# Patient Record
Sex: MALE | Race: WHITE | Employment: OTHER | URBAN - METROPOLITAN AREA
[De-identification: names, ages, dates, MRNs, and addresses within clinical notes are randomized per-mention and may not be internally consistent; named-entity substitution may affect disease eponyms.]

---

## 2018-04-26 ENCOUNTER — OFFICE VISIT (OUTPATIENT)
Dept: FAMILY MEDICINE CLINIC | Age: 83
End: 2018-04-26
Payer: MEDICARE

## 2018-04-26 VITALS
HEIGHT: 69 IN | DIASTOLIC BLOOD PRESSURE: 80 MMHG | OXYGEN SATURATION: 98 % | TEMPERATURE: 98.3 F | BODY MASS INDEX: 24.9 KG/M2 | WEIGHT: 168.12 LBS | SYSTOLIC BLOOD PRESSURE: 128 MMHG | RESPIRATION RATE: 16 BRPM | HEART RATE: 90 BPM

## 2018-04-26 DIAGNOSIS — E78.00 PURE HYPERCHOLESTEROLEMIA: Primary | ICD-10-CM

## 2018-04-26 DIAGNOSIS — M15.9 PRIMARY OSTEOARTHRITIS INVOLVING MULTIPLE JOINTS: ICD-10-CM

## 2018-04-26 DIAGNOSIS — I10 ESSENTIAL HYPERTENSION: ICD-10-CM

## 2018-04-26 PROCEDURE — G8427 DOCREV CUR MEDS BY ELIG CLIN: HCPCS | Performed by: FAMILY MEDICINE

## 2018-04-26 PROCEDURE — 1123F ACP DISCUSS/DSCN MKR DOCD: CPT | Performed by: FAMILY MEDICINE

## 2018-04-26 PROCEDURE — 1036F TOBACCO NON-USER: CPT | Performed by: FAMILY MEDICINE

## 2018-04-26 PROCEDURE — 99213 OFFICE O/P EST LOW 20 MIN: CPT | Performed by: FAMILY MEDICINE

## 2018-04-26 PROCEDURE — G8420 CALC BMI NORM PARAMETERS: HCPCS | Performed by: FAMILY MEDICINE

## 2018-04-26 PROCEDURE — 4040F PNEUMOC VAC/ADMIN/RCVD: CPT | Performed by: FAMILY MEDICINE

## 2018-04-26 RX ORDER — LISINOPRIL AND HYDROCHLOROTHIAZIDE 25; 20 MG/1; MG/1
1 TABLET ORAL DAILY
Qty: 90 TABLET | Refills: 1 | Status: SHIPPED | OUTPATIENT
Start: 2018-04-26 | End: 2018-06-14 | Stop reason: SDUPTHER

## 2018-04-26 RX ORDER — AMLODIPINE BESYLATE 2.5 MG/1
2.5 TABLET ORAL DAILY
Qty: 90 TABLET | Refills: 1 | Status: SHIPPED | OUTPATIENT
Start: 2018-04-26 | End: 2018-06-14 | Stop reason: SDUPTHER

## 2018-04-26 RX ORDER — FINASTERIDE 5 MG/1
5 TABLET, FILM COATED ORAL DAILY
Qty: 90 TABLET | Refills: 1 | Status: SHIPPED | OUTPATIENT
Start: 2018-04-26 | End: 2018-06-14 | Stop reason: SDUPTHER

## 2018-06-14 RX ORDER — LISINOPRIL AND HYDROCHLOROTHIAZIDE 25; 20 MG/1; MG/1
1 TABLET ORAL DAILY
Qty: 90 TABLET | Refills: 1 | Status: SHIPPED | OUTPATIENT
Start: 2018-06-14 | End: 2018-10-09 | Stop reason: SDUPTHER

## 2018-06-14 RX ORDER — FINASTERIDE 5 MG/1
5 TABLET, FILM COATED ORAL DAILY
Qty: 90 TABLET | Refills: 1 | Status: SHIPPED | OUTPATIENT
Start: 2018-06-14 | End: 2019-01-16 | Stop reason: SDUPTHER

## 2018-06-14 RX ORDER — AMLODIPINE BESYLATE 2.5 MG/1
2.5 TABLET ORAL DAILY
Qty: 90 TABLET | Refills: 1 | Status: SHIPPED | OUTPATIENT
Start: 2018-06-14 | End: 2018-10-09 | Stop reason: SDUPTHER

## 2018-07-24 ENCOUNTER — NURSE ONLY (OUTPATIENT)
Dept: FAMILY MEDICINE CLINIC | Age: 83
End: 2018-07-24
Payer: MEDICARE

## 2018-07-24 ENCOUNTER — HOSPITAL ENCOUNTER (OUTPATIENT)
Age: 83
Discharge: HOME | End: 2018-07-26
Payer: MEDICARE

## 2018-07-24 DIAGNOSIS — E78.00 PURE HYPERCHOLESTEROLEMIA: ICD-10-CM

## 2018-07-24 DIAGNOSIS — I10 ESSENTIAL HYPERTENSION: ICD-10-CM

## 2018-07-24 LAB
ALBUMIN SERPL-MCNC: 3.9 G/DL (ref 3.5–5.2)
ALP BLD-CCNC: 61 U/L (ref 40–129)
ALT SERPL-CCNC: 9 U/L (ref 0–40)
ANION GAP SERPL CALCULATED.3IONS-SCNC: 19 MMOL/L (ref 7–16)
AST SERPL-CCNC: 15 U/L (ref 0–39)
BASOPHILS ABSOLUTE: 0.08 E9/L (ref 0–0.2)
BASOPHILS RELATIVE PERCENT: 0.9 % (ref 0–2)
BILIRUB SERPL-MCNC: 0.4 MG/DL (ref 0–1.2)
BUN BLDV-MCNC: 26 MG/DL (ref 8–23)
CALCIUM SERPL-MCNC: 9.4 MG/DL (ref 8.6–10.2)
CHLORIDE BLD-SCNC: 100 MMOL/L (ref 98–107)
CHOLESTEROL, TOTAL: 152 MG/DL (ref 0–199)
CO2: 25 MMOL/L (ref 22–29)
CREAT SERPL-MCNC: 1.5 MG/DL (ref 0.7–1.2)
EOSINOPHILS ABSOLUTE: 0.07 E9/L (ref 0.05–0.5)
EOSINOPHILS RELATIVE PERCENT: 0.8 % (ref 0–6)
GFR AFRICAN AMERICAN: 54
GFR NON-AFRICAN AMERICAN: 44 ML/MIN/1.73
GLUCOSE BLD-MCNC: 93 MG/DL (ref 74–109)
HCT VFR BLD CALC: 35.3 % (ref 37–54)
HDLC SERPL-MCNC: 42 MG/DL
HEMOGLOBIN: 11.6 G/DL (ref 12.5–16.5)
IMMATURE GRANULOCYTES #: 0.02 E9/L
IMMATURE GRANULOCYTES %: 0.2 % (ref 0–5)
LDL CHOLESTEROL CALCULATED: 83 MG/DL (ref 0–99)
LYMPHOCYTES ABSOLUTE: 1.58 E9/L (ref 1.5–4)
LYMPHOCYTES RELATIVE PERCENT: 18.2 % (ref 20–42)
MCH RBC QN AUTO: 31.9 PG (ref 26–35)
MCHC RBC AUTO-ENTMCNC: 32.9 % (ref 32–34.5)
MCV RBC AUTO: 97 FL (ref 80–99.9)
MONOCYTES ABSOLUTE: 0.6 E9/L (ref 0.1–0.95)
MONOCYTES RELATIVE PERCENT: 6.9 % (ref 2–12)
NEUTROPHILS ABSOLUTE: 6.35 E9/L (ref 1.8–7.3)
NEUTROPHILS RELATIVE PERCENT: 73 % (ref 43–80)
PDW BLD-RTO: 12.8 FL (ref 11.5–15)
PLATELET # BLD: 212 E9/L (ref 130–450)
PMV BLD AUTO: 11.4 FL (ref 7–12)
POTASSIUM SERPL-SCNC: 3.7 MMOL/L (ref 3.5–5)
RBC # BLD: 3.64 E12/L (ref 3.8–5.8)
SODIUM BLD-SCNC: 144 MMOL/L (ref 132–146)
TOTAL PROTEIN: 7.3 G/DL (ref 6.4–8.3)
TRIGL SERPL-MCNC: 137 MG/DL (ref 0–149)
VLDLC SERPL CALC-MCNC: 27 MG/DL
WBC # BLD: 8.7 E9/L (ref 4.5–11.5)

## 2018-07-24 PROCEDURE — 36415 COLL VENOUS BLD VENIPUNCTURE: CPT | Performed by: FAMILY MEDICINE

## 2018-07-24 PROCEDURE — 85025 COMPLETE CBC W/AUTO DIFF WBC: CPT

## 2018-07-24 PROCEDURE — 80053 COMPREHEN METABOLIC PANEL: CPT

## 2018-07-24 PROCEDURE — 80061 LIPID PANEL: CPT

## 2018-07-26 ENCOUNTER — OFFICE VISIT (OUTPATIENT)
Dept: FAMILY MEDICINE CLINIC | Age: 83
End: 2018-07-26
Payer: MEDICARE

## 2018-07-26 VITALS
SYSTOLIC BLOOD PRESSURE: 126 MMHG | HEIGHT: 69 IN | BODY MASS INDEX: 25.59 KG/M2 | HEART RATE: 61 BPM | WEIGHT: 172.8 LBS | RESPIRATION RATE: 16 BRPM | DIASTOLIC BLOOD PRESSURE: 72 MMHG | TEMPERATURE: 97.4 F | OXYGEN SATURATION: 94 %

## 2018-07-26 DIAGNOSIS — N18.30 CKD (CHRONIC KIDNEY DISEASE) STAGE 3, GFR 30-59 ML/MIN (HCC): ICD-10-CM

## 2018-07-26 DIAGNOSIS — D50.0 IRON DEFICIENCY ANEMIA DUE TO CHRONIC BLOOD LOSS: ICD-10-CM

## 2018-07-26 DIAGNOSIS — I10 ESSENTIAL HYPERTENSION: ICD-10-CM

## 2018-07-26 DIAGNOSIS — E78.00 PURE HYPERCHOLESTEROLEMIA: Primary | ICD-10-CM

## 2018-07-26 DIAGNOSIS — M15.9 PRIMARY OSTEOARTHRITIS INVOLVING MULTIPLE JOINTS: ICD-10-CM

## 2018-07-26 PROCEDURE — 1036F TOBACCO NON-USER: CPT | Performed by: FAMILY MEDICINE

## 2018-07-26 PROCEDURE — G8417 CALC BMI ABV UP PARAM F/U: HCPCS | Performed by: FAMILY MEDICINE

## 2018-07-26 PROCEDURE — 1101F PT FALLS ASSESS-DOCD LE1/YR: CPT | Performed by: FAMILY MEDICINE

## 2018-07-26 PROCEDURE — G8427 DOCREV CUR MEDS BY ELIG CLIN: HCPCS | Performed by: FAMILY MEDICINE

## 2018-07-26 PROCEDURE — 4040F PNEUMOC VAC/ADMIN/RCVD: CPT | Performed by: FAMILY MEDICINE

## 2018-07-26 PROCEDURE — 99214 OFFICE O/P EST MOD 30 MIN: CPT | Performed by: FAMILY MEDICINE

## 2018-07-26 PROCEDURE — 1123F ACP DISCUSS/DSCN MKR DOCD: CPT | Performed by: FAMILY MEDICINE

## 2018-07-26 NOTE — PROGRESS NOTES
venous distension, lymphadenopathy or thyromegaly Trachea midline  Lungs: Lungs clear to auscultation bilaterally. No ronchi, crackles or wheezes  Heart: S1 S2  Regular rate and rhythm. No rub, murmur or gallop  Abdomen: Abdomen soft, non-tender. BS normal. No masses, organomegaly  Extremities: Arthritic changes are noted. Movements are limited. Pedal pulses are normal.  Musculoskeletal: Muscular strength appears intact. No joint effusion, tenderness, swelling or warmth  Neuro:  No focal motor or sensory deficits        ASSESSMENT     Patient Active Problem List    Diagnosis Date Noted    Primary osteoarthritis involving multiple joints 09/24/2012     Priority: High     Class: Chronic    Pure hypercholesterolemia      Priority: High     Class: Chronic    Essential hypertension      Priority: High     Class: Chronic    Iron deficiency anemia due to chronic blood loss 07/26/2018    CKD (chronic kidney disease) stage 3, GFR 30-59 ml/min 07/26/2018        Diagnosis:     ICD-10-CM ICD-9-CM    1. Pure hypercholesterolemia E78.00 272.0     CONTROLLED   2. Essential hypertension I10 401.9     CONTROLLED   3. Primary osteoarthritis involving multiple joints M15.0 715.09     STABLE   4. Iron deficiency anemia due to chronic blood loss D50.0 280.0 [facility] [address] [address]    PERSIOSTANT   5. CKD (chronic kidney disease) stage 3, GFR 30-59 ml/min N18.3 585.3        PLAN:      LABORATORY RESULTS 7/25/2018/REVIEWED AND DISCUSSED. Patient Instructions   LOW SALT  AND LOW FAT DIET. CONTINUE CURRENT MEDICATIONS TAKING REGULARLY. REGULAR WALKING ADVISED. SEE DR. Lin Snyder AS SCHEDULED. NEXT APPOINTMENT IN 2 MONTHS. Return in about 2 months (around 9/26/2018). I have reviewed my findings and recommendations with Kali Head.     Electronically signed by Fernando Vargas MD on 7/26/18 at 10:58 AM

## 2018-08-27 ENCOUNTER — HOSPITAL ENCOUNTER (OUTPATIENT)
Age: 83
Discharge: HOME | End: 2018-08-27
Payer: MEDICARE

## 2018-08-27 LAB
BASOPHILS ABSOLUTE: 0.06 E9/L (ref 0–0.2)
BASOPHILS RELATIVE PERCENT: 0.9 % (ref 0–2)
EOSINOPHILS ABSOLUTE: 0.06 E9/L (ref 0.05–0.5)
EOSINOPHILS RELATIVE PERCENT: 0.9 % (ref 0–6)
FERRITIN: 123 NG/ML
FOLATE: >20 NG/ML (ref 4.8–24.2)
HCT VFR BLD CALC: 37 % (ref 37–54)
HEMOGLOBIN: 12.3 G/DL (ref 12.5–16.5)
IMMATURE GRANULOCYTES #: 0.02 E9/L
IMMATURE GRANULOCYTES %: 0.3 % (ref 0–5)
IRON SATURATION: 35 % (ref 20–55)
IRON: 108 MCG/DL (ref 59–158)
LYMPHOCYTES ABSOLUTE: 1.93 E9/L (ref 1.5–4)
LYMPHOCYTES RELATIVE PERCENT: 29.4 % (ref 20–42)
MCH RBC QN AUTO: 31.5 PG (ref 26–35)
MCHC RBC AUTO-ENTMCNC: 33.2 % (ref 32–34.5)
MCV RBC AUTO: 94.6 FL (ref 80–99.9)
MONOCYTES ABSOLUTE: 0.48 E9/L (ref 0.1–0.95)
MONOCYTES RELATIVE PERCENT: 7.3 % (ref 2–12)
NEUTROPHILS ABSOLUTE: 4.02 E9/L (ref 1.8–7.3)
NEUTROPHILS RELATIVE PERCENT: 61.2 % (ref 43–80)
PDW BLD-RTO: 12.2 FL (ref 11.5–15)
PLATELET # BLD: 207 E9/L (ref 130–450)
PMV BLD AUTO: 11 FL (ref 7–12)
RBC # BLD: 3.91 E12/L (ref 3.8–5.8)
TOTAL IRON BINDING CAPACITY: 309 MCG/DL (ref 250–450)
VITAMIN B-12: 1045 PG/ML (ref 211–946)
WBC # BLD: 6.6 E9/L (ref 4.5–11.5)

## 2018-08-27 PROCEDURE — 85025 COMPLETE CBC W/AUTO DIFF WBC: CPT

## 2018-08-27 PROCEDURE — 36415 COLL VENOUS BLD VENIPUNCTURE: CPT

## 2018-08-27 PROCEDURE — 83540 ASSAY OF IRON: CPT

## 2018-08-27 PROCEDURE — 82746 ASSAY OF FOLIC ACID SERUM: CPT

## 2018-08-27 PROCEDURE — 82607 VITAMIN B-12: CPT

## 2018-08-27 PROCEDURE — 82728 ASSAY OF FERRITIN: CPT

## 2018-08-27 PROCEDURE — 83550 IRON BINDING TEST: CPT

## 2018-09-24 ENCOUNTER — HOSPITAL ENCOUNTER (OUTPATIENT)
Age: 83
Discharge: HOME | End: 2018-09-24
Payer: MEDICARE

## 2018-09-24 LAB
HCT VFR BLD CALC: 38.4 % (ref 37–54)
HEMOGLOBIN: 12.7 G/DL (ref 12.5–16.5)
MCH RBC QN AUTO: 31 PG (ref 26–35)
MCHC RBC AUTO-ENTMCNC: 33.1 % (ref 32–34.5)
MCV RBC AUTO: 93.7 FL (ref 80–99.9)
PDW BLD-RTO: 12.3 FL (ref 11.5–15)
PLATELET # BLD: 204 E9/L (ref 130–450)
PMV BLD AUTO: 10.6 FL (ref 7–12)
RBC # BLD: 4.1 E12/L (ref 3.8–5.8)
WBC # BLD: 6.1 E9/L (ref 4.5–11.5)

## 2018-09-24 PROCEDURE — 36415 COLL VENOUS BLD VENIPUNCTURE: CPT

## 2018-09-24 PROCEDURE — 85027 COMPLETE CBC AUTOMATED: CPT

## 2018-10-09 ENCOUNTER — OFFICE VISIT (OUTPATIENT)
Dept: FAMILY MEDICINE CLINIC | Age: 83
End: 2018-10-09
Payer: MEDICARE

## 2018-10-09 VITALS
RESPIRATION RATE: 16 BRPM | SYSTOLIC BLOOD PRESSURE: 120 MMHG | HEIGHT: 69 IN | HEART RATE: 68 BPM | DIASTOLIC BLOOD PRESSURE: 70 MMHG | BODY MASS INDEX: 25.45 KG/M2 | OXYGEN SATURATION: 99 % | TEMPERATURE: 97.5 F | WEIGHT: 171.8 LBS

## 2018-10-09 DIAGNOSIS — I10 ESSENTIAL HYPERTENSION: ICD-10-CM

## 2018-10-09 DIAGNOSIS — N18.30 CKD (CHRONIC KIDNEY DISEASE) STAGE 3, GFR 30-59 ML/MIN (HCC): ICD-10-CM

## 2018-10-09 DIAGNOSIS — D50.0 IRON DEFICIENCY ANEMIA DUE TO CHRONIC BLOOD LOSS: ICD-10-CM

## 2018-10-09 DIAGNOSIS — M15.9 PRIMARY OSTEOARTHRITIS INVOLVING MULTIPLE JOINTS: ICD-10-CM

## 2018-10-09 DIAGNOSIS — E78.00 PURE HYPERCHOLESTEROLEMIA: Primary | ICD-10-CM

## 2018-10-09 PROCEDURE — 1036F TOBACCO NON-USER: CPT | Performed by: FAMILY MEDICINE

## 2018-10-09 PROCEDURE — G0008 ADMIN INFLUENZA VIRUS VAC: HCPCS | Performed by: FAMILY MEDICINE

## 2018-10-09 PROCEDURE — G8427 DOCREV CUR MEDS BY ELIG CLIN: HCPCS | Performed by: FAMILY MEDICINE

## 2018-10-09 PROCEDURE — G8510 SCR DEP NEG, NO PLAN REQD: HCPCS | Performed by: FAMILY MEDICINE

## 2018-10-09 PROCEDURE — G8417 CALC BMI ABV UP PARAM F/U: HCPCS | Performed by: FAMILY MEDICINE

## 2018-10-09 PROCEDURE — 1101F PT FALLS ASSESS-DOCD LE1/YR: CPT | Performed by: FAMILY MEDICINE

## 2018-10-09 PROCEDURE — 4040F PNEUMOC VAC/ADMIN/RCVD: CPT | Performed by: FAMILY MEDICINE

## 2018-10-09 PROCEDURE — G8482 FLU IMMUNIZE ORDER/ADMIN: HCPCS | Performed by: FAMILY MEDICINE

## 2018-10-09 PROCEDURE — 90662 IIV NO PRSV INCREASED AG IM: CPT | Performed by: FAMILY MEDICINE

## 2018-10-09 PROCEDURE — 1123F ACP DISCUSS/DSCN MKR DOCD: CPT | Performed by: FAMILY MEDICINE

## 2018-10-09 PROCEDURE — 99213 OFFICE O/P EST LOW 20 MIN: CPT | Performed by: FAMILY MEDICINE

## 2018-10-09 RX ORDER — AMLODIPINE BESYLATE 2.5 MG/1
2.5 TABLET ORAL DAILY
Qty: 90 TABLET | Refills: 1 | Status: SHIPPED | OUTPATIENT
Start: 2018-10-09 | End: 2019-01-16 | Stop reason: SDUPTHER

## 2018-10-09 RX ORDER — SIMVASTATIN 40 MG
40 TABLET ORAL NIGHTLY
Qty: 90 TABLET | Refills: 0 | Status: SHIPPED | OUTPATIENT
Start: 2018-10-09 | End: 2019-01-16 | Stop reason: SDUPTHER

## 2018-10-09 RX ORDER — LISINOPRIL AND HYDROCHLOROTHIAZIDE 25; 20 MG/1; MG/1
1 TABLET ORAL DAILY
Qty: 90 TABLET | Refills: 1 | Status: SHIPPED | OUTPATIENT
Start: 2018-10-09 | End: 2019-01-16 | Stop reason: SDUPTHER

## 2018-10-09 RX ORDER — PANTOPRAZOLE SODIUM 20 MG/1
20 TABLET, DELAYED RELEASE ORAL DAILY
COMMUNITY
End: 2020-01-14

## 2018-10-09 ASSESSMENT — PATIENT HEALTH QUESTIONNAIRE - PHQ9
2. FEELING DOWN, DEPRESSED OR HOPELESS: 0
1. LITTLE INTEREST OR PLEASURE IN DOING THINGS: 0
SUM OF ALL RESPONSES TO PHQ QUESTIONS 1-9: 0
SUM OF ALL RESPONSES TO PHQ QUESTIONS 1-9: 0
SUM OF ALL RESPONSES TO PHQ9 QUESTIONS 1 & 2: 0

## 2018-11-26 ENCOUNTER — HOSPITAL ENCOUNTER (OUTPATIENT)
Age: 83
Discharge: HOME | End: 2018-11-26
Payer: MEDICARE

## 2018-11-26 LAB
ALBUMIN SERPL-MCNC: 4 G/DL (ref 3.5–5.2)
ALP BLD-CCNC: 62 U/L (ref 40–129)
ALT SERPL-CCNC: 8 U/L (ref 0–40)
ANION GAP SERPL CALCULATED.3IONS-SCNC: 15 MMOL/L (ref 7–16)
AST SERPL-CCNC: 11 U/L (ref 0–39)
BASOPHILS ABSOLUTE: 0.05 E9/L (ref 0–0.2)
BASOPHILS RELATIVE PERCENT: 0.9 % (ref 0–2)
BILIRUB SERPL-MCNC: 0.5 MG/DL (ref 0–1.2)
BUN BLDV-MCNC: 27 MG/DL (ref 8–23)
CALCIUM SERPL-MCNC: 9.3 MG/DL (ref 8.6–10.2)
CHLORIDE BLD-SCNC: 104 MMOL/L (ref 98–107)
CO2: 28 MMOL/L (ref 22–29)
CREAT SERPL-MCNC: 1.6 MG/DL (ref 0.7–1.2)
EOSINOPHILS ABSOLUTE: 0.08 E9/L (ref 0.05–0.5)
EOSINOPHILS RELATIVE PERCENT: 1.4 % (ref 0–6)
GFR AFRICAN AMERICAN: 50
GFR NON-AFRICAN AMERICAN: 41 ML/MIN/1.73
GLUCOSE BLD-MCNC: 112 MG/DL (ref 74–99)
HCT VFR BLD CALC: 35.9 % (ref 37–54)
HEMOGLOBIN: 11.7 G/DL (ref 12.5–16.5)
IMMATURE GRANULOCYTES #: 0.01 E9/L
IMMATURE GRANULOCYTES %: 0.2 % (ref 0–5)
LYMPHOCYTES ABSOLUTE: 1.76 E9/L (ref 1.5–4)
LYMPHOCYTES RELATIVE PERCENT: 31.9 % (ref 20–42)
MCH RBC QN AUTO: 31 PG (ref 26–35)
MCHC RBC AUTO-ENTMCNC: 32.6 % (ref 32–34.5)
MCV RBC AUTO: 95.2 FL (ref 80–99.9)
MONOCYTES ABSOLUTE: 0.37 E9/L (ref 0.1–0.95)
MONOCYTES RELATIVE PERCENT: 6.7 % (ref 2–12)
NEUTROPHILS ABSOLUTE: 3.25 E9/L (ref 1.8–7.3)
NEUTROPHILS RELATIVE PERCENT: 58.9 % (ref 43–80)
PDW BLD-RTO: 12 FL (ref 11.5–15)
PLATELET # BLD: 195 E9/L (ref 130–450)
PMV BLD AUTO: 9.8 FL (ref 7–12)
POTASSIUM SERPL-SCNC: 3.5 MMOL/L (ref 3.5–5)
RBC # BLD: 3.77 E12/L (ref 3.8–5.8)
SODIUM BLD-SCNC: 147 MMOL/L (ref 132–146)
TOTAL PROTEIN: 7.1 G/DL (ref 6.4–8.3)
WBC # BLD: 5.5 E9/L (ref 4.5–11.5)

## 2018-11-26 PROCEDURE — 80053 COMPREHEN METABOLIC PANEL: CPT

## 2018-11-26 PROCEDURE — 85025 COMPLETE CBC W/AUTO DIFF WBC: CPT

## 2018-11-26 PROCEDURE — 36415 COLL VENOUS BLD VENIPUNCTURE: CPT

## 2019-01-16 ENCOUNTER — OFFICE VISIT (OUTPATIENT)
Dept: FAMILY MEDICINE CLINIC | Age: 84
End: 2019-01-16
Payer: MEDICARE

## 2019-01-16 VITALS
HEIGHT: 69 IN | RESPIRATION RATE: 16 BRPM | BODY MASS INDEX: 25.77 KG/M2 | WEIGHT: 174 LBS | SYSTOLIC BLOOD PRESSURE: 126 MMHG | DIASTOLIC BLOOD PRESSURE: 62 MMHG | HEART RATE: 81 BPM | TEMPERATURE: 97.4 F | OXYGEN SATURATION: 98 %

## 2019-01-16 DIAGNOSIS — N18.30 CKD (CHRONIC KIDNEY DISEASE) STAGE 3, GFR 30-59 ML/MIN (HCC): ICD-10-CM

## 2019-01-16 DIAGNOSIS — E78.00 PURE HYPERCHOLESTEROLEMIA: Primary | ICD-10-CM

## 2019-01-16 DIAGNOSIS — I10 ESSENTIAL HYPERTENSION: ICD-10-CM

## 2019-01-16 DIAGNOSIS — D50.0 IRON DEFICIENCY ANEMIA DUE TO CHRONIC BLOOD LOSS: ICD-10-CM

## 2019-01-16 PROCEDURE — G8482 FLU IMMUNIZE ORDER/ADMIN: HCPCS | Performed by: FAMILY MEDICINE

## 2019-01-16 PROCEDURE — G8427 DOCREV CUR MEDS BY ELIG CLIN: HCPCS | Performed by: FAMILY MEDICINE

## 2019-01-16 PROCEDURE — 1036F TOBACCO NON-USER: CPT | Performed by: FAMILY MEDICINE

## 2019-01-16 PROCEDURE — 1101F PT FALLS ASSESS-DOCD LE1/YR: CPT | Performed by: FAMILY MEDICINE

## 2019-01-16 PROCEDURE — G8417 CALC BMI ABV UP PARAM F/U: HCPCS | Performed by: FAMILY MEDICINE

## 2019-01-16 PROCEDURE — 99213 OFFICE O/P EST LOW 20 MIN: CPT | Performed by: FAMILY MEDICINE

## 2019-01-16 PROCEDURE — 1123F ACP DISCUSS/DSCN MKR DOCD: CPT | Performed by: FAMILY MEDICINE

## 2019-01-16 PROCEDURE — 4040F PNEUMOC VAC/ADMIN/RCVD: CPT | Performed by: FAMILY MEDICINE

## 2019-01-16 RX ORDER — LISINOPRIL AND HYDROCHLOROTHIAZIDE 25; 20 MG/1; MG/1
1 TABLET ORAL DAILY
Qty: 90 TABLET | Refills: 1 | Status: SHIPPED | OUTPATIENT
Start: 2019-01-16 | End: 2019-04-17 | Stop reason: SDUPTHER

## 2019-01-16 RX ORDER — SIMVASTATIN 40 MG
40 TABLET ORAL NIGHTLY
Qty: 90 TABLET | Refills: 0 | Status: SHIPPED | OUTPATIENT
Start: 2019-01-16 | End: 2019-04-17 | Stop reason: SDUPTHER

## 2019-01-16 RX ORDER — METOPROLOL SUCCINATE 25 MG/1
TABLET, EXTENDED RELEASE ORAL
COMMUNITY
Start: 2019-01-11 | End: 2020-06-17 | Stop reason: SDUPTHER

## 2019-01-16 RX ORDER — AMLODIPINE BESYLATE 2.5 MG/1
2.5 TABLET ORAL DAILY
Qty: 90 TABLET | Refills: 1 | Status: SHIPPED | OUTPATIENT
Start: 2019-01-16 | End: 2019-04-17 | Stop reason: SDUPTHER

## 2019-01-16 RX ORDER — FINASTERIDE 5 MG/1
5 TABLET, FILM COATED ORAL DAILY
Qty: 90 TABLET | Refills: 1 | Status: SHIPPED | OUTPATIENT
Start: 2019-01-16 | End: 2019-04-17 | Stop reason: SDUPTHER

## 2019-01-25 ENCOUNTER — HOSPITAL ENCOUNTER (OUTPATIENT)
Age: 84
Discharge: HOME | End: 2019-01-25
Payer: MEDICARE

## 2019-01-25 LAB
BASOPHILS ABSOLUTE: 0.06 E9/L (ref 0–0.2)
BASOPHILS RELATIVE PERCENT: 1 % (ref 0–2)
EOSINOPHILS ABSOLUTE: 0.08 E9/L (ref 0.05–0.5)
EOSINOPHILS RELATIVE PERCENT: 1.3 % (ref 0–6)
HCT VFR BLD CALC: 35.3 % (ref 37–54)
HEMOGLOBIN: 11.6 G/DL (ref 12.5–16.5)
IMMATURE GRANULOCYTES #: 0.02 E9/L
IMMATURE GRANULOCYTES %: 0.3 % (ref 0–5)
LYMPHOCYTES ABSOLUTE: 1.95 E9/L (ref 1.5–4)
LYMPHOCYTES RELATIVE PERCENT: 32.8 % (ref 20–42)
MCH RBC QN AUTO: 31.5 PG (ref 26–35)
MCHC RBC AUTO-ENTMCNC: 32.9 % (ref 32–34.5)
MCV RBC AUTO: 95.9 FL (ref 80–99.9)
MONOCYTES ABSOLUTE: 0.4 E9/L (ref 0.1–0.95)
MONOCYTES RELATIVE PERCENT: 6.7 % (ref 2–12)
NEUTROPHILS ABSOLUTE: 3.44 E9/L (ref 1.8–7.3)
NEUTROPHILS RELATIVE PERCENT: 57.9 % (ref 43–80)
PDW BLD-RTO: 12.2 FL (ref 11.5–15)
PLATELET # BLD: 213 E9/L (ref 130–450)
PMV BLD AUTO: 11 FL (ref 7–12)
RBC # BLD: 3.68 E12/L (ref 3.8–5.8)
WBC # BLD: 6 E9/L (ref 4.5–11.5)

## 2019-01-25 PROCEDURE — 36415 COLL VENOUS BLD VENIPUNCTURE: CPT

## 2019-01-25 PROCEDURE — 85025 COMPLETE CBC W/AUTO DIFF WBC: CPT

## 2019-04-17 ENCOUNTER — OFFICE VISIT (OUTPATIENT)
Dept: FAMILY MEDICINE CLINIC | Age: 84
End: 2019-04-17
Payer: MEDICARE

## 2019-04-17 DIAGNOSIS — N18.30 CKD (CHRONIC KIDNEY DISEASE) STAGE 3, GFR 30-59 ML/MIN (HCC): ICD-10-CM

## 2019-04-17 DIAGNOSIS — Z13.31 SCREENING FOR DEPRESSION: ICD-10-CM

## 2019-04-17 DIAGNOSIS — R73.09 ELEVATED HEMOGLOBIN A1C: ICD-10-CM

## 2019-04-17 DIAGNOSIS — D50.0 IRON DEFICIENCY ANEMIA DUE TO CHRONIC BLOOD LOSS: ICD-10-CM

## 2019-04-17 DIAGNOSIS — E78.00 PURE HYPERCHOLESTEROLEMIA: Primary | ICD-10-CM

## 2019-04-17 DIAGNOSIS — I10 ESSENTIAL HYPERTENSION: ICD-10-CM

## 2019-04-17 LAB — HBA1C MFR BLD: 5.8 %

## 2019-04-17 PROCEDURE — G8428 CUR MEDS NOT DOCUMENT: HCPCS | Performed by: FAMILY MEDICINE

## 2019-04-17 PROCEDURE — G0444 DEPRESSION SCREEN ANNUAL: HCPCS | Performed by: FAMILY MEDICINE

## 2019-04-17 PROCEDURE — G8417 CALC BMI ABV UP PARAM F/U: HCPCS | Performed by: FAMILY MEDICINE

## 2019-04-17 PROCEDURE — 99213 OFFICE O/P EST LOW 20 MIN: CPT | Performed by: FAMILY MEDICINE

## 2019-04-17 PROCEDURE — 83036 HEMOGLOBIN GLYCOSYLATED A1C: CPT | Performed by: FAMILY MEDICINE

## 2019-04-17 PROCEDURE — 1123F ACP DISCUSS/DSCN MKR DOCD: CPT | Performed by: FAMILY MEDICINE

## 2019-04-17 PROCEDURE — 4040F PNEUMOC VAC/ADMIN/RCVD: CPT | Performed by: FAMILY MEDICINE

## 2019-04-17 PROCEDURE — 1036F TOBACCO NON-USER: CPT | Performed by: FAMILY MEDICINE

## 2019-04-17 RX ORDER — AMLODIPINE BESYLATE 2.5 MG/1
2.5 TABLET ORAL DAILY
Qty: 90 TABLET | Refills: 1 | Status: SHIPPED | OUTPATIENT
Start: 2019-04-17 | End: 2019-08-14 | Stop reason: SDUPTHER

## 2019-04-17 RX ORDER — SIMVASTATIN 40 MG
40 TABLET ORAL NIGHTLY
Qty: 90 TABLET | Refills: 0 | Status: SHIPPED | OUTPATIENT
Start: 2019-04-17 | End: 2019-08-14 | Stop reason: SDUPTHER

## 2019-04-17 RX ORDER — FINASTERIDE 5 MG/1
5 TABLET, FILM COATED ORAL DAILY
Qty: 90 TABLET | Refills: 1 | Status: SHIPPED | OUTPATIENT
Start: 2019-04-17 | End: 2019-08-14 | Stop reason: SDUPTHER

## 2019-04-17 RX ORDER — LISINOPRIL AND HYDROCHLOROTHIAZIDE 25; 20 MG/1; MG/1
1 TABLET ORAL DAILY
Qty: 90 TABLET | Refills: 1 | Status: SHIPPED | OUTPATIENT
Start: 2019-04-17 | End: 2019-08-14 | Stop reason: SDUPTHER

## 2019-04-17 ASSESSMENT — PATIENT HEALTH QUESTIONNAIRE - PHQ9
SUM OF ALL RESPONSES TO PHQ QUESTIONS 1-9: 0
1. LITTLE INTEREST OR PLEASURE IN DOING THINGS: 0
2. FEELING DOWN, DEPRESSED OR HOPELESS: 0
SUM OF ALL RESPONSES TO PHQ9 QUESTIONS 1 & 2: 0
SUM OF ALL RESPONSES TO PHQ QUESTIONS 1-9: 0

## 2019-04-17 NOTE — PROGRESS NOTES
OFFICE PROGRESS NOTE      SUBJECTIVE:        Patient ID:   Clint Forman is a 80 y.o. male who presents for   Chief Complaint   Patient presents with    3 Month Follow-Up           HPI:     FEELS GOOD. WATCHING DIET GOOD. WALKING FOR EXERCISE. TAKING MEDICATIONS REGULARLY. SEEN DR. Paul Pepper. HAD ENDOSCOPY DONE. NO SOURCE OF ACTIVE BLEEDING FOUND. SEEING DR. Paul Pepper AGAIN SOON FOR  FOLLOW UP. Prior to Admission medications    Medication Sig Start Date End Date Taking? Authorizing Provider   amLODIPine (NORVASC) 2.5 MG tablet Take 1 tablet by mouth daily 4/17/19  Yes Danilo Young MD   finasteride (PROSCAR) 5 MG tablet Take 1 tablet by mouth daily 4/17/19  Yes Danilo Young MD   lisinopril-hydrochlorothiazide (PRINZIDE;ZESTORETIC) 20-25 MG per tablet Take 1 tablet by mouth daily 4/17/19  Yes Danilo Young MD   simvastatin (ZOCOR) 40 MG tablet Take 1 tablet by mouth nightly 4/17/19  Yes Danilo Young MD   metoprolol succinate (TOPROL XL) 25 MG extended release tablet  1/11/19   Historical Provider, MD   pantoprazole (PROTONIX) 20 MG tablet Take 20 mg by mouth daily    Historical Provider, MD   timolol (TIMOPTIC) 0.5 % ophthalmic solution  5/4/16   Historical Provider, MD   Multiple Vitamin (ONE-A-DAY ESSENTIAL) TABS Take 1 tablet by mouth daily. Historical Provider, MD Jann Rodríguez Serenoa repens, 450 MG CAPS Take 2 tablets by mouth daily. Historical Provider, MD   Misc Natural Products (OSTEO BI-FLEX JOINT SHIELD PO) Take 2 tablets by mouth daily. Historical Provider, MD   aspirin 81 MG EC tablet Take 81 mg by mouth daily.       Historical Provider, MD     Social History     Socioeconomic History    Marital status:      Spouse name: Not on file    Number of children: Not on file    Years of education: Not on file    Highest education level: Not on file   Occupational History    Not on file   Social Needs    Financial resource frequency, dysuria, nocturia, hesitancy, or incontinence  Musculoskeletal: joint pains off and on. Morning stiffness. Ambulating well  Neurologic: no paralysis, paresis, paresthesia, seizures, tremors, or headaches  Hematologic/Lymphatic/Immunologic: no anemia, abnormal bleeding/bruising, fever, chills, night sweats, swollen glands, or unexplained weight loss  Endocrine: no heat or cold intolerance and no polyphagia, polydipsia, or polyuria        OBJECTIVE:     VS:  Wt Readings from Last 3 Encounters:   01/16/19 174 lb (78.9 kg)   10/09/18 171 lb 12.8 oz (77.9 kg)   07/26/18 172 lb 12.8 oz (78.4 kg)     Temp Readings from Last 3 Encounters:   01/16/19 97.4 °F (36.3 °C) (Temporal)   10/09/18 97.5 °F (36.4 °C) (Temporal)   07/26/18 97.4 °F (36.3 °C) (Temporal)     BP Readings from Last 3 Encounters:   01/16/19 126/62   10/09/18 120/70   07/26/18 126/72        General appearance: Alert, Awake, Oriented times 3, no distress  Skin: Warm and dry  Head: Normocephalic. No masses, lesions or tenderness noted  Eyes: Conjunctivae appear normal. PERLE  Ears: External ears normal  Nose/Sinuses: Nares normal. Septum midline. Mucosa normal. No drainage  Oropharynx: Oropharynx clear with no exudate seen  Neck: Neck supple. No jugular venous distension, lymphadenopathy or thyromegaly Trachea midline  Lungs: Lungs clear to auscultation bilaterally. No ronchi, crackles or wheezes  Heart: S1 S2  Regular rate and rhythm. No rub, murmur or gallop  Abdomen: Abdomen soft, non-tender. BS normal. No masses, organomegaly  Extremities: Arthritic changes are noted. Movements are limited. Pedal pulses are normal.  Musculoskeletal: Muscular strength appears intact.  No joint effusion, tenderness, swelling or warmth  Neuro:  No focal motor or sensory deficits        ASSESSMENT     Patient Active Problem List    Diagnosis Date Noted    Primary osteoarthritis involving multiple joints 09/24/2012     Priority: High     Class: Chronic    Pure hypercholesterolemia      Priority: High     Class: Chronic    Essential hypertension      Priority: High     Class: Chronic    Iron deficiency anemia due to chronic blood loss 07/26/2018    CKD (chronic kidney disease) stage 3, GFR 30-59 ml/min ([facility]) 07/26/2018        Diagnosis:     ICD-10-CM    1. Pure hypercholesterolemia E78.00    2. Iron deficiency anemia due to chronic blood loss D50.0    3. Essential hypertension I10    4. CKD (chronic kidney disease) stage 3, GFR 30-59 ml/min ([facility]) N18.3    5. Elevated hemoglobin A1c R73.09 POCT glycosylated hemoglobin (Hb A1C)   6. Screening for depression Z13.31 [place] DEPRESSION SCREEN ANNUAL       PLAN:           Patient Instructions   LOW SALT,LOW CARB. AND LOW FAT DIET. CONTINUE CURRENT MEDICATIONS TAKING REGULARLY. REGULAR WALKING ADVISED. NEXT APPOINTMENT IN 3 MONTHS. Return in about 3 months (around 7/17/2019). I have reviewed my findings and recommendations with Marita Adams.     Electronically signed by Naman Tang MD on 4/17/19 at 10:32 AM

## 2019-04-30 ENCOUNTER — HOSPITAL ENCOUNTER (OUTPATIENT)
Age: 84
Discharge: HOME | End: 2019-04-30
Payer: MEDICARE

## 2019-04-30 LAB
HCT VFR BLD CALC: 36.4 % (ref 37–54)
HEMOGLOBIN: 11.9 G/DL (ref 12.5–16.5)
MCH RBC QN AUTO: 31 PG (ref 26–35)
MCHC RBC AUTO-ENTMCNC: 32.7 % (ref 32–34.5)
MCV RBC AUTO: 94.8 FL (ref 80–99.9)
PDW BLD-RTO: 12.1 FL (ref 11.5–15)
PLATELET # BLD: 218 E9/L (ref 130–450)
PMV BLD AUTO: 10 FL (ref 7–12)
RBC # BLD: 3.84 E12/L (ref 3.8–5.8)
WBC # BLD: 7.3 E9/L (ref 4.5–11.5)

## 2019-04-30 PROCEDURE — 36415 COLL VENOUS BLD VENIPUNCTURE: CPT

## 2019-04-30 PROCEDURE — 85027 COMPLETE CBC AUTOMATED: CPT

## 2019-08-14 ENCOUNTER — OFFICE VISIT (OUTPATIENT)
Dept: FAMILY MEDICINE CLINIC | Age: 84
End: 2019-08-14
Payer: MEDICARE

## 2019-08-14 VITALS
TEMPERATURE: 97.6 F | HEART RATE: 78 BPM | BODY MASS INDEX: 24.99 KG/M2 | HEIGHT: 69 IN | RESPIRATION RATE: 16 BRPM | SYSTOLIC BLOOD PRESSURE: 132 MMHG | OXYGEN SATURATION: 97 % | DIASTOLIC BLOOD PRESSURE: 68 MMHG | WEIGHT: 168.7 LBS

## 2019-08-14 DIAGNOSIS — E78.00 PURE HYPERCHOLESTEROLEMIA: ICD-10-CM

## 2019-08-14 DIAGNOSIS — I10 ESSENTIAL HYPERTENSION: ICD-10-CM

## 2019-08-14 DIAGNOSIS — M15.9 PRIMARY OSTEOARTHRITIS INVOLVING MULTIPLE JOINTS: ICD-10-CM

## 2019-08-14 DIAGNOSIS — Z13.31 DEPRESSION SCREEN: Primary | ICD-10-CM

## 2019-08-14 DIAGNOSIS — Z00.00 ROUTINE GENERAL MEDICAL EXAMINATION AT A HEALTH CARE FACILITY: ICD-10-CM

## 2019-08-14 PROCEDURE — G0439 PPPS, SUBSEQ VISIT: HCPCS | Performed by: FAMILY MEDICINE

## 2019-08-14 PROCEDURE — 1123F ACP DISCUSS/DSCN MKR DOCD: CPT | Performed by: FAMILY MEDICINE

## 2019-08-14 PROCEDURE — 4040F PNEUMOC VAC/ADMIN/RCVD: CPT | Performed by: FAMILY MEDICINE

## 2019-08-14 RX ORDER — LISINOPRIL AND HYDROCHLOROTHIAZIDE 25; 20 MG/1; MG/1
1 TABLET ORAL DAILY
Qty: 90 TABLET | Refills: 1 | Status: SHIPPED | OUTPATIENT
Start: 2019-08-14 | End: 2019-11-13 | Stop reason: SDUPTHER

## 2019-08-14 RX ORDER — FINASTERIDE 5 MG/1
5 TABLET, FILM COATED ORAL DAILY
Qty: 90 TABLET | Refills: 1 | Status: SHIPPED | OUTPATIENT
Start: 2019-08-14 | End: 2019-11-13 | Stop reason: SDUPTHER

## 2019-08-14 RX ORDER — AMLODIPINE BESYLATE 2.5 MG/1
2.5 TABLET ORAL DAILY
Qty: 90 TABLET | Refills: 1 | Status: SHIPPED | OUTPATIENT
Start: 2019-08-14 | End: 2019-11-13 | Stop reason: SDUPTHER

## 2019-08-14 RX ORDER — SIMVASTATIN 40 MG
40 TABLET ORAL NIGHTLY
Qty: 90 TABLET | Refills: 0 | Status: SHIPPED | OUTPATIENT
Start: 2019-08-14 | End: 2020-01-14 | Stop reason: SDUPTHER

## 2019-08-14 ASSESSMENT — PATIENT HEALTH QUESTIONNAIRE - PHQ9
SUM OF ALL RESPONSES TO PHQ QUESTIONS 1-9: 0
SUM OF ALL RESPONSES TO PHQ QUESTIONS 1-9: 0

## 2019-08-14 NOTE — PROGRESS NOTES
Medicare Annual Wellness Visit  Name: Joaquín Waters Date: 2019   MRN: <[id number]> Sex: Male   Age: 80 y.o. Ethnicity: Non-/Non    : 1932 Race: Gerson Lai is here for Annual Exam    Screenings for behavioral, psychosocial and functional/safety risks, and cognitive dysfunction are all negative except as indicated below. These results, as well as other patient data from the [address] form, are documented in Flowsheets linked to this Encounter. No Known Allergies    Prior to Visit Medications    Medication Sig Taking? Authorizing Provider   amLODIPine (NORVASC) 2.5 MG tablet Take 1 tablet by mouth daily Yes Miryam Red MD   finasteride (PROSCAR) 5 MG tablet Take 1 tablet by mouth daily Yes Miryam Red MD   lisinopril-hydrochlorothiazide (PRINZIDE;ZESTORETIC) 20-25 MG per tablet Take 1 tablet by mouth daily Yes Miryam Red MD   simvastatin (ZOCOR) 40 MG tablet Take 1 tablet by mouth nightly Yes Miryam Red MD   metoprolol succinate (TOPROL XL) 25 MG extended release tablet  Yes Historical Provider, MD   pantoprazole (PROTONIX) 20 MG tablet Take 20 mg by mouth daily Yes Historical Provider, MD   timolol (TIMOPTIC) 0.5 % ophthalmic solution  Yes Historical Provider, MD   Multiple Vitamin (ONE-A-DAY ESSENTIAL) TABS Take 1 tablet by mouth daily. Yes Historical Provider, MD   Saw [place], Serenoa repens, 450 MG CAPS Take 2 tablets by mouth daily. Yes Historical Provider, MD   Misc Natural Products (OSTEO BI-FLEX JOINT SHIELD PO) Take 2 tablets by mouth daily. Yes Historical Provider, MD   aspirin 81 MG EC tablet Take 81 mg by mouth daily.    Yes Historical Provider, MD       Past Medical History:   Diagnosis Date    Hyperlipidemia     Hypertension     Lumbosacral strain 2013     Past Surgical History:   Procedure Laterality Date    A-V CARDIAC PACEMAKER INSERTION  07    COLONOSCOPY  8/3/11    EYE SURGERY  2002    cataracts evelyn    Administered    DTaP 09/18/2003    Influenza 10/28/2013    Influenza Virus Vaccine 09/24/2012, 09/23/2014    Influenza, High Dose (Fluzone 65 yrs and older) 11/21/2016, 10/25/2017, 10/09/2018    Pneumococcal Conjugate 13-valent ([id number]) 02/09/2016    Pneumococcal Polysaccharide ([id number]) 07/21/2011        Health Maintenance   Topic Date Due    Shingles Vaccine (1 of 2) 06/02/1982    DTaP/Tdap/Td vaccine (2 - Tdap) 09/18/2013    Annual Wellness Visit (AWV)  08/24/2018    Flu vaccine (1) 09/01/2019    Potassium monitoring  11/26/2019    Creatinine monitoring  11/26/2019    Pneumococcal 65+ years Vaccine  Completed     Recommendations for Preventive Services Due: see orders and patient instructions/AVS.  . Recommended screening schedule for the next 5-10 years is provided to the patient in written form: see Patient Miguel A Olea was seen today for annual exam.    Diagnoses and all orders for this visit:    Depression screen  -     CA DEPRESSION SCREEN ANNUAL    Pure hypercholesterolemia  -     Comprehensive Metabolic Panel; Future  -     Lipid Panel; Future    Essential hypertension  -     CBC Auto Differential; Future    Primary osteoarthritis involving multiple joints    Routine general medical examination at a health care facility    Other orders  -     amLODIPine (NORVASC) 2.5 MG tablet; Take 1 tablet by mouth daily  -     finasteride (PROSCAR) 5 MG tablet; Take 1 tablet by mouth daily  -     lisinopril-hydrochlorothiazide (PRINZIDE;ZESTORETIC) 20-25 MG per tablet; Take 1 tablet by mouth daily  -     simvastatin (ZOCOR) 40 MG tablet;  Take 1 tablet by mouth nightly

## 2019-10-03 ENCOUNTER — HOSPITAL ENCOUNTER (EMERGENCY)
Age: 84
Discharge: HOME | End: 2019-10-03
Payer: MEDICARE

## 2019-10-03 ENCOUNTER — APPOINTMENT (OUTPATIENT)
Dept: GENERAL RADIOLOGY | Age: 84
End: 2019-10-03
Payer: MEDICARE

## 2019-10-03 VITALS
HEART RATE: 64 BPM | SYSTOLIC BLOOD PRESSURE: 155 MMHG | DIASTOLIC BLOOD PRESSURE: 68 MMHG | TEMPERATURE: 98 F | WEIGHT: 165 LBS | RESPIRATION RATE: 18 BRPM | OXYGEN SATURATION: 96 % | BODY MASS INDEX: 24.37 KG/M2

## 2019-10-03 DIAGNOSIS — J20.9 ACUTE BRONCHITIS, UNSPECIFIED ORGANISM: Primary | ICD-10-CM

## 2019-10-03 PROCEDURE — 71046 X-RAY EXAM CHEST 2 VIEWS: CPT

## 2019-10-03 PROCEDURE — 99212 OFFICE O/P EST SF 10 MIN: CPT

## 2019-10-03 RX ORDER — DOXYCYCLINE 100 MG/1
100 CAPSULE ORAL 2 TIMES DAILY
Qty: 14 CAPSULE | Refills: 0 | Status: SHIPPED | OUTPATIENT
Start: 2019-10-03 | End: 2019-10-10

## 2019-11-05 ENCOUNTER — HOSPITAL ENCOUNTER (OUTPATIENT)
Age: 84
Discharge: HOME | End: 2019-11-05
Payer: MEDICARE

## 2019-11-05 DIAGNOSIS — E78.00 PURE HYPERCHOLESTEROLEMIA: ICD-10-CM

## 2019-11-05 DIAGNOSIS — I10 ESSENTIAL HYPERTENSION: ICD-10-CM

## 2019-11-05 LAB
ALBUMIN SERPL-MCNC: 4 G/DL (ref 3.5–5.2)
ALP BLD-CCNC: 67 U/L (ref 40–129)
ALT SERPL-CCNC: 6 U/L (ref 0–40)
ANION GAP SERPL CALCULATED.3IONS-SCNC: 12 MMOL/L (ref 7–16)
AST SERPL-CCNC: 12 U/L (ref 0–39)
BASOPHILS ABSOLUTE: 0.06 E9/L (ref 0–0.2)
BASOPHILS RELATIVE PERCENT: 1 % (ref 0–2)
BILIRUB SERPL-MCNC: 0.3 MG/DL (ref 0–1.2)
BUN BLDV-MCNC: 22 MG/DL (ref 8–23)
CALCIUM SERPL-MCNC: 9.2 MG/DL (ref 8.6–10.2)
CHLORIDE BLD-SCNC: 101 MMOL/L (ref 98–107)
CHOLESTEROL, TOTAL: 148 MG/DL (ref 0–199)
CO2: 29 MMOL/L (ref 22–29)
CREAT SERPL-MCNC: 1.5 MG/DL (ref 0.7–1.2)
EOSINOPHILS ABSOLUTE: 0.1 E9/L (ref 0.05–0.5)
EOSINOPHILS RELATIVE PERCENT: 1.7 % (ref 0–6)
GFR AFRICAN AMERICAN: 54
GFR NON-AFRICAN AMERICAN: 44 ML/MIN/1.73
GLUCOSE BLD-MCNC: 104 MG/DL (ref 74–99)
HCT VFR BLD CALC: 31.2 % (ref 37–54)
HDLC SERPL-MCNC: 45 MG/DL
HEMOGLOBIN: 10.2 G/DL (ref 12.5–16.5)
IMMATURE GRANULOCYTES #: 0.01 E9/L
IMMATURE GRANULOCYTES %: 0.2 % (ref 0–5)
LDL CHOLESTEROL CALCULATED: 80 MG/DL (ref 0–99)
LYMPHOCYTES ABSOLUTE: 1.68 E9/L (ref 1.5–4)
LYMPHOCYTES RELATIVE PERCENT: 28.5 % (ref 20–42)
MCH RBC QN AUTO: 30.6 PG (ref 26–35)
MCHC RBC AUTO-ENTMCNC: 32.7 % (ref 32–34.5)
MCV RBC AUTO: 93.7 FL (ref 80–99.9)
MONOCYTES ABSOLUTE: 0.47 E9/L (ref 0.1–0.95)
MONOCYTES RELATIVE PERCENT: 8 % (ref 2–12)
NEUTROPHILS ABSOLUTE: 3.58 E9/L (ref 1.8–7.3)
NEUTROPHILS RELATIVE PERCENT: 60.6 % (ref 43–80)
PDW BLD-RTO: 13.4 FL (ref 11.5–15)
PLATELET # BLD: 213 E9/L (ref 130–450)
PMV BLD AUTO: 9.9 FL (ref 7–12)
POTASSIUM SERPL-SCNC: 3.7 MMOL/L (ref 3.5–5)
RBC # BLD: 3.33 E12/L (ref 3.8–5.8)
SODIUM BLD-SCNC: 142 MMOL/L (ref 132–146)
TOTAL PROTEIN: 7.2 G/DL (ref 6.4–8.3)
TRIGL SERPL-MCNC: 116 MG/DL (ref 0–149)
VLDLC SERPL CALC-MCNC: 23 MG/DL
WBC # BLD: 5.9 E9/L (ref 4.5–11.5)

## 2019-11-05 PROCEDURE — 80053 COMPREHEN METABOLIC PANEL: CPT

## 2019-11-05 PROCEDURE — 36415 COLL VENOUS BLD VENIPUNCTURE: CPT

## 2019-11-05 PROCEDURE — 85025 COMPLETE CBC W/AUTO DIFF WBC: CPT

## 2019-11-05 PROCEDURE — 80061 LIPID PANEL: CPT

## 2019-11-13 ENCOUNTER — OFFICE VISIT (OUTPATIENT)
Dept: FAMILY MEDICINE CLINIC | Age: 84
End: 2019-11-13
Payer: MEDICARE

## 2019-11-13 VITALS
DIASTOLIC BLOOD PRESSURE: 62 MMHG | WEIGHT: 169 LBS | SYSTOLIC BLOOD PRESSURE: 130 MMHG | HEART RATE: 52 BPM | BODY MASS INDEX: 25.03 KG/M2 | HEIGHT: 69 IN | OXYGEN SATURATION: 98 %

## 2019-11-13 DIAGNOSIS — Z23 NEED FOR INFLUENZA VACCINATION: ICD-10-CM

## 2019-11-13 DIAGNOSIS — N18.30 CKD (CHRONIC KIDNEY DISEASE) STAGE 3, GFR 30-59 ML/MIN (HCC): ICD-10-CM

## 2019-11-13 DIAGNOSIS — I10 ESSENTIAL HYPERTENSION: ICD-10-CM

## 2019-11-13 DIAGNOSIS — D50.0 IRON DEFICIENCY ANEMIA DUE TO CHRONIC BLOOD LOSS: ICD-10-CM

## 2019-11-13 DIAGNOSIS — E78.00 PURE HYPERCHOLESTEROLEMIA: Primary | ICD-10-CM

## 2019-11-13 PROCEDURE — G8482 FLU IMMUNIZE ORDER/ADMIN: HCPCS | Performed by: FAMILY MEDICINE

## 2019-11-13 PROCEDURE — G0008 ADMIN INFLUENZA VIRUS VAC: HCPCS | Performed by: FAMILY MEDICINE

## 2019-11-13 PROCEDURE — 4040F PNEUMOC VAC/ADMIN/RCVD: CPT | Performed by: FAMILY MEDICINE

## 2019-11-13 PROCEDURE — 1123F ACP DISCUSS/DSCN MKR DOCD: CPT | Performed by: FAMILY MEDICINE

## 2019-11-13 PROCEDURE — G8427 DOCREV CUR MEDS BY ELIG CLIN: HCPCS | Performed by: FAMILY MEDICINE

## 2019-11-13 PROCEDURE — G8420 CALC BMI NORM PARAMETERS: HCPCS | Performed by: FAMILY MEDICINE

## 2019-11-13 PROCEDURE — 90653 IIV ADJUVANT VACCINE IM: CPT | Performed by: FAMILY MEDICINE

## 2019-11-13 PROCEDURE — 99213 OFFICE O/P EST LOW 20 MIN: CPT | Performed by: FAMILY MEDICINE

## 2019-11-13 PROCEDURE — 1036F TOBACCO NON-USER: CPT | Performed by: FAMILY MEDICINE

## 2019-11-13 RX ORDER — LISINOPRIL AND HYDROCHLOROTHIAZIDE 25; 20 MG/1; MG/1
1 TABLET ORAL DAILY
Qty: 90 TABLET | Refills: 1 | Status: SHIPPED | OUTPATIENT
Start: 2019-11-13 | End: 2020-01-14 | Stop reason: SDUPTHER

## 2019-11-13 RX ORDER — FERROUS SULFATE 7.5 MG/0.5
15 SYRINGE (EA) ORAL DAILY
Status: ON HOLD | COMMUNITY
End: 2021-08-09 | Stop reason: DRUGHIGH

## 2019-11-13 RX ORDER — AMLODIPINE BESYLATE 2.5 MG/1
2.5 TABLET ORAL DAILY
Qty: 90 TABLET | Refills: 1 | Status: SHIPPED | OUTPATIENT
Start: 2019-11-13 | End: 2020-01-14 | Stop reason: SDUPTHER

## 2019-11-13 RX ORDER — FINASTERIDE 5 MG/1
5 TABLET, FILM COATED ORAL DAILY
Qty: 90 TABLET | Refills: 1 | Status: SHIPPED | OUTPATIENT
Start: 2019-11-13 | End: 2020-01-14 | Stop reason: SDUPTHER

## 2019-12-10 ENCOUNTER — HOSPITAL ENCOUNTER (OUTPATIENT)
Age: 84
Discharge: HOME | End: 2019-12-12
Payer: MEDICARE

## 2019-12-10 DIAGNOSIS — E78.00 PURE HYPERCHOLESTEROLEMIA: ICD-10-CM

## 2019-12-10 DIAGNOSIS — I10 ESSENTIAL HYPERTENSION: ICD-10-CM

## 2019-12-10 DIAGNOSIS — N18.30 CKD (CHRONIC KIDNEY DISEASE) STAGE 3, GFR 30-59 ML/MIN (HCC): ICD-10-CM

## 2019-12-10 LAB
ALBUMIN SERPL-MCNC: 4.2 G/DL (ref 3.5–5.2)
ALP BLD-CCNC: 69 U/L (ref 40–129)
ALT SERPL-CCNC: 6 U/L (ref 0–40)
ANION GAP SERPL CALCULATED.3IONS-SCNC: 16 MMOL/L (ref 7–16)
AST SERPL-CCNC: 9 U/L (ref 0–39)
BASOPHILS ABSOLUTE: 0.08 E9/L (ref 0–0.2)
BASOPHILS RELATIVE PERCENT: 1.2 % (ref 0–2)
BILIRUB SERPL-MCNC: 0.3 MG/DL (ref 0–1.2)
BUN BLDV-MCNC: 36 MG/DL (ref 8–23)
CALCIUM SERPL-MCNC: 9.4 MG/DL (ref 8.6–10.2)
CHLORIDE BLD-SCNC: 104 MMOL/L (ref 98–107)
CHOLESTEROL, TOTAL: 187 MG/DL (ref 0–199)
CO2: 24 MMOL/L (ref 22–29)
CREAT SERPL-MCNC: 1.9 MG/DL (ref 0.7–1.2)
EOSINOPHILS ABSOLUTE: 0.12 E9/L (ref 0.05–0.5)
EOSINOPHILS RELATIVE PERCENT: 1.7 % (ref 0–6)
GFR AFRICAN AMERICAN: 41
GFR NON-AFRICAN AMERICAN: 34 ML/MIN/1.73
GLUCOSE BLD-MCNC: 107 MG/DL (ref 74–99)
HCT VFR BLD CALC: 36.8 % (ref 37–54)
HDLC SERPL-MCNC: 43 MG/DL
HEMOGLOBIN: 11.3 G/DL (ref 12.5–16.5)
IMMATURE GRANULOCYTES #: 0.01 E9/L
IMMATURE GRANULOCYTES %: 0.1 % (ref 0–5)
LDL CHOLESTEROL CALCULATED: 122 MG/DL (ref 0–99)
LYMPHOCYTES ABSOLUTE: 1.95 E9/L (ref 1.5–4)
LYMPHOCYTES RELATIVE PERCENT: 28.3 % (ref 20–42)
MCH RBC QN AUTO: 29.7 PG (ref 26–35)
MCHC RBC AUTO-ENTMCNC: 30.7 % (ref 32–34.5)
MCV RBC AUTO: 96.6 FL (ref 80–99.9)
MONOCYTES ABSOLUTE: 0.54 E9/L (ref 0.1–0.95)
MONOCYTES RELATIVE PERCENT: 7.8 % (ref 2–12)
NEUTROPHILS ABSOLUTE: 4.19 E9/L (ref 1.8–7.3)
NEUTROPHILS RELATIVE PERCENT: 60.9 % (ref 43–80)
PDW BLD-RTO: 13.3 FL (ref 11.5–15)
PLATELET # BLD: 226 E9/L (ref 130–450)
PMV BLD AUTO: 11.4 FL (ref 7–12)
POTASSIUM SERPL-SCNC: 3.7 MMOL/L (ref 3.5–5)
RBC # BLD: 3.81 E12/L (ref 3.8–5.8)
SODIUM BLD-SCNC: 144 MMOL/L (ref 132–146)
TOTAL PROTEIN: 7.4 G/DL (ref 6.4–8.3)
TRIGL SERPL-MCNC: 111 MG/DL (ref 0–149)
VLDLC SERPL CALC-MCNC: 22 MG/DL
WBC # BLD: 6.9 E9/L (ref 4.5–11.5)

## 2019-12-10 PROCEDURE — 80053 COMPREHEN METABOLIC PANEL: CPT

## 2019-12-10 PROCEDURE — 36415 COLL VENOUS BLD VENIPUNCTURE: CPT

## 2019-12-10 PROCEDURE — 80061 LIPID PANEL: CPT

## 2019-12-10 PROCEDURE — 85025 COMPLETE CBC W/AUTO DIFF WBC: CPT

## 2020-01-14 ENCOUNTER — OFFICE VISIT (OUTPATIENT)
Dept: FAMILY MEDICINE CLINIC | Age: 85
End: 2020-01-14
Payer: MEDICARE

## 2020-01-14 VITALS
HEIGHT: 69 IN | DIASTOLIC BLOOD PRESSURE: 58 MMHG | OXYGEN SATURATION: 97 % | SYSTOLIC BLOOD PRESSURE: 138 MMHG | BODY MASS INDEX: 24.68 KG/M2 | WEIGHT: 166.6 LBS | HEART RATE: 58 BPM

## 2020-01-14 PROCEDURE — G8420 CALC BMI NORM PARAMETERS: HCPCS | Performed by: FAMILY MEDICINE

## 2020-01-14 PROCEDURE — 1036F TOBACCO NON-USER: CPT | Performed by: FAMILY MEDICINE

## 2020-01-14 PROCEDURE — G8482 FLU IMMUNIZE ORDER/ADMIN: HCPCS | Performed by: FAMILY MEDICINE

## 2020-01-14 PROCEDURE — 99213 OFFICE O/P EST LOW 20 MIN: CPT | Performed by: FAMILY MEDICINE

## 2020-01-14 PROCEDURE — 4040F PNEUMOC VAC/ADMIN/RCVD: CPT | Performed by: FAMILY MEDICINE

## 2020-01-14 PROCEDURE — 1123F ACP DISCUSS/DSCN MKR DOCD: CPT | Performed by: FAMILY MEDICINE

## 2020-01-14 PROCEDURE — G8427 DOCREV CUR MEDS BY ELIG CLIN: HCPCS | Performed by: FAMILY MEDICINE

## 2020-01-14 RX ORDER — FINASTERIDE 5 MG/1
5 TABLET, FILM COATED ORAL DAILY
Qty: 90 TABLET | Refills: 1 | Status: SHIPPED
Start: 2020-01-14 | End: 2020-06-17 | Stop reason: SDUPTHER

## 2020-01-14 RX ORDER — PANTOPRAZOLE SODIUM 40 MG/1
40 TABLET, DELAYED RELEASE ORAL DAILY
COMMUNITY
Start: 2019-12-06 | End: 2022-04-29 | Stop reason: SDUPTHER

## 2020-01-14 RX ORDER — SIMVASTATIN 40 MG
40 TABLET ORAL NIGHTLY
Qty: 90 TABLET | Refills: 1 | Status: SHIPPED
Start: 2020-01-14 | End: 2020-06-17 | Stop reason: SDUPTHER

## 2020-01-14 RX ORDER — AMLODIPINE BESYLATE 2.5 MG/1
2.5 TABLET ORAL DAILY
Qty: 90 TABLET | Refills: 1 | Status: SHIPPED
Start: 2020-01-14 | End: 2020-06-08 | Stop reason: SDUPTHER

## 2020-01-14 RX ORDER — LISINOPRIL AND HYDROCHLOROTHIAZIDE 25; 20 MG/1; MG/1
1 TABLET ORAL DAILY
Qty: 90 TABLET | Refills: 1 | Status: SHIPPED
Start: 2020-01-14 | End: 2020-06-08 | Stop reason: SDUPTHER

## 2020-01-14 RX ORDER — MICONAZOLE NITRATE 20 MG/G
CREAM TOPICAL
COMMUNITY
Start: 2019-10-14 | End: 2021-08-09 | Stop reason: ALTCHOICE

## 2020-01-14 NOTE — PROGRESS NOTES
OFFICE PROGRESS NOTE      SUBJECTIVE:        Patient ID:   Lord Butt is a 80 y.o. male who presents for   Chief Complaint   Patient presents with    Hyperlipidemia     2 month follow up    Hypertension     check up   Mika King 55 were done 12/10/2019           HPI:     FEELS GOOD. WATCHING DIET GOOD. WALKING SOME IN HOUSE  FOR EXERCISE. TAKING MEDICATIONS REGULARLY. FORGET TO TAKE SIMVASTATIN AT NIGHT. Prior to Admission medications    Medication Sig Start Date End Date Taking? Authorizing Provider   simvastatin (ZOCOR) 40 MG tablet Take 1 tablet by mouth nightly 1/14/20  Yes Katelin Downing MD   lisinopril-hydrochlorothiazide (PRINZIDE;ZESTORETIC) 20-25 MG per tablet Take 1 tablet by mouth daily 1/14/20  Yes Katelin Downing MD   finasteride (PROSCAR) 5 MG tablet Take 1 tablet by mouth daily 1/14/20  Yes Katelin Downing MD   amLODIPine (NORVASC) 2.5 MG tablet Take 1 tablet by mouth daily 1/14/20  Yes Katelin Downing MD   ciclopirox (PENLAC) 8 % solution APPLY 1 APPLICATION TOPICALLY TO AFFECTED AREA AT BEDTIME 10/11/19  Yes Historical Provider, MD   ferrous sulfate (LI-IN-SOL) 75 (15 Fe) MG/ML solution Take 15 mg by mouth daily   Yes Historical Provider, MD   timolol (TIMOPTIC) 0.5 % ophthalmic solution  5/4/16  Yes Historical Provider, MD   Multiple Vitamin (ONE-A-DAY ESSENTIAL) TABS Take 1 tablet by mouth daily. Yes Historical Provider, MD   Saw [place], Serenoa repens, 450 MG CAPS Take 2 tablets by mouth daily. Yes Historical Provider, MD   Misc Natural Products (OSTEO BI-FLEX JOINT SHIELD PO) Take 2 tablets by mouth daily. Yes Historical Provider, MD   aspirin 81 MG EC tablet Take 81 mg by mouth daily.      Yes Historical Provider, MD   ANTIFUNGAL 2 % cream APPLY CREAM TO AFFECTED AREA TWICE DAILY 10/14/19   Historical Provider, MD   pantoprazole (PROTONIX) 40 MG tablet TAKE 1 TABLET BY MOUTH ONCE DAILY 19   Historical Provider, MD   metoprolol succinate (TOPROL XL) 25 MG extended release tablet  19   Historical Provider, MD     Social History     Socioeconomic History    Marital status:      Spouse name: None    Number of children: None    Years of education: None    Highest education level: None   Occupational History    None   Social Needs    Financial resource strain: None    Food insecurity:     Worry: None     Inability: None    Transportation needs:     Medical: None     Non-medical: None   Tobacco Use    Smoking status: Former Smoker     Packs/day: 1.00     Years: 40.00     Pack years: 40.00     Last attempt to quit: 2000     Years since quittin.3    Smokeless tobacco: Never Used   Substance and Sexual Activity    Alcohol use: No    Drug use: No    Sexual activity: None   Lifestyle    Physical activity:     Days per week: None     Minutes per session: None    Stress: None   Relationships    Social connections:     Talks on phone: None     Gets together: None     Attends Synagogue service: None     Active member of club or organization: None     Attends meetings of clubs or organizations: None     Relationship status: None    Intimate partner violence:     Fear of current or ex partner: None     Emotionally abused: None     Physically abused: None     Forced sexual activity: None   Other Topics Concern    None   Social History Narrative    None       I have reviewed Kyle's allergies, medications, problem list, medical, social and family history and have updated as needed in the electronic medical record  Review Of Systems:    Skin: no abnormal pigmentation, rash, scaling, itching, masses, hair or nail changes  Eyes: no blurring, diplopia, or eye pain  Ears/Nose/Throat: no hearing loss, tinnitus, vertigo, nosebleed, nasal congestion, rhinorrhea, sore throat  Respiratory: no cough, pleuritic chest pain, dyspnea, or changes are noted. Movements are limited. Pedal pulses are normal.  Musculoskeletal: Muscular strength appears intact. No joint effusion, tenderness, swelling or warmth  Neuro:  No focal motor or sensory deficits        ASSESSMENT     Patient Active Problem List    Diagnosis Date Noted    Primary osteoarthritis involving multiple joints 09/24/2012     Priority: High     Class: Chronic    Pure hypercholesterolemia      Priority: High     Class: Chronic    Essential hypertension      Priority: High     Class: Chronic    Iron deficiency anemia due to chronic blood loss 07/26/2018    CKD (chronic kidney disease) stage 3, GFR 30-59 ml/min ([facility]) 07/26/2018        Diagnosis:     ICD-10-CM    1. Pure hypercholesterolemia E78.00 Comprehensive Metabolic Panel     Lipid Panel    FAIR CONTROL   2. Essential hypertension I10 CBC Auto Differential    CONTROLLED   3. CKD (chronic kidney disease) stage 3, GFR 30-59 ml/min ([facility]) N18.3     STABLE   4. Iron deficiency anemia due to chronic blood loss D50.0 CBC Auto Differential    IMPROVING. PLAN:           Patient Instructions   LOW SALT  AND LOW FAT DIET. CONTINUE CURRENT MEDICATIONS TAKING REGULARLY. REGULAR WALKING ADVISED. FASTING FOR LAB WORK PRIOR TO NEXT VISIT. NEXT APPOINTMENT IN 3 MONTHS. Return in about 3 months (around 4/14/2020) for FOLLOW UP VISIT. I have reviewed my findings and recommendations with Ochoa Breaux.     Electronically signed by Rubi Leal MD on 1/14/20 at 9:45 AM

## 2020-06-08 RX ORDER — AMLODIPINE BESYLATE 2.5 MG/1
2.5 TABLET ORAL DAILY
Qty: 90 TABLET | Refills: 1 | Status: SHIPPED
Start: 2020-06-08 | End: 2020-06-17 | Stop reason: SDUPTHER

## 2020-06-08 RX ORDER — LISINOPRIL AND HYDROCHLOROTHIAZIDE 25; 20 MG/1; MG/1
1 TABLET ORAL DAILY
Qty: 90 TABLET | Refills: 1 | Status: SHIPPED
Start: 2020-06-08 | End: 2020-06-17 | Stop reason: SDUPTHER

## 2020-06-10 ENCOUNTER — HOSPITAL ENCOUNTER (OUTPATIENT)
Age: 85
Discharge: HOME | End: 2020-06-12
Payer: MEDICARE

## 2020-06-10 LAB
ALBUMIN SERPL-MCNC: 4.3 G/DL (ref 3.5–5.2)
ALP BLD-CCNC: 66 U/L (ref 40–129)
ALT SERPL-CCNC: 8 U/L (ref 0–40)
ANION GAP SERPL CALCULATED.3IONS-SCNC: 16 MMOL/L (ref 7–16)
AST SERPL-CCNC: 10 U/L (ref 0–39)
BASOPHILS ABSOLUTE: 0.07 E9/L (ref 0–0.2)
BASOPHILS RELATIVE PERCENT: 1.1 % (ref 0–2)
BILIRUB SERPL-MCNC: 0.2 MG/DL (ref 0–1.2)
BUN BLDV-MCNC: 34 MG/DL (ref 8–23)
CALCIUM SERPL-MCNC: 9.6 MG/DL (ref 8.6–10.2)
CHLORIDE BLD-SCNC: 105 MMOL/L (ref 98–107)
CHOLESTEROL, TOTAL: 136 MG/DL (ref 0–199)
CO2: 22 MMOL/L (ref 22–29)
CREAT SERPL-MCNC: 1.8 MG/DL (ref 0.7–1.2)
EOSINOPHILS ABSOLUTE: 0.14 E9/L (ref 0.05–0.5)
EOSINOPHILS RELATIVE PERCENT: 2.1 % (ref 0–6)
GFR AFRICAN AMERICAN: 43
GFR NON-AFRICAN AMERICAN: 36 ML/MIN/1.73
GLUCOSE BLD-MCNC: 103 MG/DL (ref 74–99)
HCT VFR BLD CALC: 36.6 % (ref 37–54)
HDLC SERPL-MCNC: 40 MG/DL
HEMOGLOBIN: 11.6 G/DL (ref 12.5–16.5)
IMMATURE GRANULOCYTES #: 0.01 E9/L
IMMATURE GRANULOCYTES %: 0.2 % (ref 0–5)
LDL CHOLESTEROL CALCULATED: 71 MG/DL (ref 0–99)
LYMPHOCYTES ABSOLUTE: 1.79 E9/L (ref 1.5–4)
LYMPHOCYTES RELATIVE PERCENT: 27 % (ref 20–42)
MCH RBC QN AUTO: 30.9 PG (ref 26–35)
MCHC RBC AUTO-ENTMCNC: 31.7 % (ref 32–34.5)
MCV RBC AUTO: 97.6 FL (ref 80–99.9)
MONOCYTES ABSOLUTE: 0.53 E9/L (ref 0.1–0.95)
MONOCYTES RELATIVE PERCENT: 8 % (ref 2–12)
NEUTROPHILS ABSOLUTE: 4.09 E9/L (ref 1.8–7.3)
NEUTROPHILS RELATIVE PERCENT: 61.6 % (ref 43–80)
PDW BLD-RTO: 13.3 FL (ref 11.5–15)
PLATELET # BLD: 216 E9/L (ref 130–450)
PMV BLD AUTO: 11.2 FL (ref 7–12)
POTASSIUM SERPL-SCNC: 3.6 MMOL/L (ref 3.5–5)
RBC # BLD: 3.75 E12/L (ref 3.8–5.8)
SODIUM BLD-SCNC: 143 MMOL/L (ref 132–146)
TOTAL PROTEIN: 7.4 G/DL (ref 6.4–8.3)
TRIGL SERPL-MCNC: 126 MG/DL (ref 0–149)
VLDLC SERPL CALC-MCNC: 25 MG/DL
WBC # BLD: 6.6 E9/L (ref 4.5–11.5)

## 2020-06-10 PROCEDURE — 36415 COLL VENOUS BLD VENIPUNCTURE: CPT

## 2020-06-10 PROCEDURE — 80061 LIPID PANEL: CPT

## 2020-06-10 PROCEDURE — 85025 COMPLETE CBC W/AUTO DIFF WBC: CPT

## 2020-06-10 PROCEDURE — 80053 COMPREHEN METABOLIC PANEL: CPT

## 2020-06-11 NOTE — RESULT ENCOUNTER NOTE
KIDNEY FUNCTION BORDERLINE LOW. WILL MONITOR. BLOOD SUGAR BORDERLINE HIGH. LOW SALT,LOW CARB. AND LOW FAT DIET. CONTINUE CURRENT MEDICATIONS TAKING REGULARLY. REGULAR WALKING ADVISED.

## 2020-06-17 ENCOUNTER — OFFICE VISIT (OUTPATIENT)
Dept: FAMILY MEDICINE CLINIC | Age: 85
End: 2020-06-17
Payer: MEDICARE

## 2020-06-17 VITALS
WEIGHT: 165 LBS | BODY MASS INDEX: 24.44 KG/M2 | SYSTOLIC BLOOD PRESSURE: 154 MMHG | HEIGHT: 69 IN | RESPIRATION RATE: 18 BRPM | DIASTOLIC BLOOD PRESSURE: 68 MMHG | HEART RATE: 60 BPM

## 2020-06-17 PROCEDURE — 1036F TOBACCO NON-USER: CPT | Performed by: FAMILY MEDICINE

## 2020-06-17 PROCEDURE — 99213 OFFICE O/P EST LOW 20 MIN: CPT | Performed by: FAMILY MEDICINE

## 2020-06-17 PROCEDURE — G8420 CALC BMI NORM PARAMETERS: HCPCS | Performed by: FAMILY MEDICINE

## 2020-06-17 PROCEDURE — 1123F ACP DISCUSS/DSCN MKR DOCD: CPT | Performed by: FAMILY MEDICINE

## 2020-06-17 PROCEDURE — 4040F PNEUMOC VAC/ADMIN/RCVD: CPT | Performed by: FAMILY MEDICINE

## 2020-06-17 PROCEDURE — G8427 DOCREV CUR MEDS BY ELIG CLIN: HCPCS | Performed by: FAMILY MEDICINE

## 2020-06-17 RX ORDER — LISINOPRIL AND HYDROCHLOROTHIAZIDE 25; 20 MG/1; MG/1
1 TABLET ORAL DAILY
Qty: 90 TABLET | Refills: 1 | Status: SHIPPED
Start: 2020-06-17 | End: 2020-09-21 | Stop reason: SDUPTHER

## 2020-06-17 RX ORDER — AMLODIPINE BESYLATE 2.5 MG/1
2.5 TABLET ORAL DAILY
Qty: 90 TABLET | Refills: 1 | Status: SHIPPED
Start: 2020-06-17 | End: 2020-09-21 | Stop reason: SDUPTHER

## 2020-06-17 RX ORDER — SIMVASTATIN 40 MG
40 TABLET ORAL NIGHTLY
Qty: 90 TABLET | Refills: 1 | Status: SHIPPED
Start: 2020-06-17 | End: 2020-09-21 | Stop reason: SDUPTHER

## 2020-06-17 RX ORDER — METOPROLOL SUCCINATE 25 MG/1
25 TABLET, EXTENDED RELEASE ORAL DAILY
Qty: 30 TABLET | Refills: 3 | Status: SHIPPED
Start: 2020-06-17 | End: 2020-09-21 | Stop reason: SDUPTHER

## 2020-06-17 RX ORDER — FINASTERIDE 5 MG/1
5 TABLET, FILM COATED ORAL DAILY
Qty: 90 TABLET | Refills: 1 | Status: SHIPPED
Start: 2020-06-17 | End: 2020-09-21 | Stop reason: SDUPTHER

## 2020-06-17 ASSESSMENT — PATIENT HEALTH QUESTIONNAIRE - PHQ9
2. FEELING DOWN, DEPRESSED OR HOPELESS: 0
SUM OF ALL RESPONSES TO PHQ9 QUESTIONS 1 & 2: 0
1. LITTLE INTEREST OR PLEASURE IN DOING THINGS: 0
SUM OF ALL RESPONSES TO PHQ QUESTIONS 1-9: 0
SUM OF ALL RESPONSES TO PHQ QUESTIONS 1-9: 0

## 2020-06-17 NOTE — PROGRESS NOTES
OFFICE PROGRESS NOTE      SUBJECTIVE:        Patient ID:   Ada Major is a 80 y.o. male who presents for   Chief Complaint   Patient presents with    Hyperlipidemia           HPI:     FEELS GOOD. WATCHING DIET GOOD. WALKING SOME IN HOUSE FOR EXERCISE. TAKING MEDICATIONS REGULARLY. HAD GI WORK UP 2 YEARS AGO BY DR. Ganesh Velazquez. FOUND TO HAVE A POLYP AND HEMORRHOIDS. NO OTHER SIGNIFICANT FINDINGS,         Prior to Admission medications    Medication Sig Start Date End Date Taking? Authorizing Provider   amLODIPine (NORVASC) 2.5 MG tablet Take 1 tablet by mouth daily 6/17/20  Yes Noel Bucio MD   finasteride (PROSCAR) 5 MG tablet Take 1 tablet by mouth daily 6/17/20  Yes Noel Bucio MD   lisinopril-hydroCHLOROthiazide (PRINZIDE;ZESTORETIC) 20-25 MG per tablet Take 1 tablet by mouth daily 6/17/20  Yes Noel Bucio MD   simvastatin (ZOCOR) 40 MG tablet Take 1 tablet by mouth nightly 6/17/20  Yes Noel Bucio MD   metoprolol succinate (TOPROL XL) 25 MG extended release tablet Take 1 tablet by mouth daily 6/17/20  Yes Noel Bucio MD   ANTIFUNGAL 2 % cream APPLY CREAM TO AFFECTED AREA TWICE DAILY 10/14/19  Yes Historical Provider, MD   pantoprazole (PROTONIX) 40 MG tablet TAKE 1 TABLET BY MOUTH ONCE DAILY 12/6/19  Yes Historical Provider, MD   ciclopirox (PENLAC) 8 % solution APPLY 1 APPLICATION TOPICALLY TO AFFECTED AREA AT BEDTIME 10/11/19  Yes Historical Provider, MD   ferrous sulfate (LI-IN-SOL) 75 (15 Fe) MG/ML solution Take 15 mg by mouth daily   Yes Historical Provider, MD   timolol (TIMOPTIC) 0.5 % ophthalmic solution  5/4/16  Yes Historical Provider, MD   Multiple Vitamin (ONE-A-DAY ESSENTIAL) TABS Take 1 tablet by mouth daily. Yes Historical Provider, MD Jann Rodríguez Serenoa repens, 450 MG CAPS Take 2 tablets by mouth daily. Yes Historical Provider, MD   Misc Natural Products (OSTEO BI-FLEX JOINT SHIELD PO) Take 2 tablets by mouth daily. Yes Historical Provider, MD   aspirin 81 MG EC tablet Take 81 mg by mouth daily.      Yes Historical Provider, MD     Social History     Socioeconomic History    Marital status:      Spouse name: None    Number of children: None    Years of education: None    Highest education level: None   Occupational History    None   Social Needs    Financial resource strain: None    Food insecurity     Worry: None     Inability: None    Transportation needs     Medical: None     Non-medical: None   Tobacco Use    Smoking status: Former Smoker     Packs/day: 1.00     Years: 40.00     Pack years: 40.00     Last attempt to quit: 2000     Years since quittin.7    Smokeless tobacco: Never Used   Substance and Sexual Activity    Alcohol use: No    Drug use: No    Sexual activity: None   Lifestyle    Physical activity     Days per week: None     Minutes per session: None    Stress: None   Relationships    Social connections     Talks on phone: None     Gets together: None     Attends Bahai service: None     Active member of club or organization: None     Attends meetings of clubs or organizations: None     Relationship status: None    Intimate partner violence     Fear of current or ex partner: None     Emotionally abused: None     Physically abused: None     Forced sexual activity: None   Other Topics Concern    None   Social History Narrative    None       I have reviewed Kyle's allergies, medications, problem list, medical, social and family history and have updated as needed in the electronic medical record  Review Of Systems:    Skin: no abnormal pigmentation, rash, scaling, itching, masses, hair or nail changes  Eyes: no blurring, diplopia, or eye pain  Ears/Nose/Throat: no hearing loss, tinnitus, vertigo, nosebleed, nasal congestion, rhinorrhea, sore throat  Respiratory: no cough, pleuritic chest pain, dyspnea, or wheezing  Cardiovascular: no chest pain, angina, dyspnea on exertion, orthopnea, PND, palpitations, or claudication  Gastrointestinal: no nausea, vomiting, heartburn, diarrhea, constipation, bloating,  abdominal pain, or blood per rectum. Appetite is good  Genitourinary: no urinary urgency, frequency, dysuria, nocturia, hesitancy, or incontinence  Musculoskeletal: joint pains off and on. Morning stiffness. Ambulating well  Neurologic: no paralysis, paresis, paresthesia, seizures, tremors, or headaches  Hematologic/Lymphatic/Immunologic: no anemia, abnormal bleeding/bruising, fever, chills, night sweats, swollen glands, or unexplained weight loss  Endocrine: no heat or cold intolerance and no polyphagia, polydipsia, or polyuria        OBJECTIVE:     VS:  Wt Readings from Last 3 Encounters:   06/17/20 165 lb (74.8 kg)   01/14/20 166 lb 9.6 oz (75.6 kg)   11/13/19 169 lb (76.7 kg)     Temp Readings from Last 3 Encounters:   10/03/19 98 °F (36.7 °C) (Oral)   08/14/19 97.6 °F (36.4 °C) (Temporal)   01/16/19 97.4 °F (36.3 °C) (Temporal)     BP Readings from Last 3 Encounters:   06/17/20 (!) 154/68   01/14/20 (!) 138/58   11/13/19 130/62        General appearance: Alert, Awake, Oriented times 3, no distress  Skin: Warm and dry  Head: Normocephalic. No masses, lesions or tenderness noted  Eyes: Conjunctivae appear normal. PERLE  Ears: External ears normal  Nose/Sinuses: Nares normal. Septum midline. Mucosa normal. No drainage  Oropharynx: Oropharynx clear with no exudate seen  Neck: Neck supple. No jugular venous distension, lymphadenopathy or thyromegaly Trachea midline  Chest:  Normal. Movements are Normal and Equal.  Lungs: Lungs clear to auscultation bilaterally. No ronchi, crackles or wheezes  Heart: S1 S2  Regular rate and rhythm. No rub, murmur or gallop  Abdomen: Abdomen soft, non-tender. BS normal. No masses, organomegaly. Back: Grossly Normal and Equal. DTR are Normal. SLR is Normal.  Extremities: Arthritic changes are noted. Movements are limited.  Pedal pulses are

## 2020-09-21 ENCOUNTER — OFFICE VISIT (OUTPATIENT)
Dept: FAMILY MEDICINE CLINIC | Age: 85
End: 2020-09-21
Payer: MEDICARE

## 2020-09-21 VITALS
HEART RATE: 60 BPM | OXYGEN SATURATION: 98 % | RESPIRATION RATE: 18 BRPM | SYSTOLIC BLOOD PRESSURE: 150 MMHG | HEIGHT: 69 IN | TEMPERATURE: 97 F | DIASTOLIC BLOOD PRESSURE: 68 MMHG | BODY MASS INDEX: 23.99 KG/M2 | WEIGHT: 162 LBS

## 2020-09-21 PROCEDURE — G8420 CALC BMI NORM PARAMETERS: HCPCS | Performed by: FAMILY MEDICINE

## 2020-09-21 PROCEDURE — 1123F ACP DISCUSS/DSCN MKR DOCD: CPT | Performed by: FAMILY MEDICINE

## 2020-09-21 PROCEDURE — 4040F PNEUMOC VAC/ADMIN/RCVD: CPT | Performed by: FAMILY MEDICINE

## 2020-09-21 PROCEDURE — G8427 DOCREV CUR MEDS BY ELIG CLIN: HCPCS | Performed by: FAMILY MEDICINE

## 2020-09-21 PROCEDURE — 99213 OFFICE O/P EST LOW 20 MIN: CPT | Performed by: FAMILY MEDICINE

## 2020-09-21 PROCEDURE — 1036F TOBACCO NON-USER: CPT | Performed by: FAMILY MEDICINE

## 2020-09-21 RX ORDER — LISINOPRIL AND HYDROCHLOROTHIAZIDE 25; 20 MG/1; MG/1
1 TABLET ORAL DAILY
Qty: 90 TABLET | Refills: 1 | Status: SHIPPED
Start: 2020-09-21 | End: 2020-12-07 | Stop reason: SDUPTHER

## 2020-09-21 RX ORDER — AMLODIPINE BESYLATE 2.5 MG/1
2.5 TABLET ORAL DAILY
Qty: 90 TABLET | Refills: 1 | Status: SHIPPED
Start: 2020-09-21 | End: 2020-12-07 | Stop reason: SDUPTHER

## 2020-09-21 RX ORDER — SIMVASTATIN 40 MG
40 TABLET ORAL NIGHTLY
Qty: 90 TABLET | Refills: 1 | Status: SHIPPED
Start: 2020-09-21 | End: 2020-12-07 | Stop reason: SDUPTHER

## 2020-09-21 RX ORDER — METOPROLOL SUCCINATE 25 MG/1
25 TABLET, EXTENDED RELEASE ORAL DAILY
Qty: 30 TABLET | Refills: 3 | Status: SHIPPED
Start: 2020-09-21 | End: 2020-12-07 | Stop reason: SDUPTHER

## 2020-09-21 RX ORDER — FINASTERIDE 5 MG/1
5 TABLET, FILM COATED ORAL DAILY
Qty: 90 TABLET | Refills: 1 | Status: SHIPPED
Start: 2020-09-21 | End: 2020-12-07 | Stop reason: SDUPTHER

## 2020-09-21 NOTE — PATIENT INSTRUCTIONS
LOW SALT  AND LOW FAT DIET. CONTINUE CURRENT MEDICATIONS TAKING REGULARLY. REGULAR WALKING ADVISED. FASTING FOR LAB WORK ONE MORNING. NEXT APPOINTMENT IN 1 MONTH FOR ANNUAL PHYSICAL EXAMINATION.

## 2020-09-21 NOTE — PROGRESS NOTES
OFFICE PROGRESS NOTE      SUBJECTIVE:        Patient ID:   Antoinette Dunbar is a 80 y.o. male who presents for   Chief Complaint   Patient presents with    Hypertension     3 month check up   [facility] Maintenance     Due for AWV and due flu and dtap            HPI:     FEELS GOOD. WATCHING DIET GOOD. WALKING SOME IN HOUSE FOR EXERCISE. TAKING MEDICATIONS REGULARLY. Prior to Admission medications    Medication Sig Start Date End Date Taking? Authorizing Provider   metoprolol succinate (TOPROL XL) 25 MG extended release tablet Take 1 tablet by mouth daily 9/21/20  Yes Ciara Rojas MD   simvastatin (ZOCOR) 40 MG tablet Take 1 tablet by mouth nightly 9/21/20  Yes Ciara Rojas MD   lisinopril-hydroCHLOROthiazide (PRINZIDE;ZESTORETIC) 20-25 MG per tablet Take 1 tablet by mouth daily 9/21/20  Yes Ciara Rojas MD   finasteride (PROSCAR) 5 MG tablet Take 1 tablet by mouth daily 9/21/20  Yes Ciara Rojas MD   amLODIPine (NORVASC) 2.5 MG tablet Take 1 tablet by mouth daily 9/21/20  Yes Ciara Rojas MD   ANTIFUNGAL 2 % cream APPLY CREAM TO AFFECTED AREA TWICE DAILY 10/14/19  Yes Historical Provider, MD   pantoprazole (PROTONIX) 40 MG tablet TAKE 1 TABLET BY MOUTH ONCE DAILY 12/6/19  Yes Historical Provider, MD   ciclopirox (PENLAC) 8 % solution APPLY 1 APPLICATION TOPICALLY TO AFFECTED AREA AT BEDTIME 10/11/19  Yes Historical Provider, MD   ferrous sulfate (LI-IN-SOL) 75 (15 Fe) MG/ML solution Take 15 mg by mouth daily   Yes Historical Provider, MD   timolol (TIMOPTIC) 0.5 % ophthalmic solution  5/4/16  Yes Historical Provider, MD   Multiple Vitamin (ONE-A-DAY ESSENTIAL) TABS Take 1 tablet by mouth daily. Yes Historical Provider, MD Jann Rodríguez Serenoa repens, 450 MG CAPS Take 2 tablets by mouth daily. Yes Historical Provider, MD   Misc Natural Products (OSTEO BI-FLEX JOINT SHIELD PO) Take 2 tablets by mouth daily.      Yes Historical Provider, MD aspirin 81 MG EC tablet Take 81 mg by mouth daily.      Yes Historical Provider, MD     Social History     Socioeconomic History    Marital status:      Spouse name: None    Number of children: None    Years of education: None    Highest education level: None   Occupational History    None   Social Needs    Financial resource strain: None    Food insecurity     Worry: None     Inability: None    Transportation needs     Medical: None     Non-medical: None   Tobacco Use    Smoking status: Former Smoker     Packs/day: 1.00     Years: 40.00     Pack years: 40.00     Last attempt to quit: 2000     Years since quittin.0    Smokeless tobacco: Never Used   Substance and Sexual Activity    Alcohol use: No    Drug use: No    Sexual activity: None   Lifestyle    Physical activity     Days per week: None     Minutes per session: None    Stress: None   Relationships    Social connections     Talks on phone: None     Gets together: None     Attends Jain service: None     Active member of club or organization: None     Attends meetings of clubs or organizations: None     Relationship status: None    Intimate partner violence     Fear of current or ex partner: None     Emotionally abused: None     Physically abused: None     Forced sexual activity: None   Other Topics Concern    None   Social History Narrative    None       I have reviewed Kyle's allergies, medications, problem list, medical, social and family history and have updated as needed in the electronic medical record  Review Of Systems:    Skin: no abnormal pigmentation, rash, scaling, itching, masses, hair or nail changes  Eyes: no blurring, diplopia, or eye pain  Ears/Nose/Throat: no hearing loss, tinnitus, vertigo, nosebleed, nasal congestion, rhinorrhea, sore throat  Respiratory: no cough, pleuritic chest pain, dyspnea, or wheezing  Cardiovascular: no chest pain, angina, dyspnea on exertion, orthopnea, PND, palpitations, or claudication  Gastrointestinal: no nausea, vomiting, heartburn, diarrhea, constipation, bloating,  abdominal pain, or blood per rectum. Appetite is good  Genitourinary: no urinary urgency, frequency, dysuria, nocturia, hesitancy, or incontinence  Musculoskeletal: joint pains off and on. Morning stiffness. Ambulating well  Neurologic: no paralysis, paresis, paresthesia, seizures, tremors, or headaches  Hematologic/Lymphatic/Immunologic: no anemia, abnormal bleeding/bruising, fever, chills, night sweats, swollen glands, or unexplained weight loss  Endocrine: no heat or cold intolerance and no polyphagia, polydipsia, or polyuria        OBJECTIVE:     VS:  Wt Readings from Last 3 Encounters:   09/21/20 162 lb (73.5 kg)   06/17/20 165 lb (74.8 kg)   01/14/20 166 lb 9.6 oz (75.6 kg)     Temp Readings from Last 3 Encounters:   09/21/20 97 °F (36.1 °C)   10/03/19 98 °F (36.7 °C) (Oral)   08/14/19 97.6 °F (36.4 °C) (Temporal)     BP Readings from Last 3 Encounters:   09/21/20 (!) 150/68   06/17/20 (!) 154/68   01/14/20 (!) 138/58        General appearance: Alert, Awake, Oriented times 3, no distress  Skin: Warm and dry  Head: Normocephalic. No masses, lesions or tenderness noted  Eyes: Conjunctivae appear normal. PERLE  Ears: External ears normal  Nose/Sinuses: Nares normal. Septum midline. Mucosa normal. No drainage  Oropharynx: Oropharynx clear with no exudate seen  Neck: Neck supple. No jugular venous distension, lymphadenopathy or thyromegaly Trachea midline  Chest:  Normal. Movements are Normal and Equal.  Lungs: Lungs clear to auscultation bilaterally. No ronchi, crackles or wheezes  Heart: S1 S2  Regular rate and rhythm. No rub, murmur or gallop  Abdomen: Abdomen soft, non-tender. BS normal. No masses, organomegaly. Back: Grossly Normal and Equal. DTR are Normal. SLR is Normal.  Extremities: Arthritic changes are noted. Movements are limited.  Pedal pulses are normal.  Musculoskeletal: Muscular strength appears intact. No joint effusion, tenderness, swelling or warmth  Neuro:  No focal motor or sensory deficits        ASSESSMENT     Patient Active Problem List    Diagnosis Date Noted    Primary osteoarthritis involving multiple joints 09/24/2012     Priority: High     Class: Chronic    Pure hypercholesterolemia      Priority: High     Class: Chronic    Essential hypertension      Priority: High     Class: Chronic    Iron deficiency anemia due to chronic blood loss 07/26/2018    CKD (chronic kidney disease) stage 3, GFR 30-59 ml/min ([facility]) 07/26/2018        Diagnosis:     ICD-10-CM    1. Pure hypercholesterolemia  E78.00 Comprehensive Metabolic Panel     Lipid Panel   2. Essential hypertension  I10    3. CKD (chronic kidney disease) stage 3, GFR 30-59 ml/min ([facility])  N18.3    4. Iron deficiency anemia due to chronic blood loss  D50.0 CBC Auto Differential       PLAN:           Patient Instructions   LOW SALT  AND LOW FAT DIET. CONTINUE CURRENT MEDICATIONS TAKING REGULARLY. REGULAR WALKING ADVISED. FASTING FOR LAB WORK ONE MORNING. NEXT APPOINTMENT IN 1 MONTH FOR ANNUAL PHYSICAL EXAMINATION. Return in about 1 month (around 10/21/2020) for [place]. .         I have reviewed my findings and recommendations with Kam Martin.     Electronically signed by Christina Alba MD on 9/21/20 at 9:43 AM EDT

## 2020-10-09 ENCOUNTER — HOSPITAL ENCOUNTER (OUTPATIENT)
Age: 85
Discharge: HOME | End: 2020-10-11
Payer: MEDICARE

## 2020-10-09 LAB
ALBUMIN SERPL-MCNC: 4.1 G/DL (ref 3.5–5.2)
ALP BLD-CCNC: 71 U/L (ref 40–129)
ALT SERPL-CCNC: 7 U/L (ref 0–40)
ANION GAP SERPL CALCULATED.3IONS-SCNC: 15 MMOL/L (ref 7–16)
AST SERPL-CCNC: 8 U/L (ref 0–39)
BASOPHILS ABSOLUTE: 0.05 E9/L (ref 0–0.2)
BASOPHILS RELATIVE PERCENT: 0.8 % (ref 0–2)
BILIRUB SERPL-MCNC: 0.3 MG/DL (ref 0–1.2)
BUN BLDV-MCNC: 32 MG/DL (ref 8–23)
CALCIUM SERPL-MCNC: 9.4 MG/DL (ref 8.6–10.2)
CHLORIDE BLD-SCNC: 106 MMOL/L (ref 98–107)
CHOLESTEROL, TOTAL: 152 MG/DL (ref 0–199)
CO2: 24 MMOL/L (ref 22–29)
CREAT SERPL-MCNC: 1.8 MG/DL (ref 0.7–1.2)
EOSINOPHILS ABSOLUTE: 0.09 E9/L (ref 0.05–0.5)
EOSINOPHILS RELATIVE PERCENT: 1.4 % (ref 0–6)
GFR AFRICAN AMERICAN: 43
GFR NON-AFRICAN AMERICAN: 36 ML/MIN/1.73
GLUCOSE BLD-MCNC: 115 MG/DL (ref 74–99)
HCT VFR BLD CALC: 37.5 % (ref 37–54)
HDLC SERPL-MCNC: 43 MG/DL
HEMOGLOBIN: 11.9 G/DL (ref 12.5–16.5)
IMMATURE GRANULOCYTES #: 0.02 E9/L
IMMATURE GRANULOCYTES %: 0.3 % (ref 0–5)
LDL CHOLESTEROL CALCULATED: 91 MG/DL (ref 0–99)
LYMPHOCYTES ABSOLUTE: 1.78 E9/L (ref 1.5–4)
LYMPHOCYTES RELATIVE PERCENT: 26.9 % (ref 20–42)
MCH RBC QN AUTO: 31.2 PG (ref 26–35)
MCHC RBC AUTO-ENTMCNC: 31.7 % (ref 32–34.5)
MCV RBC AUTO: 98.2 FL (ref 80–99.9)
MONOCYTES ABSOLUTE: 0.51 E9/L (ref 0.1–0.95)
MONOCYTES RELATIVE PERCENT: 7.7 % (ref 2–12)
NEUTROPHILS ABSOLUTE: 4.16 E9/L (ref 1.8–7.3)
NEUTROPHILS RELATIVE PERCENT: 62.9 % (ref 43–80)
PDW BLD-RTO: 12.6 FL (ref 11.5–15)
PLATELET # BLD: 241 E9/L (ref 130–450)
PMV BLD AUTO: 10.8 FL (ref 7–12)
POTASSIUM SERPL-SCNC: 4.4 MMOL/L (ref 3.5–5)
RBC # BLD: 3.82 E12/L (ref 3.8–5.8)
SODIUM BLD-SCNC: 145 MMOL/L (ref 132–146)
TOTAL PROTEIN: 7.2 G/DL (ref 6.4–8.3)
TRIGL SERPL-MCNC: 89 MG/DL (ref 0–149)
VLDLC SERPL CALC-MCNC: 18 MG/DL
WBC # BLD: 6.6 E9/L (ref 4.5–11.5)

## 2020-10-09 PROCEDURE — 80061 LIPID PANEL: CPT

## 2020-10-09 PROCEDURE — 80053 COMPREHEN METABOLIC PANEL: CPT

## 2020-10-09 PROCEDURE — 36415 COLL VENOUS BLD VENIPUNCTURE: CPT

## 2020-10-09 PROCEDURE — 85025 COMPLETE CBC W/AUTO DIFF WBC: CPT

## 2020-10-12 ENCOUNTER — TELEPHONE (OUTPATIENT)
Dept: FAMILY MEDICINE CLINIC | Age: 85
End: 2020-10-12

## 2020-10-12 NOTE — TELEPHONE ENCOUNTER
----- Message from Lanette Jones MD sent at 10/12/2020 10:21 AM EDT -----  KIDNEY FUNCTION BORDERLINE LOW. WILL MONITOR. BLOOD GLUCOSE BORDERLINE HIGH. LOW SALT,LOW CARB. AND LOW FAT DIET. CONTINUE CURRENT MEDICATIONS TAKING REGULARLY. REGULAR WALKING ADVISED.

## 2020-10-12 NOTE — RESULT ENCOUNTER NOTE
KIDNEY FUNCTION BORDERLINE LOW. WILL MONITOR. BLOOD GLUCOSE BORDERLINE HIGH. LOW SALT,LOW CARB. AND LOW FAT DIET. CONTINUE CURRENT MEDICATIONS TAKING REGULARLY. REGULAR WALKING ADVISED.

## 2020-11-23 ENCOUNTER — OFFICE VISIT (OUTPATIENT)
Dept: FAMILY MEDICINE CLINIC | Age: 85
End: 2020-11-23
Payer: MEDICARE

## 2020-11-23 VITALS
OXYGEN SATURATION: 97 % | WEIGHT: 162.8 LBS | HEART RATE: 43 BPM | HEIGHT: 69 IN | SYSTOLIC BLOOD PRESSURE: 118 MMHG | DIASTOLIC BLOOD PRESSURE: 70 MMHG | TEMPERATURE: 97.4 F | BODY MASS INDEX: 24.11 KG/M2 | RESPIRATION RATE: 16 BRPM

## 2020-11-23 PROCEDURE — 1123F ACP DISCUSS/DSCN MKR DOCD: CPT | Performed by: FAMILY MEDICINE

## 2020-11-23 PROCEDURE — G0439 PPPS, SUBSEQ VISIT: HCPCS | Performed by: FAMILY MEDICINE

## 2020-11-23 PROCEDURE — 4040F PNEUMOC VAC/ADMIN/RCVD: CPT | Performed by: FAMILY MEDICINE

## 2020-11-23 PROCEDURE — G8484 FLU IMMUNIZE NO ADMIN: HCPCS | Performed by: FAMILY MEDICINE

## 2020-11-23 ASSESSMENT — LIFESTYLE VARIABLES
HOW OFTEN DO YOU HAVE A DRINK CONTAINING ALCOHOL: 0
AUDIT TOTAL SCORE: INCOMPLETE
HOW OFTEN DO YOU HAVE A DRINK CONTAINING ALCOHOL: NEVER
AUDIT-C TOTAL SCORE: INCOMPLETE

## 2020-11-23 ASSESSMENT — PATIENT HEALTH QUESTIONNAIRE - PHQ9
SUM OF ALL RESPONSES TO PHQ QUESTIONS 1-9: 0
SUM OF ALL RESPONSES TO PHQ QUESTIONS 1-9: 0
2. FEELING DOWN, DEPRESSED OR HOPELESS: 0
1. LITTLE INTEREST OR PLEASURE IN DOING THINGS: 0
SUM OF ALL RESPONSES TO PHQ QUESTIONS 1-9: 0
SUM OF ALL RESPONSES TO PHQ9 QUESTIONS 1 & 2: 0

## 2020-11-23 NOTE — PATIENT INSTRUCTIONS
LOW SALT,LOW CARB. AND LOW FAT DIET. CONTINUE CURRENT MEDICATIONS TAKING REGULARLY. REGULAR WALKING ADVISED. FASTING FOR LAB WORK PRIOR TO NEXT VISIT. NEXT APPOINTMENT IN 3 MONTHS. Personalized Preventive Plan for Victor M Jackson - 11/23/2020  Medicare offers a range of preventive health benefits. Some of the tests and screenings are paid in full while other may be subject to a deductible, co-insurance, and/or copay. Some of these benefits include a comprehensive review of your medical history including lifestyle, illnesses that may run in your family, and various assessments and screenings as appropriate. After reviewing your medical record and screening and assessments performed today your provider may have ordered immunizations, labs, imaging, and/or referrals for you. A list of these orders (if applicable) as well as your Preventive Care list are included within your After Visit Summary for your review. Other Preventive Recommendations:    · A preventive eye exam performed by an eye specialist is recommended every 1-2 years to screen for glaucoma; cataracts, macular degeneration, and other eye disorders. · A preventive dental visit is recommended every 6 months. · Try to get at least 150 minutes of exercise per week or 10,000 steps per day on a pedometer . · Order or download the FREE \"Exercise & Physical Activity: Your Everyday Guide\" from [employer]. Call 0-333.352.3217 or search [employer] online. · You need 1292-8389 mg of calcium and 6889-1022 IU of vitamin D per day. It is possible to meet your calcium requirement with diet alone, but a vitamin D supplement is usually necessary to meet this goal.  · When exposed to the sun, use a sunscreen that protects against both UVA and UVB radiation with an SPF of 30 or greater. Reapply every 2 to 3 hours or after sweating, drying off with a towel, or swimming.   · Always wear a seat belt when traveling in a car. Always wear a helmet when riding a bicycle or motorcycle.

## 2020-11-23 NOTE — PROGRESS NOTES
Medicare Annual Wellness Visit  Name: Amaya Shadow Date: 2020   MRN: <[id number]> Sex: Male   Age: 80 y.o. Ethnicity: Non-/Non    : 1932 Race: Maxwell Ramey is here for Medicare AWV and Discuss Labs    Screenings for behavioral, psychosocial and functional/safety risks, and cognitive dysfunction are all negative except as indicated below. These results, as well as other patient data from the [address] form, are documented in Flowsheets linked to this Encounter. No Known Allergies      Prior to Visit Medications    Medication Sig Taking? Authorizing Provider   metoprolol succinate (TOPROL XL) 25 MG extended release tablet Take 1 tablet by mouth daily Yes Maykel Bowen MD   simvastatin (ZOCOR) 40 MG tablet Take 1 tablet by mouth nightly Yes Maykel Bowen MD   lisinopril-hydroCHLOROthiazide (PRINZIDE;ZESTORETIC) 20-25 MG per tablet Take 1 tablet by mouth daily Yes Maykel Bowen MD   finasteride (PROSCAR) 5 MG tablet Take 1 tablet by mouth daily Yes Maykel Bowen MD   amLODIPine (NORVASC) 2.5 MG tablet Take 1 tablet by mouth daily Yes Maykel Bowen MD   ANTIFUNGAL 2 % cream APPLY CREAM TO AFFECTED AREA TWICE DAILY Yes Historical Provider, MD   pantoprazole (PROTONIX) 40 MG tablet TAKE 1 TABLET BY MOUTH ONCE DAILY Yes Historical Provider, MD   ciclopirox (PENLAC) 8 % solution APPLY 1 APPLICATION TOPICALLY TO AFFECTED AREA AT BEDTIME Yes Historical Provider, MD   ferrous sulfate (LI-IN-SOL) 75 (15 Fe) MG/ML solution Take 15 mg by mouth daily Yes Historical Provider, MD   timolol (TIMOPTIC) 0.5 % ophthalmic solution  Yes Historical Provider, MD   Multiple Vitamin (ONE-A-DAY ESSENTIAL) TABS Take 1 tablet by mouth daily. Yes Historical Provider, MD Jann Rodríguez Serenoa repens, 450 MG CAPS Take 2 tablets by mouth daily. Yes Historical Provider, MD   Misc Natural Products (OSTEO BI-FLEX JOINT SHIELD PO) Take 2 tablets by mouth daily.    Yes Historical Provider, MD   aspirin 81 MG EC tablet Take 81 mg by mouth daily. Yes Historical Provider, MD         Past Medical History:   Diagnosis Date    Hyperlipidemia     Hypertension     Lumbosacral strain 7/8/2013       Past Surgical History:   Procedure Laterality Date    A-V CARDIAC PACEMAKER INSERTION  5/30/07    COLONOSCOPY  8/3/11    EYE SURGERY  2002    cataracts evelyn    TONSILLECTOMY      child         Family History   Problem Relation Age of Onset    High Blood Pressure Mother     Cancer Mother     Diabetes Mother     Heart Disease Father     High Blood Pressure Father        CareTeam (Including outside providers/suppliers regularly involved in providing care):   Patient Care Team:  Nancy Tolentino MD as PCP - Nadya Akbar MD as PCP - [facility] Empaneled Provider    Wt Readings from Last 3 Encounters:   11/23/20 162 lb 12.8 oz (73.8 kg)   09/21/20 162 lb (73.5 kg)   06/17/20 165 lb (74.8 kg)     Vitals:    11/23/20 1412   BP: 118/70   Pulse: (!) 43   Resp: 16   Temp: 97.4 °F (36.3 °C)   TempSrc: Temporal   SpO2: 97%   Weight: 162 lb 12.8 oz (73.8 kg)   Height: 5' 9\" (1.753 m)     Body mass index is 24.04 kg/m². Based upon direct observation of the patient, evaluation of cognition reveals recent and remote memory intact.     General Appearance: alert and oriented to person, place and time, well developed and well- nourished, in no acute distress  Skin: warm and dry, no rash or erythema  Head: normocephalic and atraumatic  Eyes: pupils equal, round, and reactive to light, extraocular eye movements intact, conjunctivae normal  ENT: tympanic membrane, external ear and ear canal normal bilaterally, nose without deformity, nasal mucosa and turbinates normal without polyps  Neck: supple and non-tender without mass, no thyromegaly or thyroid nodules, no cervical lymphadenopathy  Pulmonary/Chest: clear to auscultation bilaterally- no wheezes, rales or rhonchi, normal air movement, no respiratory distress  Cardiovascular: normal rate, regular rhythm, normal S1 and S2, no murmurs, rubs, clicks, or gallops, distal pulses intact, no carotid bruits  Abdomen: soft, non-tender, non-distended, normal bowel sounds, no masses or organomegaly  Extremities: no cyanosis, clubbing or edema  Musculoskeletal: normal range of motion, no joint swelling, deformity or tenderness  Neurologic: reflexes normal and symmetric, no cranial nerve deficit, gait, coordination and speech normal    Patient's complete Health Risk Assessment and screening values have been reviewed and are found in Flowsheets. The following problems were reviewed today and where indicated follow up appointments were made and/or referrals ordered. Positive Risk Factor Screenings with Interventions:       General Health and ACP:  General  In general, how would you say your health is?: Very Good  In the past 7 days, have you experienced any of the following? New or Increased Pain, New or Increased Fatigue, Loneliness, Social Isolation, Stress or Anger?: None of These  Do you get the social and emotional support that you need?: Yes  Do you have a Living Will?: (!) No  Advance Directives     Power of [facility] Will ACP-Advance Directive ACP-Power of     Not on File Not on File Filed [address] Risk Interventions:  · No Living Will: Advance Care Planning addressed with patient today    Health Habits/Nutrition:  Health Habits/Nutrition  Do you exercise for at least 20 minutes 2-3 times per week?: (!) No  Have you lost any weight without trying in the past 3 months?: No  Do you eat fewer than 2 meals per day?: No  Have you seen a dentist within the past year?: (!) No  Body mass index: 24.04  Health Habits/Nutrition Interventions:  · Inadequate physical activity:  educational materials provided to promote increased physical activity   · ADVISED TO SEE DENTIST SOON.     Hearing/Vision:  No exam data present  Hearing/Vision  Do you or your family notice any trouble with your hearing?: (!) Yes  Do you have difficulty driving, watching TV, or doing any of your daily activities because of your eyesight?: No  Have you had an eye exam within the past year?: Yes  Hearing/Vision Interventions:  · WEARS HEARING AID. ABLE TO HEAR COMFORTABLY. Personalized Preventive Plan   Current Health Maintenance Status  Immunization History   Administered Date(s) Administered    DTaP 09/18/2003    Influenza 10/28/2013    Influenza Virus Vaccine 09/24/2012, 09/23/2014    Influenza, High Dose (Fluzone 65 yrs and older) 11/21/2016, 10/25/2017, 10/09/2018    Influenza, Triv, inactivated, subunit, adjuvanted, IM (Fluad 65 yrs and older) 11/13/2019    Pneumococcal Conjugate 13-valent ([id number]) 02/09/2016    Pneumococcal Polysaccharide ([id number]) 07/21/2011        Health Maintenance   Topic Date Due    Shingles Vaccine (1 of 2) 06/02/1982    DTaP/Tdap/Td vaccine (2 - Tdap) 09/18/2013    Annual Wellness Visit (AWV)  05/29/2019    Flu vaccine (1) 09/01/2020    Lipid screen  10/09/2021    Potassium monitoring  10/09/2021    Creatinine monitoring  10/09/2021    Pneumococcal 65+ years Vaccine  Completed    Hepatitis A vaccine  Aged Out    Hepatitis B vaccine  Aged Out    Hib vaccine  Aged Out    Meningococcal (ACWY) vaccine  Aged Out     Recommendations for elmenus Due: see orders and patient instructions/AVS.  . Recommended screening schedule for the next 5-10 years is provided to the patient in written form: see Patient Instructions/AVS.    Janett Jake was seen today for medicare awv and discuss labs.     Diagnoses and all orders for this visit:    Routine general medical examination at a health care facility    Pure hypercholesterolemia    Essential hypertension    Stage 3 chronic kidney disease, unspecified whether stage 3a or 3b CKD    Iron deficiency anemia due to chronic blood loss

## 2020-12-03 ENCOUNTER — APPOINTMENT (OUTPATIENT)
Dept: GENERAL RADIOLOGY | Age: 85
End: 2020-12-03
Payer: MEDICARE

## 2020-12-03 ENCOUNTER — HOSPITAL ENCOUNTER (EMERGENCY)
Age: 85
Discharge: HOME | End: 2020-12-03
Attending: EMERGENCY MEDICINE
Payer: MEDICARE

## 2020-12-03 VITALS
HEART RATE: 68 BPM | TEMPERATURE: 98.1 F | BODY MASS INDEX: 23.7 KG/M2 | SYSTOLIC BLOOD PRESSURE: 143 MMHG | HEIGHT: 69 IN | OXYGEN SATURATION: 100 % | DIASTOLIC BLOOD PRESSURE: 70 MMHG | WEIGHT: 160 LBS | RESPIRATION RATE: 16 BRPM

## 2020-12-03 LAB
ANION GAP SERPL CALCULATED.3IONS-SCNC: 11 MMOL/L (ref 7–16)
BASOPHILS ABSOLUTE: 0.07 E9/L (ref 0–0.2)
BASOPHILS RELATIVE PERCENT: 0.7 % (ref 0–2)
BUN BLDV-MCNC: 62 MG/DL (ref 8–23)
CALCIUM SERPL-MCNC: 8.6 MG/DL (ref 8.6–10.2)
CHLORIDE BLD-SCNC: 106 MMOL/L (ref 98–107)
CO2: 24 MMOL/L (ref 22–29)
CREAT SERPL-MCNC: 1.8 MG/DL (ref 0.7–1.2)
EOSINOPHILS ABSOLUTE: 0.04 E9/L (ref 0.05–0.5)
EOSINOPHILS RELATIVE PERCENT: 0.4 % (ref 0–6)
GFR AFRICAN AMERICAN: 43
GFR NON-AFRICAN AMERICAN: 36 ML/MIN/1.73
GLUCOSE BLD-MCNC: 144 MG/DL (ref 74–99)
HCT VFR BLD CALC: 30.6 % (ref 37–54)
HEMOGLOBIN: 9.7 G/DL (ref 12.5–16.5)
IMMATURE GRANULOCYTES #: 0.04 E9/L
IMMATURE GRANULOCYTES %: 0.4 % (ref 0–5)
LYMPHOCYTES ABSOLUTE: 2.08 E9/L (ref 1.5–4)
LYMPHOCYTES RELATIVE PERCENT: 19.4 % (ref 20–42)
MCH RBC QN AUTO: 31 PG (ref 26–35)
MCHC RBC AUTO-ENTMCNC: 31.7 % (ref 32–34.5)
MCV RBC AUTO: 97.8 FL (ref 80–99.9)
MONOCYTES ABSOLUTE: 0.61 E9/L (ref 0.1–0.95)
MONOCYTES RELATIVE PERCENT: 5.7 % (ref 2–12)
NEUTROPHILS ABSOLUTE: 7.88 E9/L (ref 1.8–7.3)
NEUTROPHILS RELATIVE PERCENT: 73.4 % (ref 43–80)
PDW BLD-RTO: 12.4 FL (ref 11.5–15)
PLATELET # BLD: 215 E9/L (ref 130–450)
PMV BLD AUTO: 11 FL (ref 7–12)
POTASSIUM REFLEX MAGNESIUM: 4.2 MMOL/L (ref 3.5–5)
RBC # BLD: 3.13 E12/L (ref 3.8–5.8)
SODIUM BLD-SCNC: 141 MMOL/L (ref 132–146)
TROPONIN: <0.01 NG/ML (ref 0–0.03)
WBC # BLD: 10.7 E9/L (ref 4.5–11.5)

## 2020-12-03 PROCEDURE — 36415 COLL VENOUS BLD VENIPUNCTURE: CPT

## 2020-12-03 PROCEDURE — 93005 ELECTROCARDIOGRAM TRACING: CPT | Performed by: EMERGENCY MEDICINE

## 2020-12-03 PROCEDURE — 96360 HYDRATION IV INFUSION INIT: CPT

## 2020-12-03 PROCEDURE — 85025 COMPLETE CBC W/AUTO DIFF WBC: CPT

## 2020-12-03 PROCEDURE — 2580000003 HC RX 258: Performed by: EMERGENCY MEDICINE

## 2020-12-03 PROCEDURE — 84484 ASSAY OF TROPONIN QUANT: CPT

## 2020-12-03 PROCEDURE — 80048 BASIC METABOLIC PNL TOTAL CA: CPT

## 2020-12-03 PROCEDURE — 71045 X-RAY EXAM CHEST 1 VIEW: CPT

## 2020-12-03 PROCEDURE — 99285 EMERGENCY DEPT VISIT HI MDM: CPT

## 2020-12-03 RX ORDER — 0.9 % SODIUM CHLORIDE 0.9 %
1000 INTRAVENOUS SOLUTION INTRAVENOUS ONCE
Status: COMPLETED | OUTPATIENT
Start: 2020-12-03 | End: 2020-12-03

## 2020-12-03 RX ADMIN — SODIUM CHLORIDE 1000 ML: 9 INJECTION, SOLUTION INTRAVENOUS at 14:37

## 2020-12-03 NOTE — ED NOTES
Bed: 12  Expected date:   Expected time:   Means of arrival:   Comments:  arslan Bryant RN  12/03/20 0187

## 2020-12-03 NOTE — ED NOTES
Spoke with [employer] technician, Micky Qureshi, who states all 3 transmissions have gone through and report will be faxed in 15-20 minutes.      Emmanuel Eaton RN  12/03/20 [id number]

## 2020-12-04 LAB
EKG ATRIAL RATE: 59 BPM
EKG P AXIS: -18 DEGREES
EKG P-R INTERVAL: 200 MS
EKG Q-T INTERVAL: 458 MS
EKG QRS DURATION: 136 MS
EKG QTC CALCULATION (BAZETT): 472 MS
EKG R AXIS: -54 DEGREES
EKG T AXIS: 109 DEGREES
EKG VENTRICULAR RATE: 64 BPM

## 2020-12-04 PROCEDURE — 93010 ELECTROCARDIOGRAM REPORT: CPT | Performed by: INTERNAL MEDICINE

## 2020-12-05 ASSESSMENT — ENCOUNTER SYMPTOMS
VOMITING: 0
EYE REDNESS: 0
ABDOMINAL PAIN: 0
RECTAL PAIN: 0
SHORTNESS OF BREATH: 0

## 2020-12-05 NOTE — ED PROVIDER NOTES
Chief complaint: Dizziness      HPI:  12/5/20, Time: 10:58 AM EST      Dizziness   Quality:  Lightheadedness  Severity:  Moderate  Onset quality:  Sudden  Duration: 5 minutes. Timing:  Constant  Progression:  Resolved  Chronicity:  New  Context comment:  When carrying laundry basket  Relieved by:  Nothing  Worsened by:  Nothing  Ineffective treatments:  None tried  Associated symptoms: no chest pain, no shortness of breath and no vomiting    Risk factors: heart disease                   Gerardo Bryant is a 80 y.o. male presenting to the ED for dizziness. The history is obtained from patient as well as the patient's medical record. The patient states that he was at the [address] [place] [address] was carrying laundry to his car. The patient states that he had dizziness. This was described as lightheaded. The patient states he did not have a syncopal episode. This lasted 5 minutes. This was moderate in severity. Nothing made it better. Nothing made it worse. The patient denies any treatments prior to arrival.  The patient denies any fevers, chills, chest pain, shortness of breath, abdominal pain, dysuria, diarrhea or constipation. The patient reports he does have a pacemaker. ROS:   Review of Systems   Constitutional: Negative for chills and fever. HENT: Negative for congestion. Eyes: Negative for redness. Respiratory: Negative for shortness of breath. Cardiovascular: Negative for chest pain. Gastrointestinal: Negative for abdominal pain, rectal pain and vomiting. Genitourinary: Negative for dysuria. Musculoskeletal: Negative for arthralgias. Skin: Negative for rash. Neurological: Positive for dizziness and light-headedness. Psychiatric/Behavioral: Negative for confusion.    All other systems reviewed and are negative.      --------------------------------------------- PAST HISTORY ---------------------------------------------  Past Medical History:  has a past medical history of Hyperlipidemia, Hypertension, and Lumbosacral strain. Past Surgical History:  has a past surgical history that includes A-V cardiac pacemaker insertion (5/30/07); eye surgery (2002); Tonsillectomy; and Colonoscopy (8/3/11). Social History:  reports that he quit smoking about 20 years ago. He has a 40.00 pack-year smoking history. He has never used smokeless tobacco. He reports that he does not drink alcohol or use drugs. Family History: family history includes Cancer in his mother; Diabetes in his mother; Heart Disease in his father; High Blood Pressure in his father and mother. The patients home medications have been reviewed. Allergies: Patient has no known allergies. ---------------------------------------------------PHYSICAL EXAM--------------------------------------  Constitutional/General: Alert and oriented x3, well appearing, non toxic in NAD  Head: Normocephalic and atraumatic  Ears: Tympanic membranes unremarkable bilaterally  Mouth: Oropharynx clear, handling secretions, no trismus  Neck: Supple, full ROM,  Pulmonary: Lungs clear to auscultation bilaterally, no wheezes, rales, or rhonchi. Not in respiratory distress  Cardiovascular:  Regular rate. Regular rhythm. No murmurs  Chest: no chest wall tenderness  Abdomen: Soft. Non tender. Non distended. No rebound, guarding, or rigidity. No pulsatile masses appreciated. Musculoskeletal: Moves all extremities x 4. Warm and well perfused, no clubbing, cyanosis, or edema. Capillary refill <3 seconds  Skin: warm and dry. No rashes. Neurologic: GCS 15, CN 2-12 grossly intact, no focal deficits, symmetric strength 5/5 in the upper and lower extremities bilaterally. Speech normal Normal finger to nose. No drift. Psych: Normal Affect    -------------------------------------------------- RESULTS -------------------------------------------------  I have personally reviewed all laboratory and imaging results for this patient. Results are listed below. LABS:  Results for orders placed or performed during the hospital encounter of 12/03/20   CBC Auto Differential   Result Value Ref Range    WBC 10.7 4.5 - 11.5 E9/L    RBC 3.13 (L) 3.80 - 5.80 E12/L    Hemoglobin 9.7 (L) 12.5 - 16.5 g/dL    Hematocrit 30.6 (L) 37.0 - 54.0 %    MCV 97.8 80.0 - 99.9 fL    MCH 31.0 26.0 - 35.0 pg    MCHC 31.7 (L) 32.0 - 34.5 %    RDW 12.4 11.5 - 15.0 fL    Platelets 598 978 - 754 E9/L    MPV 11.0 7.0 - 12.0 fL    Neutrophils % 73.4 43.0 - 80.0 %    Immature Granulocytes % 0.4 0.0 - 5.0 %    Lymphocytes % 19.4 (L) 20.0 - 42.0 %    Monocytes % 5.7 2.0 - 12.0 %    Eosinophils % 0.4 0.0 - 6.0 %    Basophils % 0.7 0.0 - 2.0 %    Neutrophils Absolute 7.88 (H) 1.80 - 7.30 E9/L    Immature Granulocytes # 0.04 E9/L    Lymphocytes Absolute 2.08 1.50 - 4.00 E9/L    Monocytes Absolute 0.61 0.10 - 0.95 E9/L    Eosinophils Absolute 0.04 (L) 0.05 - 0.50 E9/L    Basophils Absolute 0.07 0.00 - 0.20 E9/L   Troponin   Result Value Ref Range    Troponin <0.01 0.00 - 0.03 ng/mL   Basic Metabolic Panel w/ Reflex to MG   Result Value Ref Range    Sodium 141 132 - 146 mmol/L    Potassium reflex Magnesium 4.2 3.5 - 5.0 mmol/L    Chloride 106 98 - 107 mmol/L    CO2 24 22 - 29 mmol/L    Anion Gap 11 7 - 16 mmol/L    Glucose 144 (H) 74 - 99 mg/dL    BUN 62 (H) 8 - 23 mg/dL    CREATININE 1.8 (H) 0.7 - 1.2 mg/dL    GFR Non-African American 36 >=60 mL/min/1.73    GFR African American 43     Calcium 8.6 8.6 - 10.2 mg/dL   EKG 12 Lead   Result Value Ref Range    Ventricular Rate 64 BPM    Atrial Rate 59 BPM    P-R Interval 200 ms    QRS Duration 136 ms    Q-T Interval 458 ms    QTc Calculation (Bazett) 472 ms    P Axis -18 degrees    R Axis -54 degrees    T Axis 109 degrees       RADIOLOGY:  Interpreted by Radiologist.  XR CHEST PORTABLE   Final Result   No acute process. EKG:  This EKG is signed and interpreted by me.     Atrial paced rate of 64, no ST segment elevation or depression, NE interval 200 MS,  MS, no STEMI  Interpreted by me      ------------------------- NURSING NOTES AND VITALS REVIEWED ---------------------------   The nursing notes within the ED encounter and vital signs as below have been reviewed by myself. BP (!) 143/70   Pulse 68   Temp 98.1 °F (36.7 °C) (Oral)   Resp 16   Ht 5' 9\" (1.753 m)   Wt 160 lb (72.6 kg)   SpO2 100%   BMI 23.63 kg/m²   Oxygen Saturation Interpretation: Normal    The patients available past medical records and past encounters were reviewed. ------------------------------ ED COURSE/MEDICAL DECISION MAKING----------------------  Medications   0.9 % sodium chloride bolus (0 mLs Intravenous Stopped 12/3/20 1550)             Medical Decision Making:   I, Dr. Kevin Mc am the primary physician of record. Tayo Hicks is a 80 y.o. male who presents to the ED for dizziness. This had resolved. differential diagnosis includes but is not limited to orthostatic hypotension electrolyte derangement, acute kidney injury the patient does have a past medical history of   has a past medical history of Hyperlipidemia, Hypertension, and Lumbosacral strain. . The patient was given IV fluid. Labs and imaging obtained, reviewed. Patient did have CBC which was fairly unremarkable, CMP grossly at the patient's baseline aside from mildly elevated BUN with prerenal etiology likely, troponin negative, patient was given IV fluid. Patient treated symptomatically. Results discussed with patient. The patient had no dizziness in the emergency department. Patient's pacemaker was interrogated and patient had no events. Re-Evaluations/Consultations:           Patient in no acute distress. Results discussed. Patient be discharged.                This patient's ED course included: History, physical examination, reevaluation prior to disposition, labs, imaging, telemetry monitoring, EKG, IV fluid      This patient has remained hemodynamically stable during their ED course. Counseling: The emergency provider has spoken with the patient and discussed todays results, in addition to providing specific details for the plan of care and counseling regarding the diagnosis and prognosis. Questions are answered at this time and they are agreeable with the plan.       --------------------------------- IMPRESSION AND DISPOSITION ---------------------------------    IMPRESSION  1. Dizziness    2. Dehydration        DISPOSITION  Disposition: Discharge to home  Patient condition is stable        NOTE: This report was transcribed using voice recognition software.  Every effort was made to ensure accuracy; however, inadvertent computerized transcription errors may be present         Madai Jaime DO  12/05/20 1113

## 2020-12-07 ENCOUNTER — OFFICE VISIT (OUTPATIENT)
Dept: FAMILY MEDICINE CLINIC | Age: 85
End: 2020-12-07
Payer: MEDICARE

## 2020-12-07 VITALS
HEART RATE: 49 BPM | WEIGHT: 161 LBS | BODY MASS INDEX: 23.85 KG/M2 | OXYGEN SATURATION: 98 % | TEMPERATURE: 98 F | SYSTOLIC BLOOD PRESSURE: 120 MMHG | RESPIRATION RATE: 16 BRPM | HEIGHT: 69 IN | DIASTOLIC BLOOD PRESSURE: 72 MMHG

## 2020-12-07 PROCEDURE — G8427 DOCREV CUR MEDS BY ELIG CLIN: HCPCS | Performed by: FAMILY MEDICINE

## 2020-12-07 PROCEDURE — 1123F ACP DISCUSS/DSCN MKR DOCD: CPT | Performed by: FAMILY MEDICINE

## 2020-12-07 PROCEDURE — G8484 FLU IMMUNIZE NO ADMIN: HCPCS | Performed by: FAMILY MEDICINE

## 2020-12-07 PROCEDURE — G8420 CALC BMI NORM PARAMETERS: HCPCS | Performed by: FAMILY MEDICINE

## 2020-12-07 PROCEDURE — 99214 OFFICE O/P EST MOD 30 MIN: CPT | Performed by: FAMILY MEDICINE

## 2020-12-07 PROCEDURE — 4040F PNEUMOC VAC/ADMIN/RCVD: CPT | Performed by: FAMILY MEDICINE

## 2020-12-07 PROCEDURE — 1036F TOBACCO NON-USER: CPT | Performed by: FAMILY MEDICINE

## 2020-12-07 RX ORDER — FINASTERIDE 5 MG/1
5 TABLET, FILM COATED ORAL DAILY
Qty: 90 TABLET | Refills: 1 | Status: SHIPPED
Start: 2020-12-07 | End: 2021-06-09 | Stop reason: SDUPTHER

## 2020-12-07 RX ORDER — LISINOPRIL AND HYDROCHLOROTHIAZIDE 25; 20 MG/1; MG/1
1 TABLET ORAL DAILY
Qty: 90 TABLET | Refills: 1 | Status: SHIPPED
Start: 2020-12-07 | End: 2021-06-09 | Stop reason: SDUPTHER

## 2020-12-07 RX ORDER — METOPROLOL SUCCINATE 25 MG/1
25 TABLET, EXTENDED RELEASE ORAL DAILY
Qty: 30 TABLET | Refills: 3 | Status: SHIPPED
Start: 2020-12-07 | End: 2021-02-25 | Stop reason: SDUPTHER

## 2020-12-07 RX ORDER — SIMVASTATIN 40 MG
40 TABLET ORAL NIGHTLY
Qty: 90 TABLET | Refills: 1 | Status: SHIPPED
Start: 2020-12-07 | End: 2021-04-04

## 2020-12-07 RX ORDER — AMLODIPINE BESYLATE 2.5 MG/1
2.5 TABLET ORAL DAILY
Qty: 90 TABLET | Refills: 1 | Status: SHIPPED
Start: 2020-12-07 | End: 2021-06-09 | Stop reason: SDUPTHER

## 2020-12-07 NOTE — PATIENT INSTRUCTIONS
LOW SALT. AND LOW FAT DIET. CONTINUE CURRENT MEDICATIONS TAKING REGULARLY. REGULAR WALKING ADVISED. SEE DR. Karson Hidalgo AS SCHEDULED. FASTING FOR LAB WORK PRIOR TO NEXT VISIT. NEXT APPOINTMENT IN 1 MONTH.

## 2020-12-07 NOTE — PROGRESS NOTES
OFFICE PROGRESS NOTE      SUBJECTIVE:        Patient ID:   Ellen Vásquez is a 80 y.o. male who presents for   Chief Complaint   Patient presents with    Follow-up     Patient was seen in ER 12/3/2020 patient had passed out at laundry mat with daughter.  Medication Refill           HPI:     HAD EPISODE OF DIZZINESS. EVALUATED IN ER ON 12/3/2020 AND RELEASED. HERE FOR FOLLOW UP. NO FURTHER SUCH EPISODES. HAS NOTICED TARRY STOOLS LATELY. NOT SEEN DR. Karson Hidalgo FOR LONG TIME. EATING  GOOD. WALKING SOME IN HOUSE FOR EXERCISE. TAKING MEDICATIONS REGULARLY. Prior to Admission medications    Medication Sig Start Date End Date Taking? Authorizing Provider   metoprolol succinate (TOPROL XL) 25 MG extended release tablet Take 1 tablet by mouth daily 12/7/20  Yes Montserrat Yi MD   simvastatin (ZOCOR) 40 MG tablet Take 1 tablet by mouth nightly 9/21/20   Montserrat Yi MD   lisinopril-hydroCHLOROthiazide (PRINZIDE;ZESTORETIC) 20-25 MG per tablet Take 1 tablet by mouth daily 9/21/20   Montserrat Yi MD   finasteride (PROSCAR) 5 MG tablet Take 1 tablet by mouth daily 9/21/20   Montserrat Yi MD   amLODIPine (NORVASC) 2.5 MG tablet Take 1 tablet by mouth daily 9/21/20   Montserrat Yi MD   ANTIFUNGAL 2 % cream APPLY CREAM TO AFFECTED AREA TWICE DAILY 10/14/19   Historical Provider, MD   pantoprazole (PROTONIX) 40 MG tablet TAKE 1 TABLET BY MOUTH ONCE DAILY 12/6/19   Historical Provider, MD   ciclopirox (PENLAC) 8 % solution APPLY 1 APPLICATION TOPICALLY TO AFFECTED AREA AT BEDTIME 10/11/19   Historical Provider, MD   ferrous sulfate (LI-IN-SOL) 75 (15 Fe) MG/ML solution Take 15 mg by mouth daily    Historical Provider, MD   timolol (TIMOPTIC) 0.5 % ophthalmic solution  5/4/16   Historical Provider, MD   Multiple Vitamin (ONE-A-DAY ESSENTIAL) TABS Take 1 tablet by mouth daily.       Historical Provider, Yohannes Rocha, 450 MG CAPS Take 2 tablets by mouth daily.      Historical Provider, MD   [facility] Natural Products (OSTEO BI-FLEX JOINT SHIELD PO) Take 2 tablets by mouth daily. Historical Provider, MD   aspirin 81 MG EC tablet Take 81 mg by mouth daily.       Historical Provider, MD     Social History     Socioeconomic History    Marital status:      Spouse name: None    Number of children: None    Years of education: None    Highest education level: None   Occupational History    None   Social Needs    Financial resource strain: None    Food insecurity     Worry: None     Inability: None    Transportation needs     Medical: None     Non-medical: None   Tobacco Use    Smoking status: Former Smoker     Packs/day: 1.00     Years: 40.00     Pack years: 40.00     Last attempt to quit: 2000     Years since quittin.2    Smokeless tobacco: Never Used   Substance and Sexual Activity    Alcohol use: No    Drug use: No    Sexual activity: None   Lifestyle    Physical activity     Days per week: None     Minutes per session: None    Stress: None   Relationships    Social connections     Talks on phone: None     Gets together: None     Attends Oriental orthodox service: None     Active member of club or organization: None     Attends meetings of clubs or organizations: None     Relationship status: None    Intimate partner violence     Fear of current or ex partner: None     Emotionally abused: None     Physically abused: None     Forced sexual activity: None   Other Topics Concern    None   Social History Narrative    None       I have reviewed Kyle's allergies, medications, problem list, medical, social and family history and have updated as needed in the electronic medical record  Review Of Systems:    Skin: no abnormal pigmentation, rash, scaling, itching, masses, hair or nail changes  Eyes: no blurring, diplopia, or eye pain  Ears/Nose/Throat: no hearing loss, tinnitus, vertigo, nosebleed, nasal congestion, rhinorrhea, sore throat  Respiratory: no cough, pleuritic chest pain, dyspnea, or wheezing  Cardiovascular: no chest pain, angina, dyspnea on exertion, orthopnea, PND, palpitations, or claudication  Gastrointestinal: no nausea, vomiting, heartburn, diarrhea, constipation, bloating,  abdominal pain, or blood per rectum. Appetite is good  Genitourinary: no urinary urgency, frequency, dysuria, nocturia, hesitancy, or incontinence  Musculoskeletal: joint pains off and on. Morning stiffness. Ambulating well  Neurologic: no paralysis, paresis, paresthesia, seizures, tremors, or headaches  Hematologic/Lymphatic/Immunologic: no anemia, abnormal bleeding/bruising, fever, chills, night sweats, swollen glands, or unexplained weight loss  Endocrine: no heat or cold intolerance and no polyphagia, polydipsia, or polyuria        OBJECTIVE:     VS:  Wt Readings from Last 3 Encounters:   12/07/20 161 lb (73 kg)   12/03/20 160 lb (72.6 kg)   11/23/20 162 lb 12.8 oz (73.8 kg)     Temp Readings from Last 3 Encounters:   12/07/20 98 °F (36.7 °C)   12/03/20 98.1 °F (36.7 °C) (Oral)   11/23/20 97.4 °F (36.3 °C) (Temporal)     BP Readings from Last 3 Encounters:   12/07/20 120/72   12/03/20 (!) 143/70   11/23/20 118/70        General appearance: Alert, Awake, Oriented times 3, no distress  Skin: Warm and dry  Head: Normocephalic. No masses, lesions or tenderness noted  Eyes: Conjunctivae appear normal. PERLE  Ears: External ears normal  Nose/Sinuses: Nares normal. Septum midline. Mucosa normal. No drainage  Oropharynx: Oropharynx clear with no exudate seen  Neck: Neck supple. No jugular venous distension, lymphadenopathy or thyromegaly Trachea midline  Chest:  Normal. Movements are Normal and Equal.  Lungs: Lungs clear to auscultation bilaterally. No ronchi, crackles or wheezes  Heart: S1 S2  Regular rate and rhythm. No rub, murmur or gallop  Abdomen: Abdomen soft, non-tender. BS normal. No masses, organomegaly.   Back: Grossly Normal and Equal. DTR are Normal. SLR is Normal.  Extremities: Arthritic changes are noted. Movements are limited. Pedal pulses are normal.  Musculoskeletal: Muscular strength appears intact. No joint effusion, tenderness, swelling or warmth  Neuro:  No focal motor or sensory deficits        ASSESSMENT     Patient Active Problem List    Diagnosis Date Noted    Primary osteoarthritis involving multiple joints 09/24/2012     Priority: High     Class: Chronic    Pure hypercholesterolemia      Priority: High     Class: Chronic    Essential hypertension      Priority: High     Class: Chronic    Iron deficiency anemia due to chronic blood loss 07/26/2018    CKD (chronic kidney disease) stage 3, GFR 30-59 ml/min 07/26/2018        Diagnosis:     ICD-10-CM    1. Dizziness  R42 CBC Auto Differential     Sunday MD [facility] ([facility])    NEW ONSET   2. Hypochromic microcytic anemia  D50.9 CBC Auto Differential     Sunday MD [facility] ([facility])    NEW ONSET   3. Stage 3 chronic kidney disease, unspecified whether stage 3a or 3b CKD  N18.30 Comprehensive Metabolic Panel    STABLE   4. Essential hypertension  I10     STABLE   5. Tarry stools  K92.1 Sunday MD [facility] ([facility])    RECENT       PLAN:     ED AND LAB. REPORT 12/3/2020 REVIEWED AND DISCUSSED. Patient Instructions   LOW SALT. AND LOW FAT DIET. CONTINUE CURRENT MEDICATIONS TAKING REGULARLY. REGULAR WALKING ADVISED. SEE DR. Zach Ellington AS SCHEDULED. FASTING FOR LAB WORK PRIOR TO NEXT VISIT. NEXT APPOINTMENT IN 1 MONTH. Return in about 1 month (around 1/7/2021) for FOLLOW UP VISIT. I have reviewed my findings and recommendations with Thien Walker.     Electronically signed by Marco Hurtado MD on 12/7/20 at 8:55 AM EST

## 2020-12-21 ENCOUNTER — TELEPHONE (OUTPATIENT)
Dept: FAMILY MEDICINE CLINIC | Age: 85
End: 2020-12-21

## 2020-12-21 ENCOUNTER — OFFICE VISIT (OUTPATIENT)
Dept: FAMILY MEDICINE CLINIC | Age: 85
End: 2020-12-21
Payer: MEDICARE

## 2020-12-21 VITALS
DIASTOLIC BLOOD PRESSURE: 78 MMHG | WEIGHT: 159 LBS | TEMPERATURE: 98 F | RESPIRATION RATE: 16 BRPM | HEIGHT: 69 IN | HEART RATE: 61 BPM | SYSTOLIC BLOOD PRESSURE: 118 MMHG | BODY MASS INDEX: 23.55 KG/M2 | OXYGEN SATURATION: 96 %

## 2020-12-21 PROCEDURE — 1123F ACP DISCUSS/DSCN MKR DOCD: CPT | Performed by: FAMILY MEDICINE

## 2020-12-21 PROCEDURE — G8484 FLU IMMUNIZE NO ADMIN: HCPCS | Performed by: FAMILY MEDICINE

## 2020-12-21 PROCEDURE — G8427 DOCREV CUR MEDS BY ELIG CLIN: HCPCS | Performed by: FAMILY MEDICINE

## 2020-12-21 PROCEDURE — 1036F TOBACCO NON-USER: CPT | Performed by: FAMILY MEDICINE

## 2020-12-21 PROCEDURE — 99213 OFFICE O/P EST LOW 20 MIN: CPT | Performed by: FAMILY MEDICINE

## 2020-12-21 PROCEDURE — G8420 CALC BMI NORM PARAMETERS: HCPCS | Performed by: FAMILY MEDICINE

## 2020-12-21 PROCEDURE — 4040F PNEUMOC VAC/ADMIN/RCVD: CPT | Performed by: FAMILY MEDICINE

## 2020-12-21 NOTE — PATIENT INSTRUCTIONS
LOW SALT,LOW CARB. AND LOW FAT DIET. CONTINUE CURRENT MEDICATIONS TAKING REGULARLY. TAKE LAXATIVE LIKE MILK OF MAGNESIA 30 ML TODAY. USE FLEET ENEMA IF NO RESULT WITH LAXATIVE. REGULAR WALKING ADVISED. ADVISED WEIGHT REDUCTION. KEEP NEXT APPOINTMENT IN 3 WEEKS.

## 2020-12-21 NOTE — PROGRESS NOTES
OFFICE PROGRESS NOTE      SUBJECTIVE:        Patient ID:   Daksha George is a 80 y.o. male who presents for   Chief Complaint   Patient presents with    Constipation     x3 day's with bloating w loss of appetite           HPI:     NO BOWEL MOVEMENTS FOR 3 DAYS. FEELS UNCOMFORTABLE. PASSING GAS OK. NO FEVER OR OTHER ASSOCIATED SYMPTOMS. WATCHING DIET GOOD. NO  EXERCISE. TAKING MEDICATIONS REGULARLY. HAD APPOINTMENT WITH DR. Star Gutierrez LAST Wednesday BUT GOT CANCELLED BY HIS OFFICE. WILL CALL HIM AGAIN TO RESCHEDULE. Prior to Admission medications    Medication Sig Start Date End Date Taking? Authorizing Provider   metoprolol succinate (TOPROL XL) 25 MG extended release tablet Take 1 tablet by mouth daily 12/7/20  Yes Gregorio Bradley MD   simvastatin (ZOCOR) 40 MG tablet Take 1 tablet by mouth nightly 12/7/20  Yes Gregorio Bradley MD   lisinopril-hydroCHLOROthiazide (PRINZIDE;ZESTORETIC) 20-25 MG per tablet Take 1 tablet by mouth daily 12/7/20  Yes Gregorio Bradley MD   finasteride (PROSCAR) 5 MG tablet Take 1 tablet by mouth daily 12/7/20  Yes Gregorio Bradley MD   amLODIPine (NORVASC) 2.5 MG tablet Take 1 tablet by mouth daily 12/7/20  Yes Gregorio Bradley MD   ANTIFUNGAL 2 % cream APPLY CREAM TO AFFECTED AREA TWICE DAILY 10/14/19  Yes Historical Provider, MD   pantoprazole (PROTONIX) 40 MG tablet TAKE 1 TABLET BY MOUTH ONCE DAILY 12/6/19  Yes Historical Provider, MD   ciclopirox (PENLAC) 8 % solution APPLY 1 APPLICATION TOPICALLY TO AFFECTED AREA AT BEDTIME 10/11/19  Yes Historical Provider, MD   ferrous sulfate (LI-IN-SOL) 75 (15 Fe) MG/ML solution Take 15 mg by mouth daily   Yes Historical Provider, MD   timolol (TIMOPTIC) 0.5 % ophthalmic solution  5/4/16  Yes Historical Provider, MD   Multiple Vitamin (ONE-A-DAY ESSENTIAL) TABS Take 1 tablet by mouth daily. Yes Historical Provider, Yohannes Rocha, 450 MG CAPS Take 2 tablets by mouth daily. Yes Historical Provider, MD   Misc Natural Products (OSTEO BI-FLEX JOINT SHIELD PO) Take 2 tablets by mouth daily. Yes Historical Provider, MD   aspirin 81 MG EC tablet Take 81 mg by mouth daily.      Yes Historical Provider, MD     Social History     Socioeconomic History    Marital status:      Spouse name: Not on file    Number of children: Not on file    Years of education: Not on file    Highest education level: Not on file   Occupational History    Not on file   Social Needs    Financial resource strain: Not on file    Food insecurity     Worry: Not on file     Inability: Not on file    Transportation needs     Medical: Not on file     Non-medical: Not on file   Tobacco Use    Smoking status: Former Smoker     Packs/day: 1.00     Years: 40.00     Pack years: 40.00     Quit date: 2000     Years since quittin.2    Smokeless tobacco: Never Used   Substance and Sexual Activity    Alcohol use: No    Drug use: No    Sexual activity: Not on file   Lifestyle    Physical activity     Days per week: Not on file     Minutes per session: Not on file    Stress: Not on file   Relationships    Social connections     Talks on phone: Not on file     Gets together: Not on file     Attends Orthodoxy service: Not on file     Active member of club or organization: Not on file     Attends meetings of clubs or organizations: Not on file     Relationship status: Not on file    Intimate partner violence     Fear of current or ex partner: Not on file     Emotionally abused: Not on file     Physically abused: Not on file     Forced sexual activity: Not on file   Other Topics Concern    Not on file   Social History Narrative    Not on file       I have reviewed Kyle's allergies, medications, problem list, medical, social and family history and have updated as needed in the electronic medical record  Review Of Systems:    Skin: no abnormal pigmentation, rash, scaling, itching, masses, hair or nail changes  Eyes: no blurring, diplopia, or eye pain  Ears/Nose/Throat: no hearing loss, tinnitus, vertigo, nosebleed, nasal congestion, rhinorrhea, sore throat  Respiratory: no cough, pleuritic chest pain, dyspnea, or wheezing  Cardiovascular: no chest pain, angina, dyspnea on exertion, orthopnea, PND, palpitations, or claudication  Gastrointestinal: no nausea, vomiting, heartburn, diarrhea, constipation, bloating,  abdominal pain, or blood per rectum. Appetite is good  Genitourinary: no urinary urgency, frequency, dysuria, nocturia, hesitancy, or incontinence  Musculoskeletal: joint pains off and on. Morning stiffness. Ambulating well  Neurologic: no paralysis, paresis, paresthesia, seizures, tremors, or headaches  Hematologic/Lymphatic/Immunologic: no anemia, abnormal bleeding/bruising, fever, chills, night sweats, swollen glands, or unexplained weight loss  Endocrine: no heat or cold intolerance and no polyphagia, polydipsia, or polyuria        OBJECTIVE:     VS:  Wt Readings from Last 3 Encounters:   12/21/20 159 lb (72.1 kg)   12/07/20 161 lb (73 kg)   12/03/20 160 lb (72.6 kg)     Temp Readings from Last 3 Encounters:   12/21/20 98 °F (36.7 °C)   12/07/20 98 °F (36.7 °C)   12/03/20 98.1 °F (36.7 °C) (Oral)     BP Readings from Last 3 Encounters:   12/21/20 118/78   12/07/20 120/72   12/03/20 (!) 143/70        General appearance: Alert, Awake, Oriented times 3, no distress  Skin: Warm and dry  Head: Normocephalic. No masses, lesions or tenderness noted  Eyes: Conjunctivae appear normal. PERLE  Ears: External ears normal  Nose/Sinuses: Nares normal. Septum midline. Mucosa normal. No drainage  Oropharynx: Oropharynx clear with no exudate seen  Neck: Neck supple. No jugular venous distension, lymphadenopathy or thyromegaly Trachea midline  Chest:  Normal. Movements are Normal and Equal.  Lungs: Lungs clear to auscultation bilaterally. No ronchi, crackles or wheezes  Heart: S1 S2  Regular rate and rhythm.  No rub, murmur or gallop  Abdomen: Abdomen soft, non-tender. BS normal. No masses, organomegaly. Back: Grossly Normal and Equal. DTR are Normal. SLR is Normal.  Extremities: Arthritic changes are noted. Movements are limited. Pedal pulses are normal.  Musculoskeletal: Muscular strength appears intact. No joint effusion, tenderness, swelling or warmth  Neuro:  No focal motor or sensory deficits        ASSESSMENT     Patient Active Problem List    Diagnosis Date Noted    Primary osteoarthritis involving multiple joints 09/24/2012     Priority: High     Class: Chronic    Pure hypercholesterolemia      Priority: High     Class: Chronic    Essential hypertension      Priority: High     Class: Chronic    Iron deficiency anemia due to chronic blood loss 07/26/2018    CKD (chronic kidney disease) stage 3, GFR 30-59 ml/min 07/26/2018        Diagnosis:     ICD-10-CM    1. Pure hypercholesterolemia  E78.00    2. Essential hypertension  I10    3. Constipation, unspecified constipation type  K59.00        PLAN:           Patient Instructions   LOW SALT,LOW CARB. AND LOW FAT DIET. CONTINUE CURRENT MEDICATIONS TAKING REGULARLY. TAKE LAXATIVE LIKE MILK OF MAGNESIA 30 ML TODAY. USE FLEET ENEMA IF NO RESULT WITH LAXATIVE. REGULAR WALKING ADVISED. ADVISED WEIGHT REDUCTION. KEEP NEXT APPOINTMENT IN 3 WEEKS. Return in about 3 weeks (around 1/11/2021) for FOLLOW UP VISIT. I have reviewed my findings and recommendations with Noah Lala. Zofia Fink     Electronically signed by Diandra Mc MD on 12/21/20 at 3:45 PM EST

## 2020-12-21 NOTE — TELEPHONE ENCOUNTER
Pt daughter Slim Leon called pt had appt with Dr. Ken España but it was cancelled due to no power. Pt has been constipated times 5 days he took laxative and didn't work on 12/20/2020 and again this a.m. still didn't go.  What  To do     Last seen 12/7/2020  Next appt 1/11/2021

## 2021-01-06 DIAGNOSIS — N18.30 STAGE 3 CHRONIC KIDNEY DISEASE, UNSPECIFIED WHETHER STAGE 3A OR 3B CKD (HCC): ICD-10-CM

## 2021-01-11 ENCOUNTER — OFFICE VISIT (OUTPATIENT)
Dept: FAMILY MEDICINE CLINIC | Age: 86
End: 2021-01-11
Payer: MEDICARE

## 2021-01-11 VITALS
HEART RATE: 57 BPM | DIASTOLIC BLOOD PRESSURE: 90 MMHG | HEIGHT: 69 IN | SYSTOLIC BLOOD PRESSURE: 138 MMHG | BODY MASS INDEX: 22.96 KG/M2 | RESPIRATION RATE: 16 BRPM | OXYGEN SATURATION: 97 % | WEIGHT: 155 LBS | TEMPERATURE: 98 F

## 2021-01-11 DIAGNOSIS — I10 ESSENTIAL HYPERTENSION: ICD-10-CM

## 2021-01-11 DIAGNOSIS — N18.30 STAGE 3 CHRONIC KIDNEY DISEASE, UNSPECIFIED WHETHER STAGE 3A OR 3B CKD (HCC): ICD-10-CM

## 2021-01-11 DIAGNOSIS — E78.00 PURE HYPERCHOLESTEROLEMIA: Primary | ICD-10-CM

## 2021-01-11 DIAGNOSIS — K59.00 CONSTIPATION, UNSPECIFIED CONSTIPATION TYPE: ICD-10-CM

## 2021-01-11 PROCEDURE — G8420 CALC BMI NORM PARAMETERS: HCPCS | Performed by: FAMILY MEDICINE

## 2021-01-11 PROCEDURE — 4040F PNEUMOC VAC/ADMIN/RCVD: CPT | Performed by: FAMILY MEDICINE

## 2021-01-11 PROCEDURE — 99213 OFFICE O/P EST LOW 20 MIN: CPT | Performed by: FAMILY MEDICINE

## 2021-01-11 PROCEDURE — G8484 FLU IMMUNIZE NO ADMIN: HCPCS | Performed by: FAMILY MEDICINE

## 2021-01-11 PROCEDURE — 1123F ACP DISCUSS/DSCN MKR DOCD: CPT | Performed by: FAMILY MEDICINE

## 2021-01-11 PROCEDURE — 1036F TOBACCO NON-USER: CPT | Performed by: FAMILY MEDICINE

## 2021-01-11 PROCEDURE — G8427 DOCREV CUR MEDS BY ELIG CLIN: HCPCS | Performed by: FAMILY MEDICINE

## 2021-01-11 SDOH — ECONOMIC STABILITY: TRANSPORTATION INSECURITY
IN THE PAST 12 MONTHS, HAS THE LACK OF TRANSPORTATION KEPT YOU FROM MEDICAL APPOINTMENTS OR FROM GETTING MEDICATIONS?: NO

## 2021-01-11 SDOH — ECONOMIC STABILITY: FOOD INSECURITY: WITHIN THE PAST 12 MONTHS, YOU WORRIED THAT YOUR FOOD WOULD RUN OUT BEFORE YOU GOT MONEY TO BUY MORE.: NEVER TRUE

## 2021-01-11 SDOH — ECONOMIC STABILITY: INCOME INSECURITY: HOW HARD IS IT FOR YOU TO PAY FOR THE VERY BASICS LIKE FOOD, HOUSING, MEDICAL CARE, AND HEATING?: NOT HARD AT ALL

## 2021-01-11 ASSESSMENT — PATIENT HEALTH QUESTIONNAIRE - PHQ9
SUM OF ALL RESPONSES TO PHQ9 QUESTIONS 1 & 2: 2
1. LITTLE INTEREST OR PLEASURE IN DOING THINGS: 1

## 2021-01-11 NOTE — PROGRESS NOTES
OFFICE PROGRESS NOTE      SUBJECTIVE:        Patient ID:   Paloma Duff is a 80 y.o. male who presents for   Chief Complaint   Patient presents with    Constipation     Patient had upper GI last week. Patient was last seen 12/21/2020 for the issue.  Health Maintenance     due for dtap and shingles            HPI:     FEELS GOOD. SEEN DR. Coleen Marcial  AND HAD UPPER ENDOSCOPY DONE.4 POLYPS REMOVED. ALSO TAKING MIRALAX  FOR CONSTIPATION CONTROL AS RECOMMENDED. WATCHING DIET GOOD. WALKING SOME IN HOUSE  FOR EXERCISE. TAKING MEDICATIONS REGULARLY. Prior to Admission medications    Medication Sig Start Date End Date Taking? Authorizing Provider   metoprolol succinate (TOPROL XL) 25 MG extended release tablet Take 1 tablet by mouth daily 12/7/20  Yes Vicenta Spears MD   simvastatin (ZOCOR) 40 MG tablet Take 1 tablet by mouth nightly 12/7/20  Yes Vicenta Spears MD   lisinopril-hydroCHLOROthiazide (PRINZIDE;ZESTORETIC) 20-25 MG per tablet Take 1 tablet by mouth daily 12/7/20  Yes Vicenta Spears MD   finasteride (PROSCAR) 5 MG tablet Take 1 tablet by mouth daily 12/7/20  Yes Vicenta Spears MD   amLODIPine (NORVASC) 2.5 MG tablet Take 1 tablet by mouth daily 12/7/20  Yes Vicenta Spears MD   ANTIFUNGAL 2 % cream APPLY CREAM TO AFFECTED AREA TWICE DAILY 10/14/19  Yes Historical Provider, MD   pantoprazole (PROTONIX) 40 MG tablet TAKE 1 TABLET BY MOUTH ONCE DAILY 12/6/19  Yes Historical Provider, MD   ciclopirox (PENLAC) 8 % solution APPLY 1 APPLICATION TOPICALLY TO AFFECTED AREA AT BEDTIME 10/11/19  Yes Historical Provider, MD   ferrous sulfate (LI-IN-SOL) 75 (15 Fe) MG/ML solution Take 15 mg by mouth daily   Yes Historical Provider, MD   timolol (TIMOPTIC) 0.5 % ophthalmic solution  5/4/16  Yes Historical Provider, MD   Multiple Vitamin (ONE-A-DAY ESSENTIAL) TABS Take 1 tablet by mouth daily.      Yes Historical Provider, Yohannes Rocha, 450 MG CAPS Take 2 tablets by mouth daily. Yes Historical Provider, MD   [facility] (OSTEO BI-FLEX JOINT SHIELD PO) Take 2 tablets by mouth daily. Yes Historical Provider, MD   aspirin 81 MG EC tablet Take 81 mg by mouth daily.      Yes Historical Provider, MD     Social History     Socioeconomic History    Marital status:      Spouse name: None    Number of children: None    Years of education: None    Highest education level: None   Occupational History    None   Social Needs    Financial resource strain: Not hard at all   [place] insecurity     Worry: Never true     Inability: Never true    Transportation needs     Medical: No     Non-medical: No   Tobacco Use    Smoking status: Former Smoker     Packs/day: 1.00     Years: 40.00     Pack years: 40.00     Quit date: 2000     Years since quittin.3    Smokeless tobacco: Never Used   Substance and Sexual Activity    Alcohol use: No    Drug use: No    Sexual activity: None   Lifestyle    Physical activity     Days per week: None     Minutes per session: None    Stress: None   Relationships    Social connections     Talks on phone: None     Gets together: None     Attends Worship service: None     Active member of club or organization: None     Attends meetings of clubs or organizations: None     Relationship status: None    Intimate partner violence     Fear of current or ex partner: None     Emotionally abused: None     Physically abused: None     Forced sexual activity: None   Other Topics Concern    None   Social History Narrative    None       I have reviewed Kyle's allergies, medications, problem list, medical, social and family history and have updated as needed in the electronic medical record  Review Of Systems:    Skin: no abnormal pigmentation, rash, scaling, itching, masses, hair or nail changes  Eyes: no blurring, diplopia, or eye pain  Ears/Nose/Throat: no hearing loss, tinnitus, vertigo, nosebleed, nasal congestion, rhinorrhea, sore throat  Respiratory: no cough, pleuritic chest pain, dyspnea, or wheezing  Cardiovascular: no chest pain, angina, dyspnea on exertion, orthopnea, PND, palpitations, or claudication  Gastrointestinal: no nausea, vomiting, heartburn, diarrhea, constipation, bloating,  abdominal pain, or blood per rectum. Appetite is good  Genitourinary: no urinary urgency, frequency, dysuria, nocturia, hesitancy, or incontinence  Musculoskeletal: joint pains off and on. Morning stiffness. Ambulating well  Neurologic: no paralysis, paresis, paresthesia, seizures, tremors, or headaches  Hematologic/Lymphatic/Immunologic: no anemia, abnormal bleeding/bruising, fever, chills, night sweats, swollen glands, or unexplained weight loss  Endocrine: no heat or cold intolerance and no polyphagia, polydipsia, or polyuria        OBJECTIVE:     VS:  Wt Readings from Last 3 Encounters:   01/11/21 155 lb (70.3 kg)   12/21/20 159 lb (72.1 kg)   12/07/20 161 lb (73 kg)     Temp Readings from Last 3 Encounters:   01/11/21 98 °F (36.7 °C)   12/21/20 98 °F (36.7 °C)   12/07/20 98 °F (36.7 °C)     BP Readings from Last 3 Encounters:   01/11/21 (!) 138/90   12/21/20 118/78   12/07/20 120/72        General appearance: Alert, Awake, Oriented times 3, no distress  Skin: Warm and dry  Head: Normocephalic. No masses, lesions or tenderness noted  Eyes: Conjunctivae appear normal. PERLE  Ears: External ears normal  Nose/Sinuses: Nares normal. Septum midline. Mucosa normal. No drainage  Oropharynx: Oropharynx clear with no exudate seen  Neck: Neck supple. No jugular venous distension, lymphadenopathy or thyromegaly Trachea midline  Chest:  Normal. Movements are Normal and Equal.  Lungs: Lungs clear to auscultation bilaterally. No ronchi, crackles or wheezes  Heart: S1 S2  Regular rate and rhythm. No rub, murmur or gallop  Abdomen: Abdomen soft, non-tender. BS normal. No masses, organomegaly.   Back: Grossly Normal and Equal. DTR are Normal. SLR is Normal.  Extremities: Arthritic changes are noted. Movements are limited. Pedal pulses are normal.  Musculoskeletal: Muscular strength appears intact. No joint effusion, tenderness, swelling or warmth  Neuro:  No focal motor or sensory deficits        ASSESSMENT     Patient Active Problem List    Diagnosis Date Noted    Primary osteoarthritis involving multiple joints 09/24/2012     Priority: High     Class: Chronic    Pure hypercholesterolemia      Priority: High     Class: Chronic    Essential hypertension      Priority: High     Class: Chronic    Iron deficiency anemia due to chronic blood loss 07/26/2018    CKD (chronic kidney disease) stage 3, GFR 30-59 ml/min 07/26/2018        Diagnosis:     ICD-10-CM    1. Pure hypercholesterolemia  E78.00    2. Essential hypertension  I10    3. Constipation, unspecified constipation type  K59.00    4. Stage 3 chronic kidney disease, unspecified whether stage 3a or 3b CKD  N18.30        PLAN:      LABORATORY RESULTS 1/4/2021 REVIEWED AND DISCUSSED. Patient Instructions   LOW SALT,LOW CARB. AND LOW FAT DIET. CONTINUE CURRENT MEDICATIONS TAKING REGULARL  REGULAR WALKING ADVISED. NEXT APPOINTMENT IN 3 MONTHS. Return in about 3 months (around 4/11/2021) for FOLLOW UP VISIT. I have reviewed my findings and recommendations with David Angel.     Electronically signed by Cheli Seymour MD on 1/11/21 at 9:42 AM EST

## 2021-01-11 NOTE — PATIENT INSTRUCTIONS
LOW SALT,LOW CARB. AND LOW FAT DIET. CONTINUE CURRENT MEDICATIONS TAKING REGULARL  REGULAR WALKING ADVISED. NEXT APPOINTMENT IN 3 MONTHS.

## 2021-01-20 ENCOUNTER — IMMUNIZATION (OUTPATIENT)
Dept: PRIMARY CARE CLINIC | Age: 86
End: 2021-01-20
Payer: MEDICARE

## 2021-01-20 PROCEDURE — 91301 COVID-19, MODERNA VACCINE 100MCG/0.5ML DOSE: CPT | Performed by: NURSE PRACTITIONER

## 2021-01-20 PROCEDURE — 0011A COVID-19, MODERNA VACCINE 100MCG/0.5ML DOSE: CPT | Performed by: NURSE PRACTITIONER

## 2021-02-05 ENCOUNTER — TELEPHONE (OUTPATIENT)
Dept: FAMILY MEDICINE CLINIC | Age: 86
End: 2021-02-05

## 2021-02-05 ENCOUNTER — OFFICE VISIT (OUTPATIENT)
Dept: FAMILY MEDICINE CLINIC | Age: 86
End: 2021-02-05
Payer: MEDICARE

## 2021-02-05 VITALS
RESPIRATION RATE: 18 BRPM | OXYGEN SATURATION: 95 % | SYSTOLIC BLOOD PRESSURE: 126 MMHG | TEMPERATURE: 98.8 F | HEART RATE: 66 BPM | WEIGHT: 155 LBS | HEIGHT: 69 IN | DIASTOLIC BLOOD PRESSURE: 78 MMHG | BODY MASS INDEX: 22.96 KG/M2

## 2021-02-05 DIAGNOSIS — B96.89 ACUTE BACTERIAL SINUSITIS: ICD-10-CM

## 2021-02-05 DIAGNOSIS — J01.90 ACUTE BACTERIAL SINUSITIS: ICD-10-CM

## 2021-02-05 DIAGNOSIS — U07.1 COVID-19: ICD-10-CM

## 2021-02-05 DIAGNOSIS — R50.9 FEVER, UNSPECIFIED FEVER CAUSE: Primary | ICD-10-CM

## 2021-02-05 LAB
Lab: ABNORMAL
QC PASS/FAIL: ABNORMAL
SARS-COV-2, POC: DETECTED

## 2021-02-05 PROCEDURE — 87426 SARSCOV CORONAVIRUS AG IA: CPT | Performed by: FAMILY MEDICINE

## 2021-02-05 PROCEDURE — G8427 DOCREV CUR MEDS BY ELIG CLIN: HCPCS | Performed by: FAMILY MEDICINE

## 2021-02-05 PROCEDURE — 1036F TOBACCO NON-USER: CPT | Performed by: FAMILY MEDICINE

## 2021-02-05 PROCEDURE — G8420 CALC BMI NORM PARAMETERS: HCPCS | Performed by: FAMILY MEDICINE

## 2021-02-05 PROCEDURE — 4040F PNEUMOC VAC/ADMIN/RCVD: CPT | Performed by: FAMILY MEDICINE

## 2021-02-05 PROCEDURE — 1123F ACP DISCUSS/DSCN MKR DOCD: CPT | Performed by: FAMILY MEDICINE

## 2021-02-05 PROCEDURE — G8484 FLU IMMUNIZE NO ADMIN: HCPCS | Performed by: FAMILY MEDICINE

## 2021-02-05 PROCEDURE — 99213 OFFICE O/P EST LOW 20 MIN: CPT | Performed by: FAMILY MEDICINE

## 2021-02-05 RX ORDER — AZITHROMYCIN 250 MG/1
250 TABLET, FILM COATED ORAL SEE ADMIN INSTRUCTIONS
Qty: 6 TABLET | Refills: 0 | Status: SHIPPED | OUTPATIENT
Start: 2021-02-05 | End: 2021-02-10

## 2021-02-05 NOTE — PROGRESS NOTES
2021    Eduin Motta (:  1932) is a 80 y.o. male, here for evaluation of the following medical concerns:  Chief Complaint   Patient presents with    Fever     100.3 since this am / Had first covid vac 21       Medical history was reviewed. HPI   Covid +  sinuses    No Known Allergies    Prior to Visit Medications    Medication Sig Taking? Authorizing Provider   metoprolol succinate (TOPROL XL) 25 MG extended release tablet Take 1 tablet by mouth daily Yes Viola Mooney MD   simvastatin (ZOCOR) 40 MG tablet Take 1 tablet by mouth nightly Yes Viola Mooney MD   lisinopril-hydroCHLOROthiazide (PRINZIDE;ZESTORETIC) 20-25 MG per tablet Take 1 tablet by mouth daily Yes Viola Mooney MD   finasteride (PROSCAR) 5 MG tablet Take 1 tablet by mouth daily Yes Viola Mooney MD   amLODIPine (NORVASC) 2.5 MG tablet Take 1 tablet by mouth daily Yes Viola Mooney MD   ANTIFUNGAL 2 % cream APPLY CREAM TO AFFECTED AREA TWICE DAILY Yes Historical Provider, MD   pantoprazole (PROTONIX) 40 MG tablet TAKE 1 TABLET BY MOUTH ONCE DAILY Yes Historical Provider, MD   ciclopirox (PENLAC) 8 % solution APPLY 1 APPLICATION TOPICALLY TO AFFECTED AREA AT BEDTIME Yes Historical Provider, MD   ferrous sulfate (LI-IN-SOL) 75 (15 Fe) MG/ML solution Take 15 mg by mouth daily Yes Historical Provider, MD   timolol (TIMOPTIC) 0.5 % ophthalmic solution  Yes Historical Provider, MD   Multiple Vitamin (ONE-A-DAY ESSENTIAL) TABS Take 1 tablet by mouth daily. Yes Historical Provider, MD Jann Rodríguez Serenoa repens, 450 MG CAPS Take 2 tablets by mouth daily. Yes Historical Provider, MD   Misc Natural Products (OSTEO BI-FLEX JOINT SHIELD PO) Take 2 tablets by mouth daily. Yes Historical Provider, MD   aspirin 81 MG EC tablet Take 81 mg by mouth daily.    Yes Historical Provider, MD        Review of Systems    Vitals:    21 1438   BP: 126/78   Pulse: 66   Resp: 18   Temp: 98.8 °F (37.1 °C)   TempSrc: Temporal   SpO2: 95%   Weight: 155 lb (70.3 kg)   Height: 5' 9\" (1.753 m)       Estimated body mass index is 22.89 kg/m² as calculated from the following:    Height as of this encounter: 5' 9\" (1.753 m). Weight as of this encounter: 155 lb (70.3 kg). Physical Exam  Constitutional:       Appearance: Normal appearance. He is normal weight. He is not ill-appearing, toxic-appearing or diaphoretic. HENT:      Head: Normocephalic and atraumatic. Right Ear: Tympanic membrane, ear canal and external ear normal.      Left Ear: Tympanic membrane, ear canal and external ear normal.      Nose: Nose normal.      Mouth/Throat:      Mouth: Mucous membranes are moist.      Pharynx: Oropharynx is clear. Cardiovascular:      Rate and Rhythm: Normal rate and regular rhythm. Pulses: Normal pulses. Heart sounds: Normal heart sounds. Pulmonary:      Effort: Pulmonary effort is normal.      Breath sounds: Normal breath sounds. Skin:     General: Skin is warm and dry. Neurological:      Mental Status: He is alert. Psychiatric:         Mood and Affect: Mood normal.         Behavior: Behavior normal.         ASSESSMENT/PLAN:  Jose Knight was seen today for fever. Diagnoses and all orders for this visit:    Fever, unspecified fever cause         Return in about 1 week (around 2/12/2021). An  electronic signature was used to authenticate this note.     --Dinah Jett,  on 2/5/2021 at 2:51 PM

## 2021-02-05 NOTE — TELEPHONE ENCOUNTER
Patient daughter called and stated that this morning patient had a fever of 99.3 around 8:00 am . Now at 11:30 am patient has a fever of 100.3 temp and a stuffy nose. Patient had Covid-19 injection on january 17 2021. Patient is being advised to flu clinic for further evaluation.

## 2021-02-09 ENCOUNTER — HOSPITAL ENCOUNTER (EMERGENCY)
Age: 86
Discharge: HOME | End: 2021-02-09
Attending: EMERGENCY MEDICINE
Payer: MEDICARE

## 2021-02-09 ENCOUNTER — APPOINTMENT (OUTPATIENT)
Dept: GENERAL RADIOLOGY | Age: 86
End: 2021-02-09
Payer: MEDICARE

## 2021-02-09 VITALS
DIASTOLIC BLOOD PRESSURE: 60 MMHG | OXYGEN SATURATION: 97 % | SYSTOLIC BLOOD PRESSURE: 136 MMHG | HEART RATE: 60 BPM | WEIGHT: 155 LBS | RESPIRATION RATE: 18 BRPM | BODY MASS INDEX: 22.96 KG/M2 | HEIGHT: 69 IN | TEMPERATURE: 98.4 F

## 2021-02-09 DIAGNOSIS — E87.6 HYPOKALEMIA: Primary | ICD-10-CM

## 2021-02-09 DIAGNOSIS — B34.9 VIRAL ILLNESS: ICD-10-CM

## 2021-02-09 DIAGNOSIS — N17.9 AKI (ACUTE KIDNEY INJURY) (HCC): ICD-10-CM

## 2021-02-09 LAB
ALBUMIN SERPL-MCNC: 3.4 G/DL (ref 3.5–5.2)
ALP BLD-CCNC: 52 U/L (ref 40–129)
ALT SERPL-CCNC: 7 U/L (ref 0–40)
ANION GAP SERPL CALCULATED.3IONS-SCNC: 14 MMOL/L (ref 7–16)
AST SERPL-CCNC: 15 U/L (ref 0–39)
BASOPHILS ABSOLUTE: 0.02 E9/L (ref 0–0.2)
BASOPHILS RELATIVE PERCENT: 0.3 % (ref 0–2)
BILIRUB SERPL-MCNC: 0.4 MG/DL (ref 0–1.2)
BUN BLDV-MCNC: 48 MG/DL (ref 8–23)
CALCIUM SERPL-MCNC: 8.8 MG/DL (ref 8.6–10.2)
CHLORIDE BLD-SCNC: 103 MMOL/L (ref 98–107)
CO2: 22 MMOL/L (ref 22–29)
CREAT SERPL-MCNC: 2.4 MG/DL (ref 0.7–1.2)
EKG ATRIAL RATE: 58 BPM
EKG P AXIS: -53 DEGREES
EKG P-R INTERVAL: 218 MS
EKG Q-T INTERVAL: 466 MS
EKG QRS DURATION: 140 MS
EKG QTC CALCULATION (BAZETT): 480 MS
EKG R AXIS: -54 DEGREES
EKG T AXIS: 97 DEGREES
EKG VENTRICULAR RATE: 64 BPM
EOSINOPHILS ABSOLUTE: 0 E9/L (ref 0.05–0.5)
EOSINOPHILS RELATIVE PERCENT: 0 % (ref 0–6)
GFR AFRICAN AMERICAN: 31
GFR NON-AFRICAN AMERICAN: 26 ML/MIN/1.73
GLUCOSE BLD-MCNC: 113 MG/DL (ref 74–99)
HCT VFR BLD CALC: 32 % (ref 37–54)
HEMOGLOBIN: 10.7 G/DL (ref 12.5–16.5)
IMMATURE GRANULOCYTES #: 0.05 E9/L
IMMATURE GRANULOCYTES %: 0.7 % (ref 0–5)
LYMPHOCYTES ABSOLUTE: 0.96 E9/L (ref 1.5–4)
LYMPHOCYTES RELATIVE PERCENT: 13 % (ref 20–42)
MAGNESIUM: 2 MG/DL (ref 1.6–2.6)
MCH RBC QN AUTO: 31.4 PG (ref 26–35)
MCHC RBC AUTO-ENTMCNC: 33.4 % (ref 32–34.5)
MCV RBC AUTO: 93.8 FL (ref 80–99.9)
MONOCYTES ABSOLUTE: 0.3 E9/L (ref 0.1–0.95)
MONOCYTES RELATIVE PERCENT: 4.1 % (ref 2–12)
NEUTROPHILS ABSOLUTE: 6.07 E9/L (ref 1.8–7.3)
NEUTROPHILS RELATIVE PERCENT: 81.9 % (ref 43–80)
PDW BLD-RTO: 13 FL (ref 11.5–15)
PLATELET # BLD: 172 E9/L (ref 130–450)
PMV BLD AUTO: 11.2 FL (ref 7–12)
POTASSIUM REFLEX MAGNESIUM: 3 MMOL/L (ref 3.5–5)
RBC # BLD: 3.41 E12/L (ref 3.8–5.8)
SODIUM BLD-SCNC: 139 MMOL/L (ref 132–146)
TOTAL PROTEIN: 6.8 G/DL (ref 6.4–8.3)
TROPONIN: 0.02 NG/ML (ref 0–0.03)
WBC # BLD: 7.4 E9/L (ref 4.5–11.5)

## 2021-02-09 PROCEDURE — 84484 ASSAY OF TROPONIN QUANT: CPT

## 2021-02-09 PROCEDURE — 6370000000 HC RX 637 (ALT 250 FOR IP): Performed by: STUDENT IN AN ORGANIZED HEALTH CARE EDUCATION/TRAINING PROGRAM

## 2021-02-09 PROCEDURE — 93010 ELECTROCARDIOGRAM REPORT: CPT | Performed by: INTERNAL MEDICINE

## 2021-02-09 PROCEDURE — 2580000003 HC RX 258: Performed by: STUDENT IN AN ORGANIZED HEALTH CARE EDUCATION/TRAINING PROGRAM

## 2021-02-09 PROCEDURE — 80053 COMPREHEN METABOLIC PANEL: CPT

## 2021-02-09 PROCEDURE — 96360 HYDRATION IV INFUSION INIT: CPT

## 2021-02-09 PROCEDURE — 71045 X-RAY EXAM CHEST 1 VIEW: CPT

## 2021-02-09 PROCEDURE — 93005 ELECTROCARDIOGRAM TRACING: CPT | Performed by: STUDENT IN AN ORGANIZED HEALTH CARE EDUCATION/TRAINING PROGRAM

## 2021-02-09 PROCEDURE — 85025 COMPLETE CBC W/AUTO DIFF WBC: CPT

## 2021-02-09 PROCEDURE — 99283 EMERGENCY DEPT VISIT LOW MDM: CPT

## 2021-02-09 PROCEDURE — 83735 ASSAY OF MAGNESIUM: CPT

## 2021-02-09 RX ORDER — 0.9 % SODIUM CHLORIDE 0.9 %
1000 INTRAVENOUS SOLUTION INTRAVENOUS ONCE
Status: COMPLETED | OUTPATIENT
Start: 2021-02-09 | End: 2021-02-09

## 2021-02-09 RX ORDER — POTASSIUM CHLORIDE 20 MEQ/1
20 TABLET, EXTENDED RELEASE ORAL ONCE
Status: COMPLETED | OUTPATIENT
Start: 2021-02-09 | End: 2021-02-09

## 2021-02-09 RX ORDER — IBUPROFEN 800 MG/1
800 TABLET ORAL EVERY 6 HOURS PRN
Status: ON HOLD | COMMUNITY
End: 2021-08-12 | Stop reason: HOSPADM

## 2021-02-09 RX ADMIN — SODIUM CHLORIDE 1000 ML: 9 INJECTION, SOLUTION INTRAVENOUS at 09:53

## 2021-02-09 RX ADMIN — POTASSIUM CHLORIDE 20 MEQ: 1500 TABLET, EXTENDED RELEASE ORAL at 11:11

## 2021-02-09 ASSESSMENT — ENCOUNTER SYMPTOMS
BACK PAIN: 0
SORE THROAT: 0
EYE PAIN: 0
WHEEZING: 0
SINUS PRESSURE: 0
DIARRHEA: 0
EYE DISCHARGE: 0
VOMITING: 0
NAUSEA: 0
ABDOMINAL PAIN: 0
COUGH: 0
CONSTIPATION: 1
SHORTNESS OF BREATH: 0
EYE REDNESS: 0

## 2021-02-09 NOTE — ED PROVIDER NOTES
Patient presents with concern for fever. Patient states that his fever got as high as 103. He did take some Motrin and it did improve he is currently at 98.4. Patient states that he was tested positive for Covid 4 days ago. He is denying any shortness of breath, cough, chest pain, nausea, vomiting, loss of taste or smell. He is not on oxygen at home. He is concerned because of the fever and that he does not feel like eating or drinking as much as he usually does. He then mentions that he is concerned about drinking too much water due to it upsetting his stomach. Patient mentions that he may be constipated as he has not had a bowel movement in 3 days. He states that he normally has a bowel movement every other day. The history is provided by the patient. No  was used. Fever  Max temp prior to arrival:  103  Severity:  Moderate  Onset quality:  Gradual  Duration:  4 days  Timing:  Constant  Progression:  Worsening  Chronicity:  New  Relieved by:  Ibuprofen  Worsened by:  Nothing  Associated symptoms: no chest pain, no chills, no cough, no diarrhea, no dysuria, no ear pain, no headaches, no myalgias, no nausea, no rash, no sore throat and no vomiting         Review of Systems   Constitutional: Positive for appetite change and fever. Negative for chills. HENT: Negative for ear pain, sinus pressure and sore throat. Eyes: Negative for pain, discharge and redness. Respiratory: Negative for cough, shortness of breath and wheezing. Cardiovascular: Negative for chest pain. Gastrointestinal: Positive for constipation. Negative for abdominal pain, diarrhea, nausea and vomiting. Genitourinary: Negative for dysuria and frequency. Musculoskeletal: Negative for arthralgias, back pain and myalgias. Skin: Negative for rash and wound. Neurological: Negative for weakness and headaches. Hematological: Negative for adenopathy. All other systems reviewed and are negative. Physical Exam  Vitals signs and nursing note reviewed. Constitutional:       General: He is not in acute distress. Appearance: He is well-developed. He is not ill-appearing. HENT:      Head: Normocephalic and atraumatic. Eyes:      Conjunctiva/sclera: Conjunctivae normal.   Neck:      Musculoskeletal: Normal range of motion and neck supple. No neck rigidity or muscular tenderness. Cardiovascular:      Rate and Rhythm: Normal rate and regular rhythm. Heart sounds: Normal heart sounds. No murmur. Pulmonary:      Effort: Pulmonary effort is normal. No respiratory distress. Breath sounds: Normal breath sounds. No wheezing or rales. Abdominal:      General: Bowel sounds are normal.      Palpations: Abdomen is soft. Tenderness: There is no abdominal tenderness. There is no guarding or rebound. Musculoskeletal:         General: No tenderness or deformity. Skin:     General: Skin is warm and dry. Neurological:      Mental Status: He is alert and oriented to person, place, and time. Cranial Nerves: No cranial nerve deficit. Coordination: Coordination normal.          Procedures     MDM  Number of Diagnoses or Management Options  MORENA (acute kidney injury) ([facility] [address]  Hypokalemia  Viral illness  Diagnosis management comments: Patient's only complaint is fever which is now resolved. CBC did not show any elevated white counts or anemias. His BMP showed a potassium at 3.0 and a mildly elevated creatinine from his baseline. Fluids started and patient was given oral potassium chloride. Patient was ambulated and was able to maintain his sats and heart rate. His vitals have been stable throughout his stay. Patient educated on isolating himself. Patient informed results and plan and is agreeable. Patient discharged.        Amount and/or Complexity of Data Reviewed  Clinical lab tests: reviewed  Tests in the radiology section of CPT®: reviewed Tests in the medicine section of CPT®: reviewed  Decide to obtain previous medical records or to obtain history from someone other than the patient: yes         ED Course as of Feb 09 1319 Tue Feb 09, 2021   1009 EKG Interpretation    Interpreted by emergency department physician    Rhythm: paced  Rate: 64  Axis: left  Ectopy: premature ventricular contractions (unifocal)  Conduction: 1st degree AV block and left bundle branch block likely secondary to pacer  ST Segments: no acute change  T Waves: inversion in  aVl  Q Waves: nonspecific    Clinical Impression: non-specific EKG and paced rhythm    Janessa Avelar      [BB]      ED Course User Index  [BB] Janessa Avelar DO        ED Course as of Feb 09 1319 Tue Feb 09, 2021   1009 EKG Interpretation    Interpreted by emergency department physician    Rhythm: paced  Rate: 64  Axis: left  Ectopy: premature ventricular contractions (unifocal)  Conduction: 1st degree AV block and left bundle branch block likely secondary to pacer  ST Segments: no acute change  T Waves: inversion in  aVl  Q Waves: nonspecific    Clinical Impression: non-specific EKG and paced rhythm    Janessa Avelar      [BB]      ED Course User Index  [BB] Janessa Avelar DO       --------------------------------------------- PAST HISTORY ---------------------------------------------  Past Medical History:  has a past medical history of Hyperlipidemia, Hypertension, and Lumbosacral strain. Past Surgical History:  has a past surgical history that includes A-V cardiac pacemaker insertion (5/30/07); eye surgery (2002); Tonsillectomy; and Colonoscopy (8/3/11). Social History:  reports that he quit smoking about 20 years ago. He has a 40.00 pack-year smoking history. He has never used smokeless tobacco. He reports that he does not drink alcohol or use drugs. Family History: family history includes Cancer in his mother; Diabetes in his mother; Heart Disease in his father; High Blood Pressure in his father and mother. The patients home medications have been reviewed. Allergies: Patient has no known allergies.     -------------------------------------------------- RESULTS -------------------------------------------------  Labs:  Results for orders placed or performed during the hospital encounter of 02/09/21   CBC Auto Differential   Result Value Ref Range    WBC 7.4 4.5 - 11.5 E9/L    RBC 3.41 (L) 3.80 - 5.80 E12/L    Hemoglobin 10.7 (L) 12.5 - 16.5 g/dL    Hematocrit 32.0 (L) 37.0 - 54.0 %    MCV 93.8 80.0 - 99.9 fL    MCH 31.4 26.0 - 35.0 pg    MCHC 33.4 32.0 - 34.5 %    RDW 13.0 11.5 - 15.0 fL    Platelets 372 067 - 093 E9/L    MPV 11.2 7.0 - 12.0 fL    Neutrophils % 81.9 (H) 43.0 - 80.0 %    Immature Granulocytes % 0.7 0.0 - 5.0 %    Lymphocytes % 13.0 (L) 20.0 - 42.0 %    Monocytes % 4.1 2.0 - 12.0 %    Eosinophils % 0.0 0.0 - 6.0 %    Basophils % 0.3 0.0 - 2.0 %    Neutrophils Absolute 6.07 1.80 - 7.30 E9/L    Immature Granulocytes # 0.05 E9/L    Lymphocytes Absolute 0.96 (L) 1.50 - 4.00 E9/L    Monocytes Absolute 0.30 0.10 - 0.95 E9/L    Eosinophils Absolute 0.00 (L) 0.05 - 0.50 E9/L    Basophils Absolute 0.02 0.00 - 0.20 E9/L   Comprehensive Metabolic Panel w/ Reflex to MG   Result Value Ref Range    Sodium 139 132 - 146 mmol/L    Potassium reflex Magnesium 3.0 (L) 3.5 - 5.0 mmol/L    Chloride 103 98 - 107 mmol/L    CO2 22 22 - 29 mmol/L    Anion Gap 14 7 - 16 mmol/L    Glucose 113 (H) 74 - 99 mg/dL    BUN 48 (H) 8 - 23 mg/dL    CREATININE 2.4 (H) 0.7 - 1.2 mg/dL    GFR Non-African American 26 >=60 mL/min/1.73    GFR African American 31     Calcium 8.8 8.6 - 10.2 mg/dL    Total Protein 6.8 6.4 - 8.3 g/dL    Albumin 3.4 (L) 3.5 - 5.2 g/dL    Total Bilirubin 0.4 0.0 - 1.2 mg/dL    Alkaline Phosphatase 52 40 - 129 U/L    ALT 7 0 - 40 U/L    AST 15 0 - 39 U/L Troponin   Result Value Ref Range    Troponin 0.02 0.00 - 0.03 ng/mL   Magnesium   Result Value Ref Range    Magnesium 2.0 1.6 - 2.6 mg/dL   EKG 12 Lead   Result Value Ref Range    Ventricular Rate 64 BPM    Atrial Rate 58 BPM    P-R Interval 218 ms    QRS Duration 140 ms    Q-T Interval 466 ms    QTc Calculation (Bazett) 480 ms    P Axis -53 degrees    R Axis -54 degrees    T Axis 97 degrees       Radiology:  XR CHEST PORTABLE   Final Result   No acute process          ------------------------- NURSING NOTES AND VITALS REVIEWED ---------------------------  Date / Time Roomed:  2/9/2021  7:35 AM  ED Bed Assignment:  [email or web address]    The nursing notes within the ED encounter and vital signs as below have been reviewed. /60   Pulse 60   Temp 98.4 °F (36.9 °C) (Oral)   Resp 18   Ht 5' 9\" (1.753 m)   Wt 155 lb (70.3 kg)   SpO2 97%   BMI 22.89 kg/m²   Oxygen Saturation Interpretation: Normal      ------------------------------------------ PROGRESS NOTES ------------------------------------------  1:18 PM EST  I have spoken with the patient and discussed todays results, in addition to providing specific details for the plan of care and counseling regarding the diagnosis and prognosis. Their questions are answered at this time and they are agreeable with the plan. I discussed at length with them reasons for immediate return here for re evaluation. They will followup with their primary care physician by calling their office tomorrow. --------------------------------- ADDITIONAL PROVIDER NOTES ---------------------------------  At this time the patient is without objective evidence of an acute process requiring hospitalization or inpatient management. They have remained hemodynamically stable throughout their entire ED visit and are stable for discharge with outpatient follow-up. The plan has been discussed in detail and they are aware of the specific conditions for emergent return, as well as the importance of follow-up. New Prescriptions    No medications on file       Diagnosis:  1. Hypokalemia    2. Viral illness    3. MORENA (acute kidney injury) ([facility] [address]        Disposition:  Patient's disposition: Discharge to home  Patient's condition is stable.     Patient was seen and evaluated by both myself and Karyle Amor, [address], DO  Resident  02/09/21 4220

## 2021-02-10 ENCOUNTER — CARE COORDINATION (OUTPATIENT)
Dept: CARE COORDINATION | Age: 86
End: 2021-02-10

## 2021-02-12 NOTE — CARE COORDINATION
Patient contacted regarding HXR-07 diagnosis\". Discussed COVID-19 related testing which was available at this time. Test results were positive. Patient informed of results, if available? Yes    Care Transition Nurse/ Ambulatory Care Manager contacted the patient by telephone to perform post discharge assessment. Call within 2 business days of discharge: Yes. Verified name and  with patient as identifiers. Provided introduction to self, and explanation of the CTN/ACM role, and reason for call due to risk factors for infection and/or exposure to COVID-19. Symptoms reviewed with patient who verbalized the following symptoms: fever and appetite change. Due to no new or worsening symptoms encounter was not routed to provider for escalation. Discussed follow-up appointments. If no appointment was previously scheduled, appointment scheduling offered: [facility][1] follow up appointment(s):   Future Appointments   Date Time Provider Radha Gillette   2021 11:40 AM MHYX [place] COVID IMM, MODERNA 28 DAY SECOND DOSE [place] FL[facility]   2021  8:15 AM Kyle Ding MD [place] [facility]     Non-[facility] follow up appointment(s): NA    Patient states he is feeling better. He has follow up with PCP scheduled at end of month. Advised hydration with gatorade or pedialyte. Tylenol for fever, aches. Patient also advised when to return to ER, significant worsening of symptoms, especially shortness of breath or elevated temperatures. Patient voiced understanding. Non-face-to-face services provided:  Obtained and reviewed discharge summary and/or continuity of care documents  Education of patient/family/caregiver/guardian to support self-management-symptom management. Advance Care Planning:   Does patient have an Advance Directive:  not on file. Patient has following risk factors of: no known risk factors.  CTN/ACM reviewed discharge instructions, medical action plan and red flags such as increased shortness of breath, increasing fever and signs of decompensation with patient who verbalized understanding. Discussed exposure protocols and quarantine with CDC Guidelines What to do if you are sick with coronavirus disease 2019.  Patient was given an opportunity for questions and concerns. The patient agrees to contact the [facility] exposure line [phone], local health department [place] Department of Health: ([phone]) and PCP office for questions related to their healthcare. CTN/ACM provided contact information for future needs. Reviewed and educated patient on any new and changed medications related to discharge diagnosis     Patient/family/caregiver given information for GetWell Loop and agrees to enroll no  Patient's preferred e-mail: na   Patient's preferred phone number: na  Based on Loop alert triggers, patient will be contacted by nurse care manager for worsening symptoms. Plan for follow-up call in 5-7 days based on severity of symptoms and risk factors.

## 2021-02-17 ENCOUNTER — TELEPHONE (OUTPATIENT)
Dept: PRIMARY CARE CLINIC | Age: 86
End: 2021-02-17

## 2021-02-17 NOTE — TELEPHONE ENCOUNTER
Patient in clinic today for 2nd covid vaccine. He reports to me that his wife was told he should not receive second vaccine dose for 90 days since he was diagnosed with covid on 2/5/2020. Per CDC guidelines updated 2/11/2020 a person can receive their second  Dose as ling as they are no longer symptomatic and they are no longer in quarantine. Can you please let me know if you have documentation that says otherwise?     Thank you,  Lou Rai

## 2021-02-19 ENCOUNTER — CARE COORDINATION (OUTPATIENT)
Dept: CARE COORDINATION | Age: 86
End: 2021-02-19

## 2021-02-19 NOTE — CARE COORDINATION
You Patient resolved from the Care Transitions episode on 2/19/21. Discussed COVID-19 related testing which was available at this time. Test results were positive. Patient informed of results, if available? Yes    Patient/family has been provided the following resources and education related to COVID-19:                         Signs, symptoms and red flags related to COVID-19            [facility] exposure and quarantine guidelines            [facility] exposure contact - [phone]            Contact for their local Department of Health                 Patient currently reports that the following symptoms have improved:  appetite change and fever. Patient states that he is feeling much better. Has a follow up appt with PCP on 2/25/21. No further outreach scheduled with this CTN/ACM. Episode of Care resolved. Patient has this CTN/ACM contact information if future needs arise.

## 2021-02-25 ENCOUNTER — OFFICE VISIT (OUTPATIENT)
Dept: FAMILY MEDICINE CLINIC | Age: 86
End: 2021-02-25
Payer: MEDICARE

## 2021-02-25 VITALS
RESPIRATION RATE: 16 BRPM | HEIGHT: 69 IN | WEIGHT: 152.8 LBS | SYSTOLIC BLOOD PRESSURE: 122 MMHG | TEMPERATURE: 97 F | BODY MASS INDEX: 22.63 KG/M2 | DIASTOLIC BLOOD PRESSURE: 78 MMHG | OXYGEN SATURATION: 94 % | HEART RATE: 63 BPM

## 2021-02-25 DIAGNOSIS — D50.0 IRON DEFICIENCY ANEMIA DUE TO CHRONIC BLOOD LOSS: ICD-10-CM

## 2021-02-25 DIAGNOSIS — I10 ESSENTIAL HYPERTENSION: ICD-10-CM

## 2021-02-25 DIAGNOSIS — N18.30 STAGE 3 CHRONIC KIDNEY DISEASE, UNSPECIFIED WHETHER STAGE 3A OR 3B CKD (HCC): ICD-10-CM

## 2021-02-25 DIAGNOSIS — E78.00 PURE HYPERCHOLESTEROLEMIA: Primary | ICD-10-CM

## 2021-02-25 DIAGNOSIS — U07.1 COVID-19 VIRUS INFECTION: ICD-10-CM

## 2021-02-25 PROCEDURE — 1036F TOBACCO NON-USER: CPT | Performed by: FAMILY MEDICINE

## 2021-02-25 PROCEDURE — 99213 OFFICE O/P EST LOW 20 MIN: CPT | Performed by: FAMILY MEDICINE

## 2021-02-25 PROCEDURE — G8427 DOCREV CUR MEDS BY ELIG CLIN: HCPCS | Performed by: FAMILY MEDICINE

## 2021-02-25 PROCEDURE — 4040F PNEUMOC VAC/ADMIN/RCVD: CPT | Performed by: FAMILY MEDICINE

## 2021-02-25 PROCEDURE — 1123F ACP DISCUSS/DSCN MKR DOCD: CPT | Performed by: FAMILY MEDICINE

## 2021-02-25 PROCEDURE — G8420 CALC BMI NORM PARAMETERS: HCPCS | Performed by: FAMILY MEDICINE

## 2021-02-25 PROCEDURE — G8484 FLU IMMUNIZE NO ADMIN: HCPCS | Performed by: FAMILY MEDICINE

## 2021-02-25 RX ORDER — LANOLIN ALCOHOL/MO/W.PET/CERES
1000 CREAM (GRAM) TOPICAL DAILY
COMMUNITY

## 2021-02-25 RX ORDER — ACETAMINOPHEN 160 MG
2000 TABLET,DISINTEGRATING ORAL DAILY
COMMUNITY

## 2021-02-25 RX ORDER — METOPROLOL SUCCINATE 25 MG/1
25 TABLET, EXTENDED RELEASE ORAL DAILY
Qty: 30 TABLET | Refills: 3 | Status: SHIPPED
Start: 2021-02-25 | End: 2021-06-09 | Stop reason: SDUPTHER

## 2021-02-25 NOTE — PROGRESS NOTES
OFFICE PROGRESS NOTE      SUBJECTIVE:        Patient ID:   Sherlyn Cao is a 80 y.o. male who presents for   Chief Complaint   Patient presents with   Becky De Guzman     one month check up,Patient is recently recovering from covid-19 and would like heart and lungs checked.  Health Maintenance     due for shingles and dtap    Fatigue     due to recently having covid           HPI:     FEELS TIRED EASILY. WAS  DIAGNOSED TO HAVE COVID INFECTION 1 MONTH AGO. RECEIVED 1ST DOSE OF COVID-19 VACCINATION. DUE FOR 2ND SHOT ON 2 WEEKS. EATING  GOOD. NO  EXERCISE. TAKING MEDICATIONS REGULARLY. Prior to Admission medications    Medication Sig Start Date End Date Taking?  Authorizing Provider   Cholecalciferol (VITAMIN D3) 50 MCG (2000 UT) CAPS Take by mouth   Yes Historical Provider, MD   Ascorbic Acid (C-250) 250 MG CHEW Take by mouth   Yes Historical Provider, MD   vitamin B-12 (CYANOCOBALAMIN) 1000 MCG tablet Take 1,000 mcg by mouth daily   Yes Historical Provider, MD   metoprolol succinate (TOPROL XL) 25 MG extended release tablet Take 1 tablet by mouth daily 2/25/21  Yes Georgina Che MD   ibuprofen (ADVIL;MOTRIN) 800 MG tablet Take 800 mg by mouth every 6 hours as needed for Pain   Yes Historical Provider, MD   simvastatin (ZOCOR) 40 MG tablet Take 1 tablet by mouth nightly 12/7/20  Yes Georgina Che MD   lisinopril-hydroCHLOROthiazide (PRINZIDE;ZESTORETIC) 20-25 MG per tablet Take 1 tablet by mouth daily 12/7/20  Yes Georgina Che MD   finasteride (PROSCAR) 5 MG tablet Take 1 tablet by mouth daily 12/7/20  Yes Georgina Che MD   amLODIPine (NORVASC) 2.5 MG tablet Take 1 tablet by mouth daily 12/7/20  Yes Georgina Che MD   ANTIFUNGAL 2 % cream APPLY CREAM TO AFFECTED AREA TWICE DAILY 10/14/19  Yes Historical Provider, MD   pantoprazole (PROTONIX) 40 MG tablet TAKE 1 TABLET BY MOUTH ONCE DAILY 12/6/19  Yes Historical Provider, MD   ciclopirox (PENLAC) 8 % solution APPLY 1 APPLICATION TOPICALLY TO AFFECTED AREA AT BEDTIME 10/11/19  Yes Historical Provider, MD   ferrous sulfate (LI-IN-SOL) 75 (15 Fe) MG/ML solution Take 15 mg by mouth daily   Yes Historical Provider, MD   timolol (TIMOPTIC) 0.5 % ophthalmic solution  16  Yes Historical Provider, MD   Multiple Vitamin (ONE-A-DAY ESSENTIAL) TABS Take 1 tablet by mouth daily. Yes Historical Provider, Yohannes Rocha, 450 MG CAPS Take 2 tablets by mouth daily. Yes Historical Provider, MD   Misc Natural Products (OSTEO BI-FLEX JOINT SHIELD PO) Take 2 tablets by mouth daily. Yes Historical Provider, MD   aspirin 81 MG EC tablet Take 81 mg by mouth daily.      Yes Historical Provider, MD     Social History     Socioeconomic History    Marital status:      Spouse name: None    Number of children: None    Years of education: None    Highest education level: None   Occupational History    None   Social Needs    Financial resource strain: Not hard at all   [employer] insecurity     Worry: Never true     Inability: Never true    Transportation needs     Medical: No     Non-medical: No   Tobacco Use    Smoking status: Former Smoker     Packs/day: 1.00     Years: 40.00     Pack years: 40.00     Quit date: 2000     Years since quittin.4    Smokeless tobacco: Never Used   Substance and Sexual Activity    Alcohol use: No    Drug use: No    Sexual activity: None   Lifestyle    Physical activity     Days per week: None     Minutes per session: None    Stress: None   Relationships    Social connections     Talks on phone: None     Gets together: None     Attends Quaker service: None     Active member of club or organization: None     Attends meetings of clubs or organizations: None     Relationship status: None    Intimate partner violence     Fear of current or ex partner: None     Emotionally abused: None     Physically abused: None     Forced sexual activity: None Other Topics Concern    None   Social History Narrative    None       I have reviewed Kyle's allergies, medications, problem list, medical, social and family history and have updated as needed in the electronic medical record  Review Of Systems:    Skin: no abnormal pigmentation, rash, scaling, itching, masses, hair or nail changes  Eyes: no blurring, diplopia, or eye pain  Ears/Nose/Throat: no hearing loss, tinnitus, vertigo, nosebleed, nasal congestion, rhinorrhea, sore throat  Respiratory: no cough, pleuritic chest pain, dyspnea, or wheezing  Cardiovascular: no chest pain, angina, dyspnea on exertion, orthopnea, PND, palpitations, or claudication  Gastrointestinal: no nausea, vomiting, heartburn, diarrhea, constipation, bloating,  abdominal pain, or blood per rectum. Appetite is good  Genitourinary: no urinary urgency, frequency, dysuria, nocturia, hesitancy, or incontinence  Musculoskeletal: joint pains off and on. Morning stiffness. Ambulating well  Neurologic: no paralysis, paresis, paresthesia, seizures, tremors, or headaches  Hematologic/Lymphatic/Immunologic: no anemia, abnormal bleeding/bruising, fever, chills, night sweats, swollen glands, or unexplained weight loss  Endocrine: no heat or cold intolerance and no polyphagia, polydipsia, or polyuria        OBJECTIVE:     VS:  Wt Readings from Last 3 Encounters:   02/25/21 152 lb 12.8 oz (69.3 kg)   02/09/21 155 lb (70.3 kg)   02/05/21 155 lb (70.3 kg)     Temp Readings from Last 3 Encounters:   02/25/21 97 °F (36.1 °C)   02/09/21 98.4 °F (36.9 °C) (Oral)   02/05/21 98.8 °F (37.1 °C) (Temporal)     BP Readings from Last 3 Encounters:   02/25/21 122/78   02/09/21 136/60   02/05/21 126/78        General appearance: Alert, Awake, Oriented times 3, no distress  Skin: Warm and dry  Head: Normocephalic. No masses, lesions or tenderness noted  Eyes: Conjunctivae appear normal. PERLE  Ears: External ears normal  Nose/Sinuses: Nares normal. Septum midline.  Mucosa normal. No drainage  Oropharynx: Oropharynx clear with no exudate seen  Neck: Neck supple. No jugular venous distension, lymphadenopathy or thyromegaly Trachea midline  Chest:  Normal. Movements are Normal and Equal.  Lungs: Lungs clear to auscultation bilaterally. No ronchi, crackles or wheezes  Heart: S1 S2  Regular rate and rhythm. No rub, murmur or gallop  Abdomen: Abdomen soft, non-tender. BS normal. No masses, organomegaly. Back: Grossly Normal and Equal. DTR are Normal. SLR is Normal.  Extremities: Arthritic changes are noted. Movements are limited. Pedal pulses are normal.  Musculoskeletal: Muscular strength appears intact. No joint effusion, tenderness, swelling or warmth  Neuro:  No focal motor or sensory deficits        ASSESSMENT     Patient Active Problem List    Diagnosis Date Noted    Primary osteoarthritis involving multiple joints 09/24/2012     Priority: High     Class: Chronic    Pure hypercholesterolemia      Priority: High     Class: Chronic    Essential hypertension      Priority: High     Class: Chronic    Iron deficiency anemia due to chronic blood loss 07/26/2018    CKD (chronic kidney disease) stage 3, GFR 30-59 ml/min 07/26/2018        Diagnosis:     ICD-10-CM    1. Pure hypercholesterolemia  E78.00     CONTROLLED   2. Essential hypertension  I10     CONTROLLED   3. Iron deficiency anemia due to chronic blood loss  D50.0     STABLE   4. COVID-19 virus infection  U07.1     IMPROVING   5. Stage 3 chronic kidney disease, unspecified whether stage 3a or 3b CKD  N18.30     STABLE       PLAN:      LABORATORY RESULTS 1/4/2021 AND 2/9/2021 REVIEWED AND DISCUSSED. Patient Instructions   LOW SALT  AND LOW FAT DIET. CONTINUE CURRENT MEDICATIONS TAKING REGULARLY. REGULAR WALKING IN HOUSE  ADVISED. OK TO TAKE 2ND COVID-19 VACCINE. NEXT APPOINTMENT IN 3 MONTHS. Return in about 3 months (around 5/25/2021) for FOLLOW UP VISIT.          I have reviewed my findings and recommendations with Denae Yung.     Electronically signed by Jen Leyva MD on 2/25/21 at 9:06 AM EST

## 2021-02-25 NOTE — PATIENT INSTRUCTIONS
LOW SALT  AND LOW FAT DIET. CONTINUE CURRENT MEDICATIONS TAKING REGULARLY. REGULAR WALKING IN HOUSE  ADVISED. OK TO TAKE 2ND COVID-19 VACCINE. NEXT APPOINTMENT IN 3 MONTHS.

## 2021-03-01 ENCOUNTER — IMMUNIZATION (OUTPATIENT)
Dept: PRIMARY CARE CLINIC | Age: 86
End: 2021-03-01
Payer: MEDICARE

## 2021-03-01 PROCEDURE — 0012A COVID-19, MODERNA VACCINE 100MCG/0.5ML DOSE: CPT | Performed by: NURSE PRACTITIONER

## 2021-03-01 PROCEDURE — 91301 COVID-19, MODERNA VACCINE 100MCG/0.5ML DOSE: CPT | Performed by: NURSE PRACTITIONER

## 2021-04-04 RX ORDER — SIMVASTATIN 40 MG
40 TABLET ORAL NIGHTLY
Qty: 90 TABLET | Refills: 0 | Status: SHIPPED
Start: 2021-04-04 | End: 2021-06-09 | Stop reason: SDUPTHER

## 2021-06-09 ENCOUNTER — OFFICE VISIT (OUTPATIENT)
Dept: FAMILY MEDICINE CLINIC | Age: 86
End: 2021-06-09
Payer: MEDICARE

## 2021-06-09 VITALS
OXYGEN SATURATION: 98 % | DIASTOLIC BLOOD PRESSURE: 82 MMHG | TEMPERATURE: 97 F | HEART RATE: 58 BPM | WEIGHT: 156 LBS | RESPIRATION RATE: 16 BRPM | HEIGHT: 69 IN | BODY MASS INDEX: 23.11 KG/M2 | SYSTOLIC BLOOD PRESSURE: 118 MMHG

## 2021-06-09 DIAGNOSIS — E78.00 PURE HYPERCHOLESTEROLEMIA: Primary | ICD-10-CM

## 2021-06-09 DIAGNOSIS — I10 ESSENTIAL HYPERTENSION: ICD-10-CM

## 2021-06-09 DIAGNOSIS — N18.30 STAGE 3 CHRONIC KIDNEY DISEASE, UNSPECIFIED WHETHER STAGE 3A OR 3B CKD (HCC): ICD-10-CM

## 2021-06-09 DIAGNOSIS — D50.0 IRON DEFICIENCY ANEMIA DUE TO CHRONIC BLOOD LOSS: ICD-10-CM

## 2021-06-09 PROCEDURE — 1123F ACP DISCUSS/DSCN MKR DOCD: CPT | Performed by: FAMILY MEDICINE

## 2021-06-09 PROCEDURE — 99213 OFFICE O/P EST LOW 20 MIN: CPT | Performed by: FAMILY MEDICINE

## 2021-06-09 PROCEDURE — G8427 DOCREV CUR MEDS BY ELIG CLIN: HCPCS | Performed by: FAMILY MEDICINE

## 2021-06-09 PROCEDURE — 4040F PNEUMOC VAC/ADMIN/RCVD: CPT | Performed by: FAMILY MEDICINE

## 2021-06-09 PROCEDURE — G8420 CALC BMI NORM PARAMETERS: HCPCS | Performed by: FAMILY MEDICINE

## 2021-06-09 PROCEDURE — 1036F TOBACCO NON-USER: CPT | Performed by: FAMILY MEDICINE

## 2021-06-09 RX ORDER — SIMVASTATIN 40 MG
40 TABLET ORAL NIGHTLY
Qty: 90 TABLET | Refills: 0 | Status: SHIPPED
Start: 2021-06-09 | End: 2021-10-12 | Stop reason: SDUPTHER

## 2021-06-09 RX ORDER — LISINOPRIL AND HYDROCHLOROTHIAZIDE 25; 20 MG/1; MG/1
1 TABLET ORAL DAILY
Qty: 90 TABLET | Refills: 1 | Status: SHIPPED
Start: 2021-06-09 | End: 2021-10-12 | Stop reason: SDUPTHER

## 2021-06-09 RX ORDER — AMLODIPINE BESYLATE 2.5 MG/1
2.5 TABLET ORAL DAILY
Qty: 90 TABLET | Refills: 1 | Status: SHIPPED
Start: 2021-06-09 | End: 2021-10-12 | Stop reason: SDUPTHER

## 2021-06-09 RX ORDER — METOPROLOL SUCCINATE 25 MG/1
25 TABLET, EXTENDED RELEASE ORAL DAILY
Qty: 30 TABLET | Refills: 3 | Status: SHIPPED
Start: 2021-06-09 | End: 2021-10-12 | Stop reason: SDUPTHER

## 2021-06-09 RX ORDER — FINASTERIDE 5 MG/1
5 TABLET, FILM COATED ORAL DAILY
Qty: 90 TABLET | Refills: 1 | Status: SHIPPED
Start: 2021-06-09 | End: 2021-10-12 | Stop reason: SDUPTHER

## 2021-06-09 SDOH — ECONOMIC STABILITY: INCOME INSECURITY: IN THE LAST 12 MONTHS, WAS THERE A TIME WHEN YOU WERE NOT ABLE TO PAY THE MORTGAGE OR RENT ON TIME?: NO

## 2021-06-09 SDOH — ECONOMIC STABILITY: HOUSING INSECURITY
IN THE LAST 12 MONTHS, WAS THERE A TIME WHEN YOU DID NOT HAVE A STEADY PLACE TO SLEEP OR SLEPT IN A SHELTER (INCLUDING NOW)?: NO

## 2021-06-09 SDOH — ECONOMIC STABILITY: HOUSING INSECURITY: IN THE LAST 12 MONTHS, HOW MANY PLACES HAVE YOU LIVED?: 1

## 2021-06-09 ASSESSMENT — LIFESTYLE VARIABLES
HOW MANY STANDARD DRINKS CONTAINING ALCOHOL DO YOU HAVE ON A TYPICAL DAY: 1 OR 2
HOW OFTEN DO YOU HAVE A DRINK CONTAINING ALCOHOL: NEVER

## 2021-06-09 NOTE — PROGRESS NOTES
OFFICE PROGRESS NOTE      SUBJECTIVE:        Patient ID:   Frank Vargas is a 80 y.o. male who presents for   Chief Complaint   Patient presents with    Hyperlipidemia     3 month check up   [facility] Maintenance     due for dtap and shingles           HPI:     FEELS GOOD. WATCHING DIET GOOD. WALKING FOR EXERCISE. TAKING MEDICATIONS REGULARLY. OK FOR VACCINES. Prior to Admission medications    Medication Sig Start Date End Date Taking?  Authorizing Provider   metoprolol succinate (TOPROL XL) 25 MG extended release tablet Take 1 tablet by mouth daily 6/9/21  Yes Grace Guthrie MD   lisinopril-hydroCHLOROthiazide [facility]) 20-25 MG per tablet Take 1 tablet by mouth daily 6/9/21  Yes Grace Guthrie MD   finasteride (PROSCAR) 5 MG tablet Take 1 tablet by mouth daily 6/9/21  Yes Grace Guthrie MD   amLODIPine (NORVASC) 2.5 MG tablet Take 1 tablet by mouth daily 6/9/21  Yes Grace Guthrie MD   simvastatin (ZOCOR) 40 MG tablet Take 1 tablet by mouth nightly 6/9/21  Yes Grace Guthrie MD   Tetanus-Diphth-Acell Pertussis ([address]) 5-2.5-18.5 LF-MCG/0.5 injection Inject 0.5 mLs into the muscle once for 1 dose 6/9/21 6/9/21 Yes Grace Guthrie MD   Cholecalciferol (VITAMIN D3) 50 MCG (2000 UT) CAPS Take by mouth   Yes Historical Provider, MD   Ascorbic Acid (C-250) 250 MG CHEW Take by mouth   Yes Historical Provider, MD   vitamin B-12 (CYANOCOBALAMIN) 1000 MCG tablet Take 1,000 mcg by mouth daily   Yes Historical Provider, MD   ibuprofen (ADVIL;MOTRIN) 800 MG tablet Take 800 mg by mouth every 6 hours as needed for Pain   Yes Historical Provider, MD   ANTIFUNGAL 2 % cream APPLY CREAM TO AFFECTED AREA TWICE DAILY 10/14/19  Yes Historical Provider, MD   pantoprazole (PROTONIX) 40 MG tablet TAKE 1 TABLET BY MOUTH ONCE DAILY 12/6/19  Yes Historical Provider, MD   ciclopirox (PENLAC) 8 % solution APPLY 1 APPLICATION TOPICALLY TO AFFECTED AREA AT BEDTIME 10/11/19  Yes  Attends Scientologist Services:     Active Member of Clubs or Organizations:     Attends Club or Organization Meetings:     Marital Status:    Intimate Partner Violence:     Fear of Current or Ex-Partner:     Emotionally Abused:     Physically Abused:     Sexually Abused:        I have reviewed Kyle's allergies, medications, problem list, medical, social and family history and have updated as needed in the electronic medical record  Review Of Systems:    Skin: no abnormal pigmentation, rash, scaling, itching, masses, hair or nail changes  Eyes: no blurring, diplopia, or eye pain  Ears/Nose/Throat: no hearing loss, tinnitus, vertigo, nosebleed, nasal congestion, rhinorrhea, sore throat  Respiratory: no cough, pleuritic chest pain, dyspnea, or wheezing  Cardiovascular: no chest pain, angina, dyspnea on exertion, orthopnea, PND, palpitations, or claudication  Gastrointestinal: no nausea, vomiting, heartburn, diarrhea, constipation, bloating,  abdominal pain, or blood per rectum. Appetite is good  Genitourinary: no urinary urgency, frequency, dysuria, nocturia, hesitancy, or incontinence  Musculoskeletal: joint pains off and on. Morning stiffness.  Ambulating well  Neurologic: no paralysis, paresis, paresthesia, seizures, tremors, or headaches  Hematologic/Lymphatic/Immunologic: no anemia, abnormal bleeding/bruising, fever, chills, night sweats, swollen glands, or unexplained weight loss  Endocrine: no heat or cold intolerance and no polyphagia, polydipsia, or polyuria        OBJECTIVE:     VS:  Wt Readings from Last 3 Encounters:   06/09/21 156 lb (70.8 kg)   02/25/21 152 lb 12.8 oz (69.3 kg)   02/09/21 155 lb (70.3 kg)     Temp Readings from Last 3 Encounters:   06/09/21 97 °F (36.1 °C)   02/25/21 97 °F (36.1 °C)   02/09/21 98.4 °F (36.9 °C) (Oral)     BP Readings from Last 3 Encounters:   06/09/21 118/82   02/25/21 122/78   02/09/21 136/60        General appearance: Alert, Awake, Oriented times 3, no distress  Skin: Warm and dry  Head: Normocephalic. No masses, lesions or tenderness noted  Eyes: Conjunctivae appear normal. PERLE  Ears: External ears normal  Nose/Sinuses: Nares normal. Septum midline. Mucosa normal. No drainage  Oropharynx: Oropharynx clear with no exudate seen  Neck: Neck supple. No jugular venous distension, lymphadenopathy or thyromegaly Trachea midline  Chest:  Normal. Movements are Normal and Equal.  Lungs: Lungs clear to auscultation bilaterally. No ronchi, crackles or wheezes  Heart: S1 S2  Regular rate and rhythm. No rub, murmur or gallop  Abdomen: Abdomen soft, non-tender. BS normal. No masses, organomegaly. Back: Grossly Normal and Equal. DTR are Normal. SLR is Normal.  Extremities: Arthritic changes are noted. Movements are limited. Pedal pulses are normal.  Musculoskeletal: Muscular strength appears intact. No joint effusion, tenderness, swelling or warmth  Neuro:  No focal motor or sensory deficits        ASSESSMENT     Patient Active Problem List    Diagnosis Date Noted    Primary osteoarthritis involving multiple joints 09/24/2012    Pure hypercholesterolemia     Essential hypertension     Iron deficiency anemia due to chronic blood loss 07/26/2018    CKD (chronic kidney disease) stage 3, GFR 30-59 ml/min ([facility]) 07/26/2018        Diagnosis:     ICD-10-CM    1. Pure hypercholesterolemia  E78.00    2. Essential hypertension  I10    3. Stage 3 chronic kidney disease, unspecified whether stage 3a or 3b CKD ([facility] [address]  N18.30 Hever Ricardo MD, Nephrology, [place]   4. Iron deficiency anemia due to chronic blood loss  D50.0 [place] - Nghia Harry MD, Nephrology, [place]       PLAN:      LABORATORY RESULTS 2/17/2021 REVIEWED AND DISCUSSED. Patient Instructions   LOW SALT  AND LOW FAT DIET. CONTINUE CURRENT MEDICATIONS TAKING REGULARLY. REGULAR WALKING ADVISED. SEE KIDNEY DOCTOR AS SCHEDULED. NEXT APPOINTMENT IN 3 MONTHS.        Return in about 3 months (around

## 2021-08-09 ENCOUNTER — HOSPITAL ENCOUNTER (INPATIENT)
Age: 86
LOS: 1 days | Discharge: HOME | DRG: 726 | End: 2021-08-12
Attending: EMERGENCY MEDICINE | Admitting: FAMILY MEDICINE
Payer: MEDICARE

## 2021-08-09 ENCOUNTER — APPOINTMENT (OUTPATIENT)
Dept: CT IMAGING | Age: 86
DRG: 726 | End: 2021-08-09
Payer: MEDICARE

## 2021-08-09 ENCOUNTER — APPOINTMENT (OUTPATIENT)
Dept: GENERAL RADIOLOGY | Age: 86
DRG: 726 | End: 2021-08-09
Payer: MEDICARE

## 2021-08-09 DIAGNOSIS — E87.6 HYPOKALEMIA: ICD-10-CM

## 2021-08-09 DIAGNOSIS — N40.0 ENLARGED PROSTATE: ICD-10-CM

## 2021-08-09 DIAGNOSIS — N18.9 CHRONIC KIDNEY DISEASE, UNSPECIFIED CKD STAGE: ICD-10-CM

## 2021-08-09 DIAGNOSIS — R33.9 URINARY RETENTION: Primary | ICD-10-CM

## 2021-08-09 PROBLEM — N18.31 STAGE 3A CHRONIC KIDNEY DISEASE (HCC): Status: ACTIVE | Noted: 2018-07-26

## 2021-08-09 LAB
ALBUMIN SERPL-MCNC: 3.6 G/DL (ref 3.5–5.2)
ALP BLD-CCNC: 120 U/L (ref 40–129)
ALT SERPL-CCNC: 15 U/L (ref 0–40)
ANION GAP SERPL CALCULATED.3IONS-SCNC: 14 MMOL/L (ref 7–16)
AST SERPL-CCNC: 12 U/L (ref 0–39)
BACTERIA: NORMAL /HPF
BASOPHILS ABSOLUTE: 0.04 E9/L (ref 0–0.2)
BASOPHILS RELATIVE PERCENT: 0.4 % (ref 0–2)
BILIRUB SERPL-MCNC: 0.5 MG/DL (ref 0–1.2)
BILIRUBIN DIRECT: 0.2 MG/DL (ref 0–0.3)
BILIRUBIN URINE: NEGATIVE
BILIRUBIN, INDIRECT: 0.3 MG/DL (ref 0–1)
BLOOD, URINE: ABNORMAL
BUN BLDV-MCNC: 39 MG/DL (ref 6–23)
CALCIUM SERPL-MCNC: 9.4 MG/DL (ref 8.6–10.2)
CHLORIDE BLD-SCNC: 105 MMOL/L (ref 98–107)
CLARITY: CLEAR
CO2: 22 MMOL/L (ref 22–29)
COLOR: YELLOW
CREAT SERPL-MCNC: 1.8 MG/DL (ref 0.7–1.2)
EKG ATRIAL RATE: 69 BPM
EKG Q-T INTERVAL: 290 MS
EKG QRS DURATION: 176 MS
EKG QTC CALCULATION (BAZETT): 395 MS
EKG R AXIS: -55 DEGREES
EKG T AXIS: 121 DEGREES
EKG VENTRICULAR RATE: 112 BPM
EOSINOPHILS ABSOLUTE: 0.01 E9/L (ref 0.05–0.5)
EOSINOPHILS RELATIVE PERCENT: 0.1 % (ref 0–6)
EPITHELIAL CELLS, UA: NORMAL /HPF
GFR AFRICAN AMERICAN: 43
GFR NON-AFRICAN AMERICAN: 36 ML/MIN/1.73
GLUCOSE BLD-MCNC: 114 MG/DL (ref 74–99)
GLUCOSE URINE: NEGATIVE MG/DL
HCT VFR BLD CALC: 32.1 % (ref 37–54)
HEMOGLOBIN: 10.5 G/DL (ref 12.5–16.5)
IMMATURE GRANULOCYTES #: 0.03 E9/L
IMMATURE GRANULOCYTES %: 0.3 % (ref 0–5)
KETONES, URINE: NEGATIVE MG/DL
LACTIC ACID: 0.8 MMOL/L (ref 0.5–2.2)
LEUKOCYTE ESTERASE, URINE: NEGATIVE
LIPASE: 27 U/L (ref 13–60)
LYMPHOCYTES ABSOLUTE: 1.24 E9/L (ref 1.5–4)
LYMPHOCYTES RELATIVE PERCENT: 13.5 % (ref 20–42)
MAGNESIUM: 1.8 MG/DL (ref 1.6–2.6)
MCH RBC QN AUTO: 30.9 PG (ref 26–35)
MCHC RBC AUTO-ENTMCNC: 32.7 % (ref 32–34.5)
MCV RBC AUTO: 94.4 FL (ref 80–99.9)
MONOCYTES ABSOLUTE: 0.48 E9/L (ref 0.1–0.95)
MONOCYTES RELATIVE PERCENT: 5.2 % (ref 2–12)
NEUTROPHILS ABSOLUTE: 7.36 E9/L (ref 1.8–7.3)
NEUTROPHILS RELATIVE PERCENT: 80.5 % (ref 43–80)
NITRITE, URINE: NEGATIVE
PDW BLD-RTO: 13 FL (ref 11.5–15)
PH UA: 5.5 (ref 5–9)
PLATELET # BLD: 340 E9/L (ref 130–450)
PMV BLD AUTO: 10.4 FL (ref 7–12)
POTASSIUM REFLEX MAGNESIUM: 3.2 MMOL/L (ref 3.5–5)
PROTEIN UA: NEGATIVE MG/DL
RBC # BLD: 3.4 E12/L (ref 3.8–5.8)
RBC UA: NORMAL /HPF (ref 0–2)
SODIUM BLD-SCNC: 141 MMOL/L (ref 132–146)
SPECIFIC GRAVITY UA: 1.02 (ref 1–1.03)
TOTAL PROTEIN: 6.7 G/DL (ref 6.4–8.3)
TROPONIN, HIGH SENSITIVITY: 26 NG/L (ref 0–11)
TROPONIN, HIGH SENSITIVITY: 26 NG/L (ref 0–11)
UROBILINOGEN, URINE: 0.2 E.U./DL
WBC # BLD: 9.2 E9/L (ref 4.5–11.5)
WBC UA: NORMAL /HPF (ref 0–5)

## 2021-08-09 PROCEDURE — 80076 HEPATIC FUNCTION PANEL: CPT

## 2021-08-09 PROCEDURE — 51702 INSERT TEMP BLADDER CATH: CPT

## 2021-08-09 PROCEDURE — 6370000000 HC RX 637 (ALT 250 FOR IP): Performed by: NURSE PRACTITIONER

## 2021-08-09 PROCEDURE — 81001 URINALYSIS AUTO W/SCOPE: CPT

## 2021-08-09 PROCEDURE — 84484 ASSAY OF TROPONIN QUANT: CPT

## 2021-08-09 PROCEDURE — 93010 ELECTROCARDIOGRAM REPORT: CPT | Performed by: INTERNAL MEDICINE

## 2021-08-09 PROCEDURE — G0378 HOSPITAL OBSERVATION PER HR: HCPCS

## 2021-08-09 PROCEDURE — 85025 COMPLETE CBC W/AUTO DIFF WBC: CPT

## 2021-08-09 PROCEDURE — 96361 HYDRATE IV INFUSION ADD-ON: CPT

## 2021-08-09 PROCEDURE — 80048 BASIC METABOLIC PNL TOTAL CA: CPT

## 2021-08-09 PROCEDURE — 96360 HYDRATION IV INFUSION INIT: CPT

## 2021-08-09 PROCEDURE — 99284 EMERGENCY DEPT VISIT MOD MDM: CPT

## 2021-08-09 PROCEDURE — 96374 THER/PROPH/DIAG INJ IV PUSH: CPT

## 2021-08-09 PROCEDURE — 83605 ASSAY OF LACTIC ACID: CPT

## 2021-08-09 PROCEDURE — 83690 ASSAY OF LIPASE: CPT

## 2021-08-09 PROCEDURE — 2580000003 HC RX 258: Performed by: EMERGENCY MEDICINE

## 2021-08-09 PROCEDURE — 71045 X-RAY EXAM CHEST 1 VIEW: CPT

## 2021-08-09 PROCEDURE — 74176 CT ABD & PELVIS W/O CONTRAST: CPT

## 2021-08-09 PROCEDURE — 99231 SBSQ HOSP IP/OBS SF/LOW 25: CPT | Performed by: FAMILY MEDICINE

## 2021-08-09 PROCEDURE — 93005 ELECTROCARDIOGRAM TRACING: CPT | Performed by: EMERGENCY MEDICINE

## 2021-08-09 PROCEDURE — 83735 ASSAY OF MAGNESIUM: CPT

## 2021-08-09 PROCEDURE — 6370000000 HC RX 637 (ALT 250 FOR IP): Performed by: FAMILY MEDICINE

## 2021-08-09 PROCEDURE — 36415 COLL VENOUS BLD VENIPUNCTURE: CPT

## 2021-08-09 RX ORDER — FERROUS SULFATE 325(65) MG
325 TABLET ORAL DAILY
COMMUNITY
End: 2021-09-10 | Stop reason: DRUGHIGH

## 2021-08-09 RX ORDER — M-VIT,TX,IRON,MINS/CALC/FOLIC 27MG-0.4MG
1 TABLET ORAL DAILY
Status: DISCONTINUED | OUTPATIENT
Start: 2021-08-10 | End: 2021-08-12 | Stop reason: HOSPADM

## 2021-08-09 RX ORDER — TAMSULOSIN HYDROCHLORIDE 0.4 MG/1
0.4 CAPSULE ORAL DAILY
Status: DISCONTINUED | OUTPATIENT
Start: 2021-08-09 | End: 2021-08-12 | Stop reason: HOSPADM

## 2021-08-09 RX ORDER — ATORVASTATIN CALCIUM 20 MG/1
20 TABLET, FILM COATED ORAL DAILY
Refills: 0 | Status: DISCONTINUED | OUTPATIENT
Start: 2021-08-09 | End: 2021-08-12 | Stop reason: HOSPADM

## 2021-08-09 RX ORDER — METOPROLOL SUCCINATE 25 MG/1
25 TABLET, EXTENDED RELEASE ORAL DAILY
Status: DISCONTINUED | OUTPATIENT
Start: 2021-08-09 | End: 2021-08-12 | Stop reason: HOSPADM

## 2021-08-09 RX ORDER — LANOLIN ALCOHOL/MO/W.PET/CERES
1000 CREAM (GRAM) TOPICAL DAILY
Status: DISCONTINUED | OUTPATIENT
Start: 2021-08-09 | End: 2021-08-12 | Stop reason: HOSPADM

## 2021-08-09 RX ORDER — PANTOPRAZOLE SODIUM 40 MG/1
40 TABLET, DELAYED RELEASE ORAL DAILY
Status: DISCONTINUED | OUTPATIENT
Start: 2021-08-09 | End: 2021-08-12 | Stop reason: HOSPADM

## 2021-08-09 RX ORDER — FINASTERIDE 5 MG/1
5 TABLET, FILM COATED ORAL DAILY
Status: DISCONTINUED | OUTPATIENT
Start: 2021-08-09 | End: 2021-08-12 | Stop reason: HOSPADM

## 2021-08-09 RX ORDER — FERROUS SULFATE 325(65) MG
325 TABLET ORAL DAILY
Status: DISCONTINUED | OUTPATIENT
Start: 2021-08-09 | End: 2021-08-12 | Stop reason: HOSPADM

## 2021-08-09 RX ORDER — IBUPROFEN 400 MG/1
200 TABLET ORAL EVERY 6 HOURS PRN
Status: DISCONTINUED | OUTPATIENT
Start: 2021-08-09 | End: 2021-08-12 | Stop reason: HOSPADM

## 2021-08-09 RX ORDER — TIMOLOL MALEATE 5 MG/ML
1 SOLUTION/ DROPS OPHTHALMIC 2 TIMES DAILY
Status: DISCONTINUED | OUTPATIENT
Start: 2021-08-09 | End: 2021-08-12 | Stop reason: HOSPADM

## 2021-08-09 RX ORDER — ASCORBIC ACID 500 MG
250 TABLET ORAL DAILY
Status: DISCONTINUED | OUTPATIENT
Start: 2021-08-09 | End: 2021-08-12 | Stop reason: HOSPADM

## 2021-08-09 RX ORDER — AMLODIPINE BESYLATE 5 MG/1
2.5 TABLET ORAL DAILY
Status: DISCONTINUED | OUTPATIENT
Start: 2021-08-09 | End: 2021-08-12 | Stop reason: HOSPADM

## 2021-08-09 RX ORDER — ASPIRIN 81 MG/1
81 TABLET ORAL DAILY
Status: DISCONTINUED | OUTPATIENT
Start: 2021-08-09 | End: 2021-08-12 | Stop reason: HOSPADM

## 2021-08-09 RX ORDER — M-VIT,TX,IRON,MINS/CALC/FOLIC 27MG-0.4MG
1 TABLET ORAL DAILY
COMMUNITY
End: 2021-09-10 | Stop reason: SDUPTHER

## 2021-08-09 RX ORDER — VITAMIN B COMPLEX
2000 TABLET ORAL DAILY
Status: DISCONTINUED | OUTPATIENT
Start: 2021-08-09 | End: 2021-08-12 | Stop reason: HOSPADM

## 2021-08-09 RX ORDER — 0.9 % SODIUM CHLORIDE 0.9 %
1000 INTRAVENOUS SOLUTION INTRAVENOUS ONCE
Status: COMPLETED | OUTPATIENT
Start: 2021-08-09 | End: 2021-08-09

## 2021-08-09 RX ADMIN — Medication 2000 UNITS: at 19:00

## 2021-08-09 RX ADMIN — ATORVASTATIN CALCIUM 20 MG: 20 TABLET, FILM COATED ORAL at 19:01

## 2021-08-09 RX ADMIN — METOPROLOL SUCCINATE 25 MG: 25 TABLET, EXTENDED RELEASE ORAL at 19:01

## 2021-08-09 RX ADMIN — FINASTERIDE 5 MG: 5 TABLET, FILM COATED ORAL at 19:01

## 2021-08-09 RX ADMIN — CYANOCOBALAMIN TAB 1000 MCG 1000 MCG: 1000 TAB at 19:01

## 2021-08-09 RX ADMIN — AMLODIPINE BESYLATE 2.5 MG: 5 TABLET ORAL at 19:01

## 2021-08-09 RX ADMIN — TIMOLOL MALEATE 1 DROP: 5 SOLUTION/ DROPS OPHTHALMIC at 21:53

## 2021-08-09 RX ADMIN — FERROUS SULFATE TAB 325 MG (65 MG ELEMENTAL FE) 325 MG: 325 (65 FE) TAB at 19:01

## 2021-08-09 RX ADMIN — PANTOPRAZOLE SODIUM 40 MG: 40 TABLET, DELAYED RELEASE ORAL at 19:01

## 2021-08-09 RX ADMIN — TAMSULOSIN HYDROCHLORIDE 0.4 MG: 0.4 CAPSULE ORAL at 19:01

## 2021-08-09 RX ADMIN — ASPIRIN 81 MG: 81 TABLET, FILM COATED ORAL at 19:00

## 2021-08-09 RX ADMIN — OXYCODONE HYDROCHLORIDE AND ACETAMINOPHEN 250 MG: 500 TABLET ORAL at 19:00

## 2021-08-09 RX ADMIN — SODIUM CHLORIDE 1000 ML: 9 INJECTION, SOLUTION INTRAVENOUS at 10:00

## 2021-08-09 ASSESSMENT — ENCOUNTER SYMPTOMS
SHORTNESS OF BREATH: 0
COUGH: 0
ABDOMINAL PAIN: 1
DIARRHEA: 0
SORE THROAT: 0
VOMITING: 0
ABDOMINAL DISTENTION: 1
NAUSEA: 0
EYE PAIN: 0
BACK PAIN: 0

## 2021-08-09 ASSESSMENT — PAIN DESCRIPTION - ORIENTATION: ORIENTATION: MID;UPPER

## 2021-08-09 ASSESSMENT — PAIN DESCRIPTION - FREQUENCY: FREQUENCY: CONTINUOUS

## 2021-08-09 ASSESSMENT — PAIN DESCRIPTION - LOCATION: LOCATION: ABDOMEN

## 2021-08-09 ASSESSMENT — PAIN DESCRIPTION - PAIN TYPE: TYPE: ACUTE PAIN

## 2021-08-09 NOTE — H&P
History and Physical    Patient:  Jose Peters  MRN: [id number]    CHIEF COMPLAINT: abdominal bloating with distension for 3 days. History Obtained From:  patient, spouse  PCP: Kevin Venegas MD    HISTORY OF PRESENT ILLNESS:   The patient is a 80 y.o. male admitted via ED with c/o of abdominal distension for 2 days. Has not been passing urine well for pasy fw days. He was catherised  In ER and 5 litre of urine was drained. He is admitted for  Further work up and treatment for the problem. Urology was consulted by ER physician. Past Medical History:        Diagnosis Date    Hyperlipidemia     Hypertension     Lumbosacral strain 7/8/2013       Past Surgical History:        Procedure Laterality Date    A-V CARDIAC PACEMAKER INSERTION  5/30/07    COLONOSCOPY  8/3/11    EYE SURGERY  2002    cataracts evelyn    TONSILLECTOMY      child       Medications Prior to Admission:    Prior to Admission medications    Medication Sig Start Date End Date Taking?  Authorizing Provider   ferrous sulfate (IRON 325) 325 (65 Fe) MG tablet Take 325 mg by mouth daily   Yes Historical Provider, MD   Multiple Vitamins-Minerals (THERAPEUTIC MULTIVITAMIN-MINERALS) tablet Take 1 tablet by mouth daily   Yes Historical Provider, MD   metoprolol succinate (TOPROL XL) 25 MG extended release tablet Take 1 tablet by mouth daily 6/9/21  Yes Kevin Venegas MD   lisinopril-hydroCHLOROthiazide (PRINZIDE;ZESTORETIC) 20-25 MG per tablet Take 1 tablet by mouth daily 6/9/21  Yes Kevin Venegas MD   finasteride (PROSCAR) 5 MG tablet Take 1 tablet by mouth daily 6/9/21  Yes Kevin Venegas MD   amLODIPine (NORVASC) 2.5 MG tablet Take 1 tablet by mouth daily 6/9/21  Yes Kevin Venegas MD   simvastatin (ZOCOR) 40 MG tablet Take 1 tablet by mouth nightly 6/9/21  Yes Kevin Venegas MD   Cholecalciferol (VITAMIN D3) 50 MCG (2000 UT) CAPS Take 2,000 Units by mouth daily    Yes Historical Provider, MD   Ascorbic Acid (C-250) 250 MG CHEW Take 250 mg by mouth daily    Yes Historical Provider, MD   vitamin B-12 (CYANOCOBALAMIN) 1000 MCG tablet Take 1,000 mcg by mouth daily   Yes Historical Provider, MD   ibuprofen (ADVIL;MOTRIN) 800 MG tablet Take 800 mg by mouth every 6 hours as needed for Pain   Yes Historical Provider, MD   pantoprazole (PROTONIX) 40 MG tablet Take 40 mg by mouth daily  12/6/19  Yes Historical Provider, MD   timolol (TIMOPTIC) 0.5 % ophthalmic solution Place 1 drop into both eyes 2 times daily  5/4/16  Yes Historical Provider, MD Jann Rodríguez Serenoa repens, 450 MG CAPS Take 2 tablets by mouth daily. Yes Historical Provider, MD   Misc Natural Products (OSTEO BI-FLEX JOINT SHIELD PO) Take 2 tablets by mouth daily. Yes Historical Provider, MD   aspirin 81 MG EC tablet Take 81 mg by mouth daily. Yes Historical Provider, MD       Allergies:  Patient has no known allergies. Social History:   TOBACCO:   reports that he quit smoking about 20 years ago. He has a 40.00 pack-year smoking history. He has never used smokeless tobacco.  ETOH:   reports no history of alcohol use. OCCUPATION:      Family History:       Problem Relation Age of Onset    High Blood Pressure Mother     Cancer Mother     Diabetes Mother     Heart Disease Father     High Blood Pressure Father        REVIEW OF SYSTEMS:  Negative except for   Pertinent items are noted in HPI. Physical Exam:    Vitals: /66   Pulse 65   Temp 98.2 °F (36.8 °C) (Oral)   Resp 16   Ht 5' 10\" (1.778 m)   Wt 156 lb (70.8 kg)   SpO2 100%   BMI 22.38 kg/m²   General appearance: alert, appears stated age and cooperative  Skin: Skin color, texture, turgor normal. No rashes or lesions  HEENT: Head: Normocephalic, no lesions, without obvious abnormality.   Neck: no adenopathy, no carotid bruit, no JVD, supple, symmetrical, trachea midline and thyroid not enlarged, symmetric, no tenderness/mass/nodules  Lungs: clear to auscultation bilaterally  Heart: regular rate and rhythm, S1, S2 normal, no murmur, click, rub or gallop  Abdomen: soft, non-tender; bowel sounds normal; no masses,  no organomegaly  Extremities: extremities show arthritic changes in the joints. Movements are limited. Neurologic: Mental status: Alert, oriented, thought content appropriate    CBC:   Recent Labs     08/09/21  0758   WBC 9.2   HGB 10.5*        BMP:    Recent Labs     08/09/21  0758      K 3.2*      CO2 22   BUN 39*   CREATININE 1.8*   GLUCOSE 114*     Hepatic:   Recent Labs     08/09/21  0758   AST 12   ALT 15   BILITOT 0.5   ALKPHOS 120       ASSESSMENT:)  Active Hospital Problems    Diagnosis Date Noted    Urinary retention [R33.9] 08/09/2021     Priority: High    Hypokalemia [E87.6] 08/09/2021     Priority: High     Class: Acute    Iron deficiency anemia due to chronic blood loss [D50.0] 07/26/2018     Priority: High    Primary osteoarthritis involving multiple joints [M89.49] 09/24/2012     Priority: High     Class: Chronic    Pure hypercholesterolemia [E78.00]      Priority: High     Class: Chronic    Essential hypertension [I10]      Priority: High     Class: Chronic    Stage 3a chronic kidney disease ([facility] [address] [N18.31] 07/26/2018       PLAN: CONTINUE CURRENT MEDICATIONS. AWAIT FURTHER UROLOGICAL  EVALUATION AND  MANAGEMENT.     Electronically signed by Yousif Schneider MD on 8/9/21 at 4:45 PM EDT

## 2021-08-09 NOTE — CONSULTS
8/9/2021 11:12 AM  Service: Urology  Group: [facility] ([facility])    Apollo Acuna  [id number]     Chief Complaint:    Urinary retention, severe bilateral hydroureteronephrosis    History of Present Illness: The patient is a 80 y.o. male patient who presented to the hospital today with complaints of abdominal pain for 3 to 4 days. He states that over the last 3 to 4 days he has noticed difficulty urinating, weak stream, dribbling, straining, and flank pain. He had a CT abdomen pelvis performed in the emergency department that showed severe bilateral hydroureteronephrosis and bladder distention. A Fay catheter was inserted for greater than 4000 mL of immediate urine output. He does not report extreme discomfort prior to Fay catheter insertion. Does notice some degree of improvement in his abdominal pain since the Fay was inserted. He has not seen a urologist for many years. He saw Dr. Dagoberto Castro in the past for BPH. He has never undergone any previous urological procedures to his knowledge. He was taking Proscar prior to arrival.  He does have a history of chronic kidney disease, but states he has not seen a nephrologist in the past to his knowledge. Past Medical History:   Diagnosis Date    Hyperlipidemia     Hypertension     Lumbosacral strain 7/8/2013         Past Surgical History:   Procedure Laterality Date    A-V CARDIAC PACEMAKER INSERTION  5/30/07    COLONOSCOPY  8/3/11    EYE SURGERY  2002    cataracts evelyn    TONSILLECTOMY      child       Medications Prior to Admission:    Not in a hospital admission. Allergies:    Patient has no known allergies. Social History:    reports that he quit smoking about 20 years ago. He has a 40.00 pack-year smoking history. He has never used smokeless tobacco. He reports that he does not drink alcohol and does not use drugs.     Family History:   Non-contributory to this Urological problem  family history includes Cancer in his mother; Diabetes in his mother; Heart Disease in his father; High Blood Pressure in his father and mother. Review of Systems:  Constitutional: No fever or chills   Respiratory: negative for cough and hemoptysis  Cardiovascular: negative for chest pain and dyspnea  Gastrointestinal: negative for abdominal pain, diarrhea, nausea and vomiting   Derm: negative for rash and skin lesion(s)  Neurological: negative for seizures and tremors  Musculoskeletal: Negative    Psychiatric: Negative   : As above in the HPI, otherwise negative  All other reviews are negative    Physical Exam:     Vitals:  BP (!) 157/88   Pulse 69   Temp 98.3 °F (36.8 °C) (Oral)   Resp 20   Ht 5' 10\" (1.778 m)   Wt 156 lb (70.8 kg)   SpO2 99%   BMI 22.38 kg/m²     General:  Awake, alert, oriented X 3. No apparent distress. HEENT:  Normocephalic, atraumatic. Lungs:  Respirations symmetric and non-labored. Abdomen:  soft, nontender, no masses  Extremities:  No clubbing, cyanosis, or edema  Skin:  Warm and dry, no open lesions or rashes  Neuro: There are no motor or sensory deficits in the 4 quadrant extremities   Rectal: deferred  Genitourinary: Fay catheter intact draining yellow urine    Labs:     Recent Labs     08/09/21  0758   WBC 9.2   RBC 3.40*   HGB 10.5*   HCT 32.1*   MCV 94.4   MCH 30.9   MCHC 32.7   RDW 13.0      MPV 10.4         Recent Labs     08/09/21  0758   CREATININE 1.8*       Lab Results   Component Value Date    PSA 2.56 11/11/2015       Imaging:               Assessment/plan:  Urinary retention (>4000ml)  Severe bilateral hydroureteronephrosis   CKD     Creatinine currently appears baseline  Needs to be monitored closely for postobstructive diuresis  Would benefit from nephrology consult  CT abdomen pelvis reviewed shows severe bilateral hydroureteronephrosis and bladder distention. This appears to be a chronic process as evidence by bilateral renal cortical thinning.   Continue the Proscar  Start Flomax  Continue the Fay catheter  Would benefit from chronic Fay versus possible TURP with SPT placement in the future as an outpatient  We will continue to follow      Electronically signed by RAJAN Esclaera CNP on 8/9/2021 at 11:12 AM   Agree with above assessment and plan
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
## 2021-08-10 LAB
ALBUMIN SERPL-MCNC: 3 G/DL (ref 3.5–5.2)
ALP BLD-CCNC: 96 U/L (ref 40–129)
ALT SERPL-CCNC: 13 U/L (ref 0–40)
ANION GAP SERPL CALCULATED.3IONS-SCNC: 11 MMOL/L (ref 7–16)
ANION GAP SERPL CALCULATED.3IONS-SCNC: 12 MMOL/L (ref 7–16)
AST SERPL-CCNC: 15 U/L (ref 0–39)
BILIRUB SERPL-MCNC: 0.6 MG/DL (ref 0–1.2)
BUN BLDV-MCNC: 36 MG/DL (ref 6–23)
BUN BLDV-MCNC: 37 MG/DL (ref 6–23)
CALCIUM SERPL-MCNC: 8.4 MG/DL (ref 8.6–10.2)
CALCIUM SERPL-MCNC: 8.7 MG/DL (ref 8.6–10.2)
CHLORIDE BLD-SCNC: 104 MMOL/L (ref 98–107)
CHLORIDE BLD-SCNC: 107 MMOL/L (ref 98–107)
CO2: 23 MMOL/L (ref 22–29)
CO2: 23 MMOL/L (ref 22–29)
CREAT SERPL-MCNC: 1.8 MG/DL (ref 0.7–1.2)
CREAT SERPL-MCNC: 1.8 MG/DL (ref 0.7–1.2)
GFR AFRICAN AMERICAN: 43
GFR AFRICAN AMERICAN: 43
GFR NON-AFRICAN AMERICAN: 36 ML/MIN/1.73
GFR NON-AFRICAN AMERICAN: 36 ML/MIN/1.73
GLUCOSE BLD-MCNC: 122 MG/DL (ref 74–99)
GLUCOSE BLD-MCNC: 150 MG/DL (ref 74–99)
HCT VFR BLD CALC: 27.5 % (ref 37–54)
HEMOGLOBIN: 9.3 G/DL (ref 12.5–16.5)
MAGNESIUM: 1.7 MG/DL (ref 1.6–2.6)
MCH RBC QN AUTO: 31.2 PG (ref 26–35)
MCHC RBC AUTO-ENTMCNC: 33.8 % (ref 32–34.5)
MCV RBC AUTO: 92.3 FL (ref 80–99.9)
PDW BLD-RTO: 13.2 FL (ref 11.5–15)
PHOSPHORUS: 2.3 MG/DL (ref 2.5–4.5)
PLATELET # BLD: 296 E9/L (ref 130–450)
PMV BLD AUTO: 10.6 FL (ref 7–12)
POTASSIUM SERPL-SCNC: 2.8 MMOL/L (ref 3.5–5)
POTASSIUM SERPL-SCNC: 3.7 MMOL/L (ref 3.5–5)
RBC # BLD: 2.98 E12/L (ref 3.8–5.8)
SODIUM BLD-SCNC: 138 MMOL/L (ref 132–146)
SODIUM BLD-SCNC: 142 MMOL/L (ref 132–146)
TOTAL PROTEIN: 6.2 G/DL (ref 6.4–8.3)
WBC # BLD: 10 E9/L (ref 4.5–11.5)

## 2021-08-10 PROCEDURE — 6370000000 HC RX 637 (ALT 250 FOR IP): Performed by: NURSE PRACTITIONER

## 2021-08-10 PROCEDURE — 84100 ASSAY OF PHOSPHORUS: CPT

## 2021-08-10 PROCEDURE — 85027 COMPLETE CBC AUTOMATED: CPT

## 2021-08-10 PROCEDURE — 80053 COMPREHEN METABOLIC PANEL: CPT

## 2021-08-10 PROCEDURE — 6360000002 HC RX W HCPCS: Performed by: INTERNAL MEDICINE

## 2021-08-10 PROCEDURE — 6370000000 HC RX 637 (ALT 250 FOR IP): Performed by: FAMILY MEDICINE

## 2021-08-10 PROCEDURE — G0378 HOSPITAL OBSERVATION PER HR: HCPCS

## 2021-08-10 PROCEDURE — 96374 THER/PROPH/DIAG INJ IV PUSH: CPT

## 2021-08-10 PROCEDURE — 83735 ASSAY OF MAGNESIUM: CPT

## 2021-08-10 PROCEDURE — 36415 COLL VENOUS BLD VENIPUNCTURE: CPT

## 2021-08-10 PROCEDURE — 80048 BASIC METABOLIC PNL TOTAL CA: CPT

## 2021-08-10 RX ORDER — POTASSIUM CHLORIDE 20 MEQ/1
20 TABLET, EXTENDED RELEASE ORAL ONCE
Status: COMPLETED | OUTPATIENT
Start: 2021-08-10 | End: 2021-08-10

## 2021-08-10 RX ORDER — POTASSIUM CHLORIDE 20 MEQ/1
20 TABLET, EXTENDED RELEASE ORAL 2 TIMES DAILY WITH MEALS
Status: DISCONTINUED | OUTPATIENT
Start: 2021-08-10 | End: 2021-08-12 | Stop reason: HOSPADM

## 2021-08-10 RX ORDER — POTASSIUM CHLORIDE AND SODIUM CHLORIDE 900; 300 MG/100ML; MG/100ML
INJECTION, SOLUTION INTRAVENOUS CONTINUOUS
Status: DISCONTINUED | OUTPATIENT
Start: 2021-08-10 | End: 2021-08-12

## 2021-08-10 RX ADMIN — Medication 1 TABLET: at 08:47

## 2021-08-10 RX ADMIN — TIMOLOL MALEATE 1 DROP: 5 SOLUTION/ DROPS OPHTHALMIC at 20:40

## 2021-08-10 RX ADMIN — TAMSULOSIN HYDROCHLORIDE 0.4 MG: 0.4 CAPSULE ORAL at 08:48

## 2021-08-10 RX ADMIN — TIMOLOL MALEATE 1 DROP: 5 SOLUTION/ DROPS OPHTHALMIC at 08:50

## 2021-08-10 RX ADMIN — ASPIRIN 81 MG: 81 TABLET, FILM COATED ORAL at 08:48

## 2021-08-10 RX ADMIN — CYANOCOBALAMIN TAB 1000 MCG 1000 MCG: 1000 TAB at 08:49

## 2021-08-10 RX ADMIN — FINASTERIDE 5 MG: 5 TABLET, FILM COATED ORAL at 08:49

## 2021-08-10 RX ADMIN — AMLODIPINE BESYLATE 2.5 MG: 5 TABLET ORAL at 08:49

## 2021-08-10 RX ADMIN — POTASSIUM CHLORIDE 20 MEQ: 1500 TABLET, EXTENDED RELEASE ORAL at 12:37

## 2021-08-10 RX ADMIN — METOPROLOL SUCCINATE 25 MG: 25 TABLET, EXTENDED RELEASE ORAL at 08:49

## 2021-08-10 RX ADMIN — OXYCODONE HYDROCHLORIDE AND ACETAMINOPHEN 250 MG: 500 TABLET ORAL at 08:49

## 2021-08-10 RX ADMIN — POTASSIUM CHLORIDE 20 MEQ: 1500 TABLET, EXTENDED RELEASE ORAL at 17:45

## 2021-08-10 RX ADMIN — PANTOPRAZOLE SODIUM 40 MG: 40 TABLET, DELAYED RELEASE ORAL at 08:48

## 2021-08-10 RX ADMIN — Medication 2000 UNITS: at 08:48

## 2021-08-10 RX ADMIN — POTASSIUM CHLORIDE 20 MEQ: 1500 TABLET, EXTENDED RELEASE ORAL at 14:28

## 2021-08-10 RX ADMIN — ATORVASTATIN CALCIUM 20 MG: 20 TABLET, FILM COATED ORAL at 08:48

## 2021-08-10 RX ADMIN — FERROUS SULFATE TAB 325 MG (65 MG ELEMENTAL FE) 325 MG: 325 (65 FE) TAB at 08:50

## 2021-08-10 RX ADMIN — POTASSIUM CHLORIDE AND SODIUM CHLORIDE: 900; 300 INJECTION, SOLUTION INTRAVENOUS at 16:28

## 2021-08-10 ASSESSMENT — PAIN SCALES - GENERAL
PAINLEVEL_OUTOF10: 0
PAINLEVEL_OUTOF10: 0

## 2021-08-10 NOTE — PROGRESS NOTES
HOSPITAL   PROGRESS NOTE. SUBJECTIVE:  Apollo Acuna, 80 y.o., male C/O  FEELS OK. NO SYMPTOMS. CONDITION DISCUSSED WITH  PATIENT  AND NURSING   STAFF. UROLOGY CONSULT NOTE REVIEWED. ROS:GENERAL- APPETITE- GOOD. BOWEL MOVEMENTS- NORMAL. NO URINE    PROBLEM. EENT: NORMAL            CVS: NORMAL. NO CHEST PAIN OR PALPITATION. RESPIRATORY:NO SOB. GI/: NO ABDOMINAL PAINS            MUSCULOSKELETAL: NO COMPLAIN            CNS: NO  COMPLAIN            OBJECTIVE:    GENERAL:Temperature:  Current - Temp: 99.6 °F (37.6 °C); Max - Temp  Av.9 °F (37.2 °C)  Min: 98.2 °F (36.8 °C)  Max: 99.6 °F (37.6 °C)  Respiratory Rate : Resp  Av.7  Min: 16  Max: 18  Pulse Range: Pulse  Av.3  Min: 64  Max: 72  Blood Presuure Range:  Systolic (16EKR), FYU:836 , Min:103 , WDJ:110   ; Diastolic (71XAS), VPX:70, Min:54, Max:77    Pulse ox Range: SpO2  Av.7 %  Min: 96 %  Max: 100 %  24hr I & O:    Intake/Output Summary (Last 24 hours) at 8/10/2021 1132  Last data filed at 8/10/2021 0953  Gross per 24 hour   Intake 180 ml   Output 3950 ml   Net -3770 ml       CONSTITUTIONALl:FAIRLY  WELL DEVELOPED,ORIENTED,CO-OPERATIVE  HEENT:NORMAL. NECK:  supple, symmetrical, trachea midline,Carotids- normal,JVP- flat  HEMATOLOGIC/LYMPHATICS:  no cervical lymphadenopathy  LUNGS:clear  CARDIOVASCULAR:  Normal apical impulse, regular rate and rhythm, normal S1 and S2, no S3 or S4, and no murmur noted  ABDOMEN:  normal bowel sounds, non-distended, non-tender, no masses palpated  EXTREMITIES: ARTHRITIC CHANGES. MOVEMENTS-LIMITED. PEDAL PULSES-FAIR.   SKIN:  normal skin color, texture, turgor    Data    CBC:   Lab Results   Component Value Date    WBC 10.0 08/10/2021    RBC 2.98 08/10/2021    HGB 9.3 08/10/2021    HCT 27.5 08/10/2021    MCV 92.3 08/10/2021    MCH 31.2 08/10/2021    MCHC 33.8 08/10/2021    RDW 13.2 08/10/2021     08/10/2021 MPV 10.6 08/10/2021     CMP:    Lab Results   Component Value Date     08/10/2021    K 2.8 08/10/2021    K 3.2 08/09/2021     08/10/2021    CO2 23 08/10/2021    BUN 37 08/10/2021    CREATININE 1.8 08/10/2021    GFRAA 43 08/10/2021    LABGLOM 36 08/10/2021    GLUCOSE 122 08/10/2021    GLUCOSE 106 01/09/2012    PROT 6.2 08/10/2021    LABALBU 3.0 08/10/2021    LABALBU 4.4 01/09/2012    CALCIUM 8.7 08/10/2021    BILITOT 0.6 08/10/2021    ALKPHOS 96 08/10/2021    AST 15 08/10/2021    ALT 13 08/10/2021         ASSESSMENT:    Active Hospital Problems    Diagnosis Date Noted    Urinary retention [R33.9] 08/09/2021     Priority: High    Hypokalemia [E87.6] 08/09/2021     Priority: High     Class: Acute    Iron deficiency anemia due to chronic blood loss [D50.0] 07/26/2018     Priority: High    Primary osteoarthritis involving multiple joints [M89.49] 09/24/2012     Priority: High     Class: Chronic    Pure hypercholesterolemia [E78.00]      Priority: High     Class: Chronic    Essential hypertension [I10]      Priority: High     Class: Chronic    Stage 3a chronic kidney disease ([facility] [address] [N18.31] 07/26/2018       PLAN: CONTINUE CURRENT MEDICATIONS AND Rx.K+ SUPPLEMENT ORALLY, NEPHROLOGY EVALUATION.       Electronically signed by Cherylene Hausen, MD on 8/10/21 at 11:32 AM EDT

## 2021-08-10 NOTE — CARE COORDINATION
Ss note:8/10/2021 1:22 PM No covid testing. Met with pt & wife Sherry Jordan, they reside in 2 story home, 3 steps to enter w/rail, B/B on first floor. PTA pt independent in ambulation, has access to str cane, standard walker, BSC & shower chair. Pt does not drive, pt has dtr Savi and son Chad Cook whom reside close by and are retired, they check in on pt & provide transportation. PCP is Dr. Naila Bridges. No hx of HHC or SNF. [facility] on [address]. Plan is home with wife, no transition of care needs relayed, children will transport home.  MINERVA Cerna

## 2021-08-10 NOTE — PROGRESS NOTES
[facility]                                                            PROGRESS NOTE                                                                       8/10/2021          SUBJECTIVE:  abd pain abated  Urine clear  Pt comfortable    OBJECTIVE:    BP (!) 103/54   Pulse 64   Temp 99.6 °F (37.6 °C) (Oral)   Resp 16   Ht 5' 10\" (1.778 m)   Wt 156 lb (70.8 kg)   SpO2 96%   BMI 22.38 kg/m²     PHYSICAL EXAMINATION:  Skin: dry, without rashes  Respirations: non-labored, intact  Abdomen: soft, non-tender, non-distended      Lab Results   Component Value Date    WBC 10.0 08/10/2021    HGB 9.3 (L) 08/10/2021    HCT 27.5 (L) 08/10/2021    MCV 92.3 08/10/2021     08/10/2021       Lab Results   Component Value Date    CREATININE 1.8 (H) 08/10/2021       Lab Results   Component Value Date    PSA 2.56 2015       REVIEW OF SYSTEMS:    CONSTITUTIONAL: negative  HEENT: negative  HEMATOLOGIC: negative  ENDOCRINE: negative  RESPIRATORY: negative  CV: negative  GI: negative  NEURO: negative  ORTHOPEDICS: negative  PSYCHIATRIC: negative  : as above    PAST FAMILY HISTORY:    Family History   Problem Relation Age of Onset    High Blood Pressure Mother     Cancer Mother     Diabetes Mother     Heart Disease Father     High Blood Pressure Father      PAST SOCIAL HISTORY:    Social History     Socioeconomic History    Marital status:      Spouse name: None    Number of children: None    Years of education: None    Highest education level: None   Occupational History    None   Tobacco Use    Smoking status: Former Smoker     Packs/day: 1.00     Years: 40.00     Pack years: 40.00     Quit date: 2000     Years since quittin.9    Smokeless tobacco: Never Used   Vaping Use    Vaping Use: Never used   Substance and Sexual Activity    Alcohol use: No    Drug use: No    Sexual activity: None   Other Topics Concern    None   Social History Narrative    None     Social Determinants of Health     Financial Resource Strain: Low Risk     Difficulty of Paying Living Expenses: Not hard at all   Food Insecurity: No Food Insecurity    Worried About Running Out of Food in the Last Year: Never true    Fidel of Food in the Last Year: Never true   Transportation Needs: No Transportation Needs    Lack of Transportation (Medical): No    Lack of Transportation (Non-Medical):  No   Physical Activity:     Days of Exercise per Week:     Minutes of Exercise per Session:    Stress:     Feeling of Stress :    Social Connections:     Frequency of Communication with Friends and Family:     Frequency of Social Gatherings with Friends and Family:     Attends Quaker Services:     Active Member of Clubs or Organizations:     Attends Club or Organization Meetings:     Marital Status:    Intimate Partner Violence:     Fear of Current or Ex-Partner:     Emotionally Abused:     Physically Abused:     Sexually Abused:        Scheduled Meds:   potassium chloride  20 mEq Oral BID WC    amLODIPine  2.5 mg Oral Daily    ascorbic acid  250 mg Oral Daily    aspirin  81 mg Oral Daily    Vitamin D  2,000 Units Oral Daily    ferrous sulfate  325 mg Oral Daily    finasteride  5 mg Oral Daily    metoprolol succinate  25 mg Oral Daily    pantoprazole  40 mg Oral Daily    therapeutic multivitamin-minerals  1 tablet Oral Daily    atorvastatin  20 mg Oral Daily    timolol  1 drop Both Eyes BID    vitamin B-12  1,000 mcg Oral Daily    tamsulosin  0.4 mg Oral Daily     Continuous Infusions:  PRN Meds:.ibuprofen    ASSESSMENT:    Patient Active Problem List   Diagnosis    Pure hypercholesterolemia    Essential hypertension    Primary osteoarthritis involving multiple joints    Iron deficiency anemia due to chronic blood loss    Stage 3a chronic kidney disease ([facility] [address]    Urinary retention    Hypokalemia       PLAN:  Pt will continue with guillory post discharge  porsha aparicio will need s/p cath and possible turp        Cortez Loera MD, MTAMY.  8/10/2021  3:09 PM

## 2021-08-10 NOTE — CONSULTS
Consult Note  NEPHROLOGY    Reason for Consult: Management of CKD stage III    Requesting Physician: Dr. Florence Query    Chief Complaint: Admitted with urinary retention    History Obtained From:  patient, electronic medical record    History of Present Ilness:  Patient is a 80 y.o. male with a past medical history of hyperlipidemia, hypertension, CKD, presenting to the Emergency Department for abdominal pain and swelling. Vitals upon arrival included BP (!) 115/56   Pulse 72   Temp 99.5 °F (37.5 °C) (Oral)   Resp 18   Ht 5' 10\" (1.778 m)   Wt 156 lb (70.8 kg)   SpO2 97%   BMI 22.38 kg/m². Pertinent labs included WBC 9.2, hemoglobin 10.5, hematocrit 32.1, and platelet count 485. Initial BMP showed sodium 141, potassium 3.2, chloride 105, CO2 22, BUN 39 and creatinine 1.8.  UA showed clear yellow urine, 1.020, large blood, negative protein. CT scan showed severely distended urinary bladder with severe bilateral hydronephrosis. Patient was subsequently admitted w/ urinary retention and hydronephrosis. Currently upon exam patient is in no acute distress. He denies any chest pain or shortness of breath. No nausea or vomiting. Good appetite since admission. He does not report extreme discomfort prior to Fay catheter insertion. Does notice some degree of improvement in his abdominal pain since the Fay was inserted. He has not seen a urologist for many years. He saw Dr. Parmjit Farrar in the past for BPH.  He was taking Proscar prior to arrival.        Past Medical History:        Diagnosis Date    Hyperlipidemia     Hypertension     Lumbosacral strain 7/8/2013       Past Surgical History:        Procedure Laterality Date    A-V CARDIAC PACEMAKER INSERTION  5/30/07    COLONOSCOPY  8/3/11    EYE SURGERY  2002    cataracts evelyn    TONSILLECTOMY      child       Current Medications:    Current Facility-Administered Medications: potassium chloride (KLOR-CON M) extended release tablet 20 mEq, 20 mEq, Oral, BID WC  amLODIPine (NORVASC) tablet 2.5 mg, 2.5 mg, Oral, Daily  ascorbic acid (VITAMIN C) tablet 250 mg, 250 mg, Oral, Daily  aspirin EC tablet 81 mg, 81 mg, Oral, Daily  Vitamin D (CHOLECALCIFEROL) tablet 2,000 Units, 2,000 Units, Oral, Daily  ferrous sulfate (IRON 325) tablet 325 mg, 325 mg, Oral, Daily  finasteride (PROSCAR) tablet 5 mg, 5 mg, Oral, Daily  metoprolol succinate (TOPROL XL) extended release tablet 25 mg, 25 mg, Oral, Daily  pantoprazole (PROTONIX) tablet 40 mg, 40 mg, Oral, Daily  therapeutic multivitamin-minerals 1 tablet, 1 tablet, Oral, Daily  atorvastatin (LIPITOR) tablet 20 mg, 20 mg, Oral, Daily  timolol (TIMOPTIC) 0.5 % ophthalmic solution 1 drop, 1 drop, Both Eyes, BID  vitamin B-12 (CYANOCOBALAMIN) tablet 1,000 mcg, 1,000 mcg, Oral, Daily  ibuprofen (ADVIL;MOTRIN) tablet 200 mg, 200 mg, Oral, Q6H PRN  tamsulosin (FLOMAX) capsule 0.4 mg, 0.4 mg, Oral, Daily    Allergies:  Patient has no known allergies. Social History:      reports that he quit smoking about 20 years ago. He has a 40.00 pack-year smoking history. He has never used smokeless tobacco. He reports that he does not drink alcohol and does not use drugs. Family History:     Family History   Problem Relation Age of Onset    High Blood Pressure Mother     Cancer Mother     Diabetes Mother     Heart Disease Father     High Blood Pressure Father          Review of Systems:       Pertinent positives stated above in HPI. All other systems were reviewed and were negative.     Physical exam:   Constitutional:  VITALS:  BP (!) 103/54   Pulse 64   Temp 99.6 °F (37.6 °C) (Oral)   Resp 16   Ht 5' 10\" (1.778 m)   Wt 156 lb (70.8 kg)   SpO2 96%   BMI 22.38 kg/m²   CURRENT TEMPERATURE:  Temp: 99.6 °F (37.6 °C)  CURRENT RESPIRATORY RATE:  Resp: 16  CURRENT PULSE:  Pulse: 64  CURRENT BLOOD PRESSURE:  BP: (!) 103/54  24HR BLOOD PRESSURE RANGE:  Systolic (15CBA), NNY:474 , Min:103 , EAL:239   ; Diastolic (04GKF), EHL:85, Min:54, Max:77    24HR INTAKE/OUTPUT:      Intake/Output Summary (Last 24 hours) at 8/10/2021 1307  Last data filed at 8/10/2021 9888  Gross per 24 hour   Intake 180 ml   Output 1950 ml   Net -1770 ml     Gen: alert, awake, nad  Skin: no rash, turgor wnl  Heent:  eomi, mmm  Neck: no bruits or jvd noted  Cardiovascular:  S1, S2 without m/r/g  Respiratory: Clear   Abdomen:  +bs, soft, nt, nd  Ext: no edema   Psychiatric: mood and affect appropriate  Musculoskeletal:  Rom, muscular strength intact    DATA:      CBC:   Lab Results   Component Value Date    WBC 10.0 08/10/2021    RBC 2.98 08/10/2021    HGB 9.3 08/10/2021    HCT 27.5 08/10/2021    MCV 92.3 08/10/2021    MCH 31.2 08/10/2021    MCHC 33.8 08/10/2021    RDW 13.2 08/10/2021     08/10/2021    MPV 10.6 08/10/2021     BMP:    Lab Results   Component Value Date     08/10/2021    K 2.8 08/10/2021    K 3.2 08/09/2021     08/10/2021    CO2 23 08/10/2021    BUN 37 08/10/2021    LABALBU 3.0 08/10/2021    LABALBU 4.4 01/09/2012    CREATININE 1.8 08/10/2021    CALCIUM 8.7 08/10/2021    GFRAA 43 08/10/2021    LABGLOM 36 08/10/2021    GLUCOSE 122 08/10/2021    GLUCOSE 106 01/09/2012       RAD:  CT ABDOMEN PELVIS WO CONTRAST Additional Contrast? None    Result Date: 8/9/2021  EXAMINATION: CT OF THE ABDOMEN AND PELVIS WITHOUT CONTRAST 8/9/2021 9:35 am TECHNIQUE: CT of the abdomen and pelvis was performed without the administration of intravenous contrast. Multiplanar reformatted images are provided for review. Dose modulation, iterative reconstruction, and/or weight based adjustment of the mA/kV was utilized to reduce the radiation dose to as low as reasonably achievable. COMPARISON: None.  HISTORY: ORDERING SYSTEM PROVIDED HISTORY: abdomialpain, periumbilical, distention TECHNOLOGIST PROVIDED HISTORY: Reason for exam:->abdomialpain, periumbilical, distention Additional Contrast?->None FINDINGS: Exam is limited without intravenous contrast.  Heterogeneous enlargement of the prostate gland measuring 7.8 x 7.5 x 8.1 cm with significant mass effect on the base of the urinary bladder. The urinary bladder is also severely distended measuring approximately 15.4 x 15.1 x 16.2 cm. Extensive severe bilateral hydroureteronephrosis. There is also some cortical thinning of the right greater than left kidney suggesting that this may be a somewhat chronic process. No obstructing ureteral stone identified and the findings are probably related to the severely distended urinary bladder. Gallbladder is moderately distended and contains at least 1 small calcified stone. No definitive evidence of acute gallbladder inflammatory change. Some images are slightly degraded by patient motion. Liver is homogeneous. Spleen is normal in size. Pancreas is unremarkable. Near the anterior margin of the uncinate process is an approximate 2.3 x 2.1 cm low-attenuation focus measuring 3 Hounsfield units. No adrenal mass. Assessment of bowel is limited without oral contrast.  No bowel obstruction. Appendix is normal.  The bowel is significantly peripherally displaced by the distended urinary bladder and the marked hydroureteronephrosis. Moderate diverticulosis without evidence of acute diverticulitis. Scattered vascular calcifications. No free fluid, free air or localized fluid collection. Calcifications in the pelvis are most consistent with phleboliths. Degenerative changes are present in the spine and pelvis. Partial visualization of cardiac pacemaker leads. Scattered calcifications, nonspecific but suggestive of old granulomatous disease. Severe heterogeneous enlargement of prostate gland with mass effect on bladder base. Severely distended urinary bladder with severe bilateral hydroureteronephrosis. These findings may be related to bladder outlet obstruction from the enlarged prostate. Urologic consultation is recommended. Cholelithiasis.  A 2.3 cm low-attenuation focus near the anterior margin of the uncinate process of the pancreas is nonspecific. This could represent a cyst or pseudocyst.  A cystic neoplasm is not entirely excluded. Correlate with any previous examination or follow-up with a nonemergent pancreatic protocol CT. XR CHEST PORTABLE    Result Date: 8/9/2021  EXAMINATION: ONE XRAY VIEW OF THE CHEST 8/9/2021 7:53 am COMPARISON: 02/09/2021 HISTORY: ORDERING SYSTEM PROVIDED HISTORY: abdominal, sometimes epigastrci pain TECHNOLOGIST PROVIDED HISTORY: Reason for exam:->abdominal, sometimes epigastrci pain FINDINGS: The cardiac silhouette is within normal limits. There is a dual lead cardiac pacer on the left. The lungs are clear. There is no focal infiltrate or effusion. 1. There is no acute cardiopulmonary disease. Assessment/Plan    1) CKD Stage 3A  Chronic kidney disease likely due to longstanding history of hypertension as well as BPH  Baseline serum creatinine range 1.8 ->1.9 over the past several years  Now current admitting serum creatinine of 1.8 at baseline  Initial UA shows clear yellow urine, 1.020, large blood, negative leuko-, negative protein  CT imaging shows severe bilateral hydronephrosis and bladder distention  Initial diuresis 4.5 L upon placement of Fay in the ED  Now without evidence of postobstructive diuresis  Follow closely on oral fluid intake; may require short course of IV fluids  Urology has been consulted  Daily labs and strict intake and output  Avoid NSAIDs    2. Hypokalemia  In the setting of postobstructive diuresis  Supplement potassium today for goal 4.0  Maintain magnesium at greater than 2.0    3. Hypertension of CKD stages 1-4  Follow with current mild hypotension    4. Anemia  Likely of CKD  Concern for current hematuria as well  Follow closely        Thank you for allowing us to participate in care of Mr Rizwan Stevens, RAJAN - CNP  8/10/2021  1:07 PM       Patient seen and examined. Chart reviewed.   Discussed with the patient's daughter was present at the bedside. I had a face to face encounter with the patient. Agree with exam.    Agree with  formulation, assessment and plan as outlined above and directed by me. Addition and corrections were done as deemed appropriate. My exam and plan include:   we'll start the patient on IV fluids  To avoid postobstructive diuresis inducedvolume depletion. Supplement potassium and check magnesium.         [address]  MD  Nephrology        Electronically signed by Silas Martinez MD on 8/10/2021 at 4:01 PM

## 2021-08-11 LAB
ALBUMIN SERPL-MCNC: 3.1 G/DL (ref 3.5–5.2)
ALP BLD-CCNC: 105 U/L (ref 40–129)
ALT SERPL-CCNC: 17 U/L (ref 0–40)
ANION GAP SERPL CALCULATED.3IONS-SCNC: 10 MMOL/L (ref 7–16)
AST SERPL-CCNC: 20 U/L (ref 0–39)
BILIRUB SERPL-MCNC: 0.5 MG/DL (ref 0–1.2)
BUN BLDV-MCNC: 34 MG/DL (ref 6–23)
CALCIUM SERPL-MCNC: 8.6 MG/DL (ref 8.6–10.2)
CHLORIDE BLD-SCNC: 107 MMOL/L (ref 98–107)
CO2: 23 MMOL/L (ref 22–29)
CREAT SERPL-MCNC: 1.7 MG/DL (ref 0.7–1.2)
GFR AFRICAN AMERICAN: 46
GFR NON-AFRICAN AMERICAN: 38 ML/MIN/1.73
GLUCOSE BLD-MCNC: 140 MG/DL (ref 74–99)
HCT VFR BLD CALC: 29.8 % (ref 37–54)
HEMOGLOBIN: 9.7 G/DL (ref 12.5–16.5)
IRON SATURATION: 20 % (ref 20–55)
IRON: 40 MCG/DL (ref 59–158)
MAGNESIUM: 1.7 MG/DL (ref 1.6–2.6)
MCH RBC QN AUTO: 30.8 PG (ref 26–35)
MCHC RBC AUTO-ENTMCNC: 32.6 % (ref 32–34.5)
MCV RBC AUTO: 94.6 FL (ref 80–99.9)
PDW BLD-RTO: 13.5 FL (ref 11.5–15)
PHOSPHORUS: 2.1 MG/DL (ref 2.5–4.5)
PLATELET # BLD: 256 E9/L (ref 130–450)
PMV BLD AUTO: 11.1 FL (ref 7–12)
POTASSIUM SERPL-SCNC: 4 MMOL/L (ref 3.5–5)
RBC # BLD: 3.15 E12/L (ref 3.8–5.8)
SODIUM BLD-SCNC: 140 MMOL/L (ref 132–146)
TOTAL IRON BINDING CAPACITY: 200 MCG/DL (ref 250–450)
TOTAL PROTEIN: 6.3 G/DL (ref 6.4–8.3)
WBC # BLD: 8.3 E9/L (ref 4.5–11.5)

## 2021-08-11 PROCEDURE — 6370000000 HC RX 637 (ALT 250 FOR IP): Performed by: NURSE PRACTITIONER

## 2021-08-11 PROCEDURE — 6370000000 HC RX 637 (ALT 250 FOR IP): Performed by: FAMILY MEDICINE

## 2021-08-11 PROCEDURE — 83550 IRON BINDING TEST: CPT

## 2021-08-11 PROCEDURE — 1200000000 HC SEMI PRIVATE

## 2021-08-11 PROCEDURE — 6360000002 HC RX W HCPCS: Performed by: INTERNAL MEDICINE

## 2021-08-11 PROCEDURE — 36415 COLL VENOUS BLD VENIPUNCTURE: CPT

## 2021-08-11 PROCEDURE — 83735 ASSAY OF MAGNESIUM: CPT

## 2021-08-11 PROCEDURE — 80053 COMPREHEN METABOLIC PANEL: CPT

## 2021-08-11 PROCEDURE — 85027 COMPLETE CBC AUTOMATED: CPT

## 2021-08-11 PROCEDURE — 83540 ASSAY OF IRON: CPT

## 2021-08-11 PROCEDURE — 84100 ASSAY OF PHOSPHORUS: CPT

## 2021-08-11 RX ADMIN — POTASSIUM CHLORIDE 20 MEQ: 1500 TABLET, EXTENDED RELEASE ORAL at 16:54

## 2021-08-11 RX ADMIN — FINASTERIDE 5 MG: 5 TABLET, FILM COATED ORAL at 08:11

## 2021-08-11 RX ADMIN — POTASSIUM CHLORIDE 20 MEQ: 1500 TABLET, EXTENDED RELEASE ORAL at 08:10

## 2021-08-11 RX ADMIN — POTASSIUM CHLORIDE AND SODIUM CHLORIDE: 900; 300 INJECTION, SOLUTION INTRAVENOUS at 04:00

## 2021-08-11 RX ADMIN — AMLODIPINE BESYLATE 2.5 MG: 5 TABLET ORAL at 08:10

## 2021-08-11 RX ADMIN — CYANOCOBALAMIN TAB 1000 MCG 1000 MCG: 1000 TAB at 08:10

## 2021-08-11 RX ADMIN — OXYCODONE HYDROCHLORIDE AND ACETAMINOPHEN 250 MG: 500 TABLET ORAL at 08:10

## 2021-08-11 RX ADMIN — ATORVASTATIN CALCIUM 20 MG: 20 TABLET, FILM COATED ORAL at 08:10

## 2021-08-11 RX ADMIN — Medication 1 TABLET: at 08:11

## 2021-08-11 RX ADMIN — IBUPROFEN 200 MG: 400 TABLET, FILM COATED ORAL at 19:36

## 2021-08-11 RX ADMIN — POTASSIUM CHLORIDE AND SODIUM CHLORIDE: 900; 300 INJECTION, SOLUTION INTRAVENOUS at 19:36

## 2021-08-11 RX ADMIN — TIMOLOL MALEATE 1 DROP: 5 SOLUTION/ DROPS OPHTHALMIC at 08:13

## 2021-08-11 RX ADMIN — PANTOPRAZOLE SODIUM 40 MG: 40 TABLET, DELAYED RELEASE ORAL at 08:11

## 2021-08-11 RX ADMIN — METOPROLOL SUCCINATE 25 MG: 25 TABLET, EXTENDED RELEASE ORAL at 08:11

## 2021-08-11 RX ADMIN — FERROUS SULFATE TAB 325 MG (65 MG ELEMENTAL FE) 325 MG: 325 (65 FE) TAB at 08:09

## 2021-08-11 RX ADMIN — ASPIRIN 81 MG: 81 TABLET, FILM COATED ORAL at 08:09

## 2021-08-11 RX ADMIN — TIMOLOL MALEATE 1 DROP: 5 SOLUTION/ DROPS OPHTHALMIC at 19:36

## 2021-08-11 RX ADMIN — TAMSULOSIN HYDROCHLORIDE 0.4 MG: 0.4 CAPSULE ORAL at 08:15

## 2021-08-11 RX ADMIN — Medication 2000 UNITS: at 08:11

## 2021-08-11 ASSESSMENT — PAIN DESCRIPTION - ONSET: ONSET: ON-GOING

## 2021-08-11 ASSESSMENT — PAIN DESCRIPTION - LOCATION: LOCATION: HEAD

## 2021-08-11 ASSESSMENT — PAIN DESCRIPTION - PAIN TYPE: TYPE: ACUTE PAIN

## 2021-08-11 ASSESSMENT — PAIN - FUNCTIONAL ASSESSMENT: PAIN_FUNCTIONAL_ASSESSMENT: ACTIVITIES ARE NOT PREVENTED

## 2021-08-11 ASSESSMENT — PAIN DESCRIPTION - FREQUENCY: FREQUENCY: CONTINUOUS

## 2021-08-11 ASSESSMENT — PAIN SCALES - GENERAL
PAINLEVEL_OUTOF10: 3
PAINLEVEL_OUTOF10: 0

## 2021-08-11 ASSESSMENT — PAIN DESCRIPTION - ORIENTATION: ORIENTATION: POSTERIOR;LEFT

## 2021-08-11 ASSESSMENT — PAIN DESCRIPTION - PROGRESSION: CLINICAL_PROGRESSION: GRADUALLY WORSENING

## 2021-08-11 ASSESSMENT — PAIN DESCRIPTION - DESCRIPTORS: DESCRIPTORS: HEADACHE

## 2021-08-11 NOTE — PROGRESS NOTES
5940    ferrous sulfate (IRON 325) tablet 325 mg  325 mg Oral Daily Freddy Nova MD   325 mg at 08/11/21 0809    finasteride (PROSCAR) tablet 5 mg  5 mg Oral Daily Diandra Mc MD   5 mg at 08/11/21 4899    metoprolol succinate (TOPROL XL) extended release tablet 25 mg  25 mg Oral Daily Freddy Nova MD   25 mg at 08/11/21 0811    pantoprazole (PROTONIX) tablet 40 mg  40 mg Oral Daily Freddy Nova MD   40 mg at 08/11/21 3099    therapeutic multivitamin-minerals 1 tablet  1 tablet Oral Daily Diandra Mc MD   1 tablet at 08/11/21 0811    atorvastatin (LIPITOR) tablet 20 mg  20 mg Oral Daily Freddy Nova MD   20 mg at 08/11/21 0810    timolol (TIMOPTIC) 0.5 % ophthalmic solution 1 drop  1 drop Both Eyes BID Diandra Mc MD   1 drop at 08/11/21 0813    vitamin B-12 (CYANOCOBALAMIN) tablet 1,000 mcg  1,000 mcg Oral Daily Freddy Nova MD   1,000 mcg at 08/11/21 0810    ibuprofen (ADVIL;MOTRIN) tablet 200 mg  200 mg Oral Q6H PRN Freddy Nova MD        tamsulosin [facility]) capsule 0.4 mg  0.4 mg Oral Daily RAJAN Neves - CNP   0.4 mg at 08/11/21 0815       CT ABDOMEN PELVIS WO CONTRAST Additional Contrast? None   Final Result   Severe heterogeneous enlargement of prostate gland with mass effect on   bladder base. Severely distended urinary bladder with severe bilateral   hydroureteronephrosis. These findings may be related to bladder outlet   obstruction from the enlarged prostate. Urologic consultation is recommended. Cholelithiasis. A 2.3 cm low-attenuation focus near the anterior margin of the uncinate   process of the pancreas is nonspecific. This could represent a cyst or   pseudocyst.  A cystic neoplasm is not entirely excluded. Correlate with any   previous examination or follow-up with a nonemergent pancreatic protocol CT. XR CHEST PORTABLE   Final Result   1. There is no acute cardiopulmonary disease.                Labs:    CBC:   Recent Labs 08/09/21  0758 08/10/21  0720 08/11/21  0919   WBC 9.2 10.0 8.3   HGB 10.5* 9.3* 9.7*    296 256        Lab Results   Component Value Date    IRON 40 (L) 08/11/2021    TIBC 200 (L) 08/11/2021    FERRITIN 85 06/22/2021       Lab Results   Component Value Date    CALCIUM 8.6 08/11/2021    CALCIUM 8.4 (L) 08/10/2021    CALCIUM 8.7 08/10/2021    PHOS 2.1 (L) 08/11/2021    PHOS 2.3 (L) 08/10/2021    MG 1.7 08/11/2021    MG 1.7 08/10/2021    MG 1.8 08/09/2021       BMP:   Recent Labs     08/10/21  0720 08/10/21  2038 08/11/21  0919    142 140   K 2.8* 3.7 4.0    107 107   CO2 23 23 23   BUN 37* 36* 34*   CREATININE 1.8* 1.8* 1.7*   GLUCOSE 122* 150* 140*             Assessment: / Plan:    1. Chronic kidney disease stage 3a with urinary obstruction. Renal function is at baseline despite urinary obstruction. Gricelda Diaz PLAN:Will follow. 2.  Hypertension with chronic kidney disease stage G1 to G4. Blood pressure at target of less than 130/80 mmHg. PLAN:continue current regimen. 3.   Anemia of chronic kidney disease and mild iron deficiency. .  Follow hemoglobin hematocrit. PLAN:  Supplement iron once acute illnesses over. Target hemoglobin 10 g/dL . No indication for JAG at the present time. 4.   Hypophosphatemia. This reflects poor oral intake. Patient encouraged to increase intake of high phosphorous content foods. Very such foods discussed. Gricelda Diaz PLAN: Supplement phosphorus as needed. Check vitamin D and PTH levels. 5.  Hypokalemia. .  Improved. PLAN: We'll follow    Discussed with patient's daughter. Becca Franklin MD  Nephrology  Electronically signed by Becca Franklin MD on 8/11/2021 at 2:44 PM      Note: This report was completed utilizing computer voice recognition software. Every effort has been made to ensure accuracy, however; inadvertent computerized transcription errors may be present.

## 2021-08-11 NOTE — PROGRESS NOTES
HOSPITAL   PROGRESS NOTE. SUBJECTIVE:  Prasanna Montana, 80 y.o., male C/O  FEELS GOOD. STARTED GETTING UP AND AROUND. CONDITION DISCUSSED WITH  PATIENT  AND NURSING   STAFF. CONSULT NOTES REVIEWED. ROS:GENERAL- APPETITE- GOOD. BOWEL MOVEMENTS- NORMAL. NO URINE    PROBLEM. EENT: NORMAL            CVS: NORMAL. NO CHEST PAIN OR PALPITATION. RESPIRATORY:NO SOB. GI/: NO ABDOMINAL PAINS            MUSCULOSKELETAL: NO COMPLAIN            CNS: NO  COMPLAIN            OBJECTIVE:    GENERAL:Temperature:  Current - Temp: 98.8 °F (37.1 °C); Max - Temp  Av.7 °F (37.1 °C)  Min: 98.6 °F (37 °C)  Max: 98.8 °F (37.1 °C)  Respiratory Rate : Resp  Av  Min: 18  Max: 18  Pulse Range: Pulse  Av.3  Min: 57  Max: 65  Blood Presuure Range:  Systolic (79JXG), MLI:800 , Min:95 , JQJ:535   ; Diastolic (21PNM), VXP:23, Min:53, Max:53    Pulse ox Range: SpO2  Av %  Min: 96 %  Max: 96 %  24hr I & O:    Intake/Output Summary (Last 24 hours) at 2021 1242  Last data filed at 2021 1241  Gross per 24 hour   Intake 1660 ml   Output 2100 ml   Net -440 ml       [facility]. NECK:  supple, symmetrical, trachea midline,Carotids- normal,JVP- flat  HEMATOLOGIC/LYMPHATICS:  no cervical lymphadenopathy  LUNGS:clear  CARDIOVASCULAR:  Normal apical impulse, regular rate and rhythm, normal S1 and S2, no S3 or S4, and no murmur noted  ABDOMEN:  normal bowel sounds, non-distended, non-tender, no masses palpated  EXTREMITIES: ARTHRITIC CHANGES. MOVEMENTS-LIMITED. PEDAL PULSES-FAIR.   SKIN:  normal skin color, texture, turgor    Data    CBC:   Lab Results   Component Value Date    WBC 8.3 2021    RBC 3.15 2021    HGB 9.7 2021    HCT 29.8 2021    MCV 94.6 2021    MCH 30.8 2021    MCHC 32.6 2021    RDW 13.5 2021     08/11/2021    MPV 11.1 08/11/2021     CMP:    Lab Results   Component Value Date     08/11/2021    K 4.0 08/11/2021    K 3.2 08/09/2021     08/11/2021    CO2 23 08/11/2021    BUN 34 08/11/2021    CREATININE 1.7 08/11/2021    GFRAA 46 08/11/2021    LABGLOM 38 08/11/2021    GLUCOSE 140 08/11/2021    GLUCOSE 106 01/09/2012    PROT 6.3 08/11/2021    LABALBU 3.1 08/11/2021    LABALBU 4.4 01/09/2012    CALCIUM 8.6 08/11/2021    BILITOT 0.5 08/11/2021    ALKPHOS 105 08/11/2021    AST 20 08/11/2021    ALT 17 08/11/2021         ASSESSMENT:    Active Hospital Problems    Diagnosis Date Noted    Urinary retention [R33.9] 08/09/2021     Priority: High    Hypokalemia [E87.6] 08/09/2021     Priority: High     Class: Acute    Iron deficiency anemia due to chronic blood loss [D50.0] 07/26/2018     Priority: High    Primary osteoarthritis involving multiple joints [M89.49] 09/24/2012     Priority: High     Class: Chronic    Pure hypercholesterolemia [E78.00]      Priority: High     Class: Chronic    Essential hypertension [I10]      Priority: High     Class: Chronic    Stage 3a chronic kidney disease ([facility] [address] [N18.31] 07/26/2018       PLAN: CONTINUE CURRENT MEDICATIONS AND Rx.ENCOURAGED AMBULATION. STOP K+ SUPPLEMENT. DISCUSS PLANNING WITH NEPHROLOGY.       Electronically signed by Ellie Alicea MD on 8/11/21 at 12:42 PM EDT

## 2021-08-12 VITALS
BODY MASS INDEX: 22.33 KG/M2 | OXYGEN SATURATION: 96 % | HEIGHT: 70 IN | HEART RATE: 68 BPM | DIASTOLIC BLOOD PRESSURE: 61 MMHG | WEIGHT: 156 LBS | SYSTOLIC BLOOD PRESSURE: 115 MMHG | RESPIRATION RATE: 16 BRPM | TEMPERATURE: 98.2 F

## 2021-08-12 LAB
ALBUMIN SERPL-MCNC: 3.2 G/DL (ref 3.5–5.2)
ALP BLD-CCNC: 126 U/L (ref 40–129)
ALT SERPL-CCNC: 17 U/L (ref 0–40)
ANION GAP SERPL CALCULATED.3IONS-SCNC: 10 MMOL/L (ref 7–16)
AST SERPL-CCNC: 19 U/L (ref 0–39)
BILIRUB SERPL-MCNC: 0.5 MG/DL (ref 0–1.2)
BUN BLDV-MCNC: 29 MG/DL (ref 6–23)
CALCIUM SERPL-MCNC: 8.9 MG/DL (ref 8.6–10.2)
CHLORIDE BLD-SCNC: 108 MMOL/L (ref 98–107)
CO2: 21 MMOL/L (ref 22–29)
CREAT SERPL-MCNC: 1.5 MG/DL (ref 0.7–1.2)
GFR AFRICAN AMERICAN: 53
GFR NON-AFRICAN AMERICAN: 44 ML/MIN/1.73
GLUCOSE BLD-MCNC: 117 MG/DL (ref 74–99)
HCT VFR BLD CALC: 31 % (ref 37–54)
HEMOGLOBIN: 9.9 G/DL (ref 12.5–16.5)
MAGNESIUM: 1.6 MG/DL (ref 1.6–2.6)
MCH RBC QN AUTO: 31.1 PG (ref 26–35)
MCHC RBC AUTO-ENTMCNC: 31.9 % (ref 32–34.5)
MCV RBC AUTO: 97.5 FL (ref 80–99.9)
PDW BLD-RTO: 13.6 FL (ref 11.5–15)
PHOSPHORUS: 2.2 MG/DL (ref 2.5–4.5)
PLATELET # BLD: 268 E9/L (ref 130–450)
PMV BLD AUTO: 11.4 FL (ref 7–12)
POTASSIUM SERPL-SCNC: 4.9 MMOL/L (ref 3.5–5)
RBC # BLD: 3.18 E12/L (ref 3.8–5.8)
SODIUM BLD-SCNC: 139 MMOL/L (ref 132–146)
TOTAL PROTEIN: 6.3 G/DL (ref 6.4–8.3)
WBC # BLD: 9.6 E9/L (ref 4.5–11.5)

## 2021-08-12 PROCEDURE — 80053 COMPREHEN METABOLIC PANEL: CPT

## 2021-08-12 PROCEDURE — 6360000002 HC RX W HCPCS: Performed by: INTERNAL MEDICINE

## 2021-08-12 PROCEDURE — 84100 ASSAY OF PHOSPHORUS: CPT

## 2021-08-12 PROCEDURE — 6370000000 HC RX 637 (ALT 250 FOR IP): Performed by: FAMILY MEDICINE

## 2021-08-12 PROCEDURE — 83735 ASSAY OF MAGNESIUM: CPT

## 2021-08-12 PROCEDURE — 6370000000 HC RX 637 (ALT 250 FOR IP): Performed by: NURSE PRACTITIONER

## 2021-08-12 PROCEDURE — 99239 HOSP IP/OBS DSCHRG MGMT >30: CPT | Performed by: FAMILY MEDICINE

## 2021-08-12 PROCEDURE — 36415 COLL VENOUS BLD VENIPUNCTURE: CPT

## 2021-08-12 PROCEDURE — 85027 COMPLETE CBC AUTOMATED: CPT

## 2021-08-12 RX ORDER — IBUPROFEN 200 MG
200 TABLET ORAL EVERY 6 HOURS PRN
Qty: 30 TABLET | Refills: 1 | Status: ON HOLD | OUTPATIENT
Start: 2021-08-12 | End: 2021-08-25 | Stop reason: HOSPADM

## 2021-08-12 RX ADMIN — ASPIRIN 81 MG: 81 TABLET, FILM COATED ORAL at 08:18

## 2021-08-12 RX ADMIN — FERROUS SULFATE TAB 325 MG (65 MG ELEMENTAL FE) 325 MG: 325 (65 FE) TAB at 08:19

## 2021-08-12 RX ADMIN — ATORVASTATIN CALCIUM 20 MG: 20 TABLET, FILM COATED ORAL at 08:20

## 2021-08-12 RX ADMIN — AMLODIPINE BESYLATE 2.5 MG: 5 TABLET ORAL at 08:19

## 2021-08-12 RX ADMIN — Medication 2000 UNITS: at 08:18

## 2021-08-12 RX ADMIN — POTASSIUM CHLORIDE AND SODIUM CHLORIDE: 900; 300 INJECTION, SOLUTION INTRAVENOUS at 08:18

## 2021-08-12 RX ADMIN — CYANOCOBALAMIN TAB 1000 MCG 1000 MCG: 1000 TAB at 08:20

## 2021-08-12 RX ADMIN — TIMOLOL MALEATE 1 DROP: 5 SOLUTION/ DROPS OPHTHALMIC at 08:18

## 2021-08-12 RX ADMIN — FINASTERIDE 5 MG: 5 TABLET, FILM COATED ORAL at 08:20

## 2021-08-12 RX ADMIN — TAMSULOSIN HYDROCHLORIDE 0.4 MG: 0.4 CAPSULE ORAL at 08:24

## 2021-08-12 RX ADMIN — METOPROLOL SUCCINATE 25 MG: 25 TABLET, EXTENDED RELEASE ORAL at 08:19

## 2021-08-12 RX ADMIN — POTASSIUM CHLORIDE 20 MEQ: 1500 TABLET, EXTENDED RELEASE ORAL at 08:24

## 2021-08-12 RX ADMIN — OXYCODONE HYDROCHLORIDE AND ACETAMINOPHEN 250 MG: 500 TABLET ORAL at 08:18

## 2021-08-12 RX ADMIN — Medication 1 TABLET: at 08:19

## 2021-08-12 RX ADMIN — PANTOPRAZOLE SODIUM 40 MG: 40 TABLET, DELAYED RELEASE ORAL at 08:19

## 2021-08-12 NOTE — PLAN OF CARE
Problem: Falls - Risk of:  Goal: Will remain free from falls  Description: Will remain free from falls  Outcome: Completed     Problem: Falls - Risk of:  Goal: Absence of physical injury  Description: Absence of physical injury  Outcome: Completed     Problem: Pain:  Goal: Pain level will decrease  Description: Pain level will decrease  Outcome: Completed

## 2021-08-12 NOTE — DISCHARGE SUMMARY
Discharge Summary    Ezekiel Mauricio  :  1932  MRN:  [id number]    Admit date:  2021  Discharge date:  2021    Admitting Physician:  Carlos A Frye MD    Admission Diagnoses: Urinary retention [R33.9]    Discharge Diagnoses: Active Hospital Problems    Diagnosis Date Noted    Urinary retention [R33.9] 2021     Priority: High    Hypokalemia [E87.6] 2021     Priority: High     Class: Acute    Iron deficiency anemia due to chronic blood loss [D50.0] 2018     Priority: High    Primary osteoarthritis involving multiple joints [M89.49] 2012     Priority: High     Class: Chronic    Pure hypercholesterolemia [E78.00]      Priority: High     Class: Chronic    Essential hypertension [I10]      Priority: High     Class: Chronic    Stage 3a chronic kidney disease ([facility] [address] [N18.31] 2018       Consults:  nephrology and urology    HPI/Hospital Course:   IV FLUID, K+ SUPPLEMENT, ROBERTS CATHETER, ENCOURAGED AMBULATION. UROLOGY AND NEPHROLOGY EVALUATION.     Discharge Medications:        Medication List      CHANGE how you take these medications    ibuprofen 200 MG tablet  Commonly known as: ADVIL;MOTRIN  Take 1 tablet by mouth every 6 hours as needed for Pain  What changed:   · medication strength  · how much to take        CONTINUE taking these medications    amLODIPine 2.5 MG tablet  Commonly known as: NORVASC  Take 1 tablet by mouth daily     aspirin 81 MG EC tablet     C-250 250 MG Chew  Generic drug: Ascorbic Acid     ferrous sulfate 325 (65 Fe) MG tablet  Commonly known as: IRON 325     finasteride 5 MG tablet  Commonly known as: PROSCAR  Take 1 tablet by mouth daily     lisinopril-hydroCHLOROthiazide 20-25 MG per tablet  Commonly known as: PRINZIDE;ZESTORETIC  Take 1 tablet by mouth daily     metoprolol succinate 25 MG extended release tablet  Commonly known as: TOPROL XL  Take 1 tablet by mouth daily     OSTEO BI-FLEX JOINT SHIELD PO     pantoprazole 40 MG tablet  Commonly known as:  PROTONIX     Saw Palmetto (Serenoa repens) 450 MG Caps     simvastatin 40 MG tablet  Commonly known as: ZOCOR  Take 1 tablet by mouth nightly     therapeutic multivitamin-minerals tablet     timolol 0.5 % ophthalmic solution  Commonly known as: TIMOPTIC     vitamin B-12 1000 MCG tablet  Commonly known as: CYANOCOBALAMIN     Vitamin D3 50 MCG (2000 UT) Caps           Where to Get Your Medications      These medications were sent to [facility] [address]  [address] [place] [address], [place] - [address] [address] [facility] [address] [facility] [phone] Maki Bearden [phone]  [address] [facility] [address] [facility]) [address]    Phone: [phone]   · ibuprofen 200 MG tablet         Disposition:   home    CONDITION AT DISCHARGE: STABLE    Patient Instructions:   Current Discharge Medication List      CONTINUE these medications which have CHANGED    Details   ibuprofen (ADVIL;MOTRIN) 200 MG tablet Take 1 tablet by mouth every 6 hours as needed for Pain  Qty: 30 tablet, Refills: 1         CONTINUE these medications which have NOT CHANGED    Details   ferrous sulfate (IRON 325) 325 (65 Fe) MG tablet Take 325 mg by mouth daily      Multiple Vitamins-Minerals (THERAPEUTIC MULTIVITAMIN-MINERALS) tablet Take 1 tablet by mouth daily      metoprolol succinate (TOPROL XL) 25 MG extended release tablet Take 1 tablet by mouth daily  Qty: 30 tablet, Refills: 3      lisinopril-hydroCHLOROthiazide (PRINZIDE;ZESTORETIC) 20-25 MG per tablet Take 1 tablet by mouth daily  Qty: 90 tablet, Refills: 1      finasteride (PROSCAR) 5 MG tablet Take 1 tablet by mouth daily  Qty: 90 tablet, Refills: 1      amLODIPine (NORVASC) 2.5 MG tablet Take 1 tablet by mouth daily  Qty: 90 tablet, Refills: 1      simvastatin (ZOCOR) 40 MG tablet Take 1 tablet by mouth nightly  Qty: 90 tablet, Refills: 0      Cholecalciferol (VITAMIN D3) 50 MCG (2000 UT) CAPS Take 2,000 Units by mouth daily       Ascorbic Acid (C-250) 250 MG CHEW Take 250 mg by mouth daily       vitamin B-12 (CYANOCOBALAMIN) 1000 MCG tablet Take 1,000 mcg by mouth daily      pantoprazole (PROTONIX) 40 MG tablet Take 40 mg by mouth daily       timolol (TIMOPTIC) 0.5 % ophthalmic solution Place 1 drop into both eyes 2 times daily   Refills: 0      Saw Palmetto, Serenoa repens, 450 MG CAPS Take 2 tablets by mouth daily. Misc Natural Products (OSTEO BI-FLEX JOINT SHIELD PO) Take 2 tablets by mouth daily. aspirin 81 MG EC tablet Take 81 mg by mouth daily.              Activity: activity as tolerated  Diet: cardiac diet    Follow-up with Diandra Mc MD in 1 WEEK      Note that over 30 minutes was spent in preparing discharge papers, discussing discharge with patient, medication review, etc.      Signed:  Diandra Mc MD  8/12/2021, 11:38 AM

## 2021-08-12 NOTE — PROGRESS NOTES
Nephrology Note  Yashira Tan MD          Patient seen and examined. Patient's son is present at the bedside. Chart reviewed. Patient is feeling better. Fay catheter draining clear urine. Denies shortness of breath. Appetite good. No nausea or dysguesia. Awake and alert . In no acute distress. Anticipating discharge     Vital SignsBP 115/61   Pulse 68   Temp 98.2 °F (36.8 °C) (Oral)   Resp 16   Ht 5' 10\" (1.778 m)   Wt 156 lb (70.8 kg)   SpO2 96%   BMI 22.38 kg/m²   24HR INTAKE/OUTPUT:      Intake/Output Summary (Last 24 hours) at 8/12/2021 1108  Last data filed at 8/12/2021 0547  Gross per 24 hour   Intake 240 ml   Output 2125 ml   Net -1885 ml         Physical Exam    Neck: No JVD  Lungs: Breath sounds decreased at the bases. No rales or ronchi. Heart: Regular rate and rhythm. No S3 gallop. No  murmrur. Abdomen: Soft non distended, non tender and normal bowel sounds. Extremeties: No edema. ROS:  RESPIRATORY:  negative  CARDIOVASCULAR:  negative  GASTROINTESTINAL:  negative  GENITOURINARY:  Fay catheter in  place.       Current Facility-Administered Medications   Medication Dose Route Frequency Provider Last Rate Last Admin    perflutren lipid microspheres (DEFINITY) injection 1.65 mg  1.5 mL Intravenous ONCE PRN John Wagner MD        potassium chloride (KLOR-CON M) extended release tablet 20 mEq  20 mEq Oral BID WC Freddy Manjarrez MD   20 mEq at 08/12/21 0824    0.9% NaCl with KCl 40 mEq infusion   Intravenous Continuous Almas Joan Tiwari MD 75 mL/hr at 08/12/21 0818 New Bag at 08/12/21 0818    amLODIPine (NORVASC) tablet 2.5 mg  2.5 mg Oral Daily John Wagner MD   2.5 mg at 08/12/21 7607    ascorbic acid (VITAMIN C) tablet 250 mg  250 mg Oral Daily John Wagner MD   250 mg at 08/12/21 0818    aspirin EC tablet 81 mg  81 mg Oral Daily Freddy Manjarrez MD   81 mg at 08/12/21 0818    Vitamin D (CHOLECALCIFEROL) tablet 2,000 Units  2,000 Units Oral Daily John Wagner MD   2,000 Units at 08/12/21 0818    ferrous sulfate (IRON 325) tablet 325 mg  325 mg Oral Daily Freddy Manjarrez MD   325 mg at 08/12/21 0819    finasteride (PROSCAR) tablet 5 mg  5 mg Oral Daily Freddy Manjarrez MD   5 mg at 08/12/21 0820    metoprolol succinate (TOPROL XL) extended release tablet 25 mg  25 mg Oral Daily John Wagner MD   25 mg at 08/12/21 0819    pantoprazole (PROTONIX) tablet 40 mg  40 mg Oral Daily Freddy Manjarrez MD   40 mg at 08/12/21 1045    therapeutic multivitamin-minerals 1 tablet  1 tablet Oral Daily John Wagner MD   1 tablet at 08/12/21 0819    atorvastatin (LIPITOR) tablet 20 mg  20 mg Oral Daily Freddy Manjarrez MD   20 mg at 08/12/21 0820    timolol (TIMOPTIC) 0.5 % ophthalmic solution 1 drop  1 drop Both Eyes BID John Wagner MD   1 drop at 08/12/21 0818    vitamin B-12 (CYANOCOBALAMIN) tablet 1,000 mcg  1,000 mcg Oral Daily Freddy Manjarrez MD   1,000 mcg at 08/12/21 0820    ibuprofen (ADVIL;MOTRIN) tablet 200 mg  200 mg Oral Q6H PRN Freddy Manjarrez MD   200 mg at 08/11/21 1936    tamsulosin (FLOMAX) capsule 0.4 mg  0.4 mg Oral Daily RAJAN Neves - CNP   0.4 mg at 08/12/21 0824       CT ABDOMEN PELVIS WO CONTRAST Additional Contrast? None   Final Result   Severe heterogeneous enlargement of prostate gland with mass effect on   bladder base. Severely distended urinary bladder with severe bilateral   hydroureteronephrosis. These findings may be related to bladder outlet   obstruction from the enlarged prostate. Urologic consultation is recommended. Cholelithiasis. A 2.3 cm low-attenuation focus near the anterior margin of the uncinate   process of the pancreas is nonspecific. This could represent a cyst or   pseudocyst.  A cystic neoplasm is not entirely excluded. Correlate with any   previous examination or follow-up with a nonemergent pancreatic protocol CT. XR CHEST PORTABLE   Final Result   1. There is no acute cardiopulmonary disease. Labs:    CBC:   Recent Labs     08/10/21  0720 08/11/21  0919 08/12/21  0900   WBC 10.0 8.3 9.6   HGB 9.3* 9.7* 9.9*    256 268        Lab Results   Component Value Date    IRON 40 (L) 08/11/2021    TIBC 200 (L) 08/11/2021    FERRITIN 85 06/22/2021       Lab Results   Component Value Date    CALCIUM 8.9 08/12/2021    CALCIUM 8.6 08/11/2021    CALCIUM 8.4 (L) 08/10/2021    PHOS 2.2 (L) 08/12/2021    PHOS 2.1 (L) 08/11/2021    PHOS 2.3 (L) 08/10/2021    MG 1.6 08/12/2021    MG 1.7 08/11/2021    MG 1.7 08/10/2021       BMP:   Recent Labs     08/10/21  2038 08/11/21  0919 08/12/21  0900    140 139   K 3.7 4.0 4.9    107 108*   CO2 23 23 21*   BUN 36* 34* 29*   CREATININE 1.8* 1.7* 1.5*   GLUCOSE 150* 140* 117*             Assessment: / Plan:    1. Chronic kidney disease stage 3a with urinary obstruction. Renal function is at baseline despite urinary obstruction. Clare Arellano PLAN:Will follow. 2.  Hypertension with chronic kidney disease stage G1 to G4. Blood pressure at target of less than 130/80 mmHg. PLAN:continue current regimen. 3.   Anemia of chronic kidney disease and mild iron deficiency. .  Follow hemoglobin hematocrit. PLAN:  Supplement iron once acute illnesses over. Target hemoglobin 10 g/dL . No indication for JAG at the present time. 4.   Hypophosphatemia. This reflects poor oral intake. Patient encouraged to increase intake of high phosphorous content foods. PLAN: Supplement phosphorus as needed. 5.  Hypokalemia. .  Improved. PLAN: We'll follow      Discharge Planning: stable for discharge from Renal - recommend BMP/CBC/MG/PHOS in 1 week - our office will arrange f/u          [facility]  Nephrology  Electronically signed by RAJAN Conklin CNP on 8/12/2021 at 11:08 AM    Patient seen and examined. Chart reviewed. I had a face to face encounter with the patient.    Agree with exam.    Agree with  formulation, assessment and plan as outlined above and directed by me. Addition and corrections were done as deemed appropriate. My exam and plan include:    Serum creatinine slightly lower than recent baseline. Hep-Lock IV fluids. Continue current treatment.       Dominick Benitez MD  Nephrology        Electronically signed by Dominick Benitez MD on 8/12/2021 at 12:55 PM

## 2021-08-13 ENCOUNTER — CARE COORDINATION (OUTPATIENT)
Dept: CASE MANAGEMENT | Age: 86
End: 2021-08-13

## 2021-08-13 NOTE — CARE COORDINATION
Care Transitions Outreach Attempt    Call within 2 business days of discharge: Yes   Attempted to reach patient for transitions of care follow up. Unable to reach patient. Left HIPAA compliant message and provided call back number  Patient: Lacey Wolfe Patient : 1932 MRN: [id number]    Last Discharge [facility]       Complaint Diagnosis Description Type Department Provider    21 Abdominal Pain; Swelling Urinary retention . .. ED to Hosp-Admission (Discharged) (ADMITTED) Via Arthur Moore MD; Eva García .. Was this an external facility discharge?  No Discharge Facility:     Noted following upcoming appointments from discharge chart review:   [facility] follow up appointment(s):   Future Appointments   Date Time Provider Radha Gillette   2021 10:15 AM MD Yon Londono [facility]   9/10/2021  9:00 AM MD Yon Londono [facility]     Non-[facility] follow up appointment(s):

## 2021-08-14 ENCOUNTER — HOSPITAL ENCOUNTER (EMERGENCY)
Age: 86
Discharge: HOME | End: 2021-08-14
Attending: EMERGENCY MEDICINE
Payer: MEDICARE

## 2021-08-14 VITALS
DIASTOLIC BLOOD PRESSURE: 58 MMHG | SYSTOLIC BLOOD PRESSURE: 121 MMHG | OXYGEN SATURATION: 98 % | RESPIRATION RATE: 18 BRPM | TEMPERATURE: 98.2 F | HEART RATE: 59 BPM

## 2021-08-14 DIAGNOSIS — T83.091A OBSTRUCTION OF FOLEY CATHETER, INITIAL ENCOUNTER (HCC): Primary | ICD-10-CM

## 2021-08-14 PROCEDURE — 51702 INSERT TEMP BLADDER CATH: CPT

## 2021-08-14 PROCEDURE — 99283 EMERGENCY DEPT VISIT LOW MDM: CPT

## 2021-08-14 ASSESSMENT — ENCOUNTER SYMPTOMS
DIARRHEA: 0
COUGH: 0
BACK PAIN: 0
VOMITING: 0
NAUSEA: 0
SHORTNESS OF BREATH: 0
ABDOMINAL DISTENTION: 0
ABDOMINAL PAIN: 0
BLOOD IN STOOL: 0
CONSTIPATION: 0

## 2021-08-14 ASSESSMENT — PAIN - FUNCTIONAL ASSESSMENT: PAIN_FUNCTIONAL_ASSESSMENT: ACTIVITIES ARE NOT PREVENTED

## 2021-08-14 ASSESSMENT — PAIN DESCRIPTION - ORIENTATION: ORIENTATION: RIGHT

## 2021-08-14 ASSESSMENT — PAIN SCALES - GENERAL: PAINLEVEL_OUTOF10: 5

## 2021-08-14 ASSESSMENT — PAIN DESCRIPTION - PAIN TYPE: TYPE: ACUTE PAIN

## 2021-08-14 ASSESSMENT — PAIN DESCRIPTION - LOCATION: LOCATION: SHOULDER

## 2021-08-14 ASSESSMENT — PAIN DESCRIPTION - ONSET: ONSET: ON-GOING

## 2021-08-14 ASSESSMENT — PAIN DESCRIPTION - PROGRESSION: CLINICAL_PROGRESSION: NOT CHANGED

## 2021-08-14 ASSESSMENT — PAIN DESCRIPTION - FREQUENCY: FREQUENCY: CONTINUOUS

## 2021-08-14 ASSESSMENT — PAIN DESCRIPTION - DESCRIPTORS: DESCRIPTORS: ACHING

## 2021-08-14 NOTE — ED TRIAGE NOTES
FIRST PROVIDER CONTACT ASSESSMENT NOTE      Department of Emergency Medicine   Admit Date: No admission date for patient encounter. Chief Complaint: Urinary Retention (Fay catheter not draining )      History of Present Illness:   Dre Thomas is a 80 y.o. male who presents to the ED for decreased urine output from catheter bag. Bag was changed and no drainage since that time.          -----------------END OF FIRST PROVIDER CONTACT ASSESSMENT NOTE--------------  Electronically signed by COLTEN Brewer   DD: 8/14/21

## 2021-08-14 NOTE — ED PROVIDER NOTES
Patient was recently admitted to the hospital for acute urinary retention. He had a Fay catheter placed and was evaluated also for electrolyte abnormalities. Patient was discharged recently and was sent home with a catheter in place. He states that last night he did empty his Fay bag and when he woke up this morning there was only 400 cc of urine in it. He states since then he has not had any output from the catheter. States he has not noticed any blood in the urine. States he has some mild lower abdominal discomfort but no other real complaints. No associated nausea or vomiting. No associated fever or chills. No associated back pain. Symptoms are mild in severity and have been persistent. He did not attempt to irrigate the catheter at home. Review of Systems   Constitutional: Negative for chills, diaphoresis, fatigue and fever. Eyes: Negative for visual disturbance. Respiratory: Negative for cough and shortness of breath. Cardiovascular: Negative for chest pain. Gastrointestinal: Negative for abdominal distention, abdominal pain, blood in stool, constipation, diarrhea, nausea and vomiting. Genitourinary: Negative for hematuria. Currently has a Fay catheter in place   Musculoskeletal: Negative for back pain. Skin: Negative for pallor. Neurological: Negative for dizziness and light-headedness. Physical Exam  Vitals and nursing note reviewed. Constitutional:       General: He is not in acute distress. Appearance: He is well-developed. He is not diaphoretic. Comments: Patient lying in bed in no acute distress   HENT:      Head: Normocephalic and atraumatic. Eyes:      Conjunctiva/sclera: Conjunctivae normal.   Cardiovascular:      Rate and Rhythm: Normal rate and regular rhythm. Heart sounds: Normal heart sounds. No murmur heard. Pulmonary:      Effort: Pulmonary effort is normal. No respiratory distress.       Breath sounds: Normal breath sounds. No wheezing or rales. Abdominal:      General: Bowel sounds are normal. There is no distension. Palpations: Abdomen is soft. Tenderness: There is no abdominal tenderness. There is no right CVA tenderness, left CVA tenderness, guarding or rebound. Musculoskeletal:      Cervical back: Normal range of motion and neck supple. Skin:     General: Skin is warm and dry. Coloration: Skin is not pale. Neurological:      Mental Status: He is alert and oriented to person, place, and time. Procedures     MDM   Initially presented to the ED because he was having no output from his urinary catheter as of this morning. He did have output as a last night and there was only 400 cc in the bag this morning. Since then there have been no output. Patient had a 12 [place] and. This was replaced when he arrived in a larger catheter with three-way was placed. There is low but irrigation no large clots. Since then he has had appropriate urine output and is advised to follow-up with his PCP and urologist.    ED Course as of Aug 14 2046   Sat Aug 14, 2021   1926 The catheter in place. Approximately 100 cc of urine in the bag. Slightly bloody. No clots. Patient has no complaints. [MS]   2039 Patient had a total of another 40 cc of urine output. Patient has no complaints at this time and is comfortably discharged home. He will continue with his appointment with his PCP as well as urology. He understands if he has worsening symptoms or new concerns that he can return to the ED for further evaluation. [MS]      ED Course User Index  [MS] Sami Orlando DO       --------------------------------------------- PAST HISTORY ---------------------------------------------  Past Medical History:  has a past medical history of Enlarged prostate, Hyperlipidemia, Hypertension, Lumbosacral strain, and Pacemaker.     Past Surgical History:  has a past surgical history that includes A-V cardiac pacemaker insertion (5/30/07); eye surgery (2002); Tonsillectomy; and Colonoscopy (8/3/11). Social History:  reports that he quit smoking about 20 years ago. He has a 40.00 pack-year smoking history. He has never used smokeless tobacco. He reports that he does not drink alcohol and does not use drugs. Family History: family history includes Cancer in his mother; Diabetes in his mother; Heart Disease in his father; High Blood Pressure in his father and mother. The patients home medications have been reviewed. Allergies: Patient has no known allergies. -------------------------------------------------- RESULTS -------------------------------------------------  Labs:  No results found for this visit on 08/14/21. Radiology:  No orders to display       ------------------------- NURSING NOTES AND VITALS REVIEWED ---------------------------  Date / Time Roomed:  8/14/2021  6:43 PM  ED Bed Assignment:  13/13    The nursing notes within the ED encounter and vital signs as below have been reviewed. BP (!) 121/58   Pulse 59   Temp 98.2 °F (36.8 °C)   Resp 18   SpO2 98%   Oxygen Saturation Interpretation: Normal      ------------------------------------------ PROGRESS NOTES ------------------------------------------  I have spoken with the patient and discussed todays results, in addition to providing specific details for the plan of care and counseling regarding the diagnosis and prognosis. Their questions are answered at this time and they are agreeable with the plan. I discussed at length with them reasons for immediate return here for re evaluation. They will followup with primary care by calling their office tomorrow. --------------------------------- ADDITIONAL PROVIDER NOTES ---------------------------------  At this time the patient is without objective evidence of an acute process requiring hospitalization or inpatient management.   They have remained hemodynamically stable throughout their entire ED visit and are stable for discharge with outpatient follow-up. The plan has been discussed in detail and they are aware of the specific conditions for emergent return, as well as the importance of follow-up. New Prescriptions    No medications on file       Diagnosis:  1. Obstruction of Fay catheter, initial encounter [facility])        Disposition:  Patient's disposition: Discharge to home  Patient's condition is stable.          Bobo Feng DO  08/14/21 2049

## 2021-08-16 ENCOUNTER — CARE COORDINATION (OUTPATIENT)
Dept: CASE MANAGEMENT | Age: 86
End: 2021-08-16

## 2021-08-16 DIAGNOSIS — R33.9 URINARY RETENTION: Primary | ICD-10-CM

## 2021-08-16 PROCEDURE — 1111F DSCHRG MED/CURRENT MED MERGE: CPT | Performed by: FAMILY MEDICINE

## 2021-08-16 NOTE — CARE COORDINATION
CTN role. CTN reviewed discharge instructions, medical action plan and red flags with patient who verbalized understanding. Patient given an opportunity to ask questions and does not have any further questions or concerns at this time. Were discharge instructions available to patient? Yes. Reviewed appropriate site of care based on symptoms and resources available to patient including: PCP, Specialist and Urgent care clinics. The patient agrees to contact the PCP office for questions related to their healthcare. Medication reconciliation was performed with patient, who verbalizes understanding of administration of home medications. Advised obtaining a 90-day supply of all daily and as-needed medications. Covid Risk Education     Educated patient about risk for severe COVID-19 due to risk factors according to CDC guidelines. CTN reviewed discharge instructions, medical action plan and red flag symptoms with the patient who verbalized understanding. Discussed COVID vaccination status: Yes. Education provided on COVID-19 vaccination as appropriate. Discussed exposure protocols and quarantine with CDC Guidelines. Patient was given an opportunity to verbalize any questions and concerns and agrees to contact CTN or health care provider for questions related to their healthcare. Reviewed and educated patient on any new and changed medications related to discharge diagnosis. Was patient discharged with a pulse oximeter? No Discussed and confirmed pulse oximeter discharge instructions and when to notify provider or seek emergency care. CTN provided contact information. Plan for follow-up call in 5-7 days based on severity of symptoms and risk factors.   Plan for next call: self management-guillory and follow up appointment-PCP, urology        Non-face-to-face services provided:  Obtained and reviewed discharge summary and/or continuity of care documents    Care Transitions 24 Hour Call    Do you have any ongoing symptoms?: No  Do you have a copy of your discharge instructions?: Yes  Do you have all of your prescriptions and are they filled?: Yes  Have you been contacted by a [employer] [address]?: No  Have you scheduled your follow up appointment?: Yes  How are you going to get to your appointment?: Car - family or friend to transport  Were you discharged with any Home Care or Post Acute Services: No  Patient DME: Sean Khan, Shower chair  Patient Home Equipment: Other (Comment: kahlil)  Do you have support at home?: Partner/Spouse/SO  Do you feel like you have everything you need to keep you well at home?: Yes  Are you an active caregiver in your home?: No  Care Transitions Interventions         Follow Up  Future Appointments   Date Time Provider Radha Gillette   8/18/2021 10:15 AM Flakita Okeefe MD [facility]   9/10/2021  9:00 AM Flakita Okeefe MD [facility] [place], RN

## 2021-08-17 LAB
LEFT VENTRICULAR EJECTION FRACTION HIGH VALUE: 55 %
LV EF: 50 %

## 2021-08-18 ENCOUNTER — OFFICE VISIT (OUTPATIENT)
Dept: FAMILY MEDICINE CLINIC | Age: 86
End: 2021-08-18
Payer: MEDICARE

## 2021-08-18 ENCOUNTER — TELEPHONE (OUTPATIENT)
Dept: FAMILY MEDICINE CLINIC | Age: 86
End: 2021-08-18

## 2021-08-18 VITALS
HEIGHT: 70 IN | OXYGEN SATURATION: 98 % | WEIGHT: 141 LBS | BODY MASS INDEX: 20.19 KG/M2 | DIASTOLIC BLOOD PRESSURE: 68 MMHG | HEART RATE: 55 BPM | SYSTOLIC BLOOD PRESSURE: 122 MMHG | TEMPERATURE: 97.5 F | RESPIRATION RATE: 16 BRPM

## 2021-08-18 DIAGNOSIS — N18.30 STAGE 3 CHRONIC KIDNEY DISEASE, UNSPECIFIED WHETHER STAGE 3A OR 3B CKD (HCC): ICD-10-CM

## 2021-08-18 DIAGNOSIS — R33.9 URINARY RETENTION: Primary | ICD-10-CM

## 2021-08-18 DIAGNOSIS — E87.6 HYPOKALEMIA: ICD-10-CM

## 2021-08-18 DIAGNOSIS — E78.00 PURE HYPERCHOLESTEROLEMIA: ICD-10-CM

## 2021-08-18 DIAGNOSIS — I10 ESSENTIAL HYPERTENSION: ICD-10-CM

## 2021-08-18 DIAGNOSIS — D50.0 IRON DEFICIENCY ANEMIA DUE TO CHRONIC BLOOD LOSS: ICD-10-CM

## 2021-08-18 PROCEDURE — 1111F DSCHRG MED/CURRENT MED MERGE: CPT | Performed by: FAMILY MEDICINE

## 2021-08-18 PROCEDURE — 99495 TRANSJ CARE MGMT MOD F2F 14D: CPT | Performed by: FAMILY MEDICINE

## 2021-08-18 RX ORDER — MULTIVITAMIN
1 TABLET ORAL DAILY
COMMUNITY

## 2021-08-18 NOTE — PROGRESS NOTES
Post-Discharge Transitional Care Management Services or Hospital Follow Up      Juventino Quijano   YOB: 1932    Date of Office Visit:  8/18/2021  Date of Hospital Admission: 8/9/21  Date of Hospital Discharge: 8/12/21  Risk of hospital readmission (high >=14%. Medium >=10%) :Readmission Risk Score: 12      Care management risk score Rising risk (score 2-5) and Complex Care (Scores >=6): 6     Non face to face  following discharge, date last encounter closed (first attempt may have been earlier): 8/16/2021 10:26 AM    Call initiated 2 business days of discharge: Yes    Patient Active Problem List   Diagnosis    Pure hypercholesterolemia    Essential hypertension    Primary osteoarthritis involving multiple joints    Iron deficiency anemia due to chronic blood loss    Stage 3a chronic kidney disease ([facility] [address]    Urinary retention    Hypokalemia       No Known Allergies    Medications listed as ordered at the time of discharge from hospital   [place], [facility] Medication Instructions DINA:    Printed on:08/18/21 1123   Medication Information                      amLODIPine (NORVASC) 2.5 MG tablet  Take 1 tablet by mouth daily             Ascorbic Acid (C-250) 250 MG CHEW  Take 250 mg by mouth daily              aspirin 81 MG EC tablet  Take 81 mg by mouth daily.                Cholecalciferol (VITAMIN D3) 50 MCG (2000 UT) CAPS  Take 2,000 Units by mouth daily              ferrous sulfate (IRON 325) 325 (65 Fe) MG tablet  Take 325 mg by mouth daily             finasteride (PROSCAR) 5 MG tablet  Take 1 tablet by mouth daily             ibuprofen (ADVIL;MOTRIN) 200 MG tablet  Take 1 tablet by mouth every 6 hours as needed for Pain             lisinopril-hydroCHLOROthiazide (PRINZIDE;ZESTORETIC) 20-25 MG per tablet  Take 1 tablet by mouth daily             metoprolol succinate (TOPROL XL) 25 MG extended release tablet  Take 1 tablet by mouth daily             Misc Natural Products (OSTEO BI-FLEX JOINT SHIELD PO)  Take 2 tablets by mouth daily. Multiple Vitamin (ONE-A-DAY ESSENTIAL) TABS  Take 1 tablet by mouth daily             Multiple Vitamins-Minerals (THERAPEUTIC MULTIVITAMIN-MINERALS) tablet  Take 1 tablet by mouth daily             pantoprazole (PROTONIX) 40 MG tablet  Take 40 mg by mouth daily              Saw [place], [place] repens, 450 MG CAPS  Take 2 tablets by mouth daily.                simvastatin (ZOCOR) 40 MG tablet  Take 1 tablet by mouth nightly             timolol (TIMOPTIC) 0.5 % ophthalmic solution  Place 1 drop into both eyes 2 times daily              vitamin B-12 (CYANOCOBALAMIN) 1000 MCG tablet  Take 1,000 mcg by mouth daily                   Medications marked \"taking\" at this time  Outpatient Medications Marked as Taking for the 8/18/21 encounter (Office Visit) with Cherylene Hausen, MD   Medication Sig Dispense Refill    ibuprofen (ADVIL;MOTRIN) 200 MG tablet Take 1 tablet by mouth every 6 hours as needed for Pain 30 tablet 1    ferrous sulfate (IRON 325) 325 (65 Fe) MG tablet Take 325 mg by mouth daily      Multiple Vitamins-Minerals (THERAPEUTIC MULTIVITAMIN-MINERALS) tablet Take 1 tablet by mouth daily      metoprolol succinate (TOPROL XL) 25 MG extended release tablet Take 1 tablet by mouth daily 30 tablet 3    lisinopril-hydroCHLOROthiazide (PRINZIDE;ZESTORETIC) 20-25 MG per tablet Take 1 tablet by mouth daily 90 tablet 1    finasteride (PROSCAR) 5 MG tablet Take 1 tablet by mouth daily 90 tablet 1    amLODIPine (NORVASC) 2.5 MG tablet Take 1 tablet by mouth daily 90 tablet 1    simvastatin (ZOCOR) 40 MG tablet Take 1 tablet by mouth nightly 90 tablet 0    Cholecalciferol (VITAMIN D3) 50 MCG (2000 UT) CAPS Take 2,000 Units by mouth daily       Ascorbic Acid (C-250) 250 MG CHEW Take 250 mg by mouth daily       vitamin B-12 (CYANOCOBALAMIN) 1000 MCG tablet Take 1,000 mcg by mouth daily      pantoprazole (PROTONIX) 40 MG tablet Take 40 mg by mouth daily       timolol (TIMOPTIC) 0.5 % ophthalmic solution Place 1 drop into both eyes 2 times daily   0    Saw [place], [place]ren[place] repens, 450 MG CAPS Take 2 tablets by mouth daily.  Misc Natural Products (OSTEO BI-FLEX JOINT SHIELD PO) Take 2 tablets by mouth daily.  aspirin 81 MG EC tablet Take 81 mg by mouth daily. Medications patient taking as of now reconciled against medications ordered at time of hospital discharge: Yes    Chief Complaint   Patient presents with   [address] from [facility]     8/9/2021 - 8/12/2021 for enlarged prostate issues     Discuss Labs       History of Present illness - Follow up of Hospital diagnosis(es): WAS ADMITTED AT [place] [id number]. WAS FOUND TO HAVE SEVERE URINARY RETENTION. HE WAS SENT HOME WITH CATHETER. SEEN NEPHROLOGIST BUT NO APPOINTMENT SET UP WITH UROLOGIST. Inpatient course: Discharge summary reviewed- see chart. Interval history/Current status: FEELS TIRED. HAD CATHETER  CHANGED BECAUSE GOT BLOCKED UP WITH BLOOD CLOT. A comprehensive review of systems was negative except for what was noted in the HPI. Vitals:    08/18/21 1034   BP: 122/68   Pulse: 55   Resp: 16   Temp: 97.5 °F (36.4 °C)   SpO2: 98%   Weight: 141 lb (64 kg)   Height: 5' 10\" (1.778 m)     Body mass index is 20.23 kg/m².    Wt Readings from Last 3 Encounters:   08/18/21 141 lb (64 kg)   08/09/21 156 lb (70.8 kg)   06/09/21 156 lb (70.8 kg)     BP Readings from Last 3 Encounters:   08/18/21 122/68   08/14/21 (!) 121/58   08/12/21 115/61        Physical Exam:  General Appearance: alert and oriented to person, place and time, well developed and well- nourished, in no acute distress  Skin: warm and dry, no rash or erythema  Head: normocephalic and atraumatic  Eyes: pupils equal, round, and reactive to light, extraocular eye movements intact, conjunctivae normal  ENT: tympanic membrane, external ear and ear canal normal bilaterally, nose without deformity, nasal mucosa and turbinates normal without polyps  Neck: supple and non-tender without mass, no thyromegaly or thyroid nodules, no cervical lymphadenopathy  Pulmonary/Chest: clear to auscultation bilaterally- no wheezes, rales or rhonchi, normal air movement, no respiratory distress  Cardiovascular: normal rate, regular rhythm, normal S1 and S2, no murmurs, rubs, clicks, or gallops, distal pulses intact, no carotid bruits  Abdomen: soft, non-tender, non-distended, normal bowel sounds, no masses or organomegaly  Extremities: no cyanosis, clubbing or edema  Musculoskeletal: normal range of motion, no joint swelling, deformity or tenderness  Neurologic: reflexes normal and symmetric, no cranial nerve deficit, gait, coordination and speech normal    Assessment/Plan:  1. Urinary retention  CONTORLLED    2. Stage 3 chronic kidney disease, unspecified whether stage 3a or 3b CKD ([facility] [address]  STABLE  - Comprehensive Metabolic Panel; Future    3. Hypokalemia  IMPROVED. 4. Iron deficiency anemia due to chronic blood loss  IMPROVING  - CBC Auto Differential; Future    5. Pure hypercholesterolemia  STABLE  - Lipid Panel; Future    6.  Essential hypertension  CONTROLLED        Medical Decision Making: moderate complexity

## 2021-08-18 NOTE — TELEPHONE ENCOUNTER
Patient need appt sooner then later for N. E.O due to recently being seen in ED for cath and urinary retention.

## 2021-08-22 ENCOUNTER — APPOINTMENT (OUTPATIENT)
Dept: GENERAL RADIOLOGY | Age: 86
DRG: 698 | End: 2021-08-22
Payer: MEDICARE

## 2021-08-22 ENCOUNTER — HOSPITAL ENCOUNTER (INPATIENT)
Age: 86
LOS: 1 days | Discharge: HOME HEALTH | DRG: 698 | End: 2021-08-25
Attending: EMERGENCY MEDICINE | Admitting: INTERNAL MEDICINE
Payer: MEDICARE

## 2021-08-22 DIAGNOSIS — N28.9 ACUTE ON CHRONIC RENAL INSUFFICIENCY: Primary | ICD-10-CM

## 2021-08-22 DIAGNOSIS — T83.89XA CALCIFICATION OF INDWELLING FOLEY CATHETER, INITIAL ENCOUNTER (HCC): ICD-10-CM

## 2021-08-22 DIAGNOSIS — R05.9 COUGH: ICD-10-CM

## 2021-08-22 DIAGNOSIS — N18.9 ACUTE ON CHRONIC RENAL INSUFFICIENCY: Primary | ICD-10-CM

## 2021-08-22 DIAGNOSIS — N40.0 BENIGN PROSTATIC HYPERPLASIA, UNSPECIFIED WHETHER LOWER URINARY TRACT SYMPTOMS PRESENT: ICD-10-CM

## 2021-08-22 PROBLEM — N17.9 AKI (ACUTE KIDNEY INJURY) (HCC): Status: ACTIVE | Noted: 2021-08-22

## 2021-08-22 LAB
ADENOVIRUS BY PCR: NOT DETECTED
ALBUMIN SERPL-MCNC: 3.1 G/DL (ref 3.5–5.2)
ALP BLD-CCNC: 96 U/L (ref 40–129)
ALT SERPL-CCNC: 8 U/L (ref 0–40)
ANION GAP SERPL CALCULATED.3IONS-SCNC: 13 MMOL/L (ref 7–16)
ANION GAP SERPL CALCULATED.3IONS-SCNC: 15 MMOL/L (ref 7–16)
AST SERPL-CCNC: 9 U/L (ref 0–39)
BACTERIA: ABNORMAL /HPF
BASOPHILS ABSOLUTE: 0.02 E9/L (ref 0–0.2)
BASOPHILS ABSOLUTE: 0.02 E9/L (ref 0–0.2)
BASOPHILS RELATIVE PERCENT: 0.2 % (ref 0–2)
BASOPHILS RELATIVE PERCENT: 0.2 % (ref 0–2)
BILIRUB SERPL-MCNC: 0.5 MG/DL (ref 0–1.2)
BILIRUBIN URINE: NEGATIVE
BLOOD, URINE: ABNORMAL
BORDETELLA PARAPERTUSSIS BY PCR: NOT DETECTED
BORDETELLA PERTUSSIS BY PCR: NOT DETECTED
BUN BLDV-MCNC: 50 MG/DL (ref 6–23)
BUN BLDV-MCNC: 54 MG/DL (ref 6–23)
CALCIUM SERPL-MCNC: 8.2 MG/DL (ref 8.6–10.2)
CALCIUM SERPL-MCNC: 9 MG/DL (ref 8.6–10.2)
CHLAMYDOPHILIA PNEUMONIAE BY PCR: NOT DETECTED
CHLORIDE BLD-SCNC: 93 MMOL/L (ref 98–107)
CHLORIDE BLD-SCNC: 99 MMOL/L (ref 98–107)
CLARITY: ABNORMAL
CO2: 20 MMOL/L (ref 22–29)
CO2: 22 MMOL/L (ref 22–29)
COLOR: YELLOW
CORONAVIRUS 229E BY PCR: NOT DETECTED
CORONAVIRUS HKU1 BY PCR: NOT DETECTED
CORONAVIRUS NL63 BY PCR: NOT DETECTED
CORONAVIRUS OC43 BY PCR: NOT DETECTED
CREAT SERPL-MCNC: 2.2 MG/DL (ref 0.7–1.2)
CREAT SERPL-MCNC: 2.4 MG/DL (ref 0.7–1.2)
EOSINOPHILS ABSOLUTE: 0 E9/L (ref 0.05–0.5)
EOSINOPHILS ABSOLUTE: 0.01 E9/L (ref 0.05–0.5)
EOSINOPHILS RELATIVE PERCENT: 0 % (ref 0–6)
EOSINOPHILS RELATIVE PERCENT: 0.1 % (ref 0–6)
GFR AFRICAN AMERICAN: 31
GFR AFRICAN AMERICAN: 34
GFR NON-AFRICAN AMERICAN: 26 ML/MIN/1.73
GFR NON-AFRICAN AMERICAN: 28 ML/MIN/1.73
GLUCOSE BLD-MCNC: 109 MG/DL (ref 74–99)
GLUCOSE BLD-MCNC: 127 MG/DL (ref 74–99)
GLUCOSE URINE: NEGATIVE MG/DL
HCT VFR BLD CALC: 30.6 % (ref 37–54)
HCT VFR BLD CALC: 32 % (ref 37–54)
HEMOGLOBIN: 10.3 G/DL (ref 12.5–16.5)
HEMOGLOBIN: 10.6 G/DL (ref 12.5–16.5)
HUMAN METAPNEUMOVIRUS BY PCR: NOT DETECTED
HUMAN RHINOVIRUS/ENTEROVIRUS BY PCR: NOT DETECTED
IMMATURE GRANULOCYTES #: 0.05 E9/L
IMMATURE GRANULOCYTES #: 0.06 E9/L
IMMATURE GRANULOCYTES %: 0.4 % (ref 0–5)
IMMATURE GRANULOCYTES %: 0.5 % (ref 0–5)
INFLUENZA A BY PCR: NOT DETECTED
INFLUENZA B BY PCR: NOT DETECTED
KETONES, URINE: NEGATIVE MG/DL
LACTIC ACID: 1 MMOL/L (ref 0.5–2.2)
LEUKOCYTE ESTERASE, URINE: ABNORMAL
LYMPHOCYTES ABSOLUTE: 1.04 E9/L (ref 1.5–4)
LYMPHOCYTES ABSOLUTE: 1.06 E9/L (ref 1.5–4)
LYMPHOCYTES RELATIVE PERCENT: 8.1 % (ref 20–42)
LYMPHOCYTES RELATIVE PERCENT: 9 % (ref 20–42)
MCH RBC QN AUTO: 30.9 PG (ref 26–35)
MCH RBC QN AUTO: 30.9 PG (ref 26–35)
MCHC RBC AUTO-ENTMCNC: 33.1 % (ref 32–34.5)
MCHC RBC AUTO-ENTMCNC: 33.7 % (ref 32–34.5)
MCV RBC AUTO: 91.9 FL (ref 80–99.9)
MCV RBC AUTO: 93.3 FL (ref 80–99.9)
MONOCYTES ABSOLUTE: 0.73 E9/L (ref 0.1–0.95)
MONOCYTES ABSOLUTE: 0.76 E9/L (ref 0.1–0.95)
MONOCYTES RELATIVE PERCENT: 5.6 % (ref 2–12)
MONOCYTES RELATIVE PERCENT: 6.6 % (ref 2–12)
MYCOPLASMA PNEUMONIAE BY PCR: NOT DETECTED
NEUTROPHILS ABSOLUTE: 11.2 E9/L (ref 1.8–7.3)
NEUTROPHILS ABSOLUTE: 9.62 E9/L (ref 1.8–7.3)
NEUTROPHILS RELATIVE PERCENT: 83.7 % (ref 43–80)
NEUTROPHILS RELATIVE PERCENT: 85.6 % (ref 43–80)
NITRITE, URINE: NEGATIVE
PARAINFLUENZA VIRUS 1 BY PCR: NOT DETECTED
PARAINFLUENZA VIRUS 2 BY PCR: NOT DETECTED
PARAINFLUENZA VIRUS 3 BY PCR: NOT DETECTED
PARAINFLUENZA VIRUS 4 BY PCR: NOT DETECTED
PDW BLD-RTO: 13.3 FL (ref 11.5–15)
PDW BLD-RTO: 13.3 FL (ref 11.5–15)
PH UA: 6.5 (ref 5–9)
PLATELET # BLD: 390 E9/L (ref 130–450)
PLATELET # BLD: 417 E9/L (ref 130–450)
PMV BLD AUTO: 10.1 FL (ref 7–12)
PMV BLD AUTO: 10.2 FL (ref 7–12)
POTASSIUM SERPL-SCNC: 4 MMOL/L (ref 3.5–5)
POTASSIUM SERPL-SCNC: 4.1 MMOL/L (ref 3.5–5)
PRO-BNP: 1099 PG/ML (ref 0–450)
PROTEIN UA: >=300 MG/DL
RBC # BLD: 3.33 E12/L (ref 3.8–5.8)
RBC # BLD: 3.43 E12/L (ref 3.8–5.8)
RBC UA: >20 /HPF (ref 0–2)
RESPIRATORY SYNCYTIAL VIRUS BY PCR: NOT DETECTED
SARS-COV-2, NAAT: NOT DETECTED
SARS-COV-2, PCR: NOT DETECTED
SODIUM BLD-SCNC: 130 MMOL/L (ref 132–146)
SODIUM BLD-SCNC: 132 MMOL/L (ref 132–146)
SPECIFIC GRAVITY UA: 1.02 (ref 1–1.03)
TOTAL PROTEIN: 6.9 G/DL (ref 6.4–8.3)
UROBILINOGEN, URINE: 0.2 E.U./DL
WBC # BLD: 11.5 E9/L (ref 4.5–11.5)
WBC # BLD: 13.1 E9/L (ref 4.5–11.5)
WBC UA: ABNORMAL /HPF (ref 0–5)

## 2021-08-22 PROCEDURE — APPSS45 APP SPLIT SHARED TIME 31-45 MINUTES: Performed by: NURSE PRACTITIONER

## 2021-08-22 PROCEDURE — 83880 ASSAY OF NATRIURETIC PEPTIDE: CPT

## 2021-08-22 PROCEDURE — 36415 COLL VENOUS BLD VENIPUNCTURE: CPT

## 2021-08-22 PROCEDURE — 96372 THER/PROPH/DIAG INJ SC/IM: CPT

## 2021-08-22 PROCEDURE — 96365 THER/PROPH/DIAG IV INF INIT: CPT

## 2021-08-22 PROCEDURE — 81001 URINALYSIS AUTO W/SCOPE: CPT

## 2021-08-22 PROCEDURE — 80053 COMPREHEN METABOLIC PANEL: CPT

## 2021-08-22 PROCEDURE — 87040 BLOOD CULTURE FOR BACTERIA: CPT

## 2021-08-22 PROCEDURE — 2580000003 HC RX 258: Performed by: NURSE PRACTITIONER

## 2021-08-22 PROCEDURE — 6370000000 HC RX 637 (ALT 250 FOR IP): Performed by: NURSE PRACTITIONER

## 2021-08-22 PROCEDURE — 0202U NFCT DS 22 TRGT SARS-COV-2: CPT

## 2021-08-22 PROCEDURE — G0378 HOSPITAL OBSERVATION PER HR: HCPCS

## 2021-08-22 PROCEDURE — 2580000003 HC RX 258: Performed by: EMERGENCY MEDICINE

## 2021-08-22 PROCEDURE — 6360000002 HC RX W HCPCS: Performed by: NURSE PRACTITIONER

## 2021-08-22 PROCEDURE — 85025 COMPLETE CBC W/AUTO DIFF WBC: CPT

## 2021-08-22 PROCEDURE — 71045 X-RAY EXAM CHEST 1 VIEW: CPT

## 2021-08-22 PROCEDURE — 87635 SARS-COV-2 COVID-19 AMP PRB: CPT

## 2021-08-22 PROCEDURE — 80048 BASIC METABOLIC PNL TOTAL CA: CPT

## 2021-08-22 PROCEDURE — 83605 ASSAY OF LACTIC ACID: CPT

## 2021-08-22 PROCEDURE — 51798 US URINE CAPACITY MEASURE: CPT

## 2021-08-22 PROCEDURE — 96375 TX/PRO/DX INJ NEW DRUG ADDON: CPT

## 2021-08-22 PROCEDURE — 99223 1ST HOSP IP/OBS HIGH 75: CPT | Performed by: INTERNAL MEDICINE

## 2021-08-22 PROCEDURE — 99285 EMERGENCY DEPT VISIT HI MDM: CPT

## 2021-08-22 RX ORDER — IPRATROPIUM BROMIDE AND ALBUTEROL SULFATE 2.5; .5 MG/3ML; MG/3ML
1 SOLUTION RESPIRATORY (INHALATION) EVERY 4 HOURS PRN
Status: DISCONTINUED | OUTPATIENT
Start: 2021-08-22 | End: 2021-08-25 | Stop reason: HOSPADM

## 2021-08-22 RX ORDER — 0.9 % SODIUM CHLORIDE 0.9 %
1000 INTRAVENOUS SOLUTION INTRAVENOUS ONCE
Status: COMPLETED | OUTPATIENT
Start: 2021-08-22 | End: 2021-08-22

## 2021-08-22 RX ORDER — ACETAMINOPHEN 650 MG/1
650 SUPPOSITORY RECTAL EVERY 6 HOURS PRN
Status: DISCONTINUED | OUTPATIENT
Start: 2021-08-22 | End: 2021-08-25 | Stop reason: HOSPADM

## 2021-08-22 RX ORDER — SODIUM CHLORIDE 0.9 % (FLUSH) 0.9 %
5-40 SYRINGE (ML) INJECTION PRN
Status: DISCONTINUED | OUTPATIENT
Start: 2021-08-22 | End: 2021-08-25 | Stop reason: HOSPADM

## 2021-08-22 RX ORDER — SODIUM CHLORIDE 9 MG/ML
INJECTION, SOLUTION INTRAVENOUS CONTINUOUS
Status: ACTIVE | OUTPATIENT
Start: 2021-08-22 | End: 2021-08-23

## 2021-08-22 RX ORDER — ONDANSETRON 2 MG/ML
4 INJECTION INTRAMUSCULAR; INTRAVENOUS EVERY 6 HOURS PRN
Status: DISCONTINUED | OUTPATIENT
Start: 2021-08-22 | End: 2021-08-25 | Stop reason: HOSPADM

## 2021-08-22 RX ORDER — LANOLIN ALCOHOL/MO/W.PET/CERES
1000 CREAM (GRAM) TOPICAL DAILY
Status: DISCONTINUED | OUTPATIENT
Start: 2021-08-22 | End: 2021-08-25 | Stop reason: HOSPADM

## 2021-08-22 RX ORDER — M-VIT,TX,IRON,MINS/CALC/FOLIC 27MG-0.4MG
1 TABLET ORAL DAILY
Status: DISCONTINUED | OUTPATIENT
Start: 2021-08-22 | End: 2021-08-25 | Stop reason: HOSPADM

## 2021-08-22 RX ORDER — ASCORBIC ACID 500 MG
250 TABLET ORAL DAILY
Status: DISCONTINUED | OUTPATIENT
Start: 2021-08-22 | End: 2021-08-25 | Stop reason: HOSPADM

## 2021-08-22 RX ORDER — SODIUM CHLORIDE 0.9 % (FLUSH) 0.9 %
5-40 SYRINGE (ML) INJECTION EVERY 12 HOURS SCHEDULED
Status: DISCONTINUED | OUTPATIENT
Start: 2021-08-22 | End: 2021-08-25 | Stop reason: HOSPADM

## 2021-08-22 RX ORDER — ONDANSETRON 4 MG/1
4 TABLET, ORALLY DISINTEGRATING ORAL EVERY 8 HOURS PRN
Status: DISCONTINUED | OUTPATIENT
Start: 2021-08-22 | End: 2021-08-25 | Stop reason: HOSPADM

## 2021-08-22 RX ORDER — FINASTERIDE 5 MG/1
5 TABLET, FILM COATED ORAL DAILY
Status: DISCONTINUED | OUTPATIENT
Start: 2021-08-22 | End: 2021-08-25 | Stop reason: HOSPADM

## 2021-08-22 RX ORDER — TIMOLOL MALEATE 5 MG/ML
1 SOLUTION/ DROPS OPHTHALMIC 2 TIMES DAILY
Status: DISCONTINUED | OUTPATIENT
Start: 2021-08-22 | End: 2021-08-25 | Stop reason: HOSPADM

## 2021-08-22 RX ORDER — POLYETHYLENE GLYCOL 3350 17 G/17G
17 POWDER, FOR SOLUTION ORAL DAILY PRN
Status: DISCONTINUED | OUTPATIENT
Start: 2021-08-22 | End: 2021-08-25 | Stop reason: HOSPADM

## 2021-08-22 RX ORDER — VITAMIN B COMPLEX
2000 TABLET ORAL DAILY
Status: DISCONTINUED | OUTPATIENT
Start: 2021-08-22 | End: 2021-08-25 | Stop reason: HOSPADM

## 2021-08-22 RX ORDER — FERROUS SULFATE 325(65) MG
325 TABLET ORAL DAILY
Status: DISCONTINUED | OUTPATIENT
Start: 2021-08-22 | End: 2021-08-25 | Stop reason: HOSPADM

## 2021-08-22 RX ORDER — ASPIRIN 81 MG/1
81 TABLET ORAL DAILY
Status: DISCONTINUED | OUTPATIENT
Start: 2021-08-22 | End: 2021-08-25 | Stop reason: HOSPADM

## 2021-08-22 RX ORDER — ACETAMINOPHEN 325 MG/1
650 TABLET ORAL EVERY 6 HOURS PRN
Status: DISCONTINUED | OUTPATIENT
Start: 2021-08-22 | End: 2021-08-25 | Stop reason: HOSPADM

## 2021-08-22 RX ORDER — PANTOPRAZOLE SODIUM 40 MG/1
40 TABLET, DELAYED RELEASE ORAL DAILY
Status: DISCONTINUED | OUTPATIENT
Start: 2021-08-22 | End: 2021-08-25 | Stop reason: HOSPADM

## 2021-08-22 RX ORDER — METOPROLOL SUCCINATE 25 MG/1
25 TABLET, EXTENDED RELEASE ORAL DAILY
Status: DISCONTINUED | OUTPATIENT
Start: 2021-08-22 | End: 2021-08-25 | Stop reason: HOSPADM

## 2021-08-22 RX ORDER — ATORVASTATIN CALCIUM 20 MG/1
20 TABLET, FILM COATED ORAL DAILY
Status: DISCONTINUED | OUTPATIENT
Start: 2021-08-22 | End: 2021-08-25 | Stop reason: HOSPADM

## 2021-08-22 RX ORDER — ATROPA BELLADONNA AND OPIUM 16.2; 6 MG/1; MG/1
60 SUPPOSITORY RECTAL EVERY 8 HOURS PRN
Status: DISCONTINUED | OUTPATIENT
Start: 2021-08-22 | End: 2021-08-25 | Stop reason: HOSPADM

## 2021-08-22 RX ORDER — SODIUM CHLORIDE 9 MG/ML
25 INJECTION, SOLUTION INTRAVENOUS PRN
Status: DISCONTINUED | OUTPATIENT
Start: 2021-08-22 | End: 2021-08-25 | Stop reason: HOSPADM

## 2021-08-22 RX ORDER — AMLODIPINE BESYLATE 2.5 MG/1
2.5 TABLET ORAL DAILY
Status: DISCONTINUED | OUTPATIENT
Start: 2021-08-22 | End: 2021-08-25 | Stop reason: HOSPADM

## 2021-08-22 RX ADMIN — Medication 2000 UNITS: at 11:23

## 2021-08-22 RX ADMIN — WATER 1000 MG: 1 INJECTION INTRAMUSCULAR; INTRAVENOUS; SUBCUTANEOUS at 11:15

## 2021-08-22 RX ADMIN — FINASTERIDE 5 MG: 5 TABLET, FILM COATED ORAL at 11:23

## 2021-08-22 RX ADMIN — FERROUS SULFATE TAB 325 MG (65 MG ELEMENTAL FE) 325 MG: 325 (65 FE) TAB at 11:23

## 2021-08-22 RX ADMIN — Medication 1 TABLET: at 11:23

## 2021-08-22 RX ADMIN — ENOXAPARIN SODIUM 30 MG: 30 INJECTION SUBCUTANEOUS at 11:20

## 2021-08-22 RX ADMIN — ASPIRIN 81 MG: 81 TABLET, FILM COATED ORAL at 11:23

## 2021-08-22 RX ADMIN — OXYCODONE HYDROCHLORIDE AND ACETAMINOPHEN 250 MG: 500 TABLET ORAL at 11:23

## 2021-08-22 RX ADMIN — CYANOCOBALAMIN TAB 1000 MCG 1000 MCG: 1000 TAB at 11:23

## 2021-08-22 RX ADMIN — TIMOLOL MALEATE 1 DROP: 5 SOLUTION OPHTHALMIC at 20:20

## 2021-08-22 RX ADMIN — AZITHROMYCIN DIHYDRATE 500 MG: 500 INJECTION, POWDER, LYOPHILIZED, FOR SOLUTION INTRAVENOUS at 14:10

## 2021-08-22 RX ADMIN — AMLODIPINE BESYLATE 2.5 MG: 2.5 TABLET ORAL at 11:23

## 2021-08-22 RX ADMIN — PANTOPRAZOLE SODIUM 40 MG: 40 TABLET, DELAYED RELEASE ORAL at 11:23

## 2021-08-22 RX ADMIN — SODIUM CHLORIDE 1000 ML: 9 INJECTION, SOLUTION INTRAVENOUS at 04:03

## 2021-08-22 RX ADMIN — METOPROLOL SUCCINATE 25 MG: 25 TABLET, EXTENDED RELEASE ORAL at 11:23

## 2021-08-22 RX ADMIN — ATORVASTATIN CALCIUM 20 MG: 20 TABLET, FILM COATED ORAL at 11:23

## 2021-08-22 RX ADMIN — SODIUM CHLORIDE: 9 INJECTION, SOLUTION INTRAVENOUS at 11:15

## 2021-08-22 ASSESSMENT — PAIN SCALES - GENERAL
PAINLEVEL_OUTOF10: 0

## 2021-08-22 NOTE — ED NOTES
Blood Cultures obtained from [facility], per policy. 1st set drawn at this time and sent to lab. Blood Cultures obtained from [facility]. 2nd set drawn and sent to lab, 5 min. Apart.       Tatiana Zhu RN  08/22/21 9402

## 2021-08-22 NOTE — Clinical Note
Patient Class: Observation [104]   REQUIRED: Diagnosis: MORENA ([facility] kidney [facility]) ([facility] [[id number]]   Estimated Length of Stay: Estimated stay of less than 2 midnights   Telemetry/Cardiac Monitoring Required?: No

## 2021-08-22 NOTE — H&P
[address] [place], [place] [address]ist Group   History and Physical      CHIEF COMPLAINT:  Urinary Retention     History of Present Illness: Charanjit Lawson is a  80 y.o. male with a history of hypertension, hyperlipidemia, pacemaker, and enlarged prostate, who presents with urinary retention with Guillory catheter. Patient states for the last 2 days he has been experiencing some difficulty with urine not draining into the Guillory. Patient states that on August 14, 2021 he had a Guillory catheter placed due to urinary retention. Patient states that he went home with his Guillory catheter, up until about a day or so he started having issues with urine retention. Patient stated that his abdomen became distended and and felt the urge to urinate but was not able to. Patient presented to the ED d/t increasing abdominal pain from not being able to void. Patient's guillory catheter was irrigated and unclogged. Patient continues to have the same 26 fr catheter in place. Patient denies any tobacco, alcohol or illicit drug use. Informant(s) for H&P:Provided by patient     REVIEW OF SYSTEMS:  no fevers, chills, cp, sob, n/v, ha, vision/hearing changes, wt changes, hot/cold flashes, other open skin lesions, diarrhea, constipation, dysuria/hematuria unless noted in HPI. Complete ROS performed with the patient and is otherwise negative. PMH:  Past Medical History:   Diagnosis Date    Enlarged prostate     Hyperlipidemia     Hypertension     Lumbosacral strain 7/8/2013    Pacemaker        Surgical History:  Past Surgical History:   Procedure Laterality Date    A-V CARDIAC PACEMAKER INSERTION  5/30/07    COLONOSCOPY  8/3/11    EYE SURGERY  2002    cataracts evelyn    TONSILLECTOMY      child       Medications Prior to Admission:    Prior to Admission medications    Medication Sig Start Date End Date Taking?  Authorizing Provider   Multiple Vitamin (ONE-A-DAY ESSENTIAL) TABS Take 1 tablet by mouth daily    Historical Provider, MD   ibuprofen (ADVIL;MOTRIN) 200 MG tablet Take 1 tablet by mouth every 6 hours as needed for Pain 8/12/21   Austin Echols MD   ferrous sulfate (IRON 325) 325 (65 Fe) MG tablet Take 325 mg by mouth daily    Historical Provider, MD   Multiple Vitamins-Minerals (THERAPEUTIC MULTIVITAMIN-MINERALS) tablet Take 1 tablet by mouth daily    Historical Provider, MD   metoprolol succinate (TOPROL XL) 25 MG extended release tablet Take 1 tablet by mouth daily 6/9/21   Austin Echols MD   lisinopril-hydroCHLOROthiazide (PRINZIDE;ZESTORETIC) 20-25 MG per tablet Take 1 tablet by mouth daily 6/9/21   Austin Echols MD   finasteride (PROSCAR) 5 MG tablet Take 1 tablet by mouth daily 6/9/21   Austin Echols MD   amLODIPine (NORVASC) 2.5 MG tablet Take 1 tablet by mouth daily 6/9/21   Austin Echols MD   simvastatin (ZOCOR) 40 MG tablet Take 1 tablet by mouth nightly 6/9/21   Austin Echols MD   Cholecalciferol (VITAMIN D3) 50 MCG (2000 UT) CAPS Take 2,000 Units by mouth daily     Historical Provider, MD   Ascorbic Acid (C-250) 250 MG CHEW Take 250 mg by mouth daily     Historical Provider, MD   vitamin B-12 (CYANOCOBALAMIN) 1000 MCG tablet Take 1,000 mcg by mouth daily    Historical Provider, MD   pantoprazole (PROTONIX) 40 MG tablet Take 40 mg by mouth daily  12/6/19   Historical Provider, MD   timolol (TIMOPTIC) 0.5 % ophthalmic solution Place 1 drop into both eyes 2 times daily  5/4/16   Historical Provider, MD   Saw [place], Serenoa repens, 450 MG CAPS Take 2 tablets by mouth daily. Historical Provider, MD   [facility] Natural Products (OSTEO BI-FLEX JOINT SHIELD PO) Take 2 tablets by mouth daily. Historical Provider, MD   aspirin 81 MG EC tablet Take 81 mg by mouth daily. Historical Provider, MD       Allergies:    Patient has no known allergies. Social History:    reports that he quit smoking about 20 years ago. He has a 40.00 pack-year smoking history.  He has never used smokeless tobacco. He reports that he does not drink alcohol and does not use drugs. Family History:   family history includes Cancer in his mother; Diabetes in his mother; Heart Disease in his father; High Blood Pressure in his father and mother. PHYSICAL EXAM:  Vitals:  BP (!) 139/54   Pulse 70   Temp 98 °F (36.7 °C) (Infrared)   Resp 20   Ht 5' 9\" (1.753 m)   Wt 141 lb (64 kg)   SpO2 97%   BMI 20.82 kg/m²      General Appearance: alert and oriented to person, place and time and in no acute distress  Skin: warm and dry  Head: normocephalic and atraumatic  Eyes: pupils equal, round, and reactive to light, extraocular eye movements intact, conjunctivae normal, wears glasses  Neck: neck supple and non tender without mass   Pulmonary/Chest: diminished with rales/rhonchi and moist cough for 1 weeks, normal air movement, no respiratory distress  Cardiovascular: normal rate, Pacemaker intact and no carotid bruits  Abdomen: soft, non-tender, non-distended, normal bowel sounds   Extremities: no cyanosis, no clubbing and no edema  Neurologic: no cranial nerve deficit and speech normal    LABS:  Recent Labs     08/22/21 0221 08/22/21  0536   * 132   K 4.1 4.0   CL 93* 99   CO2 22 20*   BUN 54* 50*   CREATININE 2.4* 2.2*   GLUCOSE 127* 109*   CALCIUM 9.0 8.2*       Recent Labs     08/22/21 0221 08/22/21  0738   WBC 11.5 13.1*   RBC 3.43* 3.33*   HGB 10.6* 10.3*   HCT 32.0* 30.6*   MCV 93.3 91.9   MCH 30.9 30.9   MCHC 33.1 33.7   RDW 13.3 13.3    417   MPV 10.2 10.1       No results for input(s): POCGLU in the last 72 hours.     U/A:    Lab Results   Component Value Date    COLORU Yellow 08/22/2021    PROTEINU >=300 08/22/2021    PHUR 6.5 08/22/2021    WBCUA PACKED 08/22/2021    RBCUA >20 08/22/2021    BACTERIA MANY 08/22/2021    CLARITYU TURBID 08/22/2021    SPECGRAV 1.020 08/22/2021    LEUKOCYTESUR LARGE 08/22/2021    UROBILINOGEN 0.2 08/22/2021    BILIRUBINUR Negative 08/22/2021    BLOODU LARGE 08/22/2021    GLUCOSEU Negative 08/22/2021       Radiology: XR CHEST PORTABLE    Result Date: 8/22/2021  EXAMINATION: ONE XRAY VIEW OF THE CHEST 8/22/2021 6:35 am COMPARISON: 08/09/2021 HISTORY: ORDERING SYSTEM PROVIDED HISTORY: ?volume overload TECHNOLOGIST PROVIDED HISTORY: Reason for exam:->?volume overload FINDINGS: There is some patchy infiltrate in the left lower lobe which is concerning for pneumonia. Left pacemaker is unchanged. There is no cardiomegaly or vascular congestion. Calcified granuloma in the right lower lobe is unchanged. There is no pleural effusion or pneumothorax. Left lower lobe infiltrate is concerning for pneumonia. ASSESSMENT:      Principal Problem:    MORENA (acute kidney injury) ([address]  Active Problems:    Pure hypercholesterolemia    Essential hypertension    Iron deficiency anemia due to chronic blood loss    Stage 3a chronic kidney disease ([address]    Urinary retention  Resolved Problems:    * No resolved hospital problems. *      PLAN:    1. Acute kidney injury superimposed on stage IIIa chronic kidney disease - admit telemetry - most likely creatinine is worse d/t urinary retention - nephrology consulted - IVF's for gentle hydration - will monitor renal function    2. Urinary retention - originally experience retention on 8/14/21 and guillory catheter inserted - folely irrigated d/t being clogged     3. Urinary tract infection - awaiting urine culture - IV Rocephin     4. Left lower lobe Pneumonia - r/o COVID pneumonia - moist cough - sating 94% on room air - rapid COVID test negative - PCR ordered - IV Azithromycin and Ceftriaxone - will place respiratory and contact isolation according to CDC protocol for COVID-19  - previously had COVID in 2/5/21     5. Essential hypertension - BP slightly elevated - holding Zestoretic - continue Norvasc and Toprol XL    6. Pure hypercholesterolemia - usually on Zocor but replaced with Lipitor     7.  Coronary artery disease - on Aspirin - AV Pacemaker - elevated proBNP - will give fluids with caution - Toprol XL - no c/o shortness of breath or chest pain    8. Benign prostatic hyperplasia  - resume home dose Proscar     9. Iron deficiency anemia - oral Ferrous sulfate - H&H stable     10. Elevated blood glucose - without the diagnosis of diabetes mellitus -A1c pending    Code Status: Full Code  DVT prophylaxis: Lovenox     NOTE: This report was transcribed using voice recognition software.  Every effort was made to ensure accuracy; however, inadvertent computerized transcription errors may be present.     Electronically signed by RAJAN Kay CNP on 8/22/2021 at 8:17 AM

## 2021-08-22 NOTE — ED NOTES
Bladder scan-330cc. Dr. Macias. Requested irrigate of guillory.      Julián Marmolejo RN  08/22/21 1357

## 2021-08-22 NOTE — ED PROVIDER NOTES
HPI:  8/22/21, Time: 2:51 AM EDT         Tano Valentino is a 80 y.o. male presenting to the ED for urinary retnetion with guillory catheter in place that was placed last Saturday for retention due to enlarged prostate. He does have a 32 [address] three-way Guillory catheter placed in. He stopped putting urine out of the Guillory at 2 PM yesterday. He does not appear to be in any discomfort. He was advised by the urology office to irrigate the Guillory catheter but they were uncomfortable doing this at home. He does not report any dysuria, hematuria, abdominal pain, flank pain, fever or chills, nausea or vomiting. The complaint has been persistent, moderate in severity, and worsened by nothing. Patient denies fever/chills, sore throat, cough, congestion, chest pain, shortness of breath, edema, headache, visual disturbances, focal paresthesias, focal weakness, abdominal pain, nausea, vomiting, diarrhea, constipation, dysuria, hematuria, trauma, neck or back pain, rash or other complaints. ROS:   A complete review of systems was performed and all pertinent positives and negatives are stated within HPI, all other systems reviewed and are negative.      --------------------------------------------- PAST HISTORY ---------------------------------------------  Past Medical History:  has a past medical history of Enlarged prostate, Hyperlipidemia, Hypertension, Lumbosacral strain, and Pacemaker. Past Surgical History:  has a past surgical history that includes A-V cardiac pacemaker insertion (5/30/07); eye surgery (2002); Tonsillectomy; and Colonoscopy (8/3/11). Social History:  reports that he quit smoking about 20 years ago. He has a 40.00 pack-year smoking history. He has never used smokeless tobacco. He reports that he does not drink alcohol and does not use drugs.     Family History: family history includes Cancer in his mother; Diabetes in his mother; Heart Disease in his father; High Blood Pressure in his father and mother. The patients home medications have been reviewed. Allergies: Patient has no known allergies. ----------------------------------------PHYSICAL EXAM--------------------------------------  Constitutional:  Well developed, well nourished, no acute distress, non-toxic appearance   Eyes:  PERRL, conjunctiva normal, EOMI  HENT:  Atraumatic, external ears normal, nose normal, oropharynx moist. Neck- normal range of motion, no nuchal rigidity   Respiratory:  No respiratory distress, normal breath sounds, no rales, no wheezing   Cardiovascular:  Normal rate, normal rhythm, no murmurs, no gallops, no rubs. Radial pulses 2+ bilaterally. GI:  Soft, nondistended, normal bowel sounds, nontender, no organomegaly, no mass, no rebound, no guarding   :  No costovertebral angle tenderness. 32 [address], three-way Fay catheter in place with no urine draining. Musculoskeletal:  No edema, no tenderness, no deformities. Back- no tenderness  Integument:  Well hydrated, no visible rash. Adequate perfusion. Lymphatic:  No inguinal lymphadenopathy noted   Neurologic:  Alert & oriented x 3, CN 2-12 normal, no focal deficits noted. Normal gait. Psychiatric:  Speech and behavior appropriate       -------------------------------------------------- RESULTS -------------------------------------------------  I have personally reviewed all laboratory and imaging results for this patient. Results are listed below.      LABS:  Results for orders placed or performed during the hospital encounter of 08/22/21   CBC auto differential   Result Value Ref Range    WBC 11.5 4.5 - 11.5 E9/L    RBC 3.43 (L) 3.80 - 5.80 E12/L    Hemoglobin 10.6 (L) 12.5 - 16.5 g/dL    Hematocrit 32.0 (L) 37.0 - 54.0 %    MCV 93.3 80.0 - 99.9 fL    MCH 30.9 26.0 - 35.0 pg    MCHC 33.1 32.0 - 34.5 %    RDW 13.3 11.5 - 15.0 fL    Platelets 656 861 - 179 E9/L    MPV 10.2 7.0 - 12.0 fL    Neutrophils % 83.7 (H) 43.0 - 80.0 %    Immature Granulocytes % 0.4 0.0 - 5.0 %    Lymphocytes % 9.0 (L) 20.0 - 42.0 %    Monocytes % 6.6 2.0 - 12.0 %    Eosinophils % 0.1 0.0 - 6.0 %    Basophils % 0.2 0.0 - 2.0 %    Neutrophils Absolute 9.62 (H) 1.80 - 7.30 E9/L    Immature Granulocytes # 0.05 E9/L    Lymphocytes Absolute 1.04 (L) 1.50 - 4.00 E9/L    Monocytes Absolute 0.76 0.10 - 0.95 E9/L    Eosinophils Absolute 0.01 (L) 0.05 - 0.50 E9/L    Basophils Absolute 0.02 0.00 - 0.20 E9/L   Comprehensive Metabolic Panel   Result Value Ref Range    Sodium 130 (L) 132 - 146 mmol/L    Potassium 4.1 3.5 - 5.0 mmol/L    Chloride 93 (L) 98 - 107 mmol/L    CO2 22 22 - 29 mmol/L    Anion Gap 15 7 - 16 mmol/L    Glucose 127 (H) 74 - 99 mg/dL    BUN 54 (H) 6 - 23 mg/dL    CREATININE 2.4 (H) 0.7 - 1.2 mg/dL    GFR Non-African American 26 >=60 mL/min/1.73    GFR African American 31     Calcium 9.0 8.6 - 10.2 mg/dL    Total Protein 6.9 6.4 - 8.3 g/dL    Albumin 3.1 (L) 3.5 - 5.2 g/dL    Total Bilirubin 0.5 0.0 - 1.2 mg/dL    Alkaline Phosphatase 96 40 - 129 U/L    ALT 8 0 - 40 U/L    AST 9 0 - 39 U/L   Lactic Acid, Plasma   Result Value Ref Range    Lactic Acid 1.0 0.5 - 2.2 mmol/L   Basic Metabolic Panel   Result Value Ref Range    Sodium 132 132 - 146 mmol/L    Potassium 4.0 3.5 - 5.0 mmol/L    Chloride 99 98 - 107 mmol/L    CO2 20 (L) 22 - 29 mmol/L    Anion Gap 13 7 - 16 mmol/L    Glucose 109 (H) 74 - 99 mg/dL    BUN 50 (H) 6 - 23 mg/dL    CREATININE 2.2 (H) 0.7 - 1.2 mg/dL    GFR Non-African American 28 >=60 mL/min/1.73    GFR African American 34     Calcium 8.2 (L) 8.6 - 10.2 mg/dL       RADIOLOGY:  Interpreted by Radiologist.  XR CHEST PORTABLE    (Results Pending)         ------------------------- NURSING NOTES AND VITALS REVIEWED ---------------------------  The nursing notes within the ED encounter and vital signs as below have been reviewed by myself.     BP (!) 139/54   Pulse 70   Temp 98 °F (36.7 °C) (Infrared)   Resp 20   Ht 5' 9\" (1.753 m)   Wt 141 lb (64 kg)   SpO2 97% BMI 20.82 kg/m²   Oxygen Saturation Interpretation: Normal      The patients available past medical records and past encounters were reviewed. ------------------------------ ED COURSE/MEDICAL DECISION MAKING----------------------  Medications   0.9 % sodium chloride bolus (0 mLs Intravenous Stopped 8/22/21 0530)           Procedures:   none      Medical Decision Making:    Bladder scan done by RN and 350cc urine in bladder   Irrigation done by RN   6:41 AM EDT  Daughter at bedside now states that patient has a junky cough. When asked patient states that it has been going on for a week now. Daughter also states that his roommate at the nursing home has pneumonia and patient has had intermittent fever. She would like him checked. Will order labs, blood cultures, chest x-ray, BNP and Covid test.    This patient's ED course included: multiple bedside re-evaluations, cardiac monitoring, continuous pulse oximetry and a personal history and physicial eaxmination    This patient has remained hemodynamically stable, improved and been closely monitored during their ED course. Re-Evaluations:  Time: 6:42 AM EDT   Re-evaluation. Patients symptoms show no change  Repeat physical examination is not changed      Consultations:   6:37 AM EDT  Spoke with Dr Radha Sweet, discussed case, recommends patient be admitted for fluids and renal monitoring  6:39 AM EDT  Spoke with Dr Chance Ramos, discussed case, accepts admission    Critical Care: none    Narayan ZAVALA, DO, am the Primary Provider of Record    Counseling: The emergency provider has spoken with the patient and daughter and discussed todays results, in addition to providing specific details for the plan of care and counseling regarding the diagnosis and prognosis. Questions are answered at this time and they are agreeable with the plan.    --------------------------- IMPRESSION AND DISPOSITION ---------------------------------    IMPRESSION  1.  Acute on chronic renal insufficiency    2. Calcification of indwelling Fay catheter, initial encounter ([facility] [address]    3. Benign prostatic hyperplasia, unspecified whether lower urinary tract symptoms present    4.  Cough        DISPOSITION  Disposition: Admit to med/surg floor  Patient condition is stable             Yung Gutierrez DO  08/22/21 [id number]

## 2021-08-22 NOTE — ED NOTES
Fay irrigant done. Pt. Tolerated well. Fay catheter drained /emptied 3000 cc. .  IV infusing as ordered.      Remy Pandya RN  08/22/21 [id number]

## 2021-08-22 NOTE — CARE COORDINATION
CM Note: 8/22/2021 at 1pm: [address] PENDING (ORDRED TODAY) / RAPID COVID NEGATIVE ON 8/22. SW consult received for \"discharge planning needs\". CM went to room went into room to discuss transition of care and do readmission assessment, however, patient is sleeping. Per chart review, patient was recently discharged from [facility] on 8/12/2021 and per notes, appears patient went home with guillory cath. PT / [place] may be helpful when the patient is able to assist with discharge planning. Unit CM / SW to follow tomorrow.  Guanakito Cronin RN

## 2021-08-22 NOTE — ED NOTES
Irrigant connected to 3 way guillory-good flow noted. No return flow noted initiallly. Then return flow of dark brown urine noted. Saline removed from catheter bulb and guillory catheter manipulated into penis. Return flow of large amount yellow/milky fluid. Saline instilled into catheter bulb. And irrigant flowing via gravity without incident. Dr. Musa Lafleur informed.      Sweetie Lopez RN  08/22/21 [id number]

## 2021-08-22 NOTE — ED NOTES
Daughter feels pt. Becoming congested (lungs). Rhonchi noted L. Upper lobe. Dr. Macias.      Victorino Campuzano, RN  08/22/21 [id number]

## 2021-08-22 NOTE — PROGRESS NOTES
3 way guillory irrigated with 100 ml normal saline, returned yellow sedimented urine, no blood or clots. Pt tolerated well.

## 2021-08-22 NOTE — CONSULTS
INPATIENT CONSULTATION RECORD FOR  8/22/2021      [facility]  [address] [facility]. [facility], [address] [facility]  [phone]        REASON FOR CONSULTATION: Urinary retention/BPH/Bilateral hydronephrosis/Malfunctioning Fay catheter      HISTORY OF PRESENT ILLNESS:      The patient is a 80 y.o. male patient who is known to my father. He was admitted earlier this month with urinary retention. He had seen Dr. Elaina Welch in the past for BPH issues and has taken Proscar chronically. A Fay was placed earlier this month for a residual of approximately 4 L. A CT scan on 8/9/2021 showed severely dilated bladder with severe bilateral hydronephrosis and severe heterogeneous enlargement of the prostate gland with mass-effect on the bladder base. I spoke with his daughter who is a nurse last night. He was at home and they noticed that there was no drainage of urine through the catheter. She was unable to flush it. She presented to the ER where the catheter was subsequently irrigated and approximately 1 L of urine drained immediately after this was done. He is currently comfortable. He denies flank or abdominal pain. He denies hematuria or UTIs. He is to be seen by nephrology. His creatinine is currently 2.2. He has been started empirically on antibiotics. Cultures are pending.     Past Medical History:   Diagnosis Date    Enlarged prostate     Hyperlipidemia     Hypertension     Lumbosacral strain 7/8/2013    Pacemaker          Past Surgical History:   Procedure Laterality Date    A-V CARDIAC PACEMAKER INSERTION  5/30/07    COLONOSCOPY  8/3/11    EYE SURGERY  2002    cataracts evelyn    TONSILLECTOMY      child       Medications Prior to Admission:    Medications Prior to Admission: Multiple Vitamin (ONE-A-DAY ESSENTIAL) TABS, Take 1 tablet by mouth daily  ibuprofen (ADVIL;MOTRIN) 200 MG tablet, Take 1 tablet by mouth every 6 hours as needed for Pain  ferrous sulfate (IRON 325) 325 (65 Fe) MG tablet, Take 325 mg by mouth daily  Multiple Vitamins-Minerals (THERAPEUTIC MULTIVITAMIN-MINERALS) tablet, Take 1 tablet by mouth daily  metoprolol succinate (TOPROL XL) 25 MG extended release tablet, Take 1 tablet by mouth daily  lisinopril-hydroCHLOROthiazide (PRINZIDE;ZESTORETIC) 20-25 MG per tablet, Take 1 tablet by mouth daily  finasteride (PROSCAR) 5 MG tablet, Take 1 tablet by mouth daily  amLODIPine (NORVASC) 2.5 MG tablet, Take 1 tablet by mouth daily  simvastatin (ZOCOR) 40 MG tablet, Take 1 tablet by mouth nightly  Cholecalciferol (VITAMIN D3) 50 MCG (2000 UT) CAPS, Take 2,000 Units by mouth daily   Ascorbic Acid (C-250) 250 MG CHEW, Take 250 mg by mouth daily   vitamin B-12 (CYANOCOBALAMIN) 1000 MCG tablet, Take 1,000 mcg by mouth daily  pantoprazole (PROTONIX) 40 MG tablet, Take 40 mg by mouth daily   timolol (TIMOPTIC) 0.5 % ophthalmic solution, Place 1 drop into both eyes 2 times daily   Saw Palmetto, Serenoa repens, 450 MG CAPS, Take 2 tablets by mouth daily. Misc Natural Products (OSTEO BI-FLEX JOINT SHIELD PO), Take 2 tablets by mouth daily. aspirin 81 MG EC tablet, Take 81 mg by mouth daily. Allergies:    Patient has no known allergies. Social History:   He  reports that he quit smoking about 20 years ago. He has a 40.00 pack-year smoking history. He has never used smokeless tobacco. He reports that he does not drink alcohol and does not use drugs. He is  and has 6 children. He is retired. Family History:   Non-contributory to this Urological problem  family history includes Cancer in his mother; Diabetes in his mother; Heart Disease in his father; High Blood Pressure in his father and mother.     REVIEW OF SYSTEMS:  Respiratory: denies any symptoms of a productive cough, shortness of breath, or hemoptysis  Cardiovascular: denies chest pain, dyspnea, or cardiac murmur  Gastrointestinal: negative for abdominal pain, diarrhea, or constipation  Dermatologic: denies any rashes, skin lesion(s), or pruritis  Neurological: negative for headaches, seizures, and tremors  Endocrine: negative for diabetic symptoms including polydipsia and polyuria or thyroid dysfunction  Ocular: denies retinopathy or glaucoma  ENT: denies sinusitis or epistaxis  Constitutional: denies nausea, vomiting, fever, or chills  Psychiatric: denies anxiety or depression  : denies hematuria or UTIs    PHYSICAL EXAM:    Vitals:  BP (!) 136/54   Pulse 77   Temp 99.6 °F (37.6 °C) (Oral)   Resp 16   Ht 5' 9\" (1.753 m)   Wt 141 lb (64 kg)   SpO2 93%   BMI 20.82 kg/m²     General:  Awake, alert, oriented X 3. Well developed, well nourished, well groomed. No apparent distress. HEENT:  Normocephalic, atraumatic. Pupils equal, round. No scleral icterus. No conjunctival injection. Normal lips, teeth, and gums. No nasal discharge. Neck:  Supple, no masses. He has hearing loss and wears bilateral hearing aids  Heart:  RRR. Lungs:  No audible wheezing. Respirations symmetric and non-labored. Clear bilaterally. Abdomen:  Soft, nontender, no masses, no organomegaly, no peritoneal signs. Active bowel sounds. No rebound or guarding. No flank or CVA tenderness. Extremities:  No clubbing, cyanosis, or edema. Brisk peripheral pulses. Skin:  Warm and dry, no open lesions or rashes. Neuro:  Cranial nerves 2-12 intact, no focal motor or sensory deficits. Alert and oriented. Rectal: Deferred  Genitalia: Circumcised male without lesions or deformity. Testes are normal bilaterally. There is no hydrocele, hernia, varicocele bilaterally. Spermatic cord and vas deferens are normal bilaterally.   He has a 26 [address] three-way Fay catheter well-positioned and draining cloudy, yellow-colored urine into a gravity bag    LABS:    Lab Results   Component Value Date    WBC 13.1 (H) 08/22/2021    HGB 10.3 (L) 08/22/2021    HCT 30.6 (L) 08/22/2021    MCV 91.9 08/22/2021     08/22/2021       Lab Results Component Value Date    BUN 50 (H) 08/22/2021    CREATININE 2.2 (H) 08/22/2021       Lab Results   Component Value Date    PSA 2.56 11/11/2015         ASSESSMENT / PLAN:    Urinary retention/BPH/Bilateral hydronephrosis/Malfunctioning Fay catheter  He will continue broad-spectrum antibiotics. Cultures are pending. He will continue taking Proscar as he has been for years. His catheter is now draining after an apparent mucous plug had caused an obstruction. His catheter be irrigated every shift and as needed to maintain patency. We again discussed the fact that he will likely need an eventual suprapubic tube and/or TURP may be later during this admission or as an outpatient in the coming weeks. He would certainly require preoperative medical clearance for this to occur given his advanced age. We will continue to follow him during his hospital stay. Thank you for allow me to assist in his care once again.   Sincerely,      Luis Deleon MD  11:18 AM  8/22/2021

## 2021-08-23 ENCOUNTER — APPOINTMENT (OUTPATIENT)
Dept: GENERAL RADIOLOGY | Age: 86
DRG: 698 | End: 2021-08-23
Payer: MEDICARE

## 2021-08-23 LAB
ALBUMIN SERPL-MCNC: 2.7 G/DL (ref 3.5–5.2)
ALP BLD-CCNC: 121 U/L (ref 40–129)
ALT SERPL-CCNC: 10 U/L (ref 0–40)
ANION GAP SERPL CALCULATED.3IONS-SCNC: 12 MMOL/L (ref 7–16)
AST SERPL-CCNC: 13 U/L (ref 0–39)
BACTERIA: ABNORMAL /HPF
BASOPHILS ABSOLUTE: 0 E9/L (ref 0–0.2)
BASOPHILS RELATIVE PERCENT: 0.4 % (ref 0–2)
BILIRUB SERPL-MCNC: 0.5 MG/DL (ref 0–1.2)
BILIRUBIN URINE: NEGATIVE
BLOOD, URINE: ABNORMAL
BUN BLDV-MCNC: 47 MG/DL (ref 6–23)
BURR CELLS: ABNORMAL
CALCIUM SERPL-MCNC: 8.6 MG/DL (ref 8.6–10.2)
CHLORIDE BLD-SCNC: 100 MMOL/L (ref 98–107)
CLARITY: ABNORMAL
CO2: 23 MMOL/L (ref 22–29)
COLOR: YELLOW
CREAT SERPL-MCNC: 2 MG/DL (ref 0.7–1.2)
EOSINOPHILS ABSOLUTE: 0 E9/L (ref 0.05–0.5)
EOSINOPHILS RELATIVE PERCENT: 0.1 % (ref 0–6)
EPITHELIAL CELLS, UA: ABNORMAL /HPF
GFR AFRICAN AMERICAN: 38
GFR NON-AFRICAN AMERICAN: 32 ML/MIN/1.73
GLUCOSE BLD-MCNC: 125 MG/DL (ref 74–99)
GLUCOSE URINE: NEGATIVE MG/DL
HBA1C MFR BLD: 6 % (ref 4–5.6)
HCT VFR BLD CALC: 29.5 % (ref 37–54)
HEMOGLOBIN: 9.7 G/DL (ref 12.5–16.5)
KETONES, URINE: NEGATIVE MG/DL
LACTIC ACID: 0.9 MMOL/L (ref 0.5–2.2)
LEUKOCYTE ESTERASE, URINE: ABNORMAL
LYMPHOCYTES ABSOLUTE: 1.46 E9/L (ref 1.5–4)
LYMPHOCYTES RELATIVE PERCENT: 11.4 % (ref 20–42)
MAGNESIUM: 2.1 MG/DL (ref 1.6–2.6)
MCH RBC QN AUTO: 31.3 PG (ref 26–35)
MCHC RBC AUTO-ENTMCNC: 32.9 % (ref 32–34.5)
MCV RBC AUTO: 95.2 FL (ref 80–99.9)
MONOCYTES ABSOLUTE: 0.4 E9/L (ref 0.1–0.95)
MONOCYTES RELATIVE PERCENT: 2.6 % (ref 2–12)
NEUTROPHILS ABSOLUTE: 11.44 E9/L (ref 1.8–7.3)
NEUTROPHILS RELATIVE PERCENT: 86 % (ref 43–80)
NITRITE, URINE: POSITIVE
PDW BLD-RTO: 13.6 FL (ref 11.5–15)
PH UA: 6 (ref 5–9)
PHOSPHORUS: 4.1 MG/DL (ref 2.5–4.5)
PLATELET # BLD: 384 E9/L (ref 130–450)
PMV BLD AUTO: 10.2 FL (ref 7–12)
POIKILOCYTES: ABNORMAL
POTASSIUM SERPL-SCNC: 3.6 MMOL/L (ref 3.5–5)
PRO-BNP: 3795 PG/ML (ref 0–450)
PROTEIN UA: 100 MG/DL
RBC # BLD: 3.1 E12/L (ref 3.8–5.8)
RBC UA: ABNORMAL /HPF (ref 0–2)
SODIUM BLD-SCNC: 135 MMOL/L (ref 132–146)
SPECIFIC GRAVITY UA: 1.02 (ref 1–1.03)
TEAR DROP CELLS: ABNORMAL
TOTAL PROTEIN: 6.2 G/DL (ref 6.4–8.3)
UROBILINOGEN, URINE: 0.2 E.U./DL
WBC # BLD: 13.3 E9/L (ref 4.5–11.5)
WBC UA: ABNORMAL /HPF (ref 0–5)

## 2021-08-23 PROCEDURE — 85025 COMPLETE CBC W/AUTO DIFF WBC: CPT

## 2021-08-23 PROCEDURE — 2700000000 HC OXYGEN THERAPY PER DAY

## 2021-08-23 PROCEDURE — 97535 SELF CARE MNGMENT TRAINING: CPT

## 2021-08-23 PROCEDURE — 94669 MECHANICAL CHEST WALL OSCILL: CPT

## 2021-08-23 PROCEDURE — 71046 X-RAY EXAM CHEST 2 VIEWS: CPT

## 2021-08-23 PROCEDURE — 94640 AIRWAY INHALATION TREATMENT: CPT

## 2021-08-23 PROCEDURE — 83735 ASSAY OF MAGNESIUM: CPT

## 2021-08-23 PROCEDURE — 80053 COMPREHEN METABOLIC PANEL: CPT

## 2021-08-23 PROCEDURE — 84100 ASSAY OF PHOSPHORUS: CPT

## 2021-08-23 PROCEDURE — 96372 THER/PROPH/DIAG INJ SC/IM: CPT

## 2021-08-23 PROCEDURE — 83880 ASSAY OF NATRIURETIC PEPTIDE: CPT

## 2021-08-23 PROCEDURE — 36415 COLL VENOUS BLD VENIPUNCTURE: CPT

## 2021-08-23 PROCEDURE — 96366 THER/PROPH/DIAG IV INF ADDON: CPT

## 2021-08-23 PROCEDURE — G0378 HOSPITAL OBSERVATION PER HR: HCPCS

## 2021-08-23 PROCEDURE — 96376 TX/PRO/DX INJ SAME DRUG ADON: CPT

## 2021-08-23 PROCEDURE — APPSS30 APP SPLIT SHARED TIME 16-30 MINUTES: Performed by: NURSE PRACTITIONER

## 2021-08-23 PROCEDURE — 2580000003 HC RX 258: Performed by: NURSE PRACTITIONER

## 2021-08-23 PROCEDURE — 6370000000 HC RX 637 (ALT 250 FOR IP): Performed by: NURSE PRACTITIONER

## 2021-08-23 PROCEDURE — 6370000000 HC RX 637 (ALT 250 FOR IP): Performed by: INTERNAL MEDICINE

## 2021-08-23 PROCEDURE — 6360000002 HC RX W HCPCS: Performed by: NURSE PRACTITIONER

## 2021-08-23 PROCEDURE — 83036 HEMOGLOBIN GLYCOSYLATED A1C: CPT

## 2021-08-23 PROCEDURE — 97530 THERAPEUTIC ACTIVITIES: CPT | Performed by: PHYSICAL THERAPIST

## 2021-08-23 PROCEDURE — 87077 CULTURE AEROBIC IDENTIFY: CPT

## 2021-08-23 PROCEDURE — 97161 PT EVAL LOW COMPLEX 20 MIN: CPT | Performed by: PHYSICAL THERAPIST

## 2021-08-23 PROCEDURE — 93005 ELECTROCARDIOGRAM TRACING: CPT | Performed by: NURSE PRACTITIONER

## 2021-08-23 PROCEDURE — 87088 URINE BACTERIA CULTURE: CPT

## 2021-08-23 PROCEDURE — 99232 SBSQ HOSP IP/OBS MODERATE 35: CPT | Performed by: INTERNAL MEDICINE

## 2021-08-23 PROCEDURE — 81001 URINALYSIS AUTO W/SCOPE: CPT

## 2021-08-23 PROCEDURE — 97165 OT EVAL LOW COMPLEX 30 MIN: CPT

## 2021-08-23 PROCEDURE — 83605 ASSAY OF LACTIC ACID: CPT

## 2021-08-23 PROCEDURE — 87186 SC STD MICRODIL/AGAR DIL: CPT

## 2021-08-23 RX ADMIN — PANTOPRAZOLE SODIUM 40 MG: 40 TABLET, DELAYED RELEASE ORAL at 08:26

## 2021-08-23 RX ADMIN — AMLODIPINE BESYLATE 2.5 MG: 2.5 TABLET ORAL at 08:26

## 2021-08-23 RX ADMIN — ACETAMINOPHEN 650 MG: 325 TABLET ORAL at 01:25

## 2021-08-23 RX ADMIN — ENOXAPARIN SODIUM 30 MG: 30 INJECTION SUBCUTANEOUS at 11:43

## 2021-08-23 RX ADMIN — Medication 10 ML: at 20:01

## 2021-08-23 RX ADMIN — FINASTERIDE 5 MG: 5 TABLET, FILM COATED ORAL at 08:26

## 2021-08-23 RX ADMIN — ASPIRIN 81 MG: 81 TABLET, FILM COATED ORAL at 08:27

## 2021-08-23 RX ADMIN — WATER 1000 MG: 1 INJECTION INTRAMUSCULAR; INTRAVENOUS; SUBCUTANEOUS at 11:43

## 2021-08-23 RX ADMIN — IPRATROPIUM BROMIDE AND ALBUTEROL SULFATE 1 AMPULE: .5; 3 SOLUTION RESPIRATORY (INHALATION) at 01:04

## 2021-08-23 RX ADMIN — METOPROLOL SUCCINATE 25 MG: 25 TABLET, EXTENDED RELEASE ORAL at 08:26

## 2021-08-23 RX ADMIN — TIMOLOL MALEATE 1 DROP: 5 SOLUTION OPHTHALMIC at 08:27

## 2021-08-23 RX ADMIN — Medication 1 TABLET: at 08:27

## 2021-08-23 RX ADMIN — ATORVASTATIN CALCIUM 20 MG: 20 TABLET, FILM COATED ORAL at 08:26

## 2021-08-23 RX ADMIN — Medication 2000 UNITS: at 14:41

## 2021-08-23 RX ADMIN — AZITHROMYCIN DIHYDRATE 500 MG: 500 INJECTION, POWDER, LYOPHILIZED, FOR SOLUTION INTRAVENOUS at 11:43

## 2021-08-23 RX ADMIN — OXYCODONE HYDROCHLORIDE AND ACETAMINOPHEN 250 MG: 500 TABLET ORAL at 08:26

## 2021-08-23 RX ADMIN — CYANOCOBALAMIN TAB 1000 MCG 1000 MCG: 1000 TAB at 14:41

## 2021-08-23 RX ADMIN — TIMOLOL MALEATE 1 DROP: 5 SOLUTION OPHTHALMIC at 20:00

## 2021-08-23 RX ADMIN — FERROUS SULFATE TAB 325 MG (65 MG ELEMENTAL FE) 325 MG: 325 (65 FE) TAB at 08:26

## 2021-08-23 ASSESSMENT — ENCOUNTER SYMPTOMS: TACHYPNEA: 1

## 2021-08-23 ASSESSMENT — PAIN SCALES - GENERAL
PAINLEVEL_OUTOF10: 0
PAINLEVEL_OUTOF10: 3

## 2021-08-23 NOTE — PROGRESS NOTES
[address] [facility]  [facility] [place]. [place]        Date:2021                                                  Patient Name: Mitzi Bell    MRN: [id number]    : 1932    Room: [address] [place] [address]      Evaluating OT: Eleanor Benson OTR/L; [id number]     Referring Provider and Specific Provider Orders/Date:      21  OT eval and treat Start: 21, End: 21, ONE TIME, Standing Count: 1 Occurrences, R      Guillermo Ching, APRN - CNP      Placement Recommendation: [address] with assistance from family (family prefers [address]) vs. Subacute if goals are not met        Diagnosis:   1. Acute on chronic renal insufficiency    2. Calcification of indwelling Fay catheter, initial encounter ([facility] [address]    3. Benign prostatic hyperplasia, unspecified whether lower urinary tract symptoms present    4.  Cough         Surgery: none      Pertinent Medical History:       Past Medical History:   Diagnosis Date    Enlarged prostate     Hyperlipidemia     Hypertension     Lumbosacral strain 2013    Pacemaker          Past Surgical History:   Procedure Laterality Date    A-V CARDIAC PACEMAKER INSERTION  07    COLONOSCOPY  8/3/11    EYE SURGERY  2002    cataracts evelyn    TONSILLECTOMY      child      Precautions:  Fall Risk, O2, catheter      Assessment of current deficits    [x] Functional mobility  [x]ADLs  [x] Strength               []Cognition    [x] Functional transfers   [x] IADLs         [] Safety Awareness   [x]Endurance    [] Fine Coordination              [x] Balance      [] Vision/perception   []Sensation     []Gross Motor Coordination  [] ROM  [] Delirium                   [] Motor Control     OT PLAN OF CARE   OT POC based on physician orders, patient diagnosis and results of clinical assessment    Frequency/Duration 1-3 days/wk for 2 weeks PRN     Specific OT Treatment Interventions to include:   * Instruction/training on adapted ADL techniques and AE recommendations to increase functional independence within precautions       * Training on energy conservation strategies, correct breathing pattern and techniques to improve independence/tolerance for self-care routine  * Functional transfer/mobility training/DME recommendations for increased independence, safety, and fall prevention  * Patient/Family education to increase follow through with safety techniques and functional independence  * Recommendation of environmental modifications for increased safety with functional transfers/mobility and ADLs  * Therapeutic exercise to improve motor endurance, ROM, and functional strength for ADLs/functional transfers  * Therapeutic activities to facilitate/challenge dynamic balance, stand tolerance for increased safety and independence with ADLs  * Positioning to improve skin integrity, interaction with environment and functional independence    Recommended Adaptive Equipment: spouse states daughter will get wheels to convert their standard walker to a wheeled walker     Home Living: spouse; single family home, 2 story, resides on 1st floor, 3 steps to enter with rail, tub shower. Equipment owned: standard walker, cane, bedside commode, tub transfer bench, grab bars    Prior Level of Function: Independent with ADLs , Independent with IADLs; ambulated cane    Driving: no  Occupation: retired from [employer]     Pain Level: 4/10 chronic back pain; Nursing notified.       Cognition: A&O: 4/4; Follows 1 step directions   Memory: good    Sequencing: good    Problem solving: good    Judgement/safety: good     [facility]   AM-PAC Daily Activity Inpatient   How much help for putting on and taking off regular lower body clothing?: A Lot  How much help for Bathing?: A Lot  How much help for Toileting?: A Lot  How much help for putting on and taking off regular upper body clothing?: A Little  How much help for taking care of personal grooming?: A Little  How much help for eating meals?: None  AM-PAC Inpatient Daily Activity Raw Score: 16  AM-PAC Inpatient ADL T-Scale Score : 35.96  ADL Inpatient CMS 0-100% Score: 53.32  ADL Inpatient CMS G-Code Modifier : CK     Functional Assessment:    Initial Eval Status  Date: 8/23/21 Treatment Status  Date: STGs = LTGs  Time frame: 10-14 days   Feeding Independent   Independent    Grooming Supervision with set up for oral care at sink level: to brush teeth   Independent    UB Dressing Minimal Assist   Independent    LB Dressing Moderate Assist to don pants while seated EOB and stood with assistance to bring up around hips and waist, assistance to place catheter through pant leg. Independent    Bathing Moderate Assist to wash face while standing at sink level  Minimal Assist    Toileting Moderate Assist   Supervision    Bed Mobility  Supine to sit: Supervision   Sit to supine: Supervision   Supine to sit: Independent   Sit to supine: Independent    Functional Transfers Minimal Assist from EOB to wheeled walker. Minimal assist for transfer to and from low commode with minimal verbal cues to use grab bar for safe commode transfer. Minimal assist for transfer from standing with wheeled walker  Transfer training with verbal cues for hand placement throughout session to improve safety. Independent    Functional Mobility Minimal assist with wheeled walker to improve balance, verbal cues for walker sequence and safety. Assistance for long O2 line management.    Modified Helper    Balance Sitting:     Static: good     Dynamic: fair   Standing: fair  with wheeled walker     Activity Tolerance fair   good    Visual/  Perceptual Glasses: no                 Hand Dominance: left      AROM (PROM) Strength Additional Info:    RUE  WFL 4/5 good  and wfl FMC/dexterity noted during ADL tasks     LUE WFL 4/5 good  and wfl FMC/dexterity noted during ADL tasks       Hearing: [facility] [place]   Sensation:  No c/o numbness

## 2021-08-23 NOTE — PROGRESS NOTES
Physical Therapy Initial Evaluation/Plan of Care    Room #:  7257/3601-14  Patient Name: Arvilla Goltz  YOB: 1932  MRN: [id number]    Date of Service: 8/23/2021     Tentative placement recommendation: Subacute vs Home Health Physical Therapy if patient meets goals  Equipment recommendation: None      Evaluating Physical Therapist: Stephany Shukla, PT, DPT #[id number]      Specific Provider Orders/Date/Referring Provider :   08/23/21 0815   PT eval and treat Start: 08/23/21 0815, End: 08/23/21 0815, ONE TIME, Standing Count: 1 Occurrences, R    Honey Archuleta, APRN - CNP Acknowledge New    Admitting Diagnosis:   Cough [R05]  Acute on chronic renal insufficiency [N28.9, N18.9]  MORENA (acute kidney injury) ([facility] [address] [N17.9]  Calcification of indwelling Fay catheter, initial encounter ([facility] [T83.89XA]  Benign prostatic hyperplasia, unspecified whether lower urinary tract symptoms present [N40.0]      Visit Diagnoses       Codes    Calcification of indwelling Fay catheter, initial encounter ([facility]     T83.89XA    Benign prostatic hyperplasia, unspecified whether lower urinary tract symptoms present     N40.0    Cough     R05        Surgery:     Patient Active Problem List   Diagnosis    Pure hypercholesterolemia    Essential hypertension    Primary osteoarthritis involving multiple joints    Iron deficiency anemia due to chronic blood loss    Stage 3a chronic kidney disease ([address]    Urinary retention    Hypokalemia    MORENA (acute kidney injury) ([address]    Acute on chronic renal insufficiency        ASSESSMENT of Current Deficits Patient exhibits decreased strength, balance and endurance impairing functional mobility, transfers, gait , gait distance and tolerance to activity . Pt able to ambulate in room with [employer] and Best for short distances with 2L of O2 donned with VC needed for [employer] approximation and sequencing.  Pt used commode during session and required Best for commode and bed transfers from sitting to standing.       PHYSICAL THERAPY  PLAN OF CARE       Physical therapy plan of care is established based on physician order,  patient diagnosis and clinical assessment    Current Treatment Recommendations:    -Bed Mobility: Lower extremity exercises  and Trunk control activities   -Sitting Balance: Incorporate reaching activities to activate trunk muscles , Hands on support to maintain midline , Facilitate active trunk muscle engagement , Facilitate postural control in all planes  and Engage in core activities to allow for movement within base of support   -Standing Balance: Perform strengthening exercises in standing to promote motor control with or without upper extremity support , Instruct patient on adequate base of support to maintain balance and Challenge balance utilizing reaching  activities beyond center of gravity    -Transfers: Provide instruction on proper hand and foot position for adequate transfer of weight onto lower extremities and use of gait device, Cues for hand placement, technique and safety, Facilitate weight shift forward on to lower extremities and provide necessary stabilization of bilateral lower extremities , Support transfer of weight on to lower extremities, Assist with extension of knees trunk and hip to accept weight transfer  and Provide stabilization to prevent fall   -Gait: Gait training, Standing activities to improve: base of support, weight shift, weight bearing , Exercises to improve trunk control, Exercises to improve hip and knee control, Performance of protected weight bearing activities and Activities to increase weight bearing   -Endurance: Utilize Supervised activities to increase level of endurance to allow for safe functional mobility including transfers and gait  and Use graduated activities to promote good breathing techniques and provide support and education to maximize respiratory function    PT long term treatment goals are located in below grid    Patient and or family understand(s) diagnosis, prognosis, and plan of care. Frequency of treatments: Patient will be seen  daily. Prior Level of Function: Patient ambulated independently and with cane   Rehab Potential: good  for baseline    Past medical history:   Past Medical History:   Diagnosis Date    Enlarged prostate     Hyperlipidemia     Hypertension     Lumbosacral strain 7/8/2013    Pacemaker      Past Surgical History:   Procedure Laterality Date    A-V CARDIAC PACEMAKER INSERTION  5/30/07    COLONOSCOPY  8/3/11    EYE SURGERY  2002    cataracts evelyn    TONSILLECTOMY      child       SUBJECTIVE:    Precautions: Up with assistance, falls and O2 ,   Social history: Patient lives with spouse  in a two story home resides first  with 3 steps  to enter with Rail  Tub shower grab bars    Equipment owned: [employer],      [address] [employer] [address]   17/24    AM-[facility] Mobility Inpatient   How much difficulty turning over in bed?: A Little  How much difficulty sitting down on / standing up from a chair with arms?: A Little  How much difficulty moving from lying on back to sitting on side of bed?: A Little  How much help from another person moving to and from a bed to a chair?: A Little  How much help from another person needed to walk in hospital room?: A Little  How much help from another person for climbing 3-5 steps with a railing?: A Lot  AM-PAC Inpatient Mobility Raw Score : 17  AM-PAC Inpatient T-Scale Score : 42.13  Mobility Inpatient CMS 0-100% Score: 50.57  Mobility Inpatient CMS G-Code Modifier : CK    Nursing cleared patient for PT evaluation. The admitting diagnosis and active problem list as listed above have been reviewed prior to the initiation of this evaluation. OBJECTIVE;   Initial Evaluation  Date: 8/23/2021 Treatment Date:     Short Term/ Long Term   Goals   Was pt agreeable to Eval/treatment?  Yes  To be met in 3 days   Pain level   0/10       Bed Mobility Rolling: Supervision     Supine to sit: Supervision     Sit to supine: Supervision     Scooting: Supervision     Rolling: Independent    Supine to sit: Independent    Sit to supine: Independent    Scooting: Independent     Transfers Sit to stand: Minimal assist of 1   Sit to stand: Independent    Ambulation    40 feet using  wheeled walker with Minimal assist of 1   for walker control, walker approximation, balance and safety   > 100 feet using  wheeled walker with Independent    Stair negotiation: ascended and descended   Not assessed     3 steps with 1 HR with IND   ROM Within functional limits    Increase range of motion 10% of affected joints    Strength BUE:  refer to OT eval  RLE:  4-/5  LLE:  4-/5  Increase strength in affected mm groups by 1/3 grade   Balance Sitting EOB:  fair +  Dynamic Standing:  fair   Sitting EOB:  good   Dynamic Standing: fair+     Patient is Alert & Oriented x person, place, time and situation and follows directions    Sensation:  Patient  denies numbness/tingling   Edema:  none noted   Endurance: fair     Vitals: 2 liters nasal cannula   Blood Pressure at rest  Blood Pressure during session    Heart Rate at rest  Heart Rate during session    SPO2 at rest 95%  SPO2 during session 92-95%     Patient education  Patient educated on role of Physical Therapy, risks of immobility, safety and plan of care, energy conservation,  importance of mobility while in hospital , purse lip breathing, safety  and O2 line management and safety      Patient response to education:   Pt verbalized understanding Pt demonstrated skill Pt requires further education in this area   Yes Partial Yes      Treatment:  Patient practiced and was instructed/facilitated in the following treatment: Patient  Sat edge of bed 10 minutes with Supervision  to increase dynamic sitting balance and activity tolerance. Pt ambulated 36' and 13' with WW with Best and VC for ww approximation.  Pt demonstrated fair+ safety awareness and was generally stable throughout therapy session. Pt had to use commode during session and required Best for commode transfer. Therapeutic Exercises:  not performed       At end of session, patient in chair with  call light and phone within reach,  all lines and tubes intact, nursing notified. Patient would benefit from continued skilled Physical Therapy to improve functional independence and quality of life. Patient's/ family goals   get stronger    Time in  940  Time out  1019    Total Treatment Time  19 minutes    Evaluation time includes thorough review of current medical information, gathering information on past medical history/social history and prior level of function, completion of standardized testing/informal observation of tasks, assessment of data, and development of Plan of care and goals.      CPT codes:  Low Complexity PT evaluation (16721)  Therapeutic activities (22330)   19 minutes  1 unit(s)    Chuck Mccollum PT

## 2021-08-23 NOTE — CARE COORDINATION
8-23-Antonio note: ( covid neg 8-22) met with pt for transition of care needs. Pt lives with his wife, he uses a cane , BSC, grab bars, and has access to a standard wheelchair, pt will talk to his wife about the recommendations from PT/oT for [facility] vs [facility]. Cm will follow up.  Electronically signed by Lashon Monreal RN on 8/23/2021 at 2:43 PM

## 2021-08-23 NOTE — PROGRESS NOTES
8/24/2021 9:49 AM  Service: Urology  Group: [facility] (Tan[facility]Patti/[facility])    Franco Kemp  [id number]    Subjective:  Sitting up in a chair  Urine is yellow  He does have a cough and some shortness of breath   No pain or discomfort  No family present     Review of Systems  Constitutional: No fever or chills   Respiratory: + cough   Cardiovascular: negative for chest pain and dyspnea  Gastrointestinal: negative for abdominal pain, diarrhea, nausea and vomiting   : See above  Derm: negative for rash and skin lesion(s)  Neurological: negative for seizures and tremors  Musculoskeletal: Negative    Psychiatric: Negative   All other reviews are negative      Scheduled Meds:   amLODIPine  2.5 mg Oral Daily    ascorbic acid  250 mg Oral Daily    aspirin  81 mg Oral Daily    Vitamin D  2,000 Units Oral Daily    ferrous sulfate  325 mg Oral Daily    finasteride  5 mg Oral Daily    metoprolol succinate  25 mg Oral Daily    therapeutic multivitamin-minerals  1 tablet Oral Daily    pantoprazole  40 mg Oral Daily    atorvastatin  20 mg Oral Daily    timolol  1 drop Both Eyes BID    vitamin B-12  1,000 mcg Oral Daily    sodium chloride flush  5-40 mL Intravenous 2 times per day    enoxaparin  30 mg Subcutaneous Daily    cefTRIAXone (ROCEPHIN) IV  1,000 mg Intravenous Q24H    azithromycin  500 mg Intravenous Q24H       Objective:  Vitals:    08/24/21 0843   BP:    Pulse: 68   Resp:    Temp:    SpO2:          Allergies: Patient has no known allergies.     General Appearance: alert and oriented to person, place and time and in no acute distress  Skin: no rash or erythema  Head: normocephalic and atraumatic  Pulmonary/Chest: normal air movement, no respiratory distress  Abdomen: soft, non-tender, non-distended  Genitourinary: guillory draining yellow urine   Extremities: no cyanosis, clubbing or edema         Labs:     Recent Labs     08/24/21  0513      K 3.5      CO2 23   BUN 42*   CREATININE 1.7* GLUCOSE 102*   CALCIUM 9.1       Lab Results   Component Value Date    HGB 9.9 08/24/2021    HCT 30.6 08/24/2021       Lab Results   Component Value Date    PSA 2.56 11/11/2015         Assessment/Plan:  Urinary Retention   BPH   Bilateral Hydronephrosis   Malfunctioning guillory catheter   MORENA on CKD       Cont to watch the creatinine, trending down   Nephrology following   Urine culture pending  Antibiotics per primary   Cont the Proscar   Cont the guillory  Change every 4 weeks   Will need outpatient follow up to discuss possible TURP once he is medically stable       RAJAN Chavez - CNP   [facility]  Urology

## 2021-08-23 NOTE — PROGRESS NOTES
Nephrology Progress Note  [facility]    Reason for Consult:   MORENA   Date of Service: 8/23/2021     Subjective    Patient seen and examined  States his dyspnea and cough improving  No chest pain or palpitations  No abdominal pain, nausea vomiting, diarrhea or constipation        Past Medical History:        Diagnosis Date    Enlarged prostate     Hyperlipidemia     Hypertension     Lumbosacral strain 7/8/2013    Pacemaker        Past Surgical History:        Procedure Laterality Date    A-V CARDIAC PACEMAKER INSERTION  5/30/07    COLONOSCOPY  8/3/11    EYE SURGERY  2002    cataracts evelyn    TONSILLECTOMY      child       Current Medications:    Current Facility-Administered Medications: amLODIPine (NORVASC) tablet 2.5 mg, 2.5 mg, Oral, Daily  ascorbic acid (VITAMIN C) tablet 250 mg, 250 mg, Oral, Daily  aspirin EC tablet 81 mg, 81 mg, Oral, Daily  Vitamin D (CHOLECALCIFEROL) tablet 2,000 Units, 2,000 Units, Oral, Daily  ferrous sulfate (IRON 325) tablet 325 mg, 325 mg, Oral, Daily  finasteride (PROSCAR) tablet 5 mg, 5 mg, Oral, Daily  metoprolol succinate (TOPROL XL) extended release tablet 25 mg, 25 mg, Oral, Daily  therapeutic multivitamin-minerals 1 tablet, 1 tablet, Oral, Daily  pantoprazole (PROTONIX) tablet 40 mg, 40 mg, Oral, Daily  atorvastatin (LIPITOR) tablet 20 mg, 20 mg, Oral, Daily  timolol (TIMOPTIC) 0.5 % ophthalmic solution 1 drop, 1 drop, Both Eyes, BID  vitamin B-12 (CYANOCOBALAMIN) tablet 1,000 mcg, 1,000 mcg, Oral, Daily  sodium chloride flush 0.9 % injection 5-40 mL, 5-40 mL, Intravenous, 2 times per day  sodium chloride flush 0.9 % injection 5-40 mL, 5-40 mL, Intravenous, PRN  0.9 % sodium chloride infusion, 25 mL, Intravenous, PRN  enoxaparin (LOVENOX) injection 30 mg, 30 mg, Subcutaneous, Daily  ondansetron (ZOFRAN-ODT) disintegrating tablet 4 mg, 4 mg, Oral, Q8H PRN **OR** ondansetron (ZOFRAN) injection 4 mg, 4 mg, Intravenous, Q6H PRN  polyethylene glycol (GLYCOLAX) packet 17 g, 17 g, Oral, Daily PRN  acetaminophen (TYLENOL) tablet 650 mg, 650 mg, Oral, Q6H PRN **OR** acetaminophen (TYLENOL) suppository 650 mg, 650 mg, Rectal, Q6H PRN  cefTRIAXone (ROCEPHIN) 1,000 mg in sterile water 10 mL IV syringe, 1,000 mg, Intravenous, Q24H  azithromycin (ZITHROMAX) 500 mg in D5W 250ml Vial Mate, 500 mg, Intravenous, Q24H  opium-belladonna (B&O SUPPRETTES) 16.2-60 MG suppository 60 mg, 60 mg, Rectal, Q8H PRN  ipratropium-albuterol (DUONEB) nebulizer solution 1 ampule, 1 ampule, Inhalation, Q4H PRN    Allergies:  Patient has no known allergies.       Physical exam:   Constitutional:  VITALS:  BP (!) 131/54   Pulse 67   Temp 99 °F (37.2 °C) (Oral)   Resp 22   Ht 5' 9\" (1.753 m)   Wt 141 lb (64 kg)   SpO2 92%   BMI 20.82 kg/m²     Gen: alert, awake, no acute distress  Eyes: eomi  HEENT: atraumatic/normocephalic  Lungs: clear to ascultation bilaterally, equal lung expansion  CV: RRR, no murmurs  Abdomen: soft, nontender, nondistended, normoactive bowel sounds  Extremitiy: no edema  : +guillory   Skin: no rash, tugor wnl  Neuro: no focal deficits  Psych: cooperative and appropriate      Data:         Last 3 BMP  Recent Labs     08/22/21 0221 08/22/21  0536 08/23/21  0453   * 132 135   K 4.1 4.0 3.6   CL 93* 99 100   CO2 22 20* 23   BUN 54* 50* 47*   CREATININE 2.4* 2.2* 2.0*   GLUCOSE 127* 109* 125*   CALCIUM 9.0 8.2* 8.6         Last 3 CMP:    Recent Labs     08/22/21 0221 08/22/21  0536 08/23/21  0453   * 132 135   K 4.1 4.0 3.6   CL 93* 99 100   CO2 22 20* 23   BUN 54* 50* 47*   CREATININE 2.4* 2.2* 2.0*   GLUCOSE 127* 109* 125*   CALCIUM 9.0 8.2* 8.6   PROT 6.9  --  6.2*   LABALBU 3.1*  --  2.7*   BILITOT 0.5  --  0.5   ALKPHOS 96  --  121   AST 9  --  13   ALT 8  --  10         Last 3 Glucose:     Recent Labs     08/22/21  0221 08/22/21  0536 08/23/21  0453   GLUCOSE 127* 109* 125*         Last 3 POC Glucose:     No results for input(s): POCGLU in the last 72 hours. Last 3 CK, CKMB, Troponin  No results for input(s): CKTOTAL, CKMB, TROPONINI in the last 72 hours. Last 3 CBC:  Recent Labs     08/22/21 0221 08/22/21  0738 08/23/21 0453   WBC 11.5 13.1* 13.3*   RBC 3.43* 3.33* 3.10*   HGB 10.6* 10.3* 9.7*   HCT 32.0* 30.6* 29.5*   MCV 93.3 91.9 95.2   MCH 30.9 30.9 31.3   MCHC 33.1 33.7 32.9   RDW 13.3 13.3 13.6    417 384   MPV 10.2 10.1 10.2       Last 3 Hepatic Function Panel:    Recent Labs     08/22/21 0221 08/23/21 0453   ALKPHOS 96 121   ALT 8 10   AST 9 13   PROT 6.9 6.2*   BILITOT 0.5 0.5   LABALBU 3.1* 2.7*       Albumin:  Recent Labs     08/23/21 0453   LABALBU 2.7*       Calcium:  Recent Labs     08/23/21 0453   CALCIUM 8.6       Ionized Calcium:  No results for input(s): IONCA in the last 72 hours. Magnesium:    Recent Labs     08/23/21 0453   MG 2.1         ABGs:  No results for input(s): PHART, PO2ART, LYD9GPM, JED3ORQ, BEART, W4TABSHT in the last 72 hours. Lactic Acid:  Recent Labs     08/23/21 0453   LACTA 0.9       Last 3 Amylase:  No results for input(s): AMYLASE in the last 72 hours. Last 3 Lipase:  No results for input(s): LIPASE in the last 72 hours. Last 3 BNP:  No results for input(s): BNP in the last 72 hours. Assessment/Plan      1. Acute kidney injury with underlying chronic kidney disease stage G3b. Acute kidney injury probably secondary to urinary retention with malfunctioning Fay   Urology following  Renal function improving  IV fluids are now off, though may need fluids if no further improvement in renal function    2. Metabolic acidosis. The patient has normal anion gap metabolic acidosis. This may be a reflection of tubular dysfunction with obstruction. Follow for now and give bicarbonate as needed     3. Hypertension with chronic kidney disease stage G1 to G4. Blood pressure is close to target of less than 130/80 mmHg. 4.  Anemia with history of mild iron deficiency. Supplement iron once acute illness is over. 5.  Hyponatremia. Hyponatremia was mild and improved. 6.  History of hypophosphatemia. Acceptable on last check.           Thank you for the opportunity to participate in the care of Mr Shannan Cardenas      ______________________________      Reagan Jarquin MD  8/23/2021  12:35 PM

## 2021-08-23 NOTE — CONSULTS
[address]                                  CONSULTATION    PATIENT NAME: Parul Guzman                       :        1932  MED REC NO:   [id number]                            ROOM:       [id number]  ACCOUNT NO:   [de-identified]                           ADMIT DATE: 2021  PROVIDER:     Isha Tabares MD    CONSULT DATE:  2021    AGE:  77-year-old. ADMITTING PHYSICIAN:  Fernando Ayers MD    REASON FOR CONSULTATION:  Acute-on-chronic kidney disease. HISTORY OF PRESENT ILLNESS:  The patient is being seen in consultation  at the request of Dr. Ventura Samson. The patient is an 77-year-old gentleman  with underlying history of chronic kidney disease stage G3b with a  baseline serum creatinine of 1.8 mg/dL. The patient was seen during his  recent hospitalization at this very facility from 2021 to  2021 when he had acute kidney injury secondary to urinary  retention. He was seen by Urology. His renal function had improved and  his serum creatinine had gone down to 1.5 mg/dL around the time of his  discharge. The patient is now admitted after he presents with chief  complaint of poorly functioning Fay catheter. The patient stated that  for two days prior to presentation, he was having difficulty in draining  urine from his Fay catheter. On 2021, the patient did have a  Fay catheter placed because of urinary retention and this was after  his discharge. The patient states that his abdomen became progressively  distended and although he had continued to experience urge to urinate,  he did not have much of urinary output. The patient presented with  increasing abdominal pain and difficulty in voiding. The patient's  Fay catheter was irrigated and unclogged in the emergency room.   The  patient's serum creatinine upon presentation was 2.4 mg/dL and early  this morning, it is down to 2.2 mg/dL. When seen up on the floor, the  patient is feeling better. He has no acute distress. PAST MEDICAL HISTORY:  Past medical history is significant for chronic  kidney disease stage G3b, deemed to be secondary to hypertensive kidney  disease and history of chronic urinary obstruction. History of  hyperlipidemia, history of hypertension, history of benign prostatic  hypertrophy. In addition, he has history of anemia of chronic kidney  disease, history of hypophosphatemia, history of hypokalemia. PAST SURGICAL HISTORY:  Past surgical history is significant for history  of tonsillectomy, history of bilateral cataract surgery with lens  implantation, history of cardiac pacemaker insertion, history of  colonoscopy. ALLERGIES:  The patient has no known drug allergies. MEDICATIONS:  Prior to admission included multivitamins once a day,  Advil 200 mg q.6 hours p.r.n. for pain which he states he has been using  less and less, ferrous sulfate 325 mg daily, metoprolol succinate 25 mg  daily, lisinopril/HCT 20/25 mg one daily, finasteride 5 mg daily,  amlodipine 2.5 mg daily, ascorbic acid 250 mg daily, vitamin B12 1000  mcg daily, pantoprazole 40 mg daily, timolol one drop to both eyes  daily, saw palmetto two tablets daily, and aspirin 81 mg daily. The  patient's current medications in the hospital include Norvasc 2.5 mg  daily, pantoprazole 40 mg daily, atorvastatin 20 mg daily, Lovenox 30 mg  subcutaneously daily, vitamin B12 1000 mcg daily, ascorbic acid 250 mg  daily, aspirin 81 mg daily, multivitamins one tablet daily, metoprolol  succinate 25 mg daily, finasteride 5 mg daily, ferrous sulfate 325 mg  daily, vitamin D 2000 units daily, GlycoLax 17 gm daily, azithromycin  500 mg once a day. He is on normal saline intravenously at the rate of  50 mL an hour. SOCIAL HISTORY:  The patient is . He is a former smoker and quit  smoking 20 years ago. He has 40-pack-year history of smoking. Denies  any alcohol use. FAMILY HISTORY:  Significant for diabetes mellitus and some form of  malignancy in his mother. His father had hypertension and  atherosclerotic heart disease. His mother also had hypertension. REVIEW OF SYSTEMS:  Negative for any fever or chills. No cough or  wheezing. He has had abdominal distention with difficulty in urinating. No chest pain. No palpitations. No gross hematuria. No dysuria. He  does use ibuprofen intermittently. PHYSICAL EXAMINATION:  GENERAL:  The patient is awake and alert, in no acute distress. VITAL SIGNS:  Blood pressure is 119/49, pulse is 80, respiratory rate is  18, temperature 98.7 degrees Fahrenheit. HEENT:  Head is normocephalic and atraumatic. Eyes:  Sclerae are  nonicteric. Pupils are equal and reactive to light and accommodation. Fundus examination deferred. Mouth and throat:  Dry oral mucosa without  any mucosal ulceration or exudate. NECK:  Supple. No jugular venous distention. Carotid pulses are  palpable and equal bilaterally. No carotid bruits. No palpable masses. CHEST:  Symmetrical.  LUNGS:  Vesicular breath sounds. Breath sounds are decreased at the  bases. No rales or rhonchi. HEART:  Regular rate and rhythm without any pericardial rub or gallop. ABDOMEN:  Soft, distended, nontender. Normal bowel sounds. No rebound  tenderness. EXTREMITIES:  The patient has +1 bipedal edema. LABORATORY DATA:  Lab data from today; sodium 132 mmol/L, potassium 4.0  mmol/L, chloride 99 mmol/L, CO2 20 mmol/L, BUN 50 mg/dL, creatinine 2.2  mg/dL, calcium 8.2 mg/dL. Hemoglobin 10.3 gm/dL. Urinalysis shows many  rbc's and wbc's with large amount of leukocyte esterase. IMPRESSION:  1. Acute kidney injury with underlying chronic kidney disease stage  G3b. Acute kidney injury probably secondary to mild Fay catheter  malfunction. The patient may need a suprapubic catheter and transurethral  resection of the prostate.   2. Metabolic acidosis. The patient has normal anion gap metabolic  acidosis. This may be a reflection of tubular dysfunction with  obstruction. We will follow. 3.  Hypertension with chronic kidney disease stage G1 to G4. Blood  pressure is at target of less than 130/80 mmHg. 4.  Anemia with history of mild iron deficiency. Supplement iron once  acute illness is over. 5.  Hyponatremia. Hyponatremia is mild. We will follow. 6.  History of hypophosphatemia. We will recheck phosphorus levels. PLAN:  Plan is to continue gentle hydration. Leave the Fay catheter  in place. Urology note is reviewed. Follow serial electrolytes, BUN  and creatinine, and phosphorus. Rest of plans as per orders. Thank you for allowing me to participate in the care of your patient. We will follow the patient with you.         Tianna Beltrán MD    D: 08/22/2021 21:09:48       T: 08/22/2021 23:38:25     [id number]  Job#: [id number]     Doc#: [id number]    CC:

## 2021-08-23 NOTE — PROGRESS NOTES
[address] [place], [place] [address]   Progress Note    Admitting Date and Time: 8/22/2021 12:28 AM  Admit Dx: Cough [R05]  Acute on chronic renal insufficiency [N28.9, N18.9]  MORENA (acute kidney injury) ([facility] [N17.9]  Calcification of indwelling Fay catheter, initial encounter ([facility] [T83.89XA]  Benign prostatic hyperplasia, unspecified whether lower urinary tract symptoms present [N40.0]    Subjective:    Patient seen and examined. He was requiring 4 liters of oxygen and was previously on room air. He reports a cough with mucus. ROS: denies fever, chills, cp, sob, n/v, HA unless stated above.      amLODIPine  2.5 mg Oral Daily    ascorbic acid  250 mg Oral Daily    aspirin  81 mg Oral Daily    Vitamin D  2,000 Units Oral Daily    ferrous sulfate  325 mg Oral Daily    finasteride  5 mg Oral Daily    metoprolol succinate  25 mg Oral Daily    therapeutic multivitamin-minerals  1 tablet Oral Daily    pantoprazole  40 mg Oral Daily    atorvastatin  20 mg Oral Daily    timolol  1 drop Both Eyes BID    vitamin B-12  1,000 mcg Oral Daily    sodium chloride flush  5-40 mL Intravenous 2 times per day    enoxaparin  30 mg Subcutaneous Daily    cefTRIAXone (ROCEPHIN) IV  1,000 mg Intravenous Q24H    azithromycin  500 mg Intravenous Q24H     sodium chloride flush, 5-40 mL, PRN  sodium chloride, 25 mL, PRN  ondansetron, 4 mg, Q8H PRN   Or  ondansetron, 4 mg, Q6H PRN  polyethylene glycol, 17 g, Daily PRN  acetaminophen, 650 mg, Q6H PRN   Or  acetaminophen, 650 mg, Q6H PRN  opium-belladonna, 60 mg, Q8H PRN  ipratropium-albuterol, 1 ampule, Q4H PRN         Objective:    BP (!) 131/54   Pulse 67   Temp 99 °F (37.2 °C) (Oral)   Resp 22   Ht 5' 9\" (1.753 m)   Wt 141 lb (64 kg)   SpO2 92%   BMI 20.82 kg/m²   General Appearance: alert and oriented to person, place and time and in no acute distress  Skin: warm and dry  Head: normocephalic and atraumatic  Eyes: pupils equal, round, and reactive to light, conjunctivae normal  Neck: neck supple and non tender without mass   Pulmonary/Chest: rhonchi, no respiratory distress  Cardiovascular: irregular, pacemaker  Abdomen: soft, non-tender, non-distended, normal bowel sounds, no masses or organomegaly  Extremities: no cyanosis, no clubbing and no edema  Neurologic: no cranial nerve deficit and speech normal      Recent Labs     08/22/21 0221 08/22/21  0536 08/23/21  0453   * 132 135   K 4.1 4.0 3.6   CL 93* 99 100   CO2 22 20* 23   BUN 54* 50* 47*   CREATININE 2.4* 2.2* 2.0*   GLUCOSE 127* 109* 125*   CALCIUM 9.0 8.2* 8.6       Recent Labs     08/22/21 0221 08/23/21  0453   ALKPHOS 96 121   PROT 6.9 6.2*   LABALBU 3.1* 2.7*   BILITOT 0.5 0.5   AST 9 13   ALT 8 10       Recent Labs     08/22/21 0221 08/22/21  0738 08/23/21  0453   WBC 11.5 13.1* 13.3*   RBC 3.43* 3.33* 3.10*   HGB 10.6* 10.3* 9.7*   HCT 32.0* 30.6* 29.5*   MCV 93.3 91.9 95.2   MCH 30.9 30.9 31.3   MCHC 33.1 33.7 32.9   RDW 13.3 13.3 13.6    417 384   MPV 10.2 10.1 10.2           Radiology:   XR CHEST (2 VW)   Final Result   Bilateral multifocal pneumonia. Consider COVID-19. XR CHEST PORTABLE   Final Result   Left lower lobe infiltrate is concerning for pneumonia. Assessment:  Principal Problem:    MORENA (acute kidney injury) ([address]  Active Problems:    Pure hypercholesterolemia    Essential hypertension    Iron deficiency anemia due to chronic blood loss    Stage 3a chronic kidney disease ([address]    Urinary retention  Resolved Problems:    * No resolved hospital problems. *      Plan:  1. Acute kidney injury superimposed on stage IIIa chronic kidney disease:  Nephrology following. MORENA likely secondary to urinary retention. Improving:  Cr was 2.4 on admission and is 2.0 today. 2. Urinary retention:  Urology following. He had a malfunctioning guillory catheter prior to admission likely due to a mucus plug. Catheter is to be irrigated every shift.  Possible TURP and or

## 2021-08-23 NOTE — CONSULTS
Patient seen and examined. Consult dictated.  , Thank You for allowing me to participate in the care of this patient. Will follow the patient with you.     Leah Abdi MD  Nephrology    Electronically signed by Warner Guardado MD on 8/22/2021 at 9:01 PM

## 2021-08-24 PROBLEM — N17.9 ACUTE KIDNEY INJURY (HCC): Status: ACTIVE | Noted: 2021-08-24

## 2021-08-24 LAB
ANION GAP SERPL CALCULATED.3IONS-SCNC: 12 MMOL/L (ref 7–16)
BUN BLDV-MCNC: 42 MG/DL (ref 6–23)
CALCIUM SERPL-MCNC: 9.1 MG/DL (ref 8.6–10.2)
CHLORIDE BLD-SCNC: 101 MMOL/L (ref 98–107)
CO2: 23 MMOL/L (ref 22–29)
CREAT SERPL-MCNC: 1.7 MG/DL (ref 0.7–1.2)
GFR AFRICAN AMERICAN: 46
GFR NON-AFRICAN AMERICAN: 38 ML/MIN/1.73
GLUCOSE BLD-MCNC: 102 MG/DL (ref 74–99)
HCT VFR BLD CALC: 30.6 % (ref 37–54)
HEMOGLOBIN: 9.9 G/DL (ref 12.5–16.5)
MAGNESIUM: 2.1 MG/DL (ref 1.6–2.6)
MCH RBC QN AUTO: 30.9 PG (ref 26–35)
MCHC RBC AUTO-ENTMCNC: 32.4 % (ref 32–34.5)
MCV RBC AUTO: 95.6 FL (ref 80–99.9)
PDW BLD-RTO: 13.5 FL (ref 11.5–15)
PHOSPHORUS: 3.9 MG/DL (ref 2.5–4.5)
PLATELET # BLD: 409 E9/L (ref 130–450)
PMV BLD AUTO: 10.3 FL (ref 7–12)
POTASSIUM SERPL-SCNC: 3.5 MMOL/L (ref 3.5–5)
RBC # BLD: 3.2 E12/L (ref 3.8–5.8)
SODIUM BLD-SCNC: 136 MMOL/L (ref 132–146)
WBC # BLD: 16 E9/L (ref 4.5–11.5)

## 2021-08-24 PROCEDURE — 36415 COLL VENOUS BLD VENIPUNCTURE: CPT

## 2021-08-24 PROCEDURE — 83735 ASSAY OF MAGNESIUM: CPT

## 2021-08-24 PROCEDURE — 6370000000 HC RX 637 (ALT 250 FOR IP): Performed by: NURSE PRACTITIONER

## 2021-08-24 PROCEDURE — 6370000000 HC RX 637 (ALT 250 FOR IP): Performed by: FAMILY MEDICINE

## 2021-08-24 PROCEDURE — 99232 SBSQ HOSP IP/OBS MODERATE 35: CPT | Performed by: FAMILY MEDICINE

## 2021-08-24 PROCEDURE — 2580000003 HC RX 258: Performed by: NURSE PRACTITIONER

## 2021-08-24 PROCEDURE — 6360000002 HC RX W HCPCS: Performed by: NURSE PRACTITIONER

## 2021-08-24 PROCEDURE — 85027 COMPLETE CBC AUTOMATED: CPT

## 2021-08-24 PROCEDURE — 1200000000 HC SEMI PRIVATE

## 2021-08-24 PROCEDURE — 84100 ASSAY OF PHOSPHORUS: CPT

## 2021-08-24 PROCEDURE — 80048 BASIC METABOLIC PNL TOTAL CA: CPT

## 2021-08-24 RX ORDER — GUAIFENESIN 600 MG/1
600 TABLET, EXTENDED RELEASE ORAL 2 TIMES DAILY
Status: DISCONTINUED | OUTPATIENT
Start: 2021-08-24 | End: 2021-08-25 | Stop reason: HOSPADM

## 2021-08-24 RX ADMIN — ENOXAPARIN SODIUM 30 MG: 30 INJECTION SUBCUTANEOUS at 08:42

## 2021-08-24 RX ADMIN — WATER 1000 MG: 1 INJECTION INTRAMUSCULAR; INTRAVENOUS; SUBCUTANEOUS at 12:52

## 2021-08-24 RX ADMIN — TIMOLOL MALEATE 1 DROP: 5 SOLUTION OPHTHALMIC at 20:17

## 2021-08-24 RX ADMIN — METOPROLOL SUCCINATE 25 MG: 25 TABLET, EXTENDED RELEASE ORAL at 08:43

## 2021-08-24 RX ADMIN — FINASTERIDE 5 MG: 5 TABLET, FILM COATED ORAL at 08:42

## 2021-08-24 RX ADMIN — Medication 2000 UNITS: at 08:42

## 2021-08-24 RX ADMIN — FERROUS SULFATE TAB 325 MG (65 MG ELEMENTAL FE) 325 MG: 325 (65 FE) TAB at 08:42

## 2021-08-24 RX ADMIN — ASPIRIN 81 MG: 81 TABLET, FILM COATED ORAL at 08:41

## 2021-08-24 RX ADMIN — TIMOLOL MALEATE 1 DROP: 5 SOLUTION OPHTHALMIC at 08:42

## 2021-08-24 RX ADMIN — GUAIFENESIN 600 MG: 600 TABLET, EXTENDED RELEASE ORAL at 12:52

## 2021-08-24 RX ADMIN — PANTOPRAZOLE SODIUM 40 MG: 40 TABLET, DELAYED RELEASE ORAL at 08:41

## 2021-08-24 RX ADMIN — OXYCODONE HYDROCHLORIDE AND ACETAMINOPHEN 250 MG: 500 TABLET ORAL at 08:41

## 2021-08-24 RX ADMIN — GUAIFENESIN 600 MG: 600 TABLET, EXTENDED RELEASE ORAL at 20:17

## 2021-08-24 RX ADMIN — Medication 10 ML: at 20:18

## 2021-08-24 RX ADMIN — Medication 10 ML: at 08:42

## 2021-08-24 RX ADMIN — ATORVASTATIN CALCIUM 20 MG: 20 TABLET, FILM COATED ORAL at 08:42

## 2021-08-24 RX ADMIN — AMLODIPINE BESYLATE 2.5 MG: 2.5 TABLET ORAL at 08:42

## 2021-08-24 RX ADMIN — CYANOCOBALAMIN TAB 1000 MCG 1000 MCG: 1000 TAB at 08:42

## 2021-08-24 RX ADMIN — Medication 1 TABLET: at 08:41

## 2021-08-24 RX ADMIN — AZITHROMYCIN DIHYDRATE 500 MG: 500 INJECTION, POWDER, LYOPHILIZED, FOR SOLUTION INTRAVENOUS at 12:52

## 2021-08-24 ASSESSMENT — PAIN SCALES - GENERAL
PAINLEVEL_OUTOF10: 0
PAINLEVEL_OUTOF10: 0

## 2021-08-24 NOTE — ACP (ADVANCE CARE PLANNING)
Advance Care Planning   Healthcare Decision Maker:    Primary Decision Maker: Gerson Pacheco - Spouse - [phone]    Secondary Decision Maker: Dillan Sahaer - [phone]    Supplemental (Other) Decision Maker: Pavel Glasgow - Child - [phone]

## 2021-08-24 NOTE — PROGRESS NOTES
HOSPITAL   PROGRESS NOTE. SUBJECTIVE:  Theoplis Risk, 80 y.o., male C/O  KIDNEY PROBLEM. GETTING BETTER. ALSO COUGHING UP PHLEGM AND FEELS BETTER WITH IT.  CONDITION DISCUSSED WITH  PATIENT  AND NURSING   STAFF. CONSULT NOTES REVIEWED. ROS:GENERAL- APPETITE- GOOD. BOWEL MOVEMENTS- NORMAL. NO URINE    PROBLEM. EENT: NORMAL            CVS: NORMAL. NO CHEST PAIN OR PALPITATION. RESPIRATORY:NO SOB. GI/: NO ABDOMINAL PAINS            MUSCULOSKELETAL: NO COMPLAIN            CNS: NO  COMPLAIN            OBJECTIVE:    GENERAL:Temperature:  Current - Temp: 98.9 °F (37.2 °C); Max - Temp  Av.8 °F (37.1 °C)  Min: 98.6 °F (37 °C)  Max: 98.9 °F (37.2 °C)  Respiratory Rate : Resp  Av  Min: 16  Max: 20  Pulse Range: Pulse  Av  Min: 62  Max: 68  Blood Presuure Range:  Systolic (09MCS), DPE:978 , Min:123 , RHK:548   ; Diastolic (25NLT), ZSK:91, Min:48, Max:50    Pulse ox Range: SpO2  Av %  Min: 92 %  Max: 96 %  24hr I & O:    Intake/Output Summary (Last 24 hours) at 2021 1123  Last data filed at 2021 0946  Gross per 24 hour   Intake 720 ml   Output 1400 ml   Net -680 ml       CONSTITUTIONALl:FAIRLY   DEVELOPED,ORIENTED,CO-OPERATIVE  HEENT:NORMAL. NECK:  supple, symmetrical, trachea midline,Carotids- normal,JVP- flat  HEMATOLOGIC/LYMPHATICS:  no cervical lymphadenopathy  LUNGS:clear  CARDIOVASCULAR:  Normal apical impulse, regular rate and rhythm, normal S1 and S2, no S3 or S4, and no murmur noted  ABDOMEN:  normal bowel sounds, non-distended, non-tender, no masses palpated  EXTREMITIES: ARTHRITIC CHANGES. MOVEMENTS-LIMITED. PEDAL PULSES-FAIR.   SKIN:  normal skin color, texture, turgor    Data    CBC:   Lab Results   Component Value Date    WBC 16.0 2021    RBC 3.20 2021    HGB 9.9 2021    HCT 30.6 2021    MCV 95.6 2021    MCH 30.9 2021    MCHC 32.4 2021    RDW 13.5 08/24/2021     08/24/2021    MPV 10.3 08/24/2021     CMP:    Lab Results   Component Value Date     08/24/2021    K 3.5 08/24/2021    K 3.2 08/09/2021     08/24/2021    CO2 23 08/24/2021    BUN 42 08/24/2021    CREATININE 1.7 08/24/2021    GFRAA 46 08/24/2021    LABGLOM 38 08/24/2021    GLUCOSE 102 08/24/2021    GLUCOSE 106 01/09/2012    PROT 6.2 08/23/2021    LABALBU 2.7 08/23/2021    LABALBU 4.4 01/09/2012    CALCIUM 9.1 08/24/2021    BILITOT 0.5 08/23/2021    ALKPHOS 121 08/23/2021    AST 13 08/23/2021    ALT 10 08/23/2021         ASSESSMENT:    Active Hospital Problems    Diagnosis Date Noted    MORENA (acute kidney injury) ([facility] [N17.9] 08/22/2021     Priority: High    Urinary retention [R33.9] 08/09/2021     Priority: High    Iron deficiency anemia due to chronic blood loss [D50.0] 07/26/2018     Priority: High    Stage 3a chronic kidney disease ([facility] [N18.31] 07/26/2018     Priority: High    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       Electronically signed by Santos Turner MD on 8/24/21 at 11:23 AM EDT

## 2021-08-24 NOTE — PROGRESS NOTES
Nephrology Progress Note  [facility]    Reason for Consult:   MORENA   Date of Service: 8/24/2021     Subjective    Patient seen and examined  States his dyspnea and cough improving  No chest pain or palpitations  No abdominal pain, nausea vomiting, diarrhea or constipation  Eating lunch at time of my evaluation          Past Medical History:        Diagnosis Date    Enlarged prostate     Hyperlipidemia     Hypertension     Lumbosacral strain 7/8/2013    Pacemaker        Past Surgical History:        Procedure Laterality Date    A-V CARDIAC PACEMAKER INSERTION  5/30/07    COLONOSCOPY  8/3/11    EYE SURGERY  2002    cataracts evelyn    TONSILLECTOMY      child       Current Medications:    Current Facility-Administered Medications: guaiFENesin (MUCINEX) extended release tablet 600 mg, 600 mg, Oral, BID  amLODIPine (NORVASC) tablet 2.5 mg, 2.5 mg, Oral, Daily  ascorbic acid (VITAMIN C) tablet 250 mg, 250 mg, Oral, Daily  aspirin EC tablet 81 mg, 81 mg, Oral, Daily  Vitamin D (CHOLECALCIFEROL) tablet 2,000 Units, 2,000 Units, Oral, Daily  ferrous sulfate (IRON 325) tablet 325 mg, 325 mg, Oral, Daily  finasteride (PROSCAR) tablet 5 mg, 5 mg, Oral, Daily  metoprolol succinate (TOPROL XL) extended release tablet 25 mg, 25 mg, Oral, Daily  therapeutic multivitamin-minerals 1 tablet, 1 tablet, Oral, Daily  pantoprazole (PROTONIX) tablet 40 mg, 40 mg, Oral, Daily  atorvastatin (LIPITOR) tablet 20 mg, 20 mg, Oral, Daily  timolol (TIMOPTIC) 0.5 % ophthalmic solution 1 drop, 1 drop, Both Eyes, BID  vitamin B-12 (CYANOCOBALAMIN) tablet 1,000 mcg, 1,000 mcg, Oral, Daily  sodium chloride flush 0.9 % injection 5-40 mL, 5-40 mL, Intravenous, 2 times per day  sodium chloride flush 0.9 % injection 5-40 mL, 5-40 mL, Intravenous, PRN  0.9 % sodium chloride infusion, 25 mL, Intravenous, PRN  enoxaparin (LOVENOX) injection 30 mg, 30 mg, Subcutaneous, Daily  ondansetron (ZOFRAN-ODT) disintegrating tablet 4 mg, 4 mg, Oral, Q8H PRN **OR** ondansetron (ZOFRAN) injection 4 mg, 4 mg, Intravenous, Q6H PRN  polyethylene glycol (GLYCOLAX) packet 17 g, 17 g, Oral, Daily PRN  acetaminophen (TYLENOL) tablet 650 mg, 650 mg, Oral, Q6H PRN **OR** acetaminophen (TYLENOL) suppository 650 mg, 650 mg, Rectal, Q6H PRN  cefTRIAXone (ROCEPHIN) 1,000 mg in sterile water 10 mL IV syringe, 1,000 mg, Intravenous, Q24H  azithromycin (ZITHROMAX) 500 mg in D5W 250ml Vial Mate, 500 mg, Intravenous, Q24H  opium-belladonna (B&O SUPPRETTES) 16.2-60 MG suppository 60 mg, 60 mg, Rectal, Q8H PRN  ipratropium-albuterol (DUONEB) nebulizer solution 1 ampule, 1 ampule, Inhalation, Q4H PRN    Allergies:  Patient has no known allergies.       Physical exam:   Constitutional:  VITALS:  BP (!) 123/48   Pulse 68   Temp 98.9 °F (37.2 °C) (Oral)   Resp 20   Ht 5' 9\" (1.753 m)   Wt 141 lb (64 kg)   SpO2 94%   BMI 20.82 kg/m²     Gen: alert, awake, no acute distress  Eyes: eomi  HEENT: atraumatic/normocephalic  Lungs: clear to ascultation bilaterally, equal lung expansion  CV: RRR, no murmurs  Abdomen: soft, nontender, nondistended, normoactive bowel sounds  Extremitiy: no edema  : +guillory   Skin: no rash, tugor wnl  Neuro: no focal deficits  Psych: cooperative and appropriate      Data:         Last 3 BMP  Recent Labs     08/22/21  0536 08/23/21 0453 08/24/21  0513    135 136   K 4.0 3.6 3.5   CL 99 100 101   CO2 20* 23 23   BUN 50* 47* 42*   CREATININE 2.2* 2.0* 1.7*   GLUCOSE 109* 125* 102*   CALCIUM 8.2* 8.6 9.1         Last 3 CMP:    Recent Labs     08/22/21  0221 08/22/21  0221 08/22/21  0536 08/23/21  0453 08/24/21  0513   *   < > 132 135 136   K 4.1   < > 4.0 3.6 3.5   CL 93*   < > 99 100 101   CO2 22   < > 20* 23 23   BUN 54*   < > 50* 47* 42*   CREATININE 2.4*   < > 2.2* 2.0* 1.7*   GLUCOSE 127*   < > 109* 125* 102*   CALCIUM 9.0   < > 8.2* 8.6 9.1   PROT 6.9  --   --  6.2*  --    LABALBU 3.1*  --   --  2.7*  --    BILITOT 0.5  --   --  0.5  -- ALKPHOS 96  --   --  121  --    AST 9  --   --  13  --    ALT 8  --   --  10  --     < > = values in this interval not displayed. Last 3 Glucose:     Recent Labs     08/22/21  0536 08/23/21 0453 08/24/21  0513   GLUCOSE 109* 125* 102*         Last 3 POC Glucose:     No results for input(s): POCGLU in the last 72 hours. Last 3 CK, CKMB, Troponin  No results for input(s): CKTOTAL, CKMB, TROPONINI in the last 72 hours. Last 3 CBC:  Recent Labs     08/22/21  0738 08/23/21 0453 08/24/21  0513   WBC 13.1* 13.3* 16.0*   RBC 3.33* 3.10* 3.20*   HGB 10.3* 9.7* 9.9*   HCT 30.6* 29.5* 30.6*   MCV 91.9 95.2 95.6   MCH 30.9 31.3 30.9   MCHC 33.7 32.9 32.4   RDW 13.3 13.6 13.5    384 409   MPV 10.1 10.2 10.3       Last 3 Hepatic Function Panel:    Recent Labs     08/22/21  0221 08/23/21  0453   ALKPHOS 96 121   ALT 8 10   AST 9 13   PROT 6.9 6.2*   BILITOT 0.5 0.5   LABALBU 3.1* 2.7*       Albumin:  Recent Labs     08/23/21  0453   LABALBU 2.7*       Calcium:  Recent Labs     08/24/21  0513   CALCIUM 9.1       Ionized Calcium:  No results for input(s): IONCA in the last 72 hours. Magnesium:    Recent Labs     08/24/21  0513   MG 2.1         ABGs:  No results for input(s): PHART, PO2ART, GKV4VKB, TQE4MUQ, BEART, R8KRONRN in the last 72 hours. Lactic Acid:  Recent Labs     08/23/21 0453   LACTA 0.9       Last 3 Amylase:  No results for input(s): AMYLASE in the last 72 hours. Last 3 Lipase:  No results for input(s): LIPASE in the last 72 hours. Last 3 BNP:  No results for input(s): BNP in the last 72 hours. Assessment/Plan      1. Acute kidney injury with underlying chronic kidney disease stage G3b. Acute kidney injury probably secondary to urinary retention with malfunctioning Fay   Urology following  Renal function improving  IV fluids are now off, though may need fluids if no further improvement in renal function    2. Metabolic acidosis.   The patient has normal anion gap metabolic acidosis. This may be a reflection of tubular dysfunction with obstruction. Follow for now and give bicarbonate as needed     3. Hypertension with chronic kidney disease stage G1 to G4. Blood pressure is close to target of less than 130/80 mmHg. 4.  Anemia with history of mild iron deficiency. On oral iron     5. Hyponatremia. Hyponatremia was mild and improved. 6.  History of hypophosphatemia. Acceptable on last check.           Thank you for the opportunity to participate in the care of Mr Ann Salazar      ______________________________      Trista Calle MD  8/24/2021  11:44 AM

## 2021-08-24 NOTE — CARE COORDINATION
8-24-Cm notes: ( cvid neg 8-22) pt plans on going home with home health care at [place] , he [facility] [place], called referral to [facility] , they have accepted the pt with a start of care thursday 8-26. Pt has a cane , BSC, grab bars, and has access to a standard wheelchair. No other needs expressed at this time.  Electronically signed by Christopher Perdomo RN on 8/24/2021 at 11:39 AM

## 2021-08-25 ENCOUNTER — TELEPHONE (OUTPATIENT)
Dept: FAMILY MEDICINE CLINIC | Age: 86
End: 2021-08-25

## 2021-08-25 ENCOUNTER — HOSPITAL ENCOUNTER (EMERGENCY)
Age: 86
Discharge: HOME | DRG: 698 | End: 2021-08-26
Attending: EMERGENCY MEDICINE
Payer: MEDICARE

## 2021-08-25 VITALS
OXYGEN SATURATION: 93 % | HEART RATE: 72 BPM | DIASTOLIC BLOOD PRESSURE: 63 MMHG | RESPIRATION RATE: 18 BRPM | TEMPERATURE: 98.5 F | SYSTOLIC BLOOD PRESSURE: 140 MMHG

## 2021-08-25 VITALS
TEMPERATURE: 97.9 F | HEART RATE: 59 BPM | SYSTOLIC BLOOD PRESSURE: 147 MMHG | OXYGEN SATURATION: 95 % | DIASTOLIC BLOOD PRESSURE: 68 MMHG | HEIGHT: 69 IN | BODY MASS INDEX: 20.88 KG/M2 | RESPIRATION RATE: 20 BRPM | WEIGHT: 141 LBS

## 2021-08-25 DIAGNOSIS — T83.9XXA FOLEY CATHETER PROBLEM, INITIAL ENCOUNTER (HCC): Primary | ICD-10-CM

## 2021-08-25 DIAGNOSIS — N18.30 STAGE 3 CHRONIC KIDNEY DISEASE, UNSPECIFIED WHETHER STAGE 3A OR 3B CKD (HCC): ICD-10-CM

## 2021-08-25 DIAGNOSIS — R33.9 URINARY RETENTION: Primary | ICD-10-CM

## 2021-08-25 LAB
ANION GAP SERPL CALCULATED.3IONS-SCNC: 13 MMOL/L (ref 7–16)
BUN BLDV-MCNC: 36 MG/DL (ref 6–23)
CALCIUM SERPL-MCNC: 8.5 MG/DL (ref 8.6–10.2)
CHLORIDE BLD-SCNC: 99 MMOL/L (ref 98–107)
CO2: 23 MMOL/L (ref 22–29)
CREAT SERPL-MCNC: 1.5 MG/DL (ref 0.7–1.2)
EKG ATRIAL RATE: 115 BPM
EKG Q-T INTERVAL: 452 MS
EKG QRS DURATION: 146 MS
EKG QTC CALCULATION (BAZETT): 480 MS
EKG R AXIS: -60 DEGREES
EKG T AXIS: 101 DEGREES
EKG VENTRICULAR RATE: 68 BPM
GFR AFRICAN AMERICAN: 53
GFR NON-AFRICAN AMERICAN: 44 ML/MIN/1.73
GLUCOSE BLD-MCNC: 103 MG/DL (ref 74–99)
HCT VFR BLD CALC: 27.9 % (ref 37–54)
HEMOGLOBIN: 9.3 G/DL (ref 12.5–16.5)
MAGNESIUM: 1.9 MG/DL (ref 1.6–2.6)
MCH RBC QN AUTO: 30.7 PG (ref 26–35)
MCHC RBC AUTO-ENTMCNC: 33.3 % (ref 32–34.5)
MCV RBC AUTO: 92.1 FL (ref 80–99.9)
ORGANISM: ABNORMAL
PDW BLD-RTO: 13.3 FL (ref 11.5–15)
PHOSPHORUS: 3.6 MG/DL (ref 2.5–4.5)
PLATELET # BLD: 378 E9/L (ref 130–450)
PMV BLD AUTO: 10.2 FL (ref 7–12)
POTASSIUM SERPL-SCNC: 3.5 MMOL/L (ref 3.5–5)
RBC # BLD: 3.03 E12/L (ref 3.8–5.8)
SODIUM BLD-SCNC: 135 MMOL/L (ref 132–146)
URINE CULTURE, ROUTINE: ABNORMAL
WBC # BLD: 12.9 E9/L (ref 4.5–11.5)

## 2021-08-25 PROCEDURE — 6370000000 HC RX 637 (ALT 250 FOR IP): Performed by: NURSE PRACTITIONER

## 2021-08-25 PROCEDURE — 36415 COLL VENOUS BLD VENIPUNCTURE: CPT

## 2021-08-25 PROCEDURE — 97110 THERAPEUTIC EXERCISES: CPT

## 2021-08-25 PROCEDURE — 97530 THERAPEUTIC ACTIVITIES: CPT

## 2021-08-25 PROCEDURE — 80048 BASIC METABOLIC PNL TOTAL CA: CPT

## 2021-08-25 PROCEDURE — 99282 EMERGENCY DEPT VISIT SF MDM: CPT

## 2021-08-25 PROCEDURE — 83735 ASSAY OF MAGNESIUM: CPT

## 2021-08-25 PROCEDURE — 97535 SELF CARE MNGMENT TRAINING: CPT

## 2021-08-25 PROCEDURE — 99239 HOSP IP/OBS DSCHRG MGMT >30: CPT | Performed by: FAMILY MEDICINE

## 2021-08-25 PROCEDURE — 6370000000 HC RX 637 (ALT 250 FOR IP): Performed by: FAMILY MEDICINE

## 2021-08-25 PROCEDURE — 6360000002 HC RX W HCPCS: Performed by: NURSE PRACTITIONER

## 2021-08-25 PROCEDURE — 85027 COMPLETE CBC AUTOMATED: CPT

## 2021-08-25 PROCEDURE — 84100 ASSAY OF PHOSPHORUS: CPT

## 2021-08-25 RX ORDER — LEVOFLOXACIN 500 MG/1
500 TABLET, FILM COATED ORAL DAILY
Qty: 7 TABLET | Refills: 0 | Status: SHIPPED | OUTPATIENT
Start: 2021-08-25 | End: 2021-09-01

## 2021-08-25 RX ADMIN — FINASTERIDE 5 MG: 5 TABLET, FILM COATED ORAL at 08:06

## 2021-08-25 RX ADMIN — Medication 2000 UNITS: at 08:06

## 2021-08-25 RX ADMIN — OXYCODONE HYDROCHLORIDE AND ACETAMINOPHEN 250 MG: 500 TABLET ORAL at 08:05

## 2021-08-25 RX ADMIN — CYANOCOBALAMIN TAB 1000 MCG 1000 MCG: 1000 TAB at 08:06

## 2021-08-25 RX ADMIN — GUAIFENESIN 600 MG: 600 TABLET, EXTENDED RELEASE ORAL at 08:06

## 2021-08-25 RX ADMIN — FERROUS SULFATE TAB 325 MG (65 MG ELEMENTAL FE) 325 MG: 325 (65 FE) TAB at 08:06

## 2021-08-25 RX ADMIN — Medication 1 TABLET: at 08:06

## 2021-08-25 RX ADMIN — METOPROLOL SUCCINATE 25 MG: 25 TABLET, EXTENDED RELEASE ORAL at 08:06

## 2021-08-25 RX ADMIN — ATORVASTATIN CALCIUM 20 MG: 20 TABLET, FILM COATED ORAL at 08:06

## 2021-08-25 RX ADMIN — ASPIRIN 81 MG: 81 TABLET, FILM COATED ORAL at 08:06

## 2021-08-25 RX ADMIN — AMLODIPINE BESYLATE 2.5 MG: 2.5 TABLET ORAL at 08:05

## 2021-08-25 RX ADMIN — TIMOLOL MALEATE 1 DROP: 5 SOLUTION OPHTHALMIC at 08:09

## 2021-08-25 RX ADMIN — ENOXAPARIN SODIUM 30 MG: 30 INJECTION SUBCUTANEOUS at 08:07

## 2021-08-25 RX ADMIN — PANTOPRAZOLE SODIUM 40 MG: 40 TABLET, DELAYED RELEASE ORAL at 08:06

## 2021-08-25 ASSESSMENT — PAIN SCALES - GENERAL
PAINLEVEL_OUTOF10: 0
PAINLEVEL_OUTOF10: 0
PAINLEVEL_OUTOF10: 10

## 2021-08-25 NOTE — TELEPHONE ENCOUNTER
----- Message from Kavita Lele sent at 8/25/2021 12:28 PM EDT -----  Subject: Message to Provider    QUESTIONS  Information for Provider? Amaya from [facility] was seeing if   patient is eligible for home care.  ---------------------------------------------------------------------------  --------------  [address]  What is the best way for the office to contact you? OK to leave message on   voicemail  Preferred Call Back Phone Number? [phone]  ---------------------------------------------------------------------------  --------------  SCRIPT ANSWERS  Relationship to Patient? Third Party  Representative Name?  Amaya from [facility]

## 2021-08-25 NOTE — PROGRESS NOTES
Physician Progress Note      PATIENT:               Sil Castillo  CSN #:                  [id number]  :                       1932  ADMIT DATE:       2021 12:28 AM  DISCH DATE:  RESPONDING  PROVIDER #:        Gilberto Delacruz MD          QUERY TEXT:    Pt admitted with diagnosis of MORENA, pneumonia and  UTI. Pt noted to have   documentation of indwelling urinary catheter. If possible, please document in   the progress notes and discharge summary if you are evaluating and/or treating   any of the following: The medical record reflects the following:  Risk Factors: per documentation guillory catheter  Clinical Indicators: per ED documentation guillory catheter in place that was   placed last Saturday for retention due to enlarged prostate, per H and P   Urinary tract infection - awaiting urine culture - IV Rocephin, Urinary   retention - originally experience retention on 21 and guillory catheter   inserted  Treatment: urinalysis, urine culture, IVF boluses, IVF, IV Rocephin, lab work   monitoring    Thank you  Anirudh Trammell RN [facility]  [phone]  Options provided:  -- UTI due to chronic indwelling urinary catheter  -- UTI not due to indwelling urinary catheter  -- Other - I will add my own diagnosis  -- Disagree - Not applicable / Not valid  -- Disagree - Clinically unable to determine / Unknown  -- Refer to Clinical Documentation Reviewer    PROVIDER RESPONSE TEXT:    UTI is due to the chronic indwelling urinary catheter.     Query created by: Loura Apley on 2021 12:10 PM      Electronically signed by:  Gilberto Delacruz MD 2021 8:24 AM

## 2021-08-25 NOTE — PROGRESS NOTES
HOSPITALIST PROGRESS NOTE:      Patient: Lior Robert Date: 6/26/2017   male, 46 year old  Admit Date: 6/19/2017   Attending: Julio Trent MD      Subjective:  Lior Robert is a 46 year old year old male who is being seen in follow up for Staphylococcus aureus bacteremia with history of pulmonary hypertension s/p port removal. which being weaned off.doing well no  shortness of breath diarrhea or nausea or vomiting.     ALLERGIES:   Allergies as of 06/19/2017 - Reviewed 06/19/2017   Allergen Reaction Noted   • Bee sting Dermatitis 02/07/2017   • Shrimp RASH 08/22/2016         Objective/Physical Exam     Vital 24 Hour Range Last Value  Temperature Temp  Min: 97.8 °F (36.6 °C)  Max: 98.7 °F (37.1 °C) 97.8 °F (36.6 °C) (06/26/17 0440)  Pulse Pulse  Min: 70  Max: 84 78 (06/26/17 0440)  Respiratory Resp  Min: 18  Max: 20 18 (06/26/17 0440)  Non-Invasive  Blood Pressure BP  Min: 102/61  Max: 112/74 111/68 (06/26/17 0440)  Pulse Oximetry SpO2  Min: 92 %  Max: 95 % 94 % (06/26/17 0440)    General appearance: alert, appears stated age and cooperative  Head: Normocephalic, without obvious abnormality, atraumatic  Lungs: clear to auscultation bilaterally, good effort   Heart: regular rate and rhythm, S1, S2 normal, no murmur, click, rub or gallop  Abdomen: Soft, non-tender; bowel sounds normal; no masses, no organomegaly  Extremities: extremities normal, atraumatic, no cyanosis or edema        Laboratory Results:      Labs personally reviewed     Pertinent:       Recent Labs  Lab 06/23/17  0603 06/20/17  0640 06/19/17  1316   WBC 4.4 5.5 6.7   RBC 4.83 4.75 5.46   HGB 13.1 13.1 15.5   HCT 39.9 39.1 45.4    143 167   SEG 54  --  73         Recent Labs  Lab 06/25/17  1702 06/25/17  1242 06/25/17  0605  06/23/17  0603 06/21/17  0615  06/20/17  0640 06/19/17  1316   SODIUM  --   --  142  --  143 140  --  139 137   POTASSIUM 4.0 3.6 3.4  < > 3.3* 3.4  < > 3.0* 3.1*   CHLORIDE  --   --  107  --  107 105  --   Physician Progress Note      PATIENT:               Trinity Ready  CSN #:                  [id number]  :                       1932  ADMIT DATE:       2021 7:16 AM  DISCH DATE:        2021 1:25 PM  RESPONDING  PROVIDER #:        Reggie Camejo MD          QUERY TEXT:    Pt admitted bilateral hydroureteronephrosis with urinary obstruction . Pt   noted to have documentation of history of BPH. If possible, please document in   progress notes and discharge summary the relationship, if any, between   bilateral hydroureteronephrosis with urinary obstruction and BPH. The medical record reflects the following:  Risk Factors: per documentation history of BPH  Clinical Indicators: per CT scan report Severe heterogeneous enlargement of   prostate gland with mass effect on  bladder base.,  per Urology consult  He has not seen a urologist for many   years. He saw Dr. Rebecca Mendez in the past for BPH, He states that over the last 3 to   4 days he has noticed difficulty urinating, weak stream, dribbling,   straining, and flank pain. He had a CT abdomen pelvis performed in the   emergency department that showed severe bilateral hydroureteronephrosis and   bladder distention, Would benefit from chronic Fay versus possible TURP with   SPT placement in the future as an outpatient  ? Treatment:  CT scan, Urology consult, Nephrology consult    Thank you  Fernando Diaz RN CCDS  [phone]  Options provided:  -- bilateral hydroureteronephrosis with urinary obstruction  due to BPH  -- bilateral hydroureteronephrosis with urinary obstruction unrelated to BPH  -- Other - I will add my own diagnosis  -- Disagree - Not applicable / Not valid  -- Disagree - Clinically unable to determine / Unknown  -- Refer to Clinical Documentation Reviewer    PROVIDER RESPONSE TEXT:    This patient has bilateral hydroureteronephrosis with urinary obstruction due   to BPH.     Query created by: Hector Hurtado on 2021 1:55 107 101   CO2  --   --  26  --  25 26  --  21 24   BUN  --   --  9  --  9 8  --  8 12   CREATININE  --   --  0.85  --  0.84 1.12  --  1.06 1.33*   GFRNA  --   --  >90  --  >90 78  --  84 64   GLUCOSE  --   --  84  --  85 113*  --  96 95   CALCIUM  --   --  8.3*  --  8.4 8.3*  --  7.5* 8.5   ALBUMIN  --   --   --   --   --   --   --  3.0* 3.7   AST  --   --   --   --   --   --   --  27 25   GPT  --   --   --   --   --   --   --  31 34   BILIRUBIN  --   --   --   --   --   --   --  0.4 0.5   ALKPT  --   --   --   --   --   --   --  61 75   < > = values in this interval not displayed.    Results for orders placed or performed during the hospital encounter of 06/19/17   Blood Culture   Result Value Ref Range    Specimen Description BLOOD R HAND     CULTURE NO GROWTH 5 DAYS.     REPORT STATUS 06/26/2017 FINAL              Diagnostics: Reviewed     No results found for this or any previous visit.     Medications/Infusions:    Reviewed         Assessment & Plan:     · Staph Aureus Bacteremia- 2/2 blood culture in a patient with port. 2d echo was normal Repeat blood cultures have been negative. Will continue IV daptomycin and finish the coyurse with antibiotics for total of 2 weeks.   · Pulmonary Hypertension- will continue cialis, Per Dr Blakely will continue Ambrisentan  and continue  oral trepositinil and  wean off IV remodulin and transition him to oral medications which would require few more days.   · Essential Hypertension- Continue home medications     DVT Propylaxis - Lovenox, SCDs and TEDs    Code status: Full Resuscitation      Central Line- none  Fenton Catheter- none        Hospital Day #: Hospital Day: 8    Tentative discharge Disposition: Home        Signed:  Juilo Trent MD  6/26/2017  9:09 AM     PM      Electronically signed by:  Argelia Doran MD 8/25/2021 8:22 AM

## 2021-08-25 NOTE — DISCHARGE SUMMARY
Discharge Summary    Ranulfo Giron  :  1932  MRN:  [id number]    Admit date:  2021  Discharge date:  2021    Admitting Physician:  Reyes Leopard, MD    Admission Diagnoses: Cough [R05]  Acute on chronic renal insufficiency [N28.9, N18.9]  MORENA (acute kidney injury) ([facility] [N17.9]  Calcification of indwelling Fay catheter, initial encounter [facility] [T83.89XA]  Benign prostatic hyperplasia, unspecified whether lower urinary tract symptoms present [N40.0]  Acute kidney injury ([facility] [N17.9]    Discharge Diagnoses: Active Hospital Problems    Diagnosis Date Noted    MORENA (acute kidney injury) ([facility] [N17.9] 2021     Priority: High    Urinary retention [R33.9] 2021     Priority: High    Iron deficiency anemia due to chronic blood loss [D50.0] 2018     Priority: High    Stage 3a chronic kidney disease ([facility] [N18.31] 2018     Priority: High    Primary osteoarthritis involving multiple joints [M89.49] 2012     Priority: High     Class: Chronic    Essential hypertension [I10]      Priority: High     Class: Chronic    Pure hypercholesterolemia [E78.00]      Priority: High     Class: Chronic    Acute kidney injury ([facility] [N17.9] 2021       Consults:  nephrology and urology    HPI/Hospital Course:   IV ANTIBIOTIC, CHANGES AS PER CONSULTANT RECOMMENDATIONS, CONTINUE CURRENT MEDICATIONS.     Discharge Medications:        Medication List      START taking these medications    levoFLOXacin 500 MG tablet  Commonly known as: LEVAQUIN  Take 1 tablet by mouth daily for 7 days        CONTINUE taking these medications    amLODIPine 2.5 MG tablet  Commonly known as: NORVASC  Take 1 tablet by mouth daily     aspirin 81 MG EC tablet     ferrous sulfate 325 (65 Fe) MG tablet  Commonly known as: IRON 325     finasteride 5 MG tablet  Commonly known as: PROSCAR  Take 1 tablet by mouth daily     lisinopril-hydroCHLOROthiazide 20-25 MG per tablet  Commonly known as: PRINZIDE;ZESTORETIC  Take 1 tablet by mouth daily     metoprolol succinate 25 MG extended release tablet  Commonly known as: TOPROL XL  Take 1 tablet by mouth daily     One-A-Day Essential Tabs     OSTEO BI-FLEX JOINT SHIELD PO     pantoprazole 40 MG tablet  Commonly known as: PROTONIX     Saw Palmetto (Serenoa repens) 450 MG Caps     simvastatin 40 MG tablet  Commonly known as: ZOCOR  Take 1 tablet by mouth nightly     timolol 0.5 % ophthalmic solution  Commonly known as: TIMOPTIC     vitamin B-12 1000 MCG tablet  Commonly known as: CYANOCOBALAMIN     Vitamin D3 50 MCG (2000 UT) Caps        STOP taking these medications    ibuprofen 200 MG tablet  Commonly known as: ADVIL;MOTRIN        ASK your doctor about these medications    C-250 250 MG Chew  Generic drug: Ascorbic Acid     therapeutic multivitamin-minerals tablet           Where to Get Your Medications      These medications were sent to [facility] [address], [place] - [address] [phone] Laurette Cheadle [phone]  [facility] [address]    Phone: [phone]   · levoFLOXacin 500 MG tablet         Disposition:   Home WITH HOME CARE     CONDITION AT DISCHARGE: STABLE    Patient Instructions:   Current Discharge Medication List      START taking these medications    Details   levoFLOXacin (LEVAQUIN) 500 MG tablet Take 1 tablet by mouth daily for 7 days  Qty: 7 tablet, Refills: 0         CONTINUE these medications which have NOT CHANGED    Details   Multiple Vitamin (ONE-A-DAY ESSENTIAL) TABS Take 1 tablet by mouth daily      ferrous sulfate (IRON 325) 325 (65 Fe) MG tablet Take 325 mg by mouth daily      Multiple Vitamins-Minerals (THERAPEUTIC MULTIVITAMIN-MINERALS) tablet Take 1 tablet by mouth daily      metoprolol succinate (TOPROL XL) 25 MG extended release tablet Take 1 tablet by mouth daily  Qty: 30 tablet, Refills: 3      lisinopril-hydroCHLOROthiazide (PRINZIDE;ZESTORETIC) 20-25 MG per tablet Take 1 tablet by mouth daily  Qty: 90 tablet, Refills: 1      finasteride (PROSCAR) 5 MG tablet Take 1 tablet by mouth daily  Qty: 90 tablet, Refills: 1      amLODIPine (NORVASC) 2.5 MG tablet Take 1 tablet by mouth daily  Qty: 90 tablet, Refills: 1      simvastatin (ZOCOR) 40 MG tablet Take 1 tablet by mouth nightly  Qty: 90 tablet, Refills: 0      Cholecalciferol (VITAMIN D3) 50 MCG (2000 UT) CAPS Take 2,000 Units by mouth daily       Ascorbic Acid (C-250) 250 MG CHEW Take 250 mg by mouth daily       vitamin B-12 (CYANOCOBALAMIN) 1000 MCG tablet Take 1,000 mcg by mouth daily      pantoprazole (PROTONIX) 40 MG tablet Take 40 mg by mouth daily       timolol (TIMOPTIC) 0.5 % ophthalmic solution Place 1 drop into both eyes 2 times daily   Refills: 0      Saw Palmetto, Serenoa repens, 450 MG CAPS Take 2 tablets by mouth daily. Misc Natural Products (OSTEO BI-FLEX JOINT SHIELD PO) Take 2 tablets by mouth daily. aspirin 81 MG EC tablet Take 81 mg by mouth daily.            STOP taking these medications       ibuprofen (ADVIL;MOTRIN) 200 MG tablet Comments:   Reason for Stopping:             Activity: activity as tolerated  Diet: cardiac diet    Follow-up with Una Manrique MD in 1 WEEK      Note that over 30 minutes was spent in preparing discharge papers, discussing discharge with patient, medication review, etc.      Signed:  Una Manrique MD  8/25/2021, 10:21 AM

## 2021-08-25 NOTE — CARE COORDINATION
8-25-Cm note: ( covid neg 8-22) called [facility] with dc date, pt already set up for start of care tomorrow 8-26. Pt has a cane , BSC, grab bars, and has access to a standard wheelchair. No other needs expressed at this time.  Electronically signed by Inna Alarcon RN on 8/25/2021 at 11:08 AM

## 2021-08-25 NOTE — PROGRESS NOTES
Nephrology Progress Note  [facility]    Reason for Consult:   MORENA   Date of Service: 8/25/2021     Subjective    Patient seen and examined  States his dyspnea and cough improving  No chest pain or palpitations  No abdominal pain, nausea vomiting, diarrhea or constipation  Eating lunch at time of my evaluation          Past Medical History:        Diagnosis Date    Enlarged prostate     Hyperlipidemia     Hypertension     Lumbosacral strain 7/8/2013    Pacemaker        Past Surgical History:        Procedure Laterality Date    A-V CARDIAC PACEMAKER INSERTION  5/30/07    COLONOSCOPY  8/3/11    EYE SURGERY  2002    cataracts evelyn    TONSILLECTOMY      child       Current Medications:    Current Facility-Administered Medications: guaiFENesin (MUCINEX) extended release tablet 600 mg, 600 mg, Oral, BID  amLODIPine (NORVASC) tablet 2.5 mg, 2.5 mg, Oral, Daily  ascorbic acid (VITAMIN C) tablet 250 mg, 250 mg, Oral, Daily  aspirin EC tablet 81 mg, 81 mg, Oral, Daily  Vitamin D (CHOLECALCIFEROL) tablet 2,000 Units, 2,000 Units, Oral, Daily  ferrous sulfate (IRON 325) tablet 325 mg, 325 mg, Oral, Daily  finasteride (PROSCAR) tablet 5 mg, 5 mg, Oral, Daily  metoprolol succinate (TOPROL XL) extended release tablet 25 mg, 25 mg, Oral, Daily  therapeutic multivitamin-minerals 1 tablet, 1 tablet, Oral, Daily  pantoprazole (PROTONIX) tablet 40 mg, 40 mg, Oral, Daily  atorvastatin (LIPITOR) tablet 20 mg, 20 mg, Oral, Daily  timolol (TIMOPTIC) 0.5 % ophthalmic solution 1 drop, 1 drop, Both Eyes, BID  vitamin B-12 (CYANOCOBALAMIN) tablet 1,000 mcg, 1,000 mcg, Oral, Daily  sodium chloride flush 0.9 % injection 5-40 mL, 5-40 mL, IntraVENous, 2 times per day  sodium chloride flush 0.9 % injection 5-40 mL, 5-40 mL, IntraVENous, PRN  0.9 % sodium chloride infusion, 25 mL, IntraVENous, PRN  enoxaparin (LOVENOX) injection 30 mg, 30 mg, Subcutaneous, Daily  ondansetron (ZOFRAN-ODT) disintegrating tablet 4 mg, 4 mg, Oral, Q8H PRN **OR** ondansetron (ZOFRAN) injection 4 mg, 4 mg, IntraVENous, Q6H PRN  polyethylene glycol (GLYCOLAX) packet 17 g, 17 g, Oral, Daily PRN  acetaminophen (TYLENOL) tablet 650 mg, 650 mg, Oral, Q6H PRN **OR** acetaminophen (TYLENOL) suppository 650 mg, 650 mg, Rectal, Q6H PRN  cefTRIAXone (ROCEPHIN) 1,000 mg in sterile water 10 mL IV syringe, 1,000 mg, IntraVENous, Q24H  azithromycin (ZITHROMAX) 500 mg in D5W 250ml Vial Mate, 500 mg, IntraVENous, Q24H  opium-belladonna (B&O SUPPRETTES) 16.2-60 MG suppository 60 mg, 60 mg, Rectal, Q8H PRN  ipratropium-albuterol (DUONEB) nebulizer solution 1 ampule, 1 ampule, Inhalation, Q4H PRN    Allergies:  Patient has no known allergies.       Physical exam:   Constitutional:  VITALS:  BP (!) 147/68   Pulse 59   Temp 97.9 °F (36.6 °C) (Oral)   Resp 20   Ht 5' 9\" (1.753 m)   Wt 141 lb (64 kg)   SpO2 95%   BMI 20.82 kg/m²     Gen: alert, awake, no acute distress  Eyes: eomi  HEENT: atraumatic/normocephalic  Lungs: clear to ascultation bilaterally, equal lung expansion  CV: RRR, no murmurs  Abdomen: soft, nontender, nondistended, normoactive bowel sounds  Extremitiy: no edema  : +guillory   Skin: no rash, tugor wnl  Neuro: no focal deficits  Psych: cooperative and appropriate      Data:         Last 3 BMP  Recent Labs     08/23/21  0453 08/24/21  0513 08/25/21  0443    136 135   K 3.6 3.5 3.5    101 99   CO2 23 23 23   BUN 47* 42* 36*   CREATININE 2.0* 1.7* 1.5*   GLUCOSE 125* 102* 103*   CALCIUM 8.6 9.1 8.5*         Last 3 CMP:    Recent Labs     08/23/21  0453 08/24/21  0513 08/25/21  0443    136 135   K 3.6 3.5 3.5    101 99   CO2 23 23 23   BUN 47* 42* 36*   CREATININE 2.0* 1.7* 1.5*   GLUCOSE 125* 102* 103*   CALCIUM 8.6 9.1 8.5*   PROT 6.2*  --   --    LABALBU 2.7*  --   --    BILITOT 0.5  --   --    ALKPHOS 121  --   --    AST 13  --   --    ALT 10  --   --          Last 3 Glucose:     Recent Labs     08/23/21  0453 08/24/21  0513 08/25/21 0443   GLUCOSE 125* 102* 103*         Last 3 POC Glucose:     No results for input(s): POCGLU in the last 72 hours. Last 3 CK, CKMB, Troponin  No results for input(s): CKTOTAL, CKMB, TROPONINI in the last 72 hours. Last 3 CBC:  Recent Labs     08/23/21 0453 08/24/21  0513 08/25/21 0443   WBC 13.3* 16.0* 12.9*   RBC 3.10* 3.20* 3.03*   HGB 9.7* 9.9* 9.3*   HCT 29.5* 30.6* 27.9*   MCV 95.2 95.6 92.1   MCH 31.3 30.9 30.7   MCHC 32.9 32.4 33.3   RDW 13.6 13.5 13.3    409 378   MPV 10.2 10.3 10.2       Last 3 Hepatic Function Panel:    Recent Labs     08/23/21 0453   ALKPHOS 121   ALT 10   AST 13   PROT 6.2*   BILITOT 0.5   LABALBU 2.7*       Albumin:  Recent Labs     08/23/21 0453   LABALBU 2.7*       Calcium:  Recent Labs     08/25/21 0443   CALCIUM 8.5*       Ionized Calcium:  No results for input(s): IONCA in the last 72 hours. Magnesium:    Recent Labs     08/25/21 0443   MG 1.9         ABGs:  No results for input(s): PHART, PO2ART, ALA6IDP, SDB2UUN, BEART, D8ILQSEO in the last 72 hours. Lactic Acid:  Recent Labs     08/23/21 0453   LACTA 0.9       Last 3 Amylase:  No results for input(s): AMYLASE in the last 72 hours. Last 3 Lipase:  No results for input(s): LIPASE in the last 72 hours. Last 3 BNP:  No results for input(s): BNP in the last 72 hours. Assessment/Plan      1. Acute kidney injury with underlying chronic kidney disease stage G3b. Acute kidney injury probably secondary to urinary retention with malfunctioning Fay   Urology following  Renal function improving  Remains off IV fluids    2. Metabolic acidosis. The patient has normal anion gap metabolic acidosis. At goal, follow      3. Hypertension with chronic kidney disease stage G1 to G4. Blood pressure is close to target of less than 130/80 mmHg. 4.  Anemia with history of mild iron deficiency. On oral iron     5. Hyponatremia. Hyponatremia was mild and improved.     6. History of hypophosphatemia. Acceptable on last check.           Stable from renal standpoint  Would check BMP, CBC, PO4 within one week of discharge       Thank you for the opportunity to participate in the care of Mr Valerio Morgan      ______________________________      Becky Lloyd MD  8/25/2021  8:57 AM

## 2021-08-25 NOTE — PROGRESS NOTES
Physical Therapy Treatment Note/Plan of Care    Room #:  7889/4519-84  Patient Name: Jamaal Laboy  YOB: 1932  MRN: [id number]    Date of Service: 8/25/2021     Tentative placement recommendation: Subacute vs Home Health Physical Therapy if patient meets goals  Equipment recommendation: None      Evaluating Physical Therapist: Kevin Sepulveda, PT, DPT #[id number]      Specific Provider Orders/Date/Referring Provider :   08/23/21 0815   PT eval and treat Start: 08/23/21 0815, End: 08/23/21 0815, ONE TIME, Standing Count: 1 Occurrences, R    Tr King, APRN - CNP Acknowledge New    Admitting Diagnosis:   Cough [R05]  Acute on chronic renal insufficiency [N28.9, N18.9]  MORENA (acute kidney injury) ([facility] [N17.9]  Calcification of indwelling Fay catheter, initial encounter ([facility] [T83.89XA]  Benign prostatic hyperplasia, unspecified whether lower urinary tract symptoms present [N40.0]  Acute kidney injury ([facility] [N17.9]      Visit Diagnoses       Codes    Calcification of indwelling Fay catheter, initial encounter ([facility]     T83.89XA    Benign prostatic hyperplasia, unspecified whether lower urinary tract symptoms present     N40.0    Cough     R05        Surgery:     Patient Active Problem List   Diagnosis    Pure hypercholesterolemia    Essential hypertension    Primary osteoarthritis involving multiple joints    Iron deficiency anemia due to chronic blood loss    Stage 3a chronic kidney disease ([address]    Urinary retention    Hypokalemia    MORENA (acute kidney injury) ([address]    Acute on chronic renal insufficiency    Acute kidney injury ([address]        ASSESSMENT of Current Deficits Patient exhibits decreased strength, balance and endurance impairing functional mobility, transfers, gait distance and tolerance to activity . Patient tolerates inc ambulation distance on room air this session as well as seated and standing exercises.  Patient would benefit from further therapy to inc safety prior to discharge home.       PHYSICAL THERAPY  PLAN OF CARE       Physical therapy plan of care is established based on physician order,  patient diagnosis and clinical assessment    Current Treatment Recommendations:    -Bed Mobility: Lower extremity exercises  and Trunk control activities   -Sitting Balance: Incorporate reaching activities to activate trunk muscles , Hands on support to maintain midline , Facilitate active trunk muscle engagement , Facilitate postural control in all planes  and Engage in core activities to allow for movement within base of support   -Standing Balance: Perform strengthening exercises in standing to promote motor control with or without upper extremity support , Instruct patient on adequate base of support to maintain balance and Challenge balance utilizing reaching  activities beyond center of gravity    -Transfers: Provide instruction on proper hand and foot position for adequate transfer of weight onto lower extremities and use of gait device, Cues for hand placement, technique and safety, Facilitate weight shift forward on to lower extremities and provide necessary stabilization of bilateral lower extremities , Support transfer of weight on to lower extremities, Assist with extension of knees trunk and hip to accept weight transfer  and Provide stabilization to prevent fall   -Gait: Gait training, Standing activities to improve: base of support, weight shift, weight bearing , Exercises to improve trunk control, Exercises to improve hip and knee control, Performance of protected weight bearing activities and Activities to increase weight bearing   -Endurance: Utilize Supervised activities to increase level of endurance to allow for safe functional mobility including transfers and gait  and Use graduated activities to promote good breathing techniques and provide support and education to maximize respiratory function    PT long term treatment goals are located in below grid    Patient and or family understand(s) diagnosis, prognosis, and plan of care. Frequency of treatments: Patient will be seen  daily. Prior Level of Function: Patient ambulated independently and with cane   Rehab Potential: good  for baseline    Past medical history:   Past Medical History:   Diagnosis Date    Enlarged prostate     Hyperlipidemia     Hypertension     Lumbosacral strain 7/8/2013    Pacemaker      Past Surgical History:   Procedure Laterality Date    A-V CARDIAC PACEMAKER INSERTION  5/30/07    COLONOSCOPY  8/3/11    EYE SURGERY  2002    cataracts evelyn    TONSILLECTOMY      child       SUBJECTIVE:    Precautions: Up with assistance, falls and O2 ,   Social history: Patient lives with spouse  in a two story home resides first  with 3 steps  to enter with Rail  Tub shower grab bars    Equipment owned: [employer],      [employer]   17/24    AM-[facility] Mobility Inpatient   How much difficulty turning over in bed?: A Little  How much difficulty sitting down on / standing up from a chair with arms?: A Little  How much difficulty moving from lying on back to sitting on side of bed?: A Little  How much help from another person moving to and from a bed to a chair?: A Little  How much help from another person needed to walk in hospital room?: A Little  How much help from another person for climbing 3-5 steps with a railing?: A Little  AM-[facility] Inpatient Mobility Raw Score : 18  AM-PAC Inpatient T-Scale Score : 43.63  Mobility Inpatient CMS 0-100% Score: 46.58  Mobility Inpatient CMS G-Code Modifier : CK    Nursing cleared patient for PT treatment. Patient sitting up in bedside chair at start of session. OBJECTIVE;   Initial Evaluation  Date: 8/23/2021 Treatment Date:  8/25/2021     Short Term/ Long Term   Goals   Was pt agreeable to Eval/treatment?  Yes yes To be met in 3 days   Pain level   0/10   No pain reported    Bed Mobility    Rolling: Supervision     Supine to sit: Supervision     Sit to supine: Supervision     Scooting: Supervision    Rolling: Not assessed patient in chair   Supine to sit: Not assessed patient in chair   Sit to supine: Not assessed patient in chair   Scooting: Not assessed patient in chair    Rolling: Independent    Supine to sit: Independent    Sit to supine: Independent    Scooting: Independent     Transfers Sit to stand: Minimal assist of 1  Sit to stand: Supervision  cues for hand placement     Sit to stand: Independent    Ambulation    40 feet using  wheeled walker with Minimal assist of 1   for walker control, walker approximation, balance and safety 60 feet, 100 feet using  wheeled walker with Supervision    with cues for safety and pacing patient required multiple standing rests d/t fatigue. > 100 feet using  wheeled walker with Independent    Stair negotiation: ascended and descended   Not assessed     3 steps with 1 HR with IND   ROM Within functional limits    Increase range of motion 10% of affected joints    Strength BUE:  refer to OT jaskaranal  RLE:  4-/5  LLE:  4-/5  Increase strength in affected mm groups by 1/3 grade   Balance Sitting EOB:  fair +  Dynamic Standing:  fair  Sitting EOB: not assessed   Dynamic Standing: fair with wheeled walker   Sitting EOB:  good   Dynamic Standing: fair+     Patient is Alert & Oriented x person, place, time and situation and follows directions    Sensation:  Patient  denies numbness/tingling   Edema:  none noted   Endurance: fair     Vitals: room air   Blood Pressure at rest  Blood Pressure during session    Heart Rate at rest  Heart Rate during session    SPO2 at rest 97%  SPO2 during session %     Patient education  Patient educated on role of Physical Therapy, risks of immobility, safety and plan of care, energy conservation,  importance of mobility while in hospital , importance and purpose of adaptive device and adjusted to proper height for the patient.  and safety      Patient response to education:   Pt verbalized understanding Pt demonstrated skill Pt requires further education in this area   Yes Partial Yes      Treatment:  Patient practiced and was instructed/facilitated in the following treatment: Patient performed seated exercises, stood to wheeled walker to amb in hallway, returned to room for seated rest, and stood to wheeled walker for standing exercises. Patient remained in bedside chair at end of session. Therapeutic Exercises:  ankle pumps, long arc quad, seated marching, calf raise, marching and standing hamstring curls [id number]       At end of session, patient in chair with  call light and phone within reach,  all lines and tubes intact, nursing notified. Patient would benefit from continued skilled Physical Therapy to improve functional independence and quality of life.          Patient's/ family goals   get stronger    Time in  935  Time out  1002    Total Treatment Time  27 minutes  CPT codes:  Therapeutic activities (95716)   12 minutes  1 unit(s)  Therapeutic exercises (97653)   15 minutes  1 unit(s)    Tony Ribera, [facility] #[id number]

## 2021-08-25 NOTE — PROGRESS NOTES
adapted ADL techniques and AE recommendations to increase functional independence within precautions       * Training on energy conservation strategies, correct breathing pattern and techniques to improve independence/tolerance for self-care routine  * Functional transfer/mobility training/DME recommendations for increased independence, safety, and fall prevention  * Patient/Family education to increase follow through with safety techniques and functional independence  * Recommendation of environmental modifications for increased safety with functional transfers/mobility and ADLs  * Therapeutic exercise to improve motor endurance, ROM, and functional strength for ADLs/functional transfers  * Therapeutic activities to facilitate/challenge dynamic balance, stand tolerance for increased safety and independence with ADLs  * Positioning to improve skin integrity, interaction with environment and functional independence     Recommended Adaptive Equipment: spouse states daughter will get wheels to convert their standard walker to a wheeled walker      Home Living: spouse; single family home, 2 story, resides on 1st floor, 3 steps to enter with rail, tub shower.        Equipment owned: standard walker, cane, bedside commode, tub transfer bench, grab bars     Prior Level of Function: Independent with ADLs , Independent with IADLs; ambulated cane     Driving: no  Occupation: retired from [employer]      Pain Level: 4/10 chronic back pain; Nursing notified.                Cognition: A&O: 4/4; Follows 1 step directions              Memory: good               Sequencing: good               Problem solving: good               Judgement/safety: good      [facility]   AM-PAC Daily Activity Inpatient   How much help for putting on and taking off regular lower body clothing?: A Lot  How much help for Bathing?: A Lot  How much help for Toileting?: A Little  How much help for putting on and taking off regular upper body clothing?: A sequence and safety. Assistance for long O2 line management. Min A with FWW for household distances to/from bathroom in room; no LOB noted  Modified Gail    Balance Sitting:     Static: good     Dynamic: fair   Standing: fair  with wheeled walker Sitting:     Static: good     Dynamic: fair   Standing: fair  with wheeled walker      Activity Tolerance fair  fair  good    Visual/  Perceptual Glasses: no                        Hand Dominance: left        AROM (PROM) Strength Additional Info:    RUE  WFL 4/5 good  and wfl FMC/dexterity noted during ADL tasks      LUE WFL 4/5 good  and wfl FMC/dexterity noted during ADL tasks       - BUE AROM exercises: 15 reps in all planes of movement to increase ROM/endurance and strength required for functional transfers/ADL participation. Exercises completed in shoulder and elbow flexion/extension, internal/external rotation, abduction/adduction, supination/pronation, digit and wrist flexion/extension and digit opposition. B UE ROM appears to be [facility] with min rest breaks provided 2* to decreased endurance/tolerance. Hearing: WFL (hearing aids)  Sensation:  No c/o numbness or tingling  Tone: WFL   Edema: none     Comments: Patient cleared by nursing staff. Upon arrival pt seated in chair. Pt agreeable to OT tx session. Daughter present, however stepped OOR during session. Pt educated with regards to hand placement, safety awareness, static sitting balance,  standing balance, transfer training, functional mobility, ADL retraining,  grooming tasks, LE/UE dressing, toileting/hygiene, B UE ROM ex's,  ECT's. At end of session pt seated in chair  with  call light within reach. Overall, pt demonstrated fair independence and safety during completion of ADL/functional transfers/mobility tasks. Pt would benefit from continued skilled OT to increase safety and independence with completion of ADL/IADL tasks for functional independence and quality of life.       Pt required cues and education as noted above for safe facilitation and completion of tasks. Therapist provided skilled monitoring of patient's response during treatment session. Prior to and at the end of session, environmental modifications completed for patients safety and efficiency of treatment session. Overall, patient demonstrates minimal difficulties with completion of BADLs and IADLs. Factors contributing to these difficulties include  decreased endurance, and generalized weakness. As noted above, patient likely to benefit from further OT intervention to increase independence, safety, and overall quality of life. Treatment:     ? Functional transfers: Facilitated transfers from various surfaces with cues for body alignment, safety and hand placement. ? ADL completion: Self-care retraining for the above-mentioned ADLs; training on proper hand placement, safety technique, sequencing, and energy conservation techniques. ? Postural Balance: Sitting/standing balance retraining to improve righting reactions with postural changes during ADLs. ? Therapeutic Ex's: To increase B UE strength/ROM/endurance required for functional transfers and ADL participation. · Pt has made fair progress towards set goals   · D/C with HHOT with family supervision/assist    Treatment Time also includes thorough review of current medical information, gathering information on past medical history/social history and prior level of function, informal observation of tasks, assessment of data and education on plan of care and goals.     Treatment Time In: 10:33 AM     Treatment Time Out: 11:03 AM            Treatment Charges: Mins Units   ADL/Home Mgt     03421 12 1   Thera Activities     89761 6 0   Ther Ex                 84388 12 1   Manual Therapy    91752     Neuro Re-ed         19510     Orthotic manage/training                               47951     Non Billable Time     Total Timed Treatment 30 2        Rissa Espinosa, COLEMAN/L [id number]

## 2021-08-26 ENCOUNTER — CARE COORDINATION (OUTPATIENT)
Dept: CASE MANAGEMENT | Age: 86
End: 2021-08-26

## 2021-08-26 DIAGNOSIS — R33.9 URINARY RETENTION: Primary | ICD-10-CM

## 2021-08-26 PROCEDURE — 1111F DSCHRG MED/CURRENT MED MERGE: CPT | Performed by: FAMILY MEDICINE

## 2021-08-26 ASSESSMENT — ENCOUNTER SYMPTOMS
SINUS PRESSURE: 0
DIARRHEA: 0
COUGH: 0
SORE THROAT: 0
WHEEZING: 0
EYE PAIN: 0
EYE DISCHARGE: 0
EYE REDNESS: 0
ABDOMINAL PAIN: 1
VOMITING: 0
SHORTNESS OF BREATH: 0
BACK PAIN: 0
NAUSEA: 0

## 2021-08-26 NOTE — ED NOTES
Indwelling guillory catheter flushed without urine return. Balloon deflated and catheter advanced without difficulty. Large clot with bright red blood returned. Catheter now draining light yellow urine.      Lorence Klinefelter, RN  08/26/21 0107

## 2021-08-26 NOTE — CARE COORDINATION
S[facility] Transitions Initial Follow Up Call    Call within 2 business days of discharge: Yes    Patient: Ezekiel Mauricio Patient : 1932   MRN: [id number]  Reason for Admission: MORENA/Urinary Retention  Discharge Date: 21 RARS: Readmission Risk Score: 28      Last Discharge [facility]       Complaint Diagnosis Description Type Department Provider    21 Urinary Catheter Problem; Hip Pain Fay catheter problem, initial encounter [facility]) ED (DISCHARGE) [facility] ED Shelly Mcneil, DO        Transitions of Care Initial Call    Was this an external facility discharge? No Discharge Facility: N/A    Challenges to be reviewed by the provider   Additional needs identified to be addressed with provider: No  none             Method of communication with provider : none      Advance Care Planning:   Does patient have an Advance Directive: reviewed and current. Was this a readmission? No  Patient stated reason for admission: abdominal pain and urinary retention  Patients top risk factors for readmission: medical condition-CKD and polypharmacy    Care Transition Nurse (CTN) contacted the family by telephone to perform post hospital discharge assessment. Verified name and  with family as identifiers. Provided introduction to self, and explanation of the CTN role. CTN reviewed discharge instructions, medical action plan and red flags with family who verbalized understanding. Family given an opportunity to ask questions and does not have any further questions or concerns at this time. Were discharge instructions available to patient? Yes. Reviewed appropriate site of care based on symptoms and resources available to patient including: PCP, Specialist, Urgent care clinics, Home health and When to call 911. The family agrees to contact the PCP office for questions related to their healthcare.      Medication reconciliation was performed with family, who verbalizes understanding of administration of home medications. Advised obtaining a 90-day supply of all daily and as-needed medications. Covid Risk Education     Educated patient about risk for severe COVID-19 due to risk factors according to CDC guidelines. CTN reviewed discharge instructions, medical action plan and red flag symptoms with the family who verbalized understanding. Discussed COVID vaccination status: Yes. Education provided on COVID-19 vaccination as appropriate. Discussed exposure protocols and quarantine with CDC Guidelines. Family was given an opportunity to verbalize any questions and concerns and agrees to contact CTN or health care provider for questions related to their healthcare. Reviewed and educated family on any new and changed medications related to discharge diagnosis. Was patient discharged with a pulse oximeter? No Discussed and confirmed pulse oximeter discharge instructions and when to notify provider or seek emergency care. CTN provided contact information. Plan for follow-up call in 5-7 days based on severity of symptoms and risk factors. Non-face-to-face services provided:  Scheduled appointment with PCP-CTN confirmed with DTR Alexis Holguin) that they paln on keeping folow up appt scheduled with Dr. Estephania Morrison ([address] [place] [address]) on 9/10/21 at 9 am and does not wish to be seen sooner. Scheduled appointment with Specialist-CTN confirmed with DTR Alexis Holguin) that she will call to delia f/u appt with Dr. Lupe Manuel (urology) and is awaiting a rturn call bak from Dr. José Miguel Carlos (nephro) regarding r/s appt from yesterday. Obtained and reviewed discharge summary and/or continuity of care documents  Education of patient/family/caregiver/guardian to support self-management-Discussed catheter care and to keep area clean and dry. Discussed s/s of infection knowing when to seek mediccal attention. Assessment and support for treatment adherence and medication management-Advised to take Levaquin as directed until compltely finished.     Care Transitions 24 Hour Call    Do you have any ongoing symptoms?: No  Do you have a copy of your discharge instructions?: Yes  Do you have all of your prescriptions and are they filled?: Yes  Have you been contacted by a [address]?: No  Have you scheduled your follow up appointment?: Yes  How are you going to get to your appointment?: Car - family or friend to transport  Were you discharged with any Home Care or Post Acute Services: Yes  Post Acute Services: Home Health (Comment: [employer] [address] 8/26/21)  Patient DME: Shubham Rice cane, Walker, Shower chair  Patient Home Equipment: Other (Comment: guillory)  Do you have support at home?: Partner/Spouse/SO  Do you feel like you have everything you need to keep you well at home?: Yes  Are you an active caregiver in your home?: No  Care Transitions Interventions  No Identified Needs       Spoke with patient DTR Karen Isabel) with patient and wife Jens Rodriguez) in background answering questions regarding TCM/hospital discharge follow up for MORENA/urinary retention as patient has enlarged prostate. Breann Palomo admits patient is busy with home care nurse from [facility] who is currently at home doing first visit for [facility]. Breann Palomo reports patient doing much better since discharge and offers no complaints at this time. Confirmed with Savi patient went back to ED yesterday after discharge d/t urinaryc catheter malfunctioning as Guillory catheter would not flush. Breann Palomo reports Guillory catheter is draining today and has \"temo, cloudy\" urine draining and admits getting 600-700 cc output this morning. States patient is drinking plenty of water. Discussed catheter care and s/s of infection knowing when to seek medical attention. Denies any shortness of breath, chest pain, abdominal pain, kidney/flnak pain, nausea, vomiting, diarrhea, chills or fever. States temperature this morning was 98. 1.      Provided a complete review of home meds with Savi and confirmed patient obtained Levaquin ordered on discharge. Advised to take Levaquin as directed until completely finished. 1111F code entered. Confirmed with Savi they plan on keeping current ov with Dr. Queenie Espitia (PCP) scheduled for 9/10/21 at 9 am and wishes not to be seen sooner. States she will call today to schedule f/u appt with Dr. Alexa Arellano (urology) and awaiting a call back to r/s appt with Dr. Mario Johansen (nephrology). Denies any complaints or needs at this time. CTN will continue to follow for Care Transition.     Follow Up  Future Appointments   Date Time Provider Radha Gillette   9/10/2021  9:00 AM Lupe Guallpa MD [facility]       RAJAN Arriaga

## 2021-08-26 NOTE — ED PROVIDER NOTES
Patient's Fay catheter stopped flowing this afternoon. Wife tried to flush it and was unsuccessful in establishing flow. Patient reports mild suprapubic pain. The history is provided by the patient. Abdominal Pain  Pain location:  Suprapubic  Pain quality: aching    Pain radiates to:  Does not radiate  Pain severity:  Mild  Onset quality:  Gradual  Duration:  6 days  Timing:  Constant  Progression:  Worsening  Chronicity:  New  Relieved by:  None tried  Worsened by:  Nothing  Ineffective treatments:  None tried  Associated symptoms: no chest pain, no chills, no cough, no diarrhea, no dysuria, no fever, no nausea, no shortness of breath, no sore throat and no vomiting         Review of Systems   Constitutional: Negative for chills and fever. HENT: Negative for ear pain, sinus pressure and sore throat. Eyes: Negative for pain, discharge and redness. Respiratory: Negative for cough, shortness of breath and wheezing. Cardiovascular: Negative for chest pain. Gastrointestinal: Positive for abdominal pain. Negative for diarrhea, nausea and vomiting. Genitourinary: Negative for dysuria and frequency. Musculoskeletal: Negative for arthralgias and back pain. Skin: Negative for rash and wound. Neurological: Negative for weakness and headaches. Hematological: Negative for adenopathy. All other systems reviewed and are negative. Physical Exam  Vitals and nursing note reviewed. Constitutional:       Appearance: He is well-developed. HENT:      Head: Normocephalic and atraumatic. Eyes:      Pupils: Pupils are equal, round, and reactive to light. Cardiovascular:      Rate and Rhythm: Normal rate and regular rhythm. Heart sounds: Normal heart sounds. No murmur heard. Pulmonary:      Effort: Pulmonary effort is normal. No respiratory distress. Breath sounds: Normal breath sounds. No wheezing or rales.    Abdominal:      General: Bowel sounds are normal.      Palpations: Abdomen is soft. Tenderness: There is abdominal tenderness in the suprapubic area. There is no guarding or rebound. Comments: Mild suprapubic pain and palpable urinary bladder distention. Musculoskeletal:      Cervical back: Normal range of motion and neck supple. Skin:     General: Skin is warm and dry. Neurological:      Mental Status: He is alert and oriented to person, place, and time. Cranial Nerves: No cranial nerve deficit. Coordination: Coordination normal.          Procedures     MDM       1:10 AM EDT  Nursing staff is flushed the catheter removing the clot. Over a liter of urine was drained. No more tenderness in the suprapubic area. --------------------------------------------- PAST HISTORY ---------------------------------------------  Past Medical History:  has a past medical history of Enlarged prostate, Hyperlipidemia, Hypertension, Lumbosacral strain, and Pacemaker. Past Surgical History:  has a past surgical history that includes A-V cardiac pacemaker insertion (5/30/07); eye surgery (2002); Tonsillectomy; and Colonoscopy (8/3/11). Social History:  reports that he quit smoking about 20 years ago. He has a 40.00 pack-year smoking history. He has never used smokeless tobacco. He reports that he does not drink alcohol and does not use drugs. Family History: family history includes Cancer in his mother; Diabetes in his mother; Heart Disease in his father; High Blood Pressure in his father and mother. The patients home medications have been reviewed. Allergies: Patient has no known allergies. -------------------------------------------------- RESULTS -------------------------------------------------  Labs:  No results found for this visit on 08/25/21.     Radiology:  No orders to display       ------------------------- NURSING NOTES AND VITALS REVIEWED ---------------------------  Date / Time Roomed:  8/25/2021 11:28 PM  ED Bed Assignment:  16/16    The nursing notes within the ED encounter and vital signs as below have been reviewed. BP (!) 140/63   Pulse 72   Temp 98.5 °F (36.9 °C) (Oral)   Resp 18   SpO2 93%   Oxygen Saturation Interpretation: Normal      ------------------------------------------ PROGRESS NOTES ------------------------------------------  1:18 AM EDT  I have spoken with the patient and spouse and discussed todays results, in addition to providing specific details for the plan of care and counseling regarding the diagnosis and prognosis. Their questions are answered at this time and they are agreeable with the plan. I discussed at length with them reasons for immediate return here for re evaluation. They will followup with their primary care physician by calling their office tomorrow. --------------------------------- ADDITIONAL PROVIDER NOTES ---------------------------------  At this time the patient is without objective evidence of an acute process requiring hospitalization or inpatient management. They have remained hemodynamically stable throughout their entire ED visit and are stable for discharge with outpatient follow-up. The plan has been discussed in detail and they are aware of the specific conditions for emergent return, as well as the importance of follow-up. New Prescriptions    No medications on file       Diagnosis:  1. Fay catheter problem, initial encounter [facility])        Disposition:  Patient's disposition: Discharge to home  Patient's condition is stable.        Bola Sethi, DO  08/26/21 [address], DO  08/26/21 [id number]

## 2021-08-26 NOTE — ED NOTES
Bed: 16  Expected date:   Expected time:   Means of arrival:   Comments:  Kelly España / Gracie Craigr, CONI  08/25/21 0846

## 2021-08-27 LAB
BLOOD CULTURE, ROUTINE: NORMAL
CULTURE, BLOOD 2: NORMAL

## 2021-09-03 ENCOUNTER — CARE COORDINATION (OUTPATIENT)
Dept: CASE MANAGEMENT | Age: 86
End: 2021-09-03

## 2021-09-03 NOTE — CARE COORDINATION
[facility] Transitions Follow Up Call    9/3/2021    Patient: Ailin Be  Patient : 1932   MRN: [id number]  Reason for Admission:   Discharge Date: 21 RARS: Readmission Risk Score: 28    Attempted to reach patient by phone regarding follow up. HIPAA compliant message left on patient's voicemail; CTN contact information provided.       Follow Up  Future Appointments   Date Time Provider Radha Gillette   9/10/2021  9:00 AM Kenia Tavarez MD [facility]       Lowell Dumont RN

## 2021-09-09 ENCOUNTER — CARE COORDINATION (OUTPATIENT)
Dept: CASE MANAGEMENT | Age: 86
End: 2021-09-09

## 2021-09-09 NOTE — CARE COORDINATION
[facility] Transitions Follow Up Call    2021    Patient: Fletcher Ochoa  Patient : 1932   MRN: <[id number]>  Reason for Admission: UTI  Discharge Date: 21 RARS: Readmission Risk Score: 28    Care Transitions Follow Up Call    Patient's wife states patient is doing better. Has patent Fay catheter and it is draining yellow urine. Denies urinary burning, pain, fever, odor, Denies visible blood in urine. Denies confusion, back pain, nausea, vomiting, weakness and being overly fatigued. Instructed wife on Fay Catheter care and signs and symptoms of urinary infection. Encourage to drink adequate fluids. Patient's wife expresses understanding. Patient has fair appetite. Wife states patient has better appetite. Normal bowel elimination patterns. Continues to have Home health care coming out. Patient has no needs or concerns for writer at this time. Will continue to follow. Jordan Beyer LPN    [phone]  [place] [employer] Coordinator    Needs to be reviewed by the provider   Additional needs identified to be addressed with provider No  none             Method of communication with provider : none      Care Transition Nurse (CTN) contacted the family by telephone to follow up after admission on 2021. Verified name and  with family as identifiers. Addressed changes since last contact: none  Discussed follow-up appointments. If no appointment was previously scheduled, appointment scheduling offered: Yes   Is follow up appointment scheduled within 7 days of discharge? Yes    Advance Care Planning:   Does patient have an Advance Directive:  reviewed and current. CTN reviewed discharge instructions, medical action plan and red flags with family and discussed any barriers to care and/or understanding of plan of care after discharge.    Discussed appropriate site of care based on symptoms and resources available to patient including: Home health and When to call 911. The family agrees to contact the PCP office for questions related to their healthcare. Patients top risk factors for readmission: lack of knowledge about disease, medical condition-UTI and medication management  Interventions to address risk factors: Obtained and reviewed discharge summary and/or continuity of care documents    [facility] follow up appointment(s): 8/18/2021    CTN provided contact information for future needs. Plan for follow-up call in 5-7 days based on severity of symptoms and risk factors. Plan for next call: symptom management-UTI with Retention    Care Transitions Subsequent and Final Call    Subsequent and Final Calls  Do you have any ongoing symptoms?: No  Have your medications changed?: No  Do you have any questions related to your medications?: No  Do you currently have any active services?: Yes  Are you currently active with any services?: Home Health  Do you have any needs or concerns that I can assist you with?: No  Identified Barriers: Impairment  Care Transitions Interventions  Other Interventions:            Follow Up  Future Appointments   Date Time Provider Radha Gillette   9/10/2021  9:00 AM Diandra Mc MD [facility]       Michael Sesay LPN

## 2021-09-10 ENCOUNTER — OFFICE VISIT (OUTPATIENT)
Dept: FAMILY MEDICINE CLINIC | Age: 86
End: 2021-09-10
Payer: MEDICARE

## 2021-09-10 VITALS
OXYGEN SATURATION: 98 % | HEART RATE: 60 BPM | HEIGHT: 69 IN | WEIGHT: 131 LBS | TEMPERATURE: 97 F | BODY MASS INDEX: 19.4 KG/M2 | RESPIRATION RATE: 16 BRPM | SYSTOLIC BLOOD PRESSURE: 102 MMHG | DIASTOLIC BLOOD PRESSURE: 60 MMHG

## 2021-09-10 DIAGNOSIS — E78.00 PURE HYPERCHOLESTEROLEMIA: Primary | ICD-10-CM

## 2021-09-10 DIAGNOSIS — R33.8 ENLARGED PROSTATE WITH URINARY RETENTION: ICD-10-CM

## 2021-09-10 DIAGNOSIS — I10 ESSENTIAL HYPERTENSION: ICD-10-CM

## 2021-09-10 DIAGNOSIS — D50.0 IRON DEFICIENCY ANEMIA DUE TO CHRONIC BLOOD LOSS: ICD-10-CM

## 2021-09-10 DIAGNOSIS — N18.30 STAGE 3 CHRONIC KIDNEY DISEASE, UNSPECIFIED WHETHER STAGE 3A OR 3B CKD (HCC): ICD-10-CM

## 2021-09-10 DIAGNOSIS — N40.1 ENLARGED PROSTATE WITH URINARY RETENTION: ICD-10-CM

## 2021-09-10 PROCEDURE — G8420 CALC BMI NORM PARAMETERS: HCPCS | Performed by: FAMILY MEDICINE

## 2021-09-10 PROCEDURE — 1111F DSCHRG MED/CURRENT MED MERGE: CPT | Performed by: FAMILY MEDICINE

## 2021-09-10 PROCEDURE — 4040F PNEUMOC VAC/ADMIN/RCVD: CPT | Performed by: FAMILY MEDICINE

## 2021-09-10 PROCEDURE — 1123F ACP DISCUSS/DSCN MKR DOCD: CPT | Performed by: FAMILY MEDICINE

## 2021-09-10 PROCEDURE — G8427 DOCREV CUR MEDS BY ELIG CLIN: HCPCS | Performed by: FAMILY MEDICINE

## 2021-09-10 PROCEDURE — 99214 OFFICE O/P EST MOD 30 MIN: CPT | Performed by: FAMILY MEDICINE

## 2021-09-10 PROCEDURE — 1036F TOBACCO NON-USER: CPT | Performed by: FAMILY MEDICINE

## 2021-09-10 RX ORDER — FERROUS SULFATE 325(65) MG
325 TABLET ORAL 2 TIMES DAILY
Qty: 100 TABLET | Refills: 3 | Status: SHIPPED | COMMUNITY
Start: 2021-09-10

## 2021-09-10 NOTE — PROGRESS NOTES
Historical Provider, Yohannes Rocha, 450 MG CAPS Take 2 tablets by mouth daily. Historical Provider, MD   Misc Natural Products (OSTEO BI-FLEX JOINT SHIELD PO) Take 2 tablets by mouth daily. Historical Provider, MD   aspirin 81 MG EC tablet Take 81 mg by mouth daily. Historical Provider, MD     Social History     Socioeconomic History    Marital status:      Spouse name: None    Number of children: None    Years of education: None    Highest education level: None   Occupational History    None   Tobacco Use    Smoking status: Former Smoker     Packs/day: 1.00     Years: 40.00     Pack years: 40.00     Quit date: 2000     Years since quittin.9    Smokeless tobacco: Never Used   Vaping Use    Vaping Use: Never used   Substance and Sexual Activity    Alcohol use: No    Drug use: No    Sexual activity: None   Other Topics Concern    None   Social History Narrative    None     Social Determinants of Health     Financial Resource Strain: Low Risk     Difficulty of Paying Living Expenses: Not hard at all   Food Insecurity: No Food Insecurity    Worried About Running Out of Food in the Last Year: Never true    Fidel of Food in the Last Year: Never true   Transportation Needs: No Transportation Needs    Lack of Transportation (Medical): No    Lack of Transportation (Non-Medical):  No   Physical Activity:     Days of Exercise per Week:     Minutes of Exercise per Session:    Stress:     Feeling of Stress :    Social Connections:     Frequency of Communication with Friends and Family:     Frequency of Social Gatherings with Friends and Family:     Attends Sikh Services:     Active Member of Clubs or Organizations:     Attends Club or Organization Meetings:     Marital Status:    Intimate Partner Violence:     Fear of Current or Ex-Partner:     Emotionally Abused:     Physically Abused:     Sexually Abused:        I have reviewed Kyle's allergies, medications, problem list, medical, social and family history and have updated as needed in the electronic medical record  Review Of Systems:    Skin: no abnormal pigmentation, rash, scaling, itching, masses, hair or nail changes  Eyes: no blurring, diplopia, or eye pain  Ears/Nose/Throat: no hearing loss, tinnitus, vertigo, nosebleed, nasal congestion, rhinorrhea, sore throat  Respiratory: no cough, pleuritic chest pain, dyspnea, or wheezing  Cardiovascular: no chest pain, angina, dyspnea on exertion, orthopnea, PND, palpitations, or claudication  Gastrointestinal: no nausea, vomiting, heartburn, diarrhea, constipation, bloating,  abdominal pain, or blood per rectum. Appetite is good  Genitourinary: no urinary urgency, frequency, dysuria, nocturia, hesitancy, or incontinence  Musculoskeletal: joint pains off and on. Morning stiffness. Ambulating well  Neurologic: no paralysis, paresis, paresthesia, seizures, tremors, or headaches  Hematologic/Lymphatic/Immunologic: no anemia, abnormal bleeding/bruising, fever, chills, night sweats, swollen glands, or unexplained weight loss  Endocrine: no heat or cold intolerance and no polyphagia, polydipsia, or polyuria        OBJECTIVE:     VS:  Wt Readings from Last 3 Encounters:   09/10/21 131 lb (59.4 kg)   08/22/21 141 lb (64 kg)   08/18/21 141 lb (64 kg)     Temp Readings from Last 3 Encounters:   09/10/21 97 °F (36.1 °C)   08/25/21 98.5 °F (36.9 °C) (Oral)   08/25/21 97.9 °F (36.6 °C) (Oral)     BP Readings from Last 3 Encounters:   09/10/21 102/60   08/25/21 (!) 140/63   08/25/21 (!) 147/68        General appearance: Alert, Awake, Oriented times 3, no distress  Skin: Warm and dry  Head: Normocephalic. No masses, lesions or tenderness noted  Eyes: Conjunctivae appear normal. PERLE  Ears: External ears normal  Nose/Sinuses: Nares normal. Septum midline. Mucosa normal. No drainage  Oropharynx: Oropharynx clear with no exudate seen  Neck: Neck supple.  No jugular venous distension, lymphadenopathy or thyromegaly Trachea midline  Chest:  Normal. Movements are Normal and Equal.  Lungs: Lungs clear to auscultation bilaterally. No ronchi, crackles or wheezes  Heart: S1 S2  Regular rate and rhythm. No rub, murmur or gallop  Abdomen: Abdomen soft, non-tender. BS normal. No masses, organomegaly. Back: Grossly Normal and Equal. DTR are Normal. SLR is Normal.  Extremities: Arthritic changes are noted. Movements are limited. Pedal pulses are normal.  Musculoskeletal: Muscular strength appears intact. No joint effusion, tenderness, swelling or warmth  Neuro:  No focal motor or sensory deficits        ASSESSMENT     Patient Active Problem List    Diagnosis Date Noted    MORENA (acute kidney injury) ([facility] [facility] 08/22/2021    Urinary retention 08/09/2021    Hypokalemia 08/09/2021    Iron deficiency anemia due to chronic blood loss 07/26/2018    Stage 3 chronic kidney disease ([facility] [address] 07/26/2018    Primary osteoarthritis involving multiple joints 09/24/2012    Pure hypercholesterolemia     Essential hypertension     Enlarged prostate with urinary retention 09/10/2021    Acute kidney injury ([facility] [address] 08/24/2021    Acute on chronic renal insufficiency         Diagnosis:     ICD-10-CM    1. Pure hypercholesterolemia  E78.00     CONTROLLED   2. Essential hypertension  I10     CONTROLLED   3. Iron deficiency anemia due to chronic blood loss  D50.0     IMPROVING   4. Stage 3 chronic kidney disease, unspecified whether stage 3a or 3b CKD ([facility])  N18.30     STABLE   5. Enlarged prostate with urinary retention  N40.1     R33.8     PERSIST. PLAN:      LABORATORY RESULTS 9/3/2021 REVIEWED AND DISCUSSED. INCREASE IRON TABLETS TO 2 TIMES  A  DAY. Patient Instructions   LOW SALT FOR BLOOD PRESSURE CONTROL. LOW FAT DIET FOR CHOLESTEROL CONTROL. DRINK ENOUGH FLUIDS FOR BETTER KIDNEY FUNCTION. TAKE  AMLODIPINE 2.5 MG. DAILY, PRINIVIL/HCT 20/25 1 TAB. DAILY,TOPROL XL 25 MG. 1 TAB.  DAILY FOR BLOOD PRESSURE CONTROL. TAKE  ZOCOR 40 MG. 1 TAB. DAILY. FOR CHOLESTEROL CONTROL. Rehan Ra TAKE  FEOSOL 1 TAB. 2 TIMES A DAY AND MULTIVITAMIN 1 TAB. DAILY TO IMPROVE BLOOD COUNT. TAKE  PROSCAR 5 MG. DAILY FOR PROSTATE PROBLEM. REGULAR WALKING ADVISED. CONTINUE FOLLOW UP WITH DR. Elva Izquierdo FOR PROSTATE PROBLEM. NEXT APPOINTMENT IN 1 MONTH. Return in about 1 month (around 10/10/2021) for FOLLOW UP VISIT. I have reviewed my findings and recommendations with Lacey Wolfe.     Electronically signed by Taisha Slaughter MD on 9/10/21 at 9:56 AM EDT

## 2021-09-17 ENCOUNTER — CARE COORDINATION (OUTPATIENT)
Dept: CASE MANAGEMENT | Age: 86
End: 2021-09-17

## 2021-09-17 NOTE — CARE COORDINATION
[facility] Transitions Follow Up Call    2021    Patient: Yulissa Luciano  Patient : 1932   MRN: [id number]  Reason for Admission: MORENA  Discharge Date: 21 RARS: Readmission Risk Score: 28       Attempted to contact patient/family today 21 for TCM/hospital discharge follow up sub call for MORENA. Left message on home/mobile number requesting a return call back to CTN and provided contact information. Noted patient completed follow up appt with Dr. Vernon Winters (PCP) on 9/10/21. CTN will continue with outreach for Care Transition.      Jose Elias Porras, APRN

## 2021-09-27 ENCOUNTER — CARE COORDINATION (OUTPATIENT)
Dept: CASE MANAGEMENT | Age: 86
End: 2021-09-27

## 2021-09-27 NOTE — CARE COORDINATION
[facility] Transitions Follow Up Call    2021    Patient: Juventino Quijano  Patient : 1932   MRN: [id number]  Reason for Admission: MORENA  Discharge Date: 21 RARS: Readmission Risk Score: 28    Care Transitions Follow Up Call    Needs to be reviewed by the provider   Additional needs identified to be addressed with provider: No  none             Method of communication with provider : none      Care Transition Nurse (CTN) contacted the family by telephone to follow up after admission on 21. Verified name and  with family as identifiers. Addressed changes since last contact: none  Discussed follow-up appointments. If no appointment was previously scheduled, appointment scheduling offered: Yes. Is follow up appointment scheduled within 7 days of discharge? Yes. Advance Care Planning:   Does patient have an Advance Directive: reviewed and current. CTN reviewed discharge instructions, medical action plan and red flags with family and discussed any barriers to care and/or understanding of plan of care after discharge. Discussed appropriate site of care based on symptoms and resources available to patient including: PCP, Specialist, Urgent care clinics, Home health and When to call 911. The family agrees to contact the PCP office for questions related to their healthcare. Patients top risk factors for readmission: medical condition-CKD and polypharmacy     No further follow-up call indicated based on severity of symptoms and risk factors.     Care Transitions Subsequent and Final Call    Subsequent and Final Calls  Do you have any ongoing symptoms?: No  Have your medications changed?: No  Do you have any questions related to your medications?: No  Do you currently have any active services?: Yes  Are you currently active with any services?: Home Health  Do you have any needs or concerns that I can assist you with?: No  Identified Barriers: Lack of Education  Care Transitions Interventions  No Identified Needs  Other Interventions:         Spoke with patient wife Sonja Fenton) today 9/27/21 for final TCM/hospital discharge follow up call for MORENA. Albina ThompsonSusie reports patient continue with guillory catheter in but was recently hanged per urology. Denies patient having any shortness of breath, chest pain, abdominal pain, kidney/flank/back pain, nausea, vomiting, diarrhea, chills or fever. Albina ThompsonSusie reports patient having clear yellow urine out put from catheter and admits having 1,500 cc output this morning when emptying catheter bag. [facility] patient is drinking plenty of fluids and keeping meatus/urethra site clean and dry. Reiterated s/s of infection and knowing when to seek medical attention. Evelyn report patient is finished with Levaquin therapy that was ordered on discharge. [facility] patient is scheduled to follow up with urology again in October. [facility] patient continue with [facility] which is going well. Denies any complaints, concerns or further needs. Evelyn in agreement to final call. CTN signing off for Care Transition.      Follow Up  Future Appointments   Date Time Provider Radha Gillette   10/11/2021 10:00 AM MD [facility]     CTN provided contact information for future needs    RAJAN Bowles

## 2021-10-12 ENCOUNTER — OFFICE VISIT (OUTPATIENT)
Dept: FAMILY MEDICINE CLINIC | Age: 86
End: 2021-10-12
Payer: MEDICARE

## 2021-10-12 VITALS
BODY MASS INDEX: 19.99 KG/M2 | WEIGHT: 135 LBS | SYSTOLIC BLOOD PRESSURE: 122 MMHG | TEMPERATURE: 97.8 F | DIASTOLIC BLOOD PRESSURE: 62 MMHG | HEIGHT: 69 IN | RESPIRATION RATE: 16 BRPM | OXYGEN SATURATION: 96 % | HEART RATE: 57 BPM

## 2021-10-12 DIAGNOSIS — D50.0 IRON DEFICIENCY ANEMIA DUE TO CHRONIC BLOOD LOSS: ICD-10-CM

## 2021-10-12 DIAGNOSIS — N40.1 ENLARGED PROSTATE WITH URINARY RETENTION: ICD-10-CM

## 2021-10-12 DIAGNOSIS — E78.00 PURE HYPERCHOLESTEROLEMIA: Primary | ICD-10-CM

## 2021-10-12 DIAGNOSIS — N18.30 STAGE 3 CHRONIC KIDNEY DISEASE, UNSPECIFIED WHETHER STAGE 3A OR 3B CKD (HCC): ICD-10-CM

## 2021-10-12 DIAGNOSIS — I10 ESSENTIAL HYPERTENSION: ICD-10-CM

## 2021-10-12 DIAGNOSIS — R33.8 ENLARGED PROSTATE WITH URINARY RETENTION: ICD-10-CM

## 2021-10-12 DIAGNOSIS — Z23 FLU VACCINE NEED: ICD-10-CM

## 2021-10-12 PROCEDURE — 90694 VACC AIIV4 NO PRSRV 0.5ML IM: CPT | Performed by: FAMILY MEDICINE

## 2021-10-12 PROCEDURE — 1123F ACP DISCUSS/DSCN MKR DOCD: CPT | Performed by: FAMILY MEDICINE

## 2021-10-12 PROCEDURE — G8427 DOCREV CUR MEDS BY ELIG CLIN: HCPCS | Performed by: FAMILY MEDICINE

## 2021-10-12 PROCEDURE — 1036F TOBACCO NON-USER: CPT | Performed by: FAMILY MEDICINE

## 2021-10-12 PROCEDURE — G8484 FLU IMMUNIZE NO ADMIN: HCPCS | Performed by: FAMILY MEDICINE

## 2021-10-12 PROCEDURE — G0008 ADMIN INFLUENZA VIRUS VAC: HCPCS | Performed by: FAMILY MEDICINE

## 2021-10-12 PROCEDURE — 4040F PNEUMOC VAC/ADMIN/RCVD: CPT | Performed by: FAMILY MEDICINE

## 2021-10-12 PROCEDURE — G8420 CALC BMI NORM PARAMETERS: HCPCS | Performed by: FAMILY MEDICINE

## 2021-10-12 PROCEDURE — 99214 OFFICE O/P EST MOD 30 MIN: CPT | Performed by: FAMILY MEDICINE

## 2021-10-12 RX ORDER — AMLODIPINE BESYLATE 2.5 MG/1
2.5 TABLET ORAL DAILY
Qty: 90 TABLET | Refills: 1 | Status: SHIPPED
Start: 2021-10-12 | End: 2022-05-02 | Stop reason: SDUPTHER

## 2021-10-12 RX ORDER — LISINOPRIL AND HYDROCHLOROTHIAZIDE 25; 20 MG/1; MG/1
1 TABLET ORAL DAILY
Qty: 90 TABLET | Refills: 1 | Status: SHIPPED
Start: 2021-10-12 | End: 2021-11-11 | Stop reason: DRUGHIGH

## 2021-10-12 RX ORDER — FINASTERIDE 5 MG/1
5 TABLET, FILM COATED ORAL DAILY
Qty: 90 TABLET | Refills: 1 | Status: SHIPPED
Start: 2021-10-12 | End: 2022-05-02

## 2021-10-12 RX ORDER — SIMVASTATIN 40 MG
40 TABLET ORAL NIGHTLY
Qty: 90 TABLET | Refills: 0 | Status: SHIPPED
Start: 2021-10-12 | End: 2022-01-18 | Stop reason: SDUPTHER

## 2021-10-12 RX ORDER — METOPROLOL SUCCINATE 25 MG/1
25 TABLET, EXTENDED RELEASE ORAL DAILY
Qty: 30 TABLET | Refills: 3 | Status: SHIPPED
Start: 2021-10-12 | End: 2022-01-18 | Stop reason: SDUPTHER

## 2021-10-12 NOTE — PROGRESS NOTES
OFFICE PROGRESS NOTE      SUBJECTIVE:        Patient ID:   Frankey Letters is a 80 y.o. male who presents for   Chief Complaint   Patient presents with    Hypertension     1 month check up   [facility] Maintenance     due for flu            HPI:     FEELS GOOD. STILL WEARING URINE BAG. SEEING UROLOGIST ON 10/27/2021 FOR FURTHER EVALUATION AND OPTIONS. EATING  GOOD. WALKING IN HOUSE FOR EXERCISE. TAKING MEDICATIONS REGULARLY. OK FOR FLU VACCINE. Prior to Admission medications    Medication Sig Start Date End Date Taking?  Authorizing Provider   simvastatin (ZOCOR) 40 MG tablet Take 1 tablet by mouth nightly 10/12/21  Yes Basim Lau MD   amLODIPine (NORVASC) 2.5 MG tablet Take 1 tablet by mouth daily 10/12/21  Yes Basim Lau MD   finasteride (PROSCAR) 5 MG tablet Take 1 tablet by mouth daily 10/12/21  Yes Basim Lau MD   lisinopril-hydroCHLOROthiazide (PRINZIDE;ZESTORETIC) 20-25 MG per tablet Take 1 tablet by mouth daily 10/12/21  Yes Basim Lau MD   metoprolol succinate (TOPROL XL) 25 MG extended release tablet Take 1 tablet by mouth daily 10/12/21  Yes Basim Lau MD   ferrous sulfate (IRON 325) 325 (65 Fe) MG tablet Take 1 tablet by mouth 2 times daily 9/10/21   Basim Lau MD   Multiple Vitamin (ONE-A-DAY ESSENTIAL) TABS Take 1 tablet by mouth daily    Historical Provider, MD   Cholecalciferol (VITAMIN D3) 50 MCG (2000 UT) CAPS Take 2,000 Units by mouth daily     Historical Provider, MD   Ascorbic Acid (C-250) 250 MG CHEW Take 250 mg by mouth daily     Historical Provider, MD   vitamin B-12 (CYANOCOBALAMIN) 1000 MCG tablet Take 1,000 mcg by mouth daily    Historical Provider, MD   pantoprazole (PROTONIX) 40 MG tablet Take 40 mg by mouth daily  12/6/19   Historical Provider, MD   timolol (TIMOPTIC) 0.5 % ophthalmic solution Place 1 drop into both eyes 2 times daily  5/4/16   Historical Provider, Yohannes Rocha repens, 450 MG CAPS Take 2 tablets by mouth daily. Historical Provider, MD   [facility] [place] [facility] (OSTEO BI-FLEX JOINT SHIELD PO) Take 2 tablets by mouth daily. Historical Provider, MD   aspirin 81 MG EC tablet Take 81 mg by mouth daily. Historical Provider, MD     Social History     Socioeconomic History    Marital status:      Spouse name: None    Number of children: None    Years of education: None    Highest education level: None   Occupational History    None   Tobacco Use    Smoking status: Former Smoker     Packs/day: 1.00     Years: 40.00     Pack years: 40.00     Quit date: 2000     Years since quittin.0    Smokeless tobacco: Never Used   Vaping Use    Vaping Use: Never used   Substance and Sexual Activity    Alcohol use: No    Drug use: No    Sexual activity: None   Other Topics Concern    None   Social History Narrative    None     Social Determinants of Health     Financial Resource Strain: Low Risk     Difficulty of Paying Living Expenses: Not hard at all   Food Insecurity: No Food Insecurity    Worried About Running Out of Food in the Last Year: Never true    Fidel of Food in the Last Year: Never true   Transportation Needs: No Transportation Needs    Lack of Transportation (Medical): No    Lack of Transportation (Non-Medical):  No   Physical Activity:     Days of Exercise per Week:     Minutes of Exercise per Session:    Stress:     Feeling of Stress :    Social Connections:     Frequency of Communication with Friends and Family:     Frequency of Social Gatherings with Friends and Family:     Attends Scientologist Services:     Active Member of Clubs or Organizations:     Attends Club or Organization Meetings:     Marital Status:    Intimate Partner Violence:     Fear of Current or Ex-Partner:     Emotionally Abused:     Physically Abused:     Sexually Abused:        I have reviewed Kyle's allergies, medications, problem list, medical, social and family history and have updated as needed in the electronic medical record  Review Of Systems:    Skin: no abnormal pigmentation, rash, scaling, itching, masses, hair or nail changes  Eyes: no blurring, diplopia, or eye pain  Ears/Nose/Throat: no hearing loss, tinnitus, vertigo, nosebleed, nasal congestion, rhinorrhea, sore throat  Respiratory: no cough, pleuritic chest pain, dyspnea, or wheezing  Cardiovascular: no chest pain, angina, dyspnea on exertion, orthopnea, PND, palpitations, or claudication  Gastrointestinal: no nausea, vomiting, heartburn, diarrhea, constipation, bloating,  abdominal pain, or blood per rectum. Appetite is good  Genitourinary: no urinary urgency, frequency, dysuria, nocturia, hesitancy, or incontinence  Musculoskeletal: joint pains off and on. Morning stiffness. Ambulating well  Neurologic: no paralysis, paresis, paresthesia, seizures, tremors, or headaches  Hematologic/Lymphatic/Immunologic: no anemia, abnormal bleeding/bruising, fever, chills, night sweats, swollen glands, or unexplained weight loss  Endocrine: no heat or cold intolerance and no polyphagia, polydipsia, or polyuria        OBJECTIVE:     VS:  Wt Readings from Last 3 Encounters:   10/12/21 135 lb (61.2 kg)   09/10/21 131 lb (59.4 kg)   08/22/21 141 lb (64 kg)     Temp Readings from Last 3 Encounters:   10/12/21 97.8 °F (36.6 °C)   09/10/21 97 °F (36.1 °C)   08/25/21 98.5 °F (36.9 °C) (Oral)     BP Readings from Last 3 Encounters:   10/12/21 122/62   09/10/21 102/60   08/25/21 (!) 140/63        General appearance: Alert, Awake, Oriented times 3, no distress  Skin: Warm and dry  Head: Normocephalic. No masses, lesions or tenderness noted  Eyes: Conjunctivae appear normal. PERLE  Ears: External ears normal  Nose/Sinuses: Nares normal. Septum midline. Mucosa normal. No drainage  Oropharynx: Oropharynx clear with no exudate seen  Neck: Neck supple.  No jugular venous distension, lymphadenopathy or thyromegaly Trachea midline  Chest:  Normal. Movements are Normal and Equal.  Lungs: Lungs clear to auscultation bilaterally. No ronchi, crackles or wheezes  Heart: S1 S2  Regular rate and rhythm. No rub, murmur or gallop  Abdomen: Abdomen soft, non-tender. BS normal. No masses, organomegaly. Back: Grossly Normal and Equal. DTR are Normal. SLR is Normal.  Extremities: Arthritic changes are noted. Movements are limited. Pedal pulses are normal.  Musculoskeletal: Muscular strength appears intact. No joint effusion, tenderness, swelling or warmth  Neuro:  No focal motor or sensory deficits        ASSESSMENT     Patient Active Problem List    Diagnosis Date Noted    MORENA (acute kidney injury) ([facility] 08/22/2021    Urinary retention 08/09/2021    Hypokalemia 08/09/2021    Iron deficiency anemia due to chronic blood loss 07/26/2018    Stage 3 chronic kidney disease ([facility] 07/26/2018    Primary osteoarthritis involving multiple joints 09/24/2012    Pure hypercholesterolemia     Essential hypertension     Enlarged prostate with urinary retention 09/10/2021    Acute kidney injury ([facility] 08/24/2021    Acute on chronic renal insufficiency         Diagnosis:     ICD-10-CM    1. Pure hypercholesterolemia  E78.00 Comprehensive Metabolic Panel     Lipid Panel    CONTROLLED   2. Essential hypertension  I10     CONTROLLED   3. Iron deficiency anemia due to chronic blood loss  D50.0 CBC Auto Differential    IMPROVING   4. Stage 3 chronic kidney disease, unspecified whether stage 3a or 3b CKD (HCC)  N18.30     STABLE   5. Enlarged prostate with urinary retention  N40.1     R33.8     STABLE   6. Flu vaccine need  Z23 INFLUENZA, QUADV, ADJUVANTED, 65 YRS =, IM, PF, PREFILL SYR, 0.5ML (FLUAD)    GIVEN. PLAN:           Patient Instructions    LOW SALT FOR BLOOD PRESSURE CONTROL. LOW FAT DIET FOR CHOLESTEROL CONTROL. DRINK ENOUGH FLUIDS FOR BETTER KIDNEY FUNCTION. TAKE  AMLODIPINE 2.5 MG. DAILY, PRINIVIL/HCT 20/25 1 TAB. DAILY,TOPROL XL 25 MG. 1 TAB.  DAILY FOR BLOOD PRESSURE CONTROL. TAKE  ZOCOR 40 MG. 1 TAB. DAILY. FOR CHOLESTEROL CONTROL. Avis Valenzuela TAKE  FEOSOL 1 TAB. 2 TIMES A DAY AND MULTIVITAMIN 1 TAB. DAILY TO IMPROVE BLOOD COUNT. TAKE  PROSCAR 5 MG. DAILY FOR PROSTATE PROBLEM. REGULAR WALKING ADVISED. CONTINUE FOLLOW UP WITH DR. Suzanne Johnson FOR PROSTATE PROBLEM. FASTING FOR LAB WORK PRIOR TO NEXT VISIT. NEXT APPOINTMENT IN 3 MONTH. Return in about 3 months (around 1/12/2022) for FOLLOW UP VISIT. I have reviewed my findings and recommendations with Derek Rausch.     Electronically signed by Irma Sanchez MD on 10/12/21 at 9:36 AM EDT

## 2021-10-12 NOTE — PATIENT INSTRUCTIONS
LOW SALT FOR BLOOD PRESSURE CONTROL. LOW FAT DIET FOR CHOLESTEROL CONTROL. DRINK ENOUGH FLUIDS FOR BETTER KIDNEY FUNCTION. TAKE  AMLODIPINE 2.5 MG. DAILY, PRINIVIL/HCT 20/25 1 TAB. DAILY,TOPROL XL 25 MG. 1 TAB. DAILY FOR BLOOD PRESSURE CONTROL. TAKE  ZOCOR 40 MG. 1 TAB. DAILY. FOR CHOLESTEROL CONTROL. Desire Rouse TAKE  FEOSOL 1 TAB. 2 TIMES A DAY AND MULTIVITAMIN 1 TAB. DAILY TO IMPROVE BLOOD COUNT. TAKE  PROSCAR 5 MG. DAILY FOR PROSTATE PROBLEM. REGULAR WALKING ADVISED. CONTINUE FOLLOW UP WITH DR. Alma Delia Clayton FOR PROSTATE PROBLEM. FASTING FOR LAB WORK PRIOR TO NEXT VISIT. NEXT APPOINTMENT IN 3 MONTH.

## 2021-10-29 ENCOUNTER — PREP FOR PROCEDURE (OUTPATIENT)
Dept: UROLOGY | Age: 86
End: 2021-10-29

## 2021-10-29 DIAGNOSIS — Z90.79 S/P TURP: Primary | ICD-10-CM

## 2021-10-29 RX ORDER — SODIUM CHLORIDE 0.9 % (FLUSH) 0.9 %
5-40 SYRINGE (ML) INJECTION PRN
Status: CANCELLED | OUTPATIENT
Start: 2021-10-29

## 2021-10-29 RX ORDER — SODIUM CHLORIDE 9 MG/ML
25 INJECTION, SOLUTION INTRAVENOUS PRN
Status: CANCELLED | OUTPATIENT
Start: 2021-10-29

## 2021-10-29 RX ORDER — SODIUM CHLORIDE 9 MG/ML
INJECTION, SOLUTION INTRAVENOUS CONTINUOUS
Status: CANCELLED | OUTPATIENT
Start: 2021-10-29

## 2021-10-29 RX ORDER — SODIUM CHLORIDE 0.9 % (FLUSH) 0.9 %
5-40 SYRINGE (ML) INJECTION EVERY 12 HOURS SCHEDULED
Status: CANCELLED | OUTPATIENT
Start: 2021-10-29

## 2021-11-01 NOTE — PROGRESS NOTES
[address] [facility]                                                                                                                    PRE OP INSTRUCTIONS FOR  Natalio Yarbrough        Date: 11/1/2021    Date of surgery: 11/2/2021    Arrival Time: Hospital will call you between 5pm and 7pm with your final arrival time for surgery    1. Do not eat or drink anything after   Midnight  prior to surgery. This includes no water, chewing gum, mints or ice chips. 2. Take the following medications with a small sip of water on the morning of Surgery:take metoprolol amlodipine and omeprazole dos with sip water      3. Diabetics may take evening dose of insulin but none after midnight. If you feel symptomatic or low blood sugar morning of surgery drink 1-2 ounces of apple juice only. 4. Aspirin, Ibuprofen, Advil, Naproxen, Vitamin E and other Anti-inflammatory products should be stopped  before surgery  as directed by your physician. Take Tylenol only unless instructed otherwise by your surgeon. 5. Check with your Doctor regarding stopping Plavix, Coumadin, Lovenox, Eliquis, Effient, or other blood thinners. 6. Do not smoke,use illicit drugs and do not drink any alcoholic beverages 24 hours prior to surgery. 7. You may brush your teeth the morning of surgery. DO NOT SWALLOW WATER    8. You MUST make arrangements for a responsible adult to take you home after your surgery. You will not be allowed to leave alone or drive yourself home. It is strongly suggested someone stay with you the first 24 hrs. Your surgery will be cancelled if you do not have a ride home. 9. PEDIATRIC PATIENTS ONLY:  A parent/legal guardian must accompany a child scheduled for surgery and plan to stay at the hospital until the child is discharged. Please do not bring other children with you.     10. Please wear simple, loose fitting clothing to the hospital.  Do not bring valuables (money, credit cards, checkbooks, etc.) Do not wear any makeup (including no eye makeup) or nail polish on your fingers or toes. 11. DO NOT wear any jewelry or piercings on day of surgery. All body piercing jewelry must be removed. 12. Shower the night before surgery with __x_Antibacterial soap /RADHA WIPES________    13. TOTAL JOINT REPLACEMENT/HYSTERECTOMY PATIENTS ONLY---Remember to bring Blood Bank bracelet to the hospital on the day of surgery. 14. If you have a Living Will and Durable Power of  for Healthcare, please bring in a copy. 15. If appropriate bring crutches, inspirex, WALKER, CANE etc... 12. Notify your Surgeon if you develop any illness between now and surgery time, cough, cold, fever, sore throat, nausea, vomiting, etc.  Please notify your surgeon if you experience dizziness, shortness of breath or blurred vision between now & the time of your surgery. 17. If you have ___dentures, they will be removed before going to the OR; we will provide you a container. If you wear ___contact lenses or ___glasses, they will be removed; please bring a case for them. 18. To provide excellent care visitors will be limited to 2 in the room at any given time. 19. Please bring picture ID and insurance card. 20. Sleep apnea patients need to bring CPAP AND SETTINGS to hospital on day of surgery. 21. During flu season no children under the age of 15 are permitted in the hospital for the safety of all patients. 22. Other                Please call AMBULATORY CARE if you have any further questions.    [address] [facility]     [address]

## 2021-11-01 NOTE — PROGRESS NOTES
Spoke to [employer] on phone she is requesting home care to continue post op [facility] choice [phone] dr ramirez office notified they will fax orders to [facility]       Dr Shaila Otero office called for most recent pacer check

## 2021-11-02 ENCOUNTER — HOSPITAL ENCOUNTER (OUTPATIENT)
Age: 86
Setting detail: OBSERVATION
Discharge: HOME HEALTH | End: 2021-11-05
Attending: UROLOGY | Admitting: UROLOGY
Payer: MEDICARE

## 2021-11-02 ENCOUNTER — HOSPITAL ENCOUNTER (OUTPATIENT)
Dept: GENERAL RADIOLOGY | Age: 86
Discharge: HOME | End: 2021-11-04
Payer: MEDICARE

## 2021-11-02 ENCOUNTER — ANESTHESIA (OUTPATIENT)
Dept: OPERATING ROOM | Age: 86
End: 2021-11-02
Payer: MEDICARE

## 2021-11-02 ENCOUNTER — ANESTHESIA EVENT (OUTPATIENT)
Dept: OPERATING ROOM | Age: 86
End: 2021-11-02
Payer: MEDICARE

## 2021-11-02 VITALS
DIASTOLIC BLOOD PRESSURE: 68 MMHG | OXYGEN SATURATION: 99 % | SYSTOLIC BLOOD PRESSURE: 124 MMHG | TEMPERATURE: 97.7 F | RESPIRATION RATE: 1 BRPM

## 2021-11-02 DIAGNOSIS — N20.0 CALCULUS OF KIDNEY: ICD-10-CM

## 2021-11-02 PROBLEM — R35.1 BPH ASSOCIATED WITH NOCTURIA: Status: ACTIVE | Noted: 2021-11-02

## 2021-11-02 PROBLEM — N40.1 BPH ASSOCIATED WITH NOCTURIA: Status: ACTIVE | Noted: 2021-11-02

## 2021-11-02 PROBLEM — N13.8 BPH WITH URINARY OBSTRUCTION: Status: ACTIVE | Noted: 2021-11-02

## 2021-11-02 PROBLEM — N40.1 BPH WITH URINARY OBSTRUCTION: Status: ACTIVE | Noted: 2021-11-02

## 2021-11-02 LAB
ABO/RH: NORMAL
ANION GAP SERPL CALCULATED.3IONS-SCNC: 6 MMOL/L (ref 7–16)
ANTIBODY SCREEN: NORMAL
BUN BLDV-MCNC: 29 MG/DL (ref 6–23)
CALCIUM SERPL-MCNC: 9.7 MG/DL (ref 8.6–10.2)
CHLORIDE BLD-SCNC: 104 MMOL/L (ref 98–107)
CO2: 29 MMOL/L (ref 22–29)
CREAT SERPL-MCNC: 1.7 MG/DL (ref 0.7–1.2)
GFR AFRICAN AMERICAN: 46
GFR NON-AFRICAN AMERICAN: 38 ML/MIN/1.73
GLUCOSE BLD-MCNC: 109 MG/DL (ref 74–99)
HCT VFR BLD CALC: 29.5 % (ref 37–54)
HCT VFR BLD CALC: 34.5 % (ref 37–54)
HEMOGLOBIN: 11.1 G/DL (ref 12.5–16.5)
HEMOGLOBIN: 9.3 G/DL (ref 12.5–16.5)
MCH RBC QN AUTO: 31.4 PG (ref 26–35)
MCHC RBC AUTO-ENTMCNC: 32.2 % (ref 32–34.5)
MCV RBC AUTO: 97.7 FL (ref 80–99.9)
PDW BLD-RTO: 14 FL (ref 11.5–15)
PLATELET # BLD: 256 E9/L (ref 130–450)
PMV BLD AUTO: 10.1 FL (ref 7–12)
POTASSIUM REFLEX MAGNESIUM: 3.9 MMOL/L (ref 3.5–5)
RBC # BLD: 3.53 E12/L (ref 3.8–5.8)
SODIUM BLD-SCNC: 139 MMOL/L (ref 132–146)
WBC # BLD: 8 E9/L (ref 4.5–11.5)

## 2021-11-02 PROCEDURE — 86923 COMPATIBILITY TEST ELECTRIC: CPT

## 2021-11-02 PROCEDURE — 2580000003 HC RX 258: Performed by: NURSE ANESTHETIST, CERTIFIED REGISTERED

## 2021-11-02 PROCEDURE — 86901 BLOOD TYPING SEROLOGIC RH(D): CPT

## 2021-11-02 PROCEDURE — P9047 ALBUMIN (HUMAN), 25%, 50ML: HCPCS

## 2021-11-02 PROCEDURE — 3600000013 HC SURGERY LEVEL 3 ADDTL 15MIN: Performed by: UROLOGY

## 2021-11-02 PROCEDURE — 6360000002 HC RX W HCPCS: Performed by: NURSE ANESTHETIST, CERTIFIED REGISTERED

## 2021-11-02 PROCEDURE — 6360000002 HC RX W HCPCS: Performed by: NURSE PRACTITIONER

## 2021-11-02 PROCEDURE — 85014 HEMATOCRIT: CPT

## 2021-11-02 PROCEDURE — 80048 BASIC METABOLIC PNL TOTAL CA: CPT

## 2021-11-02 PROCEDURE — 7100000000 HC PACU RECOVERY - FIRST 15 MIN: Performed by: UROLOGY

## 2021-11-02 PROCEDURE — 2580000003 HC RX 258: Performed by: UROLOGY

## 2021-11-02 PROCEDURE — 2709999900 HC NON-CHARGEABLE SUPPLY: Performed by: UROLOGY

## 2021-11-02 PROCEDURE — 85018 HEMOGLOBIN: CPT

## 2021-11-02 PROCEDURE — 3700000001 HC ADD 15 MINUTES (ANESTHESIA): Performed by: UROLOGY

## 2021-11-02 PROCEDURE — G0378 HOSPITAL OBSERVATION PER HR: HCPCS

## 2021-11-02 PROCEDURE — 6370000000 HC RX 637 (ALT 250 FOR IP): Performed by: UROLOGY

## 2021-11-02 PROCEDURE — 2500000003 HC RX 250 WO HCPCS: Performed by: NURSE ANESTHETIST, CERTIFIED REGISTERED

## 2021-11-02 PROCEDURE — 2580000003 HC RX 258: Performed by: NURSE PRACTITIONER

## 2021-11-02 PROCEDURE — 3700000000 HC ANESTHESIA ATTENDED CARE: Performed by: UROLOGY

## 2021-11-02 PROCEDURE — 74400 UROGRAPHY IV +-KUB TOMOG: CPT

## 2021-11-02 PROCEDURE — 36415 COLL VENOUS BLD VENIPUNCTURE: CPT

## 2021-11-02 PROCEDURE — 2720000010 HC SURG SUPPLY STERILE: Performed by: UROLOGY

## 2021-11-02 PROCEDURE — 85027 COMPLETE CBC AUTOMATED: CPT

## 2021-11-02 PROCEDURE — 3600000003 HC SURGERY LEVEL 3 BASE: Performed by: UROLOGY

## 2021-11-02 PROCEDURE — C1758 CATHETER, URETERAL: HCPCS | Performed by: UROLOGY

## 2021-11-02 PROCEDURE — 6360000002 HC RX W HCPCS

## 2021-11-02 PROCEDURE — 86850 RBC ANTIBODY SCREEN: CPT

## 2021-11-02 PROCEDURE — 86900 BLOOD TYPING SEROLOGIC ABO: CPT

## 2021-11-02 PROCEDURE — 7100000001 HC PACU RECOVERY - ADDTL 15 MIN: Performed by: UROLOGY

## 2021-11-02 PROCEDURE — 36430 TRANSFUSION BLD/BLD COMPNT: CPT

## 2021-11-02 PROCEDURE — 88305 TISSUE EXAM BY PATHOLOGIST: CPT

## 2021-11-02 PROCEDURE — P9016 RBC LEUKOCYTES REDUCED: HCPCS

## 2021-11-02 RX ORDER — SODIUM CHLORIDE 9 MG/ML
INJECTION, SOLUTION INTRAVENOUS CONTINUOUS
Status: DISCONTINUED | OUTPATIENT
Start: 2021-11-02 | End: 2021-11-05 | Stop reason: HOSPADM

## 2021-11-02 RX ORDER — LIDOCAINE HYDROCHLORIDE 20 MG/ML
INJECTION, SOLUTION INTRAVENOUS PRN
Status: DISCONTINUED | OUTPATIENT
Start: 2021-11-02 | End: 2021-11-02 | Stop reason: SDUPTHER

## 2021-11-02 RX ORDER — METOPROLOL SUCCINATE 25 MG/1
25 TABLET, EXTENDED RELEASE ORAL DAILY
Status: DISCONTINUED | OUTPATIENT
Start: 2021-11-03 | End: 2021-11-05 | Stop reason: HOSPADM

## 2021-11-02 RX ORDER — FERROUS SULFATE 325(65) MG
325 TABLET ORAL 2 TIMES DAILY
Status: DISCONTINUED | OUTPATIENT
Start: 2021-11-02 | End: 2021-11-05 | Stop reason: HOSPADM

## 2021-11-02 RX ORDER — PANTOPRAZOLE SODIUM 40 MG/1
40 TABLET, DELAYED RELEASE ORAL DAILY
Status: DISCONTINUED | OUTPATIENT
Start: 2021-11-03 | End: 2021-11-05 | Stop reason: HOSPADM

## 2021-11-02 RX ORDER — PROPOFOL 10 MG/ML
INJECTION, EMULSION INTRAVENOUS PRN
Status: DISCONTINUED | OUTPATIENT
Start: 2021-11-02 | End: 2021-11-02 | Stop reason: SDUPTHER

## 2021-11-02 RX ORDER — LISINOPRIL AND HYDROCHLOROTHIAZIDE 25; 20 MG/1; MG/1
1 TABLET ORAL DAILY
Status: DISCONTINUED | OUTPATIENT
Start: 2021-11-03 | End: 2021-11-02

## 2021-11-02 RX ORDER — SODIUM CHLORIDE 0.9 % (FLUSH) 0.9 %
5-40 SYRINGE (ML) INJECTION PRN
Status: DISCONTINUED | OUTPATIENT
Start: 2021-11-02 | End: 2021-11-05 | Stop reason: HOSPADM

## 2021-11-02 RX ORDER — MEPERIDINE HYDROCHLORIDE 25 MG/ML
12.5 INJECTION INTRAMUSCULAR; INTRAVENOUS; SUBCUTANEOUS ONCE
Status: COMPLETED | OUTPATIENT
Start: 2021-11-02 | End: 2021-11-02

## 2021-11-02 RX ORDER — MEPERIDINE HYDROCHLORIDE 25 MG/ML
INJECTION INTRAMUSCULAR; INTRAVENOUS; SUBCUTANEOUS
Status: COMPLETED
Start: 2021-11-02 | End: 2021-11-02

## 2021-11-02 RX ORDER — TIMOLOL MALEATE 5 MG/ML
1 SOLUTION/ DROPS OPHTHALMIC 2 TIMES DAILY
Status: DISCONTINUED | OUTPATIENT
Start: 2021-11-02 | End: 2021-11-05 | Stop reason: HOSPADM

## 2021-11-02 RX ORDER — FENTANYL CITRATE 50 UG/ML
INJECTION, SOLUTION INTRAMUSCULAR; INTRAVENOUS PRN
Status: DISCONTINUED | OUTPATIENT
Start: 2021-11-02 | End: 2021-11-02 | Stop reason: SDUPTHER

## 2021-11-02 RX ORDER — SODIUM CHLORIDE 9 MG/ML
INJECTION, SOLUTION INTRAVENOUS PRN
Status: DISCONTINUED | OUTPATIENT
Start: 2021-11-02 | End: 2021-11-05 | Stop reason: HOSPADM

## 2021-11-02 RX ORDER — SODIUM CHLORIDE 0.9 % (FLUSH) 0.9 %
SYRINGE (ML) INJECTION
Status: DISPENSED
Start: 2021-11-02 | End: 2021-11-03

## 2021-11-02 RX ORDER — LISINOPRIL 20 MG/1
20 TABLET ORAL DAILY
Status: DISCONTINUED | OUTPATIENT
Start: 2021-11-03 | End: 2021-11-05 | Stop reason: HOSPADM

## 2021-11-02 RX ORDER — ALBUMIN (HUMAN) 12.5 G/50ML
25 SOLUTION INTRAVENOUS ONCE
Status: COMPLETED | OUTPATIENT
Start: 2021-11-02 | End: 2021-11-02

## 2021-11-02 RX ORDER — SODIUM CHLORIDE 9 MG/ML
25 INJECTION, SOLUTION INTRAVENOUS PRN
Status: DISCONTINUED | OUTPATIENT
Start: 2021-11-02 | End: 2021-11-05 | Stop reason: HOSPADM

## 2021-11-02 RX ORDER — ALBUMIN (HUMAN) 12.5 G/50ML
SOLUTION INTRAVENOUS
Status: COMPLETED
Start: 2021-11-02 | End: 2021-11-02

## 2021-11-02 RX ORDER — HYDROCHLOROTHIAZIDE 25 MG/1
25 TABLET ORAL DAILY
Status: DISCONTINUED | OUTPATIENT
Start: 2021-11-03 | End: 2021-11-05 | Stop reason: HOSPADM

## 2021-11-02 RX ORDER — SODIUM CHLORIDE 0.9 % (FLUSH) 0.9 %
5-40 SYRINGE (ML) INJECTION EVERY 12 HOURS SCHEDULED
Status: DISCONTINUED | OUTPATIENT
Start: 2021-11-02 | End: 2021-11-05 | Stop reason: HOSPADM

## 2021-11-02 RX ORDER — GLYCOPYRROLATE 0.2 MG/ML
INJECTION INTRAMUSCULAR; INTRAVENOUS PRN
Status: DISCONTINUED | OUTPATIENT
Start: 2021-11-02 | End: 2021-11-02 | Stop reason: SDUPTHER

## 2021-11-02 RX ORDER — ALBUMIN, HUMAN INJ 5% 5 %
25 SOLUTION INTRAVENOUS ONCE
Status: DISCONTINUED | OUTPATIENT
Start: 2021-11-02 | End: 2021-11-02

## 2021-11-02 RX ORDER — FUROSEMIDE 10 MG/ML
10 INJECTION INTRAMUSCULAR; INTRAVENOUS ONCE
Status: DISCONTINUED | OUTPATIENT
Start: 2021-11-02 | End: 2021-11-05 | Stop reason: HOSPADM

## 2021-11-02 RX ORDER — SODIUM CHLORIDE 9 MG/ML
INJECTION, SOLUTION INTRAVENOUS CONTINUOUS PRN
Status: DISCONTINUED | OUTPATIENT
Start: 2021-11-02 | End: 2021-11-02 | Stop reason: SDUPTHER

## 2021-11-02 RX ORDER — FINASTERIDE 5 MG/1
5 TABLET, FILM COATED ORAL DAILY
Status: DISCONTINUED | OUTPATIENT
Start: 2021-11-03 | End: 2021-11-05 | Stop reason: HOSPADM

## 2021-11-02 RX ORDER — SODIUM CHLORIDE, SODIUM LACTATE, POTASSIUM CHLORIDE, CALCIUM CHLORIDE 600; 310; 30; 20 MG/100ML; MG/100ML; MG/100ML; MG/100ML
INJECTION, SOLUTION INTRAVENOUS CONTINUOUS PRN
Status: DISCONTINUED | OUTPATIENT
Start: 2021-11-02 | End: 2021-11-02 | Stop reason: SDUPTHER

## 2021-11-02 RX ADMIN — PHENYLEPHRINE HYDROCHLORIDE 100 MCG: 10 INJECTION INTRAVENOUS at 15:12

## 2021-11-02 RX ADMIN — PHENYLEPHRINE HYDROCHLORIDE 100 MCG: 10 INJECTION INTRAVENOUS at 15:24

## 2021-11-02 RX ADMIN — SODIUM CHLORIDE: 9 INJECTION, SOLUTION INTRAVENOUS at 22:23

## 2021-11-02 RX ADMIN — PHENYLEPHRINE HYDROCHLORIDE 100 MCG: 10 INJECTION INTRAVENOUS at 14:44

## 2021-11-02 RX ADMIN — PHENYLEPHRINE HYDROCHLORIDE 100 MCG: 10 INJECTION INTRAVENOUS at 15:42

## 2021-11-02 RX ADMIN — MEPERIDINE HYDROCHLORIDE 12.5 MG: 25 INJECTION, SOLUTION INTRAMUSCULAR; INTRAVENOUS; SUBCUTANEOUS at 16:56

## 2021-11-02 RX ADMIN — ALBUMIN (HUMAN) 25 G: 0.25 INJECTION, SOLUTION INTRAVENOUS at 17:00

## 2021-11-02 RX ADMIN — GLYCOPYRROLATE 0.1 MG: 0.2 INJECTION, SOLUTION INTRAMUSCULAR; INTRAVENOUS at 14:44

## 2021-11-02 RX ADMIN — FENTANYL CITRATE 100 MCG: 50 INJECTION, SOLUTION INTRAMUSCULAR; INTRAVENOUS at 14:30

## 2021-11-02 RX ADMIN — PHENYLEPHRINE HYDROCHLORIDE 100 MCG: 10 INJECTION INTRAVENOUS at 15:32

## 2021-11-02 RX ADMIN — SODIUM CHLORIDE: 9 INJECTION, SOLUTION INTRAVENOUS at 15:08

## 2021-11-02 RX ADMIN — LIDOCAINE HYDROCHLORIDE 100 MG: 20 INJECTION, SOLUTION INTRAVENOUS at 14:30

## 2021-11-02 RX ADMIN — PHENYLEPHRINE HYDROCHLORIDE 100 MCG: 10 INJECTION INTRAVENOUS at 14:56

## 2021-11-02 RX ADMIN — PHENYLEPHRINE HYDROCHLORIDE 100 MCG: 10 INJECTION INTRAVENOUS at 15:26

## 2021-11-02 RX ADMIN — PHENYLEPHRINE HYDROCHLORIDE 100 MCG: 10 INJECTION INTRAVENOUS at 14:58

## 2021-11-02 RX ADMIN — Medication 2000 MG: at 14:38

## 2021-11-02 RX ADMIN — SODIUM CHLORIDE, POTASSIUM CHLORIDE, SODIUM LACTATE AND CALCIUM CHLORIDE: 600; 310; 30; 20 INJECTION, SOLUTION INTRAVENOUS at 14:27

## 2021-11-02 RX ADMIN — GLYCOPYRROLATE 0.2 MG: 0.2 INJECTION, SOLUTION INTRAMUSCULAR; INTRAVENOUS at 14:30

## 2021-11-02 RX ADMIN — PHENYLEPHRINE HYDROCHLORIDE 100 MCG: 10 INJECTION INTRAVENOUS at 15:34

## 2021-11-02 RX ADMIN — PROPOFOL 200 MG: 10 INJECTION, EMULSION INTRAVENOUS at 14:30

## 2021-11-02 RX ADMIN — SODIUM CHLORIDE: 9 INJECTION, SOLUTION INTRAVENOUS at 13:34

## 2021-11-02 RX ADMIN — MEPERIDINE HYDROCHLORIDE 12.5 MG: 25 INJECTION INTRAMUSCULAR; INTRAVENOUS; SUBCUTANEOUS at 16:56

## 2021-11-02 RX ADMIN — ALBUMIN (HUMAN) 25 G: 12.5 SOLUTION INTRAVENOUS at 17:00

## 2021-11-02 RX ADMIN — PHENYLEPHRINE HYDROCHLORIDE 100 MCG: 10 INJECTION INTRAVENOUS at 15:51

## 2021-11-02 RX ADMIN — PHENYLEPHRINE HYDROCHLORIDE 100 MCG: 10 INJECTION INTRAVENOUS at 15:54

## 2021-11-02 ASSESSMENT — PULMONARY FUNCTION TESTS
PIF_VALUE: 11
PIF_VALUE: 2
PIF_VALUE: 2
PIF_VALUE: 11
PIF_VALUE: 10
PIF_VALUE: 4
PIF_VALUE: 3
PIF_VALUE: 10
PIF_VALUE: 12
PIF_VALUE: 1
PIF_VALUE: 2
PIF_VALUE: 11
PIF_VALUE: 3
PIF_VALUE: 2
PIF_VALUE: 11
PIF_VALUE: 3
PIF_VALUE: 0
PIF_VALUE: 7
PIF_VALUE: 11
PIF_VALUE: 0
PIF_VALUE: 11
PIF_VALUE: 11
PIF_VALUE: 2
PIF_VALUE: 10
PIF_VALUE: 11
PIF_VALUE: 2
PIF_VALUE: 12
PIF_VALUE: 2
PIF_VALUE: 2
PIF_VALUE: 11
PIF_VALUE: 10
PIF_VALUE: 11
PIF_VALUE: 3
PIF_VALUE: 3
PIF_VALUE: 11
PIF_VALUE: 11
PIF_VALUE: 2
PIF_VALUE: 2
PIF_VALUE: 6
PIF_VALUE: 10
PIF_VALUE: 11
PIF_VALUE: 2
PIF_VALUE: 1
PIF_VALUE: 5
PIF_VALUE: 2
PIF_VALUE: 1
PIF_VALUE: 11
PIF_VALUE: 1
PIF_VALUE: 2
PIF_VALUE: 3
PIF_VALUE: 2
PIF_VALUE: 2
PIF_VALUE: 11
PIF_VALUE: 12
PIF_VALUE: 2
PIF_VALUE: 3
PIF_VALUE: 10
PIF_VALUE: 3
PIF_VALUE: 0
PIF_VALUE: 2
PIF_VALUE: 2
PIF_VALUE: 10
PIF_VALUE: 2
PIF_VALUE: 3
PIF_VALUE: 2
PIF_VALUE: 0
PIF_VALUE: 10
PIF_VALUE: 1
PIF_VALUE: 11
PIF_VALUE: 3
PIF_VALUE: 2
PIF_VALUE: 0
PIF_VALUE: 0
PIF_VALUE: 11
PIF_VALUE: 12
PIF_VALUE: 0
PIF_VALUE: 2
PIF_VALUE: 3
PIF_VALUE: 11
PIF_VALUE: 0
PIF_VALUE: 10
PIF_VALUE: 11
PIF_VALUE: 12
PIF_VALUE: 26
PIF_VALUE: 0
PIF_VALUE: 11
PIF_VALUE: 3
PIF_VALUE: 10
PIF_VALUE: 1
PIF_VALUE: 3
PIF_VALUE: 2
PIF_VALUE: 1
PIF_VALUE: 10
PIF_VALUE: 9
PIF_VALUE: 5
PIF_VALUE: 11
PIF_VALUE: 2
PIF_VALUE: 2
PIF_VALUE: 3
PIF_VALUE: 1
PIF_VALUE: 2
PIF_VALUE: 3
PIF_VALUE: 12
PIF_VALUE: 11
PIF_VALUE: 3
PIF_VALUE: 7
PIF_VALUE: 10
PIF_VALUE: 11
PIF_VALUE: 10

## 2021-11-02 ASSESSMENT — PAIN SCALES - GENERAL
PAINLEVEL_OUTOF10: 0

## 2021-11-02 ASSESSMENT — PAIN - FUNCTIONAL ASSESSMENT: PAIN_FUNCTIONAL_ASSESSMENT: 0-10

## 2021-11-02 NOTE — BRIEF OP NOTE
Brief Postoperative Note    Chad Jacobson  YOB: 1932  [id number]    Pre-operative Diagnosis: urinary  Retention neurogenic bladder bph     Post-operative Diagnosis: Same    Procedure: cysto retro turp suprapubic cystostomy  Anesthesia: General    Surgeons/Assistants: Steven Londono MD      Estimated Blood Loss: 566     Complications: None    Specimens: Was Obtained:     Findings:     Electronically signed by Steven Londono MD on 11/2/2021 at 4:26 PM  Brief Postoperative Note      Patient: Chad Jacobson  YOB: 1932  MRN: [id number]    Date of Procedure: 11/2/2021    Pre-Op Diagnosis: BPH    Post-Op Diagnosis:        Procedure(s):  CYSTOSCOPY RETROGRADE PYELOGRAM, TRANSURETHRAL RESECTION OF THE PROSTATE WITH SUPRAPUBIC CATHETER PLACEMENT    Surgeon(s):  Steven Londono MD    Assistant:      Anesthesia: Monitor Anesthesia Care    Estimated Blood Loss (mL): 938     Complications: None    Specimens:   ID Type Source Tests Collected by Time Destination   A : PROSTATE Tissue Tissue SURGICAL PATHOLOGY Steven Londono MD 11/2/2021 1555        Implants:      Drains:   Urethral Catheter Latex 24 fr (Active)       Suprapubic Catheter Latex 30 fr (Active)       [REMOVED] Urethral Catheter Straight-tip 26 fr (Removed)       Findings:     Electronically signed by Steven Londono MD on 11/2/2021 at 4:26 PM

## 2021-11-02 NOTE — ANESTHESIA PRE PROCEDURE
Department of Anesthesiology  Preprocedure Note       Name:  Alexia Brandon   Age:  80 y.o.  :  1932                                          MRN:  [id number]         Date:  2021      Surgeon: Nicolette Scruggs):  Kristie Yung MD    Procedure: Procedure(s):  CYSTOSCOPY RETROGRADE PYELOGRAM, TRANSURETHRAL RESECTION OF THE PROSTATE WITH ROBERTS INSERTION    Medications prior to admission:   Prior to Admission medications    Medication Sig Start Date End Date Taking? Authorizing Provider   simvastatin (ZOCOR) 40 MG tablet Take 1 tablet by mouth nightly 10/12/21  Yes Jorge Rice MD   amLODIPine (NORVASC) 2.5 MG tablet Take 1 tablet by mouth daily 10/12/21  Yes Jorge Rice MD   finasteride (PROSCAR) 5 MG tablet Take 1 tablet by mouth daily 10/12/21  Yes Jorge Rice MD   lisinopril-hydroCHLOROthiazide (PRINZIDE;ZESTORETIC) 20-25 MG per tablet Take 1 tablet by mouth daily 10/12/21  Yes Jorge Rice MD   metoprolol succinate (TOPROL XL) 25 MG extended release tablet Take 1 tablet by mouth daily 10/12/21  Yes Jorge Rice MD   ferrous sulfate (IRON 325) 325 (65 Fe) MG tablet Take 1 tablet by mouth 2 times daily 9/10/21  Yes Jorge Rice MD   Multiple Vitamin (ONE-A-DAY ESSENTIAL) TABS Take 1 tablet by mouth daily   Yes Historical Provider, MD   Cholecalciferol (VITAMIN D3) 50 MCG (2000 UT) CAPS Take 2,000 Units by mouth daily    Yes Historical Provider, MD   Ascorbic Acid (C-250) 250 MG CHEW Take 250 mg by mouth daily    Yes Historical Provider, MD   vitamin B-12 (CYANOCOBALAMIN) 1000 MCG tablet Take 1,000 mcg by mouth daily   Yes Historical Provider, MD   pantoprazole (PROTONIX) 40 MG tablet Take 40 mg by mouth daily  19  Yes Historical Provider, MD   timolol (TIMOPTIC) 0.5 % ophthalmic solution Place 1 drop into both eyes 2 times daily  16  Yes Historical Provider, Yohannes Rocha repens, 450 MG CAPS Take 2 tablets by mouth daily.      Yes Historical Provider, MD   Misc Natural Former Smoker     Packs/day: 1.00     Years: 40.00     Pack years: 40.00     Quit date: 2000     Years since quittin.1    Smokeless tobacco: Never Used   Substance Use Topics    Alcohol use: No                                Counseling given: Not Answered      Vital Signs (Current):   Vitals:    21 1330 21 1305   BP:  139/62   Pulse:  60   Resp:  16   Temp:  98 °F (36.7 °C)   TempSrc:  Infrared   SpO2:  99%   Weight: 135 lb (61.2 kg)    Height: 5' 9\" (1.753 m)                                               BP Readings from Last 3 Encounters:   21 139/62   10/12/21 122/62   09/10/21 102/60       NPO Status: Time of last liquid consumption:                         Time of last solid consumption:                         Date of last liquid consumption: 21                        Date of last solid food consumption: 21    BMI:   Wt Readings from Last 3 Encounters:   21 135 lb (61.2 kg)   10/12/21 135 lb (61.2 kg)   09/10/21 131 lb (59.4 kg)     Body mass index is 19.94 kg/m². CBC:   Lab Results   Component Value Date    WBC 7.9 2021    RBC 3.71 2021    HGB 11.4 2021    HCT 35.0 2021    MCV 94.3 2021    RDW 13.2 2021     2021       CMP:   Lab Results   Component Value Date     2021    K 4.8 2021    K 3.2 2021    CL 98 2021    CO2 26 2021    BUN 42 2021    CREATININE 1.9 2021    GFRAA 41 2021    LABGLOM 34 2021    GLUCOSE 99 2021    GLUCOSE 106 2012    PROT 6.9 2021    CALCIUM 9.7 2021    BILITOT 0.3 2021    ALKPHOS 82 2021    AST 19 2021    ALT 21 2021       POC Tests: No results for input(s): POCGLU, POCNA, POCK, POCCL, POCBUN, POCHEMO, POCHCT in the last 72 hours.     Coags:   Lab Results   Component Value Date    PROTIME 11.4 2017    INR 1.0 2017       HCG (If Applicable): No results found for:

## 2021-11-02 NOTE — ANESTHESIA POSTPROCEDURE EVALUATION
Department of Anesthesiology  Postprocedure Note    Patient: Yesi Risk  MRN: [id number]  YOB: 1932  Date of evaluation: 11/2/2021  Time:  6:30 PM     Procedure Summary     Date: 11/02/21 Room / Location: [facility]  [address] [facility]    Anesthesia Start: 7303 Anesthesia Stop: 36    Procedure: CYSTOSCOPY RETROGRADE PYELOGRAM, TRANSURETHRAL RESECTION OF THE PROSTATE WITH SUPRAPUBIC CATHETER PLACEMENT (N/A Bladder) Diagnosis: (BPH)    Surgeons: Issa Esteban MD Responsible Provider: Angie Gardner MD    Anesthesia Type: MAC ASA Status: 3          Anesthesia Type: MAC    Lisa Phase I: Lisa Score: 9    Lisa Phase II:      Last vitals: Reviewed and per EMR flowsheets.        Anesthesia Post Evaluation    Patient location during evaluation: PACU  Patient participation: complete - patient participated  Level of consciousness: awake  Pain score: 3  Airway patency: patent  Nausea & Vomiting: no nausea  Complications: no  Cardiovascular status: blood pressure returned to baseline  Respiratory status: acceptable  Hydration status: euvolemic

## 2021-11-03 LAB
ANION GAP SERPL CALCULATED.3IONS-SCNC: 7 MMOL/L (ref 7–16)
BUN BLDV-MCNC: 26 MG/DL (ref 6–23)
CALCIUM SERPL-MCNC: 8.3 MG/DL (ref 8.6–10.2)
CHLORIDE BLD-SCNC: 107 MMOL/L (ref 98–107)
CO2: 25 MMOL/L (ref 22–29)
CREAT SERPL-MCNC: 1.5 MG/DL (ref 0.7–1.2)
GFR AFRICAN AMERICAN: 53
GFR NON-AFRICAN AMERICAN: 44 ML/MIN/1.73
GLUCOSE BLD-MCNC: 145 MG/DL (ref 74–99)
HCT VFR BLD CALC: 24.8 % (ref 37–54)
HEMOGLOBIN: 8 G/DL (ref 12.5–16.5)
MCH RBC QN AUTO: 31.5 PG (ref 26–35)
MCHC RBC AUTO-ENTMCNC: 32.3 % (ref 32–34.5)
MCV RBC AUTO: 97.6 FL (ref 80–99.9)
PDW BLD-RTO: 14.5 FL (ref 11.5–15)
PLATELET # BLD: 165 E9/L (ref 130–450)
PMV BLD AUTO: 10.2 FL (ref 7–12)
POTASSIUM SERPL-SCNC: 4.1 MMOL/L (ref 3.5–5)
RBC # BLD: 2.54 E12/L (ref 3.8–5.8)
SODIUM BLD-SCNC: 139 MMOL/L (ref 132–146)
WBC # BLD: 9.4 E9/L (ref 4.5–11.5)

## 2021-11-03 PROCEDURE — 85027 COMPLETE CBC AUTOMATED: CPT

## 2021-11-03 PROCEDURE — 6370000000 HC RX 637 (ALT 250 FOR IP): Performed by: UROLOGY

## 2021-11-03 PROCEDURE — G0378 HOSPITAL OBSERVATION PER HR: HCPCS

## 2021-11-03 PROCEDURE — 36415 COLL VENOUS BLD VENIPUNCTURE: CPT

## 2021-11-03 PROCEDURE — 2580000003 HC RX 258: Performed by: UROLOGY

## 2021-11-03 PROCEDURE — 6360000002 HC RX W HCPCS: Performed by: UROLOGY

## 2021-11-03 PROCEDURE — 80048 BASIC METABOLIC PNL TOTAL CA: CPT

## 2021-11-03 RX ORDER — OXYCODONE HYDROCHLORIDE AND ACETAMINOPHEN 5; 325 MG/1; MG/1
1 TABLET ORAL EVERY 4 HOURS PRN
Status: DISCONTINUED | OUTPATIENT
Start: 2021-11-03 | End: 2021-11-05

## 2021-11-03 RX ORDER — SODIUM CHLORIDE 9 MG/ML
INJECTION, SOLUTION INTRAVENOUS PRN
Status: DISCONTINUED | OUTPATIENT
Start: 2021-11-03 | End: 2021-11-05 | Stop reason: HOSPADM

## 2021-11-03 RX ORDER — SODIUM CHLORIDE, SODIUM LACTATE, POTASSIUM CHLORIDE, CALCIUM CHLORIDE 600; 310; 30; 20 MG/100ML; MG/100ML; MG/100ML; MG/100ML
INJECTION, SOLUTION INTRAVENOUS CONTINUOUS
Status: DISCONTINUED | OUTPATIENT
Start: 2021-11-03 | End: 2021-11-05 | Stop reason: HOSPADM

## 2021-11-03 RX ADMIN — SODIUM CHLORIDE, POTASSIUM CHLORIDE, SODIUM LACTATE AND CALCIUM CHLORIDE: 600; 310; 30; 20 INJECTION, SOLUTION INTRAVENOUS at 12:42

## 2021-11-03 RX ADMIN — SODIUM CHLORIDE, POTASSIUM CHLORIDE, SODIUM LACTATE AND CALCIUM CHLORIDE: 600; 310; 30; 20 INJECTION, SOLUTION INTRAVENOUS at 20:24

## 2021-11-03 ASSESSMENT — PAIN SCALES - GENERAL
PAINLEVEL_OUTOF10: 0

## 2021-11-03 NOTE — CARE COORDINATION
11-3-Cm note: ( no covid testing) met with pt for transition of care needs, pt had a S/P catheter replaced , he is going home with a uretheral catheter as well, pt was on CBI , it was stopped today , pt is active with [facility], they will need called when pt discharges so they can get him scheduled for a visit [phone]. Pt has a cane , BSC, grab bars, and has access to a standard wheelchair. No other needs expressed at this time. Pt's wife will provide transport home.  Electronically signed by Sherren Muck, RN on 11/3/2021 at 1:14 PM

## 2021-11-03 NOTE — PLAN OF CARE
Problem: Skin Integrity:  Goal: Will show no infection signs and symptoms  Description: Will show no infection signs and symptoms  11/3/2021 0955 by Shonna Mccarthy RN  Outcome: Met This Shift  11/2/2021 2340 by Soraida Nascimento RN  Outcome: Ongoing  Goal: Absence of new skin breakdown  Description: Absence of new skin breakdown  11/3/2021 0955 by Shonna Mccarthy RN  Outcome: Met This Shift  11/2/2021 2340 by Soraida Nascimento RN  Outcome: Ongoing     Problem: Falls - Risk of:  Goal: Will remain free from falls  Description: Will remain free from falls  11/3/2021 0955 by Shonna Mccarthy RN  Outcome: Met This Shift  11/2/2021 2340 by Soraida Nascimento RN  Outcome: Met This Shift  Goal: Absence of physical injury  Description: Absence of physical injury  11/3/2021 0955 by Shonna Mccarthy RN  Outcome: Met This Shift  11/2/2021 2340 by Soraida Nascimento RN  Outcome: Met This Shift

## 2021-11-03 NOTE — PROGRESS NOTES
11/3/2021 8:16 AM  Service: Urology  Group: [facility] (Tan/Patti/Manjinder)    Theoplis Risk  [id number]    Subjective:    Awake and alert  Ate and tolerated breakfast  CBI stopped  Yellow urine in tubing   Denies pain or discomfort  No family present     Review of Systems  Constitutional: No fever or chills   Respiratory: negative for cough and hemoptysis  Cardiovascular: negative for chest pain and dyspnea  Gastrointestinal: negative for abdominal pain, diarrhea, nausea and vomiting   : See above  Derm: negative for rash and skin lesion(s)  Neurological: negative for seizures and tremors  Musculoskeletal: Negative    Psychiatric: Negative   All other reviews are negative      Scheduled Meds:   sodium chloride flush  5-40 mL IntraVENous 2 times per day    finasteride  5 mg Oral Daily    ferrous sulfate  325 mg Oral BID    metoprolol succinate  25 mg Oral Daily    pantoprazole  40 mg Oral Daily    timolol  1 drop Both Eyes BID    furosemide  10 mg IntraVENous Once    lisinopril  20 mg Oral Daily    And    hydroCHLOROthiazide  25 mg Oral Daily       Objective:  Vitals:    11/03/21 0730   BP:    Pulse: 62   Resp:    Temp:    SpO2: 96%         Allergies: Patient has no known allergies.     General Appearance: alert and oriented to person, place and time and in no acute distress  Skin: no rash or erythema  Head: normocephalic and atraumatic  Pulmonary/Chest: normal air movement, no respiratory distress  Abdomen: soft, non-tender, non-distended  Genitourinary: guillory intact, CBI stopped, urine yellow   Extremities: no cyanosis, clubbing or edema         Labs:     Recent Labs     11/02/21  1325      K 3.9      CO2 29   BUN 29*   CREATININE 1.7*   GLUCOSE 109*   CALCIUM 9.7       Lab Results   Component Value Date    HGB 9.3 11/02/2021    HCT 29.5 11/02/2021       Lab Results   Component Value Date    PSA 2.56 11/11/2015         Assessment/Plan:  POD#1 cystopanendoscopy, retrograde pyelogram, suprapubic cystostomy, transurethral resection of the prostate     Repeat labs are pending  Cont to watch the hemoglobin  May need another transfusion pending results  Ok to keep CBI stopped  Irrigated catheter, no clots, urine remained yellow  Ambulate  Cont the guillory  Cont the SPT   Hopefully home in AM with guillory and SPT   Will follow     [facility], APRN - CNP   [facility]  [facility]

## 2021-11-03 NOTE — OP NOTE
[address]                                OPERATIVE REPORT    PATIENT NAME: Lana Finn                       :        1932  MED REC NO:   [id number]                            ROOM:  ACCOUNT NO:   [de-identified]                           ADMIT DATE: 2021  PROVIDER:     Faheem Pace MD    DATE OF PROCEDURE:  2021    PREOPERATIVE DIAGNOSES:  BPH with the bladder outlet obstruction ,  bilateral hydronephrosis, neurogenic bladder suspected. OPERATIONS:  Cystopanendoscopy, retrograde pyelogram, suprapubic  cystostomy, TUR prostate. SURGEON:  Faheem Pace MD    ESTIMATED BLOOD LOSS:  200 mL    OPERATION REPORT:  With the patient in the lithotomy position, under  satisfactory general anesthesia, the genitalia and lower abdomen were  prepped and draped in a sterile manner. A 21-Macedonian panendoscope passed  easily through the pendulous and membranous urethra. There were no  strictures or lesions seen. The prostatic fossa was an extremely large  gland with an intraprostatic length of at least 5 cm. There was a very  large median lobe extending into the bladder. The trigone was barely  visible and the bladder was badly trabeculated. Retrograde pyelograms  were performed with the ureteral catheter demonstrating significantly  torturous ureters with the calyceal system appeared to be somewhat  decompressed at this point. The bladder was filled to capacity. A  Lowsley curved retractor was inserted. The tip of the Lowsley was  palpated and a small incision was made midway between the umbilicus and  the symphysis. The Lowsley retractor was brought through this incision  following which a 30-Macedonian catheter was fixated to the [address] was withdrawn, trailing along the catheter.   This was detached  when it exited the meatus, was manipulated back into the bladder,  inflated, and sutured in place and left as a suprapubic cystostomy. A  28-Polish resectoscope sheath was placed into the bladder. Resection  with the Rodas-Hernandez resectoscope was carried out, very large amount  of tissue was removed. Hemostasis was achieved at the end of the  procedure. All clots and chips were evacuated. A 24 Simplastic  three-way Fay catheter was inserted, placed on continuous irrigation. The patient tolerated the procedure well, was sent to recovery room in  satisfactory condition.         Thomas Renee MD    D: 11/02/2021 16:40:30       T: 11/02/2021 16:42:46     [email or web address]  Job#: [id number]     Doc#: [id number]    CC:

## 2021-11-04 LAB
ANION GAP SERPL CALCULATED.3IONS-SCNC: 6 MMOL/L (ref 7–16)
BLOOD BANK DISPENSE STATUS: NORMAL
BLOOD BANK PRODUCT CODE: NORMAL
BPU ID: NORMAL
BUN BLDV-MCNC: 23 MG/DL (ref 6–23)
CALCIUM SERPL-MCNC: 8 MG/DL (ref 8.6–10.2)
CHLORIDE BLD-SCNC: 106 MMOL/L (ref 98–107)
CO2: 25 MMOL/L (ref 22–29)
CREAT SERPL-MCNC: 1.6 MG/DL (ref 0.7–1.2)
DESCRIPTION BLOOD BANK: NORMAL
GFR AFRICAN AMERICAN: 49
GFR NON-AFRICAN AMERICAN: 41 ML/MIN/1.73
GLUCOSE BLD-MCNC: 114 MG/DL (ref 74–99)
HCT VFR BLD CALC: 25 % (ref 37–54)
HCT VFR BLD CALC: 25.2 % (ref 37–54)
HCT VFR BLD CALC: 29.2 % (ref 37–54)
HEMOGLOBIN: 8.1 G/DL (ref 12.5–16.5)
HEMOGLOBIN: 8.4 G/DL (ref 12.5–16.5)
HEMOGLOBIN: 9.5 G/DL (ref 12.5–16.5)
MCH RBC QN AUTO: 31.6 PG (ref 26–35)
MCHC RBC AUTO-ENTMCNC: 32.4 % (ref 32–34.5)
MCV RBC AUTO: 97.7 FL (ref 80–99.9)
PDW BLD-RTO: 14.4 FL (ref 11.5–15)
PLATELET # BLD: 145 E9/L (ref 130–450)
PMV BLD AUTO: 10.8 FL (ref 7–12)
POTASSIUM SERPL-SCNC: 3.4 MMOL/L (ref 3.5–5)
RBC # BLD: 2.56 E12/L (ref 3.8–5.8)
SODIUM BLD-SCNC: 137 MMOL/L (ref 132–146)
WBC # BLD: 9.1 E9/L (ref 4.5–11.5)

## 2021-11-04 PROCEDURE — 80048 BASIC METABOLIC PNL TOTAL CA: CPT

## 2021-11-04 PROCEDURE — G0378 HOSPITAL OBSERVATION PER HR: HCPCS

## 2021-11-04 PROCEDURE — 85018 HEMOGLOBIN: CPT

## 2021-11-04 PROCEDURE — 6370000000 HC RX 637 (ALT 250 FOR IP): Performed by: UROLOGY

## 2021-11-04 PROCEDURE — 2580000003 HC RX 258: Performed by: UROLOGY

## 2021-11-04 PROCEDURE — 85014 HEMATOCRIT: CPT

## 2021-11-04 PROCEDURE — 6360000002 HC RX W HCPCS: Performed by: UROLOGY

## 2021-11-04 PROCEDURE — 96374 THER/PROPH/DIAG INJ IV PUSH: CPT

## 2021-11-04 PROCEDURE — 36415 COLL VENOUS BLD VENIPUNCTURE: CPT

## 2021-11-04 PROCEDURE — 85027 COMPLETE CBC AUTOMATED: CPT

## 2021-11-04 PROCEDURE — 96376 TX/PRO/DX INJ SAME DRUG ADON: CPT

## 2021-11-04 RX ORDER — SODIUM CHLORIDE 9 MG/ML
INJECTION, SOLUTION INTRAVENOUS PRN
Status: DISCONTINUED | OUTPATIENT
Start: 2021-11-04 | End: 2021-11-05 | Stop reason: HOSPADM

## 2021-11-04 RX ADMIN — SODIUM CHLORIDE, POTASSIUM CHLORIDE, SODIUM LACTATE AND CALCIUM CHLORIDE: 600; 310; 30; 20 INJECTION, SOLUTION INTRAVENOUS at 12:35

## 2021-11-04 ASSESSMENT — PAIN SCALES - GENERAL
PAINLEVEL_OUTOF10: 0

## 2021-11-04 NOTE — PROGRESS NOTES
11/4/2021 8:31 AM  Service: Urology  Group: [facility] (Tan/Patti/Manjinder)    Patricia Lebron  [id number]    Subjective:    No new complaints  Guillory draining pink urine without clots  No pain  Ambulated yesterday evening  Tolerating diet  Daughter present     Review of Systems  Constitutional: No fever or chills   Respiratory: negative for cough and hemoptysis  Cardiovascular: negative for chest pain and dyspnea  Gastrointestinal: negative for abdominal pain, diarrhea, nausea and vomiting   : See above  Derm: negative for rash and skin lesion(s)  Neurological: negative for seizures and tremors  Musculoskeletal: Negative    Psychiatric: Negative   All other reviews are negative      Scheduled Meds:   ceFAZolin  1,000 mg IntraVENous Q12H    sodium chloride flush  5-40 mL IntraVENous 2 times per day    finasteride  5 mg Oral Daily    ferrous sulfate  325 mg Oral BID    metoprolol succinate  25 mg Oral Daily    pantoprazole  40 mg Oral Daily    timolol  1 drop Both Eyes BID    furosemide  10 mg IntraVENous Once    lisinopril  20 mg Oral Daily    And    hydroCHLOROthiazide  25 mg Oral Daily       Objective:  Vitals:    11/04/21 0530   BP: (!) 117/51   Pulse: 59   Resp: 16   Temp: 98.8 °F (37.1 °C)   SpO2: 95%         Allergies: Patient has no known allergies.     General Appearance: alert and oriented to person, place and time and in no acute distress  Skin: no rash or erythema  Head: normocephalic and atraumatic  Pulmonary/Chest: normal air movement, no respiratory distress  Abdomen: soft, non-tender, non-distended  Genitourinary: guillory  Draining pink urine without clots  Extremities: no cyanosis, clubbing or edema         Labs:     Recent Labs     11/04/21  0411      K 3.4*      CO2 25   BUN 23   CREATININE 1.6*   GLUCOSE 114*   CALCIUM 8.0*       Lab Results   Component Value Date    HGB 8.1 11/04/2021    HCT 25.0 11/04/2021       Lab Results   Component Value Date    PSA 2.56 11/11/2015 Assessment/Plan:  POD#2 cystopanendoscopy, retrograde pyelogram, suprapubic cystostomy, transurethral resection of the prostate       Guillory was hand irrigated, no clots, remained light pink     Cont to watch the hemoglobin  Transfuse 1 unit PRBC  H&H following transfusion  Cont the guillory   Manually irrigate PRN to maintain patency  Encourage ambulation  Likely home in AM       [address], APRN - CNP   [facility]  Agree with above assessment and plan

## 2021-11-05 VITALS
OXYGEN SATURATION: 94 % | HEIGHT: 69 IN | BODY MASS INDEX: 19.99 KG/M2 | HEART RATE: 59 BPM | SYSTOLIC BLOOD PRESSURE: 121 MMHG | RESPIRATION RATE: 18 BRPM | WEIGHT: 135 LBS | DIASTOLIC BLOOD PRESSURE: 64 MMHG | TEMPERATURE: 98.4 F

## 2021-11-05 PROCEDURE — G0378 HOSPITAL OBSERVATION PER HR: HCPCS

## 2021-11-05 PROCEDURE — 6370000000 HC RX 637 (ALT 250 FOR IP): Performed by: UROLOGY

## 2021-11-05 RX ORDER — CEPHALEXIN 500 MG/1
500 CAPSULE ORAL 2 TIMES DAILY
Qty: 6 CAPSULE | Refills: 0 | Status: SHIPPED | OUTPATIENT
Start: 2021-11-05 | End: 2021-11-15

## 2021-11-05 ASSESSMENT — PAIN SCALES - GENERAL: PAINLEVEL_OUTOF10: 0

## 2021-11-05 NOTE — DISCHARGE SUMMARY
Physician Discharge Summary     Patient ID:  Jareth Fitzgerald  [id number]  5 y.o.  6/2/1932    Admit date: 11/2/2021    Discharge date and time: 11/5/2021    Admitting Physician: Rod Peters MD     Admission Diagnoses: BPH with urinary obstruction [N40.1, N13.8]  BPH associated with nocturia [N40.1, R35.1]    Discharge Diagnoses: same plus acute blood loss anemia    Hospital Course: required CBI and blood post op. Hgb stable after transfusion and CBI was stopped. Pt discharged with guillory catheter.   Cystopanendoscopy, retrograde pyelogram, suprapubic  cystostomy, TUR prostate.       Treatments: IV hydration and surgery: CBI    Disposition: home    Patient Instructions:   Current Discharge Medication List      START taking these medications    Details   cephALEXin (KEFLEX) 500 MG capsule Take 1 capsule by mouth 2 times daily for 10 days  Qty: 6 capsule, Refills: 0         CONTINUE these medications which have NOT CHANGED    Details   simvastatin (ZOCOR) 40 MG tablet Take 1 tablet by mouth nightly  Qty: 90 tablet, Refills: 0      amLODIPine (NORVASC) 2.5 MG tablet Take 1 tablet by mouth daily  Qty: 90 tablet, Refills: 1      finasteride (PROSCAR) 5 MG tablet Take 1 tablet by mouth daily  Qty: 90 tablet, Refills: 1      lisinopril-hydroCHLOROthiazide (PRINZIDE;ZESTORETIC) 20-25 MG per tablet Take 1 tablet by mouth daily  Qty: 90 tablet, Refills: 1      metoprolol succinate (TOPROL XL) 25 MG extended release tablet Take 1 tablet by mouth daily  Qty: 30 tablet, Refills: 3      ferrous sulfate (IRON 325) 325 (65 Fe) MG tablet Take 1 tablet by mouth 2 times daily  Qty: 100 tablet, Refills: 3      Multiple Vitamin (ONE-A-DAY ESSENTIAL) TABS Take 1 tablet by mouth daily      Cholecalciferol (VITAMIN D3) 50 MCG (2000 UT) CAPS Take 2,000 Units by mouth daily       Ascorbic Acid (C-250) 250 MG CHEW Take 250 mg by mouth daily       vitamin B-12 (CYANOCOBALAMIN) 1000 MCG tablet Take 1,000 mcg by mouth daily      pantoprazole (PROTONIX) 40 MG tablet Take 40 mg by mouth daily       timolol (TIMOPTIC) 0.5 % ophthalmic solution Place 1 drop into both eyes 2 times daily   Refills: 0      Saw Palmetto, Serenoa repens, 450 MG CAPS Take 2 tablets by mouth daily. Misc Natural Products (OSTEO BI-FLEX JOINT SHIELD PO) Take 2 tablets by mouth daily.         aspirin 81 MG EC tablet Take 81 mg by mouth daily Last dose 10/28               Signed:  RAJAN Villasenor CNP  11/5/2021  8:12 AM

## 2021-11-05 NOTE — CARE COORDINATION
CM note: [facility] liaison aware that patient will discharge today. Orders written for new suprapubic cath care per request of home health care agency. Pt will return home with urinary catheter as well which he had pta. No other discharge needs.

## 2021-11-05 NOTE — PROGRESS NOTES
Active Problems:    BPH with urinary obstruction    BPH associated with nocturia  Resolved Problems:    * No resolved hospital problems. *    Cystopanendoscopy, retrograde pyelogram, suprapubic  cystostomy, TUR prostate. Post op day 3  Acute blood loss anemia       Plan:  Ok for discharge today with guillory catheter  Keep sp tube clamped  D/w Dr Jeremy Loyola.    Rx for Aman Valdez, APRN - CNP   [facility]

## 2021-11-05 NOTE — PROGRESS NOTES
CLINICAL PHARMACY NOTE: MEDS TO BEDS    Total # of Prescriptions Filled: 1   The following medications were delivered to the patient:  · Cephalexin 500mg    Additional Documentation:

## 2021-11-08 ENCOUNTER — CARE COORDINATION (OUTPATIENT)
Dept: CASE MANAGEMENT | Age: 86
End: 2021-11-08

## 2021-11-08 DIAGNOSIS — R33.9 URINARY RETENTION: Primary | ICD-10-CM

## 2021-11-08 PROCEDURE — 1111F DSCHRG MED/CURRENT MED MERGE: CPT | Performed by: FAMILY MEDICINE

## 2021-11-08 NOTE — CARE COORDINATION
Samy 45 Transitions Initial Follow Up Call    Call within 2 business days of discharge: Yes    Patient: Marie Cross Patient : 1932   MRN: [id number]  Reason for Admission: BPH with Urinary Obstruction  Discharge Date: 21 RARS: Readmission Risk Score: 28      Last Discharge [facility]       Complaint Diagnosis Description Type Department Provider    21   Admission (Discharged) Jorge Luis Howard MD           Transitions of Care Initial Call    Was this an external facility discharge? No Discharge Facility: [facility]    Challenges to be reviewed by the provider   Additional needs identified to be addressed with provider: No         Method of communication with provider : none      Advance Care Planning:   Does patient have an Advance Directive: decision maker updated. Was this a readmission? No  Patient stated reason for admission: N/A  Patients top risk factors for readmission: medical condition and polypharmacy    Care Transition Nurse (CTN) contacted the patient by telephone to perform post hospital discharge assessment. Verified name and  with patient as identifiers. Provided introduction to self, and explanation of the CTN role. Patient has given verbal permission to his spouse, Vini Graves, to speak on his behalf on the initial call and future calls. CTN reviewed discharge instructions, medical action plan and red flags with patient's spouse who verbalized understanding. patient's spouse given an opportunity to ask questions and does not have any further questions or concerns at this time. Were discharge instructions available to patient? Yes. Reviewed appropriate site of care based on symptoms and resources available to patient including: PCP, Specialist and When to call 911. The patient's spouse agrees to contact the PCP office for questions related to their healthcare.      Medication reconciliation was performed with patient's spouse, who verbalizes understanding of administration of home medications. 1111f entered      Non-face-to-face services provided:  Obtained and reviewed discharge summary and/or continuity of care documents    Care Transitions 24 Hour Call    Do you have any ongoing symptoms?: Yes  Patient-reported symptoms: Other  -patient offers C/O \"sore for a few days\" right groin; reports symptoms have improved  Do you have a copy of your discharge instructions?: Yes  Do you have all of your prescriptions and are they filled?: Yes  Have you scheduled your follow up appointment?: Yes  How are you going to get to your appointment?: Car - family or friend to transport  Were you discharged with any Home Care or Post Acute Services: Yes  Post Acute Services: Home Health (Comment: [address];  [facility] 11/7/21)  Do you feel like you have everything you need to keep you well at home?: Yes  Are you an active caregiver in your home?: No  Care Transitions Interventions  No Identified Needs    Patient's spouse is pleasant in conversation and provides patient update.   -denies patient with fever, chills, or chest discomfort   -denies lightheaded or dizziness   -reports indwelling guillory catheter intact; urine \"clearing up\"   -reports supra-pubic guillory clamped; dressing dry and intact, dressing changed by Gabino De Oliveira nurse on 11/7/2021  -reports patient has regular bowel patterns  -reports patient has a good appetite  -ambulates with a cane; no falls  -utilizing Incentive Spirometer twice daily; CTN encouraged frequency of use and Evelyn verbalized understanding   -family provides transportation   -able to afford cost of medications     Emotional support provided; discussed will continue to follow.          Follow Up  Future Appointments   Date Time Provider Radha Gillette   1/18/2022  9:30 AM Abelina Sever, MD [facility]       Lily Gómez RN

## 2021-11-11 ENCOUNTER — OFFICE VISIT (OUTPATIENT)
Dept: FAMILY MEDICINE CLINIC | Age: 86
End: 2021-11-11
Payer: MEDICARE

## 2021-11-11 VITALS
WEIGHT: 142 LBS | BODY MASS INDEX: 21.03 KG/M2 | RESPIRATION RATE: 16 BRPM | OXYGEN SATURATION: 98 % | SYSTOLIC BLOOD PRESSURE: 128 MMHG | TEMPERATURE: 98 F | HEART RATE: 60 BPM | DIASTOLIC BLOOD PRESSURE: 50 MMHG | HEIGHT: 69 IN

## 2021-11-11 DIAGNOSIS — N18.30 STAGE 3 CHRONIC KIDNEY DISEASE, UNSPECIFIED WHETHER STAGE 3A OR 3B CKD (HCC): ICD-10-CM

## 2021-11-11 DIAGNOSIS — E78.00 PURE HYPERCHOLESTEROLEMIA: ICD-10-CM

## 2021-11-11 DIAGNOSIS — D50.0 IRON DEFICIENCY ANEMIA DUE TO CHRONIC BLOOD LOSS: ICD-10-CM

## 2021-11-11 DIAGNOSIS — Z90.79 S/P PROSTATECTOMY: Primary | ICD-10-CM

## 2021-11-11 DIAGNOSIS — I10 ESSENTIAL HYPERTENSION: ICD-10-CM

## 2021-11-11 PROCEDURE — 1111F DSCHRG MED/CURRENT MED MERGE: CPT | Performed by: FAMILY MEDICINE

## 2021-11-11 PROCEDURE — 99495 TRANSJ CARE MGMT MOD F2F 14D: CPT | Performed by: FAMILY MEDICINE

## 2021-11-11 RX ORDER — LISINOPRIL AND HYDROCHLOROTHIAZIDE 12.5; 1 MG/1; MG/1
1 TABLET ORAL DAILY
Qty: 90 TABLET | Refills: 0 | Status: SHIPPED
Start: 2021-11-11 | End: 2022-01-18 | Stop reason: SDUPTHER

## 2021-11-11 NOTE — PATIENT INSTRUCTIONS
LOW SALT FOR BLOOD PRESSURE CONTROL. LOW FAT DIET FOR CHOLESTEROL CONTROL. DRINK ENOUGH FLUIDS FOR BETTER KIDNEY FUNCTION. TAKE  AMLODIPINE 2.5 MG. DAILY, PRINIVIL/HCT 10/12.5  1 TAB. DAILY,TOPROL XL 25 MG. 1 TAB. DAILY FOR BLOOD PRESSURE CONTROL. TAKE  ZOCOR 40 MG. 1 TAB. DAILY.  FOR CHOLESTEROL CONTROL. Somis Copping TAKE  FEOSOL 1 TAB. 2 TIMES A DAY AND MULTIVITAMIN 1 TAB. DAILY TO IMPROVE BLOOD COUNT. TAKE  PROSCAR 5 MG. DAILY FOR PROSTATE PROBLEM. REGULAR WALKING ADVISED. CONTINUE FOLLOW UP WITH DR. Ck Wilson FOR PROSTATE PROBLEM. FASTING FOR LAB WORK PRIOR TO NEXT VISIT. KEEP NEXT APPOINTMENT IN 3 MONTH.

## 2021-11-11 NOTE — PROGRESS NOTES
Post-Discharge Transitional Care Management Services or Hospital Follow Up      Parul Wilson   YOB: 1932    Date of Office Visit:  11/11/2021  Date of Hospital Admission: 11/2/21  Date of Hospital Discharge: 11/5/21  Risk of hospital readmission (high >=14%. Medium >=10%) :Readmission Risk Score: 28      Care management risk score Rising risk (score 2-5) and Complex Care (Scores >=6): 10     Non face to face  following discharge, date last encounter closed (first attempt may have been earlier): 11/8/2021 10:20 AM    Call initiated 2 business days of discharge: Yes    Patient Active Problem List   Diagnosis    Pure hypercholesterolemia    Essential hypertension    Primary osteoarthritis involving multiple joints    Iron deficiency anemia due to chronic blood loss    Stage 3 chronic kidney disease ([address]    Urinary retention    Hypokalemia    MORENA (acute kidney injury) ([address]    Acute on chronic renal insufficiency    Acute kidney injury ([address]    Enlarged prostate with urinary retention    BPH with urinary obstruction    BPH associated with nocturia       No Known Allergies    Medications listed as ordered at the time of discharge from hospital  @DISCHARGEMEDSLIST(<NOROUTINE> error)@      Medications marked \"taking\" at this time  Outpatient Medications Marked as Taking for the 11/11/21 encounter (Office Visit) with Brandt Freedman MD   Medication Sig Dispense Refill    lisinopril-hydroCHLOROthiazide (PRINZIDE;ZESTORETIC) 10-12.5 MG per tablet Take 1 tablet by mouth daily 90 tablet 0        Medications patient taking as of now reconciled against medications ordered at time of hospital discharge: Yes    Chief Complaint   Patient presents with    [place] [facility]     11/2/2021-11/5/2021  Patient had surgery for prostate and pt has cath now        History of Present illness - Follow up of Hospital diagnosis(es): [facility] [address]. HAD SURGERY DONE.  HAS deficiency anemia due to chronic blood loss  LOW    5.  Stage 3 chronic kidney disease, unspecified whether stage 3a or 3b CKD ([facility]  STABLE    Medical Decision Making: moderate complexity

## 2021-11-16 ENCOUNTER — CARE COORDINATION (OUTPATIENT)
Dept: CASE MANAGEMENT | Age: 86
End: 2021-11-16

## 2021-11-16 NOTE — CARE COORDINATION
Samy 45 Transitions Follow Up Call    2021    Patient: Jamaal Laboy  Patient : 1932   MRN: [id number]  Reason for Admission:   Discharge Date: 21 RARS: Readmission Risk Score: 28         Spoke with: Patient's spouse/caregiver, Efrain Lois Coles provides patient update on the Subsequent call     Care Transitions Subsequent and Final Call    Subsequent and Final Calls  Do you have any ongoing symptoms?: No  Do you have any questions related to your medications?: No  Do you currently have any active services?: Yes  Are you currently active with any services?: Home Health  -continues to follow  Do you have any needs or concerns that I can assist you with?: No  Identified Barriers: None  Care Transitions Interventions  No Identified Needs    Lois Coles is pleasant in conversation and provides patient update.   -reports patient's indwelling catheter was discontinued by Urologist and sutures removed from Suprapubic catheter site  -patient is voiding and is incontinent of urine   -Suprapubic catheter remains clamped  -reports patient has a good appetite   -regular bowel patterns   -continues to utilize [employer]   -no falls     Emotional support provided; discussed will continue to follow.          Follow Up  Future Appointments   Date Time Provider Radha Gillette   2022  9:30 AM Austin Echols MD [facility]       Charanjit Henry RN

## 2021-11-23 ENCOUNTER — CARE COORDINATION (OUTPATIENT)
Dept: CASE MANAGEMENT | Age: 86
End: 2021-11-23

## 2021-11-23 NOTE — CARE COORDINATION
Samy 45 Transitions Follow Up Call    2021    Patient: Nick Leyva  Patient : 1932   MRN: [id number]  Reason for Admission:   Discharge Date: 21 RARS: Readmission Risk Score: 28         Spoke with: Patient's spouse/caregiver, Evelyn. Lashonda Glatter provides patient update. Care Transitions Subsequent and Final Call    Subsequent and Final Calls  Do you have any ongoing symptoms?: No  Do you have any questions related to your medications?: No  Do you currently have any active services?: Yes  Are you currently active with any services?: Home Health  -services continue  Do you have any needs or concerns that I can assist you with?: No  Identified Barriers: None  Care Transitions Interventions  No Identified Needs    Lashonda Glatter is pleasant in conversation and reports the patient is doing well.   -patient is voiding without difficulty and is incontinent of urine   -Suprapubic catheter remains clamped  -reports patient has a good appetite   -regular bowel patterns   -no falls   -voices no needs or concerns at this time     Emotional support provided; discussed final call. CTN contact information provided should future needs arise.        Follow Up  Future Appointments   Date Time Provider Radha Gillette   2022  9:30 AM Nat Rubinstein, MD [facility]       Tyson Emanuel RN

## 2021-12-06 ENCOUNTER — IMMUNIZATION (OUTPATIENT)
Dept: PRIMARY CARE CLINIC | Age: 86
End: 2021-12-06
Payer: MEDICARE

## 2021-12-06 PROCEDURE — 0064A COVID-19, MODERNA BOOSTER VACCINE 0.25ML DOSE: CPT | Performed by: INTERNAL MEDICINE

## 2021-12-06 PROCEDURE — 91306 COVID-19, MODERNA BOOSTER VACCINE 0.25ML DOSE: CPT | Performed by: INTERNAL MEDICINE

## 2022-01-04 ENCOUNTER — HOSPITAL ENCOUNTER (INPATIENT)
Age: 87
LOS: 5 days | Discharge: HOME | DRG: 683 | End: 2022-01-09
Attending: EMERGENCY MEDICINE | Admitting: FAMILY MEDICINE
Payer: MEDICARE

## 2022-01-04 ENCOUNTER — TELEPHONE (OUTPATIENT)
Dept: FAMILY MEDICINE CLINIC | Age: 87
End: 2022-01-04

## 2022-01-04 DIAGNOSIS — E78.00 PURE HYPERCHOLESTEROLEMIA: ICD-10-CM

## 2022-01-04 DIAGNOSIS — N17.9 AKI (ACUTE KIDNEY INJURY) (HCC): Primary | ICD-10-CM

## 2022-01-04 DIAGNOSIS — D50.0 IRON DEFICIENCY ANEMIA DUE TO CHRONIC BLOOD LOSS: ICD-10-CM

## 2022-01-04 LAB
ALBUMIN SERPL-MCNC: 3.4 G/DL (ref 3.5–5.2)
ALP BLD-CCNC: 78 U/L (ref 40–129)
ALT SERPL-CCNC: <5 U/L (ref 0–40)
ANION GAP SERPL CALCULATED.3IONS-SCNC: 15 MMOL/L (ref 7–16)
ANION GAP SERPL CALCULATED.3IONS-SCNC: 16 MMOL/L (ref 7–16)
AST SERPL-CCNC: 6 U/L (ref 0–39)
BASOPHILS ABSOLUTE: 0.08 E9/L (ref 0–0.2)
BASOPHILS ABSOLUTE: 0.1 E9/L (ref 0–0.2)
BASOPHILS RELATIVE PERCENT: 1.2 % (ref 0–2)
BASOPHILS RELATIVE PERCENT: 1.2 % (ref 0–2)
BILIRUB SERPL-MCNC: 0.2 MG/DL (ref 0–1.2)
BUN BLDV-MCNC: 96 MG/DL (ref 6–23)
BUN BLDV-MCNC: 98 MG/DL (ref 6–23)
CALCIUM SERPL-MCNC: 9.8 MG/DL (ref 8.6–10.2)
CALCIUM SERPL-MCNC: 9.9 MG/DL (ref 8.6–10.2)
CHLORIDE BLD-SCNC: 107 MMOL/L (ref 98–107)
CHLORIDE BLD-SCNC: 108 MMOL/L (ref 98–107)
CHOLESTEROL, TOTAL: 121 MG/DL (ref 0–199)
CO2: 14 MMOL/L (ref 22–29)
CO2: 14 MMOL/L (ref 22–29)
CREAT SERPL-MCNC: 3.7 MG/DL (ref 0.7–1.2)
CREAT SERPL-MCNC: 4 MG/DL (ref 0.7–1.2)
EOSINOPHILS ABSOLUTE: 0.1 E9/L (ref 0.05–0.5)
EOSINOPHILS ABSOLUTE: 0.13 E9/L (ref 0.05–0.5)
EOSINOPHILS RELATIVE PERCENT: 1.5 % (ref 0–6)
EOSINOPHILS RELATIVE PERCENT: 1.5 % (ref 0–6)
FERRITIN: 385 NG/ML
FOLATE: >20 NG/ML (ref 4.8–24.2)
GFR AFRICAN AMERICAN: 17
GFR AFRICAN AMERICAN: 19
GFR NON-AFRICAN AMERICAN: 14 ML/MIN/1.73
GFR NON-AFRICAN AMERICAN: 16 ML/MIN/1.73
GLUCOSE BLD-MCNC: 102 MG/DL (ref 74–99)
GLUCOSE BLD-MCNC: 142 MG/DL (ref 74–99)
HCT VFR BLD CALC: 35.7 % (ref 37–54)
HCT VFR BLD CALC: 36.6 % (ref 37–54)
HDLC SERPL-MCNC: 38 MG/DL
HEMOGLOBIN: 11.5 G/DL (ref 12.5–16.5)
HEMOGLOBIN: 11.9 G/DL (ref 12.5–16.5)
IMMATURE GRANULOCYTES #: 0.02 E9/L
IMMATURE GRANULOCYTES #: 0.03 E9/L
IMMATURE GRANULOCYTES %: 0.3 % (ref 0–5)
IMMATURE GRANULOCYTES %: 0.3 % (ref 0–5)
IRON % SATURATION: 34 % (ref 20–55)
IRON: 83 MCG/DL (ref 59–158)
LDL CHOLESTEROL CALCULATED: 61 MG/DL (ref 0–99)
LYMPHOCYTES ABSOLUTE: 1.96 E9/L (ref 1.5–4)
LYMPHOCYTES ABSOLUTE: 2.33 E9/L (ref 1.5–4)
LYMPHOCYTES RELATIVE PERCENT: 27.1 % (ref 20–42)
LYMPHOCYTES RELATIVE PERCENT: 30.2 % (ref 20–42)
MCH RBC QN AUTO: 30.1 PG (ref 26–35)
MCH RBC QN AUTO: 31.2 PG (ref 26–35)
MCHC RBC AUTO-ENTMCNC: 32.2 % (ref 32–34.5)
MCHC RBC AUTO-ENTMCNC: 32.5 % (ref 32–34.5)
MCV RBC AUTO: 93.5 FL (ref 80–99.9)
MCV RBC AUTO: 95.8 FL (ref 80–99.9)
MONOCYTES ABSOLUTE: 0.54 E9/L (ref 0.1–0.95)
MONOCYTES ABSOLUTE: 0.65 E9/L (ref 0.1–0.95)
MONOCYTES RELATIVE PERCENT: 7.5 % (ref 2–12)
MONOCYTES RELATIVE PERCENT: 8.3 % (ref 2–12)
NEUTROPHILS ABSOLUTE: 3.79 E9/L (ref 1.8–7.3)
NEUTROPHILS ABSOLUTE: 5.37 E9/L (ref 1.8–7.3)
NEUTROPHILS RELATIVE PERCENT: 58.5 % (ref 43–80)
NEUTROPHILS RELATIVE PERCENT: 62.4 % (ref 43–80)
PDW BLD-RTO: 14 FL (ref 11.5–15)
PDW BLD-RTO: 14 FL (ref 11.5–15)
PLATELET # BLD: 402 E9/L (ref 130–450)
PLATELET # BLD: 408 E9/L (ref 130–450)
PMV BLD AUTO: 9.7 FL (ref 7–12)
PMV BLD AUTO: 9.9 FL (ref 7–12)
POTASSIUM REFLEX MAGNESIUM: 4.8 MMOL/L (ref 3.5–5)
POTASSIUM SERPL-SCNC: 6 MMOL/L (ref 3.5–5)
RBC # BLD: 3.82 E12/L (ref 3.8–5.8)
RBC # BLD: 3.82 E12/L (ref 3.8–5.8)
SODIUM BLD-SCNC: 136 MMOL/L (ref 132–146)
SODIUM BLD-SCNC: 138 MMOL/L (ref 132–146)
TOTAL IRON BINDING CAPACITY: 246 MCG/DL (ref 250–450)
TOTAL PROTEIN: 7.3 G/DL (ref 6.4–8.3)
TRIGL SERPL-MCNC: 111 MG/DL (ref 0–149)
TROPONIN, HIGH SENSITIVITY: 42 NG/L (ref 0–11)
VITAMIN B-12: 1709 PG/ML (ref 211–946)
VLDLC SERPL CALC-MCNC: 22 MG/DL
WBC # BLD: 6.5 E9/L (ref 4.5–11.5)
WBC # BLD: 8.6 E9/L (ref 4.5–11.5)

## 2022-01-04 PROCEDURE — 6360000002 HC RX W HCPCS: Performed by: INTERNAL MEDICINE

## 2022-01-04 PROCEDURE — 93005 ELECTROCARDIOGRAM TRACING: CPT | Performed by: EMERGENCY MEDICINE

## 2022-01-04 PROCEDURE — 99284 EMERGENCY DEPT VISIT MOD MDM: CPT

## 2022-01-04 PROCEDURE — 36415 COLL VENOUS BLD VENIPUNCTURE: CPT

## 2022-01-04 PROCEDURE — 80048 BASIC METABOLIC PNL TOTAL CA: CPT

## 2022-01-04 PROCEDURE — 2580000003 HC RX 258: Performed by: INTERNAL MEDICINE

## 2022-01-04 PROCEDURE — 99222 1ST HOSP IP/OBS MODERATE 55: CPT | Performed by: INTERNAL MEDICINE

## 2022-01-04 PROCEDURE — 1200000000 HC SEMI PRIVATE

## 2022-01-04 PROCEDURE — 84484 ASSAY OF TROPONIN QUANT: CPT

## 2022-01-04 PROCEDURE — 85025 COMPLETE CBC W/AUTO DIFF WBC: CPT

## 2022-01-04 PROCEDURE — 2580000003 HC RX 258: Performed by: EMERGENCY MEDICINE

## 2022-01-04 RX ORDER — POLYETHYLENE GLYCOL 3350 17 G/17G
17 POWDER, FOR SOLUTION ORAL DAILY PRN
Status: DISCONTINUED | OUTPATIENT
Start: 2022-01-04 | End: 2022-01-09 | Stop reason: HOSPADM

## 2022-01-04 RX ORDER — SODIUM CHLORIDE 9 MG/ML
25 INJECTION, SOLUTION INTRAVENOUS PRN
Status: DISCONTINUED | OUTPATIENT
Start: 2022-01-04 | End: 2022-01-09 | Stop reason: HOSPADM

## 2022-01-04 RX ORDER — ACETAMINOPHEN 650 MG/1
650 SUPPOSITORY RECTAL EVERY 6 HOURS PRN
Status: DISCONTINUED | OUTPATIENT
Start: 2022-01-04 | End: 2022-01-09 | Stop reason: HOSPADM

## 2022-01-04 RX ORDER — ONDANSETRON 2 MG/ML
4 INJECTION INTRAMUSCULAR; INTRAVENOUS EVERY 6 HOURS PRN
Status: DISCONTINUED | OUTPATIENT
Start: 2022-01-04 | End: 2022-01-09 | Stop reason: HOSPADM

## 2022-01-04 RX ORDER — SODIUM CHLORIDE 9 MG/ML
INJECTION, SOLUTION INTRAVENOUS CONTINUOUS
Status: DISCONTINUED | OUTPATIENT
Start: 2022-01-04 | End: 2022-01-05

## 2022-01-04 RX ORDER — HEPARIN SODIUM 5000 [USP'U]/ML
5000 INJECTION, SOLUTION INTRAVENOUS; SUBCUTANEOUS EVERY 8 HOURS SCHEDULED
Status: DISCONTINUED | OUTPATIENT
Start: 2022-01-04 | End: 2022-01-09 | Stop reason: HOSPADM

## 2022-01-04 RX ORDER — SODIUM CHLORIDE 0.9 % (FLUSH) 0.9 %
10 SYRINGE (ML) INJECTION EVERY 12 HOURS SCHEDULED
Status: DISCONTINUED | OUTPATIENT
Start: 2022-01-04 | End: 2022-01-09 | Stop reason: HOSPADM

## 2022-01-04 RX ORDER — PROMETHAZINE HYDROCHLORIDE 25 MG/1
12.5 TABLET ORAL EVERY 6 HOURS PRN
Status: DISCONTINUED | OUTPATIENT
Start: 2022-01-04 | End: 2022-01-09 | Stop reason: HOSPADM

## 2022-01-04 RX ORDER — 0.9 % SODIUM CHLORIDE 0.9 %
1000 INTRAVENOUS SOLUTION INTRAVENOUS ONCE
Status: COMPLETED | OUTPATIENT
Start: 2022-01-04 | End: 2022-01-04

## 2022-01-04 RX ORDER — ACETAMINOPHEN 325 MG/1
650 TABLET ORAL EVERY 6 HOURS PRN
Status: DISCONTINUED | OUTPATIENT
Start: 2022-01-04 | End: 2022-01-09 | Stop reason: HOSPADM

## 2022-01-04 RX ORDER — SODIUM CHLORIDE 0.9 % (FLUSH) 0.9 %
10 SYRINGE (ML) INJECTION PRN
Status: DISCONTINUED | OUTPATIENT
Start: 2022-01-04 | End: 2022-01-09 | Stop reason: HOSPADM

## 2022-01-04 RX ADMIN — SODIUM CHLORIDE: 9 INJECTION, SOLUTION INTRAVENOUS at 21:52

## 2022-01-04 RX ADMIN — Medication 10 ML: at 21:52

## 2022-01-04 RX ADMIN — HEPARIN SODIUM 5000 UNITS: 5000 INJECTION INTRAVENOUS; SUBCUTANEOUS at 21:52

## 2022-01-04 RX ADMIN — SODIUM CHLORIDE 1000 ML: 9 INJECTION, SOLUTION INTRAVENOUS at 18:31

## 2022-01-04 ASSESSMENT — ENCOUNTER SYMPTOMS
VOMITING: 0
ABDOMINAL PAIN: 0
RHINORRHEA: 0
NAUSEA: 0
COUGH: 0
COLOR CHANGE: 0
DIARRHEA: 0
SHORTNESS OF BREATH: 0

## 2022-01-04 NOTE — TELEPHONE ENCOUNTER
Spoke with wife Jerica Rangel explained patient is in ARF with potassium elevated at 6. BUN 96, creatinine 4 and GFR 14 She was instructed to call the ambulance and have him taken into the hospital not to drive him as the potassium rises his heart will stop.

## 2022-01-04 NOTE — ED NOTES
Patient in by [facility] ambulance, was called by pcp for elevated potassium of 6.0. patient has no complaints.       Radha Chinchilla RN  01/04/22 1605

## 2022-01-05 LAB
ALBUMIN SERPL-MCNC: 3.1 G/DL (ref 3.5–5.2)
ALP BLD-CCNC: 73 U/L (ref 40–129)
ALT SERPL-CCNC: 5 U/L (ref 0–40)
ANION GAP SERPL CALCULATED.3IONS-SCNC: 12 MMOL/L (ref 7–16)
AST SERPL-CCNC: 6 U/L (ref 0–39)
BACTERIA: ABNORMAL /HPF
BASOPHILS ABSOLUTE: 0.11 E9/L (ref 0–0.2)
BASOPHILS RELATIVE PERCENT: 1.4 % (ref 0–2)
BILIRUB SERPL-MCNC: <0.2 MG/DL (ref 0–1.2)
BILIRUBIN URINE: NEGATIVE
BLOOD, URINE: ABNORMAL
BUN BLDV-MCNC: 86 MG/DL (ref 6–23)
CALCIUM SERPL-MCNC: 9.2 MG/DL (ref 8.6–10.2)
CHLORIDE BLD-SCNC: 116 MMOL/L (ref 98–107)
CHLORIDE URINE RANDOM: 94 MMOL/L
CLARITY: ABNORMAL
CO2: 12 MMOL/L (ref 22–29)
COLOR: YELLOW
CREAT SERPL-MCNC: 3 MG/DL (ref 0.7–1.2)
CREATININE URINE: 33 MG/DL (ref 40–278)
EKG ATRIAL RATE: 77 BPM
EKG P AXIS: 89 DEGREES
EKG P-R INTERVAL: 210 MS
EKG Q-T INTERVAL: 402 MS
EKG QRS DURATION: 132 MS
EKG QTC CALCULATION (BAZETT): 454 MS
EKG R AXIS: -51 DEGREES
EKG T AXIS: 104 DEGREES
EKG VENTRICULAR RATE: 77 BPM
EOSINOPHIL, URINE: 0 % (ref 0–1)
EOSINOPHILS ABSOLUTE: 0.13 E9/L (ref 0.05–0.5)
EOSINOPHILS RELATIVE PERCENT: 1.6 % (ref 0–6)
EPITHELIAL CELLS, UA: ABNORMAL /HPF
GFR AFRICAN AMERICAN: 24
GFR NON-AFRICAN AMERICAN: 20 ML/MIN/1.73
GLUCOSE BLD-MCNC: 137 MG/DL (ref 74–99)
GLUCOSE URINE: NEGATIVE MG/DL
HCT VFR BLD CALC: 32.9 % (ref 37–54)
HEMOGLOBIN: 10.7 G/DL (ref 12.5–16.5)
IMMATURE GRANULOCYTES #: 0.03 E9/L
IMMATURE GRANULOCYTES %: 0.4 % (ref 0–5)
KETONES, URINE: NEGATIVE MG/DL
LEUKOCYTE ESTERASE, URINE: ABNORMAL
LYMPHOCYTES ABSOLUTE: 2.18 E9/L (ref 1.5–4)
LYMPHOCYTES RELATIVE PERCENT: 27.5 % (ref 20–42)
MCH RBC QN AUTO: 31 PG (ref 26–35)
MCHC RBC AUTO-ENTMCNC: 32.5 % (ref 32–34.5)
MCV RBC AUTO: 95.4 FL (ref 80–99.9)
MONOCYTES ABSOLUTE: 0.73 E9/L (ref 0.1–0.95)
MONOCYTES RELATIVE PERCENT: 9.2 % (ref 2–12)
NEUTROPHILS ABSOLUTE: 4.74 E9/L (ref 1.8–7.3)
NEUTROPHILS RELATIVE PERCENT: 59.9 % (ref 43–80)
NITRITE, URINE: POSITIVE
OSMOLALITY URINE: 371 MOSM/KG (ref 300–900)
PDW BLD-RTO: 14 FL (ref 11.5–15)
PH UA: 6 (ref 5–9)
PLATELET # BLD: 331 E9/L (ref 130–450)
PMV BLD AUTO: 9.4 FL (ref 7–12)
POTASSIUM REFLEX MAGNESIUM: 4.6 MMOL/L (ref 3.5–5)
POTASSIUM, UR: 12 MMOL/L
PROTEIN UA: 100 MG/DL
RBC # BLD: 3.45 E12/L (ref 3.8–5.8)
RBC UA: ABNORMAL /HPF (ref 0–2)
SODIUM BLD-SCNC: 140 MMOL/L (ref 132–146)
SODIUM URINE: 105 MMOL/L
SPECIFIC GRAVITY UA: 1.02 (ref 1–1.03)
TOTAL PROTEIN: 6.3 G/DL (ref 6.4–8.3)
UREA NITROGEN, UR: 356 MG/DL (ref 800–1666)
UROBILINOGEN, URINE: 0.2 E.U./DL
WBC # BLD: 7.9 E9/L (ref 4.5–11.5)
WBC UA: >20 /HPF (ref 0–5)

## 2022-01-05 PROCEDURE — 2580000003 HC RX 258: Performed by: INTERNAL MEDICINE

## 2022-01-05 PROCEDURE — 6360000002 HC RX W HCPCS: Performed by: INTERNAL MEDICINE

## 2022-01-05 PROCEDURE — 84133 ASSAY OF URINE POTASSIUM: CPT

## 2022-01-05 PROCEDURE — 6370000000 HC RX 637 (ALT 250 FOR IP): Performed by: INTERNAL MEDICINE

## 2022-01-05 PROCEDURE — 87077 CULTURE AEROBIC IDENTIFY: CPT

## 2022-01-05 PROCEDURE — 87088 URINE BACTERIA CULTURE: CPT

## 2022-01-05 PROCEDURE — 84300 ASSAY OF URINE SODIUM: CPT

## 2022-01-05 PROCEDURE — 85025 COMPLETE CBC W/AUTO DIFF WBC: CPT

## 2022-01-05 PROCEDURE — 80053 COMPREHEN METABOLIC PANEL: CPT

## 2022-01-05 PROCEDURE — 1200000000 HC SEMI PRIVATE

## 2022-01-05 PROCEDURE — 84540 ASSAY OF URINE/UREA-N: CPT

## 2022-01-05 PROCEDURE — 81001 URINALYSIS AUTO W/SCOPE: CPT

## 2022-01-05 PROCEDURE — 83935 ASSAY OF URINE OSMOLALITY: CPT

## 2022-01-05 PROCEDURE — 93010 ELECTROCARDIOGRAM REPORT: CPT | Performed by: INTERNAL MEDICINE

## 2022-01-05 PROCEDURE — 99232 SBSQ HOSP IP/OBS MODERATE 35: CPT | Performed by: FAMILY MEDICINE

## 2022-01-05 PROCEDURE — 82570 ASSAY OF URINE CREATININE: CPT

## 2022-01-05 PROCEDURE — 82436 ASSAY OF URINE CHLORIDE: CPT

## 2022-01-05 PROCEDURE — 87205 SMEAR GRAM STAIN: CPT

## 2022-01-05 PROCEDURE — 2500000003 HC RX 250 WO HCPCS: Performed by: INTERNAL MEDICINE

## 2022-01-05 PROCEDURE — 36415 COLL VENOUS BLD VENIPUNCTURE: CPT

## 2022-01-05 RX ORDER — ATORVASTATIN CALCIUM 20 MG/1
20 TABLET, FILM COATED ORAL DAILY
Refills: 0 | Status: DISCONTINUED | OUTPATIENT
Start: 2022-01-05 | End: 2022-01-09 | Stop reason: HOSPADM

## 2022-01-05 RX ORDER — AMLODIPINE BESYLATE 2.5 MG/1
2.5 TABLET ORAL DAILY
Status: DISCONTINUED | OUTPATIENT
Start: 2022-01-05 | End: 2022-01-09 | Stop reason: HOSPADM

## 2022-01-05 RX ORDER — FINASTERIDE 5 MG/1
5 TABLET, FILM COATED ORAL DAILY
Status: DISCONTINUED | OUTPATIENT
Start: 2022-01-05 | End: 2022-01-09 | Stop reason: HOSPADM

## 2022-01-05 RX ORDER — DEXTROSE MONOHYDRATE 50 MG/ML
INJECTION, SOLUTION INTRAVENOUS CONTINUOUS
Status: DISCONTINUED | OUTPATIENT
Start: 2022-01-05 | End: 2022-01-05

## 2022-01-05 RX ORDER — METOPROLOL SUCCINATE 25 MG/1
25 TABLET, EXTENDED RELEASE ORAL DAILY
Status: DISCONTINUED | OUTPATIENT
Start: 2022-01-06 | End: 2022-01-09 | Stop reason: HOSPADM

## 2022-01-05 RX ORDER — ASPIRIN 81 MG/1
81 TABLET ORAL DAILY
Status: DISCONTINUED | OUTPATIENT
Start: 2022-01-05 | End: 2022-01-09 | Stop reason: HOSPADM

## 2022-01-05 RX ADMIN — Medication 10 ML: at 23:25

## 2022-01-05 RX ADMIN — SODIUM BICARBONATE: 84 INJECTION, SOLUTION INTRAVENOUS at 17:02

## 2022-01-05 RX ADMIN — HEPARIN SODIUM 5000 UNITS: 5000 INJECTION INTRAVENOUS; SUBCUTANEOUS at 15:00

## 2022-01-05 RX ADMIN — ATORVASTATIN CALCIUM 20 MG: 20 TABLET, FILM COATED ORAL at 22:28

## 2022-01-05 RX ADMIN — HEPARIN SODIUM 5000 UNITS: 5000 INJECTION INTRAVENOUS; SUBCUTANEOUS at 22:27

## 2022-01-05 RX ADMIN — FINASTERIDE 5 MG: 5 TABLET, FILM COATED ORAL at 08:52

## 2022-01-05 RX ADMIN — AMLODIPINE BESYLATE 2.5 MG: 2.5 TABLET ORAL at 09:03

## 2022-01-05 RX ADMIN — ASPIRIN 81 MG: 81 TABLET, COATED ORAL at 08:52

## 2022-01-05 ASSESSMENT — PAIN SCALES - GENERAL: PAINLEVEL_OUTOF10: 0

## 2022-01-05 NOTE — PLAN OF CARE
Problem: Falls - Risk of:  Goal: Will remain free from falls  1/5/2022 1133 by Delight Schilder, RN  Outcome: Met This Shift     Problem: Falls - Risk of:  Goal: Will remain free from falls  1/5/2022 1133 by Delight Schilder, RN  Outcome: Met This Shift     Problem: Falls - Risk of:  Goal: Absence of physical injury  1/5/2022 1133 by Delight Schilder, RN  Outcome: Met This Shift     Problem: Falls - Risk of:  Goal: Absence of physical injury  1/5/2022 1133 by Delight Schilder, RN  Outcome: Met This Shift

## 2022-01-05 NOTE — PROGRESS NOTES
HOSPITAL   PROGRESS NOTE. SUBJECTIVE:  Ryann Santana, 80 y.o., male C/O  HIGH POTASSIUM LEVEL. CONDITION DISCUSSED WITH  PATIENT  AND NURSING   STAFF. ER NOTES REVIEWED. ROS:GENERAL- APPETITE- GOOD. BOWEL MOVEMENTS- NORMAL. NO URINE    PROBLEM. EENT: NORMAL            CVS: NORMAL. NO CHEST PAIN OR PALPITATION. RESPIRATORY:NO SOB. GI/: NO ABDOMINAL PAINS            MUSCULOSKELETAL: NO COMPLAIN            CNS: NO  COMPLAIN            OBJECTIVE:    GENERAL:Temperature:  Current - Temp: 98 °F (36.7 °C); Max - Temp  Av.2 °F (36.8 °C)  Min: 98 °F (36.7 °C)  Max: 98.5 °F (36.9 °C)  Respiratory Rate : Resp  Av.2  Min: 16  Max: 21  Pulse Range: Pulse  Av.4  Min: 64  Max: 77  Blood Presuure Range:  Systolic (05RWE), LASHAUN:504 , Min:102 , CBD:101   ; Diastolic (26GUB), OIO:03, Min:55, Max:80    Pulse ox Range: SpO2  Av.5 %  Min: 95 %  Max: 100 %  24hr I & O:    Intake/Output Summary (Last 24 hours) at 2022 1241  Last data filed at 2022 1110  Gross per 24 hour   Intake 1240 ml   Output 400 ml   Net 840 ml       CONSTITUTIONALl:FAIRLY   DEVELOPED,ORIENTED,CO-OPERATIVE  HEENT:NORMAL. NECK:  supple, symmetrical, trachea midline,Carotids- normal,JVP- flat  HEMATOLOGIC/LYMPHATICS:  no cervical lymphadenopathy  LUNGS:clear  CARDIOVASCULAR:  Normal apical impulse, regular rate and rhythm, normal S1 and S2, no S3 or S4, and no murmur noted  ABDOMEN:  normal bowel sounds, non-distended, non-tender, no masses palpated  EXTREMITIES: ARTHRITIC CHANGES. MOVEMENTS-LIMITED. PEDAL PULSES-FAIR.   SKIN:  normal skin color, texture, turgor    Data    CBC:   Lab Results   Component Value Date    WBC 7.9 2022    RBC 3.45 2022    HGB 10.7 2022    HCT 32.9 2022    MCV 95.4 2022    MCH 31.0 2022    MCHC 32.5 2022    RDW 14.0 2022     2022    MPV 9.4 2022 CMP:    Lab Results   Component Value Date     01/05/2022    K 4.6 01/05/2022     01/05/2022    CO2 12 01/05/2022    BUN 86 01/05/2022    CREATININE 3.0 01/05/2022    GFRAA 24 01/05/2022    LABGLOM 20 01/05/2022    GLUCOSE 137 01/05/2022    GLUCOSE 106 01/09/2012    PROT 6.3 01/05/2022    LABALBU 3.1 01/05/2022    LABALBU 4.4 01/09/2012    CALCIUM 9.2 01/05/2022    BILITOT <0.2 01/05/2022    ALKPHOS 73 01/05/2022    AST 6 01/05/2022    ALT 5 01/05/2022         ASSESSMENT:    Active Hospital Problems    Diagnosis Date Noted    MORENA (acute kidney injury) ([facility] [N17.9] 08/22/2021     Priority: High    Iron deficiency anemia due to chronic blood loss [D50.0] 07/26/2018     Priority: High    Stage 3 chronic kidney disease ([facility] [N18.30] 07/26/2018     Priority: High    Pure hypercholesterolemia [E78.00]      Priority: High     Class: Chronic    Essential hypertension [I10]      Priority: High     Class: Chronic       PLAN: CONTINUE CURRENT MEDICATIONS AND Rx. ENCOURAGED AMBULATION. NEPHROLOGY CONSULT FOR LOW KIDNEY FUNCTION.       Electronically signed by Peter Stubbs MD on 1/5/22 at 12:41 PM EST

## 2022-01-05 NOTE — H&P
[address] [place], [place] [address] Group   HISTORY AND PHYSICAL EXAM      AUTHOR: Lety Almeida MD PATIENT NAME: Jaleel Hoffman   DATE: 2022 MRN: [id number], : 1932   Primary Care Physician: Becky Cesar MD     CHIEF COMPLAINT / REASON FOR ADMISSION:  Sent for abnormally elevated potassium    HPI:   This is a 80 y.o. male  has a past medical history of Enlarged prostate, Hyperlipidemia, Hypertension, Lumbosacral strain, and Pacemaker. presented with abnormally elevated potassium days prior to arrival to the hospital. Patient had labs done routinely this morning showing his potassium was elevated. No active complaints. Patient has a suprapubic catheter and has been producing urine. The patient was seen and examined at bedside, appears alert and awake with no acute distress and is able to answer simple  questions.  On direct questioning, patient denied any  resting ongoing chest pain, resting SOB, hemoptysis, productive cough, fever, ongoing palpitation, active abdominal pain, hematemesis, rectal bleeding, joya, hematuria, any other  and GI complaints and any new focal neuro deficits   ROS:  Pertinent positives and negatives are noted in the HPI, all other systems are reviewed and negative    PMH:  Past Medical History:   Diagnosis Date    Enlarged prostate     Hyperlipidemia     Hypertension     Lumbosacral strain 2013    Pacemaker        Surgical History:  Past Surgical History:   Procedure Laterality Date    A-V CARDIAC PACEMAKER INSERTION  07    COLONOSCOPY  8/3/11    CYSTOSCOPY N/A 2021    CYSTOSCOPY RETROGRADE PYELOGRAM, TRANSURETHRAL RESECTION OF THE PROSTATE WITH SUPRAPUBIC CATHETER PLACEMENT performed by Russ Latif MD at [place] [address]      cataracts evelyn    PACEMAKER PLACEMENT      2017 battery change    TONSILLECTOMY      child       Medications Prior to Admission:    Prior to Admission medications    Medication Sig Start Date End Date Taking? Authorizing Provider   lisinopril-hydroCHLOROthiazide (PRINZIDE;ZESTORETIC) 10-12.5 MG per tablet Take 1 tablet by mouth daily 11/11/21   Matteo Ferrell MD   simvastatin (ZOCOR) 40 MG tablet Take 1 tablet by mouth nightly 10/12/21   Matteo Ferrell MD   amLODIPine (NORVASC) 2.5 MG tablet Take 1 tablet by mouth daily 10/12/21   Matteo Ferrell MD   finasteride (PROSCAR) 5 MG tablet Take 1 tablet by mouth daily 10/12/21   Matteo Ferrell MD   metoprolol succinate (TOPROL XL) 25 MG extended release tablet Take 1 tablet by mouth daily 10/12/21   Matteo Ferrell MD   ferrous sulfate (IRON 325) 325 (65 Fe) MG tablet Take 1 tablet by mouth 2 times daily 9/10/21   Matteo Ferrell MD   Multiple Vitamin (ONE-A-DAY ESSENTIAL) TABS Take 1 tablet by mouth daily    Historical Provider, MD   Cholecalciferol (VITAMIN D3) 50 MCG (2000 UT) CAPS Take 2,000 Units by mouth daily     Historical Provider, MD   Ascorbic Acid (C-250) 250 MG CHEW Take 250 mg by mouth daily     Historical Provider, MD   vitamin B-12 (CYANOCOBALAMIN) 1000 MCG tablet Take 1,000 mcg by mouth daily    Historical Provider, MD   pantoprazole (PROTONIX) 40 MG tablet Take 40 mg by mouth daily  12/6/19   Historical Provider, MD   timolol (TIMOPTIC) 0.5 % ophthalmic solution Place 1 drop into both eyes 2 times daily  5/4/16   Historical Provider, MD Jann Rodríguez Serenoa repens, 450 MG CAPS Take 2 tablets by mouth daily. Historical Provider, MD   Misc Natural Products (OSTEO BI-FLEX JOINT SHIELD PO) Take 2 tablets by mouth daily. Historical Provider, MD   aspirin 81 MG EC tablet Take 81 mg by mouth daily Last dose 10/28  Patient not taking: Reported on 11/8/2021    Historical Provider, MD       Allergies:    Patient has no known allergies. Social History:    reports that he quit smoking about 21 years ago. He has a 40.00 pack-year smoking history.  He has never used smokeless tobacco. He reports that he does not drink alcohol and does not use drugs. Family History:   family history includes Cancer in his mother; Diabetes in his mother; Heart Disease in his father; High Blood Pressure in his father and mother. PHYSICAL EXAM:  Vitals:  /75   Pulse 74   Temp 98.5 °F (36.9 °C) (Oral)   Resp 16   Wt 132 lb (59.9 kg)   SpO2 100%   BMI 19.49 kg/m²   GENERAL: No acute distress, Alert and awake, Afebrile, Appears tired and weak otherwise hemodynamically stable at present. HEENT: PERRLA, no icterus. OP clear and no exudates. NECK: Supple  no carotid/ophthalmic bruits, JVD None. RESPIRATORY:  Bilateral equal vesicular breath sound with no wheezing. Lung bases are clear. HEART: No tachycardia at bedside and regular rhythm. Normal S1 and S2, No S3 or S4 is audible. No pulsation, thrills, murmur or friction rubs. ABDOMEN: Soft, nondistended, nontender. No hepatomegaly or splenomegaly. No CVA tenderness on the both sides. Bowel sound is present. Suprapubic catheter in place   EXTREMITIES: All peripheral pulses are present. No calf tenderness or swelling. No pedal edema is present. Lance Teixeira NEUROLOGY: Alert and awake. No new focal neuro deficit. Bilateral Pupil is equal and reactive to light. CN-ii-xii otherwise grossly intact. Motor and Sensory: Grossly Intact bilaterally with no new focal signs   LABS:  Recent Labs     01/04/22  0945 01/04/22  1833   WBC 8.6 6.5   RBC 3.82 3.82   HGB 11.5* 11.9*   HCT 35.7* 36.6*   MCV 93.5 95.8   MCH 30.1 31.2   MCHC 32.2 32.5   RDW 14.0 14.0    402   MPV 9.9 9.7     Recent Labs     01/04/22  0945 01/04/22  1833    136   K 6.0* 4.8   * 107   CO2 14* 14*   BUN 96* 98*   CREATININE 4.0* 3.7*   GLUCOSE 102* 142*   CALCIUM 9.8 9.9     No results for input(s): POCGLU in the last 72 hours.   Results for orders placed or performed during the hospital encounter of 01/04/22   CBC Auto Differential   Result Value Ref Range    WBC 6.5 4.5 - 11.5 E9/L    RBC 3.82 3.80 - 5.80 E12/L    Hemoglobin 11.9 (L) 12.5 - 16.5 g/dL    Hematocrit 36.6 (L) 37.0 - 54.0 %    MCV 95.8 80.0 - 99.9 fL    MCH 31.2 26.0 - 35.0 pg    MCHC 32.5 32.0 - 34.5 %    RDW 14.0 11.5 - 15.0 fL    Platelets 339 315 - 982 E9/L    MPV 9.7 7.0 - 12.0 fL    Neutrophils % 58.5 43.0 - 80.0 %    Immature Granulocytes % 0.3 0.0 - 5.0 %    Lymphocytes % 30.2 20.0 - 42.0 %    Monocytes % 8.3 2.0 - 12.0 %    Eosinophils % 1.5 0.0 - 6.0 %    Basophils % 1.2 0.0 - 2.0 %    Neutrophils Absolute 3.79 1.80 - 7.30 E9/L    Immature Granulocytes # 0.02 E9/L    Lymphocytes Absolute 1.96 1.50 - 4.00 E9/L    Monocytes Absolute 0.54 0.10 - 0.95 E9/L    Eosinophils Absolute 0.10 0.05 - 0.50 E9/L    Basophils Absolute 0.08 0.00 - 0.20 Z0/M   Basic Metabolic Panel w/ Reflex to MG   Result Value Ref Range    Sodium 136 132 - 146 mmol/L    Potassium reflex Magnesium 4.8 3.5 - 5.0 mmol/L    Chloride 107 98 - 107 mmol/L    CO2 14 (L) 22 - 29 mmol/L    Anion Gap 15 7 - 16 mmol/L    Glucose 142 (H) 74 - 99 mg/dL    BUN 98 (H) 6 - 23 mg/dL    CREATININE 3.7 (H) 0.7 - 1.2 mg/dL    GFR Non-African American 16 >=60 mL/min/1.73    GFR African American 19     Calcium 9.9 8.6 - 10.2 mg/dL   Troponin   Result Value Ref Range    Troponin, High Sensitivity 42 (H) 0 - 11 ng/L   EKG 12 Lead   Result Value Ref Range    Ventricular Rate 77 BPM    Atrial Rate 77 BPM    P-R Interval 210 ms    QRS Duration 132 ms    Q-T Interval 402 ms    QTc Calculation (Bazett) 454 ms    P Axis 89 degrees    R Axis -51 degrees    T Axis 104 degrees     ED Course as of 01/05/22 0548   Tue Jan 04, 2022 2104 Patient resting in bed no distress. Discussed results of labs with him. Discussed that his potassium is not elevated but he is in acute kidney injury. He is agreeable to admission for further evaluation and hydration. [MS]      ED Course User Index  [MS] Ethan Russell DO     Radiology: No results found.   ASSESSMENT:    Present on Admission:   MORENA (acute kidney injury) ([facility] [address]    PLAN:  # MORENA on CKD - secondary to dehydration vs ACEi induced           Continue IV fluid and encourge PO fluid              Monitor I/O, BUN/Cr accordingly. Avoid Nephrotoxic medications, ACE-i and NSAIDS. US-Renal/Renal consult if fails to improve promptly  # Hyperkalemia - ACEi induced   Hold Lisinopril   K normalized with iv hydration  # Hypertension   Currently better controlled  Will resume home medications as needed. Monitor vitals and adjust BP meds as needed  # BPH - Stable  No acute retention  Continue home medication  Urology f/u as outpatient  # Rest of the chronic medical problems are stable and will be managed with appropriately with home medications, placed nursing communication order to verify home medications before giving them to the patient. # Diet: On PO Diet  # IVF's: No  # Fall Precaution: Yes  # Disposition: Home/primary residence   # Code Status: Full code by default  # DVT Prophylaxis : SC Heparin or SC Lovenox as on chart  The patient at bedside was counseled about clinical status, laboratory/imaging results, diagnoses, medication side effects, risk, and treatment plan, all questions were answered to patient's satisfaction and verbalized understanding    SIGNATURE: Kalyani Aguilar MD PATIENT NAME: Сергей Pod   CONTACT #: Hospitalist on call MRN: [id number]     Disclaimer: Portions of this note may have been generated using Dragon voice recognition software. Reasonable efforts were made to correct any dictation errors that resulted due to the programming of this software but some may still be present.

## 2022-01-05 NOTE — ED NOTES
Tried to call report to 3rd floor, RN will call back for report     Josie Fitzgerald RN  01/04/22 4154

## 2022-01-05 NOTE — CONSULTS
Consult Note  NEPHROLOGY    Reason for Consult: Evaluation of MORENA on CKD stage III    Requesting Physician: Dr. Mavis Rosas MD    Chief Complaint: Admitted with MORENA    History Obtained From:  patient, electronic medical record    History of Present Ilness:     Patient is a 80 y. o. male with a past medical history of hyperlipidemia, hypertension, CKD, presenting to the Emergency Department for abnormal labs. Patient had routine lab work drawn on 1/4/2022 at 9:45 in the morning which revealed a potassium of 6.0, BUN of 96 and creatinine of 4.0. He was subsequently instructed to go to the emergency room. Vitals upon arrival included /80, T 98.3, P 77, R 21. Pertinent labs included sodium 136, potassium 4.8, chloride 107, CO2 14, BUN 98 and creatinine 3.7. Initial CBC included WBC 6.5, hemoglobin 11.9, hematocrit 36.6 and platelet count. Patient was subsequently admitted with MORENA. Currently upon exam patient is in no acute distress. He denies any chest pain or shortness of breath. No nausea or vomiting. Good appetite since admission. Patient is completely independent with the care of a suprapubic catheter. He follows with Dr. Catie Miguel with urology and was last seen in the office this past Monday. He is also known to our practice for his history of CKD stage III. His daughter is also here to help with HPI and review of systems.         Past Medical History:        Diagnosis Date    Enlarged prostate     Hyperlipidemia     Hypertension     Lumbosacral strain 7/8/2013    Pacemaker        Past Surgical History:        Procedure Laterality Date    A-V CARDIAC PACEMAKER INSERTION  5/30/07    COLONOSCOPY  8/3/11    CYSTOSCOPY N/A 11/2/2021    CYSTOSCOPY RETROGRADE PYELOGRAM, TRANSURETHRAL RESECTION OF THE PROSTATE WITH SUPRAPUBIC CATHETER PLACEMENT performed by Annalee Peguero MD at [address] [facility]    cataracts evelyn    PACEMAKER PLACEMENT      2017 battery change    TONSILLECTOMY      child       Current Medications:    Current Facility-Administered Medications: aspirin EC tablet 81 mg, 81 mg, Oral, Daily  atorvastatin (LIPITOR) tablet 20 mg, 20 mg, Oral, Daily  amLODIPine (NORVASC) tablet 2.5 mg, 2.5 mg, Oral, Daily  finasteride (PROSCAR) tablet 5 mg, 5 mg, Oral, Daily  [START ON 1/6/2022] metoprolol succinate (TOPROL XL) extended release tablet 25 mg, 25 mg, Oral, Daily  0.9 % sodium chloride infusion, , IntraVENous, Continuous  sodium chloride flush 0.9 % injection 10 mL, 10 mL, IntraVENous, 2 times per day  sodium chloride flush 0.9 % injection 10 mL, 10 mL, IntraVENous, PRN  0.9 % sodium chloride infusion, 25 mL, IntraVENous, PRN  heparin (porcine) injection 5,000 Units, 5,000 Units, SubCUTAneous, 3 times per day  promethazine (PHENERGAN) tablet 12.5 mg, 12.5 mg, Oral, Q6H PRN **OR** ondansetron (ZOFRAN) injection 4 mg, 4 mg, IntraVENous, Q6H PRN  polyethylene glycol (GLYCOLAX) packet 17 g, 17 g, Oral, Daily PRN  acetaminophen (TYLENOL) tablet 650 mg, 650 mg, Oral, Q6H PRN **OR** acetaminophen (TYLENOL) suppository 650 mg, 650 mg, Rectal, Q6H PRN    Allergies:  Patient has no known allergies. Social History:      reports that he quit smoking about 21 years ago. He has a 40.00 pack-year smoking history. He has never used smokeless tobacco. He reports that he does not drink alcohol and does not use drugs.         Family History:     Family History   Problem Relation Age of Onset    High Blood Pressure Mother     Cancer Mother     Diabetes Mother     Heart Disease Father     High Blood Pressure Father          Review of Systems:       Pertinent positives stated above in HPI. All other systems were reviewed and were negative.     Physical exam:   Constitutional:  VITALS:  BP (!) 119/55   Pulse 77   Temp 98 °F (36.7 °C)   Resp 20   Wt 132 lb (59.9 kg)   SpO2 100%   BMI 19.49 kg/m²   CURRENT TEMPERATURE:  Temp: 98 °F (36.7 °C)  CURRENT RESPIRATORY RATE:  Resp: 20  CURRENT PULSE:  Pulse: 77  CURRENT BLOOD PRESSURE:  BP: (!) 119/55  24HR BLOOD PRESSURE RANGE:  Systolic (14PDD), LFW:194 , Min:102 , CORRY:613   ; Diastolic (05YWF), FKL:50, Min:55, Max:80    24HR INTAKE/OUTPUT:      Intake/Output Summary (Last 24 hours) at 1/5/2022 1438  Last data filed at 1/5/2022 1430  Gross per 24 hour   Intake 1480 ml   Output 1500 ml   Net -20 ml     Gen: alert, awake, nad  Skin: no rash, turgor wnl  Heent:  eomi, mmm  Neck: No bruits or jvd noted  Cardiovascular:  S1, S2 without m/r/g  Respiratory: Clear  Abdomen:  +bs, soft, nt, nd, suprapubic catheter  Ext: Trace lower extremity edema  Psychiatric: mood and affect appropriate  Musculoskeletal:  Rom, muscular strength intact    DATA:      CBC:   Lab Results   Component Value Date    WBC 7.9 01/05/2022    RBC 3.45 01/05/2022    HGB 10.7 01/05/2022    HCT 32.9 01/05/2022    MCV 95.4 01/05/2022    MCH 31.0 01/05/2022    MCHC 32.5 01/05/2022    RDW 14.0 01/05/2022     01/05/2022    MPV 9.4 01/05/2022     BMP:    Lab Results   Component Value Date     01/05/2022    K 4.6 01/05/2022     01/05/2022    CO2 12 01/05/2022    BUN 86 01/05/2022    LABALBU 3.1 01/05/2022    LABALBU 4.4 01/09/2012    CREATININE 3.0 01/05/2022    CALCIUM 9.2 01/05/2022    GFRAA 24 01/05/2022    LABGLOM 20 01/05/2022    GLUCOSE 137 01/05/2022    GLUCOSE 106 01/09/2012       RAD:  No results found. Assessment/Plan    1) MORENA on CKD Stage 3A  Chronic kidney disease likely due to longstanding history of hypertension as well as BPH  Baseline serum creatinine range 1.5 ->1.8 over the past 3 years  Now current admitting serum creatinine of 4.0 w/ BUN 98  Patient has been on ACE inhibitor and diuretic. Patient is a poor historian may have some degree of volume depletion in the setting of use of ACE inhibitor and diuretic contributing the patient's acute kidney injury.   Initial UA shows cloudy yellow urine, 1.020, moderate blood, large leuko, negative protein  Follows with urology for history of suprapubic catheter  PLAN:   1. Continue IV fluids. Keep off ACE inhibitor. 2. Obtain urine sodium, chloride and creatinine levels  3. Daily labs and strict intake and output / Avoid NSAIDs     2. Non Gap Metabolic Acidosis   In the setting of MORENA  PLAN:   1. Switch IV fluids to include IV sodium bicarbonate  2. Check lactic acid / Beta Hydroxy     3. Hypertension of CKD stages 1-4  Follow with current mild hypotension     4. Anemia  Likely of CKD  Iron stores from 1/4/22: Ferr 385 / Fe 83 / 34% sat  Follow H/H           Thank you for allowing us to participate in care of Mr Chad Abreu, APRN - CNP  1/5/2022  2:38 PM         Patient seen and examined. Chart reviewed. I had a face to face encounter with the patient. Agree with exam.    Agree with  formulation, assessment and plan as outlined above and directed by me. Addition and corrections were done as deemed appropriate. My exam and plan include:     Check urine electrolytes to calculate the urine anion gap to assess renal acidification. Check fractional Excretion of urea. Hyperkalemia secondary to acute kidney injury as well as use of ACE inhibitor. Volume depletion may further limit renal potassium excretion.               Cassandra Brambila MD  Nephrology        Electronically signed by Cassandra Brambila MD on 1/5/2022 at 9:36 PM

## 2022-01-05 NOTE — CARE COORDINATION
CM note: attempted to meet with patient on several occassions, and patient was being provided care by staff. Chart reviewed and patient lives with wife, owns a cane, b[employer] and w[employer].  of [facility].   Anticipate that patient will return home with spouse at discharge, needs unknown at this time, will follow up with patient in the AM.

## 2022-01-06 LAB
ANION GAP SERPL CALCULATED.3IONS-SCNC: 13 MMOL/L (ref 7–16)
BETA-HYDROXYBUTYRATE: 0.12 MMOL/L (ref 0.02–0.27)
BUN BLDV-MCNC: 56 MG/DL (ref 6–23)
CALCIUM SERPL-MCNC: 9.1 MG/DL (ref 8.6–10.2)
CHLORIDE BLD-SCNC: 110 MMOL/L (ref 98–107)
CO2: 18 MMOL/L (ref 22–29)
CREAT SERPL-MCNC: 2.3 MG/DL (ref 0.7–1.2)
GFR AFRICAN AMERICAN: 33
GFR NON-AFRICAN AMERICAN: 27 ML/MIN/1.73
GLUCOSE BLD-MCNC: 138 MG/DL (ref 74–99)
LACTIC ACID: 1.2 MMOL/L (ref 0.5–2.2)
POTASSIUM REFLEX MAGNESIUM: 4 MMOL/L (ref 3.5–5)
SODIUM BLD-SCNC: 141 MMOL/L (ref 132–146)

## 2022-01-06 PROCEDURE — 1200000000 HC SEMI PRIVATE

## 2022-01-06 PROCEDURE — 2580000003 HC RX 258: Performed by: INTERNAL MEDICINE

## 2022-01-06 PROCEDURE — 6360000002 HC RX W HCPCS: Performed by: INTERNAL MEDICINE

## 2022-01-06 PROCEDURE — 83605 ASSAY OF LACTIC ACID: CPT

## 2022-01-06 PROCEDURE — 80048 BASIC METABOLIC PNL TOTAL CA: CPT

## 2022-01-06 PROCEDURE — 6370000000 HC RX 637 (ALT 250 FOR IP): Performed by: INTERNAL MEDICINE

## 2022-01-06 PROCEDURE — 36415 COLL VENOUS BLD VENIPUNCTURE: CPT

## 2022-01-06 PROCEDURE — 82010 KETONE BODYS QUAN: CPT

## 2022-01-06 PROCEDURE — 99232 SBSQ HOSP IP/OBS MODERATE 35: CPT | Performed by: FAMILY MEDICINE

## 2022-01-06 PROCEDURE — 2500000003 HC RX 250 WO HCPCS: Performed by: INTERNAL MEDICINE

## 2022-01-06 RX ADMIN — FINASTERIDE 5 MG: 5 TABLET, FILM COATED ORAL at 08:30

## 2022-01-06 RX ADMIN — ASPIRIN 81 MG: 81 TABLET, COATED ORAL at 08:29

## 2022-01-06 RX ADMIN — HEPARIN SODIUM 5000 UNITS: 5000 INJECTION INTRAVENOUS; SUBCUTANEOUS at 05:04

## 2022-01-06 RX ADMIN — METOPROLOL SUCCINATE 25 MG: 25 TABLET, FILM COATED, EXTENDED RELEASE ORAL at 08:30

## 2022-01-06 RX ADMIN — ATORVASTATIN CALCIUM 20 MG: 20 TABLET, FILM COATED ORAL at 20:06

## 2022-01-06 RX ADMIN — SODIUM BICARBONATE: 84 INJECTION, SOLUTION INTRAVENOUS at 15:07

## 2022-01-06 RX ADMIN — AMLODIPINE BESYLATE 2.5 MG: 2.5 TABLET ORAL at 08:30

## 2022-01-06 RX ADMIN — SODIUM BICARBONATE: 84 INJECTION, SOLUTION INTRAVENOUS at 04:02

## 2022-01-06 RX ADMIN — HEPARIN SODIUM 5000 UNITS: 5000 INJECTION INTRAVENOUS; SUBCUTANEOUS at 14:05

## 2022-01-06 ASSESSMENT — PAIN DESCRIPTION - PAIN TYPE: TYPE: ACUTE PAIN

## 2022-01-06 ASSESSMENT — PAIN DESCRIPTION - ORIENTATION: ORIENTATION: RIGHT

## 2022-01-06 ASSESSMENT — PAIN SCALES - GENERAL: PAINLEVEL_OUTOF10: 5

## 2022-01-06 ASSESSMENT — PAIN DESCRIPTION - LOCATION: LOCATION: FLANK

## 2022-01-06 ASSESSMENT — PAIN DESCRIPTION - DESCRIPTORS: DESCRIPTORS: ACHING;DISCOMFORT;TENDER

## 2022-01-06 NOTE — PROGRESS NOTES
NEPHROLOGY Attending   Progress Note  1/6/2022 9:43 AM  Subjective:   Admit Date: 1/4/2022  PCP: Dylan Denton MD    Interval History:    1/6/22: No adverse events overnight - Currently upon exam patient is in no acute distress - denies any chest pain or shortness of breath - no nausea or vomiting - good appetite since admission      Diet: ADULT DIET; Regular; Low Fat/Low Chol/High Fiber/BG    Data:   Scheduled Meds:   aspirin  81 mg Oral Daily    atorvastatin  20 mg Oral Daily    amLODIPine  2.5 mg Oral Daily    finasteride  5 mg Oral Daily    metoprolol succinate  25 mg Oral Daily    sodium chloride flush  10 mL IntraVENous 2 times per day    heparin (porcine)  5,000 Units SubCUTAneous 3 times per day     Continuous Infusions:   sodium bicarbonate infusion 100 mL/hr at 01/06/22 0402    sodium chloride       PRN Meds:sodium chloride flush, sodium chloride, promethazine **OR** ondansetron, polyethylene glycol, acetaminophen **OR** acetaminophen    Intake/Output Summary (Last 24 hours) at 1/6/2022 0943  Last data filed at 1/6/2022 0500  Gross per 24 hour   Intake 1580 ml   Output 2500 ml   Net -920 ml     CBC:   Recent Labs     01/04/22  0945 01/04/22  1833 01/05/22  0535   WBC 8.6 6.5 7.9   HGB 11.5* 11.9* 10.7*    402 331     BMP:    Recent Labs     01/04/22  1833 01/05/22  0535 01/06/22  0420    140 141   K 4.8 4.6 4.0    116* 110*   CO2 14* 12* 18*   BUN 98* 86* 56*   CREATININE 3.7* 3.0* 2.3*   GLUCOSE 142* 137* 138*     Hepatic:   Recent Labs     01/04/22  0945 01/05/22  0535   AST 6 6   ALT <5 5   BILITOT 0.2 <0.2   ALKPHOS 78 73     Troponin: No results for input(s): TROPONINI in the last 72 hours. BNP: No results for input(s): BNP in the last 72 hours.   Lipids:   Recent Labs     01/04/22  0945   CHOL 121   HDL 38     ABGs: No results found for: PHART, PO2ART, HPW0RMD  INR: No results for input(s): INR in the last 72 hours.  -----------------------------------------------------------------  RAD: No results found. Objective:   Vitals:   Vitals:    01/06/22 0500   BP: 110/66   Pulse: 75   Resp: 16   Temp: 97.6 °F (36.4 °C)   SpO2:      Patient Vitals for the past 24 hrs:   BP Temp Temp src Pulse Resp SpO2   01/06/22 0500 110/66 97.6 °F (36.4 °C) Oral 75 16 --   01/05/22 1630 108/60 97.8 °F (36.6 °C) Oral 76 16 98 %     Gen: alert, awake, nad  Skin: no rash, turgor wnl  Heent:  eomi, mmm  Neck: No bruits or jvd noted  Cardiovascular:  S1, S2 without m/r/g  Respiratory: Clear  Abdomen:  +bs, soft, nt, nd, suprapubic catheter  Ext: Trace lower extremity edema  Psychiatric: mood and affect appropriate  Musculoskeletal:  Rom, muscular strength intact         Assessment/Plan:   1) MORENA on CKD Stage 3A  CKD likely due to longstanding history of hypertension as well as BPH  Baseline serum creatinine range 1.5 ->1.8 over the past 3 years  Now current admitting serum creatinine of 4.0 w/ BUN 98  Patient has been on ACE inhibitor and diuretic - likely degree of volume depletion   Initial UA shows cloudy yellow urine, 1.020, moderate blood, large leuko, negative protein  U Lytes: Yohannes 105 / Chl 94 / Creat 33 / Osmo 371 / Urea 356  FENa: 6.8% / Urine anion gap (+) at 23.0 mEq/L    PLAN:   1. Continue IV fluids and keep off ACE inhibitor. 2. Daily labs and strict intake and output / Avoid NSAIDs  3. Await Urine C/S     2. Non Gap Metabolic Acidosis   In the setting of MORENA  Beta Hydroxy (0.12) / Lactic Acid (1.2)  PLAN:   1. Continue IV fluids to include IV sodium bicarbonate  2. Follow labs     3. Hypertension of CKD stages 1-4  Follow with current mild hypotension     4. Anemia  Likely of CKD  Iron stores from 1/4/22: Ferr 385 / Fe 83 / 34% sat  Follow H/H              RAJAN Yu - CNP     Patient seen and examined. Wife is present at the bedside. Patient is feeling better. Chart reviewed.   I had a face to face encounter with the patient. Agree with exam.    Agree with  formulation, assessment and plan as outlined above and directed by me. Addition and corrections were done as deemed appropriate. My exam and plan include:     Continue current treatment. Netabolic acidosis-normal anion gap: Metabolic acidosis secondary to renal tubular acidosis with a urinary anion gap of +23. Continue bicarbonate supplement. Patient will need oral bicarbonate supplement.            Jami Denton MD  Nephrology        Electronically signed by Jami Denton MD on 1/6/2022 at 2:19 PM

## 2022-01-06 NOTE — PROGRESS NOTES
HOSPITAL   PROGRESS NOTE. SUBJECTIVE:  Pedro Pillai, 80 y.o., male C/O  ELEVATED POTASSIUM SECONDARY TO KIDNEY INJURY CONDITION DISCUSSED WITH  PATIENT  AND NURSING   STAFF. CONSULT NOTE  BY NEPHROLOGY  REVIEWED. ROS:GENERAL- APPETITE- GOOD. BOWEL MOVEMENTS- NORMAL. NO URINE    PROBLEM. EENT: NORMAL            CVS: NORMAL. NO CHEST PAIN OR PALPITATION. RESPIRATORY:NO SOB. GI/: NO ABDOMINAL PAINS            MUSCULOSKELETAL: NO COMPLAIN            CNS: NO  COMPLAIN            OBJECTIVE:    GENERAL:Temperature:  Current - Temp: 97.6 °F (36.4 °C); Max - Temp  Av.7 °F (36.5 °C)  Min: 97.6 °F (36.4 °C)  Max: 97.8 °F (36.6 °C)  Respiratory Rate : Resp  Av  Min: 16  Max: 16  Pulse Range: Pulse  Av  Min: 75  Max: 86  Blood Presuure Range:  Systolic (30WRZ), TPS:280 , Min:108 , GCQ:002   ; Diastolic (87WZT), OOY:94, Min:60, Max:66    Pulse ox Range: SpO2  Av %  Min: 96 %  Max: 98 %  24hr I & O:    Intake/Output Summary (Last 24 hours) at 2022 1141  Last data filed at 2022 1117  Gross per 24 hour   Intake 1640 ml   Output 2400 ml   Net -760 ml       CONSTITUTIONALl:FAIRLY   DEVELOPED,ORIENTED,CO-OPERATIVE  HEENT:NORMAL. NECK:  supple, symmetrical, trachea midline,Carotids- normal,JVP- flat  HEMATOLOGIC/LYMPHATICS:  no cervical lymphadenopathy  LUNGS:clear  CARDIOVASCULAR:  Normal apical impulse, regular rate and rhythm, normal S1 and S2, no S3 or S4, and no murmur noted  ABDOMEN:  normal bowel sounds, non-distended, non-tender, no masses palpated. HAS SUPRAPUBIC  CATHETER. EXTREMITIES: ARTHRITIC CHANGES. MOVEMENTS-LIMITED. PEDAL PULSES-FAIR.   SKIN:  normal skin color, texture, turgor    Data    CBC:   Lab Results   Component Value Date    WBC 7.9 2022    RBC 3.45 2022    HGB 10.7 2022    HCT 32.9 2022    MCV 95.4 2022    MCH 31.0 2022    MCHC 32.5 2022 RDW 14.0 01/05/2022     01/05/2022    MPV 9.4 01/05/2022     CMP:    Lab Results   Component Value Date     01/06/2022    K 4.0 01/06/2022     01/06/2022    CO2 18 01/06/2022    BUN 56 01/06/2022    CREATININE 2.3 01/06/2022    GFRAA 33 01/06/2022    LABGLOM 27 01/06/2022    GLUCOSE 138 01/06/2022    GLUCOSE 106 01/09/2012    PROT 6.3 01/05/2022    LABALBU 3.1 01/05/2022    LABALBU 4.4 01/09/2012    CALCIUM 9.1 01/06/2022    BILITOT <0.2 01/05/2022    ALKPHOS 73 01/05/2022    AST 6 01/05/2022    ALT 5 01/05/2022     Beta-Hydroxybutyrate [[id number]]    Collected: 01/06/22 0420    Updated: 01/06/22 0515    Specimen Source: Blood     Beta-Hydroxybutyrate 0.12 mmol/L   Lactic Acid, Plasma [[id number]]    Collected: 01/06/22 0420    Updated: 01/06/22 0513    Specimen Source: Blood     Lactic Acid 1.2 mmol/L   Potassium, urine, random [[id number]]    Collected: 01/05/22 1855    Updated: 01/05/22 2156    Specimen Source: Urine, clean catch     Potassium, Ur 12.0 mmol/L   Eosinophil smear urine [[id number]]    Collected: 01/05/22 1855    Updated: 01/05/22 2138     Eosinophil, Urine 0 %   Culture, Urine [[id number]]    Collected: 01/05/22 1855    Updated: 01/05/22 2117    Specimen Source: Urine voided    Creatinine, Random Urine [[id number]] (Abnormal)    Collected: 01/05/22 1855    Updated: 01/05/22 1949    Specimen Source: Urine, clean catch     Creatinine, Ur 33 Low  mg/dL   Urea nitrogen, urine [[id number]] (Abnormal)    Collected: 01/05/22 1855    Updated: 01/05/22 1943    Specimen Source: Urine, clean catch     Urea Nitrogen, Ur 356 Low  mg/dL   Chloride, Random Urine [[id number]]          ASSESSMENT:    Active Hospital Problems    Diagnosis Date Noted    MORENA (acute kidney injury) ([facility] [N17.9] 08/22/2021     Priority: High    Iron deficiency anemia due to chronic blood loss [D50.0] 07/26/2018     Priority: High    Stage 3 chronic kidney disease ([facility] [N18.30] 07/26/2018     Priority: High    Pure hypercholesterolemia [E78.00]      Priority: High     Class: Chronic    Essential hypertension [I10]      Priority: High     Class: Chronic       PLAN: CONTINUE CURRENT MEDICATIONS AND Rx.ENCOURAGE AMBULATION.       Electronically signed by Dylan Denton MD on 1/6/22 at 11:41 AM EST

## 2022-01-06 NOTE — CARE COORDINATION
CM note: Met with patient today for transition of care planning. He lives with his wife, is independent with ADLs, no longer drives but depends on his children for transportation. He owns/uses a cane for ambulation. He is active with [facility] home care. Pt plans to return home with resumption of home care at discharge and one of his children will transport him home. Pt currently on IVF of sodium bicarb.

## 2022-01-07 PROCEDURE — 2500000003 HC RX 250 WO HCPCS: Performed by: INTERNAL MEDICINE

## 2022-01-07 PROCEDURE — 6370000000 HC RX 637 (ALT 250 FOR IP): Performed by: INTERNAL MEDICINE

## 2022-01-07 PROCEDURE — 2580000003 HC RX 258: Performed by: INTERNAL MEDICINE

## 2022-01-07 PROCEDURE — 1200000000 HC SEMI PRIVATE

## 2022-01-07 PROCEDURE — 6370000000 HC RX 637 (ALT 250 FOR IP): Performed by: FAMILY MEDICINE

## 2022-01-07 PROCEDURE — 6360000002 HC RX W HCPCS: Performed by: INTERNAL MEDICINE

## 2022-01-07 PROCEDURE — 6370000000 HC RX 637 (ALT 250 FOR IP): Performed by: NURSE PRACTITIONER

## 2022-01-07 PROCEDURE — 99232 SBSQ HOSP IP/OBS MODERATE 35: CPT | Performed by: FAMILY MEDICINE

## 2022-01-07 RX ORDER — POLYETHYLENE GLYCOL 3350 17 G/17G
17 POWDER, FOR SOLUTION ORAL ONCE
Status: COMPLETED | OUTPATIENT
Start: 2022-01-07 | End: 2022-01-07

## 2022-01-07 RX ORDER — SODIUM BICARBONATE 650 MG/1
650 TABLET ORAL 3 TIMES DAILY
Status: DISCONTINUED | OUTPATIENT
Start: 2022-01-07 | End: 2022-01-09

## 2022-01-07 RX ADMIN — SODIUM BICARBONATE: 84 INJECTION, SOLUTION INTRAVENOUS at 08:10

## 2022-01-07 RX ADMIN — POLYETHYLENE GLYCOL 3350 17 G: 17 POWDER, FOR SOLUTION ORAL at 14:45

## 2022-01-07 RX ADMIN — SODIUM BICARBONATE 650 MG TABLET 650 MG: at 20:21

## 2022-01-07 RX ADMIN — SODIUM BICARBONATE 650 MG TABLET 650 MG: at 14:45

## 2022-01-07 RX ADMIN — HEPARIN SODIUM 5000 UNITS: 5000 INJECTION INTRAVENOUS; SUBCUTANEOUS at 14:45

## 2022-01-07 RX ADMIN — METOPROLOL SUCCINATE 25 MG: 25 TABLET, FILM COATED, EXTENDED RELEASE ORAL at 08:08

## 2022-01-07 RX ADMIN — FINASTERIDE 5 MG: 5 TABLET, FILM COATED ORAL at 08:09

## 2022-01-07 RX ADMIN — AMLODIPINE BESYLATE 2.5 MG: 2.5 TABLET ORAL at 08:09

## 2022-01-07 RX ADMIN — ASPIRIN 81 MG: 81 TABLET, COATED ORAL at 08:08

## 2022-01-07 RX ADMIN — HEPARIN SODIUM 5000 UNITS: 5000 INJECTION INTRAVENOUS; SUBCUTANEOUS at 20:21

## 2022-01-07 RX ADMIN — ATORVASTATIN CALCIUM 20 MG: 20 TABLET, FILM COATED ORAL at 20:21

## 2022-01-07 ASSESSMENT — PAIN SCALES - GENERAL
PAINLEVEL_OUTOF10: 0
PAINLEVEL_OUTOF10: 0

## 2022-01-07 NOTE — PROGRESS NOTES
NEPHROLOGY Attending   Progress Note  1/7/2022 1:46 PM  Subjective:   Admit Date: 1/4/2022  PCP: Sheryl Arana MD    Interval History:    1/7/22: No adverse events overnight - no acute distress - good appetite - up and about in room - daughter at bedside     Diet: ADULT DIET; Regular; Low Fat/Low Chol/High Fiber/BG    Data:   Scheduled Meds:   polyethylene glycol  17 g Oral Once    aspirin  81 mg Oral Daily    atorvastatin  20 mg Oral Daily    amLODIPine  2.5 mg Oral Daily    finasteride  5 mg Oral Daily    metoprolol succinate  25 mg Oral Daily    sodium chloride flush  10 mL IntraVENous 2 times per day    heparin (porcine)  5,000 Units SubCUTAneous 3 times per day     Continuous Infusions:   sodium bicarbonate infusion 100 mL/hr at 01/07/22 0810    sodium chloride       PRN Meds:sodium chloride flush, sodium chloride, promethazine **OR** ondansetron, polyethylene glycol, acetaminophen **OR** acetaminophen    Intake/Output Summary (Last 24 hours) at 1/7/2022 1346  Last data filed at 1/7/2022 1345  Gross per 24 hour   Intake 1555 ml   Output 1250 ml   Net 305 ml     CBC:   Recent Labs     01/04/22  1833 01/05/22  0535   WBC 6.5 7.9   HGB 11.9* 10.7*    331     BMP:    Recent Labs     01/04/22  1833 01/05/22  0535 01/06/22  0420    140 141   K 4.8 4.6 4.0    116* 110*   CO2 14* 12* 18*   BUN 98* 86* 56*   CREATININE 3.7* 3.0* 2.3*   GLUCOSE 142* 137* 138*     Hepatic:   Recent Labs     01/05/22  0535   AST 6   ALT 5   BILITOT <0.2   ALKPHOS 73     Troponin: No results for input(s): TROPONINI in the last 72 hours. BNP: No results for input(s): BNP in the last 72 hours. Lipids:   No results for input(s): CHOL, HDL in the last 72 hours. Invalid input(s): LDLCALCU  ABGs: No results found for: PHART, PO2ART, NGO0TGN  INR: No results for input(s): INR in the last 72 hours. -----------------------------------------------------------------  RAD: No results found.       Objective:   Vitals: Vitals:    01/07/22 0800   BP: 129/63   Pulse: 83   Resp:    Temp:    SpO2: 98%     Patient Vitals for the past 24 hrs:   BP Temp Temp src Pulse Resp SpO2   01/07/22 0800 129/63 -- -- 83 -- 98 %   01/07/22 0600 (!) 95/46 99.3 °F (37.4 °C) Oral 68 16 98 %   01/06/22 1715 (!) 103/52 99 °F (37.2 °C) Oral 86 18 95 %     Gen: alert, awake, nad  Skin: no rash, turgor wnl  Heent:  eomi, mmm  Neck: No bruits or jvd noted  Cardiovascular:  S1, S2 without m/r/g  Respiratory: Clear  Abdomen:  +bs, soft, nt, nd, suprapubic catheter  Ext: Trace lower extremity edema  Psychiatric: mood and affect appropriate  Musculoskeletal:  Rom, muscular strength intact         Assessment/Plan:   1) MORENA on CKD Stage 3A  CKD likely due to longstanding history of hypertension as well as BPH  Baseline serum creatinine range 1.5 ->1.8 over the past 3 years  Now current admitting serum creatinine of 4.0 w/ BUN 98  Patient has been on ACE inhibitor and diuretic - likely degree of volume depletion   Initial UA shows cloudy yellow urine, 1.020, moderate blood, large leuko, negative protein  U Lytes: Yohannes 105 / Chl 94 / Creat 33 / Osmo 371 / Urea 356  FENa: 6.8% / Urine anion gap (+) at 23.0 mEq/L    PLAN:   1. Continue IV fluids and keep off ACE inhibitor. 2. Daily labs and strict intake and output / Avoid NSAIDs  3. Await Urine C/S (likely chronic with supra pubic catheter)     2. Non Gap Metabolic Acidosis   secondary to renal tubular acidosis with a urinary anion gap of +23. Beta Hydroxy (0.12) / Lactic Acid (1.2)  PLAN:   1. Continue IV fluids to include IV sodium bicarbonate  2. Start Oral supplement as well on 1/7/22    3. Hypertension of CKD stages 1-4  Follow with current mild hypotension     4. Anemia  Likely of CKD  Iron stores from 1/4/22: Ferr 385 / Fe 83 / 34% sat  Follow H/H              Elizabeth Barros APRN - CNP     Patient seen and examined. Chart reviewed. I had a face to face encounter with the patient.    Agree with exam. Agree with  formulation, assessment and plan as outlined above and directed by me. Addition and corrections were done as deemed appropriate. My exam and plan include:     Continue current treatment. In light of chronic kidney disease will check vitamin D and PTH levels. If serum bicarbonate levels above 20 mmol/L tomorrow he can be discharged home on bicarbonate supplements orally. Normal anion gap  Metabolic acidosis secondary to renal tubular acidosis with urinary anion gap of  + 23 !         Nikolas Witt MD  Nephrology        Electronically signed by Nikolas Witt MD on 1/7/2022 at 3:15 PM

## 2022-01-07 NOTE — PROGRESS NOTES
HOSPITAL   PROGRESS NOTE. SUBJECTIVE:  Ralph Bailey, 80 y.o., male C/O KIDNEY FAILURE. NO BOWEL MOVEMENT SINCE ADMISSION. REQUEST MIRALAX. CONDITION DISCUSSED WITH  PATIENT  AND NURSING   STAFF. CASE DISCUSSED WITH DR. AGUILLON. ROS:GENERAL- APPETITE- GOOD. BOWEL MOVEMENTS- NORMAL. NO URINE    PROBLEM. EENT: NORMAL            CVS: NORMAL. NO CHEST PAIN OR PALPITATION. RESPIRATORY:NO SOB. GI/: NO ABDOMINAL PAINS            MUSCULOSKELETAL: NO COMPLAIN            CNS: NO  COMPLAIN            OBJECTIVE:    GENERAL:Temperature:  Current - Temp: 99.3 °F (37.4 °C); Max - Temp  Av.2 °F (37.3 °C)  Min: 99 °F (37.2 °C)  Max: 99.3 °F (37.4 °C)  Respiratory Rate : Resp  Av  Min: 16  Max: 18  Pulse Range: Pulse  Av  Min: 68  Max: 86  Blood Presuure Range:  Systolic (00FGC), PHA:664 , Min:95 , QTW:670   ; Diastolic (06JZV), TOURE:83, Min:46, Max:63    Pulse ox Range: SpO2  Av %  Min: 95 %  Max: 98 %  24hr I & O:    Intake/Output Summary (Last 24 hours) at 2022 1301  Last data filed at 2022 0838  Gross per 24 hour   Intake 1555 ml   Output 1200 ml   Net 355 ml       CONSTITUTIONALl:FAIRLY DEVELOPED,ORIENTED,CO-OPERATIVE  HEENT:NORMAL. NECK:  supple, symmetrical, trachea midline,Carotids- normal,JVP- flat  HEMATOLOGIC/LYMPHATICS:  no cervical lymphadenopathy  LUNGS:clear  CARDIOVASCULAR:  Normal apical impulse, regular rate and rhythm, normal S1 and S2, no S3 or S4, and no murmur noted  ABDOMEN:  normal bowel sounds, non-distended, non-tender, no masses palpated  EXTREMITIES: ARTHRITIC CHANGES. MOVEMENTS-LIMITED. PEDAL PULSES-FAIR.   SKIN:  normal skin color, texture, turgor    Data    CBC:   Lab Results   Component Value Date    WBC 7.9 2022    RBC 3.45 2022    HGB 10.7 2022    HCT 32.9 2022    MCV 95.4 2022    MCH 31.0 2022    MCHC 32.5 2022    RDW 14.0 01/05/2022     01/05/2022    MPV 9.4 01/05/2022     CMP:    Lab Results   Component Value Date     01/06/2022    K 4.0 01/06/2022     01/06/2022    CO2 18 01/06/2022    BUN 56 01/06/2022    CREATININE 2.3 01/06/2022    GFRAA 33 01/06/2022    LABGLOM 27 01/06/2022    GLUCOSE 138 01/06/2022    GLUCOSE 106 01/09/2012    PROT 6.3 01/05/2022    LABALBU 3.1 01/05/2022    LABALBU 4.4 01/09/2012    CALCIUM 9.1 01/06/2022    BILITOT <0.2 01/05/2022    ALKPHOS 73 01/05/2022    AST 6 01/05/2022    ALT 5 01/05/2022     Magnesium:    Lab Results   Component Value Date    MG 1.9 08/25/2021         ASSESSMENT:    Active Hospital Problems    Diagnosis Date Noted    MORENA [facility]acute kidney [facility]) ([facility] [N17.9] 08/22/2021     Priority: High    Iron deficiency anemia due to chronic blood loss [D50.0] 07/26/2018     Priority: High    Stage 3 chronic kidney disease ([facility] [N18.30] 07/26/2018     Priority: High    Pure hypercholesterolemia [E78.00]      Priority: High     Class: Chronic    Essential hypertension [I10]      Priority: High     Class: Chronic       PLAN: CONTINUE CURRENT MEDICATIONS AND Rx.CONTINUE IV FLUID AS PER NEPHROLOGY RECOMMENDATION. ENCOURAGE AMBULATION.       Electronically signed by Micaela Torres MD on 1/7/22 at 1:01 PM EST

## 2022-01-08 LAB
ANION GAP SERPL CALCULATED.3IONS-SCNC: 11 MMOL/L (ref 7–16)
BUN BLDV-MCNC: 45 MG/DL (ref 6–23)
CALCIUM SERPL-MCNC: 8 MG/DL (ref 8.6–10.2)
CHLORIDE BLD-SCNC: 93 MMOL/L (ref 98–107)
CO2: 36 MMOL/L (ref 22–29)
CREAT SERPL-MCNC: 2.2 MG/DL (ref 0.7–1.2)
GFR AFRICAN AMERICAN: 34
GFR NON-AFRICAN AMERICAN: 28 ML/MIN/1.73
GLUCOSE BLD-MCNC: 147 MG/DL (ref 74–99)
HCT VFR BLD CALC: 27.3 % (ref 37–54)
HEMOGLOBIN: 9 G/DL (ref 12.5–16.5)
MAGNESIUM: 1.6 MG/DL (ref 1.6–2.6)
MCH RBC QN AUTO: 30.9 PG (ref 26–35)
MCHC RBC AUTO-ENTMCNC: 33 % (ref 32–34.5)
MCV RBC AUTO: 93.8 FL (ref 80–99.9)
PDW BLD-RTO: 13.7 FL (ref 11.5–15)
PHOSPHORUS: 2.8 MG/DL (ref 2.5–4.5)
PLATELET # BLD: 265 E9/L (ref 130–450)
PMV BLD AUTO: 9.8 FL (ref 7–12)
POTASSIUM SERPL-SCNC: 3.4 MMOL/L (ref 3.5–5)
RBC # BLD: 2.91 E12/L (ref 3.8–5.8)
SODIUM BLD-SCNC: 140 MMOL/L (ref 132–146)
WBC # BLD: 7.4 E9/L (ref 4.5–11.5)

## 2022-01-08 PROCEDURE — 2580000003 HC RX 258: Performed by: INTERNAL MEDICINE

## 2022-01-08 PROCEDURE — 6370000000 HC RX 637 (ALT 250 FOR IP): Performed by: FAMILY MEDICINE

## 2022-01-08 PROCEDURE — 6370000000 HC RX 637 (ALT 250 FOR IP): Performed by: NURSE PRACTITIONER

## 2022-01-08 PROCEDURE — 36415 COLL VENOUS BLD VENIPUNCTURE: CPT

## 2022-01-08 PROCEDURE — 1200000000 HC SEMI PRIVATE

## 2022-01-08 PROCEDURE — 6360000002 HC RX W HCPCS: Performed by: INTERNAL MEDICINE

## 2022-01-08 PROCEDURE — 83735 ASSAY OF MAGNESIUM: CPT

## 2022-01-08 PROCEDURE — 6370000000 HC RX 637 (ALT 250 FOR IP): Performed by: INTERNAL MEDICINE

## 2022-01-08 PROCEDURE — 99222 1ST HOSP IP/OBS MODERATE 55: CPT | Performed by: FAMILY MEDICINE

## 2022-01-08 PROCEDURE — 85027 COMPLETE CBC AUTOMATED: CPT

## 2022-01-08 PROCEDURE — 84100 ASSAY OF PHOSPHORUS: CPT

## 2022-01-08 PROCEDURE — 2500000003 HC RX 250 WO HCPCS: Performed by: INTERNAL MEDICINE

## 2022-01-08 PROCEDURE — 80048 BASIC METABOLIC PNL TOTAL CA: CPT

## 2022-01-08 RX ORDER — CIPROFLOXACIN 250 MG/1
250 TABLET, FILM COATED ORAL EVERY 12 HOURS SCHEDULED
Status: DISCONTINUED | OUTPATIENT
Start: 2022-01-08 | End: 2022-01-09 | Stop reason: HOSPADM

## 2022-01-08 RX ADMIN — SODIUM BICARBONATE: 84 INJECTION, SOLUTION INTRAVENOUS at 09:13

## 2022-01-08 RX ADMIN — METOPROLOL SUCCINATE 25 MG: 25 TABLET, FILM COATED, EXTENDED RELEASE ORAL at 09:12

## 2022-01-08 RX ADMIN — ATORVASTATIN CALCIUM 20 MG: 20 TABLET, FILM COATED ORAL at 21:51

## 2022-01-08 RX ADMIN — ASPIRIN 81 MG: 81 TABLET, COATED ORAL at 09:12

## 2022-01-08 RX ADMIN — SODIUM BICARBONATE 650 MG TABLET 650 MG: at 21:51

## 2022-01-08 RX ADMIN — HEPARIN SODIUM 5000 UNITS: 5000 INJECTION INTRAVENOUS; SUBCUTANEOUS at 14:08

## 2022-01-08 RX ADMIN — AMLODIPINE BESYLATE 2.5 MG: 2.5 TABLET ORAL at 09:12

## 2022-01-08 RX ADMIN — CIPROFLOXACIN HYDROCHLORIDE 250 MG: 250 TABLET, FILM COATED ORAL at 14:07

## 2022-01-08 RX ADMIN — HEPARIN SODIUM 5000 UNITS: 5000 INJECTION INTRAVENOUS; SUBCUTANEOUS at 21:51

## 2022-01-08 RX ADMIN — HEPARIN SODIUM 5000 UNITS: 5000 INJECTION INTRAVENOUS; SUBCUTANEOUS at 05:07

## 2022-01-08 RX ADMIN — SODIUM BICARBONATE 650 MG TABLET 650 MG: at 14:07

## 2022-01-08 RX ADMIN — CIPROFLOXACIN HYDROCHLORIDE 250 MG: 250 TABLET, FILM COATED ORAL at 21:00

## 2022-01-08 RX ADMIN — FINASTERIDE 5 MG: 5 TABLET, FILM COATED ORAL at 09:12

## 2022-01-08 RX ADMIN — Medication 10 ML: at 21:52

## 2022-01-08 RX ADMIN — SODIUM BICARBONATE 650 MG TABLET 650 MG: at 09:12

## 2022-01-08 NOTE — PROGRESS NOTES
Nephrology Progress Note  1/8/2022 10:40 AM  Subjective:   Admit Date: 1/4/2022  PCP: Dilan Hahn MD    Interval History: Patient was seen in his room he was sitting comfortably in bed not on oxygen with no complaints tolerating IV fluids able to eat and drink    Diet: ADULT DIET; Regular; Low Fat/Low Chol/High Fiber/BG    Data:     Scheduled Meds:   sodium bicarbonate  650 mg Oral TID    aspirin  81 mg Oral Daily    atorvastatin  20 mg Oral Daily    amLODIPine  2.5 mg Oral Daily    finasteride  5 mg Oral Daily    metoprolol succinate  25 mg Oral Daily    sodium chloride flush  10 mL IntraVENous 2 times per day    heparin (porcine)  5,000 Units SubCUTAneous 3 times per day     Continuous Infusions:   sodium bicarbonate infusion 100 mL/hr at 01/08/22 0913    sodium chloride       PRN Meds:sodium chloride flush, sodium chloride, promethazine **OR** ondansetron, polyethylene glycol, acetaminophen **OR** acetaminophen  I/O last 3 completed shifts: In: 1731.7 [P.O.:680; I.V.:1051.7]  Out: 2100 [Urine:2100]  I/O this shift:  In: -   Out: 550 [Urine:550]    Intake/Output Summary (Last 24 hours) at 1/8/2022 1040  Last data filed at 1/8/2022 0800  Gross per 24 hour   Intake 1491.67 ml   Output 1450 ml   Net 41.67 ml     CBC: No results for input(s): WBC, HGB, PLT in the last 72 hours. BMP:    Recent Labs     01/06/22  0420      K 4.0   *   CO2 18*   BUN 56*   CREATININE 2.3*   GLUCOSE 138*     Hepatic: No results for input(s): AST, ALT, ALB, BILITOT, ALKPHOS in the last 72 hours.   Protein/ Albumin:    Lab Results   Component Value Date    LABALBU 3.1 (L) 01/05/2022      Component 1/5/22 1855   Urine Culture, Routine  Abnormal   Growth present, evaluating for:   Gram negative rods   <10,000 CFU/mL   Gram negative rods   P      Organism GNR oxidase positive Abnormal  P    Urine Culture, Routine >100,000 CFU/ml   Identification and sensitivity to follow  P        Objective:     Vitals: BP (!) 104/47 Pulse 66   Temp 100.1 °F (37.8 °C) (Oral)   Resp 16   Wt 132 lb (59.9 kg)   SpO2 93%   BMI 19.49 kg/m²   General appearance: Awake, alert and oriented not on oxygen and not in distress  Lungs: Good air movement  Heart: No S3, No rub  Abdomen: Lax, soft No tenderness. Suprapubic catheter. L. Extremities: No edema    Assessment & Plan:     No labs available from today to review. I ordered basic metabolic panel    Improving acute kidney injury on top of stage IIIb chronic kidney disease with baseline serum creatinine 1.5-1.8 with IV fluids after discontinuation of ACE inhibitor and discontinuation of diuretics creatinine 4 down to 2.3 yesterday suggesting prerenal azotemia as the cause of acute kidney injury    Improving metabolic acidosis with anion gap of 13 and serum albumin of 3.1 likely reflecting acute kidney injury with renal tubular acidosis on sodium bicarbonate drip await today's labs    Urine culture as above. Temperature 100.1. In the context of suprapubic catheter. On Cipro. Defer to PCP      This note was created using voice recognition software.     Electronically signed by Priscila Plascencia MD on 1/8/2022 at 10:40 AM

## 2022-01-08 NOTE — PROGRESS NOTES
HOSPITAL   PROGRESS NOTE. SUBJECTIVE:  Agueda Jara, 80 y.o., male C/O  Kidney failure. CONDITION DISCUSSED WITH  PATIENT  AND NURSING   STAFF. CONSULT NOTES REVIEWED. ROS:GENERAL- APPETITE- GOOD. BOWEL MOVEMENTS- NORMAL. NO URINE    PROBLEM. EENT: NORMAL            CVS: NORMAL. NO CHEST PAIN OR PALPITATION. RESPIRATORY:NO SOB. GI/: NO ABDOMINAL PAINS            MUSCULOSKELETAL: NO COMPLAIN            CNS: NO  COMPLAIN            OBJECTIVE:    GENERAL:Temperature:  Current - Temp: 100.1 °F (37.8 °C); Max - Temp  Av.3 °F (37.4 °C)  Min: 98.4 °F (36.9 °C)  Max: 100.1 °F (37.8 °C)  Respiratory Rate : Resp  Av  Min: 16  Max: 16  Pulse Range: Pulse  Av.5  Min: 61  Max: 66  Blood Presuure Range:  Systolic (88QET), JEFRY:398 , Min:104 , HIM:402   ; Diastolic (53AGA), KQS:97, Min:47, Max:58    Pulse ox Range: SpO2  Av.5 %  Min: 92 %  Max: 93 %  24hr I & O:    Intake/Output Summary (Last 24 hours) at 2022 1050  Last data filed at 2022 0800  Gross per 24 hour   Intake 1491.67 ml   Output 1450 ml   Net 41.67 ml       CONSTITUTIONALl:FAIRLY   DEVELOPED,ORIENTED,CO-OPERATIVE  HEENT:NORMAL. NECK:  supple, symmetrical, trachea midline,Carotids- normal,JVP- flat  HEMATOLOGIC/LYMPHATICS:  no cervical lymphadenopathy  LUNGS:clear  CARDIOVASCULAR:  Normal apical impulse, regular rate and rhythm, normal S1 and S2, no S3 or S4, and no murmur noted  ABDOMEN:  normal bowel sounds, non-distended, non-tender, no masses palpated  EXTREMITIES: ARTHRITIC CHANGES. MOVEMENTS-LIMITED. PEDAL PULSES-FAIR.   SKIN:  normal skin color, texture, turgor    Data    CBC:   Lab Results   Component Value Date    WBC 7.9 2022    RBC 3.45 2022    HGB 10.7 2022    HCT 32.9 2022    MCV 95.4 2022    MCH 31.0 2022    MCHC 32.5 2022    RDW 14.0 2022     2022    MPV 9.4 01/05/2022     CMP:    Lab Results   Component Value Date     01/06/2022    K 4.0 01/06/2022     01/06/2022    CO2 18 01/06/2022    BUN 56 01/06/2022    CREATININE 2.3 01/06/2022    GFRAA 33 01/06/2022    LABGLOM 27 01/06/2022    GLUCOSE 138 01/06/2022    GLUCOSE 106 01/09/2012    PROT 6.3 01/05/2022    LABALBU 3.1 01/05/2022    LABALBU 4.4 01/09/2012    CALCIUM 9.1 01/06/2022    BILITOT <0.2 01/05/2022    ALKPHOS 73 01/05/2022    AST 6 01/05/2022    ALT 5 01/05/2022      Urine Culture, Routine --    >100,000 CFU/ml   Identification and sensitivity to follow          ASSESSMENT:    Active Hospital Problems    Diagnosis Date Noted    MORENA (acute kidney injury) ([facility] [N17.9] 08/22/2021     Priority: High    Iron deficiency anemia due to chronic blood loss [D50.0] 07/26/2018     Priority: High    Stage 3 chronic kidney disease ([facility] [N18.30] 07/26/2018     Priority: High    Pure hypercholesterolemia [E78.00]      Priority: High     Class: Chronic    Essential hypertension [I10]      Priority: High     Class: Chronic       PLAN: CONTINUE CURRENT MEDICATIONS AND Rx. START ANTIBIOTIC. AWAIT FURTHER NEPHROLOGY EVALUATION.       Electronically signed by Javi Kaur MD on 1/8/22 at 10:50 AM EST

## 2022-01-09 VITALS
SYSTOLIC BLOOD PRESSURE: 110 MMHG | RESPIRATION RATE: 18 BRPM | HEART RATE: 68 BPM | WEIGHT: 132 LBS | OXYGEN SATURATION: 95 % | DIASTOLIC BLOOD PRESSURE: 58 MMHG | BODY MASS INDEX: 19.49 KG/M2 | TEMPERATURE: 98.3 F

## 2022-01-09 LAB
ALBUMIN SERPL-MCNC: 2.6 G/DL (ref 3.5–5.2)
ALP BLD-CCNC: 60 U/L (ref 40–129)
ALT SERPL-CCNC: 7 U/L (ref 0–40)
ANION GAP SERPL CALCULATED.3IONS-SCNC: 10 MMOL/L (ref 7–16)
AST SERPL-CCNC: 10 U/L (ref 0–39)
BASOPHILS ABSOLUTE: 0.05 E9/L (ref 0–0.2)
BASOPHILS RELATIVE PERCENT: 0.8 % (ref 0–2)
BILIRUB SERPL-MCNC: 0.3 MG/DL (ref 0–1.2)
BUN BLDV-MCNC: 38 MG/DL (ref 6–23)
CALCIUM SERPL-MCNC: 7.9 MG/DL (ref 8.6–10.2)
CHLORIDE BLD-SCNC: 94 MMOL/L (ref 98–107)
CO2: 37 MMOL/L (ref 22–29)
CREAT SERPL-MCNC: 2.1 MG/DL (ref 0.7–1.2)
EOSINOPHILS ABSOLUTE: 0.31 E9/L (ref 0.05–0.5)
EOSINOPHILS RELATIVE PERCENT: 4.7 % (ref 0–6)
GFR AFRICAN AMERICAN: 36
GFR NON-AFRICAN AMERICAN: 30 ML/MIN/1.73
GLUCOSE BLD-MCNC: 115 MG/DL (ref 74–99)
HCT VFR BLD CALC: 27.5 % (ref 37–54)
HEMOGLOBIN: 9 G/DL (ref 12.5–16.5)
IMMATURE GRANULOCYTES #: 0.03 E9/L
IMMATURE GRANULOCYTES %: 0.5 % (ref 0–5)
LYMPHOCYTES ABSOLUTE: 1.99 E9/L (ref 1.5–4)
LYMPHOCYTES RELATIVE PERCENT: 30 % (ref 20–42)
MAGNESIUM: 1.5 MG/DL (ref 1.6–2.6)
MCH RBC QN AUTO: 30.9 PG (ref 26–35)
MCHC RBC AUTO-ENTMCNC: 32.7 % (ref 32–34.5)
MCV RBC AUTO: 94.5 FL (ref 80–99.9)
MONOCYTES ABSOLUTE: 0.76 E9/L (ref 0.1–0.95)
MONOCYTES RELATIVE PERCENT: 11.5 % (ref 2–12)
NEUTROPHILS ABSOLUTE: 3.49 E9/L (ref 1.8–7.3)
NEUTROPHILS RELATIVE PERCENT: 52.5 % (ref 43–80)
PDW BLD-RTO: 13.6 FL (ref 11.5–15)
PHOSPHORUS: 3.4 MG/DL (ref 2.5–4.5)
PLATELET # BLD: 258 E9/L (ref 130–450)
PMV BLD AUTO: 9.7 FL (ref 7–12)
POTASSIUM SERPL-SCNC: 3.6 MMOL/L (ref 3.5–5)
RBC # BLD: 2.91 E12/L (ref 3.8–5.8)
SODIUM BLD-SCNC: 141 MMOL/L (ref 132–146)
TOTAL PROTEIN: 5.7 G/DL (ref 6.4–8.3)
WBC # BLD: 6.6 E9/L (ref 4.5–11.5)

## 2022-01-09 PROCEDURE — 6360000002 HC RX W HCPCS: Performed by: INTERNAL MEDICINE

## 2022-01-09 PROCEDURE — 2580000003 HC RX 258: Performed by: INTERNAL MEDICINE

## 2022-01-09 PROCEDURE — 6370000000 HC RX 637 (ALT 250 FOR IP): Performed by: NURSE PRACTITIONER

## 2022-01-09 PROCEDURE — 83735 ASSAY OF MAGNESIUM: CPT

## 2022-01-09 PROCEDURE — 99239 HOSP IP/OBS DSCHRG MGMT >30: CPT | Performed by: FAMILY MEDICINE

## 2022-01-09 PROCEDURE — 6370000000 HC RX 637 (ALT 250 FOR IP): Performed by: INTERNAL MEDICINE

## 2022-01-09 PROCEDURE — 85025 COMPLETE CBC W/AUTO DIFF WBC: CPT

## 2022-01-09 PROCEDURE — 80053 COMPREHEN METABOLIC PANEL: CPT

## 2022-01-09 PROCEDURE — 36415 COLL VENOUS BLD VENIPUNCTURE: CPT

## 2022-01-09 PROCEDURE — 2500000003 HC RX 250 WO HCPCS: Performed by: INTERNAL MEDICINE

## 2022-01-09 PROCEDURE — 6370000000 HC RX 637 (ALT 250 FOR IP): Performed by: FAMILY MEDICINE

## 2022-01-09 PROCEDURE — 84100 ASSAY OF PHOSPHORUS: CPT

## 2022-01-09 RX ORDER — MAGNESIUM SULFATE 1 G/100ML
1000 INJECTION INTRAVENOUS ONCE
Status: COMPLETED | OUTPATIENT
Start: 2022-01-09 | End: 2022-01-09

## 2022-01-09 RX ORDER — CIPROFLOXACIN 250 MG/1
250 TABLET, FILM COATED ORAL EVERY 12 HOURS SCHEDULED
Qty: 20 TABLET | Refills: 0 | Status: SHIPPED | OUTPATIENT
Start: 2022-01-09 | End: 2022-01-19

## 2022-01-09 RX ADMIN — ASPIRIN 81 MG: 81 TABLET, COATED ORAL at 08:49

## 2022-01-09 RX ADMIN — HEPARIN SODIUM 5000 UNITS: 5000 INJECTION INTRAVENOUS; SUBCUTANEOUS at 06:58

## 2022-01-09 RX ADMIN — FINASTERIDE 5 MG: 5 TABLET, FILM COATED ORAL at 08:49

## 2022-01-09 RX ADMIN — METOPROLOL SUCCINATE 25 MG: 25 TABLET, FILM COATED, EXTENDED RELEASE ORAL at 08:49

## 2022-01-09 RX ADMIN — SODIUM BICARBONATE: 84 INJECTION, SOLUTION INTRAVENOUS at 00:22

## 2022-01-09 RX ADMIN — AMLODIPINE BESYLATE 2.5 MG: 2.5 TABLET ORAL at 08:49

## 2022-01-09 RX ADMIN — MAGNESIUM SULFATE HEPTAHYDRATE 1000 MG: 1 INJECTION, SOLUTION INTRAVENOUS at 11:06

## 2022-01-09 RX ADMIN — SODIUM BICARBONATE 650 MG TABLET 650 MG: at 08:49

## 2022-01-09 RX ADMIN — CIPROFLOXACIN HYDROCHLORIDE 250 MG: 250 TABLET, FILM COATED ORAL at 08:49

## 2022-01-09 NOTE — PROGRESS NOTES
Nephrology Progress Note  1/9/2022 10:44 AM  Subjective:   Admit Date: 1/4/2022  PCP: Dang Duran MD    Interval History: Same like yesterday. Lying comfortably in bed with his head elevated not on oxygen with no complaints tolerating IV fluids able to eat and drink    Diet: ADULT DIET; Regular; Low Fat/Low Chol/High Fiber/BG    Data:     Scheduled Meds:   ciprofloxacin  250 mg Oral 2 times per day    sodium bicarbonate  650 mg Oral TID    aspirin  81 mg Oral Daily    atorvastatin  20 mg Oral Daily    amLODIPine  2.5 mg Oral Daily    finasteride  5 mg Oral Daily    metoprolol succinate  25 mg Oral Daily    sodium chloride flush  10 mL IntraVENous 2 times per day    heparin (porcine)  5,000 Units SubCUTAneous 3 times per day     Continuous Infusions:   sodium bicarbonate infusion 50 mL/hr at 01/09/22 0022    sodium chloride       PRN Meds:sodium chloride flush, sodium chloride, promethazine **OR** ondansetron, polyethylene glycol, acetaminophen **OR** acetaminophen  I/O last 3 completed shifts: In: 0738 [P.O.:1460; I.V.:750]  Out: 7349 [Urine:3260]  No intake/output data recorded.     Intake/Output Summary (Last 24 hours) at 1/9/2022 1044  Last data filed at 1/9/2022 0600  Gross per 24 hour   Intake 1830 ml   Output 1860 ml   Net -30 ml     CBC:   Recent Labs     01/08/22  1128 01/09/22  0536   WBC 7.4 6.6   HGB 9.0* 9.0*    258     BMP:    Recent Labs     01/08/22  1128 01/09/22  0536    141   K 3.4* 3.6   CL 93* 94*   CO2 36* 37*   BUN 45* 38*   CREATININE 2.2* 2.1*   GLUCOSE 147* 115*     Hepatic:   Recent Labs     01/09/22  0536   AST 10   ALT 7   BILITOT 0.3   ALKPHOS 60     Protein/ Albumin:    Lab Results   Component Value Date    LABALBU 2.6 (L) 01/09/2022      Component 1/5/22 1855   Urine Culture, Routine  Abnormal   Growth present, evaluating for:   Gram negative rods   <10,000 CFU/mL   Gram negative rods   P      Organism GNR oxidase positive Abnormal  P    Urine Culture, Routine >100,000 CFU/ml   Identification and sensitivity to follow  P        Objective:     Vitals: BP (!) 110/58   Pulse 68   Temp 98.3 °F (36.8 °C) (Oral)   Resp 18   Wt 132 lb (59.9 kg)   SpO2 95%   BMI 19.49 kg/m²   General appearance: Awake, alert and oriented not on oxygen and not in distress  Lungs: Good air movement  Heart: No S3, No rub  Abdomen: Lax, soft No tenderness. Suprapubic catheter. L. Extremities: No edema    Assessment & Plan:     Improving acute kidney injury on top of stage IIIb chronic kidney disease with baseline serum creatinine 1.5-1.8 with IV fluids after discontinuation of ACE inhibitor and discontinuation of diuretics creatinine 4 down to 2.2  suggesting prerenal azotemia as the cause of acute kidney injury    Metabolic alkalosis: Induced by sodium bicarbonate therapy to treat metabolic acidosis earlier on presentation. Discontinue sodium bicarbonate    Supplement hypomagnesemia    Hypocalcemia reflecting hypoalbuminemia    Stable anemia at least partially secondary to chronic kidney disease    Urine culture as above. .  In the context of suprapubic catheter. On Cipro. Defer to PCP      This note was created using voice recognition software.     Electronically signed by Gualberto Rodriguez MD on 1/9/2022 at 10:44 AM

## 2022-01-09 NOTE — DISCHARGE SUMMARY
Discharge Summary    Warner Neri  :  1932  MRN:  [id number]    Admit date:  2022  Discharge date:  2021    Admitting Physician:  Fredo Richardson MD    Admission Diagnoses: MORENA (acute kidney injury) [facility]) [N17.9]    Discharge Diagnoses: Active Hospital Problems    Diagnosis Date Noted    MORENA (acute kidney injury) ([facility] [address] [N17.9] 2021     Priority: High    Iron deficiency anemia due to chronic blood loss [D50.0] 2018     Priority: High    Stage 3 chronic kidney disease ([facility] [address] [N18.30] 2018     Priority: High    Pure hypercholesterolemia [E78.00]      Priority: High     Class: Chronic    Essential hypertension [I10]      Priority: High     Class: Chronic       Consults:  nephrology    HPI/Hospital Course:   IV FLUIDS TO IMPROVE KIDNEY FUNCTION, CORRECTION OF METABOLIC ACIDOSIS, CONTINUE CURRENT MEDICATIONS, PT/OT. ENCOURAGED AMBULATION.     Discharge Medications:        Medication List      START taking these medications    ciprofloxacin 250 MG tablet  Commonly known as: CIPRO  Take 1 tablet by mouth every 12 hours for 10 days        CONTINUE taking these medications    amLODIPine 2.5 MG tablet  Commonly known as: NORVASC  Take 1 tablet by mouth daily     aspirin 81 MG EC tablet     C-250 250 MG Chew  Generic drug: Ascorbic Acid     ferrous sulfate 325 (65 Fe) MG tablet  Commonly known as: IRON 325     finasteride 5 MG tablet  Commonly known as: PROSCAR  Take 1 tablet by mouth daily     lisinopril-hydroCHLOROthiazide 10-12.5 MG per tablet  Commonly known as: PRINZIDE;ZESTORETIC  Take 1 tablet by mouth daily     metoprolol succinate 25 MG extended release tablet  Commonly known as: TOPROL XL  Take 1 tablet by mouth daily     One-A-Day Essential Tabs     OSTEO BI-FLEX JOINT SHIELD PO     pantoprazole 40 MG tablet  Commonly known as: PROTONIX     Saw Palmetto (Serenoa repens) 450 MG Caps     simvastatin 40 MG tablet  Commonly known as: ZOCOR  Take 1 tablet by mouth nightly     timolol 0.5 0      Saw Palmetto, Serenoa repens, 450 MG CAPS Take 2 tablets by mouth daily. Misc Natural Products (OSTEO BI-FLEX JOINT SHIELD PO) Take 2 tablets by mouth daily.         aspirin 81 MG EC tablet Take 81 mg by mouth daily Last dose 10/28           Activity: activity as tolerated  Diet: cardiac diet    Follow-up with Dang Duran MD in 1 WEEK      Note that over 30 minutes was spent in preparing discharge papers, discussing discharge with patient, medication review, etc.      Signed:  Dang Duran MD  1/9/2022, 10:41 AM

## 2022-01-10 ENCOUNTER — CARE COORDINATION (OUTPATIENT)
Dept: CASE MANAGEMENT | Age: 87
End: 2022-01-10

## 2022-01-10 DIAGNOSIS — N17.9 AKI (ACUTE KIDNEY INJURY) (HCC): Primary | ICD-10-CM

## 2022-01-10 PROCEDURE — 1111F DSCHRG MED/CURRENT MED MERGE: CPT | Performed by: FAMILY MEDICINE

## 2022-01-10 NOTE — CARE COORDINATION
Samy 45 Transitions Initial Follow Up Call    Call within 2 business days of discharge: Yes    Patient: [place] Limb Patient : 1932   MRN: <[id number]>  Reason for Admission: MORENA  Discharge Date: 22 RARS: Readmission Risk Score: 19.9 ( )      Last Discharge [facility]       Complaint Diagnosis Description Type Department Provider    22 Other MORENA (acute kidney injury) [facility]) ED to Hosp-Admission (Discharged) (ADMITTED) [facility] 3 SURG Duy Padgett MD; Jeremías Cao. .. Spoke with pt's wife, Minna Crenshaw (On HIPAA), for initial care transition call. Pt discharged from [facility] on 22 with MORENA, elevated K+ level. Minna Crenshaw states that the pt is \"doing better\" today. She states that the pt is eating and drinking well. Pt has a SP catheter in place and per Evelyn, urine looks \"good. \" She reports that pt denies any complaints. She states that the pt has a f/u with Dr. Armando Lewis on 22 (urologoy) and that her daughter will be calling to schedule a f/u with Dr. Venessa Neely office. Pt already has a previously scheduled f/u appt with his pcp for 22. CTN did route a message to the pcp's office staff asking if they would be able to update/add to the appt type a HFU appt, as pt is scheduled currently for an AWV. Minna Crenshaw confirms that the pt was able to  new rx of Cipro and is taking as prescribed. Full med review completed with Evelyn and 1111F entered. Pt is active with [facility]. Minna Crenshaw states that she has not heard from the [facility] agency yet on when they will be restarting. CTN will call [facility] and confirm. Evelyn voices no needs/concerns at this time. She was agreeable to continued outreaches from this CTN.     Addendum: 1/10/22 @ [phone] with Muriel Ontiveros at [facility] and confirmed [address] will be 22    Facility: [facility]    Non-face-to-face services provided:  Scheduled appointment with PCP-see above  Scheduled appointment with Specialist-see above  Obtained and reviewed discharge summary and/or continuity of care documents  Assessment and support for treatment adherence and medication management-1111F entered     Transitions of Care Initial Call    Challenges to be reviewed by the provider   Additional needs identified to be addressed with provider: No  none             Method of communication with provider : none      Advance Care Planning:   Does patient have an Advance Directive: health care decision maker information reviewed and verified. Was this a readmission? No  Patient stated reason for admission: high K+  Patients top risk factors for readmission: medical condition-CKD, htn, BPH, SP cath  multiple health system providers  polypharmacy    Care Transition Nurse (CTN) contacted the patient by telephone to perform post hospital discharge assessment. Provided introduction to self, and explanation of the CTN role. CTN reviewed discharge instructions, medical action plan and red flags with family who verbalized understanding. Family given an opportunity to ask questions and does not have any further questions or concerns at this time. Were discharge instructions available to patient? Yes. Reviewed appropriate site of care based on symptoms and resources available to patient including: PCP, Specialist, Home health and When to call 911. The family agrees to contact the PCP office for questions related to their healthcare. Medication reconciliation was performed with family, who verbalizes understanding of administration of home medications. CTN provided contact information. Plan for follow-up call in 7-10 days based on severity of symptoms and risk factors. Plan for next call: symptom management-any s/sx dehydration? SP cath WNL? any s/sx UTI? Has pt scheduled with renal? Has pt attended urology appt on 1/12/22?        Care Transitions 24 Hour Call    Schedule Follow Up Appointment with PCP: Completed  Do you have any ongoing symptoms?: No  Do you have a copy of your discharge instructions?: Yes  Do you have all of your prescriptions and are they filled?: Yes  Have you been contacted by a [address]?: No  Have you scheduled your follow up appointment?: Yes  How are you going to get to your appointment?: Car - family or friend to transport  Were you discharged with any Home Care or Post Acute Services: Yes  Post Acute Services: Home Health (Comment: [address];  [facility] 1/11/22)  Patient DME: Karen Maldonado, Shower chair  Patient Home Equipment: Other (Comment: kahlil)  Do you have support at home?: Partner/Spouse/SO  Are you an active caregiver in your home?: No  Care Transitions Interventions  No Identified Needs         Follow Up  Future Appointments   Date Time Provider Radha Gillette   1/18/2022  9:30 AM Dilan Hahn MD [facility]       Stanislav Ramos RN

## 2022-01-11 LAB — URINE CULTURE, ROUTINE: NORMAL

## 2022-01-18 ENCOUNTER — OFFICE VISIT (OUTPATIENT)
Dept: FAMILY MEDICINE CLINIC | Age: 87
End: 2022-01-18
Payer: MEDICARE

## 2022-01-18 VITALS
HEIGHT: 69 IN | TEMPERATURE: 98.2 F | BODY MASS INDEX: 20.29 KG/M2 | SYSTOLIC BLOOD PRESSURE: 102 MMHG | HEART RATE: 60 BPM | OXYGEN SATURATION: 97 % | RESPIRATION RATE: 16 BRPM | DIASTOLIC BLOOD PRESSURE: 64 MMHG | WEIGHT: 137 LBS

## 2022-01-18 DIAGNOSIS — R33.8 ENLARGED PROSTATE WITH URINARY RETENTION: ICD-10-CM

## 2022-01-18 DIAGNOSIS — N17.9 AKI (ACUTE KIDNEY INJURY) (HCC): Primary | ICD-10-CM

## 2022-01-18 DIAGNOSIS — I10 ESSENTIAL HYPERTENSION: ICD-10-CM

## 2022-01-18 DIAGNOSIS — N18.30 STAGE 3 CHRONIC KIDNEY DISEASE, UNSPECIFIED WHETHER STAGE 3A OR 3B CKD (HCC): ICD-10-CM

## 2022-01-18 DIAGNOSIS — E78.00 PURE HYPERCHOLESTEROLEMIA: ICD-10-CM

## 2022-01-18 DIAGNOSIS — N40.1 ENLARGED PROSTATE WITH URINARY RETENTION: ICD-10-CM

## 2022-01-18 PROCEDURE — 99495 TRANSJ CARE MGMT MOD F2F 14D: CPT | Performed by: FAMILY MEDICINE

## 2022-01-18 PROCEDURE — 1111F DSCHRG MED/CURRENT MED MERGE: CPT | Performed by: FAMILY MEDICINE

## 2022-01-18 RX ORDER — LISINOPRIL AND HYDROCHLOROTHIAZIDE 12.5; 1 MG/1; MG/1
1 TABLET ORAL DAILY
Qty: 90 TABLET | Refills: 0 | Status: SHIPPED
Start: 2022-01-18 | End: 2022-05-02

## 2022-01-18 RX ORDER — SIMVASTATIN 40 MG
40 TABLET ORAL NIGHTLY
Qty: 90 TABLET | Refills: 0 | Status: SHIPPED
Start: 2022-01-18 | End: 2022-04-28 | Stop reason: SDUPTHER

## 2022-01-18 RX ORDER — METOPROLOL SUCCINATE 25 MG/1
25 TABLET, EXTENDED RELEASE ORAL DAILY
Qty: 90 TABLET | Refills: 0 | Status: SHIPPED
Start: 2022-01-18 | End: 2022-05-02 | Stop reason: SDUPTHER

## 2022-01-18 SDOH — ECONOMIC STABILITY: FOOD INSECURITY: WITHIN THE PAST 12 MONTHS, YOU WORRIED THAT YOUR FOOD WOULD RUN OUT BEFORE YOU GOT MONEY TO BUY MORE.: NEVER TRUE

## 2022-01-18 SDOH — ECONOMIC STABILITY: FOOD INSECURITY: WITHIN THE PAST 12 MONTHS, THE FOOD YOU BOUGHT JUST DIDN'T LAST AND YOU DIDN'T HAVE MONEY TO GET MORE.: NEVER TRUE

## 2022-01-18 SDOH — ECONOMIC STABILITY: TRANSPORTATION INSECURITY
IN THE PAST 12 MONTHS, HAS LACK OF TRANSPORTATION KEPT YOU FROM MEETINGS, WORK, OR FROM GETTING THINGS NEEDED FOR DAILY LIVING?: NO

## 2022-01-18 ASSESSMENT — LIFESTYLE VARIABLES
AUDIT TOTAL SCORE: INCOMPLETE
HOW OFTEN DO YOU HAVE A DRINK CONTAINING ALCOHOL: NEVER
HOW OFTEN DO YOU HAVE A DRINK CONTAINING ALCOHOL: 0
AUDIT-C TOTAL SCORE: INCOMPLETE

## 2022-01-18 ASSESSMENT — PATIENT HEALTH QUESTIONNAIRE - PHQ9
SUM OF ALL RESPONSES TO PHQ QUESTIONS 1-9: 0
SUM OF ALL RESPONSES TO PHQ9 QUESTIONS 1 & 2: 0
SUM OF ALL RESPONSES TO PHQ QUESTIONS 1-9: 0
2. FEELING DOWN, DEPRESSED OR HOPELESS: 0
1. LITTLE INTEREST OR PLEASURE IN DOING THINGS: 0

## 2022-01-18 ASSESSMENT — SOCIAL DETERMINANTS OF HEALTH (SDOH): HOW HARD IS IT FOR YOU TO PAY FOR THE VERY BASICS LIKE FOOD, HOUSING, MEDICAL CARE, AND HEATING?: NOT HARD AT ALL

## 2022-01-18 NOTE — PROGRESS NOTES
Post-Discharge Transitional Care Management Services or Hospital Follow Up      Concepcion Menezes   YOB: 1932    Date of Office Visit:  1/18/2022  Date of Hospital Admission: 1/4/22  Date of Hospital Discharge: 1/9/22  Risk of hospital readmission (high >=14%. Medium >=10%) :Readmission Risk Score: 19.9 ( )      Care management risk score Rising risk (score 2-5) and Complex Care (Scores >=6): 10     Non face to face  following discharge, date last encounter closed (first attempt may have been earlier): 1/10/2022 12:54 PM    Call initiated 2 business days of discharge: Yes    Patient Active Problem List   Diagnosis    Pure hypercholesterolemia    Essential hypertension    Primary osteoarthritis involving multiple joints    Iron deficiency anemia due to chronic blood loss    Stage 3 chronic kidney disease ([address]    Urinary retention    Hypokalemia    MORENA (acute kidney injury) ([facility] [address]    Acute on chronic renal insufficiency    Acute kidney injury ([facility] [address]    Enlarged prostate with urinary retention    BPH with urinary obstruction    BPH associated with nocturia       No Known Allergies    Medications listed as ordered at the time of discharge from hospital     Medication List          Accurate as of January 18, 2022 10:07 AM. If you have any questions, ask your nurse or doctor.             CONTINUE taking these medications    amLODIPine 2.5 MG tablet  Commonly known as: NORVASC  Take 1 tablet by mouth daily     aspirin 81 MG EC tablet     C-250 250 MG Chew  Generic drug: Ascorbic Acid     ciprofloxacin 250 MG tablet  Commonly known as: CIPRO  Take 1 tablet by mouth every 12 hours for 10 days     ferrous sulfate 325 (65 Fe) MG tablet  Commonly known as: IRON 325     finasteride 5 MG tablet  Commonly known as: PROSCAR  Take 1 tablet by mouth daily     lisinopril-hydroCHLOROthiazide 10-12.5 MG per tablet  Commonly known as: PRINZIDE;ZESTORETIC  Take 1 tablet by mouth daily     metoprolol succinate 25 MG extended release tablet  Commonly known as: TOPROL XL  Take 1 tablet by mouth daily     One-A-Day Essential Tabs     OSTEO BI-FLEX JOINT SHIELD PO     pantoprazole 40 MG tablet  Commonly known as: PROTONIX     Saw Palmetto (Serenoa repens) 450 MG Caps     simvastatin 40 MG tablet  Commonly known as: ZOCOR  Take 1 tablet by mouth nightly     timolol 0.5 % ophthalmic solution  Commonly known as: TIMOPTIC     vitamin B-12 1000 MCG tablet  Commonly known as: CYANOCOBALAMIN     Vitamin D3 50 MCG (2000 UT) Caps           Where to Get Your Medications      These medications were sent to [address] [facility]), [place] - [address] [facility] [place] [phone] [place] [phone]  [address] [facility]) [address] [place] [address]    Phone: [phone]   · lisinopril-hydroCHLOROthiazide 10-12.5 MG per tablet  · metoprolol succinate 25 MG extended release tablet  · simvastatin 40 MG tablet           Medications marked \"taking\" at this time  Outpatient Medications Marked as Taking for the 1/18/22 encounter (Office Visit) with Indy Osman MD   Medication Sig Dispense Refill    metoprolol succinate (TOPROL XL) 25 MG extended release tablet Take 1 tablet by mouth daily 90 tablet 0    simvastatin (ZOCOR) 40 MG tablet Take 1 tablet by mouth nightly 90 tablet 0    lisinopril-hydroCHLOROthiazide (PRINZIDE;ZESTORETIC) 10-12.5 MG per tablet Take 1 tablet by mouth daily 90 tablet 0    ciprofloxacin (CIPRO) 250 MG tablet Take 1 tablet by mouth every 12 hours for 10 days 20 tablet 0    amLODIPine (NORVASC) 2.5 MG tablet Take 1 tablet by mouth daily 90 tablet 1    finasteride (PROSCAR) 5 MG tablet Take 1 tablet by mouth daily 90 tablet 1    ferrous sulfate (IRON 325) 325 (65 Fe) MG tablet Take 1 tablet by mouth 2 times daily 100 tablet 3    Multiple Vitamin (ONE-A-DAY ESSENTIAL) TABS Take 1 tablet by mouth daily      Cholecalciferol (VITAMIN D3) 50 MCG (2000 UT) CAPS Take 2,000 Units by mouth daily       Ascorbic Acid (C-250) 250 MG CHEW Take 250 mg by mouth daily       vitamin B-12 (CYANOCOBALAMIN) 1000 MCG tablet Take 1,000 mcg by mouth daily      pantoprazole (PROTONIX) 40 MG tablet Take 40 mg by mouth daily       timolol (TIMOPTIC) 0.5 % ophthalmic solution Place 1 drop into both eyes 2 times daily   0    Saw Gaffney, Serenoa repens, 450 MG CAPS Take 2 tablets by mouth daily.  Misc Natural Products (OSTEO BI-FLEX JOINT SHIELD PO) Take 2 tablets by mouth daily.  aspirin 81 MG EC tablet Take 81 mg by mouth daily Last dose 10/28          Medications patient taking as of now reconciled against medications ordered at time of hospital discharge: Yes    Chief Complaint   Patient presents with    Medicare [employer]    Follow-Up from Hospital     Patient was in ED  1/4/2022 - 1/9/2022 for UTI     Health Maintenance     due for shingles and dtap       History of Present illness - Follow up of Hospital diagnosis(es): [address] ALSO HAS INDWELLING CATHETER FOR URINARY PROBLEM. WAS DISCHARGED AFTER TREATMENT AND STABILIZING OF THE PROBLEMS. Inpatient course: Discharge summary reviewed- see chart. Interval history/Current status: FEELS MUCH BETTER. GETS ROUND IN HOUSE WELL. A comprehensive review of systems was negative except for what was noted in the HPI. Vitals:    01/18/22 0908   BP: 102/64   Pulse: 60   Resp: 16   Temp: 98.2 °F (36.8 °C)   SpO2: 97%   Weight: 137 lb (62.1 kg)   Height: 5' 9\" (1.753 m)     Body mass index is 20.23 kg/m².    Wt Readings from Last 3 Encounters:   01/18/22 137 lb (62.1 kg)   01/04/22 132 lb (59.9 kg)   11/11/21 142 lb (64.4 kg)     BP Readings from Last 3 Encounters:   01/18/22 102/64   01/09/22 (!) 110/58   11/11/21 (!) 128/50        Physical Exam:  General Appearance: alert and oriented to person, place and time, well developed and well- nourished, in no acute distress  Skin: warm and dry, no rash or erythema  Head: normocephalic and atraumatic  Eyes: pupils equal, round, and reactive to light, extraocular eye movements intact, conjunctivae normal  ENT: tympanic membrane, external ear and ear canal normal bilaterally, nose without deformity, nasal mucosa and turbinates normal without polyps  Neck: supple and non-tender without mass, no thyromegaly or thyroid nodules, no cervical lymphadenopathy  Pulmonary/Chest: clear to auscultation bilaterally- no wheezes, rales or rhonchi, normal air movement, no respiratory distress  Cardiovascular: normal rate, regular rhythm, normal S1 and S2, no murmurs, rubs, clicks, or gallops, distal pulses intact, no carotid bruits  Abdomen: soft, non-tender, non-distended, normal bowel sounds, no masses or organomegaly  Extremities: no cyanosis, clubbing or edema  Musculoskeletal: normal range of motion, no joint swelling, deformity or tenderness  Neurologic: reflexes normal and symmetric, no cranial nerve deficit, gait, coordination and speech normal    Assessment/Plan:  1. MORENA (acute kidney injury) ([address]  IMPROVING    2. Stage 3 chronic kidney disease, unspecified whether stage 3a or 3b CKD ([address]  STABLE    3. Pure hypercholesterolemia  CONTORLLED    4. Essential hypertension  CONTROLLED    5.  Enlarged prostate with urinary retention  STABLE        Medical Decision Making: high complexity

## 2022-01-18 NOTE — PATIENT INSTRUCTIONS
LOW SALT FOR BLOOD PRESSURE CONTROL. LOW FAT DIET FOR CHOLESTEROL CONTROL. DRINK ENOUGH FLUIDS FOR BETTER KIDNEY FUNCTION. TAKE  AMLODIPINE 2.5 MG. DAILY, PRINIVIL/HCT 10/12.5  1 TAB. DAILY,TOPROL XL 25 MG. 1 TAB. DAILY FOR BLOOD PRESSURE CONTROL. TAKE  ZOCOR 40 MG. 1 TAB. DAILY.  FOR CHOLESTEROL CONTROL. Gallito Pickenio TAKE  FEOSOL 1 TAB. 2 TIMES A DAY AND MULTIVITAMIN 1 TAB. DAILY TO IMPROVE BLOOD COUNT. TAKE  PROSCAR 5 MG. DAILY FOR PROSTATE PROBLEM. REGULAR WALKING ADVISED. CONTINUE FOLLOW UP WITH DR. Gely Hutson FOR PROSTATE PROBLEM. KEEP NEXT APPOINTMENT IN 1 MONTH.

## 2022-01-20 ENCOUNTER — CARE COORDINATION (OUTPATIENT)
Dept: CASE MANAGEMENT | Age: 87
End: 2022-01-20

## 2022-01-20 NOTE — CARE COORDINATION
Samy 45 Transitions Follow Up Call    2022    Patient: Carol Dahl  Patient : 1932   MRN: <[id number]>  Reason for Admission: MORENA  Discharge Date: 22 RARS: Readmission Risk Score: 19.9 ( )       Attempted to reach the patient for subsequent Care Transition call. Message left with CTN's contact information requesting return phone call.     Follow Up  Future Appointments   Date Time Provider Department Center   2022  8:45 AM [facility]   2023  9:30 AM Shiloh Meyer MD [facility]       Nay Gonsales RN

## 2022-01-26 ENCOUNTER — CARE COORDINATION (OUTPATIENT)
Dept: CASE MANAGEMENT | Age: 87
End: 2022-01-26

## 2022-01-26 NOTE — CARE COORDINATION
Samy 45 Transitions Follow Up Call    2022    Patient: oYmi Morelos  Patient : 1932   MRN: <[id number]>  Reason for Admission: MORENA  Discharge Date: 22 RARS: Readmission Risk Score: 19.9 ( )         Spoke with both the pt and his wife, Minan Crenshaw, for a sub care transition call. Pt states that he is doing \"very well. \" Pt states that he completed his HFU appts with both urology and with his pcp. Pt states that he had his f/u with urology on 22 and that they changed his catheter. Pt states that the urine looks clear and that the catheter is draining well with no problems. Pt states that his daughter is coordinating with Dr. Venessa Neely office (renal) to schedule his next f/u appt. iMnna Crenshaw states that Dr. Venessa Neely office would call them with the next appt. Pt states that he is still active with [facility] and is due for a nurse visit today. Pt/wife did not voice any needs/concerns. Pt was agreeable with today being the final outreach from this CTN. CTN will sign off. Care Transitions Follow Up Call    Needs to be reviewed by the provider   Additional needs identified to be addressed with provider: No  none             Method of communication with provider : none      Care Transition Nurse (CTN) contacted the patient by telephone to follow up after admission on 22. Addressed changes since last contact: Reviewed with pt for any changes in tx plan/medications from recent OV with pcp and urology. CTN reviewed medical action plan and red flags with patient and discussed any barriers to care and/or understanding of plan of care after discharge. Discussed appropriate site of care based on symptoms and resources available to patient including: PCP, Specialist, Urgent care clinics, Home health and When to call 911. The patient agrees to contact the PCP office for questions related to their healthcare.      Patients top risk factors for readmission: medical condition-CKD, anemia, BPH, htn  multiple health system providers  polypharmacy  Interventions to address risk factors: Scheduled appointment with PCP-keep scheduled appts and Scheduled appointment with Benita Bolds scheduled appts    CTN provided contact information for future needs. No further follow-up call indicated based on severity of symptoms and risk factors. Care Transitions Subsequent and Final Call    Subsequent and Final Calls  Care Transitions Interventions  Other Interventions:            Follow Up  Future Appointments   Date Time Provider Department Center   2/22/2022  8:45 AM Duy Padgett MD [facility]   1/19/2023  9:30 AM Duy Padgett MD [facility]       Cassidy Rosas RN

## 2022-02-22 ENCOUNTER — OFFICE VISIT (OUTPATIENT)
Dept: FAMILY MEDICINE CLINIC | Age: 87
End: 2022-02-22
Payer: MEDICARE

## 2022-02-22 VITALS
WEIGHT: 143 LBS | BODY MASS INDEX: 21.12 KG/M2 | DIASTOLIC BLOOD PRESSURE: 60 MMHG | TEMPERATURE: 97.3 F | SYSTOLIC BLOOD PRESSURE: 120 MMHG | OXYGEN SATURATION: 96 % | HEART RATE: 63 BPM

## 2022-02-22 DIAGNOSIS — E78.00 PURE HYPERCHOLESTEROLEMIA: Primary | ICD-10-CM

## 2022-02-22 DIAGNOSIS — N18.30 STAGE 3 CHRONIC KIDNEY DISEASE, UNSPECIFIED WHETHER STAGE 3A OR 3B CKD (HCC): ICD-10-CM

## 2022-02-22 DIAGNOSIS — E83.42 HYPOMAGNESEMIA: ICD-10-CM

## 2022-02-22 DIAGNOSIS — H40.2294: ICD-10-CM

## 2022-02-22 DIAGNOSIS — D50.0 IRON DEFICIENCY ANEMIA DUE TO CHRONIC BLOOD LOSS: ICD-10-CM

## 2022-02-22 DIAGNOSIS — I10 ESSENTIAL HYPERTENSION: ICD-10-CM

## 2022-02-22 PROBLEM — R35.1 BPH ASSOCIATED WITH NOCTURIA: Status: RESOLVED | Noted: 2021-11-02 | Resolved: 2022-02-22

## 2022-02-22 PROBLEM — R33.9 URINARY RETENTION: Status: RESOLVED | Noted: 2021-08-09 | Resolved: 2022-02-22

## 2022-02-22 PROBLEM — N40.1 BPH ASSOCIATED WITH NOCTURIA: Status: RESOLVED | Noted: 2021-11-02 | Resolved: 2022-02-22

## 2022-02-22 PROBLEM — N17.9 ACUTE KIDNEY INJURY (HCC): Status: RESOLVED | Noted: 2021-08-24 | Resolved: 2022-02-22

## 2022-02-22 PROBLEM — N17.9 AKI (ACUTE KIDNEY INJURY) (HCC): Status: RESOLVED | Noted: 2021-08-22 | Resolved: 2022-02-22

## 2022-02-22 PROCEDURE — 4040F PNEUMOC VAC/ADMIN/RCVD: CPT | Performed by: FAMILY MEDICINE

## 2022-02-22 PROCEDURE — G8484 FLU IMMUNIZE NO ADMIN: HCPCS | Performed by: FAMILY MEDICINE

## 2022-02-22 PROCEDURE — G8427 DOCREV CUR MEDS BY ELIG CLIN: HCPCS | Performed by: FAMILY MEDICINE

## 2022-02-22 PROCEDURE — G8420 CALC BMI NORM PARAMETERS: HCPCS | Performed by: FAMILY MEDICINE

## 2022-02-22 PROCEDURE — 1123F ACP DISCUSS/DSCN MKR DOCD: CPT | Performed by: FAMILY MEDICINE

## 2022-02-22 PROCEDURE — 99214 OFFICE O/P EST MOD 30 MIN: CPT | Performed by: FAMILY MEDICINE

## 2022-02-22 PROCEDURE — 1036F TOBACCO NON-USER: CPT | Performed by: FAMILY MEDICINE

## 2022-02-22 RX ORDER — ADHESIVE TAPE 3"X 2.3 YD
200 TAPE, NON-MEDICATED TOPICAL DAILY
Qty: 30 TABLET | Refills: 3 | Status: SHIPPED | OUTPATIENT
Start: 2022-02-22

## 2022-02-22 RX ORDER — TIMOLOL MALEATE 5 MG/ML
1 SOLUTION/ DROPS OPHTHALMIC 2 TIMES DAILY
Qty: 5 ML | Refills: 0 | Status: SHIPPED
Start: 2022-02-22 | End: 2022-05-02

## 2022-02-22 NOTE — PROGRESS NOTES
OFFICE PROGRESS NOTE      SUBJECTIVE:        Patient ID:   Pierre President is a 80 y.o. male who presents for   Chief Complaint   Patient presents with    Hypertension    Chronic Kidney Disease    Referral - General     [facility]    Health Maintenance     Due for AWV           HPI:     RECHECK BP, CHOLESTEROL AND LOW BLOOD COUNT. Nghia Awkward MEDICATION REFILL. FEELS GOOD. WATCHING DIET GOOD. WALKING FOR EXERCISE. TAKING MEDICATIONS REGULARLY. HAS SUPRA PUBIC CATHETER. SEEING DR. Devendra Ireland FOR PROSTATE  AND BLADDER PROBLEM. Prior to Admission medications    Medication Sig Start Date End Date Taking?  Authorizing Provider   timolol (TIMOPTIC) 0.5 % ophthalmic solution Place 1 drop into both eyes 2 times daily 2/22/22  Yes Meghan Forde MD   Magnesium Oxide 200 MG TABS Take 200 mg by mouth daily 2/22/22  Yes Meghan Forde MD   metoprolol succinate (TOPROL XL) 25 MG extended release tablet Take 1 tablet by mouth daily 1/18/22  Yes Meghan Forde MD   simvastatin (ZOCOR) 40 MG tablet Take 1 tablet by mouth nightly 1/18/22  Yes Meghan Forde MD   lisinopril-hydroCHLOROthiazide (PRINZIDE;ZESTORETIC) 10-12.5 MG per tablet Take 1 tablet by mouth daily 1/18/22  Yes Meghan Forde MD   amLODIPine (NORVASC) 2.5 MG tablet Take 1 tablet by mouth daily 10/12/21  Yes Meghan Forde MD   finasteride (PROSCAR) 5 MG tablet Take 1 tablet by mouth daily 10/12/21  Yes Meghan Forde MD   ferrous sulfate (IRON 325) 325 (65 Fe) MG tablet Take 1 tablet by mouth 2 times daily 9/10/21  Yes Meghan Forde MD   Multiple Vitamin (ONE-A-DAY ESSENTIAL) TABS Take 1 tablet by mouth daily   Yes Historical Provider, MD   Cholecalciferol (VITAMIN D3) 50 MCG (2000 UT) CAPS Take 2,000 Units by mouth daily    Yes Historical Provider, MD   Ascorbic Acid (C-250) 250 MG CHEW Take 250 mg by mouth daily    Yes Historical Provider, MD   vitamin B-12 (CYANOCOBALAMIN) 1000 MCG tablet Take 1,000 mcg by mouth daily   Yes Historical Provider, MD   pantoprazole (PROTONIX) 40 MG tablet Take 40 mg by mouth daily  19  Yes Historical Provider, Yohannes Rocha repens, 450 MG CAPS Take 2 tablets by mouth daily. Yes Historical Provider, MD [employer] (OSTEO BI-FLEX JOINT SHIELD PO) Take 2 tablets by mouth daily. Yes Historical Provider, MD   aspirin 81 MG EC tablet Take 81 mg by mouth daily Last dose 10/28   Yes Historical Provider, MD     Social History     Socioeconomic History    Marital status:      Spouse name: Not on file    Number of children: Not on file    Years of education: Not on file    Highest education level: Not on file   Occupational History    Not on file   Tobacco Use    Smoking status: Former Smoker     Packs/day: 1.00     Years: 40.00     Pack years: 40.00     Quit date: 2000     Years since quittin.4    Smokeless tobacco: Never Used   Vaping Use    Vaping Use: Never used   Substance and Sexual Activity    Alcohol use: No    Drug use: No    Sexual activity: Not on file   Other Topics Concern    Not on file   Social History Narrative    Not on file     Social Determinants of Health     Financial Resource Strain: Low Risk     Difficulty of Paying Living Expenses: Not hard at all   Food Insecurity: No Food Insecurity    Worried About [address] in the Last Year: Never true    [address] in the Last Year: Never true   Transportation Needs: No Transportation Needs    Lack of Transportation (Medical): No    Lack of Transportation (Non-Medical):  No   Physical Activity:     Days of Exercise per Week: Not on file    Minutes of Exercise per Session: Not on file   Stress:     Feeling of Stress : Not on file   Social Connections:     Frequency of Communication with Friends and Family: Not on file    Frequency of Social Gatherings with Friends and Family: Not on file    Attends Muslim Services: Not on file   [employer]s or Organizations: Not on file    Attends Club or Organization Meetings: Not on file    Marital Status: Not on file   Intimate Partner Violence:     Fear of Current or Ex-Partner: Not on file    Emotionally Abused: Not on file    Physically Abused: Not on file    Sexually Abused: Not on file   Housing Stability: [address] Unable to Pay for Housing in the Last Year: No    Number of [place] in the Last Year: 1    Unstable Housing in the Last Year: No       I have reviewed Kyle's allergies, medications, problem list, medical, social and family history and have updated as needed in the electronic medical record  Review Of Systems:    Skin: no abnormal pigmentation, rash, scaling, itching, masses, hair or nail changes  Eyes: no blurring, diplopia, or eye pain  Ears/Nose/Throat: no hearing loss, tinnitus, vertigo, nosebleed, nasal congestion, rhinorrhea, sore throat  Respiratory: no cough, pleuritic chest pain, dyspnea, or wheezing  Cardiovascular: no chest pain, angina, dyspnea on exertion, orthopnea, PND, palpitations, or claudication  Gastrointestinal: no nausea, vomiting, heartburn, diarrhea, constipation, bloating,  abdominal pain, or blood per rectum. Appetite is good  Genitourinary: no urinary urgency, frequency, dysuria, nocturia, hesitancy, or incontinence  Musculoskeletal: joint pains off and on. Morning stiffness.  Ambulating well  Neurologic: no paralysis, paresis, paresthesia, seizures, tremors, or headaches  Hematologic/Lymphatic/Immunologic: no anemia, abnormal bleeding/bruising, fever, chills, night sweats, swollen glands, or unexplained weight loss  Endocrine: no heat or cold intolerance and no polyphagia, polydipsia, or polyuria        OBJECTIVE:     VS:  Wt Readings from Last 3 Encounters:   02/22/22 143 lb (64.9 kg)   01/18/22 137 lb (62.1 kg)   01/04/22 132 lb (59.9 kg)     Temp Readings from Last 3 Encounters:   02/22/22 97.3 °F (36.3 °C)   01/18/22 98.2 °F (36.8 °C)   01/09/22 98.3 °F (36.8 °C) (Oral)     BP Readings from Last 3 Encounters:   02/22/22 120/60   01/18/22 102/64   01/09/22 (!) 110/58        General appearance: Alert, Awake, Oriented times 3, no distress  Skin: Warm and dry  Head: Normocephalic. No masses, lesions or tenderness noted  Eyes: Conjunctivae appear normal. PERLE  Ears: External ears normal  Nose/Sinuses: Nares normal. Septum midline. Mucosa normal. No drainage  Oropharynx: Oropharynx clear with no exudate seen  Neck: Neck supple. No jugular venous distension, lymphadenopathy or thyromegaly Trachea midline  Chest:  Normal. Movements are Normal and Equal.  Lungs: Lungs clear to auscultation bilaterally. No ronchi, crackles or wheezes  Heart: S1 S2  Regular rate and rhythm. No rub, murmur or gallop  Abdomen: Abdomen soft, non-tender. BS normal. No masses, organomegaly. SUPRAPUBIC CATHETER. Back: Grossly Normal and Equal. DTR are Normal. SLR is Normal.  Extremities: Arthritic changes are noted. Movements are limited. Pedal pulses are normal.  Musculoskeletal: Muscular strength appears intact. No joint effusion, tenderness, swelling or warmth  Neuro:  No focal motor or sensory deficits        ASSESSMENT     Patient Active Problem List    Diagnosis Date Noted    Hypokalemia 08/09/2021    Iron deficiency anemia due to chronic blood loss 07/26/2018    Stage 3 chronic kidney disease ([facility] [address] 07/26/2018    Primary osteoarthritis involving multiple joints 09/24/2012    Pure hypercholesterolemia     Essential hypertension     Hypomagnesemia 02/22/2022    Chronic primary angle-closure glaucoma, indeterminate stage 02/22/2022    BPH with urinary obstruction 11/02/2021    Enlarged prostate with urinary retention 09/10/2021        Diagnosis:     ICD-10-CM    1. Pure hypercholesterolemia  E78.00 Comprehensive Metabolic Panel     Lipid Panel    CONTROLLED   2. Essential hypertension  I10     CONTROLLED   3.  Stage 3 chronic kidney disease, unspecified whether stage 3a or 3b CKD ([facility] [address]  N18.30     STABLE   4. Hypomagnesemia  E83.42 Magnesium    NEW ONSET   5. Chronic primary angle-closure glaucoma, indeterminate stage, unspecified laterality  O23.1032 External Referral To Ophthalmology    CONTROLLED   6. Iron deficiency anemia due to chronic blood loss  D50.0 CBC with Auto Differential       PLAN:      LABORATORY RESULTS 1/9/2022  REVIEWED AND DISCUSSED. Patient Instructions   LOW SALT FOR BLOOD PRESSURE CONTROL. LOW FAT DIET FOR CHOLESTEROL CONTROL. DRINK ENOUGH FLUIDS FOR BETTER KIDNEY FUNCTION. TAKE  AMLODIPINE 2.5 MG. DAILY, PRINIVIL/HCT 10/12.5  1 TAB. DAILY,TOPROL XL 25 MG. 1 TAB. DAILY FOR BLOOD PRESSURE CONTROL. TAKE  ZOCOR 40 MG. 1 TAB. DAILY.  FOR CHOLESTEROL CONTROL. Susan Puente TAKE  FEOSOL 1 TAB. 2 TIMES A DAY AND MULTIVITAMIN 1 TAB. DAILY TO IMPROVE BLOOD COUNT. TAKE  PROSCAR 5 MG. DAILY FOR PROSTATE PROBLEM. REGULAR WALKING ADVISED. CONTINUE FOLLOW UP WITH DR. Dre Hawkins FOR PROSTATE PROBLEM. FASTING FOR LAB WORK ONE MORNING.  KEEP NEXT APPOINTMENT IN 1 MONTH FOR ANNUAL PHYSICAL EXAMINATION. Return in about 2 months (around 4/22/2022) for [employer]. .         I have reviewed my findings and recommendations with [address] [place] [address].     Electronically signed by Mignon Anne MD on 2/22/22 at 9:37 AM EST

## 2022-02-22 NOTE — PATIENT INSTRUCTIONS
LOW SALT FOR BLOOD PRESSURE CONTROL. LOW FAT DIET FOR CHOLESTEROL CONTROL. DRINK ENOUGH FLUIDS FOR BETTER KIDNEY FUNCTION. TAKE  AMLODIPINE 2.5 MG. DAILY, PRINIVIL/HCT 10/12.5  1 TAB. DAILY,TOPROL XL 25 MG. 1 TAB. DAILY FOR BLOOD PRESSURE CONTROL. TAKE  ZOCOR 40 MG. 1 TAB. DAILY.  FOR CHOLESTEROL CONTROL. Vernon Skates TAKE  FEOSOL 1 TAB. 2 TIMES A DAY AND MULTIVITAMIN 1 TAB. DAILY TO IMPROVE BLOOD COUNT. TAKE  PROSCAR 5 MG. DAILY FOR PROSTATE PROBLEM. REGULAR WALKING ADVISED. CONTINUE FOLLOW UP WITH DR. Luisito Arellano FOR PROSTATE PROBLEM. FASTING FOR LAB WORK ONE MORNING.  KEEP NEXT APPOINTMENT IN 1 MONTH FOR ANNUAL PHYSICAL EXAMINATION.

## 2022-02-24 ENCOUNTER — TELEPHONE (OUTPATIENT)
Dept: FAMILY MEDICINE CLINIC | Age: 87
End: 2022-02-24

## 2022-02-24 NOTE — TELEPHONE ENCOUNTER
----- Message from Renee Kat sent at 2/24/2022 12:09 PM EST -----  Subject: Refill Request    QUESTIONS  Name of Medication? lisinopril-hydroCHLOROthiazide (PRINZIDE;ZESTORETIC)   10-12.5 MG per tablet   Rx sent 1/18/22 for #90. Patient-reported dosage and instructions? One tab by mouth once a day   10-12.5  How many days do you have left? 0  Preferred Pharmacy? [facility]  Pharmacy phone number (if available)? [phone]  ---------------------------------------------------------------------------  --------------  CALL BACK INFO  What is the best way for the office to contact you? OK to leave message on   voicemail  Preferred Call Back Phone Number?  [phone]

## 2022-03-07 DIAGNOSIS — E83.42 HYPOMAGNESEMIA: ICD-10-CM

## 2022-03-07 DIAGNOSIS — D50.0 IRON DEFICIENCY ANEMIA DUE TO CHRONIC BLOOD LOSS: ICD-10-CM

## 2022-03-07 DIAGNOSIS — E78.00 PURE HYPERCHOLESTEROLEMIA: ICD-10-CM

## 2022-03-07 LAB
ALBUMIN SERPL-MCNC: 3.8 G/DL (ref 3.5–5.2)
ALP BLD-CCNC: 73 U/L (ref 40–129)
ALT SERPL-CCNC: 6 U/L (ref 0–40)
ANION GAP SERPL CALCULATED.3IONS-SCNC: 12 MMOL/L (ref 7–16)
AST SERPL-CCNC: 10 U/L (ref 0–39)
BASOPHILS ABSOLUTE: 0.09 E9/L (ref 0–0.2)
BASOPHILS RELATIVE PERCENT: 0.8 % (ref 0–2)
BILIRUB SERPL-MCNC: 0.3 MG/DL (ref 0–1.2)
BUN BLDV-MCNC: 46 MG/DL (ref 6–23)
CALCIUM SERPL-MCNC: 9.5 MG/DL (ref 8.6–10.2)
CHLORIDE BLD-SCNC: 106 MMOL/L (ref 98–107)
CHOLESTEROL, TOTAL: 133 MG/DL (ref 0–199)
CO2: 21 MMOL/L (ref 22–29)
CREAT SERPL-MCNC: 2.5 MG/DL (ref 0.7–1.2)
EOSINOPHILS ABSOLUTE: 0.12 E9/L (ref 0.05–0.5)
EOSINOPHILS RELATIVE PERCENT: 1 % (ref 0–6)
GFR AFRICAN AMERICAN: 30
GFR NON-AFRICAN AMERICAN: 24 ML/MIN/1.73
GLUCOSE BLD-MCNC: 101 MG/DL (ref 74–99)
HCT VFR BLD CALC: 35.8 % (ref 37–54)
HDLC SERPL-MCNC: 43 MG/DL
HEMOGLOBIN: 11.1 G/DL (ref 12.5–16.5)
IMMATURE GRANULOCYTES #: 0.11 E9/L
IMMATURE GRANULOCYTES %: 0.9 % (ref 0–5)
LDL CHOLESTEROL CALCULATED: 70 MG/DL (ref 0–99)
LYMPHOCYTES ABSOLUTE: 3 E9/L (ref 1.5–4)
LYMPHOCYTES RELATIVE PERCENT: 25.5 % (ref 20–42)
MAGNESIUM: 2 MG/DL (ref 1.6–2.6)
MCH RBC QN AUTO: 30.2 PG (ref 26–35)
MCHC RBC AUTO-ENTMCNC: 31 % (ref 32–34.5)
MCV RBC AUTO: 97.5 FL (ref 80–99.9)
MONOCYTES ABSOLUTE: 0.65 E9/L (ref 0.1–0.95)
MONOCYTES RELATIVE PERCENT: 5.5 % (ref 2–12)
NEUTROPHILS ABSOLUTE: 7.8 E9/L (ref 1.8–7.3)
NEUTROPHILS RELATIVE PERCENT: 66.3 % (ref 43–80)
PDW BLD-RTO: 13.7 FL (ref 11.5–15)
PLATELET # BLD: 426 E9/L (ref 130–450)
PMV BLD AUTO: 9.9 FL (ref 7–12)
POTASSIUM SERPL-SCNC: 4.8 MMOL/L (ref 3.5–5)
RBC # BLD: 3.67 E12/L (ref 3.8–5.8)
SODIUM BLD-SCNC: 139 MMOL/L (ref 132–146)
TOTAL PROTEIN: 7.1 G/DL (ref 6.4–8.3)
TRIGL SERPL-MCNC: 101 MG/DL (ref 0–149)
VLDLC SERPL CALC-MCNC: 20 MG/DL
WBC # BLD: 11.8 E9/L (ref 4.5–11.5)

## 2022-03-22 ENCOUNTER — OFFICE VISIT (OUTPATIENT)
Dept: FAMILY MEDICINE CLINIC | Age: 87
End: 2022-03-22
Payer: MEDICARE

## 2022-03-22 VITALS
SYSTOLIC BLOOD PRESSURE: 118 MMHG | TEMPERATURE: 97.3 F | DIASTOLIC BLOOD PRESSURE: 64 MMHG | BODY MASS INDEX: 20.38 KG/M2 | WEIGHT: 138 LBS | OXYGEN SATURATION: 95 % | HEART RATE: 118 BPM

## 2022-03-22 DIAGNOSIS — N40.1 ENLARGED PROSTATE WITH URINARY RETENTION: ICD-10-CM

## 2022-03-22 DIAGNOSIS — R30.0 BURNING WITH URINATION: ICD-10-CM

## 2022-03-22 DIAGNOSIS — R33.8 ENLARGED PROSTATE WITH URINARY RETENTION: ICD-10-CM

## 2022-03-22 DIAGNOSIS — N30.00 ACUTE CYSTITIS WITHOUT HEMATURIA: ICD-10-CM

## 2022-03-22 DIAGNOSIS — E78.00 PURE HYPERCHOLESTEROLEMIA: Primary | ICD-10-CM

## 2022-03-22 DIAGNOSIS — D50.0 IRON DEFICIENCY ANEMIA DUE TO CHRONIC BLOOD LOSS: ICD-10-CM

## 2022-03-22 DIAGNOSIS — N18.4 STAGE 4 CHRONIC KIDNEY DISEASE (HCC): ICD-10-CM

## 2022-03-22 DIAGNOSIS — I10 ESSENTIAL HYPERTENSION: ICD-10-CM

## 2022-03-22 LAB
BILIRUBIN, POC: NORMAL
BLOOD URINE, POC: NORMAL
CLARITY, POC: NORMAL
COLOR, POC: NORMAL
GLUCOSE URINE, POC: NORMAL
KETONES, POC: NORMAL
LEUKOCYTE EST, POC: NORMAL
NITRITE, POC: NORMAL
PH, POC: 5.5
PROTEIN, POC: >300
SPECIFIC GRAVITY, POC: 1.02
UROBILINOGEN, POC: 1

## 2022-03-22 PROCEDURE — 1036F TOBACCO NON-USER: CPT | Performed by: FAMILY MEDICINE

## 2022-03-22 PROCEDURE — G8484 FLU IMMUNIZE NO ADMIN: HCPCS | Performed by: FAMILY MEDICINE

## 2022-03-22 PROCEDURE — 99214 OFFICE O/P EST MOD 30 MIN: CPT | Performed by: FAMILY MEDICINE

## 2022-03-22 PROCEDURE — G8420 CALC BMI NORM PARAMETERS: HCPCS | Performed by: FAMILY MEDICINE

## 2022-03-22 PROCEDURE — 1123F ACP DISCUSS/DSCN MKR DOCD: CPT | Performed by: FAMILY MEDICINE

## 2022-03-22 PROCEDURE — G8427 DOCREV CUR MEDS BY ELIG CLIN: HCPCS | Performed by: FAMILY MEDICINE

## 2022-03-22 PROCEDURE — 81002 URINALYSIS NONAUTO W/O SCOPE: CPT | Performed by: FAMILY MEDICINE

## 2022-03-22 PROCEDURE — 4040F PNEUMOC VAC/ADMIN/RCVD: CPT | Performed by: FAMILY MEDICINE

## 2022-03-22 RX ORDER — AMOXICILLIN 250 MG/1
250 CAPSULE ORAL 3 TIMES DAILY
Qty: 30 CAPSULE | Refills: 0 | Status: SHIPPED | OUTPATIENT
Start: 2022-03-22 | End: 2022-04-01

## 2022-03-22 NOTE — PATIENT INSTRUCTIONS
LOW SALT FOR BLOOD PRESSURE CONTROL. LOW FAT DIET FOR CHOLESTEROL CONTROL. DRINK ENOUGH FLUIDS FOR BETTER KIDNEY FUNCTION. TAKE  AMLODIPINE 2.5 MG. DAILY, PRINIVIL/HCT 10/12.5  1 TAB. DAILY,TOPROL XL 25 MG. 1 TAB. DAILY FOR BLOOD PRESSURE CONTROL. TAKE  ZOCOR 40 MG. 1 TAB. DAILY.  FOR CHOLESTEROL CONTROL. Jamey Miller TAKE  FEOSOL 1 TAB. 2 TIMES A DAY AND MULTIVITAMIN 1 TAB. DAILY TO IMPROVE BLOOD COUNT. TAKE  PROSCAR 5 MG. DAILY FOR PROSTATE PROBLEM. REGULAR WALKING ADVISED. CONTINUE FOLLOW UP WITH DR. Jarod Silverman FOR PROSTATE PROBLEM. KEEP NEXT APPOINTMENT IN 1 MONTH FOR ANNUAL PHYSICAL EXAMINATION.

## 2022-03-22 NOTE — PROGRESS NOTES
OFFICE PROGRESS NOTE      SUBJECTIVE:        Patient ID:   Denny Wilson is a 80 y.o. male who presents for   Chief Complaint   Patient presents with    Urinary Tract Infection           HPI:     RECHECK BP, CHOLESTEROL AND URINE PROBLEM. BURNING ON MICTURITION FOR PAST FEW DAYS. NO FEVER OR OTHER ASSOCIATED SYMPTOMS. STILL HAS CATHETER AS PER UROLOGIST.  WATCHING DIET GOOD. WALKING SOME IN HOUSE FOR EXERCISE. TAKING MEDICATIONS REGULARLY. Prior to Admission medications    Medication Sig Start Date End Date Taking?  Authorizing Provider   amoxicillin (AMOXIL) 250 MG capsule Take 1 capsule by mouth 3 times daily for 10 days 3/22/22 4/1/22 Yes Alfred Poon MD   timolol (TIMOPTIC) 0.5 % ophthalmic solution Place 1 drop into both eyes 2 times daily 2/22/22  Yes Alfred Poon MD   Magnesium Oxide 200 MG TABS Take 200 mg by mouth daily 2/22/22  Yes Alfred Poon MD   metoprolol succinate (TOPROL XL) 25 MG extended release tablet Take 1 tablet by mouth daily 1/18/22  Yes Alfred Poon MD   simvastatin (ZOCOR) 40 MG tablet Take 1 tablet by mouth nightly 1/18/22  Yes Alfred Poon MD   lisinopril-hydroCHLOROthiazide (PRINZIDE;ZESTORETIC) 10-12.5 MG per tablet Take 1 tablet by mouth daily 1/18/22  Yes Alfred Poon MD   amLODIPine (NORVASC) 2.5 MG tablet Take 1 tablet by mouth daily 10/12/21  Yes Alfred Poon MD   finasteride (PROSCAR) 5 MG tablet Take 1 tablet by mouth daily 10/12/21  Yes Alfred Poon MD   ferrous sulfate (IRON 325) 325 (65 Fe) MG tablet Take 1 tablet by mouth 2 times daily 9/10/21  Yes Alfred Poon MD   Multiple Vitamin (ONE-A-DAY ESSENTIAL) TABS Take 1 tablet by mouth daily   Yes Historical Provider, MD   Cholecalciferol (VITAMIN D3) 50 MCG (2000 UT) CAPS Take 2,000 Units by mouth daily    Yes Historical Provider, MD   Ascorbic Acid (C-250) 250 MG CHEW Take 250 mg by mouth daily    Yes Historical Provider, MD   vitamin B-12 (CYANOCOBALAMIN) 1000 MCG tablet Take 1,000 mcg by mouth daily   Yes Historical Provider, MD   pantoprazole (PROTONIX) 40 MG tablet Take 40 mg by mouth daily  19  Yes Historical Provider, MD Jann Rodríguez Serenoa repens, 450 MG CAPS Take 2 tablets by mouth daily. Yes Historical Provider, MD   [employer] Natural Products (OSTEO BI-FLEX JOINT SHIELD PO) Take 2 tablets by mouth daily. Yes Historical Provider, MD   aspirin 81 MG EC tablet Take 81 mg by mouth daily Last dose 10/28   Yes Historical Provider, MD     Social History     Socioeconomic History    Marital status:      Spouse name: Not on file    Number of children: Not on file    Years of education: Not on file    Highest education level: Not on file   Occupational History    Not on file   Tobacco Use    Smoking status: Former Smoker     Packs/day: 1.00     Years: 40.00     Pack years: 40.00     Quit date: 2000     Years since quittin.5    Smokeless tobacco: Never Used   Vaping Use    Vaping Use: Never used   Substance and Sexual Activity    Alcohol use: No    Drug use: No    Sexual activity: Not on file   Other Topics Concern    Not on file   Social History Narrative    Not on file     Social Determinants of Health     Financial Resource Strain: Low Risk     Difficulty of Paying Living Expenses: Not hard at all   Food Insecurity: No Food Insecurity    Worried About [address] in the Last Year: Never true    [address] in the Last Year: Never true   Transportation Needs: No Transportation Needs    Lack of Transportation (Medical): No    Lack of Transportation (Non-Medical):  No   Physical Activity:     Days of Exercise per Week: Not on file    Minutes of Exercise per Session: Not on file   Stress:     Feeling of Stress : Not on file   Social Connections:     Frequency of Communication with Friends and Family: Not on file    Frequency of Social Gatherings with Friends and Family: Not on file    Attends Jew Services: Not on file    Active Member of Clubs or Organizations: Not on file    Attends Club or Organization Meetings: Not on file    Marital Status: Not on file   Intimate Partner Violence:     Fear of Current or Ex-Partner: Not on file    Emotionally Abused: Not on file    Physically Abused: Not on file    Sexually Abused: Not on file   Housing Stability: [address] Unable to Pay for Housing in the Last Year: No    Number of [place] in the Last Year: 1    Unstable Housing in the Last Year: No       I have reviewed Kyle's allergies, medications, problem list, medical, social and family history and have updated as needed in the electronic medical record  Review Of Systems:    Skin: no abnormal pigmentation, rash, scaling, itching, masses, hair or nail changes  Eyes: no blurring, diplopia, or eye pain  Ears/Nose/Throat: no hearing loss, tinnitus, vertigo, nosebleed, nasal congestion, rhinorrhea, sore throat  Respiratory: no cough, pleuritic chest pain, dyspnea, or wheezing  Cardiovascular: no chest pain, angina, dyspnea on exertion, orthopnea, PND, palpitations, or claudication  Gastrointestinal: no nausea, vomiting, heartburn, diarrhea, constipation, bloating,  abdominal pain, or blood per rectum. Appetite is good  Genitourinary: no urinary urgency, frequency, dysuria, nocturia, hesitancy, or incontinence  Musculoskeletal: joint pains off and on. Morning stiffness.  Ambulating well  Neurologic: no paralysis, paresis, paresthesia, seizures, tremors, or headaches  Hematologic/Lymphatic/Immunologic: no anemia, abnormal bleeding/bruising, fever, chills, night sweats, swollen glands, or unexplained weight loss  Endocrine: no heat or cold intolerance and no polyphagia, polydipsia, or polyuria        OBJECTIVE:     VS:  Wt Readings from Last 3 Encounters:   03/22/22 138 lb (62.6 kg)   02/22/22 143 lb (64.9 kg)   01/18/22 137 lb (62.1 kg)     Temp Readings from Last 3 Encounters:   03/22/22 97.3 °F (36.3 °C)   02/22/22 97.3 °F (36.3 °C)   01/18/22 98.2 °F (36.8 °C)     BP Readings from Last 3 Encounters:   03/22/22 118/64   02/22/22 120/60   01/18/22 102/64        General appearance: Alert, Awake, Oriented times 3, no distress  Skin: Warm and dry  Head: Normocephalic. No masses, lesions or tenderness noted  Eyes: Conjunctivae appear normal. PERLE  Ears: External ears normal  Nose/Sinuses: Nares normal. Septum midline. Mucosa normal. No drainage  Oropharynx: Oropharynx clear with no exudate seen  Neck: Neck supple. No jugular venous distension, lymphadenopathy or thyromegaly Trachea midline  Chest:  Normal. Movements are Normal and Equal.  Lungs: Lungs clear to auscultation bilaterally. No ronchi, crackles or wheezes  Heart: S1 S2  Regular rate and rhythm. No rub, murmur or gallop  Abdomen: Abdomen soft, non-tender. BS normal. No masses, organomegaly. Back: Grossly Normal and Equal. DTR are Normal. SLR is Normal.  Extremities: Arthritic changes are noted. Movements are limited. Pedal pulses are normal.  Musculoskeletal: Muscular strength appears intact. No joint effusion, tenderness, swelling or warmth  Neuro:  No focal motor or sensory deficits        ASSESSMENT     Patient Active Problem List    Diagnosis Date Noted    Hypokalemia 08/09/2021    Iron deficiency anemia due to chronic blood loss 07/26/2018    Stage 4 chronic kidney disease ([facility] [address] 07/26/2018    Primary osteoarthritis involving multiple joints 09/24/2012    Pure hypercholesterolemia     Essential hypertension     Hypomagnesemia 02/22/2022    Chronic primary angle-closure glaucoma, indeterminate stage 02/22/2022    BPH with urinary obstruction 11/02/2021    Enlarged prostate with urinary retention 09/10/2021        Diagnosis:     ICD-10-CM    1. Pure hypercholesterolemia  E78.00     CONTROLLED   2. Essential hypertension  I10     CONTROLLED   3. Stage 4 chronic kidney disease (HCC)  N18.4     PROGRESSIVE    4.  Iron deficiency anemia due to chronic blood loss  D50.0     STABLE   5. Enlarged prostate with urinary retention  N40.1     R33.8     STABLE   6. Acute cystitis without hematuria  N30.00     ACTIVE    7. Burning with urination  R30.0 POCT Urinalysis no Micro       PLAN:           Patient Instructions   LOW SALT FOR BLOOD PRESSURE CONTROL. LOW FAT DIET FOR CHOLESTEROL CONTROL. DRINK ENOUGH FLUIDS FOR BETTER KIDNEY FUNCTION. TAKE  AMLODIPINE 2.5 MG. DAILY, PRINIVIL/HCT 10/12.5  1 TAB. DAILY,TOPROL XL 25 MG. 1 TAB. DAILY FOR BLOOD PRESSURE CONTROL. TAKE  ZOCOR 40 MG. 1 TAB. DAILY.  FOR CHOLESTEROL CONTROL. Vianey Dominguez TAKE  FEOSOL 1 TAB. 2 TIMES A DAY AND MULTIVITAMIN 1 TAB. DAILY TO IMPROVE BLOOD COUNT. TAKE  PROSCAR 5 MG. DAILY FOR PROSTATE PROBLEM. REGULAR WALKING ADVISED. CONTINUE FOLLOW UP WITH DR. Alpa Beverly FOR PROSTATE PROBLEM. KEEP NEXT APPOINTMENT IN 1 MONTH FOR ANNUAL PHYSICAL EXAMINATION. Return in about 30 days (around 4/21/2022) for [employer]. .         I have reviewed my findings and recommendations with Katy Cottrell.     Electronically signed by Malina Pace MD on 3/22/22 at 2:50 PM EDT

## 2022-04-28 ENCOUNTER — OFFICE VISIT (OUTPATIENT)
Dept: FAMILY MEDICINE CLINIC | Age: 87
End: 2022-04-28
Payer: MEDICARE

## 2022-04-28 VITALS
HEART RATE: 58 BPM | BODY MASS INDEX: 20.82 KG/M2 | OXYGEN SATURATION: 99 % | TEMPERATURE: 97.4 F | WEIGHT: 141 LBS | DIASTOLIC BLOOD PRESSURE: 60 MMHG | SYSTOLIC BLOOD PRESSURE: 108 MMHG | RESPIRATION RATE: 16 BRPM

## 2022-04-28 DIAGNOSIS — D50.0 IRON DEFICIENCY ANEMIA DUE TO CHRONIC BLOOD LOSS: ICD-10-CM

## 2022-04-28 DIAGNOSIS — R33.8 ENLARGED PROSTATE WITH URINARY RETENTION: ICD-10-CM

## 2022-04-28 DIAGNOSIS — E78.00 PURE HYPERCHOLESTEROLEMIA: Primary | ICD-10-CM

## 2022-04-28 DIAGNOSIS — I10 ESSENTIAL HYPERTENSION: ICD-10-CM

## 2022-04-28 DIAGNOSIS — N18.31 STAGE 3A CHRONIC KIDNEY DISEASE (HCC): ICD-10-CM

## 2022-04-28 DIAGNOSIS — N40.1 ENLARGED PROSTATE WITH URINARY RETENTION: ICD-10-CM

## 2022-04-28 PROBLEM — N18.30 CHRONIC RENAL DISEASE, STAGE III (HCC): Status: ACTIVE | Noted: 2022-04-28

## 2022-04-28 PROCEDURE — 99213 OFFICE O/P EST LOW 20 MIN: CPT | Performed by: FAMILY MEDICINE

## 2022-04-28 PROCEDURE — G8420 CALC BMI NORM PARAMETERS: HCPCS | Performed by: FAMILY MEDICINE

## 2022-04-28 PROCEDURE — 1036F TOBACCO NON-USER: CPT | Performed by: FAMILY MEDICINE

## 2022-04-28 PROCEDURE — 4040F PNEUMOC VAC/ADMIN/RCVD: CPT | Performed by: FAMILY MEDICINE

## 2022-04-28 PROCEDURE — G8427 DOCREV CUR MEDS BY ELIG CLIN: HCPCS | Performed by: FAMILY MEDICINE

## 2022-04-28 PROCEDURE — 1123F ACP DISCUSS/DSCN MKR DOCD: CPT | Performed by: FAMILY MEDICINE

## 2022-04-28 RX ORDER — SIMVASTATIN 40 MG
40 TABLET ORAL NIGHTLY
Qty: 90 TABLET | Refills: 1 | Status: SHIPPED
Start: 2022-04-28 | End: 2022-07-26 | Stop reason: SDUPTHER

## 2022-04-28 NOTE — PROGRESS NOTES
OFFICE PROGRESS NOTE      SUBJECTIVE:        Patient ID:   Jose Peters is a 80 y.o. male who presents for   Chief Complaint   Patient presents with    Hypertension    Benign Prostatic Hypertrophy    Chronic Kidney Disease           HPI:     RECHECK ANEMIA, BP, CHOLESTEROL AND PROSTATE PROBLEM. MEDICATION REFILL. FEELS GOOD. WATCHING DIET GOOD. WALKING SOME IN HOUSE FOR EXERCISE. TAKING MEDICATIONS REGULARLY. Prior to Admission medications    Medication Sig Start Date End Date Taking? Authorizing Provider   simvastatin (ZOCOR) 40 MG tablet Take 1 tablet by mouth nightly 4/28/22  Yes Kevin Venegas MD   timolol (TIMOPTIC) 0.5 % ophthalmic solution Place 1 drop into both eyes 2 times daily 2/22/22  Yes Kevin Venegas MD   Magnesium Oxide 200 MG TABS Take 200 mg by mouth daily 2/22/22  Yes Kevin Venegas MD   metoprolol succinate (TOPROL XL) 25 MG extended release tablet Take 1 tablet by mouth daily 1/18/22  Yes Kevin Venegas MD   lisinopril-hydroCHLOROthiazide (PRINZIDE;ZESTORETIC) 10-12.5 MG per tablet Take 1 tablet by mouth daily 1/18/22  Yes Kevin Venegas MD   amLODIPine (NORVASC) 2.5 MG tablet Take 1 tablet by mouth daily 10/12/21  Yes Kevin Venegas MD   finasteride (PROSCAR) 5 MG tablet Take 1 tablet by mouth daily 10/12/21  Yes Kevin Venegas MD   ferrous sulfate (IRON 325) 325 (65 Fe) MG tablet Take 1 tablet by mouth 2 times daily 9/10/21  Yes Kevin Venegas MD   Multiple Vitamin (ONE-A-DAY ESSENTIAL) TABS Take 1 tablet by mouth daily   Yes Historical Provider, MD   Cholecalciferol (VITAMIN D3) 50 MCG (2000 UT) CAPS Take 2,000 Units by mouth daily    Yes Historical Provider, MD   Ascorbic Acid (C-250) 250 MG CHEW Take 250 mg by mouth daily    Yes Historical Provider, MD   pantoprazole (PROTONIX) 40 MG tablet Take 40 mg by mouth daily  12/6/19  Yes Historical Provider, Yohannes Rocha repens, 450 MG CAPS Take 2 tablets by mouth daily. Yes Historical Provider, MD   Misc Natural Products (OSTEO BI-FLEX JOINT SHIELD PO) Take 2 tablets by mouth daily. Yes Historical Provider, MD   aspirin 81 MG EC tablet Take 81 mg by mouth daily Last dose 10/28   Yes Historical Provider, MD   vitamin B-12 (CYANOCOBALAMIN) 1000 MCG tablet Take 1,000 mcg by mouth daily    Historical Provider, MD     Social History     Socioeconomic History    Marital status:      Spouse name: Not on file    Number of children: Not on file    Years of education: Not on file    Highest education level: Not on file   Occupational History    Not on file   Tobacco Use    Smoking status: Former Smoker     Packs/day: 1.00     Years: 40.00     Pack years: 40.00     Quit date: 2000     Years since quittin.6    Smokeless tobacco: Never Used   Vaping Use    Vaping Use: Never used   Substance and Sexual Activity    Alcohol use: No    Drug use: No    Sexual activity: Not on file   Other Topics Concern    Not on file   Social History Narrative    Not on file     Social Determinants of Health     Financial Resource Strain: Low Risk     Difficulty of Paying Living Expenses: Not hard at all   Food Insecurity: No Food Insecurity    Worried About [facility] in the Last Year: Never true    [facility] in the Last Year: Never true   Transportation Needs: No Transportation Needs    Lack of Transportation (Medical): No    Lack of Transportation (Non-Medical):  No   Physical Activity:     Days of Exercise per Week: Not on file    Minutes of Exercise per Session: Not on file   Stress:     Feeling of Stress : Not on file   Social Connections:     Frequency of Communication with Friends and Family: Not on file    Frequency of Social Gatherings with Friends and Family: Not on file    Attends Zoroastrian Services: Not on file    Active Member of Clubs or Organizations: Not on file    Attends Club or Organization Meetings: Not on file    Marital Status: Not on file   Intimate Partner Violence:     Fear of Current or Ex-Partner: Not on file    Emotionally Abused: Not on file    Physically Abused: Not on file    Sexually Abused: Not on file   Housing Stability: [address] Unable to Pay for Housing in the Last Year: No    Number of Places Lived in the Last Year: 1    Unstable Housing in the Last Year: No       I have reviewed Kyle's allergies, medications, problem list, medical, social and family history and have updated as needed in the electronic medical record  Review Of Systems:    Skin: no abnormal pigmentation, rash, scaling, itching, masses, hair or nail changes  Eyes: no blurring, diplopia, or eye pain  Ears/Nose/Throat: no hearing loss, tinnitus, vertigo, nosebleed, nasal congestion, rhinorrhea, sore throat  Respiratory: no cough, pleuritic chest pain, dyspnea, or wheezing  Cardiovascular: no chest pain, angina, dyspnea on exertion, orthopnea, PND, palpitations, or claudication  Gastrointestinal: no nausea, vomiting, heartburn, diarrhea, constipation, bloating,  abdominal pain, or blood per rectum. Appetite is good  Genitourinary: no urinary urgency, frequency, dysuria, nocturia, hesitancy, or incontinence  Musculoskeletal: joint pains off and on. Morning stiffness.  Ambulating well  Neurologic: no paralysis, paresis, paresthesia, seizures, tremors, or headaches  Hematologic/Lymphatic/Immunologic: no anemia, abnormal bleeding/bruising, fever, chills, night sweats, swollen glands, or unexplained weight loss  Endocrine: no heat or cold intolerance and no polyphagia, polydipsia, or polyuria        OBJECTIVE:     VS:  Wt Readings from Last 3 Encounters:   04/28/22 141 lb (64 kg)   03/22/22 138 lb (62.6 kg)   02/22/22 143 lb (64.9 kg)     Temp Readings from Last 3 Encounters:   04/28/22 97.4 °F (36.3 °C)   03/22/22 97.3 °F (36.3 °C)   02/22/22 97.3 °F (36.3 °C)     BP Readings from Last 3 Encounters:   04/28/22 108/60   03/22/22 118/64   02/22/22 120/60 General appearance: Alert, Awake, Oriented times 3, no distress  Skin: Warm and dry  Head: Normocephalic. No masses, lesions or tenderness noted  Eyes: Conjunctivae appear normal. PERLE  Ears: External ears normal  Nose/Sinuses: Nares normal. Septum midline. Mucosa normal. No drainage  Oropharynx: Oropharynx clear with no exudate seen  Neck: Neck supple. No jugular venous distension, lymphadenopathy or thyromegaly Trachea midline  Chest:  Normal. Movements are Normal and Equal.  Lungs: Lungs clear to auscultation bilaterally. No ronchi, crackles or wheezes  Heart: S1 S2  Regular rate and rhythm. No rub, murmur or gallop  Abdomen: Abdomen soft, non-tender. BS normal. No masses, organomegaly. Back: Grossly Normal and Equal. DTR are Normal. SLR is Normal.  Extremities: Arthritic changes are noted. Movements are limited. Pedal pulses are normal.  Musculoskeletal: Muscular strength appears intact. No joint effusion, tenderness, swelling or warmth  Neuro:  No focal motor or sensory deficits        ASSESSMENT     Patient Active Problem List    Diagnosis Date Noted    Hypokalemia 08/09/2021    Iron deficiency anemia due to chronic blood loss 07/26/2018    Stage 4 chronic kidney disease ([address] 07/26/2018    Primary osteoarthritis involving multiple joints 09/24/2012    Pure hypercholesterolemia     Essential hypertension     Chronic renal disease, stage III ([address] [634708] 04/28/2022    Hypomagnesemia 02/22/2022    Chronic primary angle-closure glaucoma, indeterminate stage 02/22/2022    BPH with urinary obstruction 11/02/2021    Enlarged prostate with urinary retention 09/10/2021        Diagnosis:     ICD-10-CM    1. Pure hypercholesterolemia  E78.00     CONTROLLED   2. Essential hypertension  I10     CONTROLLED   3. Stage 3a chronic kidney disease (HCC)  N18.31     STABLE   4. Iron deficiency anemia due to chronic blood loss  D50.0     IMPROVING   5.  Enlarged prostate with urinary retention  N40.1     R33.8 STABLE       PLAN:           Patient Instructions   LOW SALT FOR BLOOD PRESSURE CONTROL. LOW FAT DIET FOR CHOLESTEROL CONTROL. DRINK ENOUGH FLUIDS FOR BETTER KIDNEY FUNCTION. TAKE  AMLODIPINE 2.5 MG. DAILY, PRINIVIL/HCT 10/12.5  1 TAB. DAILY,TOPROL XL 25 MG. 1 TAB. DAILY FOR BLOOD PRESSURE CONTROL. TAKE  ZOCOR 40 MG. 1 TAB. DAILY.  FOR CHOLESTEROL CONTROL. [place] TAKE  FEOSOL 1 TAB. 2 TIMES A DAY AND MULTIVITAMIN 1 TAB. DAILY TO IMPROVE BLOOD COUNT. TAKE  PROSCAR 5 MG. DAILY FOR PROSTATE PROBLEM. REGULAR WALKING ADVISED. CONTINUE FOLLOW UP WITH DR. Katty Cisneros FOR PROSTATE PROBLEM.   NEXT APPOINTMENT IN 3 MONTHS FOR ANNUAL PHYSICAL EXAMINATION. Return in about 3 months (around 7/28/2022) for [employer]. .         I have reviewed my findings and recommendations with Monika Chiu.     Electronically signed by Carolee Flood MD on 4/28/22 at 4:31 PM EDT

## 2022-04-28 NOTE — PATIENT INSTRUCTIONS
LOW SALT FOR BLOOD PRESSURE CONTROL. LOW FAT DIET FOR CHOLESTEROL CONTROL. DRINK ENOUGH FLUIDS FOR BETTER KIDNEY FUNCTION. TAKE  AMLODIPINE 2.5 MG. DAILY, PRINIVIL/HCT 10/12.5  1 TAB. DAILY,TOPROL XL 25 MG. 1 TAB. DAILY FOR BLOOD PRESSURE CONTROL. TAKE  ZOCOR 40 MG. 1 TAB. DAILY.  FOR CHOLESTEROL CONTROL. Fredo Harden TAKE  FEOSOL 1 TAB. 2 TIMES A DAY AND MULTIVITAMIN 1 TAB. DAILY TO IMPROVE BLOOD COUNT. TAKE  PROSCAR 5 MG. DAILY FOR PROSTATE PROBLEM. REGULAR WALKING ADVISED. CONTINUE FOLLOW UP WITH DR. Pacheco Cervantes FOR PROSTATE PROBLEM.   NEXT APPOINTMENT IN 3 MONTHS FOR ANNUAL PHYSICAL EXAMINATION.

## 2022-05-02 RX ORDER — TIMOLOL MALEATE 5 MG/ML
1 SOLUTION/ DROPS OPHTHALMIC 2 TIMES DAILY
Qty: 5 ML | Refills: 0 | Status: SHIPPED | OUTPATIENT
Start: 2022-05-02

## 2022-05-02 RX ORDER — PANTOPRAZOLE SODIUM 40 MG/1
40 TABLET, DELAYED RELEASE ORAL DAILY
Qty: 30 TABLET | Refills: 2 | Status: SHIPPED
Start: 2022-05-02 | End: 2022-07-26 | Stop reason: SDUPTHER

## 2022-05-02 RX ORDER — METOPROLOL SUCCINATE 25 MG/1
25 TABLET, EXTENDED RELEASE ORAL DAILY
Qty: 90 TABLET | Refills: 0 | Status: SHIPPED
Start: 2022-05-02 | End: 2022-07-26 | Stop reason: SDUPTHER

## 2022-05-02 RX ORDER — AMLODIPINE BESYLATE 2.5 MG/1
2.5 TABLET ORAL DAILY
Qty: 90 TABLET | Refills: 1 | Status: SHIPPED
Start: 2022-05-02 | End: 2022-07-26 | Stop reason: SDUPTHER

## 2022-05-02 RX ORDER — LISINOPRIL AND HYDROCHLOROTHIAZIDE 12.5; 1 MG/1; MG/1
1 TABLET ORAL DAILY
Qty: 90 TABLET | Refills: 0 | Status: SHIPPED
Start: 2022-05-02 | End: 2022-07-26 | Stop reason: SDUPTHER

## 2022-05-02 RX ORDER — LISINOPRIL AND HYDROCHLOROTHIAZIDE 12.5; 1 MG/1; MG/1
TABLET ORAL
Qty: 90 TABLET | Refills: 0 | Status: ON HOLD
Start: 2022-05-02 | End: 2022-06-22 | Stop reason: HOSPADM

## 2022-05-02 RX ORDER — TIMOLOL MALEATE 5 MG/ML
SOLUTION/ DROPS OPHTHALMIC
Qty: 5 ML | Refills: 0 | Status: SHIPPED
Start: 2022-05-02 | End: 2022-06-16

## 2022-05-02 RX ORDER — FINASTERIDE 5 MG/1
TABLET, FILM COATED ORAL
Qty: 90 TABLET | Refills: 0 | Status: ON HOLD
Start: 2022-05-02 | End: 2022-06-22 | Stop reason: HOSPADM

## 2022-05-02 RX ORDER — FINASTERIDE 5 MG/1
5 TABLET, FILM COATED ORAL DAILY
Qty: 90 TABLET | Refills: 1 | Status: SHIPPED
Start: 2022-05-02 | End: 2022-07-26 | Stop reason: SDUPTHER

## 2022-06-16 ENCOUNTER — TELEPHONE (OUTPATIENT)
Dept: FAMILY MEDICINE CLINIC | Age: 87
End: 2022-06-16

## 2022-06-16 ENCOUNTER — HOSPITAL ENCOUNTER (EMERGENCY)
Age: 87
Discharge: HOME | End: 2022-06-16

## 2022-06-16 ENCOUNTER — APPOINTMENT (OUTPATIENT)
Dept: ULTRASOUND IMAGING | Age: 87
DRG: 699 | End: 2022-06-16
Payer: MEDICARE

## 2022-06-16 ENCOUNTER — HOSPITAL ENCOUNTER (INPATIENT)
Age: 87
LOS: 6 days | Discharge: HOME | DRG: 699 | End: 2022-06-22
Attending: EMERGENCY MEDICINE | Admitting: INTERNAL MEDICINE
Payer: MEDICARE

## 2022-06-16 DIAGNOSIS — E86.0 DEHYDRATION: ICD-10-CM

## 2022-06-16 DIAGNOSIS — N17.9 ACUTE RENAL FAILURE, UNSPECIFIED ACUTE RENAL FAILURE TYPE (HCC): Primary | ICD-10-CM

## 2022-06-16 DIAGNOSIS — E87.5 HYPERKALEMIA: ICD-10-CM

## 2022-06-16 DIAGNOSIS — N30.00 ACUTE CYSTITIS WITHOUT HEMATURIA: ICD-10-CM

## 2022-06-16 PROBLEM — N13.30 HYDRONEPHROSIS DUE TO OBSTRUCTION OF BLADDER: Status: ACTIVE | Noted: 2022-06-16

## 2022-06-16 PROBLEM — N39.0 URINARY TRACT INFECTION ASSOCIATED WITH CATHETERIZATION OF URINARY TRACT (HCC): Status: ACTIVE | Noted: 2022-06-16

## 2022-06-16 PROBLEM — T83.511A URINARY TRACT INFECTION ASSOCIATED WITH CATHETERIZATION OF URINARY TRACT (HCC): Status: ACTIVE | Noted: 2022-06-16

## 2022-06-16 PROBLEM — N32.0 HYDRONEPHROSIS DUE TO OBSTRUCTION OF BLADDER: Status: ACTIVE | Noted: 2022-06-16

## 2022-06-16 PROBLEM — N39.0 COMPLICATED UTI (URINARY TRACT INFECTION): Status: ACTIVE | Noted: 2022-06-16

## 2022-06-16 LAB
ANION GAP SERPL CALCULATED.3IONS-SCNC: 14 MMOL/L (ref 7–16)
BACTERIA: ABNORMAL /HPF
BASOPHILS ABSOLUTE: 0.04 E9/L (ref 0–0.2)
BASOPHILS RELATIVE PERCENT: 0.5 % (ref 0–2)
BILIRUBIN URINE: NEGATIVE
BLOOD, URINE: ABNORMAL
BUN BLDV-MCNC: 104 MG/DL (ref 6–23)
CALCIUM SERPL-MCNC: 9.5 MG/DL (ref 8.6–10.2)
CHLORIDE BLD-SCNC: 105 MMOL/L (ref 98–107)
CLARITY: ABNORMAL
CO2: 14 MMOL/L (ref 22–29)
COLOR: YELLOW
CREAT SERPL-MCNC: 6.4 MG/DL (ref 0.7–1.2)
EOSINOPHILS ABSOLUTE: 0.02 E9/L (ref 0.05–0.5)
EOSINOPHILS RELATIVE PERCENT: 0.2 % (ref 0–6)
EPITHELIAL CELLS, UA: ABNORMAL /HPF
GFR AFRICAN AMERICAN: 10
GFR NON-AFRICAN AMERICAN: 8 ML/MIN/1.73
GLUCOSE BLD-MCNC: 109 MG/DL (ref 74–99)
GLUCOSE URINE: NEGATIVE MG/DL
HCT VFR BLD CALC: 34.7 % (ref 37–54)
HEMOGLOBIN: 11.1 G/DL (ref 12.5–16.5)
IMMATURE GRANULOCYTES #: 0.02 E9/L
IMMATURE GRANULOCYTES %: 0.2 % (ref 0–5)
KETONES, URINE: ABNORMAL MG/DL
LEUKOCYTE ESTERASE, URINE: ABNORMAL
LYMPHOCYTES ABSOLUTE: 2.19 E9/L (ref 1.5–4)
LYMPHOCYTES RELATIVE PERCENT: 25.1 % (ref 20–42)
MCH RBC QN AUTO: 30.7 PG (ref 26–35)
MCHC RBC AUTO-ENTMCNC: 32 % (ref 32–34.5)
MCV RBC AUTO: 96.1 FL (ref 80–99.9)
MONOCYTES ABSOLUTE: 1.02 E9/L (ref 0.1–0.95)
MONOCYTES RELATIVE PERCENT: 11.7 % (ref 2–12)
NEUTROPHILS ABSOLUTE: 5.43 E9/L (ref 1.8–7.3)
NEUTROPHILS RELATIVE PERCENT: 62.3 % (ref 43–80)
NITRITE, URINE: NEGATIVE
PDW BLD-RTO: 14.6 FL (ref 11.5–15)
PH UA: 6 (ref 5–9)
PLATELET # BLD: 391 E9/L (ref 130–450)
PMV BLD AUTO: 9.3 FL (ref 7–12)
POTASSIUM REFLEX MAGNESIUM: 5.9 MMOL/L (ref 3.5–5)
PROTEIN UA: >=300 MG/DL
RBC # BLD: 3.61 E12/L (ref 3.8–5.8)
RBC UA: ABNORMAL /HPF (ref 0–2)
SODIUM BLD-SCNC: 133 MMOL/L (ref 132–146)
SPECIFIC GRAVITY UA: 1.02 (ref 1–1.03)
UROBILINOGEN, URINE: 0.2 E.U./DL
WBC # BLD: 8.7 E9/L (ref 4.5–11.5)
WBC UA: ABNORMAL /HPF (ref 0–5)

## 2022-06-16 PROCEDURE — 6370000000 HC RX 637 (ALT 250 FOR IP): Performed by: PHYSICIAN ASSISTANT

## 2022-06-16 PROCEDURE — 84484 ASSAY OF TROPONIN QUANT: CPT

## 2022-06-16 PROCEDURE — 1200000000 HC SEMI PRIVATE

## 2022-06-16 PROCEDURE — 2580000003 HC RX 258: Performed by: PHYSICIAN ASSISTANT

## 2022-06-16 PROCEDURE — 36415 COLL VENOUS BLD VENIPUNCTURE: CPT

## 2022-06-16 PROCEDURE — 96368 THER/DIAG CONCURRENT INF: CPT

## 2022-06-16 PROCEDURE — 93005 ELECTROCARDIOGRAM TRACING: CPT | Performed by: PHYSICIAN ASSISTANT

## 2022-06-16 PROCEDURE — 76770 US EXAM ABDO BACK WALL COMP: CPT

## 2022-06-16 PROCEDURE — 85025 COMPLETE CBC W/AUTO DIFF WBC: CPT

## 2022-06-16 PROCEDURE — 99223 1ST HOSP IP/OBS HIGH 75: CPT | Performed by: INTERNAL MEDICINE

## 2022-06-16 PROCEDURE — 99285 EMERGENCY DEPT VISIT HI MDM: CPT

## 2022-06-16 PROCEDURE — 96365 THER/PROPH/DIAG IV INF INIT: CPT

## 2022-06-16 PROCEDURE — 87088 URINE BACTERIA CULTURE: CPT

## 2022-06-16 PROCEDURE — 80048 BASIC METABOLIC PNL TOTAL CA: CPT

## 2022-06-16 PROCEDURE — 81001 URINALYSIS AUTO W/SCOPE: CPT

## 2022-06-16 PROCEDURE — 96375 TX/PRO/DX INJ NEW DRUG ADDON: CPT

## 2022-06-16 PROCEDURE — 6360000002 HC RX W HCPCS: Performed by: PHYSICIAN ASSISTANT

## 2022-06-16 RX ORDER — SODIUM CHLORIDE 9 MG/ML
1000 INJECTION, SOLUTION INTRAVENOUS CONTINUOUS
Status: DISCONTINUED | OUTPATIENT
Start: 2022-06-16 | End: 2022-06-17

## 2022-06-16 RX ORDER — DEXTROSE MONOHYDRATE 25 G/50ML
50 INJECTION, SOLUTION INTRAVENOUS PRN
Status: DISCONTINUED | OUTPATIENT
Start: 2022-06-16 | End: 2022-06-16 | Stop reason: CLARIF

## 2022-06-16 RX ADMIN — DEXTROSE MONOHYDRATE 250 ML: 100 INJECTION, SOLUTION INTRAVENOUS at 23:03

## 2022-06-16 RX ADMIN — INSULIN HUMAN 10 UNITS: 100 INJECTION, SOLUTION PARENTERAL at 22:59

## 2022-06-16 RX ADMIN — CALCIUM GLUCONATE 1000 MG: 98 INJECTION, SOLUTION INTRAVENOUS at 23:17

## 2022-06-16 ASSESSMENT — ENCOUNTER SYMPTOMS
RESPIRATORY NEGATIVE: 1
GASTROINTESTINAL NEGATIVE: 1
ALLERGIC/IMMUNOLOGIC NEGATIVE: 1

## 2022-06-16 ASSESSMENT — PAIN SCALES - GENERAL: PAINLEVEL_OUTOF10: 5

## 2022-06-16 NOTE — TELEPHONE ENCOUNTER
Called Savi to clarify message. Left  msg requesting return call. Per Mesfin Miguel, she already spoke to pt's dgt.

## 2022-06-16 NOTE — TELEPHONE ENCOUNTER
----- Message from Karla Ram sent at 6/16/2022 10:53 AM EDT -----  Subject: Message to Provider    QUESTIONS  Information for Provider? Patient daughter Leatha Dominguez states that his   father has noticed that he is not going to the patient as much. He was   checked on 6/8 with Urologist & everything is okay. Patient has not felt   well as least for 3 days. Does he need lab work completed. ---------------------------------------------------------------------------  --------------  Charito GOMEZ  What is the best way for the office to contact you? OK to leave message on   voicemail  Preferred Call Back Phone Number? [phone]  ---------------------------------------------------------------------------  --------------  SCRIPT ANSWERS  Relationship to Patient? Other  Representative Name? Savi  Additional information verified (besides Name and Date of Birth)? Address  Have you recently (14 days) seen a provider for this problem?  Yes

## 2022-06-17 LAB
ALBUMIN SERPL-MCNC: 3.7 G/DL (ref 3.5–5.2)
ALP BLD-CCNC: 68 U/L (ref 40–129)
ALT SERPL-CCNC: <5 U/L (ref 0–40)
ANION GAP SERPL CALCULATED.3IONS-SCNC: 11 MMOL/L (ref 7–16)
ANION GAP SERPL CALCULATED.3IONS-SCNC: 13 MMOL/L (ref 7–16)
AST SERPL-CCNC: <5 U/L (ref 0–39)
BILIRUB SERPL-MCNC: 0.3 MG/DL (ref 0–1.2)
BUN BLDV-MCNC: 102 MG/DL (ref 6–23)
BUN BLDV-MCNC: 90 MG/DL (ref 6–23)
CALCIUM SERPL-MCNC: 9.2 MG/DL (ref 8.6–10.2)
CALCIUM SERPL-MCNC: 9.7 MG/DL (ref 8.6–10.2)
CHLORIDE BLD-SCNC: 106 MMOL/L (ref 98–107)
CHLORIDE BLD-SCNC: 110 MMOL/L (ref 98–107)
CO2: 14 MMOL/L (ref 22–29)
CO2: 17 MMOL/L (ref 22–29)
CREAT SERPL-MCNC: 4.3 MG/DL (ref 0.7–1.2)
CREAT SERPL-MCNC: 5.5 MG/DL (ref 0.7–1.2)
CREATININE URINE: 54 MG/DL (ref 40–278)
CREATININE URINE: 54 MG/DL (ref 40–278)
EKG ATRIAL RATE: 71 BPM
EKG Q-T INTERVAL: 260 MS
EKG QRS DURATION: 156 MS
EKG QTC CALCULATION (BAZETT): 401 MS
EKG R AXIS: 86 DEGREES
EKG T AXIS: -68 DEGREES
EKG VENTRICULAR RATE: 143 BPM
EOSINOPHIL, URINE: 1 % (ref 0–1)
GFR AFRICAN AMERICAN: 12
GFR AFRICAN AMERICAN: 16
GFR NON-AFRICAN AMERICAN: 10 ML/MIN/1.73
GFR NON-AFRICAN AMERICAN: 13 ML/MIN/1.73
GLUCOSE BLD-MCNC: 151 MG/DL (ref 74–99)
GLUCOSE BLD-MCNC: 85 MG/DL (ref 74–99)
OSMOLALITY URINE: 261 MOSM/KG (ref 300–900)
PHOSPHORUS: 5.2 MG/DL (ref 2.5–4.5)
POTASSIUM REFLEX MAGNESIUM: 5 MMOL/L (ref 3.5–5)
POTASSIUM SERPL-SCNC: 5 MMOL/L (ref 3.5–5)
POTASSIUM SERPL-SCNC: 5.5 MMOL/L (ref 3.5–5)
POTASSIUM, UR: 13.3 MMOL/L
PROSTATE SPECIFIC ANTIGEN: 1.35 NG/ML (ref 0–4)
PROTEIN PROTEIN: 166 MG/DL (ref 0–12)
PROTEIN/CREAT RATIO: 3.1
PROTEIN/CREAT RATIO: 3.1 (ref 0–0.2)
SODIUM BLD-SCNC: 133 MMOL/L (ref 132–146)
SODIUM BLD-SCNC: 138 MMOL/L (ref 132–146)
SODIUM URINE: 61 MMOL/L
TOTAL PROTEIN: 7 G/DL (ref 6.4–8.3)
TROPONIN, HIGH SENSITIVITY: 67 NG/L (ref 0–11)

## 2022-06-17 PROCEDURE — 83935 ASSAY OF URINE OSMOLALITY: CPT

## 2022-06-17 PROCEDURE — 6360000002 HC RX W HCPCS: Performed by: INTERNAL MEDICINE

## 2022-06-17 PROCEDURE — 99232 SBSQ HOSP IP/OBS MODERATE 35: CPT | Performed by: INTERNAL MEDICINE

## 2022-06-17 PROCEDURE — 97165 OT EVAL LOW COMPLEX 30 MIN: CPT

## 2022-06-17 PROCEDURE — 84156 ASSAY OF PROTEIN URINE: CPT

## 2022-06-17 PROCEDURE — 6370000000 HC RX 637 (ALT 250 FOR IP): Performed by: NURSE PRACTITIONER

## 2022-06-17 PROCEDURE — 97535 SELF CARE MNGMENT TRAINING: CPT

## 2022-06-17 PROCEDURE — 2580000003 HC RX 258: Performed by: PHYSICIAN ASSISTANT

## 2022-06-17 PROCEDURE — 6360000002 HC RX W HCPCS: Performed by: PHYSICIAN ASSISTANT

## 2022-06-17 PROCEDURE — 87205 SMEAR GRAM STAIN: CPT

## 2022-06-17 PROCEDURE — 1200000000 HC SEMI PRIVATE

## 2022-06-17 PROCEDURE — 84153 ASSAY OF PSA TOTAL: CPT

## 2022-06-17 PROCEDURE — 84300 ASSAY OF URINE SODIUM: CPT

## 2022-06-17 PROCEDURE — 84133 ASSAY OF URINE POTASSIUM: CPT

## 2022-06-17 PROCEDURE — 80048 BASIC METABOLIC PNL TOTAL CA: CPT

## 2022-06-17 PROCEDURE — 97530 THERAPEUTIC ACTIVITIES: CPT

## 2022-06-17 PROCEDURE — 80069 RENAL FUNCTION PANEL: CPT

## 2022-06-17 PROCEDURE — 2500000003 HC RX 250 WO HCPCS: Performed by: INTERNAL MEDICINE

## 2022-06-17 PROCEDURE — 80053 COMPREHEN METABOLIC PANEL: CPT

## 2022-06-17 PROCEDURE — 2580000003 HC RX 258: Performed by: INTERNAL MEDICINE

## 2022-06-17 PROCEDURE — 87040 BLOOD CULTURE FOR BACTERIA: CPT

## 2022-06-17 PROCEDURE — 6370000000 HC RX 637 (ALT 250 FOR IP): Performed by: INTERNAL MEDICINE

## 2022-06-17 PROCEDURE — 82570 ASSAY OF URINE CREATININE: CPT

## 2022-06-17 PROCEDURE — 36415 COLL VENOUS BLD VENIPUNCTURE: CPT

## 2022-06-17 RX ORDER — METOPROLOL SUCCINATE 25 MG/1
25 TABLET, EXTENDED RELEASE ORAL DAILY
Status: DISCONTINUED | OUTPATIENT
Start: 2022-06-17 | End: 2022-06-22 | Stop reason: HOSPADM

## 2022-06-17 RX ORDER — MULTIVITAMIN
1 TABLET ORAL DAILY
Status: DISCONTINUED | OUTPATIENT
Start: 2022-06-17 | End: 2022-06-17

## 2022-06-17 RX ORDER — POLYETHYLENE GLYCOL 3350 17 G/17G
17 POWDER, FOR SOLUTION ORAL DAILY
Status: DISCONTINUED | OUTPATIENT
Start: 2022-06-17 | End: 2022-06-22 | Stop reason: HOSPADM

## 2022-06-17 RX ORDER — ONDANSETRON 4 MG/1
4 TABLET, ORALLY DISINTEGRATING ORAL EVERY 8 HOURS PRN
Status: DISCONTINUED | OUTPATIENT
Start: 2022-06-17 | End: 2022-06-22 | Stop reason: HOSPADM

## 2022-06-17 RX ORDER — ATORVASTATIN CALCIUM 20 MG/1
20 TABLET, FILM COATED ORAL DAILY
Refills: 1 | Status: DISCONTINUED | OUTPATIENT
Start: 2022-06-17 | End: 2022-06-22 | Stop reason: HOSPADM

## 2022-06-17 RX ORDER — SODIUM CHLORIDE 0.9 % (FLUSH) 0.9 %
5-40 SYRINGE (ML) INJECTION PRN
Status: DISCONTINUED | OUTPATIENT
Start: 2022-06-17 | End: 2022-06-22 | Stop reason: HOSPADM

## 2022-06-17 RX ORDER — VITAMIN B COMPLEX
2000 TABLET ORAL DAILY
Status: DISCONTINUED | OUTPATIENT
Start: 2022-06-17 | End: 2022-06-22 | Stop reason: HOSPADM

## 2022-06-17 RX ORDER — SODIUM CHLORIDE 0.9 % (FLUSH) 0.9 %
5-40 SYRINGE (ML) INJECTION EVERY 12 HOURS SCHEDULED
Status: DISCONTINUED | OUTPATIENT
Start: 2022-06-17 | End: 2022-06-22 | Stop reason: HOSPADM

## 2022-06-17 RX ORDER — ACETAMINOPHEN 650 MG/1
650 SUPPOSITORY RECTAL EVERY 6 HOURS PRN
Status: DISCONTINUED | OUTPATIENT
Start: 2022-06-17 | End: 2022-06-22 | Stop reason: HOSPADM

## 2022-06-17 RX ORDER — ONDANSETRON 2 MG/ML
4 INJECTION INTRAMUSCULAR; INTRAVENOUS EVERY 6 HOURS PRN
Status: DISCONTINUED | OUTPATIENT
Start: 2022-06-17 | End: 2022-06-22 | Stop reason: HOSPADM

## 2022-06-17 RX ORDER — ASPIRIN 81 MG/1
81 TABLET ORAL DAILY
Status: DISCONTINUED | OUTPATIENT
Start: 2022-06-17 | End: 2022-06-22 | Stop reason: HOSPADM

## 2022-06-17 RX ORDER — LANOLIN ALCOHOL/MO/W.PET/CERES
1000 CREAM (GRAM) TOPICAL DAILY
Status: DISCONTINUED | OUTPATIENT
Start: 2022-06-17 | End: 2022-06-22 | Stop reason: HOSPADM

## 2022-06-17 RX ORDER — ASCORBIC ACID 500 MG
250 TABLET ORAL DAILY
Status: DISCONTINUED | OUTPATIENT
Start: 2022-06-17 | End: 2022-06-22 | Stop reason: HOSPADM

## 2022-06-17 RX ORDER — SODIUM CHLORIDE 9 MG/ML
INJECTION, SOLUTION INTRAVENOUS PRN
Status: DISCONTINUED | OUTPATIENT
Start: 2022-06-17 | End: 2022-06-22 | Stop reason: HOSPADM

## 2022-06-17 RX ORDER — PANTOPRAZOLE SODIUM 40 MG/1
40 TABLET, DELAYED RELEASE ORAL DAILY
Status: DISCONTINUED | OUTPATIENT
Start: 2022-06-17 | End: 2022-06-22 | Stop reason: HOSPADM

## 2022-06-17 RX ORDER — FINASTERIDE 5 MG/1
5 TABLET, FILM COATED ORAL DAILY
Status: DISCONTINUED | OUTPATIENT
Start: 2022-06-17 | End: 2022-06-22 | Stop reason: HOSPADM

## 2022-06-17 RX ORDER — AMLODIPINE BESYLATE 5 MG/1
2.5 TABLET ORAL DAILY
Status: DISCONTINUED | OUTPATIENT
Start: 2022-06-17 | End: 2022-06-22 | Stop reason: HOSPADM

## 2022-06-17 RX ORDER — HEPARIN SODIUM 5000 [USP'U]/ML
5000 INJECTION, SOLUTION INTRAVENOUS; SUBCUTANEOUS EVERY 8 HOURS SCHEDULED
Status: DISCONTINUED | OUTPATIENT
Start: 2022-06-17 | End: 2022-06-22 | Stop reason: HOSPADM

## 2022-06-17 RX ORDER — TIMOLOL MALEATE 5 MG/ML
1 SOLUTION/ DROPS OPHTHALMIC 2 TIMES DAILY
Status: DISCONTINUED | OUTPATIENT
Start: 2022-06-17 | End: 2022-06-22 | Stop reason: HOSPADM

## 2022-06-17 RX ORDER — ACETAMINOPHEN 325 MG/1
650 TABLET ORAL EVERY 6 HOURS PRN
Status: DISCONTINUED | OUTPATIENT
Start: 2022-06-17 | End: 2022-06-22 | Stop reason: HOSPADM

## 2022-06-17 RX ADMIN — FINASTERIDE 5 MG: 5 TABLET, FILM COATED ORAL at 10:06

## 2022-06-17 RX ADMIN — HEPARIN SODIUM 5000 UNITS: 5000 INJECTION INTRAVENOUS; SUBCUTANEOUS at 21:25

## 2022-06-17 RX ADMIN — CEFEPIME HYDROCHLORIDE 1000 MG: 1 INJECTION, POWDER, FOR SOLUTION INTRAMUSCULAR; INTRAVENOUS at 00:38

## 2022-06-17 RX ADMIN — Medication 2000 UNITS: at 10:07

## 2022-06-17 RX ADMIN — SODIUM CHLORIDE 1000 ML: 9 INJECTION, SOLUTION INTRAVENOUS at 00:36

## 2022-06-17 RX ADMIN — HEPARIN SODIUM 5000 UNITS: 5000 INJECTION INTRAVENOUS; SUBCUTANEOUS at 05:07

## 2022-06-17 RX ADMIN — ATORVASTATIN CALCIUM 20 MG: 20 TABLET, FILM COATED ORAL at 21:25

## 2022-06-17 RX ADMIN — HEPARIN SODIUM 5000 UNITS: 5000 INJECTION INTRAVENOUS; SUBCUTANEOUS at 16:22

## 2022-06-17 RX ADMIN — OXYCODONE HYDROCHLORIDE AND ACETAMINOPHEN 250 MG: 500 TABLET ORAL at 10:07

## 2022-06-17 RX ADMIN — TIMOLOL MALEATE 1 DROP: 5 SOLUTION OPHTHALMIC at 10:08

## 2022-06-17 RX ADMIN — SODIUM BICARBONATE: 84 INJECTION, SOLUTION INTRAVENOUS at 21:55

## 2022-06-17 RX ADMIN — SODIUM BICARBONATE: 84 INJECTION, SOLUTION INTRAVENOUS at 10:10

## 2022-06-17 RX ADMIN — CYANOCOBALAMIN TAB 1000 MCG 1000 MCG: 1000 TAB at 10:07

## 2022-06-17 RX ADMIN — POLYETHYLENE GLYCOL 3350 17 G: 17 POWDER, FOR SOLUTION ORAL at 10:08

## 2022-06-17 RX ADMIN — PANTOPRAZOLE SODIUM 40 MG: 40 TABLET, DELAYED RELEASE ORAL at 05:07

## 2022-06-17 RX ADMIN — TIMOLOL MALEATE 1 DROP: 5 SOLUTION OPHTHALMIC at 21:25

## 2022-06-17 RX ADMIN — ASPIRIN 81 MG: 81 TABLET, COATED ORAL at 10:07

## 2022-06-17 NOTE — PROGRESS NOTES
Department of Internal Medicine  General Internal Medicine  Attending Progress Note  Chief Complaint   Patient presents with    Dysuria     stated that he is producing little to no urine     SUBJECTIVE:    Reports that he is feeling better. However, he still complained of penile pain. No nausea or vomiting. No chest pain.      OBJECTIVE      Medications    Current Facility-Administered Medications: ascorbic acid (VITAMIN C) tablet 250 mg, 250 mg, Oral, Daily  aspirin EC tablet 81 mg, 81 mg, Oral, Daily  pantoprazole (PROTONIX) tablet 40 mg, 40 mg, Oral, Daily  timolol (TIMOPTIC) 0.5 % ophthalmic solution 1 drop, 1 drop, Both Eyes, BID  sodium chloride flush 0.9 % injection 5-40 mL, 5-40 mL, IntraVENous, 2 times per day  sodium chloride flush 0.9 % injection 5-40 mL, 5-40 mL, IntraVENous, PRN  0.9 % sodium chloride infusion, , IntraVENous, PRN  acetaminophen (TYLENOL) tablet 650 mg, 650 mg, Oral, Q6H PRN **OR** acetaminophen (TYLENOL) suppository 650 mg, 650 mg, Rectal, Q6H PRN  ondansetron (ZOFRAN-ODT) disintegrating tablet 4 mg, 4 mg, Oral, Q8H PRN **OR** ondansetron (ZOFRAN) injection 4 mg, 4 mg, IntraVENous, Q6H PRN  polyethylene glycol (GLYCOLAX) packet 17 g, 17 g, Oral, Daily  heparin (porcine) injection 5,000 Units, 5,000 Units, SubCUTAneous, 3 times per day  sodium bicarbonate 150 mEq in dextrose 5 % 1,000 mL infusion, , IntraVENous, Continuous  Vitamin D (CHOLECALCIFEROL) tablet 2,000 Units, 2,000 Units, Oral, Daily  amLODIPine (NORVASC) tablet 2.5 mg, 2.5 mg, Oral, Daily  finasteride (PROSCAR) tablet 5 mg, 5 mg, Oral, Daily  metoprolol succinate (TOPROL XL) extended release tablet 25 mg, 25 mg, Oral, Daily  atorvastatin (LIPITOR) tablet 20 mg, 20 mg, Oral, Daily  vitamin B-12 (CYANOCOBALAMIN) tablet 1,000 mcg, 1,000 mcg, Oral, Daily  Physical    VITALS:  BP (!) 112/55   Pulse 67   Temp 97.8 °F (36.6 °C) (Oral)   Resp 18   Wt 134 lb (60.8 kg)   SpO2 98%   BMI 19.79 kg/m²   CONSTITUTIONAL:  awake and alert  EYES:  extra-ocular muscles intact and vision intact  ENT:  normocepalic, without obvious abnormality  NECK:  supple, symmetrical, trachea midline  LUNGS:  no increased work of breathing, no retractions and clear to auscultation, anteriorly  CARDIOVASCULAR:  normal apical pulses and normal S1 and S2  ABDOMEN:  normal bowel sounds, non-distended and non-tender  MUSCULOSKELETAL:  there is no redness, warmth, or swelling of the joints  NEUROLOGIC:  Mental Status Exam:  Level of Alertness:   awake  Orientation:   person, place, time  SKIN:  no bruising or bleeding  Data    CBC:   Lab Results   Component Value Date    WBC 8.7 06/16/2022    RBC 3.61 06/16/2022    HGB 11.1 06/16/2022    HCT 34.7 06/16/2022    MCV 96.1 06/16/2022    MCH 30.7 06/16/2022    MCHC 32.0 06/16/2022    RDW 14.6 06/16/2022     06/16/2022    MPV 9.3 06/16/2022     BMP:    Lab Results   Component Value Date     06/17/2022    K 5.0 06/17/2022    K 5.0 06/17/2022     06/17/2022    CO2 14 06/17/2022     06/17/2022    LABALBU 3.7 06/17/2022    LABALBU 4.4 01/09/2012    CREATININE 5.5 06/17/2022    CALCIUM 9.7 06/17/2022    GFRAA 12 06/17/2022    LABGLOM 10 06/17/2022    GLUCOSE 85 06/17/2022    GLUCOSE 106 01/09/2012       ASSESSMENT AND PLAN      1. Complicated UTI (urinary tract infection)    2. Urinary tract infection associated with catheterization of urinary tract (HCC)    3.  Hydronephrosis due to obstruction of bladder    4. Acute on chronic Kidney disease Stage 3 acute kidney injury:    5. Hyperlipidemia:    6. BPH:     7. Hypertension Essential:    8. GERD:     9.   Penile Pain:     Plans:  Defer abx to ID   Await urine culture and sensitivity  Appreciate Urology recommendations  Continue BP medications and monitor  Continue IV fluid and monitor renal function  Good urine output  Continue Finasteride  Hold nephrotoxic agents

## 2022-06-17 NOTE — ED PROVIDER NOTES
ED Attending shared visit  CC: No       [facility]  Department of Emergency Medicine   ED  Encounter Note  Admit Date/RoomTime: 2022  6:25 PM  ED Room:   NAME: Thien Walker  : 1932  MRN: [id number]     Chief Complaint:  Dysuria (stated that he is producing little to no urine)    HISTORY OF PRESENT ILLNESS        Thien Walker is a 80 y.o. male who presents to the ED with his daughter with a concern for urinary tract infection. Part of the history is provided by daughter at bedside. Patient does have a history of enlarged prostate with urinary retention. He does have a suprapubic catheter which he clamps and empties 3 times daily. In between that he does urinate regularly through his penis. Per daughter he has been sick for the past 4 to 5 days. No energy. Not as active. Poor appetite. The daughter states today she got a urine sample from him and noted that it is milky and very thick. Patient himself denies any fevers or chills. Denies nausea or vomiting. Does deal with bottles of constipation. Last bowel movement 2 days ago. Overall discomfort he rates a 5 out of 10. ROS   Pertinent positives and negatives are stated within HPI, all other systems reviewed and are negative. Past Medical History:  has a past medical history of Enlarged prostate, Hyperlipidemia, Hypertension, Lumbosacral strain, and Pacemaker. Surgical History:  has a past surgical history that includes A-V cardiac pacemaker insertion (07); eye surgery (); Tonsillectomy; Colonoscopy (8/3/11); pacemaker placement; and Cystoscopy (N/A, 2021). Social History:  reports that he quit smoking about 21 years ago. He has a 40.00 pack-year smoking history. He has never used smokeless tobacco. He reports that he does not drink alcohol and does not use drugs.     Family History: family history includes Cancer in his mother; Diabetes in his mother; Heart Disease in his father; High Blood Pressure in his father and mother. Allergies: Patient has no known allergies. PHYSICAL EXAM   Oxygen Saturation Interpretation: Normal on room air analysis. ED Triage Vitals   BP Temp Temp src Heart Rate Resp SpO2 Height Weight   06/16/22 1734 06/16/22 1713 -- 06/16/22 1713 06/16/22 1713 06/16/22 1713 -- --   124/63 98 °F (36.7 °C)  89 18 99 %         General:  NAD. Alert and Oriented. Well-appearing. Skin:  Warm, dry. No rashes. Head:  Normocephalic. Atraumatic. Eyes:  EOMI. Conjunctiva normal.  ENT:  Oral mucosa moist.  Airway patent. Neck:  Supple. Normal ROM. Respiratory:  No respiratory distress. No labored breathing. Lungs clear without rales, rhonchi or wheezing. Cardiovascular:  Regular rate. No peripheral edema. Extremities warm and good color. Chest:  Abdomen:  Soft, nondistended. Normal bowel sounds. Nontender to palpation all 4 quadrants. Negative rebound, negative guarding. Rectal:  Gu: Positive suprapubic catheter. Positive dried blood around the catheter entrance at the stoma. Bladder is nontender to palpation and I do not appreciate any bladder distention. Pelvic:  Extremities:  Normal ROM. Nontender to palpation. Atraumatic. Back:  Normal ROM. Nontender to palpation. Neuro:  Alert and Oriented to person, place, time and situation. Normal LOC. Moves all extremities. Speech fluent. Psych:  Calm and Cooperative. Normal thought process. Normal judgement.         Lab / Imaging Results   (All laboratory and radiology results have been personally reviewed by myself)  Labs:  Results for orders placed or performed during the hospital encounter of 06/16/22   CBC with Auto Differential   Result Value Ref Range    WBC 8.7 4.5 - 11.5 E9/L    RBC 3.61 (L) 3.80 - 5.80 E12/L    Hemoglobin 11.1 (L) 12.5 - 16.5 g/dL    Hematocrit 34.7 (L) 37.0 - 54.0 %    MCV 96.1 80.0 - 99.9 fL    MCH 30.7 26.0 - 35.0 pg    MCHC 32.0 32.0 - 34.5 %    RDW 14.6 11.5 - 15.0 fL    Platelets 957 130 - 450 E9/L    MPV 9.3 7.0 - 12.0 fL    Neutrophils % 62.3 43.0 - 80.0 %    Immature Granulocytes % 0.2 0.0 - 5.0 %    Lymphocytes % 25.1 20.0 - 42.0 %    Monocytes % 11.7 2.0 - 12.0 %    Eosinophils % 0.2 0.0 - 6.0 %    Basophils % 0.5 0.0 - 2.0 %    Neutrophils Absolute 5.43 1.80 - 7.30 E9/L    Immature Granulocytes # 0.02 E9/L    Lymphocytes Absolute 2.19 1.50 - 4.00 E9/L    Monocytes Absolute 1.02 (H) 0.10 - 0.95 E9/L    Eosinophils Absolute 0.02 (L) 0.05 - 0.50 E9/L    Basophils Absolute 0.04 0.00 - 0.20 K2/E   Basic Metabolic Panel w/ Reflex to MG   Result Value Ref Range    Sodium 133 132 - 146 mmol/L    Potassium reflex Magnesium 5.9 (H) 3.5 - 5.0 mmol/L    Chloride 105 98 - 107 mmol/L    CO2 14 (L) 22 - 29 mmol/L    Anion Gap 14 7 - 16 mmol/L    Glucose 109 (H) 74 - 99 mg/dL     (H) 6 - 23 mg/dL    CREATININE 6.4 (H) 0.7 - 1.2 mg/dL    GFR Non-African American 8 >=60 mL/min/1.73    GFR African American 10     Calcium 9.5 8.6 - 10.2 mg/dL     Imaging: All Radiology results interpreted by Radiologist unless otherwise noted. US RETROPERITONEAL COMPLETE    (Results Pending)     EKG done 2217. Sinus rhythm with a heart rate of estimated at 100. Prolonged OR interval.  Normal QRS. Positive peaked T waves. No ST elevation or depression.   QTc 401  ED Course / Medical Decision Making     Medications   0.9 % sodium chloride infusion (has no administration in time range)   calcium gluconate 1,000 mg in dextrose 5 % 100 mL IVPB (has no administration in time range)   insulin regular (HUMULIN R;NOVOLIN R) injection 10 Units (has no administration in time range)   dextrose bolus 10% 250 mL (has no administration in time range)   cefepime (MAXIPIME) 1000 mg IVPB minibag (has no administration in time range)     ED Course as of 06/16/22 2229   Thu Jun 16, 2022   2213 ATTENDING PROVIDER ATTESTATION:     I have personally performed and/or participated in the history, exam, medical decision making, and procedures and agree with all pertinent clinical information unless otherwise noted. I have also reviewed and agree with the past medical, family and social history unless otherwise noted. History: Patient with significant decrease in urine output associated with dysuria. My findings: Yuval Hector is a 80 y.o. male whom is in no distress. Physical exam reveals he is alert and oriented. Heart is regular, lungs are decreased. Abdomen soft nontender EXTR are intact without edema. Oropharynx is quite dry. Skin turgor is poor. My plan: Symptomatic and supportive care. Labs. [TG]      ED Course User Index  [TG] Kera Kerr DO     Re-examination:  6/16/22       Time:   Patients condition . Consult(s):   IP CONSULT TO HOSPITALIST    Procedure(s):   none    MDM:   Acute renal failure with significant urinary tract infection. IV fluids ordered. Antibiotics. Patient also hyperkalemic and protocol ordered for that. Hospitalist Dr. Eugene Rojas in ER and notified . Plan of Care/Counseling:  EM Attending Physician and Physician Assistant on duty reviewed today's visit with the patient in addition to providing specific details for the plan of care and counseling regarding the diagnosis and prognosis. Questions are answered at this time and are agreeable with the plan. ASSESSMENT     1. Acute renal failure, unspecified acute renal failure type ([address]    2. Acute cystitis without hematuria    3. Hyperkalemia    4. Dehydration      PLAN   Inpatient Admission to General Medical Floor. Patient condition is good    New Medications     New Prescriptions    No medications on file     Electronically signed by COLTEN Recnios   DD: 6/16/22  **This report was transcribed using voice recognition software. Every effort was made to ensure accuracy; however, inadvertent computerized transcription errors may be present.   END OF ED PROVIDER NOTE       Roxanne Recinos  06/16/22 [address] [place] [address], [place]  06/17/22 0039

## 2022-06-17 NOTE — H&P
History & Physicial  06/16/22  Primary Care:  Maykel Bowen MD  [facility] [address]        Chief Complaint   Patient presents with    Dysuria     stated that he is producing little to no urine       HPI:    19-year-old male with a suprapubic catheter secondary to prostatic hypertrophy presents to the hospital with a significant urinary tract infection. I have seen his urine it is a combination of thick yellow pasty gray. Ultrasound demonstrates bilateral hydronephrosis. He does not have a fever or increased white count. He does state that he has pain in his penis. He denies chills    Prior to Visit Medications    Medication Sig Taking?  Authorizing Provider   finasteride (PROSCAR) 5 MG tablet Take 1 tablet by mouth daily  Maykel Bowen MD   lisinopril-hydroCHLOROthiazide (PRINZIDE;ZESTORETIC) 10-12.5 MG per tablet Take 1 tablet by mouth daily  Maykel Bowen MD   amLODIPine (NORVASC) 2.5 MG tablet Take 1 tablet by mouth daily  Maykel Bowen MD   metoprolol succinate (TOPROL XL) 25 MG extended release tablet Take 1 tablet by mouth daily  Maykel Bowen MD   pantoprazole (PROTONIX) 40 MG tablet Take 1 tablet by mouth daily  Maykel Bowen MD   timolol (TIMOPTIC) 0.5 % ophthalmic solution Place 1 drop into both eyes 2 times daily  Maykel Bowen MD   lisinopril-hydroCHLOROthiazide (PRINZIDE;ZESTORETIC) 10-12.5 MG per tablet Take 1 tablet by mouth once daily  Maykel Bowen MD   timolol (TIMOPTIC) 0.5 % ophthalmic solution INSTILL 1 DROP INTO EACH EYE TWICE DAILY  Maykel Bowen MD   finasteride (PROSCAR) 5 MG tablet Take 1 tablet by mouth once daily  Maykel Bowen MD   simvastatin (ZOCOR) 40 MG tablet Take 1 tablet by mouth nightly  Maykel Bowen MD   Magnesium Oxide 200 MG TABS Take 200 mg by mouth daily  Maykel Bowen MD   ferrous sulfate (IRON 325) 325 (65 Fe) MG tablet Take 1 tablet by mouth 2 times daily  Maykel Bowen MD   Multiple Vitamin (ONE-A-DAY ESSENTIAL) TABS Take 1 tablet by mouth daily  Historical Provider, MD   Cholecalciferol (VITAMIN D3) 50 MCG ( UT) CAPS Take 2,000 Units by mouth daily   Historical Provider, MD   Ascorbic Acid (C-250) 250 MG CHEW Take 250 mg by mouth daily   Historical Provider, MD   vitamin B-12 (CYANOCOBALAMIN) 1000 MCG tablet Take 1,000 mcg by mouth daily  Historical Provider, MD   [facility] (OSTEO BI-FLEX JOINT SHIELD PO) Take 2 tablets by mouth daily. Historical Provider, MD   aspirin 81 MG EC tablet Take 81 mg by mouth daily Last dose 10/28  Historical Provider, MD     Social History     Tobacco Use    Smoking status: Former Smoker     Packs/day: 1.00     Years: 40.00     Pack years: 40.00     Quit date: 2000     Years since quittin.7    Smokeless tobacco: Never Used   Vaping Use    Vaping Use: Never used   Substance Use Topics    Alcohol use: No    Drug use: No     Family History   Problem Relation Age of Onset    High Blood Pressure Mother     Cancer Mother     Diabetes Mother     Heart Disease Father     High Blood Pressure Father      Past Surgical History:   Procedure Laterality Date    A-V CARDIAC PACEMAKER INSERTION  07    COLONOSCOPY  8/3/11    CYSTOSCOPY N/A 2021    CYSTOSCOPY RETROGRADE PYELOGRAM, TRANSURETHRAL RESECTION OF THE PROSTATE WITH SUPRAPUBIC CATHETER PLACEMENT performed by Virgilio Granados MD at [place] [address]      cataracts evelyn    PACEMAKER PLACEMENT      2017 battery change    TONSILLECTOMY      child     Past Medical History:   Diagnosis Date    Enlarged prostate     Hyperlipidemia     Hypertension     Lumbosacral strain 2013    Pacemaker      Review of Systems   Constitutional: Negative for chills, fatigue and fever. Respiratory: Negative. Cardiovascular: Negative. Gastrointestinal: Negative. Endocrine: Negative. Genitourinary: Positive for difficulty urinating and dysuria.  Negative for flank pain, frequency, genital sores and hematuria. Musculoskeletal: Negative. Skin: Negative. Allergic/Immunologic: Negative. Neurological: Negative. Hematological: Negative. Psychiatric/Behavioral: Negative. Physical Exam  Constitutional:       General: He is not in acute distress. Appearance: Normal appearance. He is normal weight. He is not ill-appearing, toxic-appearing or diaphoretic. HENT:      Head: Normocephalic and atraumatic. Right Ear: External ear normal.      Left Ear: External ear normal.      Nose: Nose normal. No congestion. Mouth/Throat:      Mouth: Mucous membranes are moist.      Pharynx: Oropharynx is clear. No oropharyngeal exudate. Eyes:      Extraocular Movements: Extraocular movements intact. Pupils: Pupils are equal, round, and reactive to light. Cardiovascular:      Rate and Rhythm: Normal rate. Rhythm irregular. Pulses: Normal pulses. Heart sounds: Normal heart sounds. Pulmonary:      Effort: Pulmonary effort is normal.      Breath sounds: Normal breath sounds. Abdominal:      General: Abdomen is flat. Bowel sounds are normal.      Palpations: Abdomen is soft. Genitourinary:     Comments: Suprapubic catheter  Musculoskeletal:         General: Normal range of motion. Cervical back: Normal range of motion. Right lower leg: No edema. Left lower leg: No edema. Skin:     General: Skin is warm and dry. Capillary Refill: Capillary refill takes 2 to 3 seconds. Neurological:      General: No focal deficit present. Mental Status: He is alert. He is disoriented. Psychiatric:         Mood and Affect: Mood normal.         Behavior: Behavior normal.         Thought Content: Thought content normal.         Judgment: Judgment normal.         Active Problems:    Urinary tract infection associated with catheterization of urinary tract (HCC)    Hydronephrosis due to obstruction of bladder  Resolved Problems:    * No resolved hospital problems. *    Plan:  Recent urinary tract infection demonstrated Pseudomonas. Pseudomonal coverage will be part of the antibiotic therapy. A consult for infectious disease will be placed. He is not septic. Therefore I am not calling them at this minute for prescribing of antibiotic dose.     DVT Prophylaxis: Subcutaneous heparin  Code Status:    Electronically signed by Chloe Jenkins MD on 6/16/2022 at 11:29 PM

## 2022-06-17 NOTE — CONSULTS
6/17/2022 9:10 AM  Service: Urology  Group: [facility] ([facility])    Prasanna Montana  [id number]     Chief Complaint:  Azotemia hypotonic bladder possible urinary tract infection    History of Present Illness: The patient and his wife are the historians    The patient is a 80 y.o. male patient who presents with change in physical and mental status. The patient had a cystoscopy transurethral resection of the prostate and suprapubic tube placement by Dr. Steffen Mullins on November 2, 2021. The patient has had benign prostatic hypertrophy he was recently seen in the office on May 18 and was thought to have low postvoid residuals suprapubic tube was to be clamped and eventually was being planned to be removed.   Patient had a BUN of 104 and a creatinine of 6.4 and with hydration his creatinine is down to 5.5  Renal ultrasound showed severe bilateral hydronephrosis  Apparently Dr. Steffen Mullins was planning to remove the suprapubic tube they were advised to unclamp it twice a day but in the meantime the patient developed significant pyelocystitis with bacteremia and azotemia eventually requiring admission  Catheter has been placed to drainage patient is comfortable but the urine is still cloudy        Past Medical History:   Diagnosis Date    Enlarged prostate     Hyperlipidemia     Hypertension     Lumbosacral strain 7/8/2013    Pacemaker          Past Surgical History:   Procedure Laterality Date    A-V CARDIAC PACEMAKER INSERTION  5/30/07    COLONOSCOPY  8/3/11    CYSTOSCOPY N/A 11/2/2021    CYSTOSCOPY RETROGRADE PYELOGRAM, TRANSURETHRAL RESECTION OF THE PROSTATE WITH SUPRAPUBIC CATHETER PLACEMENT performed by Aby Sanchez [facility] at [facility]  2002    cataracts evelyn    PACEMAKER PLACEMENT      2017 battery change    TONSILLECTOMY      child       Medications Prior to Admission:    Medications Prior to Admission: finasteride (PROSCAR) 5 MG tablet, Take 1 tablet by mouth daily  lisinopril-hydroCHLOROthiazide (PRINZIDE;ZESTORETIC) 10-12.5 MG per tablet, Take 1 tablet by mouth daily  amLODIPine (NORVASC) 2.5 MG tablet, Take 1 tablet by mouth daily  metoprolol succinate (TOPROL XL) 25 MG extended release tablet, Take 1 tablet by mouth daily  pantoprazole (PROTONIX) 40 MG tablet, Take 1 tablet by mouth daily  timolol (TIMOPTIC) 0.5 % ophthalmic solution, Place 1 drop into both eyes 2 times daily  lisinopril-hydroCHLOROthiazide (PRINZIDE;ZESTORETIC) 10-12.5 MG per tablet, Take 1 tablet by mouth once daily  finasteride (PROSCAR) 5 MG tablet, Take 1 tablet by mouth once daily  simvastatin (ZOCOR) 40 MG tablet, Take 1 tablet by mouth nightly  Magnesium Oxide 200 MG TABS, Take 200 mg by mouth daily  ferrous sulfate (IRON 325) 325 (65 Fe) MG tablet, Take 1 tablet by mouth 2 times daily  Multiple Vitamin (ONE-A-DAY ESSENTIAL) TABS, Take 1 tablet by mouth daily  Cholecalciferol (VITAMIN D3) 50 MCG (2000 UT) CAPS, Take 2,000 Units by mouth daily   Ascorbic Acid (C-250) 250 MG CHEW, Take 250 mg by mouth daily   vitamin B-12 (CYANOCOBALAMIN) 1000 MCG tablet, Take 1,000 mcg by mouth daily  Misc Natural Products (OSTEO BI-FLEX JOINT SHIELD PO), Take 2 tablets by mouth daily. aspirin 81 MG EC tablet, Take 81 mg by mouth daily Last dose 10/28    Allergies:    Patient has no known allergies. Social History:    reports that he quit smoking about 21 years ago. He has a 40.00 pack-year smoking history. He has never used smokeless tobacco. He reports that he does not drink alcohol and does not use drugs. Family History:   Non-contributory to this Urological problem  family history includes Cancer in his mother; Diabetes in his mother; Heart Disease in his father; High Blood Pressure in his father and mother. Review of Systems: ENT: History of closed angle glaucoma  Respiratory: negative for cough and hemoptysis  Cardiovascular: negative for chest pain and dyspnea.   He has a pacemaker  Gastrointestinal: negative for abdominal pain, diarrhea, nausea and vomiting  Derm: negative for rash and skin lesion(s)  Neurological: negative for seizures and tremors  Endocrine: negative for diabetic symptoms including polydipsia and polyuria. History of hypertension and hyperlipidemia which have both been treated with medication  : As above in the HPI, otherwise negative  All other reviews are negative    Physical Exam:     Vitals:  BP (!) 112/55   Pulse 67   Temp 97.8 °F (36.6 °C) (Oral)   Resp 18   Wt 134 lb (60.8 kg)   SpO2 98%   BMI 19.79 kg/m²     General: Thin and frail looking but is oriented today. No scleral icterus. No conjunctival injection. Normal lips, teeth, and gums. No nasal discharge. Neck:  Supple, no masses. Heart:  RRR  Lungs:  No audible wheezing. Respirations symmetric and non-labored. Abdomen:  soft, nontender, no masses, no organomegaly, no peritoneal signs suprapubic tube is well-positioned without stomatitis but there is cloudy urine in the tubing  Extremities:  No clubbing, cyanosis, or edema  Skin:  Warm and dry, no open lesions or rashes  Neuro: There are no motor or sensory deficits in the 4 quadrant extremities   Rectal: deferred  Genitalia: Suprapubic tube is well-positioned testes epididymis and cord normal penis is recessed    Labs:     Recent Labs     06/16/22  1847   WBC 8.7   RBC 3.61*   HGB 11.1*   HCT 34.7*   MCV 96.1   MCH 30.7   MCHC 32.0   RDW 14.6      MPV 9.3         Recent Labs     06/16/22 1847 06/17/22  0237   CREATININE 6.4* 5.5*         Assessment:  Gerardo Bryant 80 y.o. 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 If his azotemia is not resolving he may need cystoscopy and stent insertion.     Electronically signed by Venice Foss MD on 6/17/2022 at 9:10 AM

## 2022-06-17 NOTE — CONSULTS
[facility]  Consult Note    [address] [facility] [address]  [place], [address]  Phone [phone]   [phone]      Admit Date: 2022  6:25 PM  Pt Name: Yuval Hector  MRN: [id number]  : 1932  Reason for Consult:    Chief Complaint   Patient presents with    Dysuria     stated that he is producing little to no urine     Requesting Physician:  Bautista Hoskins MD  PCP: Radha Shaw MD  History Obtained From:  patient, chart   ID consulted for Dehydration [E86.0]  Hyperkalemia [E87.5]  Acute cystitis without hematuria [N43.69]  Complicated UTI (urinary tract infection) [N39.0]  Acute renal failure, unspecified acute renal failure type ([facility] [address] [N17.9]  on hospital day 1   Face to face encounter   CHIEF COMPLAINT       Chief Complaint   Patient presents with    Dysuria     stated that he is producing little to no urine     HISTORYOF PRESENT ILLNESS   Yuval Hector is a 80 y.o. male who presents with   has a past medical history of Enlarged prostate, Hyperlipidemia, Hypertension, Lumbosacral strain, and Pacemaker. ED TRIAGEVITALS  BP: (!) 112/55, Temp: 97.8 °F (36.6 °C), Heart Rate: 67, Resp: 18, SpO2: 98 %  HPI  Patient lives with wife- he reports he was not feeling well since about Monday. He has a suprabic cath and he also reports he voids. His urine has been cloudy with sediments. He denies any fevers at home. He reports fatigue, poor appetite. And overall not feeling well. He is known to Urology. He had TURP and suprapubic placement in 2021. He is chronic kidney disease. He denies any recent antibiotics  Cultures are pending   Labs reviewed   He denies any nausea, vomiting, fevers, no diarrhea, does have constipation. Previous cultures reviewed     ID has been asked to evaluate and manage atbs.    REVIEW OF SYSTEMS     CONSTITUTIONAL:   + fatigue   ALLERGIES:    No urticaria, hay fever,    EYES:     No blurry vision, loss of vision, eye pain  ENT:      No hearing loss, sore throat  CARDIOVASCULAR:   No chest pain or palpitations, + HTN, + hyperlipidemia , + pacer   RESPIRATORY:   No cough, sob  ENDOCRINE:    No increase thirst, urination   HEME-LYMPH:   No easy bruising or bleeding  GI:     No nausea, vomiting or diarrhea  :     No urinary complaints  NEURO:    No seizures, stroke, HA  MUSCULOSKELETAL:  No muscle aches or pain, no joint pain  SKIN:     No rash or itch  PSYCH:    No depression or anxiety    Medications Prior to Admission: finasteride (PROSCAR) 5 MG tablet, Take 1 tablet by mouth daily  lisinopril-hydroCHLOROthiazide (PRINZIDE;ZESTORETIC) 10-12.5 MG per tablet, Take 1 tablet by mouth daily  amLODIPine (NORVASC) 2.5 MG tablet, Take 1 tablet by mouth daily  metoprolol succinate (TOPROL XL) 25 MG extended release tablet, Take 1 tablet by mouth daily  pantoprazole (PROTONIX) 40 MG tablet, Take 1 tablet by mouth daily  timolol (TIMOPTIC) 0.5 % ophthalmic solution, Place 1 drop into both eyes 2 times daily  lisinopril-hydroCHLOROthiazide (PRINZIDE;ZESTORETIC) 10-12.5 MG per tablet, Take 1 tablet by mouth once daily  finasteride (PROSCAR) 5 MG tablet, Take 1 tablet by mouth once daily  simvastatin (ZOCOR) 40 MG tablet, Take 1 tablet by mouth nightly  Magnesium Oxide 200 MG TABS, Take 200 mg by mouth daily  ferrous sulfate (IRON 325) 325 (65 Fe) MG tablet, Take 1 tablet by mouth 2 times daily  Multiple Vitamin (ONE-A-DAY ESSENTIAL) TABS, Take 1 tablet by mouth daily  Cholecalciferol (VITAMIN D3) 50 MCG (2000 UT) CAPS, Take 2,000 Units by mouth daily   Ascorbic Acid (C-250) 250 MG CHEW, Take 250 mg by mouth daily   vitamin B-12 (CYANOCOBALAMIN) 1000 MCG tablet, Take 1,000 mcg by mouth daily  Misc Natural Products (OSTEO BI-FLEX JOINT SHIELD PO), Take 2 tablets by mouth daily.     aspirin 81 MG EC tablet, Take 81 mg by mouth daily Last dose 10/28  CURRENT MEDICATIONS     Current Facility-Administered Medications:     ascorbic acid (VITAMIN C) tablet 250 mg, 250 mg, Oral, Daily, Jorge Tinajero MD, 250 mg at 06/17/22 1007    aspirin EC tablet 81 mg, 81 mg, Oral, Daily, Jorge Tinajero MD, 81 mg at 06/17/22 1007    pantoprazole (PROTONIX) tablet 40 mg, 40 mg, Oral, Daily, Jorge Tinajero MD, 40 mg at 06/17/22 0507    timolol (TIMOPTIC) 0.5 % ophthalmic solution 1 drop, 1 drop, Both Eyes, BID, Jorge Tinajero MD, 1 drop at 06/17/22 1008    sodium chloride flush 0.9 % injection 5-40 mL, 5-40 mL, IntraVENous, 2 times per day, Jorge Tinajero MD    sodium chloride flush 0.9 % injection 5-40 mL, 5-40 mL, IntraVENous, PRN, Jorge Tinajero MD    0.9 % sodium chloride infusion, , IntraVENous, PRN, Jorge Tinajero MD    acetaminophen (TYLENOL) tablet 650 mg, 650 mg, Oral, Q6H PRN **OR** acetaminophen (TYLENOL) suppository 650 mg, 650 mg, Rectal, Q6H PRN, Jorge Tinajero MD    ondansetron (ZOFRAN-ODT) disintegrating tablet 4 mg, 4 mg, Oral, Q8H PRN **OR** ondansetron (ZOFRAN) injection 4 mg, 4 mg, IntraVENous, Q6H PRN, Jorge Tinajero MD    polyethylene glycol [facility]) packet 17 g, 17 g, Oral, Daily, Jorge Tinajero MD, 17 g at 06/17/22 1008    heparin (porcine) injection 5,000 Units, 5,000 Units, SubCUTAneous, 3 times per day, Jorge Tinajero MD, 5,000 Units at 06/17/22 0507    sodium bicarbonate 150 mEq in dextrose 5 % 1,000 mL infusion, , IntraVENous, Continuous, Nava Jacob MD, Last Rate: 100 mL/hr at 06/17/22 1010, New Bag at 06/17/22 1010    Vitamin D (CHOLECALCIFEROL) tablet 2,000 Units, 2,000 Units, Oral, Daily, RAJAN Lopez CNP, 2,000 Units at 06/17/22 1007    amLODIPine (NORVASC) tablet 2.5 mg, 2.5 mg, Oral, Daily, RAJAN Lopez CNP    finasteride (PROSCAR) tablet 5 mg, 5 mg, Oral, Daily, RAJAN Lopez CNP, 5 mg at 06/17/22 1006    metoprolol succinate (TOPROL XL) extended release tablet 25 mg, 25 mg, Oral, Daily, RAJAN Lopez CNP    atorvastatin (LIPITOR) tablet 20 mg, 20 mg, Oral, Daily, Chester Ji Lobito Sutton, RAJAN - CNP    vitamin B-12 (CYANOCOBALAMIN) tablet 1,000 mcg, 1,000 mcg, Oral, Daily, RAJAN Cannon CNP, 1,000 mcg at 06/17/22 1007  ALLERGIES     Patient has no known allergies.   Immunization History   Administered Date(s) Administered    COVID-19, Moderna, Booster, PF, 50mcg/0.25ml 12/06/2021    COVID-19, Moderna, Primary or Immunocompromised, PF, 100mcg/0.5mL 01/20/2021, 02/19/2021, 03/01/2021    DTaP 09/18/2003    Influenza 10/28/2013    Influenza Virus Vaccine 09/24/2012, 09/23/2014    Influenza, High Dose (Fluzone 65 yrs and older) 11/21/2016, 10/25/2017, 10/09/2018    Influenza, High-dose, Nellene Jarocho, 65 yrs +, IM (Fluzone) 10/26/2020    Influenza, Quadv, adjuvanted, 65 yrs +, IM, PF (Fluad) 10/12/2021    Influenza, Triv, inactivated, subunit, adjuvanted, IM (Fluad 65 yrs and older) 11/13/2019    Pneumococcal Conjugate 13-valent ([id number]) 02/09/2016, 10/01/2020    Pneumococcal Polysaccharide ([id number]) 07/21/2011      Internal Administration   First Dose COVID-19, Moderna, Primary or Immunocompromised, PF, 100mcg/0.5mL  01/20/2021   Second Dose COVID-19, Moderna, Primary or Immunocompromised, PF, 100mcg/0.5mL   02/19/2021       Last COVID Lab SARS-CoV-2, PCR (no units)   Date Value   08/22/2021 Not Detected     SARS-CoV-2, NAAT (no units)   Date Value   08/22/2021 Not Detected     SARS-COV-2, POC (no units)   Date Value   02/05/2021 Detected (A)        PAST MEDICAL HISTORY     Past Medical History:   Diagnosis Date    Enlarged prostate     Hyperlipidemia     Hypertension     Lumbosacral strain 7/8/2013    Pacemaker      SURGICAL HISTORY       Past Surgical History:   Procedure Laterality Date    A-V CARDIAC PACEMAKER INSERTION  5/30/07    COLONOSCOPY  8/3/11    CYSTOSCOPY N/A 11/2/2021    CYSTOSCOPY RETROGRADE PYELOGRAM, TRANSURETHRAL RESECTION OF THE PROSTATE WITH SUPRAPUBIC CATHETER PLACEMENT performed by Scarlet Muñoz MD at [address]  [facility] cataracts evelyn    PACEMAKER PLACEMENT      2017 battery change    TONSILLECTOMY      child     FAMILY HISTORY       Family History   Problem Relation Age of Onset    High Blood Pressure Mother     Cancer Mother     Diabetes Mother     Heart Disease Father     High Blood Pressure Father      SOCIAL HISTORY       Social History     Socioeconomic History    Marital status:      Spouse name: None    Number of children: None    Years of education: None    Highest education level: None   Occupational History    None   Tobacco Use    Smoking status: Former Smoker     Packs/day: 1.00     Years: 40.00     Pack years: 40.00     Quit date: 2000     Years since quittin.7    Smokeless tobacco: Never Used   Vaping Use    Vaping Use: Never used   Substance and Sexual Activity    Alcohol use: No    Drug use: No    Sexual activity: None   Other Topics Concern    None   Social History Narrative    None     Social Determinants of Health     Financial Resource Strain: Low Risk     Difficulty of Paying Living Expenses: Not hard at all   Food Insecurity: No Food Insecurity    Worried About Running Out of Food in the Last Year: Never true    Fidel of Food in the Last Year: Never true   Transportation Needs: No Transportation Needs    Lack of Transportation (Medical): No    Lack of Transportation (Non-Medical):  No   Physical Activity:     Days of Exercise per Week: Not on file    Minutes of Exercise per Session: Not on file   Stress:     Feeling of Stress : Not on file   Social Connections:     Frequency of Communication with Friends and Family: Not on file    Frequency of Social Gatherings with Friends and Family: Not on file    Attends Christianity Services: Not on file    Active Member of Clubs or Organizations: Not on file    Attends Club or Organization Meetings: Not on file    Marital Status: Not on file   Intimate Partner Violence:     Fear of Current or Ex-Partner: Not on file   Bk Emotionally Abused: Not on file    Physically Abused: Not on file    Sexually Abused: Not on file   Housing Stability:     Unable to Pay for Housing in the Last Year: Not on file    Number of Places Lived in the Last Year: Not on file    Unstable Housing in the Last Year: Not on file     · Lives with wife     PHYSICAL EXAM       ED Triage Vitals   BP Temp Temp Source Heart Rate Resp SpO2 Height Weight   06/16/22 1734 06/16/22 1713 06/17/22 0113 06/16/22 1713 06/16/22 1713 06/16/22 1713 -- 06/17/22 0503   124/63 98 °F (36.7 °C) Oral 89 18 99 %  134 lb (60.8 kg)     Vitals:    Vitals:    06/16/22 2322 06/17/22 0113 06/17/22 0503 06/17/22 0830   BP: 122/61 104/61  (!) 112/55   Pulse: 86 95  67   Resp: 16 18  18   Temp:  97.8 °F (36.6 °C)  97.8 °F (36.6 °C)   TempSrc:  Oral  Oral   SpO2: 99% 98%  98%   Weight:   134 lb (60.8 kg)      Physical Exam   Constitutional/General: Alert and oriented, NAD, thin , Solomon has hearing aids  Head: NC/AT  Eyes: PERRL, EOMI  Mouth: Normal mucosa, no thrush   Neck: Supple, full ROM,    Pulmonary: Lungs clear to auscultation bilaterally. Not in respiratory distress. Diminished to bases. Cardiovascular:  Regular rate and rhythm  Abdomen: Soft, + BS. No distension. Nontender. Extremities: Moves all extremities x 4. Warm and well perfused  Skin: Warm and dry without rash  Neurologic:    No focal deficits  Psych: Normal Affect  Suprapubic cath exit site with some drainage. ++ cloudy urine with sediments. PIV     DIAGNOSTIC RESULTS   RADIOLOGY:   US RETROPERITONEAL COMPLETE    Result Date: 6/16/2022  EXAMINATION: RETROPERITONEAL ULTRASOUND OF THE KIDNEYS AND URINARY BLADDER 6/16/2022 COMPARISON: CT abdomen and pelvis from August 9, 2021 HISTORY: ORDERING SYSTEM PROVIDED HISTORY: ARF TECHNOLOGIST PROVIDED HISTORY: Reason for exam:->ARF What reading provider will be dictating this exam?->CRC FINDINGS: Kidneys:  The right kidney measures 11.5 cm in length and the left kidney measures 11.2 cm in length. Right kidney: The corticomedullary differentiation and cortical thickness is decreased. Anechoic thin walled nonvascular right mid and upper pole renal cysts measuring 25 mm and 12 mm. There is moderate to severe right hydronephrosis. Left kidney: The corticomedullary differentiation and cortical thickness is decreased. Anechoic thin walled nonvascular left mid to lower pole 27 mm cyst.  No concerning renal mass or intrarenal calcification. There is moderate to severe left hydronephrosis. Bladder: A Fay catheter is identified within the bladder. The bladder is not fully decompressed. Bladder wall appears thickened. Echogenic debris is identified within the bladder. The prostate gland appears enlarged measuring 6.2 x 5.2 x 4.4 cm. 1.  Moderate to severe bilateral hydronephrosis. When compared to prior CT scan the degree of hydronephrosis is less extensive. 2.  Bilateral renal cortical thinning. Bilateral renal cysts. 3.  A Fay catheter identified within the bladder. Bladder is not fully decompressed. Layering echogenic debris identified within the bladder lumen. Bladder wall appears thickened. 4.  Prostate is enlarged. LABS  Recent Labs     06/16/22 1847   WBC 8.7   HGB 11.1*   HCT 34.7*   MCV 96.1        Recent Labs     06/16/22 1847 06/16/22 1847 06/17/22  0237     --  133   K 5.9*   < > 5.0  5.0      < > 106   CO2 14*   < > 14*   *  --  102*   CREATININE 6.4*  --  5.5*   GFRAA 10  --  12   LABGLOM 8  --  10   GLUCOSE 109*  --  85   PROT  --   --  7.0   LABALBU  --   --  3.7   CALCIUM 9.5  --  9.7   BILITOT  --   --  0.3   ALKPHOS  --   --  68   AST  --   --  <5   ALT  --   --  <5    < > = values in this interval not displayed.      Lab Results   Component Value Date    ADVIRPCR Not Detected 08/22/2021    [facility] Not Detected 08/22/2021    BPPTXPPCR Not Detected 08/22/2021    [facility] Not Detected 08/22/2021    [id number] Not Detected 08/22/2021    XUBREU2TIA Not Detected 08/22/2021    [id number] Not Detected 08/22/2021    [id number] Not Detected 08/22/2021    COVID19 Not Detected 08/22/2021    COVID19 Not Detected 08/22/2021    [facility] Not Detected 08/22/2021    RHENTPCR Not Detected 08/22/2021    FLUBPCR Not Detected 08/22/2021    [facility] Not Detected 08/22/2021    [id number] Not Detected 08/22/2021    [id number] Not Detected 08/22/2021    [id number] Not Detected 08/22/2021    ALRNIGA2VBM Not Detected 08/22/2021    RSVPCR Not Detected 08/22/2021     Lab Results   Component Value Date    COVID19 Not Detected 08/22/2021    COVID19 Not Detected 08/22/2021     COVID-19/RICK-COV2 LABS  Recent Labs     06/17/22  0237   AST <5   ALT <5     Lab Results   Component Value Date    CHOL 133 03/07/2022    TRIG 101 03/07/2022    HDL 43 03/07/2022    LDLCALC 70 03/07/2022    LABVLDL 20 03/07/2022     MICROBIOLOGY:  Cultures :     Lab Results   Component Value Date    BC 5 Days no growth 08/22/2021    ORG Pseudomonas aeruginosa 08/23/2021     Lab Results   Component Value Date    BLOODCULT2 5 Days no growth 08/22/2021    ORG Pseudomonas aeruginosa 08/23/2021     Urine Culture, Routine   Date Value Ref Range Status   01/05/2022   Final    10 to 100,000 CFU/mL  Mixed al isolated. Further workup and sensitivity testing  is not routinely indicated and will not be performed.   Mixed al isolated includes:  Mixed gram negative rods  Oxidase positive gram negative rods  Nonhemolytic Strep species     08/23/2021 >100,000 CFU/ml  Vabomere=36    Final       FINAL IMPRESSION    Patient is a 80 y.o. male who presented with   Chief Complaint   Patient presents with    Dysuria     stated that he is producing little to no urine    and admitted for Dehydration [E86.0]  Hyperkalemia [E87.5]  Acute cystitis without hematuria [H75.35]  Complicated UTI (urinary tract infection) [N39.0]  Acute renal failure, unspecified acute renal failure type ([address] [N17.9]  UTI  History of COVID    Plan:   · Start Cefepime 1 gram IV daily   · Awaiting cultures  · Labs reviewed    · Urology and nephrology consulted   · Previous cultures reviewed      Imaging and labs were reviewed per medical records and any ID pertinent labs were addressed with the patient. The patient/FAMILY  was educated about the diagnosis, prognosis, indications, risks and benefits of treatment. An opportunity to ask questions was given to the patient/FAMILY and questions were answered. Thank you for involving me in the care of Yulissa Luciano. Please do not hesitate to call for any questions or concerns. Electronically signed by RAJAN Duenas on 6/17/2022 at 10:13 AM  As above      This is a face to face encounter with Yulissa Luciano on 06/17/22. I have performed and participated in the history, exam, medical decision making, and  POC with the NURSE PRACTITIONER, RAJAN Duenas. in bed feels cold has no c/o f/c/n/v/d/  Has   urinary complaint with SPT  Fay shows pyuria  H/o Pseudomonas  1.  Moderate to severe bilateral hydronephrosis.  When compared to prior CT scan the degree of hydronephrosis is less extensive. 2.  Bilateral renal cortical thinning.  Bilateral renal cysts. 3.  A Fay catheter identified within the bladder.  Bladder is not fully   decompressed.  Layering echogenic debris identified within the bladder lumen. Bladder wall appears thickened. 4.  Prostate is enlarged. rx complicated uti/sepsis h/o hydronephrosis   [START ON 6/18/2022] cefepime (MAXIPIME) 500 mg in dextrose 5 % 50 mL IVPB, Q24H    Watch MS      Imaging and labs were reviewed. Yulissa Luciano was informed of their diagnosis, indications, risks and benefits of treatment. Yulissa Luciano had the opportunity to ask questions. All questions were answered. Thank you for involving me in the care of Yulissa Luciano.  Please do not hesitate to call for any questions or concerns.     Electronically signed by Joe Hwang MD on 6/17/2022 at 12:53 PM

## 2022-06-17 NOTE — CARE COORDINATION
CM note: Met with patient and his wife at the bedside for transition of care planning. Pt lives with his wife in their home, he ambulates with a cane at baseline, has a SP catheter which patient cares for, also has [facility] that attends to catheter. Wife states they are aware that patient is here, will need resumption of care orders. Verified PCP and pharmacy preference. Pt and wife both state plan is to discharge home with resumption of home care at discharge and family will provide transportation.

## 2022-06-17 NOTE — PROGRESS NOTES
Nephrology Consult Note  Patient's Name: Tammie Rodriguez  7:11 AM  6/17/2022    Nephrologist: Kannan Favorite    Reason for Consult: MORENA  Requesting Physician:  Gomez Simental MD    Chief Complaint:      History Obtained From:  patient and past medical records    History of Present Ilness:    Tammie Rodriguez is a 80 y.o. male with prior history Jan 2022  MORENA on CKD Stage 3A  Chronic kidney disease likely due to longstanding history of hypertension as well as BPH/ Neurogenic Bladder  with a Baseline serum creatinine range 1.5 ->1.8mg/dl over the past 3 years. Admitted in jan 2022 with a  serum creatinine of 4.0 w/ BUN 98  In the setting of  ACE inhibitor and diuretic. Patient   Follows with urology for history of suprapubic catheter he was seen May 25 2022 by Urology and the plan has he was voiding with a low PVR to keep SP tube clamped and then to have removal. He presented to the ED 6/16/22 for SP tube producing little or no urine and urine  thick with a milky consistency as per the ED notes. He notes for the last several days he had no energy and was lying down more frequently for longer periods.  He also noted abd pain shiloh over th bladder      Past Medical History:   Diagnosis Date    Enlarged prostate     Hyperlipidemia     Hypertension     Lumbosacral strain 7/8/2013    Pacemaker        Past Surgical History:   Procedure Laterality Date    A-V CARDIAC PACEMAKER INSERTION  5/30/07    COLONOSCOPY  8/3/11    CYSTOSCOPY N/A 11/2/2021    CYSTOSCOPY RETROGRADE PYELOGRAM, TRANSURETHRAL RESECTION OF THE PROSTATE WITH SUPRAPUBIC CATHETER PLACEMENT performed by Duke Moreau MD at [place] [address] SURGERY  2002    cataracts evelyn    PACEMAKER PLACEMENT      2017 battery change    TONSILLECTOMY      child       Family History   Problem Relation Age of Onset    High Blood Pressure Mother     Cancer Mother     Diabetes Mother     Heart Disease Father     High Blood Pressure Father         reports that he quit smoking about 21 years ago. He has a 40.00 pack-year smoking history. He has never used smokeless tobacco. He reports that he does not drink alcohol and does not use drugs. Allergies:  Patient has no known allergies. Current Medications:    ascorbic acid (VITAMIN C) tablet 250 mg, Daily  aspirin EC tablet 81 mg, Daily  pantoprazole (PROTONIX) tablet 40 mg, Daily  timolol (TIMOPTIC) 0.5 % ophthalmic solution 1 drop, BID  sodium chloride flush 0.9 % injection 5-40 mL, 2 times per day  sodium chloride flush 0.9 % injection 5-40 mL, PRN  0.9 % sodium chloride infusion, PRN  acetaminophen (TYLENOL) tablet 650 mg, Q6H PRN   Or  acetaminophen (TYLENOL) suppository 650 mg, Q6H PRN  ondansetron (ZOFRAN-ODT) disintegrating tablet 4 mg, Q8H PRN   Or  ondansetron (ZOFRAN) injection 4 mg, Q6H PRN  polyethylene glycol (GLYCOLAX) packet 17 g, Daily  heparin (porcine) injection 5,000 Units, 3 times per day  sodium bicarbonate 150 mEq in dextrose 5 % 1,000 mL infusion, Continuous        Review of Systems:   Pertinent items are noted in HPI.     Physical exam:   Constitutional:  Elderly frail mail  Vitals:   VITALS:  /61   Pulse 95   Temp 97.8 °F (36.6 °C) (Oral)   Resp 18   Wt 134 lb (60.8 kg)   SpO2 98%   BMI 19.79 kg/m²   24HR INTAKE/OUTPUT:    Intake/Output Summary (Last 24 hours) at 6/17/2022 9845  Last data filed at 6/17/2022 9205  Gross per 24 hour   Intake 950 ml   Output 1500 ml   Net -550 ml     URINARY CATHETER OUTPUT (Fay):  Suprapubic Catheter-Output (mL): 300 mL  DRAIN/TUBE OUTPUT:     VENT SETTINGS:     Additional Respiratory Assessments  Heart Rate: 95  Resp: 18  SpO2: 98 %    Skin: no rash, turgor wnl  Heent:  eomi, mmm  Neck: no bruits or jvd noted  Cardiovascular: Irreg without S3 or a rub  Respiratory: CTA B without w/r/r  Abdomen:  +bs, soft, suprapubic catheter in place, tender suprapubic on palpation  Ext: (-) ,bilat lower extremity edema  Psychiatric: mood and affect appropriate  Musculoskeletal:  Rom, muscular strength intact    Data:   Labs:  CBC:   Lab Results   Component Value Date    WBC 8.7 06/16/2022    RBC 3.61 06/16/2022    HGB 11.1 06/16/2022    HCT 34.7 06/16/2022    MCV 96.1 06/16/2022    MCH 30.7 06/16/2022    MCHC 32.0 06/16/2022    RDW 14.6 06/16/2022     06/16/2022    MPV 9.3 06/16/2022     CBC with Differential:    Lab Results   Component Value Date    WBC 8.7 06/16/2022    RBC 3.61 06/16/2022    HGB 11.1 06/16/2022    HCT 34.7 06/16/2022     06/16/2022    MCV 96.1 06/16/2022    MCH 30.7 06/16/2022    MCHC 32.0 06/16/2022    RDW 14.6 06/16/2022    SEGSPCT 62 09/12/2013    LYMPHOPCT 25.1 06/16/2022    MONOPCT 11.7 06/16/2022    BASOPCT 0.5 06/16/2022    MONOSABS 1.02 06/16/2022    LYMPHSABS 2.19 06/16/2022    EOSABS 0.02 06/16/2022    BASOSABS 0.04 06/16/2022     Hemoglobin/Hematocrit:    Lab Results   Component Value Date    HGB 11.1 06/16/2022    HCT 34.7 06/16/2022     CMP:    Lab Results   Component Value Date     06/17/2022    K 5.0 06/17/2022    K 5.0 06/17/2022     06/17/2022    CO2 14 06/17/2022     06/17/2022    CREATININE 5.5 06/17/2022    GFRAA 12 06/17/2022    LABGLOM 10 06/17/2022    GLUCOSE 85 06/17/2022    GLUCOSE 106 01/09/2012    PROT 7.0 06/17/2022    LABALBU 3.7 06/17/2022    LABALBU 4.4 01/09/2012    CALCIUM 9.7 06/17/2022    BILITOT 0.3 06/17/2022    ALKPHOS 68 06/17/2022    AST <5 06/17/2022    ALT <5 06/17/2022     BMP:    Lab Results   Component Value Date     06/17/2022    K 5.0 06/17/2022    K 5.0 06/17/2022     06/17/2022    CO2 14 06/17/2022     06/17/2022    LABALBU 3.7 06/17/2022    LABALBU 4.4 01/09/2012    CREATININE 5.5 06/17/2022    CALCIUM 9.7 06/17/2022    GFRAA 12 06/17/2022    LABGLOM 10 06/17/2022    GLUCOSE 85 06/17/2022    GLUCOSE 106 01/09/2012     BUN/Creatinine:    Lab Results   Component Value Date     06/17/2022    CREATININE 5.5 06/17/2022     Hepatic Function Panel:    Lab Results   Component Value Date    ALKPHOS 68 06/17/2022    ALT <5 06/17/2022    AST <5 06/17/2022    PROT 7.0 06/17/2022    BILITOT 0.3 06/17/2022    BILIDIR 0.2 08/09/2021    IBILI 0.3 08/09/2021    LABALBU 3.7 06/17/2022    LABALBU 4.4 01/09/2012     Albumin:    Lab Results   Component Value Date    LABALBU 3.7 06/17/2022    LABALBU 4.4 01/09/2012     Calcium:    Lab Results   Component Value Date    CALCIUM 9.7 06/17/2022     Ionized Calcium:  No results found for: IONCA  Magnesium:    Lab Results   Component Value Date    MG 2.0 03/07/2022     Phosphorus:    Lab Results   Component Value Date    PHOS 5.2 06/17/2022     LDH:  No results found for: LDH  Uric Acid:    Lab Results   Component Value Date    LABURIC 8.8 03/07/2022     PT/INR:    Lab Results   Component Value Date    PROTIME 11.4 07/06/2017    INR 1.0 07/06/2017     PTT:  No results found for: APTT, PTT[APTT}  Troponin:    Lab Results   Component Value Date    TROPONINI 0.02 02/09/2021     U/A:    Lab Results   Component Value Date    NITRITE Neg 03/22/2022    COLORU Yellow 06/16/2022    PROTEINU >=300 06/16/2022    PHUR 6.0 06/16/2022    WBCUA PACKED 06/16/2022    WBCUA NONE 01/09/2012    RBCUA 2-5 06/16/2022    RBCUA NONE 01/17/2014    YEAST Present 03/07/2022    BACTERIA FEW 06/16/2022    CLARITYU TURBID 06/16/2022    SPECGRAV 1.020 06/16/2022    LEUKOCYTESUR LARGE 06/16/2022    UROBILINOGEN 0.2 06/16/2022    BILIRUBINUR Negative 06/16/2022    BILIRUBINUR Neg 03/22/2022    BILIRUBINUR NEGATIVE 01/09/2012    BLOODU LARGE 06/16/2022    GLUCOSEU Negative 06/16/2022    GLUCOSEU NEGATIVE 01/09/2012    AMORPHOUS FEW 01/17/2014     ABG:  No results found for: PH, PCO2, PO2, HCO3, BE, THGB, TCO2, O2SAT  HgBA1c:    Lab Results   Component Value Date    LABA1C 6.0 08/23/2021     Microalbumen/Creatinine ratio:  No components found for: RUCREAT  FLP:    Lab Results   Component Value Date    TRIG 101 03/07/2022    HDL 43 03/07/2022 [address] 70 03/07/2022    LABVLDL 20 03/07/2022     TSH:    Lab Results   Component Value Date    TSH 1.070 11/11/2015     VITAMIN B12: No components found for: B12  FOLATE:    Lab Results   Component Value Date    FOLATE >20.0 01/04/2022     Iron Saturation:  No components found for: PERCENTFE  FERRITIN:    Lab Results   Component Value Date    FERRITIN 203 03/07/2022     AMYLASE:  No results found for: AMYLASE  LIPASE:    Lab Results   Component Value Date    LIPASE 27 08/09/2021     Fibrinogen Level:  No components found for: FIB  24 Hour Urine for Protein:  No components found for: RAWUPRO, UHRS3, GAPH33XW, UTV3  24 Hour Urine for Creatinine Clearance:  No components found for: CREAT4, UHRS10, UTV10  PSA:   Lab Results   Component Value Date    PSA 2.56 11/11/2015        Imaging:  EXAMINATION:  RETROPERITONEAL ULTRASOUND OF THE KIDNEYS AND URINARY BLADDER     6/16/2022     COMPARISON:  CT abdomen and pelvis from August 9, 2021     HISTORY:  ORDERING SYSTEM PROVIDED HISTORY: ARF  TECHNOLOGIST PROVIDED HISTORY:     Reason for exam:->ARF  What reading provider will be dictating this exam?->CRC     FINDINGS:     Kidneys:     The right kidney measures 11.5 cm in length and the left kidney measures 11.2  cm in length.     Right kidney: The corticomedullary differentiation and cortical thickness is  decreased.  Anechoic thin walled nonvascular right mid and upper pole renal  cysts measuring 25 mm and 12 mm.  There is moderate to severe right  hydronephrosis.     Left kidney: The corticomedullary differentiation and cortical thickness is  decreased.  Anechoic thin walled nonvascular left mid to lower pole 27 mm  cyst.  No concerning renal mass or intrarenal calcification.  There is  moderate to severe left hydronephrosis.         Bladder:     A Fay catheter is identified within the bladder.  The bladder is not fully  decompressed.  Bladder wall appears thickened.  Echogenic debris is  identified within the bladder.     The prostate gland appears enlarged measuring 6.2 x 5.2 x 4.4 cm.        Impression  1.  Moderate to severe bilateral hydronephrosis.  When compared to prior CT  scan the degree of hydronephrosis is less extensive.     2.  Bilateral renal cortical thinning.  Bilateral renal cysts.     3.  A Fay catheter identified within the bladder.  Bladder is not fully  decompressed.  Layering echogenic debris identified within the bladder lumen. Bladder wall appears thickened.     4.  Prostate is enlarged. Assessment  1-Stage III MORENA in the setting of Neurogenic Bladder  with chronic suprapubic catheter possibly exacerbated by the ACEI+HCTZ and possible UTI  UA SG 1.020 ketones tr, blood large, protein >300, Ni large, WBC packed, hector few, LE large, pH 6.0  PLAN:  1. Continue INF change to a bicarbonate based solution  2. Urology to see  3. Follow labs  4. Hold ACEI    2-Non Anion Gap Met Acidosis in the setting of the Impaired tubule function due Obstruction  And the MORENA  PLAN:  1. Change to an IV HCO3 based fluid  2. Follow Labs    3-Hyperkalemia sec to ACEI + MORENA + Obstruction  K+ 5.9-->5.0  PLAN:  1. Follow K+  2. 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 Check PTH, VIt D  2. Follow Ca++ and PO4    5-HTN with CKD I-IV  BP at goal <130/80  PLAN:  1. 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  Check Fe++, B12, Folate    Thank you  for allowing us to participate in care of Hung Oakes MD  7:11 AM  6/17/2022

## 2022-06-17 NOTE — PROGRESS NOTES
Pharmacy Note - Renal Dosing and Extended Infusion Beta-Lactam Adjustment    Cefepime 1,000 mg IV every 24 hours, infused over 30 minutes, ordered for treatment of UTI. Per [facility] Renal Dose Adjustment Policy and Extended Infusion Beta-Lactam Policy, cefepime 3,230 mg IV every 24 hours, infused over 30 minutes, will be changed to cefepime 500 mg IV every 24 hours, infused over 240 minutes. Estimated Creatinine Clearance: Estimated Creatinine Clearance: 8 mL/min (A) (based on SCr of 5.5 mg/dL (H)). Dialysis Status, MORENA, CKD: CKD stage 3    BMI: Body mass index is 20.6 kg/m². Rationale for Adjustment: Agent is renally eliminated and demonstrates time-dependent effect on bacterial eradication. Extended-infusion dosing strategy aims to enhance microbiologic and clinical efficacy. Pharmacy will continue to monitor renal function, cultures and sensitivities (where available) and adjust dose as necessary. Please call with any questions.     Thank you,    Alek Whitman, PharmD   6/17/2022 12:53 PM

## 2022-06-17 NOTE — PROGRESS NOTES
assessment    Frequency/Duration 1-3 days/wk for 2 weeks PRN     Specific OT Treatment Interventions to include:   * Instruction/training on adapted ADL techniques and AE recommendations to increase functional independence within precautions       * Training on energy conservation strategies, correct breathing pattern and techniques to improve independence/tolerance for self-care routine  * Functional transfer/mobility training/DME recommendations for increased independence, safety, and fall prevention  * Patient/Family education to increase follow through with safety techniques and functional independence  * Recommendation of environmental modifications for increased safety with functional transfers/mobility and ADLs  * Therapeutic exercise to improve motor endurance, ROM, and functional strength for ADLs/functional transfers  * Therapeutic activities to facilitate/challenge dynamic balance, stand tolerance for increased safety and independence with ADLs  * Positioning to improve skin integrity, interaction with environment and functional independence    Recommended Adaptive Equipment: none      Home Living: with spouse; single family home, 2 story, resides on 1st floor, 3 steps to enter with rail, tub shower. Equipment owned: wheeled walker, straight cane, bedside commode, tub transfer bench, 3 grab bars    Prior Level of Function: Independent with ADLs , Independent with IADLs; ambulated with straight cane     Pain Level: 3/10 pain \"around my penis\"; Nursing notified.       Cognition: A&O: 4/4; Follows 2 step directions   Memory: good    Sequencing: good    Problem solving: good    Judgement/safety: good     [facility]   AM-PAC Daily Activity Inpatient   How much help for putting on and taking off regular lower body clothing?: A Lot  How much help for Bathing?: A Lot  How much help for Toileting?: A Little  How much help for putting on and taking off regular upper body clothing?: A Little  How much help for taking care of personal grooming?: A Little  How much help for eating meals?: None  AM-PAC Inpatient Daily Activity Raw Score: 17  AM-PAC Inpatient ADL T-Scale Score : 37.26  ADL Inpatient CMS 0-100% Score: 50.11  ADL Inpatient CMS G-Code Modifier : CK     Functional Assessment:    Initial Eval Status  Date: 6/17/22 Treatment Status  Date: STGs = LTGs  Time frame: 10-14 days   Feeding Independent   Independent    Grooming Supervision   Independent    UB Dressing Minimal Assist to tie gown  Independent    LB Dressing Moderate Assist for lower body clothing management before and after toileting. Minimal assist to don slippers while seated at EOB. Independent    Bathing Moderate Assist  Modified [place]    Toileting Supervision for hygiene after using commode for BM. Toilet wipes provided. Independent    Bed Mobility  Supine to sit: Supervision   Sit to supine: N/T as pt was up in chair   Supine to sit: Independent   Sit to supine: Independent    Functional Transfers Minimal Assist from EOB to wheeled walker. Minimal assist for transfer to and from low commode with minimal verbal cues to use grab bar for safe commode transfer. Supervision for transfer from standing with wheeled walker to bedside chair. Transfer training with verbal cues for hand placement throughout session to improve safety. Independent    Functional Mobility Minimal assist with wheeled walker to improve balance to and from bathroom and up to bedside chair, verbal cues for walker sequence and safety.    Modified [place]    Balance Sitting:     Static: good     Dynamic: fair   Standing: fair  with wheeled walker     Activity Tolerance Fair   good    Visual/  Perceptual Glasses: yes                 Hand Dominance: left      AROM (PROM) Strength Additional Info:    RUE  WFL 4/5 good  and wfl FMC/dexterity noted during ADL tasks     LUE WFL 4/5 good  and wfl FMC/dexterity noted during ADL tasks       Hearing: [facility]   Sensation: No c/o numbness or tingling  Tone: WFL   Edema: no     Comments: Upon arrival the patient was supine. At end of session, patient was up in chair with call light and phone within reach, all lines and tubes intact. Blankets applied as pt was cold. Overall patient demonstrated decreased independence and safety during completion of ADL/functional transfer/mobility tasks. Pt would benefit from continued skilled OT to increase safety and independence with completion of ADL/IADL tasks for functional independence and quality of life. Treatment: OT treatment provided this date includes:    Instruction/training on safety and adapted techniques for completion of ADLs    Instruction/training on safe functional mobility/transfer techniques    Instruction/training on energy conservation/work simplification for completion of ADLs    Proper Positioning/Alignment    Rehab Potential: Good for established goals. Patient / Family Goal: return home with assistance from family        Patient and/or family were instructed on functional diagnosis, prognosis/goals and OT plan of care. Demonstrated good understanding. Eval Complexity: Low    Time In: 1:47pm   Time Out: 2:30pm    Total Treatment Time: 23      Min Units   OT Eval Low 97165  X  1    OT Eval Medium 06294      OT Eval High 30221      OT Re-Eval 87369            ADL/Self Care 59005  15  1    Therapeutic Activities 09704  8  1    Therapeutic Ex 97806       Orthotic Management 37220       Manual [id number]     Neuro Re-Ed 82729       Non-Billable Time        Evaluation Time additionally includes thorough review of current medical information, gathering information on past medical history/social history and prior level of function, interpretation of standardized testing/informal observation of tasks, assessment of data and development of plan of care and goals.         Evaluating OT: Mike Rust OTR/L; [id number]

## 2022-06-17 NOTE — ACP (ADVANCE CARE PLANNING)
Advance Care Planning   Healthcare Decision Maker:    Primary Decision Maker: Evelyn Fox - Spouse - [phone]    Secondary Decision Maker: Tobi Kacey - [phone]    Supplemental (Other) Decision Maker: Ozzyajay Bergman - Child - [phone]    Click here to complete Healthcare Decision Makers including selection of the Healthcare Decision Maker Relationship (ie \"Primary\"). Today we documented Decision Maker(s) consistent with Legal Next of Kin hierarchy.

## 2022-06-18 LAB
ALBUMIN SERPL-MCNC: 3 G/DL (ref 3.5–5.2)
ALP BLD-CCNC: 61 U/L (ref 40–129)
ALT SERPL-CCNC: <5 U/L (ref 0–40)
ANION GAP SERPL CALCULATED.3IONS-SCNC: 11 MMOL/L (ref 7–16)
AST SERPL-CCNC: 6 U/L (ref 0–39)
BILIRUB SERPL-MCNC: 0.2 MG/DL (ref 0–1.2)
BUN BLDV-MCNC: 72 MG/DL (ref 6–23)
CALCIUM SERPL-MCNC: 8.7 MG/DL (ref 8.6–10.2)
CHLORIDE BLD-SCNC: 104 MMOL/L (ref 98–107)
CO2: 24 MMOL/L (ref 22–29)
CREAT SERPL-MCNC: 3.1 MG/DL (ref 0.7–1.2)
FERRITIN: 332 NG/ML
FOLATE: >20 NG/ML (ref 4.8–24.2)
GFR AFRICAN AMERICAN: 23
GFR NON-AFRICAN AMERICAN: 19 ML/MIN/1.73
GLUCOSE BLD-MCNC: 126 MG/DL (ref 74–99)
HCT VFR BLD CALC: 31 % (ref 37–54)
HEMOGLOBIN: 10.3 G/DL (ref 12.5–16.5)
IRON SATURATION: 29 % (ref 20–55)
IRON: 53 MCG/DL (ref 59–158)
MAGNESIUM: 2 MG/DL (ref 1.6–2.6)
MCH RBC QN AUTO: 31.7 PG (ref 26–35)
MCHC RBC AUTO-ENTMCNC: 33.2 % (ref 32–34.5)
MCV RBC AUTO: 95.4 FL (ref 80–99.9)
PARATHYROID HORMONE INTACT: 47 PG/ML (ref 15–65)
PDW BLD-RTO: 14.5 FL (ref 11.5–15)
PHOSPHORUS: 3.4 MG/DL (ref 2.5–4.5)
PLATELET # BLD: 349 E9/L (ref 130–450)
PMV BLD AUTO: 9.3 FL (ref 7–12)
POTASSIUM SERPL-SCNC: 4.2 MMOL/L (ref 3.5–5)
RBC # BLD: 3.25 E12/L (ref 3.8–5.8)
SODIUM BLD-SCNC: 139 MMOL/L (ref 132–146)
TOTAL IRON BINDING CAPACITY: 185 MCG/DL (ref 250–450)
TOTAL PROTEIN: 6.2 G/DL (ref 6.4–8.3)
VITAMIN B-12: 1251 PG/ML (ref 211–946)
VITAMIN D 25-HYDROXY: 56 NG/ML (ref 30–100)
WBC # BLD: 6.6 E9/L (ref 4.5–11.5)

## 2022-06-18 PROCEDURE — 97161 PT EVAL LOW COMPLEX 20 MIN: CPT | Performed by: PHYSICAL THERAPIST

## 2022-06-18 PROCEDURE — 2580000003 HC RX 258: Performed by: INTERNAL MEDICINE

## 2022-06-18 PROCEDURE — 2500000003 HC RX 250 WO HCPCS: Performed by: INTERNAL MEDICINE

## 2022-06-18 PROCEDURE — 99232 SBSQ HOSP IP/OBS MODERATE 35: CPT | Performed by: INTERNAL MEDICINE

## 2022-06-18 PROCEDURE — 97530 THERAPEUTIC ACTIVITIES: CPT | Performed by: PHYSICAL THERAPIST

## 2022-06-18 PROCEDURE — 6360000002 HC RX W HCPCS: Performed by: INTERNAL MEDICINE

## 2022-06-18 PROCEDURE — 84100 ASSAY OF PHOSPHORUS: CPT

## 2022-06-18 PROCEDURE — APPSS30 APP SPLIT SHARED TIME 16-30 MINUTES: Performed by: NURSE PRACTITIONER

## 2022-06-18 PROCEDURE — 82306 VITAMIN D 25 HYDROXY: CPT

## 2022-06-18 PROCEDURE — 83540 ASSAY OF IRON: CPT

## 2022-06-18 PROCEDURE — 6370000000 HC RX 637 (ALT 250 FOR IP): Performed by: INTERNAL MEDICINE

## 2022-06-18 PROCEDURE — 6360000002 HC RX W HCPCS: Performed by: CLINICAL NURSE SPECIALIST

## 2022-06-18 PROCEDURE — 80053 COMPREHEN METABOLIC PANEL: CPT

## 2022-06-18 PROCEDURE — 82607 VITAMIN B-12: CPT

## 2022-06-18 PROCEDURE — 1200000000 HC SEMI PRIVATE

## 2022-06-18 PROCEDURE — 83735 ASSAY OF MAGNESIUM: CPT

## 2022-06-18 PROCEDURE — 82728 ASSAY OF FERRITIN: CPT

## 2022-06-18 PROCEDURE — 83970 ASSAY OF PARATHORMONE: CPT

## 2022-06-18 PROCEDURE — 2580000003 HC RX 258: Performed by: CLINICAL NURSE SPECIALIST

## 2022-06-18 PROCEDURE — 6370000000 HC RX 637 (ALT 250 FOR IP): Performed by: NURSE PRACTITIONER

## 2022-06-18 PROCEDURE — 36415 COLL VENOUS BLD VENIPUNCTURE: CPT

## 2022-06-18 PROCEDURE — 83550 IRON BINDING TEST: CPT

## 2022-06-18 PROCEDURE — 82746 ASSAY OF FOLIC ACID SERUM: CPT

## 2022-06-18 PROCEDURE — 85027 COMPLETE CBC AUTOMATED: CPT

## 2022-06-18 RX ORDER — SODIUM CHLORIDE, SODIUM LACTATE, POTASSIUM CHLORIDE, CALCIUM CHLORIDE 600; 310; 30; 20 MG/100ML; MG/100ML; MG/100ML; MG/100ML
INJECTION, SOLUTION INTRAVENOUS CONTINUOUS
Status: DISCONTINUED | OUTPATIENT
Start: 2022-06-18 | End: 2022-06-21

## 2022-06-18 RX ADMIN — HEPARIN SODIUM 5000 UNITS: 5000 INJECTION INTRAVENOUS; SUBCUTANEOUS at 20:00

## 2022-06-18 RX ADMIN — Medication 10 ML: at 08:43

## 2022-06-18 RX ADMIN — CEFEPIME HYDROCHLORIDE 500 MG: 1 INJECTION, POWDER, FOR SOLUTION INTRAMUSCULAR; INTRAVENOUS at 01:06

## 2022-06-18 RX ADMIN — METOPROLOL SUCCINATE 25 MG: 25 TABLET, EXTENDED RELEASE ORAL at 08:41

## 2022-06-18 RX ADMIN — TIMOLOL MALEATE 1 DROP: 5 SOLUTION OPHTHALMIC at 08:40

## 2022-06-18 RX ADMIN — HEPARIN SODIUM 5000 UNITS: 5000 INJECTION INTRAVENOUS; SUBCUTANEOUS at 14:15

## 2022-06-18 RX ADMIN — CYANOCOBALAMIN TAB 1000 MCG 1000 MCG: 1000 TAB at 08:41

## 2022-06-18 RX ADMIN — Medication 2000 UNITS: at 08:41

## 2022-06-18 RX ADMIN — ATORVASTATIN CALCIUM 20 MG: 20 TABLET, FILM COATED ORAL at 19:58

## 2022-06-18 RX ADMIN — SODIUM BICARBONATE: 84 INJECTION, SOLUTION INTRAVENOUS at 08:40

## 2022-06-18 RX ADMIN — PANTOPRAZOLE SODIUM 40 MG: 40 TABLET, DELAYED RELEASE ORAL at 05:49

## 2022-06-18 RX ADMIN — SODIUM CHLORIDE, POTASSIUM CHLORIDE, SODIUM LACTATE AND CALCIUM CHLORIDE: 600; 310; 30; 20 INJECTION, SOLUTION INTRAVENOUS at 11:37

## 2022-06-18 RX ADMIN — TIMOLOL MALEATE 1 DROP: 5 SOLUTION OPHTHALMIC at 19:58

## 2022-06-18 RX ADMIN — OXYCODONE HYDROCHLORIDE AND ACETAMINOPHEN 250 MG: 500 TABLET ORAL at 08:42

## 2022-06-18 RX ADMIN — POLYETHYLENE GLYCOL 3350 17 G: 17 POWDER, FOR SOLUTION ORAL at 08:47

## 2022-06-18 RX ADMIN — ASPIRIN 81 MG: 81 TABLET, COATED ORAL at 08:41

## 2022-06-18 RX ADMIN — HEPARIN SODIUM 5000 UNITS: 5000 INJECTION INTRAVENOUS; SUBCUTANEOUS at 05:50

## 2022-06-18 RX ADMIN — FINASTERIDE 5 MG: 5 TABLET, FILM COATED ORAL at 08:41

## 2022-06-18 RX ADMIN — Medication 10 ML: at 19:59

## 2022-06-18 RX ADMIN — AMLODIPINE BESYLATE 2.5 MG: 10 TABLET ORAL at 08:42

## 2022-06-18 NOTE — PROGRESS NOTES
[facility]     [address] [place] [address] 80  [place], [address]  Phone [phone]   [phone]      Admit Date: 2022  6:25 PM  Pt Name: Will Cintron  MRN: [id number]  : 1932  Reason for Consult:    Chief Complaint   Patient presents with    Dysuria     stated that he is producing little to no urine     Requesting Physician:  Armand Zee MD  PCP: Nancy Tolentino MD  History Obtained From:  patient, chart   ID consulted for Dehydration [E86.0]  Hyperkalemia [E87.5]  Acute cystitis without hematuria [Y29.98]  Complicated UTI (urinary tract infection) [N39.0]  Acute renal failure, unspecified acute renal failure type ([address] [N17.9]  on hospital day 2   Face to face encounter   CHIEF COMPLAINT       Chief Complaint   Patient presents with    Dysuria     stated that he is producing little to no urine     HISTORYOF PRESENT ILLNESS   Will Cintron is a 80 y.o. male who presents with   has a past medical history of Enlarged prostate, Hyperlipidemia, Hypertension, Lumbosacral strain, and Pacemaker. HPI  Patient lives with wife- he reports he was not feeling well since about Monday. He has a suprabic cath and he also reports he voids. His urine has been cloudy with sediments. He denies any fevers at home. He reports fatigue, poor appetite. And overall not feeling well. He is known to Urology. He had TURP and suprapubic placement in 2021. He is chronic kidney disease. He denies any recent antibiotics  Cultures are pending   Labs reviewed   He denies any nausea, vomiting, fevers, no diarrhea, does have constipation. Previous cultures reviewed     ID has been asked to evaluate and manage atbs.    DOS  22  Pt in chair has no c/o guillory still cloudy no issues with atbx   REVIEW OF SYSTEMS     CONSTITUTIONAL:   + fatigue   ALLERGIES:    No urticaria, hay fever,    EYES:     No blurry vision, loss of vision, eye pain  ENT:      No hearing loss, sore throat  CARDIOVASCULAR:   No chest pain or palpitations, + HTN, + hyperlipidemia , + pacer   RESPIRATORY:   No cough, sob  ENDOCRINE:    No increase thirst, urination   HEME-LYMPH:   No easy bruising or bleeding  GI:     No nausea, vomiting or diarrhea  :     No urinary complaints  NEURO:    No seizures, stroke, HA  MUSCULOSKELETAL:  No muscle aches or pain, no joint pain  SKIN:     No rash or itch  PSYCH:    No depression or anxiety       CURRENT MEDICATIONS     Current Facility-Administered Medications:     lactated ringers infusion, , IntraVENous, Continuous, Sherri Casanova MD, Last Rate: 75 mL/hr at 06/18/22 1137, New Bag at 06/18/22 1137    [START ON 6/19/2022] cefepime (MAXIPIME) 1000 mg IVPB extended (mini-bag), 1,000 mg, IntraVENous, Q24H, Citlaly Silverman, APRN - CNS    ascorbic acid (VITAMIN C) tablet 250 mg, 250 mg, Oral, Daily, Arnold Morrow MD, 250 mg at 06/18/22 7786    aspirin EC tablet 81 mg, 81 mg, Oral, Daily, Arnold Morrow MD, 81 mg at 06/18/22 0841    pantoprazole (PROTONIX) tablet 40 mg, 40 mg, Oral, Daily, Arnold Morrow MD, 40 mg at 06/18/22 0549    timolol (TIMOPTIC) 0.5 % ophthalmic solution 1 drop, 1 drop, Both Eyes, BID, Arnold Morrow MD, 1 drop at 06/18/22 0840    sodium chloride flush 0.9 % injection 5-40 mL, 5-40 mL, IntraVENous, 2 times per day, Arnold Morrow MD, 10 mL at 06/18/22 0843    sodium chloride flush 0.9 % injection 5-40 mL, 5-40 mL, IntraVENous, PRN, Arnold Morrow MD    0.9 % sodium chloride infusion, , IntraVENous, PRN, Arnold Morrow MD    acetaminophen (TYLENOL) tablet 650 mg, 650 mg, Oral, Q6H PRN **OR** acetaminophen (TYLENOL) suppository 650 mg, 650 mg, Rectal, Q6H PRN, Arnold Morrow MD    ondansetron (ZOFRAN-ODT) disintegrating tablet 4 mg, 4 mg, Oral, Q8H PRN **OR** ondansetron (ZOFRAN) injection 4 mg, 4 mg, IntraVENous, Q6H PRN, Arnold Morrow MD    polyethylene glycol [facility]) packet 17 g, 17 g, Oral, Daily, Green Bennetts, MD, 17 g at 06/18/22 0847    heparin (porcine) injection 5,000 Units, 5,000 Units, SubCUTAneous, 3 times per day, Vi Fuentes MD, 5,000 Units at 06/18/22 0550    Vitamin D (CHOLECALCIFEROL) tablet 2,000 Units, 2,000 Units, Oral, Daily, RAJAN Her - CNP, 2,000 Units at 06/18/22 0841    amLODIPine (NORVASC) tablet 2.5 mg, 2.5 mg, Oral, Daily, RAJAN Her - CNP, 2.5 mg at 06/18/22 0842    finasteride (PROSCAR) tablet 5 mg, 5 mg, Oral, Daily, RAJAN Her - CNP, 5 mg at 06/18/22 0841    metoprolol succinate (TOPROL XL) extended release tablet 25 mg, 25 mg, Oral, Daily, RAJAN Her - CNP, 25 mg at 06/18/22 0841    atorvastatin (LIPITOR) tablet 20 mg, 20 mg, Oral, Daily, RAJAN Her - CNP, 20 mg at 06/17/22 2125    vitamin B-12 (CYANOCOBALAMIN) tablet 1,000 mcg, 1,000 mcg, Oral, Daily, RAJAN Her - CNP, 1,000 mcg at 06/18/22 0841  ALLERGIES     Patient has no known allergies.   Immunization History   Administered Date(s) Administered    COVID-19, Moderna, Booster, PF, 50mcg/0.25ml 12/06/2021    COVID-19, Moderna, Primary or Immunocompromised, PF, 100mcg/0.5mL 01/20/2021, 02/19/2021, 03/01/2021    DTaP 09/18/2003    Influenza 10/28/2013    Influenza Virus Vaccine 09/24/2012, 09/23/2014    Influenza, High Dose (Fluzone 65 yrs and older) 11/21/2016, 10/25/2017, 10/09/2018    Influenza, High-dose, Hetal Velazcojuan manuel, 65 yrs +, IM (Fluzone) 10/26/2020    Influenza, Quadv, adjuvanted, 65 yrs +, IM, PF (Fluad) 10/12/2021    Influenza, Triv, inactivated, subunit, adjuvanted, IM (Fluad 65 yrs and older) 11/13/2019    Pneumococcal Conjugate 13-valent ([place] [employer]) 02/09/2016, 10/01/2020    Pneumococcal Polysaccharide ([id number]) 07/21/2011      Internal Administration   First Dose COVID-19, Marielle Back, Primary or Immunocompromised, PF, 100mcg/0.5mL  01/20/2021   Second Dose COVID-19, Moderna, Primary or Immunocompromised, PF, 100mcg/0.5mL   02/19/2021       Last COVID Lab SARS-CoV-2, PCR (no units)   Date Value   08/22/2021 Not Detected     SARS-CoV-2, NAAT (no units)   Date Value   08/22/2021 Not Detected     SARS-COV-2, POC (no units)   Date Value   02/05/2021 Detected (A)        PAST MEDICAL HISTORY     Past Medical History:   Diagnosis Date    Enlarged prostate     Hyperlipidemia     Hypertension     Lumbosacral strain 7/8/2013    Pacemaker      SURGICAL HISTORY       Past Surgical History:   Procedure Laterality Date    A-V CARDIAC PACEMAKER INSERTION  5/30/07    COLONOSCOPY  8/3/11    CYSTOSCOPY N/A 11/2/2021    CYSTOSCOPY RETROGRADE PYELOGRAM, TRANSURETHRAL RESECTION OF THE PROSTATE WITH SUPRAPUBIC CATHETER PLACEMENT performed by Juan Luis Virk MD at [place] [address]  2002    cataracts evelyn    PACEMAKER PLACEMENT      2017 battery change    TONSILLECTOMY      child     FAMILY HISTORY       Family History   Problem Relation Age of Onset    High Blood Pressure Mother     Cancer Mother     Diabetes Mother     Heart Disease Father     High Blood Pressure Father      ·      PHYSICAL EXAM        Vitals:    06/17/22 2128 06/18/22 0547 06/18/22 0745 06/18/22 0841   BP: (!) 109/55  (!) 115/55 (!) 115/55   Pulse: 60  75 75   Resp: 18  16    Temp: 98.4 °F (36.9 °C)  98.2 °F (36.8 °C)    TempSrc: Oral  Oral    SpO2: 98%  97%    Weight:  139 lb 11.2 oz (63.4 kg)     Height:         Physical Exam   Constitutional/General:   NAD, thin , [place] has hearing aids  Head: NC/AT  Eyes: PERRL, EOMI   Pulmonary: Lungs clear to auscultation bilaterally. Cardiovascular:  Regular rate and rhythm  Abdomen: Soft, + BS. No distension. Nontender. Extremities: Moves all extremities x 4. Warm and well perfused  Skin: Warm and dry without rash  Neurologic:    No focal deficits  Psych: Normal Affect  Suprapubic cath  . ++ cloudy/milky  urine with sediments.    PIV     DIAGNOSTIC RESULTS   RADIOLOGY:   US RETROPERITONEAL COMPLETE    Result Date: 6/16/2022  EXAMINATION: RETROPERITONEAL ULTRASOUND OF THE KIDNEYS AND URINARY BLADDER 6/16/2022 COMPARISON: CT abdomen and pelvis from August 9, 2021 HISTORY: ORDERING SYSTEM PROVIDED HISTORY: ARF TECHNOLOGIST PROVIDED HISTORY: Reason for exam:->ARF What reading provider will be dictating this exam?->CRC FINDINGS: Kidneys: The right kidney measures 11.5 cm in length and the left kidney measures 11.2 cm in length. Right kidney: The corticomedullary differentiation and cortical thickness is decreased. Anechoic thin walled nonvascular right mid and upper pole renal cysts measuring 25 mm and 12 mm. There is moderate to severe right hydronephrosis. Left kidney: The corticomedullary differentiation and cortical thickness is decreased. Anechoic thin walled nonvascular left mid to lower pole 27 mm cyst.  No concerning renal mass or intrarenal calcification. There is moderate to severe left hydronephrosis. Bladder: A Fay catheter is identified within the bladder. The bladder is not fully decompressed. Bladder wall appears thickened. Echogenic debris is identified within the bladder. The prostate gland appears enlarged measuring 6.2 x 5.2 x 4.4 cm. 1.  Moderate to severe bilateral hydronephrosis. When compared to prior CT scan the degree of hydronephrosis is less extensive. 2.  Bilateral renal cortical thinning. Bilateral renal cysts. 3.  A Fay catheter identified within the bladder. Bladder is not fully decompressed. Layering echogenic debris identified within the bladder lumen. Bladder wall appears thickened. 4.  Prostate is enlarged.      LABS  Recent Labs     06/16/22  1847 06/18/22  0625   WBC 8.7 6.6   HGB 11.1* 10.3*   HCT 34.7* 31.0*   MCV 96.1 95.4    349     Recent Labs     06/17/22  0237 06/17/22  0237 06/17/22  1217 06/17/22  1217 06/18/22  0625     --  138  --  139   K 5.0  5.0   < > 5.5*   < > 4.2      < > 110*   < > 104   CO2 14*   < > 17*   < > 24   *  --  90*  --  72*   CREATININE 5.5* evaluating for:  Gram positive organism  Gram negative rods     01/05/2022   Final    10 to 100,000 CFU/mL  Mixed al isolated. Further workup and sensitivity testing  is not routinely indicated and will not be performed. Mixed al isolated includes:  Mixed gram negative rods  Oxidase positive gram negative rods  Nonhemolytic Strep species     08/23/2021 >100,000 CFU/ml  Vabomere=36    Final       FINAL IMPRESSION    Patient is a 80 y.o. male who presented with   Chief Complaint   Patient presents with    Dysuria     stated that he is producing little to no urine    and admitted for Dehydration [E86.0]  Hyperkalemia [E87.5]  Acute cystitis without hematuria [P48.26]  Complicated UTI (urinary tract infection) [N39.0]  Acute renal failure, unspecified acute renal failure type ([address] [N17.9]  UTI mixed gpo/gnr blood cx ngtd   History of COVID    Plan:   · Cont Cefepime 1 gram IV daily  cr3.1  · Awaiting cultures  · Labs reviewed    · Urology and nephrology consulted   · Previous cultures reviewed              Imaging and labs were reviewed. Emiliano Teixeira was informed of their diagnosis, indications, risks and benefits of treatment. Emiliano Teixeira had the opportunity to ask questions. All questions were answered. Thank you for involving me in the care of Emiliano Teixeira. Please do not hesitate to call for any questions or concerns.     Electronically signed by Joe Hwang MD on 6/18/2022 at 1:02 PM

## 2022-06-18 NOTE — PLAN OF CARE
Problem: Pain  Goal: Verbalizes/displays adequate comfort level or baseline comfort level  6/18/2022 0433 by Aime Lai RN  Outcome: Progressing  6/17/2022 1854 by Gina Simmonds, RN  Outcome: Progressing     Problem: Safety - Adult  Goal: Free from fall injury  6/18/2022 0433 by Aime aLi RN  Outcome: Progressing  6/17/2022 1854 by Gina Simmonds, RN  Outcome: Progressing     Problem: ABCDS Injury Assessment  Goal: Absence of physical injury  6/18/2022 0433 by Aime Lai RN  Outcome: Progressing  6/17/2022 1854 by Gina Simmonds, RN  Outcome: Progressing

## 2022-06-18 NOTE — PROGRESS NOTES
Nephrology Progress Note  [facility]    Reason for Consult:   MORENA  Date of Service: 6/18/2022     Subjective    Patient seen and examined  No chest pain, sob, abd pain, nausea  Does have pain at guillory site         Past Medical History:        Diagnosis Date    Enlarged prostate     Hyperlipidemia     Hypertension     Lumbosacral strain 7/8/2013    Pacemaker        Past Surgical History:        Procedure Laterality Date    A-V CARDIAC PACEMAKER INSERTION  5/30/07    COLONOSCOPY  8/3/11    CYSTOSCOPY N/A 11/2/2021    CYSTOSCOPY RETROGRADE PYELOGRAM, TRANSURETHRAL RESECTION OF THE PROSTATE WITH SUPRAPUBIC CATHETER PLACEMENT performed by Pacheco Rosa MD at [address]    cataracts evelyn    PACEMAKER PLACEMENT      2017 battery change    TONSILLECTOMY      child       Current Medications:    Current Facility-Administered Medications: ascorbic acid (VITAMIN C) tablet 250 mg, 250 mg, Oral, Daily  aspirin EC tablet 81 mg, 81 mg, Oral, Daily  pantoprazole (PROTONIX) tablet 40 mg, 40 mg, Oral, Daily  timolol (TIMOPTIC) 0.5 % ophthalmic solution 1 drop, 1 drop, Both Eyes, BID  sodium chloride flush 0.9 % injection 5-40 mL, 5-40 mL, IntraVENous, 2 times per day  sodium chloride flush 0.9 % injection 5-40 mL, 5-40 mL, IntraVENous, PRN  0.9 % sodium chloride infusion, , IntraVENous, PRN  acetaminophen (TYLENOL) tablet 650 mg, 650 mg, Oral, Q6H PRN **OR** acetaminophen (TYLENOL) suppository 650 mg, 650 mg, Rectal, Q6H PRN  ondansetron (ZOFRAN-ODT) disintegrating tablet 4 mg, 4 mg, Oral, Q8H PRN **OR** ondansetron (ZOFRAN) injection 4 mg, 4 mg, IntraVENous, Q6H PRN  polyethylene glycol (GLYCOLAX) packet 17 g, 17 g, Oral, Daily  heparin (porcine) injection 5,000 Units, 5,000 Units, SubCUTAneous, 3 times per day  sodium bicarbonate 150 mEq in dextrose 5 % 1,000 mL infusion, , IntraVENous, Continuous  Vitamin D (CHOLECALCIFEROL) tablet 2,000 Units, 2,000 Units, Oral, Daily  amLODIPine (NORVASC) tablet 2.5 mg, 2.5 mg, Oral, Daily  finasteride (PROSCAR) tablet 5 mg, 5 mg, Oral, Daily  metoprolol succinate (TOPROL XL) extended release tablet 25 mg, 25 mg, Oral, Daily  atorvastatin (LIPITOR) tablet 20 mg, 20 mg, Oral, Daily  vitamin B-12 (CYANOCOBALAMIN) tablet 1,000 mcg, 1,000 mcg, Oral, Daily  cefepime (MAXIPIME) 500 mg in dextrose 5 % 50 mL IVPB, 500 mg, IntraVENous, Q24H    Allergies:  Patient has no known allergies. Physical exam:   Constitutional:  VITALS:  BP (!) 115/55   Pulse 75   Temp 98.2 °F (36.8 °C) (Oral)   Resp 16   Ht 5' 9\" (1.753 m)   Wt 139 lb 11.2 oz (63.4 kg)   SpO2 97%   BMI 20.63 kg/m²     Gen: alert, awake, no acute distress  Eyes: eomi  HEENT: atraumatic/normocephalic, moist mucus membranes  Lungs: clear to ascultation bilaterally, equal lung expansion  CV: RRR, no murmurs  Abdomen: soft, nontender, nondistended, normoactive bowel sounds  Extremitiy: no edema  : no CVA tenderness, +suprapubic cath   Skin: no rash, tugor wnl  Neuro: no focal deficits, AOX3  Psych: cooperative and appropriate      Data:         Last 3 BMP  Recent Labs     06/17/22  0237 06/17/22  1217 06/18/22  0625    138 139   K 5.0  5.0 5.5* 4.2    110* 104   CO2 14* 17* 24   * 90* 72*   CREATININE 5.5* 4.3* 3.1*   GLUCOSE 85 151* 126*   CALCIUM 9.7 9.2 8.7         Last 3 CMP:    Recent Labs     06/17/22  0237 06/17/22  1217 06/18/22  0625    138 139   K 5.0  5.0 5.5* 4.2    110* 104   CO2 14* 17* 24   * 90* 72*   CREATININE 5.5* 4.3* 3.1*   GLUCOSE 85 151* 126*   CALCIUM 9.7 9.2 8.7   PROT 7.0  --  6.2*   LABALBU 3.7  --  3.0*   BILITOT 0.3  --  0.2   ALKPHOS 68  --  61   AST <5  --  6   ALT <5  --  <5         Last 3 Glucose:     Recent Labs     06/17/22  0237 06/17/22  1217 06/18/22  0625   GLUCOSE 85 151* 126*         Last 3 POC Glucose:     No results for input(s): POCGLU in the last 72 hours.     Last 3 CK, CKMB, Troponin  No results for input(s): CKTOTAL, CKMB, TROPONINI in the last 72 hours. Last 3 CBC:  Recent Labs     06/16/22  1847 06/18/22  0625   WBC 8.7 6.6   RBC 3.61* 3.25*   HGB 11.1* 10.3*   HCT 34.7* 31.0*   MCV 96.1 95.4   MCH 30.7 31.7   MCHC 32.0 33.2   RDW 14.6 14.5    349   MPV 9.3 9.3       Last 3 Hepatic Function Panel:    Recent Labs     06/17/22  0237 06/18/22  0625   ALKPHOS 68 61   ALT <5 <5   AST <5 6   PROT 7.0 6.2*   BILITOT 0.3 0.2   LABALBU 3.7 3.0*       Albumin:  Recent Labs     06/18/22  0625   LABALBU 3.0*       Calcium:  Recent Labs     06/18/22  0625   CALCIUM 8.7       Ionized Calcium:  No results for input(s): IONCA in the last 72 hours. Magnesium:    Recent Labs     06/18/22  0625   MG 2.0         ABGs:  No results for input(s): PHART, PO2ART, QQZ5HUB, NEA9JME, BEART, B8BNSIJK in the last 72 hours. Lactic Acid:  No results for input(s): LACTA in the last 72 hours. Last 3 Amylase:  No results for input(s): AMYLASE in the last 72 hours. Last 3 Lipase:  No results for input(s): LIPASE in the last 72 hours. Last 3 BNP:  No results for input(s): BNP in the last 72 hours. Assessment/Plan      1-Stage III MORENA in the setting of Neurogenic Bladder  with chronic suprapubic catheter possibly exacerbated by the ACEI+HCTZ and possible UTI  UA SG 1.020 ketones tr, blood large, protein >300, Ni large, WBC packed, hector few, LE large, pH 6.0    PLAN:  1. Continue IVF  2. Urology following   3. Follow labs - renal function improving   4. Hold ACEI     2-Non Anion Gap Met Acidosis in the setting of the Impaired tubule function due Obstruction  And the MORENA  PLAN:  1. Improved with bicarbonate gtt - can stop at this time  2. Follow Labs     3-Hyperkalemia sec to ACEI + MORENA + Obstruction  PLAN:  1. Follow K+ - acceptable    2. 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 Check PTH, VIt D  2.  Follow Ca++ and PO4     5-HTN with CKD I-IV  BP at goal <130/80  PLAN:  1. 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  Check Fe++, B12, Folate        Thank you for the opportunity to participate in the care of Mr Jorge Ayala      ______________________________      Sherri Casanova MD  6/18/2022  10:03 AM

## 2022-06-18 NOTE — PROGRESS NOTES
[address] [place], [place] [address]   Progress Note    Admitting Date and Time: 6/16/2022  6:25 PM  Admit Dx: Dehydration [E86.0]  Hyperkalemia [E87.5]  Acute cystitis without hematuria [J60.61]  Complicated UTI (urinary tract infection) [N39.0]  Acute renal failure, unspecified acute renal failure type ([address] [N17.9]    Subjective:    Patient was sitting up in the chair. He complained of pain in his testicles. He said that his heart monitor wires were tangled in his testicles this morning. ROS: denies fever, chills, cp, sob, n/v, HA unless stated above.      ascorbic acid  250 mg Oral Daily    aspirin  81 mg Oral Daily    pantoprazole  40 mg Oral Daily    timolol  1 drop Both Eyes BID    sodium chloride flush  5-40 mL IntraVENous 2 times per day    polyethylene glycol  17 g Oral Daily    heparin (porcine)  5,000 Units SubCUTAneous 3 times per day    Vitamin D  2,000 Units Oral Daily    amLODIPine  2.5 mg Oral Daily    finasteride  5 mg Oral Daily    metoprolol succinate  25 mg Oral Daily    atorvastatin  20 mg Oral Daily    vitamin B-12  1,000 mcg Oral Daily    cefepime  500 mg IntraVENous Q24H     sodium chloride flush, 5-40 mL, PRN  sodium chloride, , PRN  acetaminophen, 650 mg, Q6H PRN   Or  acetaminophen, 650 mg, Q6H PRN  ondansetron, 4 mg, Q8H PRN   Or  ondansetron, 4 mg, Q6H PRN         Objective:    BP (!) 115/55   Pulse 75   Temp 98.2 °F (36.8 °C) (Oral)   Resp 16   Ht 5' 9\" (1.753 m)   Wt 139 lb 11.2 oz (63.4 kg)   SpO2 97%   BMI 20.63 kg/m²   General Appearance: alert and oriented to person, place and time and in no acute distress  Skin: warm and dry, scabbed area to nose  Head: normocephalic and atraumatic  Eyes: pupils equal, round, and reactive to light, conjunctivae normal  Neck: neck supple and non tender without mass   Pulmonary/Chest: clear to auscultation bilaterally- no wheezes, rales or rhonchi, no respiratory distress  Cardiovascular: irregularly irregular, murmur  Abdomen: soft, tender, distended, normal bowel sounds, suprapubic catheter with cloudy urine  Extremities: no cyanosis, no clubbing and no edema  Neurologic: no cranial nerve deficit and speech normal  :  Testicles externally examined with Charge Nurse present, no evidence of trauma or irritation      Recent Labs     06/17/22  0237 06/17/22  1217 06/18/22  0625    138 139   K 5.0  5.0 5.5* 4.2    110* 104   CO2 14* 17* 24   * 90* 72*   CREATININE 5.5* 4.3* 3.1*   GLUCOSE 85 151* 126*   CALCIUM 9.7 9.2 8.7       Recent Labs     06/17/22  0237 06/18/22  0625   ALKPHOS 68 61   PROT 7.0 6.2*   LABALBU 3.7 3.0*   BILITOT 0.3 0.2   AST <5 6   ALT <5 <5       Recent Labs     06/16/22  1847 06/18/22  0625   WBC 8.7 6.6   RBC 3.61* 3.25*   HGB 11.1* 10.3*   HCT 34.7* 31.0*   MCV 96.1 95.4   MCH 30.7 31.7   MCHC 32.0 33.2   RDW 14.6 14.5    349   MPV 9.3 9.3           Radiology:   US RETROPERITONEAL COMPLETE   Final Result   1. Moderate to severe bilateral hydronephrosis. When compared to prior CT   scan the degree of hydronephrosis is less extensive. 2.  Bilateral renal cortical thinning. Bilateral renal cysts. 3.  A Fay catheter identified within the bladder. Bladder is not fully   decompressed. Layering echogenic debris identified within the bladder lumen. Bladder wall appears thickened. 4.  Prostate is enlarged. Assessment:  Principal Problem:    Complicated UTI (urinary tract infection)  Active Problems:    Urinary tract infection associated with catheterization of urinary tract (HCC)    Hydronephrosis due to obstruction of bladder    Stage 3 acute kidney injury ([address]  Resolved Problems:    * No resolved hospital problems. *      Plan:  1. Complicated UTI:  ID following. Continue Cefepime. Await blood and urine cultures. 2. Pyocystis from indwelling suprapubic catheter:  Urology following.   Plan is for non surgical management with bladder drainage, antibiotics and hydration. If azotemia denny not resolve then possible cystoscopy and stent. 3. Acute in chronic kidney disease:  Nephrology following. Creatinine was 5.5 on admission and is 3.1 today. Continue IV fluids. 4. Hyperkalemia:  Potassium was 5.5 yesterday. ACEi is on hold. Potassium is 4.2 today. 5. Metabolic acidosis:  Improved. Bicarb was 14 on admission and is 24 today. 6. Hypertension:  Continue Norvasc, metoprolol  7. Anemia:  Hgb is 10.3 today. Likely secondary to chronic kidney disease. 8. BPH:  Continue Proscar. 9. GERD: Continue Protonix    NOTE: This report was transcribed using voice recognition software. Every effort was made to ensure accuracy; however, inadvertent computerized transcription errors may be present.      Electronically signed by RAJAN Costa CNP on 6/18/2022 at 9:57 AM

## 2022-06-18 NOTE — PROGRESS NOTES
Physical Therapy Initial Evaluation/Plan of Care    Room #:  4081/8837-07  Patient Name: Sandra Garcia  YOB: 1932  MRN: [id number]    Date of Service: 6/18/2022     Tentative placement recommendation: Home Health Physical Therapy   Equipment recommendation: Patient has needed equipment       Evaluating Physical Therapist: Varsha Robison, PT #[id number]      Specific Provider Orders/Date/Referring Provider :  06/17/22 1045   PT eval and treat Start: 06/17/22 1045, End: 06/17/22 1045, ONE TIME, Standing Count: 1 Occurrences, R    Marc Buerger, APRN - CNP      Admitting Diagnosis:   Dehydration [E86.0]  Hyperkalemia [E87.5]  Acute cystitis without hematuria [A60.10]  Complicated UTI (urinary tract infection) [N39.0]  Acute renal failure, unspecified acute renal failure type ([address] [N17.9]     suprapubic catheter producing little to no urine which is cloudy in nature    Surgery: none  Visit Diagnoses       Codes    Acute cystitis without hematuria     N30.00    Hyperkalemia     E87.5    Dehydration     E86.0          Patient Active Problem List   Diagnosis    Pure hypercholesterolemia    Essential hypertension    Primary osteoarthritis involving multiple joints    Iron deficiency anemia due to chronic blood loss    Stage 4 chronic kidney disease ([address]    Hypokalemia    Enlarged prostate with urinary retention    BPH with urinary obstruction    Hypomagnesemia    Chronic primary angle-closure glaucoma, indeterminate stage    Chronic renal disease, stage III ([address] [528501]    Urinary tract infection associated with catheterization of urinary tract ([address]    Hydronephrosis due to obstruction of bladder    Stage 3 acute kidney injury ([address]    Complicated UTI (urinary tract infection)    Acute renal failure ([address]        ASSESSMENT of Current Deficits Patient exhibits decreased strength, balance, endurance and pain at catheter site impairing functional mobility, transfers, gait , gait distance and tolerance to activity slow jadon, cues for upright. Patient requires skilled physical therapy to address concerns listed above to increase safety and independence at discharge. PHYSICAL THERAPY  PLAN OF CARE       Physical therapy plan of care is established based on physician order,  patient diagnosis and clinical assessment    Current Treatment Recommendations:    -Bed Mobility: Lower extremity exercises   -Sitting Balance: Incorporate reaching activities to activate trunk muscles   -Standing Balance: Perform strengthening exercises in standing to promote motor control with or without upper extremity support   -Transfers: Provide instruction on proper hand and foot position for adequate transfer of weight onto lower extremities and use of gait device if needed and Cues for hand placement, technique and safety. Provide stabilization to prevent fall   -Gait: Gait training, Standing activities to improve: base of support, weight shift, weight bearing  and Pregait training to emphasize: Device control, Upright and Safety   -Endurance: Utilize Supervised activities to increase level of endurance to allow for safe functional mobility including transfers and gait   -Stairs: Stair training with instruction on proper technique and hand placement on rail    PT long term treatment goals are located in below grid    Patient and or family understand(s) diagnosis, prognosis, and plan of care. Frequency of treatments: Patient will be seen  daily.          Prior Level of Function: Patient ambulated with cane    Rehab Potential: good    for baseline    Past medical history:   Past Medical History:   Diagnosis Date    Enlarged prostate     Hyperlipidemia     Hypertension     Lumbosacral strain 7/8/2013    Pacemaker      Past Surgical History:   Procedure Laterality Date    A-V CARDIAC PACEMAKER INSERTION  5/30/07    COLONOSCOPY  8/3/11    CYSTOSCOPY N/A 11/2/2021    CYSTOSCOPY RETROGRADE PYELOGRAM, TRANSURETHRAL RESECTION OF THE PROSTATE WITH SUPRAPUBIC CATHETER PLACEMENT performed by Nilay Weinstein MD at [place] [address]  2002    cataracts evelyn    PACEMAKER PLACEMENT      2017 battery change    TONSILLECTOMY      child       SUBJECTIVE:    Precautions: Up with assistance, falls and hard of hearing ,  Suprapubic catheter, hearing aides  Social history: Patient lives with spouse in a two story home resides first  with 3 steps  to enter with Rail  Tub shower grab bars    Equipment owned: [place], [address] [facility], Bedside commode and Tub transfer bench,       [id number]   How much difficulty turning over in bed?: A Little  How much difficulty sitting down on / standing up from a chair with arms?: A Little  How much difficulty moving from lying on back to sitting on side of bed?: A Little  How much help from another person moving to and from a bed to a chair?: A Little  How much help from another person needed to walk in hospital room?: A Little  How much help from another person for climbing 3-5 steps with a railing?: A Lot  AM-PAC Inpatient Mobility Raw Score : 17  AM-PAC Inpatient T-Scale Score : 42.13  Mobility Inpatient CMS 0-100% Score: 50.57  Mobility Inpatient CMS G-Code Modifier : CK    Nursing cleared patient for PT evaluation. The admitting diagnosis and active problem list as listed above have been reviewed prior to the initiation of this evaluation. OBJECTIVE;   Initial Evaluation  Date: 6/18/2022 Treatment Date:     Short Term/ Long Term   Goals   Was pt agreeable to Eval/treatment? Yes  To be met in 3 days   Pain level   4/10  \"down below\" catheter site     Bed Mobility    Rolling: Supervision     Supine to sit: Supervision     Sit to supine: Not assessed     Scooting: Supervision     Rolling: Independent    Supine to sit:  Independent    Sit to supine: Independent    Scooting: Independent     Transfers Sit to stand: Minimal assist of 1 Cues for hand placement and safety    Sit to stand: Modified Independent     Ambulation    40 feet using  wheeled walker with Supervision    cues for upright posture, safety and pacing   100 feet using  wheeled walker or cane with Modified Independent    Stair negotiation: ascended and descended   Not assessed     3 steps 1 rail with supervision    ROM Within functional limits    Increase range of motion 10% of affected joints    Strength BUE:  refer to OT ashwin  RLE:  4-/5  LLE:  4-/5  Increase strength in affected mm groups by 1/3 grade   Balance Sitting EOB:  good -  Dynamic Standing:  fair wheeled walker   Sitting EOB:  good    Dynamic Standing: good cane versus wheeled walker      Patient is Alert & Oriented x person, place, time and situation and follows directions    Sensation:  Patient  denies numbness/tingling   Edema:  no   Endurance: fair  +    Vitals: room air   Blood Pressure at rest  Blood Pressure during session    Heart Rate at rest 74 Heart Rate during session  107   SPO2 at rest 98%  SPO2 during session  95%     Patient education  Patient educated on role of Physical Therapy, risks of immobility, safety and plan of care,  importance of mobility while in hospital  and ankle pumps, quad set and glut set for edema control, blood clot prevention     Patient response to education:   Pt verbalized understanding Pt demonstrated skill Pt requires further education in this area   Yes Partial Yes      Treatment:  Patient practiced and was instructed/facilitated in the following treatment: Patient assisted to edge of bed,  Sat edge of bed 10 minutes with Supervision  to increase dynamic sitting balance and activity tolerance. donned slippers, ambulated in room, assisted up to chair, performed exercises. Therapeutic Exercises:  ankle pumps, heel raises, glut sets, long arc quad and seated marching  x 15 reps.        At end of session, patient in chair with nursing present call light and phone within reach,  all

## 2022-06-19 LAB
ALBUMIN SERPL-MCNC: 3.2 G/DL (ref 3.5–5.2)
ALP BLD-CCNC: 63 U/L (ref 40–129)
ALT SERPL-CCNC: <5 U/L (ref 0–40)
ANION GAP SERPL CALCULATED.3IONS-SCNC: 10 MMOL/L (ref 7–16)
AST SERPL-CCNC: 7 U/L (ref 0–39)
BILIRUB SERPL-MCNC: 0.3 MG/DL (ref 0–1.2)
BUN BLDV-MCNC: 56 MG/DL (ref 6–23)
CALCIUM SERPL-MCNC: 8.9 MG/DL (ref 8.6–10.2)
CHLORIDE BLD-SCNC: 103 MMOL/L (ref 98–107)
CO2: 28 MMOL/L (ref 22–29)
CREAT SERPL-MCNC: 2.3 MG/DL (ref 0.7–1.2)
GFR AFRICAN AMERICAN: 32
GFR NON-AFRICAN AMERICAN: 27 ML/MIN/1.73
GLUCOSE BLD-MCNC: 96 MG/DL (ref 74–99)
HCT VFR BLD CALC: 32.4 % (ref 37–54)
HEMOGLOBIN: 10.6 G/DL (ref 12.5–16.5)
MAGNESIUM: 2 MG/DL (ref 1.6–2.6)
MCH RBC QN AUTO: 31.1 PG (ref 26–35)
MCHC RBC AUTO-ENTMCNC: 32.7 % (ref 32–34.5)
MCV RBC AUTO: 95 FL (ref 80–99.9)
PDW BLD-RTO: 14.2 FL (ref 11.5–15)
PHOSPHORUS: 3.3 MG/DL (ref 2.5–4.5)
PLATELET # BLD: 378 E9/L (ref 130–450)
PMV BLD AUTO: 9 FL (ref 7–12)
POTASSIUM SERPL-SCNC: 4 MMOL/L (ref 3.5–5)
RBC # BLD: 3.41 E12/L (ref 3.8–5.8)
SODIUM BLD-SCNC: 141 MMOL/L (ref 132–146)
TOTAL PROTEIN: 6.3 G/DL (ref 6.4–8.3)
URINE CULTURE, ROUTINE: NORMAL
WBC # BLD: 7.8 E9/L (ref 4.5–11.5)

## 2022-06-19 PROCEDURE — 6360000002 HC RX W HCPCS: Performed by: CLINICAL NURSE SPECIALIST

## 2022-06-19 PROCEDURE — 6370000000 HC RX 637 (ALT 250 FOR IP): Performed by: INTERNAL MEDICINE

## 2022-06-19 PROCEDURE — 85027 COMPLETE CBC AUTOMATED: CPT

## 2022-06-19 PROCEDURE — 1200000000 HC SEMI PRIVATE

## 2022-06-19 PROCEDURE — 6370000000 HC RX 637 (ALT 250 FOR IP): Performed by: NURSE PRACTITIONER

## 2022-06-19 PROCEDURE — 87186 SC STD MICRODIL/AGAR DIL: CPT

## 2022-06-19 PROCEDURE — 87088 URINE BACTERIA CULTURE: CPT

## 2022-06-19 PROCEDURE — 84100 ASSAY OF PHOSPHORUS: CPT

## 2022-06-19 PROCEDURE — APPSS30 APP SPLIT SHARED TIME 16-30 MINUTES: Performed by: NURSE PRACTITIONER

## 2022-06-19 PROCEDURE — 2580000003 HC RX 258: Performed by: CLINICAL NURSE SPECIALIST

## 2022-06-19 PROCEDURE — 36415 COLL VENOUS BLD VENIPUNCTURE: CPT

## 2022-06-19 PROCEDURE — 6360000002 HC RX W HCPCS: Performed by: INTERNAL MEDICINE

## 2022-06-19 PROCEDURE — 80053 COMPREHEN METABOLIC PANEL: CPT

## 2022-06-19 PROCEDURE — 87077 CULTURE AEROBIC IDENTIFY: CPT

## 2022-06-19 PROCEDURE — 2580000003 HC RX 258: Performed by: INTERNAL MEDICINE

## 2022-06-19 PROCEDURE — 83735 ASSAY OF MAGNESIUM: CPT

## 2022-06-19 PROCEDURE — 99232 SBSQ HOSP IP/OBS MODERATE 35: CPT | Performed by: INTERNAL MEDICINE

## 2022-06-19 RX ADMIN — CEFEPIME HYDROCHLORIDE 1000 MG: 1 INJECTION, POWDER, FOR SOLUTION INTRAMUSCULAR; INTRAVENOUS at 02:20

## 2022-06-19 RX ADMIN — Medication 2000 UNITS: at 08:48

## 2022-06-19 RX ADMIN — SODIUM CHLORIDE, POTASSIUM CHLORIDE, SODIUM LACTATE AND CALCIUM CHLORIDE: 600; 310; 30; 20 INJECTION, SOLUTION INTRAVENOUS at 02:18

## 2022-06-19 RX ADMIN — FINASTERIDE 5 MG: 5 TABLET, FILM COATED ORAL at 08:47

## 2022-06-19 RX ADMIN — Medication 10 ML: at 22:32

## 2022-06-19 RX ADMIN — AMLODIPINE BESYLATE 2.5 MG: 10 TABLET ORAL at 08:48

## 2022-06-19 RX ADMIN — ATORVASTATIN CALCIUM 20 MG: 20 TABLET, FILM COATED ORAL at 21:57

## 2022-06-19 RX ADMIN — HEPARIN SODIUM 5000 UNITS: 5000 INJECTION INTRAVENOUS; SUBCUTANEOUS at 05:58

## 2022-06-19 RX ADMIN — SODIUM CHLORIDE, POTASSIUM CHLORIDE, SODIUM LACTATE AND CALCIUM CHLORIDE: 600; 310; 30; 20 INJECTION, SOLUTION INTRAVENOUS at 21:56

## 2022-06-19 RX ADMIN — ASPIRIN 81 MG: 81 TABLET, COATED ORAL at 08:47

## 2022-06-19 RX ADMIN — HEPARIN SODIUM 5000 UNITS: 5000 INJECTION INTRAVENOUS; SUBCUTANEOUS at 14:31

## 2022-06-19 RX ADMIN — HEPARIN SODIUM 5000 UNITS: 5000 INJECTION INTRAVENOUS; SUBCUTANEOUS at 21:57

## 2022-06-19 RX ADMIN — POLYETHYLENE GLYCOL 3350 17 G: 17 POWDER, FOR SOLUTION ORAL at 08:47

## 2022-06-19 RX ADMIN — OXYCODONE HYDROCHLORIDE AND ACETAMINOPHEN 250 MG: 500 TABLET ORAL at 08:48

## 2022-06-19 RX ADMIN — TIMOLOL MALEATE 1 DROP: 5 SOLUTION OPHTHALMIC at 21:57

## 2022-06-19 RX ADMIN — PANTOPRAZOLE SODIUM 40 MG: 40 TABLET, DELAYED RELEASE ORAL at 05:58

## 2022-06-19 RX ADMIN — METOPROLOL SUCCINATE 25 MG: 25 TABLET, EXTENDED RELEASE ORAL at 08:48

## 2022-06-19 RX ADMIN — TIMOLOL MALEATE 1 DROP: 5 SOLUTION OPHTHALMIC at 08:47

## 2022-06-19 NOTE — PROGRESS NOTES
[facility]     [address] [place] [address]  [place], [facility] [address]  Phone [phone]   [phone]      Admit Date: 2022  6:25 PM  Pt Name: Fletcher Ochoa  MRN: [id number]  : 1932  Reason for Consult:    Chief Complaint   Patient presents with    Dysuria     stated that he is producing little to no urine     Requesting Physician:  Chuyita Law MD  PCP: Delia Varner MD  History Obtained From:  patient, chart   ID consulted for Dehydration [E86.0]  Hyperkalemia [E87.5]  Acute cystitis without hematuria [X08.16]  Complicated UTI (urinary tract infection) [N39.0]  Acute renal failure, unspecified acute renal failure type ([address] [N17.9]  on hospital day 3   Face to face encounter   CHIEF COMPLAINT       Chief Complaint   Patient presents with    Dysuria     stated that he is producing little to no urine     HISTORYOF PRESENT ILLNESS   Flecther Ocoha is a 80 y.o. male who presents with   has a past medical history of Enlarged prostate, Hyperlipidemia, Hypertension, Lumbosacral strain, and Pacemaker. Dysuria       Patient lives with wife- he reports he was not feeling well since about Monday. He has a suprabic cath and he also reports he voids. His urine has been cloudy with sediments. He denies any fevers at home. He reports fatigue, poor appetite. And overall not feeling well. He is known to Urology. He had TURP and suprapubic placement in 2021. He is chronic kidney disease. He denies any recent antibiotics  Cultures are pending   Labs reviewed   He denies any nausea, vomiting, fevers, no diarrhea, does have constipation. Previous cultures reviewed     ID has been asked to evaluate and manage atbs.    DOS  22  Pt in chair wife present spt with cloudy urine  No f/c/n/v/d/  t in chair has no c/o guillory still cloudy no issues with atbx   REVIEW OF SYSTEMS     CONSTITUTIONAL:   + fatigue   ALLERGIES:    No urticaria, hay fever,    EYES:     No blurry vision, loss of vision, eye pain  ENT:      No hearing loss, sore throat  CARDIOVASCULAR:   No chest pain or palpitations, + HTN, + hyperlipidemia , + pacer   RESPIRATORY:   No cough, sob  ENDOCRINE:    No increase thirst, urination   HEME-LYMPH:   No easy bruising or bleeding  GI:     No nausea, vomiting or diarrhea  :     No urinary complaints  NEURO:    No seizures, stroke, HA  MUSCULOSKELETAL:  No muscle aches or pain, no joint pain  SKIN:     No rash or itch  PSYCH:    No depression or anxiety       CURRENT MEDICATIONS     Current Facility-Administered Medications:     lactated ringers infusion, , IntraVENous, Continuous, Ellis Guy MD, Last Rate: 75 mL/hr at 06/19/22 1547, Rate Verify at 06/19/22 1547    cefepime (MAXIPIME) 1000 mg IVPB extended (mini-bag), 1,000 mg, IntraVENous, Q24H, RAJAN Rincon - CNS, Stopped at 06/19/22 0618    ascorbic acid (VITAMIN C) tablet 250 mg, 250 mg, Oral, Daily, Rosalee Carbajal MD, 250 mg at 06/19/22 0848    aspirin EC tablet 81 mg, 81 mg, Oral, Daily, Rosalee Carbajal MD, 81 mg at 06/19/22 0847    pantoprazole (PROTONIX) tablet 40 mg, 40 mg, Oral, Daily, Rosalee Carbajal MD, 40 mg at 06/19/22 0558    timolol (TIMOPTIC) 0.5 % ophthalmic solution 1 drop, 1 drop, Both Eyes, BID, Rosalee Carbajal MD, 1 drop at 06/19/22 0847    sodium chloride flush 0.9 % injection 5-40 mL, 5-40 mL, IntraVENous, 2 times per day, Rosalee Carbajal MD, 10 mL at 06/18/22 1959    sodium chloride flush 0.9 % injection 5-40 mL, 5-40 mL, IntraVENous, PRN, Rosalee Carbajal MD    0.9 % sodium chloride infusion, , IntraVENous, PRN, Rosalee Carbajal MD    acetaminophen (TYLENOL) tablet 650 mg, 650 mg, Oral, Q6H PRN **OR** acetaminophen (TYLENOL) suppository 650 mg, 650 mg, Rectal, Q6H PRN, Rosalee Carbajal MD    ondansetron (ZOFRAN-ODT) disintegrating tablet 4 mg, 4 mg, Oral, Q8H PRN **OR** ondansetron (ZOFRAN) injection 4 mg, 4 mg, IntraVENous, Q6H PRN, Rosalee Carbajal, MD    polyethylene glycol (GLYCOLAX) packet 17 g, 17 g, Oral, Daily, Anna Torres MD, 17 g at 06/19/22 0847    heparin (porcine) injection 5,000 Units, 5,000 Units, SubCUTAneous, 3 times per day, Anna Torres MD, 5,000 Units at 06/19/22 1431    Vitamin D (CHOLECALCIFEROL) tablet 2,000 Units, 2,000 Units, Oral, Daily, Kristel Crease, APRN - CNP, 2,000 Units at 06/19/22 0848    amLODIPine (NORVASC) tablet 2.5 mg, 2.5 mg, Oral, Daily, Kristel Crease, APRN - CNP, 2.5 mg at 06/19/22 0848    finasteride (PROSCAR) tablet 5 mg, 5 mg, Oral, Daily, Kristel Crease, APRN - CNP, 5 mg at 06/19/22 0847    metoprolol succinate (TOPROL XL) extended release tablet 25 mg, 25 mg, Oral, Daily, Kristel Crease, APRN - CNP, 25 mg at 06/19/22 0848    atorvastatin (LIPITOR) tablet 20 mg, 20 mg, Oral, Daily, Kristel Crease, APRN - CNP, 20 mg at 06/18/22 1958    vitamin B-12 (CYANOCOBALAMIN) tablet 1,000 mcg, 1,000 mcg, Oral, Daily, Kristel Crease, APRN - CNP, 1,000 mcg at 06/18/22 0841  ALLERGIES     Patient has no known allergies.   Immunization History   Administered Date(s) Administered    COVID-19, Moderna, Booster, PF, 50mcg/0.25ml 12/06/2021    COVID-19, Moderna, Primary or Immunocompromised, PF, 100mcg/0.5mL 01/20/2021, 02/19/2021, 03/01/2021    DTaP 09/18/2003    Influenza 10/28/2013    Influenza Virus Vaccine 09/24/2012, 09/23/2014    Influenza, High Dose (Fluzone 65 yrs and older) 11/21/2016, 10/25/2017, 10/09/2018    Influenza, High-dose, Leslee Rangel, 65 yrs +, IM (Fluzone) 10/26/2020    Influenza, Quadv, adjuvanted, 65 yrs +, IM, PF (Fluad) 10/12/2021    Influenza, Triv, inactivated, subunit, adjuvanted, IM (Fluad 65 yrs and older) 11/13/2019    Pneumococcal Conjugate 13-valent (Ephriam Hefty) 02/09/2016, 10/01/2020    Pneumococcal Polysaccharide ([id number]) 07/21/2011      Internal Administration   First Dose COVID-19, Moderna, Primary or Immunocompromised, PF, 100mcg/0.5mL  01/20/2021   Second Dose COVID-19, Moderna, Primary or Immunocompromised, PF, 100mcg/0.5mL   02/19/2021       Last COVID Lab SARS-CoV-2, PCR (no units)   Date Value   08/22/2021 Not Detected     SARS-CoV-2, NAAT (no units)   Date Value   08/22/2021 Not Detected     SARS-COV-2, POC (no units)   Date Value   02/05/2021 Detected (A)        PAST MEDICAL HISTORY     Past Medical History:   Diagnosis Date    Enlarged prostate     Hyperlipidemia     Hypertension     Lumbosacral strain 7/8/2013    Pacemaker      SURGICAL HISTORY       Past Surgical History:   Procedure Laterality Date    A-V CARDIAC PACEMAKER INSERTION  5/30/07    COLONOSCOPY  8/3/11    CYSTOSCOPY N/A 11/2/2021    CYSTOSCOPY RETROGRADE PYELOGRAM, TRANSURETHRAL RESECTION OF THE PROSTATE WITH SUPRAPUBIC CATHETER PLACEMENT performed by Anshul Phillips MD at [place] [address] SURGERY  2002    cataracts evelyn    PACEMAKER PLACEMENT      2017 battery change    TONSILLECTOMY      child     FAMILY HISTORY       Family History   Problem Relation Age of Onset    High Blood Pressure Mother     Cancer Mother     Diabetes Mother     Heart Disease Father     High Blood Pressure Father      ·      PHYSICAL EXAM        Vitals:    06/18/22 1954 06/19/22 0557 06/19/22 0745 06/19/22 0848   BP: (!) 126/58  (!) 121/58 (!) 121/58   Pulse: 68  59 65   Resp: 18  16    Temp: 98.5 °F (36.9 °C)  98.1 °F (36.7 °C)    TempSrc: Oral  Oral    SpO2: 95%  94%    Weight:  138 lb (62.6 kg)     Height:         Physical Exam   Constitutional/General:    In chair [place] has hearing aids  Head: NC/AT  Eyes: PERRL, EOMI   Pulmonary: Lungs clear to auscultation bilaterally. Cardiovascular:  Regular rate and rhythm  Abdomen: Soft, + BS. No distension. Nontender. Extremities    Warm and well perfused  Skin: Warm and dry without rash  Neurologic:    No focal deficits  Psych: Normal Affect  Suprapubic cath  . cloudy  urine with sediments.    PIV     DIAGNOSTIC RESULTS   RADIOLOGY:   US RETROPERITONEAL COMPLETE    Result Date: 6/16/2022  EXAMINATION: RETROPERITONEAL ULTRASOUND OF THE KIDNEYS AND URINARY BLADDER 6/16/2022 COMPARISON: CT abdomen and pelvis from August 9, 2021 HISTORY: ORDERING SYSTEM PROVIDED HISTORY: ARF TECHNOLOGIST PROVIDED HISTORY: Reason for exam:->ARF What reading provider will be dictating this exam?->CRC FINDINGS: Kidneys: The right kidney measures 11.5 cm in length and the left kidney measures 11.2 cm in length. Right kidney: The corticomedullary differentiation and cortical thickness is decreased. Anechoic thin walled nonvascular right mid and upper pole renal cysts measuring 25 mm and 12 mm. There is moderate to severe right hydronephrosis. Left kidney: The corticomedullary differentiation and cortical thickness is decreased. Anechoic thin walled nonvascular left mid to lower pole 27 mm cyst.  No concerning renal mass or intrarenal calcification. There is moderate to severe left hydronephrosis. Bladder: A Fay catheter is identified within the bladder. The bladder is not fully decompressed. Bladder wall appears thickened. Echogenic debris is identified within the bladder. The prostate gland appears enlarged measuring 6.2 x 5.2 x 4.4 cm. 1.  Moderate to severe bilateral hydronephrosis. When compared to prior CT scan the degree of hydronephrosis is less extensive. 2.  Bilateral renal cortical thinning. Bilateral renal cysts. 3.  A Fay catheter identified within the bladder. Bladder is not fully decompressed. Layering echogenic debris identified within the bladder lumen. Bladder wall appears thickened. 4.  Prostate is enlarged.      LABS  Recent Labs     06/16/22  1847 06/18/22  0625 06/19/22  0626   WBC 8.7 6.6 7.8   HGB 11.1* 10.3* 10.6*   HCT 34.7* 31.0* 32.4*   MCV 96.1 95.4 95.0    349 378     Recent Labs     06/17/22  0237 06/17/22  0237 06/17/22  1217 06/17/22  1217 06/18/22  0625 06/18/22  0625 06/19/22  0626      < > 138  --  139  --  141   K 5.0  5.0 < > 5.5*   < > 4.2   < > 4.0      < > 110*   < > 104   < > 103   CO2 14*   < > 17*   < > 24   < > 28   *   < > 90*  --  72*  --  56*   CREATININE 5.5*   < > 4.3*  --  3.1*  --  2.3*   GFRAA 12   < > 16  --  23  --  32   LABGLOM 10   < > 13  --  19  --  27   GLUCOSE 85   < > 151*  --  126*  --  96   PROT 7.0  --   --   --  6.2*  --  6.3*   LABALBU 3.7  --   --   --  3.0*  --  3.2*   CALCIUM 9.7   < > 9.2  --  8.7  --  8.9   BILITOT 0.3  --   --   --  0.2  --  0.3   ALKPHOS 68  --   --   --  61  --  63   AST <5  --   --   --  6  --  7   ALT <5  --   --   --  <5  --  <5    < > = values in this interval not displayed.      Lab Results   Component Value Date    ADVIRPCR Not Detected 08/22/2021    [facility] Not Detected 08/22/2021    BPPTXPPCR Not Detected 08/22/2021    CHLPNEPCR Not Detected 08/22/2021    GKK888VGSB Not Detected 08/22/2021    AFLFDY7ZUQ Not Detected 08/22/2021    FRDQY90EQS Not Detected 08/22/2021    QNZVN22SJU Not Detected 08/22/2021    COVID19 Not Detected 08/22/2021    COVID19 Not Detected 08/22/2021    [facility] Not Detected 08/22/2021    RHENTPCR Not Detected 08/22/2021    FLUBPCR Not Detected 08/22/2021    [facility] Not Detected 08/22/2021    HDQTUUH9UCW Not Detected 08/22/2021    RTQQJQY8CZD Not Detected 08/22/2021    UTATFKW6ADD Not Detected 08/22/2021    OMYQCQA3BJD Not Detected 08/22/2021    RSVPCR Not Detected 08/22/2021     Lab Results   Component Value Date    COVID19 Not Detected 08/22/2021    COVID19 Not Detected 08/22/2021     COVID-19/RICK-COV2 LABS  Recent Labs     06/17/22  0237 06/18/22  0625 06/19/22  0626   FERRITIN  --  332  --    AST <5 6 7   ALT <5 <5 <5     Lab Results   Component Value Date    CHOL 133 03/07/2022    TRIG 101 03/07/2022    HDL 43 03/07/2022    LDLCALC 70 03/07/2022    LABVLDL 20 03/07/2022     MICROBIOLOGY:  Cultures :     Lab Results   Component Value Date    BC 24 Hours no growth 06/17/2022    BC 5 Days no growth 08/22/2021    ORG Pseudomonas aeruginosa 08/23/2021     Lab Results   Component Value Date    BLOODCULT2 24 Hours no growth 06/17/2022    BLOODCULT2 5 Days no growth 08/22/2021    ORG Pseudomonas aeruginosa 08/23/2021     Urine Culture, Routine   Date Value Ref Range Status   06/16/2022   Final    10 to 100,000 CFU/mL  Mixed al isolated. Further workup and sensitivity testing  is not routinely indicated and will not be performed. Mixed al isolated includes:  Mixed gram negative rods  Nonhemolytic Strep species     01/05/2022   Final    10 to 100,000 CFU/mL  Mixed al isolated. Further workup and sensitivity testing  is not routinely indicated and will not be performed. Mixed al isolated includes:  Mixed gram negative rods  Oxidase positive gram negative rods  Nonhemolytic Strep species     08/23/2021 >100,000 CFU/ml  Vabomere=36    Final       FINAL IMPRESSION    Patient is a 80 y.o. male who presented with   Chief Complaint   Patient presents with    Dysuria     stated that he is producing little to no urine    and admitted for Dehydration [E86.0]  Hyperkalemia [E87.5]  Acute cystitis without hematuria [F44.29]  Complicated UTI (urinary tract infection) [N39.0]  Acute renal failure, unspecified acute renal failure type ([address] [N17.9]  UTI mixed gpo/gnr blood cx ngtd   Moderate to severe bilateral hydronephrosis  History of COVID  chemo 2.3     Plan:   · Cont Cefepime 1 gram IV daily  Cr3.1   · Awaiting cultures mixed will repeat   · Labs reviewed    · Urology and nephrology consulted   · Previous cultures reviewed              Imaging and labs were reviewed. Jeremiah Luciano was informed of their diagnosis, indications, risks and benefits of treatment. Jeremiah Luciano had the opportunity to ask questions. All questions were answered. Thank you for involving me in the care of Jeremiah Luciano. Please do not hesitate to call for any questions or concerns.     Electronically signed by Jim Stark MD on 6/19/2022 at 4:26 PM

## 2022-06-19 NOTE — PROGRESS NOTES
Nephrology Progress Note  [facility]    Reason for Consult:   MORENA  Date of Service: 6/19/2022     Subjective    Patient seen and examined  No chest pain, sob, abd pain, nausea  Pain at guillory site improving       Past Medical History:        Diagnosis Date    Enlarged prostate     Hyperlipidemia     Hypertension     Lumbosacral strain 7/8/2013    Pacemaker        Past Surgical History:        Procedure Laterality Date    A-V CARDIAC PACEMAKER INSERTION  5/30/07    COLONOSCOPY  8/3/11    CYSTOSCOPY N/A 11/2/2021    CYSTOSCOPY RETROGRADE PYELOGRAM, TRANSURETHRAL RESECTION OF THE PROSTATE WITH SUPRAPUBIC CATHETER PLACEMENT performed by Dank Pineda MD at [address]    cataracts evelyn    PACEMAKER PLACEMENT      2017 battery change    TONSILLECTOMY      child       Current Medications:    Current Facility-Administered Medications: lactated ringers infusion, , IntraVENous, Continuous  cefepime (MAXIPIME) 1000 mg IVPB extended (mini-bag), 1,000 mg, IntraVENous, Q24H  ascorbic acid (VITAMIN C) tablet 250 mg, 250 mg, Oral, Daily  aspirin EC tablet 81 mg, 81 mg, Oral, Daily  pantoprazole (PROTONIX) tablet 40 mg, 40 mg, Oral, Daily  timolol (TIMOPTIC) 0.5 % ophthalmic solution 1 drop, 1 drop, Both Eyes, BID  sodium chloride flush 0.9 % injection 5-40 mL, 5-40 mL, IntraVENous, 2 times per day  sodium chloride flush 0.9 % injection 5-40 mL, 5-40 mL, IntraVENous, PRN  0.9 % sodium chloride infusion, , IntraVENous, PRN  acetaminophen (TYLENOL) tablet 650 mg, 650 mg, Oral, Q6H PRN **OR** acetaminophen (TYLENOL) suppository 650 mg, 650 mg, Rectal, Q6H PRN  ondansetron (ZOFRAN-ODT) disintegrating tablet 4 mg, 4 mg, Oral, Q8H PRN **OR** ondansetron (ZOFRAN) injection 4 mg, 4 mg, IntraVENous, Q6H PRN  polyethylene glycol (GLYCOLAX) packet 17 g, 17 g, Oral, Daily  heparin (porcine) injection 5,000 Units, 5,000 Units, SubCUTAneous, 3 times per day  Vitamin D (CHOLECALCIFEROL) tablet 2,000 Units, 2,000 Units, Oral, Daily  amLODIPine (NORVASC) tablet 2.5 mg, 2.5 mg, Oral, Daily  finasteride (PROSCAR) tablet 5 mg, 5 mg, Oral, Daily  metoprolol succinate (TOPROL XL) extended release tablet 25 mg, 25 mg, Oral, Daily  atorvastatin (LIPITOR) tablet 20 mg, 20 mg, Oral, Daily  vitamin B-12 (CYANOCOBALAMIN) tablet 1,000 mcg, 1,000 mcg, Oral, Daily    Allergies:  Patient has no known allergies. Physical exam:   Constitutional:  VITALS:  BP (!) 121/58   Pulse 65   Temp 98.1 °F (36.7 °C) (Oral)   Resp 16   Ht 5' 9\" (1.753 m)   Wt 138 lb (62.6 kg)   SpO2 94%   BMI 20.38 kg/m²     Gen: alert, awake, no acute distress  Eyes: eomi  HEENT: atraumatic/normocephalic, moist mucus membranes  Lungs: clear to ascultation bilaterally, equal lung expansion  CV: RRR, no murmurs  Abdomen: soft, nontender, nondistended, normoactive bowel sounds  Extremitiy: no edema  : no CVA tenderness, +suprapubic cath   Skin: no rash, tugor wnl  Neuro: no focal deficits   Psych: cooperative and appropriate      Data:         Last 3 BMP  Recent Labs     06/17/22  1217 06/18/22  0625 06/19/22  0626    139 141   K 5.5* 4.2 4.0   * 104 103   CO2 17* 24 28   BUN 90* 72* 56*   CREATININE 4.3* 3.1* 2.3*   GLUCOSE 151* 126* 96   CALCIUM 9.2 8.7 8.9         Last 3 CMP:    Recent Labs     06/17/22  0237 06/17/22  0237 06/17/22  1217 06/18/22  0625 06/19/22  0626      < > 138 139 141   K 5.0  5.0   < > 5.5* 4.2 4.0      < > 110* 104 103   CO2 14*   < > 17* 24 28   *   < > 90* 72* 56*   CREATININE 5.5*   < > 4.3* 3.1* 2.3*   GLUCOSE 85   < > 151* 126* 96   CALCIUM 9.7   < > 9.2 8.7 8.9   PROT 7.0  --   --  6.2* 6.3*   LABALBU 3.7  --   --  3.0* 3.2*   BILITOT 0.3  --   --  0.2 0.3   ALKPHOS 68  --   --  61 63   AST <5  --   --  6 7   ALT <5  --   --  <5 <5    < > = values in this interval not displayed.          Last 3 Glucose:     Recent Labs     06/17/22  1217 06/18/22  0625 06/19/22  0626   GLUCOSE 151* 126* 96         Last 3 POC Glucose:     No results for input(s): POCGLU in the last 72 hours. Last 3 CK, CKMB, Troponin  No results for input(s): CKTOTAL, CKMB, TROPONINI in the last 72 hours. Last 3 CBC:  Recent Labs     06/16/22  1847 06/18/22  0625 06/19/22  0626   WBC 8.7 6.6 7.8   RBC 3.61* 3.25* 3.41*   HGB 11.1* 10.3* 10.6*   HCT 34.7* 31.0* 32.4*   MCV 96.1 95.4 95.0   MCH 30.7 31.7 31.1   MCHC 32.0 33.2 32.7   RDW 14.6 14.5 14.2    349 378   MPV 9.3 9.3 9.0       Last 3 Hepatic Function Panel:    Recent Labs     06/17/22  0237 06/18/22  0625 06/19/22  0626   ALKPHOS 68 61 63   ALT <5 <5 <5   AST <5 6 7   PROT 7.0 6.2* 6.3*   BILITOT 0.3 0.2 0.3   LABALBU 3.7 3.0* 3.2*       Albumin:  Recent Labs     06/19/22  0626   LABALBU 3.2*       Calcium:  Recent Labs     06/19/22  0626   CALCIUM 8.9       Ionized Calcium:  No results for input(s): IONCA in the last 72 hours. Magnesium:    Recent Labs     06/19/22  0626   MG 2.0         ABGs:  No results for input(s): PHART, PO2ART, GIY4WKF, AUN0UWK, BEART, F4WQZJVI in the last 72 hours. Lactic Acid:  No results for input(s): LACTA in the last 72 hours. Last 3 Amylase:  No results for input(s): AMYLASE in the last 72 hours. Last 3 Lipase:  No results for input(s): LIPASE in the last 72 hours. Last 3 BNP:  No results for input(s): BNP in the last 72 hours. Assessment/Plan      1-Stage III MORENA in the setting of Neurogenic Bladder  with chronic suprapubic catheter possibly exacerbated by the ACEI+HCTZ and possible UTI  UA SG 1.020 ketones tr, blood large, protein >300, Ni large, WBC packed, hector few, LE large, pH 6.0    PLAN:  1. Continue IVF  2. Urology following   3. Follow labs - renal function improving   4. Hold ACEI     2-Non Anion Gap Met Acidosis in the setting of the Impaired tubule function due Obstruction  And the MORENA  PLAN:  1. Improved with bicarbonate gtt - now off  2.  Follow Labs     3-Hyperkalemia sec to ACEI + MORENA + Obstruction  PLAN:  1. Follow K+ - acceptable    2. 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 PTH of 47, vitamin D of 56. On vitamin D replacement  2. Follow Ca++ and PO4     5-HTN with CKD I-IV  BP at goal <130/80  PLAN:  1. 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  Check Fe++, B12, Folate      Discussed with family at bedside         Thank you for the opportunity to participate in the care of Mr Taylor Turkish      ______________________________      Samra Giang MD  6/19/2022  4:08 PM

## 2022-06-19 NOTE — PROGRESS NOTES
[address] [place], [place] [address]ist   Progress Note    Admitting Date and Time: 6/16/2022  6:25 PM  Admit Dx: Dehydration [E86.0]  Hyperkalemia [E87.5]  Acute cystitis without hematuria [H16.45]  Complicated UTI (urinary tract infection) [N39.0]  Acute renal failure, unspecified acute renal failure type ([address] [N17.9]    Subjective:    Patient reports feeling better today. He continues to have some discomfort at the suprapubic cath site. Creatinine has improved to 2.3 today. Urine culture is growing Mixed al:  Mixed gram negative rods and Nonhemolytic Strep species. ROS: denies fever, chills, cp, sob, n/v, HA unless stated above.      cefepime (MAXIPIME) 1000 mg IVPB extended (mini-bag)  1,000 mg IntraVENous Q24H    ascorbic acid  250 mg Oral Daily    aspirin  81 mg Oral Daily    pantoprazole  40 mg Oral Daily    timolol  1 drop Both Eyes BID    sodium chloride flush  5-40 mL IntraVENous 2 times per day    polyethylene glycol  17 g Oral Daily    heparin (porcine)  5,000 Units SubCUTAneous 3 times per day    Vitamin D  2,000 Units Oral Daily    amLODIPine  2.5 mg Oral Daily    finasteride  5 mg Oral Daily    metoprolol succinate  25 mg Oral Daily    atorvastatin  20 mg Oral Daily    vitamin B-12  1,000 mcg Oral Daily     sodium chloride flush, 5-40 mL, PRN  sodium chloride, , PRN  acetaminophen, 650 mg, Q6H PRN   Or  acetaminophen, 650 mg, Q6H PRN  ondansetron, 4 mg, Q8H PRN   Or  ondansetron, 4 mg, Q6H PRN         Objective:    BP (!) 121/58   Pulse 65   Temp 98.1 °F (36.7 °C) (Oral)   Resp 16   Ht 5' 9\" (1.753 m)   Wt 138 lb (62.6 kg)   SpO2 94%   BMI 20.38 kg/m²   General Appearance: alert and oriented to person, place and time and in no acute distress  Skin: warm and dry, scabbed area to nose  Head: normocephalic and atraumatic  Eyes: pupils equal, round, and reactive to light, conjunctivae normal  Neck: neck supple and non tender without mass   Pulmonary/Chest: clear to auscultation bilaterally- no wheezes, rales or rhonchi, no respiratory distress  Cardiovascular: irregularly irregular, murmur  Abdomen: soft, tender, distended, normal bowel sounds, suprapubic catheter with cloudy urine  Extremities: no cyanosis, no clubbing and no edema  Neurologic: no cranial nerve deficit and speech normal      Recent Labs     06/17/22  1217 06/18/22  0625 06/19/22  0626    139 141   K 5.5* 4.2 4.0   * 104 103   CO2 17* 24 28   BUN 90* 72* 56*   CREATININE 4.3* 3.1* 2.3*   GLUCOSE 151* 126* 96   CALCIUM 9.2 8.7 8.9       Recent Labs     06/17/22  0237 06/18/22  0625 06/19/22  0626   ALKPHOS 68 61 63   PROT 7.0 6.2* 6.3*   LABALBU 3.7 3.0* 3.2*   BILITOT 0.3 0.2 0.3   AST <5 6 7   ALT <5 <5 <5       Recent Labs     06/16/22  1847 06/18/22  0625 06/19/22  0626   WBC 8.7 6.6 7.8   RBC 3.61* 3.25* 3.41*   HGB 11.1* 10.3* 10.6*   HCT 34.7* 31.0* 32.4*   MCV 96.1 95.4 95.0   MCH 30.7 31.7 31.1   MCHC 32.0 33.2 32.7   RDW 14.6 14.5 14.2    349 378   MPV 9.3 9.3 9.0           Radiology:   US RETROPERITONEAL COMPLETE   Final Result   1. Moderate to severe bilateral hydronephrosis. When compared to prior CT   scan the degree of hydronephrosis is less extensive. 2.  Bilateral renal cortical thinning. Bilateral renal cysts. 3.  A Fay catheter identified within the bladder. Bladder is not fully   decompressed. Layering echogenic debris identified within the bladder lumen. Bladder wall appears thickened. 4.  Prostate is enlarged. Assessment:  Principal Problem:    Complicated UTI (urinary tract infection)  Active Problems:    Urinary tract infection associated with catheterization of urinary tract (HCC)    Hydronephrosis due to obstruction of bladder    Stage 3 acute kidney injury ([address]  Resolved Problems:    * No resolved hospital problems. *      Plan:  1. Complicated UTI:  ID following.   Urine culture is growing Mixed al: Mixed gram negative rods

## 2022-06-20 LAB
ALBUMIN SERPL-MCNC: 3 G/DL (ref 3.5–5.2)
ALP BLD-CCNC: 60 U/L (ref 40–129)
ALT SERPL-CCNC: 5 U/L (ref 0–40)
ANION GAP SERPL CALCULATED.3IONS-SCNC: 9 MMOL/L (ref 7–16)
AST SERPL-CCNC: 8 U/L (ref 0–39)
BILIRUB SERPL-MCNC: 0.3 MG/DL (ref 0–1.2)
BUN BLDV-MCNC: 44 MG/DL (ref 6–23)
CALCIUM SERPL-MCNC: 8.3 MG/DL (ref 8.6–10.2)
CHLORIDE BLD-SCNC: 104 MMOL/L (ref 98–107)
CO2: 28 MMOL/L (ref 22–29)
CREAT SERPL-MCNC: 1.9 MG/DL (ref 0.7–1.2)
GFR AFRICAN AMERICAN: 40
GFR NON-AFRICAN AMERICAN: 33 ML/MIN/1.73
GLUCOSE BLD-MCNC: 100 MG/DL (ref 74–99)
HCT VFR BLD CALC: 29.8 % (ref 37–54)
HEMOGLOBIN: 9.4 G/DL (ref 12.5–16.5)
MAGNESIUM: 1.6 MG/DL (ref 1.6–2.6)
MCH RBC QN AUTO: 31.2 PG (ref 26–35)
MCHC RBC AUTO-ENTMCNC: 31.5 % (ref 32–34.5)
MCV RBC AUTO: 99 FL (ref 80–99.9)
PDW BLD-RTO: 14.2 FL (ref 11.5–15)
PHOSPHORUS: 3.3 MG/DL (ref 2.5–4.5)
PLATELET # BLD: 347 E9/L (ref 130–450)
PMV BLD AUTO: 9.3 FL (ref 7–12)
POTASSIUM SERPL-SCNC: 4 MMOL/L (ref 3.5–5)
RBC # BLD: 3.01 E12/L (ref 3.8–5.8)
SODIUM BLD-SCNC: 141 MMOL/L (ref 132–146)
TOTAL PROTEIN: 5.8 G/DL (ref 6.4–8.3)
WBC # BLD: 8.9 E9/L (ref 4.5–11.5)

## 2022-06-20 PROCEDURE — 97530 THERAPEUTIC ACTIVITIES: CPT

## 2022-06-20 PROCEDURE — 6360000002 HC RX W HCPCS: Performed by: INTERNAL MEDICINE

## 2022-06-20 PROCEDURE — 6370000000 HC RX 637 (ALT 250 FOR IP): Performed by: INTERNAL MEDICINE

## 2022-06-20 PROCEDURE — 2580000003 HC RX 258: Performed by: CLINICAL NURSE SPECIALIST

## 2022-06-20 PROCEDURE — 85027 COMPLETE CBC AUTOMATED: CPT

## 2022-06-20 PROCEDURE — APPSS45 APP SPLIT SHARED TIME 31-45 MINUTES: Performed by: PHYSICIAN ASSISTANT

## 2022-06-20 PROCEDURE — 83735 ASSAY OF MAGNESIUM: CPT

## 2022-06-20 PROCEDURE — 97110 THERAPEUTIC EXERCISES: CPT

## 2022-06-20 PROCEDURE — 2580000003 HC RX 258: Performed by: INTERNAL MEDICINE

## 2022-06-20 PROCEDURE — 6370000000 HC RX 637 (ALT 250 FOR IP): Performed by: PHYSICIAN ASSISTANT

## 2022-06-20 PROCEDURE — 1200000000 HC SEMI PRIVATE

## 2022-06-20 PROCEDURE — 36415 COLL VENOUS BLD VENIPUNCTURE: CPT

## 2022-06-20 PROCEDURE — 80053 COMPREHEN METABOLIC PANEL: CPT

## 2022-06-20 PROCEDURE — 6370000000 HC RX 637 (ALT 250 FOR IP): Performed by: NURSE PRACTITIONER

## 2022-06-20 PROCEDURE — 6360000002 HC RX W HCPCS: Performed by: CLINICAL NURSE SPECIALIST

## 2022-06-20 PROCEDURE — 99232 SBSQ HOSP IP/OBS MODERATE 35: CPT | Performed by: INTERNAL MEDICINE

## 2022-06-20 PROCEDURE — 84100 ASSAY OF PHOSPHORUS: CPT

## 2022-06-20 RX ORDER — LANOLIN ALCOHOL/MO/W.PET/CERES
400 CREAM (GRAM) TOPICAL DAILY
Status: COMPLETED | OUTPATIENT
Start: 2022-06-20 | End: 2022-06-21

## 2022-06-20 RX ADMIN — HEPARIN SODIUM 5000 UNITS: 5000 INJECTION INTRAVENOUS; SUBCUTANEOUS at 05:22

## 2022-06-20 RX ADMIN — TIMOLOL MALEATE 1 DROP: 5 SOLUTION OPHTHALMIC at 20:33

## 2022-06-20 RX ADMIN — AMLODIPINE BESYLATE 2.5 MG: 10 TABLET ORAL at 09:04

## 2022-06-20 RX ADMIN — Medication 10 ML: at 09:09

## 2022-06-20 RX ADMIN — FINASTERIDE 5 MG: 5 TABLET, FILM COATED ORAL at 09:05

## 2022-06-20 RX ADMIN — HEPARIN SODIUM 5000 UNITS: 5000 INJECTION INTRAVENOUS; SUBCUTANEOUS at 20:33

## 2022-06-20 RX ADMIN — METOPROLOL SUCCINATE 25 MG: 25 TABLET, EXTENDED RELEASE ORAL at 09:06

## 2022-06-20 RX ADMIN — SODIUM CHLORIDE, POTASSIUM CHLORIDE, SODIUM LACTATE AND CALCIUM CHLORIDE: 600; 310; 30; 20 INJECTION, SOLUTION INTRAVENOUS at 22:39

## 2022-06-20 RX ADMIN — Medication 10 ML: at 20:38

## 2022-06-20 RX ADMIN — Medication 400 MG: at 14:03

## 2022-06-20 RX ADMIN — ASPIRIN 81 MG: 81 TABLET, COATED ORAL at 09:04

## 2022-06-20 RX ADMIN — OXYCODONE HYDROCHLORIDE AND ACETAMINOPHEN 250 MG: 500 TABLET ORAL at 09:05

## 2022-06-20 RX ADMIN — Medication 2000 UNITS: at 09:06

## 2022-06-20 RX ADMIN — HEPARIN SODIUM 5000 UNITS: 5000 INJECTION INTRAVENOUS; SUBCUTANEOUS at 14:03

## 2022-06-20 RX ADMIN — ATORVASTATIN CALCIUM 20 MG: 20 TABLET, FILM COATED ORAL at 20:33

## 2022-06-20 RX ADMIN — SODIUM CHLORIDE, POTASSIUM CHLORIDE, SODIUM LACTATE AND CALCIUM CHLORIDE: 600; 310; 30; 20 INJECTION, SOLUTION INTRAVENOUS at 09:09

## 2022-06-20 RX ADMIN — TIMOLOL MALEATE 1 DROP: 5 SOLUTION OPHTHALMIC at 09:09

## 2022-06-20 RX ADMIN — CYANOCOBALAMIN TAB 1000 MCG 1000 MCG: 1000 TAB at 09:05

## 2022-06-20 RX ADMIN — PANTOPRAZOLE SODIUM 40 MG: 40 TABLET, DELAYED RELEASE ORAL at 05:40

## 2022-06-20 RX ADMIN — CEFEPIME HYDROCHLORIDE 1000 MG: 1 INJECTION, POWDER, FOR SOLUTION INTRAMUSCULAR; INTRAVENOUS at 03:00

## 2022-06-20 NOTE — CARE COORDINATION
Plan remains home with spouse at [place], will need a resumption of home care order for [facility]. Family will transport home.

## 2022-06-20 NOTE — PROGRESS NOTES
[facility]     [address] [place] [address]  [place], [address]  Phone [phone]   [phone]      Admit Date: 2022  6:25 PM  Pt Name: Prasanna Montana  MRN: [id number]  : 1932  Reason for Consult:    Chief Complaint   Patient presents with    Dysuria     stated that he is producing little to no urine     Requesting Physician:  Sivan Wren MD  PCP: Yousif Schneiedr MD  History Obtained From:  patient, chart   ID consulted for Dehydration [E86.0]  Hyperkalemia [E87.5]  Acute cystitis without hematuria [K26.77]  Complicated UTI (urinary tract infection) [N39.0]  Acute renal failure, unspecified acute renal failure type ([address] [N17.9]  on hospital day 4   Face to face encounter   CHIEF COMPLAINT       Chief Complaint   Patient presents with    Dysuria     stated that he is producing little to no urine     HISTORYOF PRESENT ILLNESS   Prasanna Montana is a 80 y.o. male who presents with   has a past medical history of Enlarged prostate, Hyperlipidemia, Hypertension, Lumbosacral strain, and Pacemaker. Dysuria       Patient lives with wife- he reports he was not feeling well since about Monday. He has a suprabic cath and he also reports he voids. His urine has been cloudy with sediments. He denies any fevers at home. He reports fatigue, poor appetite. And overall not feeling well. He is known to Urology. He had TURP and suprapubic placement in 2021. He is chronic kidney disease. He denies any recent antibiotics  Cultures are pending   Labs reviewed   He denies any nausea, vomiting, fevers, no diarrhea, does have constipation. Previous cultures reviewed     ID has been asked to evaluate and manage atbs.    DOS  22  Assisted in transferring pt from bed to chair to eat breakd=fast has no c/o Guillory yellow   2022 Pt in chair wife present spt with cloudy urine  No f/c/n/v/d/  t in chair has no c/o guillory still cloudy no issues with atbx   REVIEW OF SYSTEMS     CONSTITUTIONAL:   + fatigue   ALLERGIES:    No urticaria, hay fever,    EYES:     No blurry vision, loss of vision, eye pain  ENT:      No hearing loss, sore throat  CARDIOVASCULAR:   No chest pain or palpitations, + HTN, + hyperlipidemia , + pacer   RESPIRATORY:   No cough, sob  ENDOCRINE:    No increase thirst, urination   HEME-LYMPH:   No easy bruising or bleeding  GI:     No nausea, vomiting or diarrhea  :     No urinary complaints  NEURO:    No seizures, stroke, HA  MUSCULOSKELETAL:  No muscle aches or pain, no joint pain  SKIN:     No rash or itch  PSYCH:    No depression or anxiety       CURRENT MEDICATIONS     Current Facility-Administered Medications:     magnesium oxide (MAG-OX) tablet 400 mg, 400 mg, Oral, Daily, Kenzie Pierre PA-C    lactated ringers infusion, , IntraVENous, Continuous, Uzma Casanova MD, Last Rate: 75 mL/hr at 06/20/22 0909, New Bag at 06/20/22 0909    cefepime (MAXIPIME) 1000 mg IVPB extended (mini-bag), 1,000 mg, IntraVENous, Q24H, Naheed Bolaños APRN - CNS, Stopped at 06/20/22 1162    ascorbic acid (VITAMIN C) tablet 250 mg, 250 mg, Oral, Daily, Audrey Laurent MD, 250 mg at 06/20/22 0905    aspirin EC tablet 81 mg, 81 mg, Oral, Daily, Audrey Laurent MD, 81 mg at 06/20/22 0904    pantoprazole (PROTONIX) tablet 40 mg, 40 mg, Oral, Daily, Audrey Laurent MD, 40 mg at 06/20/22 0540    timolol (TIMOPTIC) 0.5 % ophthalmic solution 1 drop, 1 drop, Both Eyes, BID, Audrey Laurent MD, 1 drop at 06/20/22 0909    sodium chloride flush 0.9 % injection 5-40 mL, 5-40 mL, IntraVENous, 2 times per day, Audrey Laurent MD, 10 mL at 06/20/22 0909    sodium chloride flush 0.9 % injection 5-40 mL, 5-40 mL, IntraVENous, PRN, Audrey Laurent MD    0.9 % sodium chloride infusion, , IntraVENous, PRN, Audrey Laurent MD    acetaminophen (TYLENOL) tablet 650 mg, 650 mg, Oral, Q6H PRN **OR** acetaminophen (TYLENOL) suppository 650 mg, 650 mg, Rectal, Q6H PRN, Emily Powell MD    ondansetron (ZOFRAN-ODT) disintegrating tablet 4 mg, 4 mg, Oral, Q8H PRN **OR** ondansetron (ZOFRAN) injection 4 mg, 4 mg, IntraVENous, Q6H PRN, Emily Powell MD    polyethylene glycol [facility]) packet 17 g, 17 g, Oral, Daily, Emily Powell MD, 17 g at 06/19/22 0847    heparin (porcine) injection 5,000 Units, 5,000 Units, SubCUTAneous, 3 times per day, Emily Powell MD, 5,000 Units at 06/20/22 0522    Vitamin D (CHOLECALCIFEROL) tablet 2,000 Units, 2,000 Units, Oral, Daily, Gevena Bake, APRN - CNP, 2,000 Units at 06/20/22 0906    amLODIPine (NORVASC) tablet 2.5 mg, 2.5 mg, Oral, Daily, Gevena Bake, APRN - CNP, 2.5 mg at 06/20/22 4225    finasteride (PROSCAR) tablet 5 mg, 5 mg, Oral, Daily, Gevena Bake, APRN - CNP, 5 mg at 06/20/22 5929    metoprolol succinate (TOPROL XL) extended release tablet 25 mg, 25 mg, Oral, Daily, Gevena Bake, APRN - CNP, 25 mg at 06/20/22 8370    atorvastatin (LIPITOR) tablet 20 mg, 20 mg, Oral, Daily, Gevena Bake, APRN - CNP, 20 mg at 06/19/22 2157    vitamin B-12 (CYANOCOBALAMIN) tablet 1,000 mcg, 1,000 mcg, Oral, Daily, Gevena Bake, APRN - CNP, 1,000 mcg at 06/20/22 1940  ALLERGIES     Patient has no known allergies.   Immunization History   Administered Date(s) Administered    COVID-19, Moderna, Booster, PF, 50mcg/0.25ml 12/06/2021    COVID-19, Moderna, Primary or Immunocompromised, PF, 100mcg/0.5mL 01/20/2021, 02/19/2021, 03/01/2021    DTaP 09/18/2003    Influenza 10/28/2013    Influenza Virus Vaccine 09/24/2012, 09/23/2014    Influenza, High Dose (Fluzone 65 yrs and older) 11/21/2016, 10/25/2017, 10/09/2018    Influenza, High-dose, Desiree Spies, 65 yrs +, IM (Fluzone) 10/26/2020    Influenza, Quadv, adjuvanted, 65 yrs +, IM, PF (Fluad) 10/12/2021    Influenza, Triv, inactivated, subunit, adjuvanted, IM (Fluad 65 yrs and older) 11/13/2019    Pneumococcal Conjugate 13-valent ([id number]) 02/09/2016, 10/01/2020    Pneumococcal Polysaccharide ([id number]) 07/21/2011      Internal Administration   First Dose COVID-19, Moderna, Primary or Immunocompromised, PF, 100mcg/0.5mL  01/20/2021   Second Dose COVID-19, Moderna, Primary or Immunocompromised, PF, 100mcg/0.5mL   02/19/2021       Last COVID Lab SARS-CoV-2, PCR (no units)   Date Value   08/22/2021 Not Detected     SARS-CoV-2, NAAT (no units)   Date Value   08/22/2021 Not Detected     SARS-COV-2, POC (no units)   Date Value   02/05/2021 Detected (A)        PAST MEDICAL HISTORY     Past Medical History:   Diagnosis Date    Enlarged prostate     Hyperlipidemia     Hypertension     Lumbosacral strain 7/8/2013    Pacemaker      SURGICAL HISTORY       Past Surgical History:   Procedure Laterality Date    A-V CARDIAC PACEMAKER INSERTION  5/30/07    COLONOSCOPY  8/3/11    CYSTOSCOPY N/A 11/2/2021    CYSTOSCOPY RETROGRADE PYELOGRAM, TRANSURETHRAL RESECTION OF THE PROSTATE WITH SUPRAPUBIC CATHETER PLACEMENT performed by Fausto Riggs MD at [place] [address] SURGERY  2002    cataracts evelyn    PACEMAKER PLACEMENT      2017 battery change    TONSILLECTOMY      child     FAMILY HISTORY       Family History   Problem Relation Age of Onset    High Blood Pressure Mother     Cancer Mother     Diabetes Mother     Heart Disease Father     High Blood Pressure Father      ·      PHYSICAL EXAM        Vitals:    06/19/22 0848 06/19/22 2152 06/20/22 0519 06/20/22 0745   BP: (!) 121/58 125/77  (!) 102/55   Pulse: 65 66  59   Resp:  16  17   Temp:  98.7 °F (37.1 °C)  98.2 °F (36.8 °C)   TempSrc:  Oral  Oral   SpO2:  97%  97%   Weight:   139 lb (63 kg)    Height:         Physical Exam   Constitutional/General:    In chair [place] has hearing aids  Head: NC/AT  Eyes: PERRL, EOMI   Pulmonary: Lungs clear to auscultation bilaterally. Cardiovascular:  Regular rate and rhythm  Abdomen: Soft, + BS. No distension. Nontender.     Extremities    Warm and well perfused  Skin: Warm and dry without Recent Labs     06/18/22  0625 06/18/22  0625 06/19/22  0626 06/19/22  0626 06/20/22  0742     --  141  --  141   K 4.2   < > 4.0   < > 4.0      < > 103   < > 104   CO2 24   < > 28   < > 28   BUN 72*  --  56*  --  44*   CREATININE 3.1*  --  2.3*  --  1.9*   GFRAA 23  --  32  --  40   LABGLOM 19  --  27  --  33   GLUCOSE 126*  --  96  --  100*   PROT 6.2*  --  6.3*  --  5.8*   LABALBU 3.0*  --  3.2*  --  3.0*   CALCIUM 8.7  --  8.9  --  8.3*   BILITOT 0.2  --  0.3  --  0.3   ALKPHOS 61  --  63  --  60   AST 6  --  7  --  8   ALT <5  --  <5  --  5    < > = values in this interval not displayed.      Lab Results   Component Value Date    ADVIRPCR Not Detected 08/22/2021    [facility] Not Detected 08/22/2021    BPPTXPPCR Not Detected 08/22/2021    CHLPNEPCR Not Detected 08/22/2021    [id number] Not Detected 08/22/2021    DVLEPG0LXF Not Detected 08/22/2021    BAVRS25FFV Not Detected 08/22/2021    [id number] Not Detected 08/22/2021    COVID19 Not Detected 08/22/2021    COVID19 Not Detected 08/22/2021    [facility] Not Detected 08/22/2021    RHENTPCR Not Detected 08/22/2021    FLUBPCR Not Detected 08/22/2021    [facility] Not Detected 08/22/2021    [id number] Not Detected 08/22/2021    QRCLWZX6WXH Not Detected 08/22/2021    [id number] Not Detected 08/22/2021    [id number] Not Detected 08/22/2021    RSVPCR Not Detected 08/22/2021     Lab Results   Component Value Date    COVID19 Not Detected 08/22/2021    COVID19 Not Detected 08/22/2021     COVID-19/RICK-COV2 LABS  Recent Labs     06/18/22  0625 06/19/22  0626 06/20/22  0742   FERRITIN 332  --   --    AST 6 7 8   ALT <5 <5 5     Lab Results   Component Value Date    CHOL 133 03/07/2022    TRIG 101 03/07/2022    HDL 43 03/07/2022    LDLCALC 70 03/07/2022    LABVLDL 20 03/07/2022     MICROBIOLOGY:  Cultures :     Lab Results   Component Value Date    BC 24 Hours no growth 06/17/2022    BC 5 Days no growth 08/22/2021    ORG Pseudomonas aeruginosa 08/23/2021 Lab Results   Component Value Date    BLOODCULT2 24 Hours no growth 06/17/2022    BLOODCULT2 5 Days no growth 08/22/2021    ORG Pseudomonas aeruginosa 08/23/2021     Urine Culture, Routine   Date Value Ref Range Status   06/16/2022   Final    10 to 100,000 CFU/mL  Mixed al isolated. Further workup and sensitivity testing  is not routinely indicated and will not be performed. Mixed al isolated includes:  Mixed gram negative rods  Nonhemolytic Strep species     01/05/2022   Final    10 to 100,000 CFU/mL  Mixed al isolated. Further workup and sensitivity testing  is not routinely indicated and will not be performed. Mixed al isolated includes:  Mixed gram negative rods  Oxidase positive gram negative rods  Nonhemolytic Strep species     08/23/2021 >100,000 CFU/ml  Vabomere=36    Final       FINAL IMPRESSION    Patient is a 80 y.o. male who presented with   Chief Complaint   Patient presents with    Dysuria     stated that he is producing little to no urine    and admitted for Dehydration [E86.0]  Hyperkalemia [E87.5]  Acute cystitis without hematuria [D68.93]  Complicated UTI (urinary tract infection) [N39.0]  Acute renal failure, unspecified acute renal failure type ([address] [N17.9]  UTI mixed gpo/gnr blood cx ngtd   Moderate to severe bilateral hydronephrosis  History of COVID  chemo better     Plan:   · Cont Cefepime 1 gram IV daily  Poss d/c with po Augmentin  · Urine cultures mixed will repeat   · Labs reviewed    · Urology and nephrology consulted   · Previous cultures reviewed              Imaging and labs were reviewed. Jose Peters was informed of their diagnosis, indications, risks and benefits of treatment. Joes Peters had the opportunity to ask questions. All questions were answered. Thank you for involving me in the care of Jose Peters. Please do not hesitate to call for any questions or concerns.     Electronically signed by Holden Wilson MD on 6/20/2022 at 1:24 PM

## 2022-06-20 NOTE — PROGRESS NOTES
6/20/2022 8:18 AM  Service: Urology  Group: [facility] (Tan[facility]Patti[facility])    Apollo Acuna  [id number]    Subjective:    Sitting up in a chair  No new complaints  SPT draining cloudy yellow urine  No family present    Review of Systems  Constitutional: No fever or chills   Respiratory: negative for cough and hemoptysis  Cardiovascular: negative for chest pain and dyspnea  Gastrointestinal: negative for abdominal pain, diarrhea, nausea and vomiting   : See above  Derm: negative for rash and skin lesion(s)  Neurological: negative for seizures and tremors  Musculoskeletal: Negative    Psychiatric: Negative   All other reviews are negative      Scheduled Meds:   cefepime (MAXIPIME) 1000 mg IVPB extended (mini-bag)  1,000 mg IntraVENous Q24H    ascorbic acid  250 mg Oral Daily    aspirin  81 mg Oral Daily    pantoprazole  40 mg Oral Daily    timolol  1 drop Both Eyes BID    sodium chloride flush  5-40 mL IntraVENous 2 times per day    polyethylene glycol  17 g Oral Daily    heparin (porcine)  5,000 Units SubCUTAneous 3 times per day    Vitamin D  2,000 Units Oral Daily    amLODIPine  2.5 mg Oral Daily    finasteride  5 mg Oral Daily    metoprolol succinate  25 mg Oral Daily    atorvastatin  20 mg Oral Daily    vitamin B-12  1,000 mcg Oral Daily       Objective:  Vitals:    06/20/22 0745   BP: (!) 102/55   Pulse: 59   Resp: 17   Temp: 98.2 °F (36.8 °C)   SpO2: 97%         Allergies: Patient has no known allergies.     General Appearance: alert and oriented to person, place and time and in no acute distress  Skin: no rash or erythema  Head: normocephalic and atraumatic  Pulmonary/Chest: normal air movement, no respiratory distress  Abdomen: soft, non-tender, non-distended  Genitourinary: SPT draining cloudy yellow urine   Extremities: no cyanosis, clubbing or edema         Labs:     Recent Labs     06/19/22  0626      K 4.0      CO2 28   BUN 56*   CREATININE 2.3*   GLUCOSE 96   CALCIUM 8.9 Lab Results   Component Value Date    HGB 10.6 06/19/2022    HCT 32.4 06/19/2022       Lab Results   Component Value Date    PSA 1.35 06/17/2022    PSA 2.56 11/11/2015         Assessment/Plan:  NGB with SPT   Bilateral hydronephrosis  BPH  s/p TURP  (2021)  MORENA   Bilateral renal cysts    Hand irrigated SPT, irrigates easily     Creatinine continues to trend down, 6.4>>1.9 and now appears to be approaching baseline. Nephrology following   Urine culture mixed al, repeat culture pending  Cont the antibiotics per ID   Continue the SPT  He should be discharged with drainage bag and the SPT should be left to gravity. He will no longer clamp the SPT at home.    He has follow up appt in approx 2 to 3 weeks   There are no further acute  interventions planned at this time  Please call with additional questions or concerns           Angelique Bhat, RAJAN - CNP   [facility]

## 2022-06-20 NOTE — PROGRESS NOTES
Physical Therapy Treatment Note/Plan of Care    Room #:  1412/1487-60  Patient Name: Daksha George  YOB: 1932  MRN: [id number]    Date of Service: 6/20/2022     Tentative placement recommendation: Home Health Physical Therapy   Equipment recommendation: Patient has needed equipment       Evaluating Physical Therapist: Yvette Da Silva, PT #[id number]      Specific Provider Orders/Date/Referring Provider :  06/17/22 1045   PT eval and treat Start: 06/17/22 1045, End: 06/17/22 1045, ONE TIME, Standing Count: 1 Occurrences, R    Aris Dobson, APRN - CNP      Admitting Diagnosis:   Dehydration [E86.0]  Hyperkalemia [E87.5]  Acute cystitis without hematuria [J42.81]  Complicated UTI (urinary tract infection) [N39.0]  Acute renal failure, unspecified acute renal failure type ([address] [N17.9]     suprapubic catheter producing little to no urine which is cloudy in nature    Surgery: none  Visit Diagnoses       Codes    Acute cystitis without hematuria     N30.00    Hyperkalemia     E87.5    Dehydration     E86.0          Patient Active Problem List   Diagnosis    Pure hypercholesterolemia    Essential hypertension    Primary osteoarthritis involving multiple joints    Iron deficiency anemia due to chronic blood loss    Stage 4 chronic kidney disease ([address]    Hypokalemia    Enlarged prostate with urinary retention    BPH with urinary obstruction    Hypomagnesemia    Chronic primary angle-closure glaucoma, indeterminate stage    Chronic renal disease, stage III ([address] [611178]    Urinary tract infection associated with catheterization of urinary tract ([address]    Hydronephrosis due to obstruction of bladder    Stage 3 acute kidney injury ([address]    Complicated UTI (urinary tract infection)    Acute renal failure ([address]        ASSESSMENT of Current Deficits  Patient with slow jadon and cues for upright posture and pacing.   Patient requires skilled physical therapy to address concerns listed above to increase safety and independence at discharge. PHYSICAL THERAPY  PLAN OF CARE       Physical therapy plan of care is established based on physician order,  patient diagnosis and clinical assessment    Current Treatment Recommendations:    -Bed Mobility: Lower extremity exercises   -Sitting Balance: Incorporate reaching activities to activate trunk muscles   -Standing Balance: Perform strengthening exercises in standing to promote motor control with or without upper extremity support   -Transfers: Provide instruction on proper hand and foot position for adequate transfer of weight onto lower extremities and use of gait device if needed and Cues for hand placement, technique and safety. Provide stabilization to prevent fall   -Gait: Gait training, Standing activities to improve: base of support, weight shift, weight bearing  and Pregait training to emphasize: Device control, Upright and Safety   -Endurance: Utilize Supervised activities to increase level of endurance to allow for safe functional mobility including transfers and gait   -Stairs: Stair training with instruction on proper technique and hand placement on rail    PT long term treatment goals are located in below grid    Patient and or family understand(s) diagnosis, prognosis, and plan of care. Frequency of treatments: Patient will be seen  daily.          Prior Level of Function: Patient ambulated with cane    Rehab Potential: good    for baseline    Past medical history:   Past Medical History:   Diagnosis Date    Enlarged prostate     Hyperlipidemia     Hypertension     Lumbosacral strain 7/8/2013    Pacemaker      Past Surgical History:   Procedure Laterality Date    A-V CARDIAC PACEMAKER INSERTION  5/30/07    COLONOSCOPY  8/3/11    CYSTOSCOPY N/A 11/2/2021    CYSTOSCOPY RETROGRADE PYELOGRAM, TRANSURETHRAL RESECTION OF THE PROSTATE WITH SUPRAPUBIC CATHETER PLACEMENT performed by Ang Garcia MD at [facility] [place] [facility]  2002 cataracts evelyn    PACEMAKER PLACEMENT      2017 battery change    TONSILLECTOMY      child       SUBJECTIVE:    Precautions: Up with assistance, falls and hard of hearing ,  Suprapubic catheter, hearing aides  Social history: Patient lives with spouse in a two story home resides first  with 3 steps  to enter with Rail  Tub shower grab bars    Equipment owned: [employer], Bedside commode and Tub transfer bench,       [facility]   How much difficulty turning over in bed?: None  How much difficulty sitting down on / standing up from a chair with arms?: A Little  How much difficulty moving from lying on back to sitting on side of bed?: None  How much help from another person moving to and from a bed to a chair?: A Little  How much help from another person needed to walk in hospital room?: A Little  How much help from another person for climbing 3-5 steps with a railing?: A Little  AM-PAC Inpatient Mobility Raw Score : 20  AM-PAC Inpatient T-Scale Score : 47.67  Mobility Inpatient CMS 0-100% Score: 35.83  Mobility Inpatient CMS G-Code Modifier : 9645 Chip Estimate cleared patient for PT treatment. .   OBJECTIVE;   Initial Evaluation  Date: 6/18/2022 Treatment Date:  6/20/2022       Short Term/ Long Term   Goals   Was pt agreeable to Eval/treatment? Yes yes To be met in 3 days   Pain level   4/10  \"down below\" catheter site 0/10    Bed Mobility    Rolling: Supervision     Supine to sit: Supervision     Sit to supine: Not assessed     Scooting: Supervision    Rolling: Supervision    Supine to sit: Supervision    Sit to supine: Not assessed    Scooting: Supervision     Rolling: Independent    Supine to sit:  Independent    Sit to supine: Independent    Scooting: Independent     Transfers Sit to stand: Minimal assist of 1 Cues for hand placement and safety   Sit to stand: Supervision  Cues for hand placement and safety       Sit to stand: Modified Independent     Ambulation 40 feet using  wheeled walker with Supervision    cues for upright posture, safety and pacing 4 x 100 feet using  wheeled walker with Supervision    cues for upright posture, safety and pacing    100 feet using  wheeled walker or cane with Modified Independent    Stair negotiation: ascended and descended   Not assessed     3 steps 1 rail with supervision    ROM Within functional limits    Increase range of motion 10% of affected joints    Strength BUE:  refer to OT eval  RLE:  4-/5  LLE:  4-/5  Increase strength in affected mm groups by 1/3 grade   Balance Sitting EOB:  good -  Dynamic Standing:  fair wheeled walker  Sitting EOB: good   Dynamic Standing: good wheeled walker   Sitting EOB:  good    Dynamic Standing: good cane versus wheeled walker      Patient is Alert & Oriented x person, place, time and situation and follows directions    Sensation:  Patient  denies numbness/tingling   Edema:  no   Endurance: fair  +    Vitals: room air   Blood Pressure at rest  Blood Pressure during session    Heart Rate at rest  Heart Rate during session     SPO2 at rest %  SPO2 during session  %     Patient education  Patient educated on role of Physical Therapy, risks of immobility, safety and plan of care,  importance of mobility while in hospital  and ankle pumps, quad set and glut set for edema control, blood clot prevention     Patient response to education:   Pt verbalized understanding Pt demonstrated skill Pt requires further education in this area   Yes Partial Yes      Treatment:  Patient practiced and was instructed/facilitated in the following treatment: Patient  to edge of bed,  Sat edge of bed 10 minutes with Supervision  to increase dynamic sitting balance and activity tolerance. Pt stood, ambulated in the hallway, and back to the chair. Pt performed seated exercises. Therapeutic Exercises:  ankle pumps, heel raises, hip abduction/adduction, long arc quad and seated marching  x 20 reps.    AROM    At end of session, patient in chair with spouse present call light and phone within reach,  all lines and tubes intact, nursing notified. Patient would benefit from continued skilled Physical Therapy to improve functional independence and quality of life. Patient's/ family goals   home    Time in  14:40  Time out  15:05    Total Treatment Time  25 minutes        CPT codes:    Therapeutic activities (10159)   16 minutes  1 unit(s)  Therapeutic exercises (38222)   9 minutes  1 unit(s)   Murray Gomez  [facility] [place]  LIC # 48603

## 2022-06-20 NOTE — PROGRESS NOTES
[address] [place], [place] [address]ist   Progress Note    Admitting Date and Time: 6/16/2022  6:25 PM  Admit Dx: Dehydration [E86.0]  Hyperkalemia [E87.5]  Acute cystitis without hematuria [H35.99]  Complicated UTI (urinary tract infection) [N39.0]  Acute renal failure, unspecified acute renal failure type ([address] [N17.9]    Subjective:  Patient reports feeling better today. He continues to have some discomfort at the suprapubic cath site. Appetite is improved and he is walking to the bathroom using his alone walker without issue. Urine culture is growing Mixed al:  Mixed gram negative rods and Nonhemolytic Strep species. ROS: denies fever, chills, cp, sob, n/v, HA unless stated above.      cefepime (MAXIPIME) 1000 mg IVPB extended (mini-bag)  1,000 mg IntraVENous Q24H    ascorbic acid  250 mg Oral Daily    aspirin  81 mg Oral Daily    pantoprazole  40 mg Oral Daily    timolol  1 drop Both Eyes BID    sodium chloride flush  5-40 mL IntraVENous 2 times per day    polyethylene glycol  17 g Oral Daily    heparin (porcine)  5,000 Units SubCUTAneous 3 times per day    Vitamin D  2,000 Units Oral Daily    amLODIPine  2.5 mg Oral Daily    finasteride  5 mg Oral Daily    metoprolol succinate  25 mg Oral Daily    atorvastatin  20 mg Oral Daily    vitamin B-12  1,000 mcg Oral Daily     sodium chloride flush, 5-40 mL, PRN  sodium chloride, , PRN  acetaminophen, 650 mg, Q6H PRN   Or  acetaminophen, 650 mg, Q6H PRN  ondansetron, 4 mg, Q8H PRN   Or  ondansetron, 4 mg, Q6H PRN    Objective:    BP (!) 102/55   Pulse 59   Temp 98.2 °F (36.8 °C) (Oral)   Resp 17   Ht 5' 9\" (1.753 m)   Wt 139 lb (63 kg)   SpO2 97%   BMI 20.53 kg/m²   General Appearance: alert and oriented to person, place and time and in no acute distress  Skin: warm and dry, scabbed area to nose  Head: normocephalic and atraumatic  Eyes: pupils equal, round, and reactive to light, conjunctivae normal  Neck: neck supple and non tender without mass   Pulmonary/Chest: clear to auscultation bilaterally- no wheezes, rales or rhonchi, no respiratory distress  Cardiovascular: irregularly irregular, murmur  Abdomen: soft, tender, distended, normal bowel sounds, suprapubic catheter with cloudy urine  Extremities: no cyanosis, no clubbing and no edema  Neurologic: no cranial nerve deficit and speech normal  : SPT in place    Recent Labs     06/18/22 0625 06/19/22 0626 06/20/22  0742    141 141   K 4.2 4.0 4.0    103 104   CO2 24 28 28   BUN 72* 56* 44*   CREATININE 3.1* 2.3* 1.9*   GLUCOSE 126* 96 100*   CALCIUM 8.7 8.9 8.3*       Recent Labs     06/18/22 0625 06/19/22 0626 06/20/22  0742   ALKPHOS 61 63 60   PROT 6.2* 6.3* 5.8*   LABALBU 3.0* 3.2* 3.0*   BILITOT 0.2 0.3 0.3   AST 6 7 8   ALT <5 <5 5       Recent Labs     06/18/22 0625 06/19/22 0626 06/20/22  0742   WBC 6.6 7.8 8.9   RBC 3.25* 3.41* 3.01*   HGB 10.3* 10.6* 9.4*   HCT 31.0* 32.4* 29.8*   MCV 95.4 95.0 99.0   MCH 31.7 31.1 31.2   MCHC 33.2 32.7 31.5*   RDW 14.5 14.2 14.2    378 347   MPV 9.3 9.0 9.3     Radiology:   US RETROPERITONEAL COMPLETE   Final Result   1. Moderate to severe bilateral hydronephrosis. When compared to prior CT   scan the degree of hydronephrosis is less extensive. 2.  Bilateral renal cortical thinning. Bilateral renal cysts. 3.  A Fay catheter identified within the bladder. Bladder is not fully   decompressed. Layering echogenic debris identified within the bladder lumen. Bladder wall appears thickened. 4.  Prostate is enlarged. Assessment:  Principal Problem:    Complicated UTI (urinary tract infection)  Active Problems:    Urinary tract infection associated with catheterization of urinary tract (HCC)    Hydronephrosis due to obstruction of bladder    Stage 3 acute kidney injury ([address]  Resolved Problems:    * No resolved hospital problems. *      Plan:  1.    Complicated UTI:  ID following.    -Urine culture is growing Mixed al: Mixed gram negative rods and Nonhemolytic Strep species.  -Continue Cefepime per ID  -Per urology; he should be discharged with drainage bag and the SPT should be left to gravity. He will no longer clamp the SPT at home. .     2. Pyocystis from indwelling suprapubic catheter:    -Urology following.    -Per urology; he should be discharged with drainage bag and the SPT should be left to gravity. He will no longer clamp the SPT at home.  -Plan is for non surgical management with bladder drainage, antibiotics and hydration. 3.   MORENA on chronic kidney disease:    -Nephrology following. Creatinine was 5.5 on admission and is 1.9 today.  -Continue IV fluids. 4.   Multifactorial hyperkalemia resolved:    -Potassium was 5.5 yesterday. ACEi is on hold. Potassium is 4.0 today. 5.   Metabolic acidosis Improved:    -Bicarb was 14 on admission and is 28 today. 6.   Hypertension:    -Continue Norvasc, metoprolol, lisinopril on hold secondary to MORENA    7. Anemia:    -Hgb is 9.4 today. Likely secondary to chronic kidney disease with MORENA. 8.   BPH:    -Continue Proscar. 9.   GERD:   -Continue Protonix    CODE STATUS: Full code  DVT prophylaxis: Subcu heparin  Disposition: Following ID's transition to oral antibiotics, will be discharged home to follow-up with the with urology for SPT    NOTE: This report was transcribed using voice recognition software. Every effort was made to ensure accuracy; however, inadvertent computerized transcription errors may be present.      Electronically signed by Laura Garcia PA-C on 6/20/2022 at 12:41 PM

## 2022-06-20 NOTE — PROGRESS NOTES
Nephrology Progress  Note  Patient's Name: Wilfred Rajan  10:17 AM  6/20/2022    Nephrologist: Candis Mclean    Reason for Consult: MORENA  Requesting Physician:  David Matt MD    History of Present Ilness from the 6/17/22 note:    Wilfred Rajna is a 80 y.o. male with prior history Jan 2022  MORENA on CKD Stage 3A  Chronic kidney disease likely due to longstanding history of hypertension as well as BPH/ Neurogenic Bladder  with a Baseline serum creatinine range 1.5 ->1.8mg/dl over the past 3 years. Admitted in jan 2022 with a  serum creatinine of 4.0 w/ BUN 98  In the setting of  ACE inhibitor and diuretic. Patient   Follows with urology for history of suprapubic catheter he was seen May 25 2022 by Urology and the plan has he was voiding with a low PVR to keep SP tube clamped and then to have removal. He presented to the ED 6/16/22 for SP tube producing little or no urine and urine  thick with a milky consistency as per the ED notes. He notes for the last several days he had no energy and was lying down more frequently for longer periods.  He also noted abd pain shiloh over th bladder    6/20/22:Pt awake alert appropriate up in chair, no CP or SOB      Past Medical History:   Diagnosis Date    Enlarged prostate     Hyperlipidemia     Hypertension     Lumbosacral strain 7/8/2013    Pacemaker        Past Surgical History:   Procedure Laterality Date    A-V CARDIAC PACEMAKER INSERTION  5/30/07    COLONOSCOPY  8/3/11    CYSTOSCOPY N/A 11/2/2021    CYSTOSCOPY RETROGRADE PYELOGRAM, TRANSURETHRAL RESECTION OF THE PROSTATE WITH SUPRAPUBIC CATHETER PLACEMENT performed by Faraz Zhang MD at [place] [address] SURGERY  2002    cataracts evelyn    PACEMAKER PLACEMENT      2017 battery change    TONSILLECTOMY      child       Family History   Problem Relation Age of Onset    High Blood Pressure Mother     Cancer Mother     Diabetes Mother     Heart Disease Father     High Blood Pressure Father         reports that he quit smoking about 21 years ago. He has a 40.00 pack-year smoking history. He has never used smokeless tobacco. He reports that he does not drink alcohol and does not use drugs. Allergies:  Patient has no known allergies. Current Medications:    lactated ringers infusion, Continuous  cefepime (MAXIPIME) 1000 mg IVPB extended (mini-bag), Q24H  ascorbic acid (VITAMIN C) tablet 250 mg, Daily  aspirin EC tablet 81 mg, Daily  pantoprazole (PROTONIX) tablet 40 mg, Daily  timolol (TIMOPTIC) 0.5 % ophthalmic solution 1 drop, BID  sodium chloride flush 0.9 % injection 5-40 mL, 2 times per day  sodium chloride flush 0.9 % injection 5-40 mL, PRN  0.9 % sodium chloride infusion, PRN  acetaminophen (TYLENOL) tablet 650 mg, Q6H PRN   Or  acetaminophen (TYLENOL) suppository 650 mg, Q6H PRN  ondansetron (ZOFRAN-ODT) disintegrating tablet 4 mg, Q8H PRN   Or  ondansetron (ZOFRAN) injection 4 mg, Q6H PRN  polyethylene glycol (GLYCOLAX) packet 17 g, Daily  heparin (porcine) injection 5,000 Units, 3 times per day  Vitamin D (CHOLECALCIFEROL) tablet 2,000 Units, Daily  amLODIPine (NORVASC) tablet 2.5 mg, Daily  finasteride (PROSCAR) tablet 5 mg, Daily  metoprolol succinate (TOPROL XL) extended release tablet 25 mg, Daily  atorvastatin (LIPITOR) tablet 20 mg, Daily  vitamin B-12 (CYANOCOBALAMIN) tablet 1,000 mcg, Daily        Review of Systems:   Pertinent items are noted in HPI.     Physical exam:   Constitutional:  Elderly frail mail  Vitals:   VITALS:  BP (!) 102/55   Pulse 59   Temp 98.2 °F (36.8 °C) (Oral)   Resp 17   Ht 5' 9\" (1.753 m)   Wt 139 lb (63 kg)   SpO2 97%   BMI 20.53 kg/m²   24HR INTAKE/OUTPUT:      Intake/Output Summary (Last 24 hours) at 6/20/2022 1017  Last data filed at 6/20/2022 0518  Gross per 24 hour   Intake 1847.66 ml   Output 2200 ml   Net -352.34 ml     URINARY CATHETER OUTPUT (Fay):  Suprapubic Catheter-Output (mL): 1300 mL  DRAIN/TUBE OUTPUT:     VENT SETTINGS:     Additional Respiratory Assessments  Heart Rate: 59  Resp: 17  SpO2: 97 %    Skin: no rash, turgor wnl  Heent:  eomi, mmm  Neck: no bruits or jvd noted  Cardiovascular: Irreg without S3 or a rub  Respiratory: CTA B without w/r/r  Abdomen:  +bs, soft, suprapubic catheter in place, tender suprapubic on palpation  Ext: (-) ,bilat lower extremity edema  Psychiatric: mood and affect appropriate  Musculoskeletal:  Rom, muscular strength intact    Data:   Labs:  CBC:   Lab Results   Component Value Date    WBC 8.9 06/20/2022    RBC 3.01 06/20/2022    HGB 9.4 06/20/2022    HCT 29.8 06/20/2022    MCV 99.0 06/20/2022    MCH 31.2 06/20/2022    MCHC 31.5 06/20/2022    RDW 14.2 06/20/2022     06/20/2022    MPV 9.3 06/20/2022     CBC with Differential:    Lab Results   Component Value Date    WBC 8.9 06/20/2022    RBC 3.01 06/20/2022    HGB 9.4 06/20/2022    HCT 29.8 06/20/2022     06/20/2022    MCV 99.0 06/20/2022    MCH 31.2 06/20/2022    MCHC 31.5 06/20/2022    RDW 14.2 06/20/2022    SEGSPCT 62 09/12/2013    LYMPHOPCT 25.1 06/16/2022    MONOPCT 11.7 06/16/2022    BASOPCT 0.5 06/16/2022    MONOSABS 1.02 06/16/2022    LYMPHSABS 2.19 06/16/2022    EOSABS 0.02 06/16/2022    BASOSABS 0.04 06/16/2022     Hemoglobin/Hematocrit:    Lab Results   Component Value Date    HGB 9.4 06/20/2022    HCT 29.8 06/20/2022     CMP:    Lab Results   Component Value Date     06/20/2022    K 4.0 06/20/2022    K 5.0 06/17/2022     06/20/2022    CO2 28 06/20/2022    BUN 44 06/20/2022    CREATININE 1.9 06/20/2022    GFRAA 40 06/20/2022    LABGLOM 33 06/20/2022    GLUCOSE 100 06/20/2022    GLUCOSE 106 01/09/2012    PROT 5.8 06/20/2022    LABALBU 3.0 06/20/2022    LABALBU 4.4 01/09/2012    CALCIUM 8.3 06/20/2022    BILITOT 0.3 06/20/2022    ALKPHOS 60 06/20/2022    AST 8 06/20/2022    ALT 5 06/20/2022     BMP:    Lab Results   Component Value Date     06/20/2022    K 4.0 06/20/2022    K 5.0 06/17/2022     06/20/2022    CO2 28 06/20/2022 BUN 44 06/20/2022    LABALBU 3.0 06/20/2022    LABALBU 4.4 01/09/2012    CREATININE 1.9 06/20/2022    CALCIUM 8.3 06/20/2022    GFRAA 40 06/20/2022    LABGLOM 33 06/20/2022    GLUCOSE 100 06/20/2022    GLUCOSE 106 01/09/2012     BUN/Creatinine:    Lab Results   Component Value Date    BUN 44 06/20/2022    CREATININE 1.9 06/20/2022     Hepatic Function Panel:    Lab Results   Component Value Date    ALKPHOS 60 06/20/2022    ALT 5 06/20/2022    AST 8 06/20/2022    PROT 5.8 06/20/2022    BILITOT 0.3 06/20/2022    BILIDIR 0.2 08/09/2021    IBILI 0.3 08/09/2021    LABALBU 3.0 06/20/2022    LABALBU 4.4 01/09/2012     Albumin:    Lab Results   Component Value Date    LABALBU 3.0 06/20/2022    LABALBU 4.4 01/09/2012     Calcium:    Lab Results   Component Value Date    CALCIUM 8.3 06/20/2022     Ionized Calcium:  No results found for: IONCA  Magnesium:    Lab Results   Component Value Date    MG 1.6 06/20/2022     Phosphorus:    Lab Results   Component Value Date    PHOS 3.3 06/20/2022     LDH:  No results found for: LDH  Uric Acid:    Lab Results   Component Value Date    LABURIC 8.8 03/07/2022     PT/INR:    Lab Results   Component Value Date    PROTIME 11.4 07/06/2017    INR 1.0 07/06/2017     PTT:  No results found for: APTT, PTT[APTT}  Troponin:    Lab Results   Component Value Date    TROPONINI 0.02 02/09/2021     U/A:    Lab Results   Component Value Date    NITRITE Neg 03/22/2022    COLORU Yellow 06/16/2022    PROTEINU >=300 06/16/2022    PHUR 6.0 06/16/2022    WBCUA PACKED 06/16/2022    WBCUA NONE 01/09/2012    RBCUA 2-5 06/16/2022    RBCUA NONE 01/17/2014    YEAST Present 03/07/2022    BACTERIA FEW 06/16/2022    CLARITYU TURBID 06/16/2022    SPECGRAV 1.020 06/16/2022    LEUKOCYTESUR LARGE 06/16/2022    UROBILINOGEN 0.2 06/16/2022    BILIRUBINUR Negative 06/16/2022    BILIRUBINUR Neg 03/22/2022    BILIRUBINUR NEGATIVE 01/09/2012    BLOODU LARGE 06/16/2022    GLUCOSEU Negative 06/16/2022    GLUCOSEU NEGATIVE 01/09/2012    AMORPHOUS FEW 01/17/2014     ABG:  No results found for: PH, PCO2, PO2, HCO3, BE, THGB, TCO2, O2SAT  HgBA1c:    Lab Results   Component Value Date    LABA1C 6.0 08/23/2021     Microalbumen/Creatinine ratio:  No components found for: RUCREAT  FLP:    Lab Results   Component Value Date    TRIG 101 03/07/2022    HDL 43 03/07/2022    LDLCALC 70 03/07/2022    LABVLDL 20 03/07/2022     TSH:    Lab Results   Component Value Date    TSH 1.070 11/11/2015     VITAMIN B12: No components found for: B12  FOLATE:    Lab Results   Component Value Date    FOLATE >20.0 06/18/2022     Iron Saturation:  No components found for: PERCENTFE  FERRITIN:    Lab Results   Component Value Date    FERRITIN 332 06/18/2022     AMYLASE:  No results found for: AMYLASE  LIPASE:    Lab Results   Component Value Date    LIPASE 27 08/09/2021     Fibrinogen Level:  No components found for: FIB  24 Hour Urine for Protein:  No components found for: RAWUPRO, UHRS3, DLCI26PM, UTV3  24 Hour Urine for Creatinine Clearance:  No components found for: CREAT4, UHRS10, UTV10  PSA:   Lab Results   Component Value Date    PSA 1.35 06/17/2022        Imaging:  EXAMINATION:  RETROPERITONEAL ULTRASOUND OF THE KIDNEYS AND URINARY BLADDER     6/16/2022     COMPARISON:  CT abdomen and pelvis from August 9, 2021     HISTORY:  ORDERING SYSTEM PROVIDED HISTORY: ARF  TECHNOLOGIST PROVIDED HISTORY:     Reason for exam:->ARF  What reading provider will be dictating this exam?->CRC     FINDINGS:     Kidneys:     The right kidney measures 11.5 cm in length and the left kidney measures 11.2  cm in length.     Right kidney: The corticomedullary differentiation and cortical thickness is  decreased.  Anechoic thin walled nonvascular right mid and upper pole renal  cysts measuring 25 mm and 12 mm.  There is moderate to severe right  hydronephrosis.     Left kidney: The corticomedullary differentiation and cortical thickness is  decreased.  Anechoic thin walled nonvascular left mid to lower pole 27 mm  cyst.  No concerning renal mass or intrarenal calcification.  There is  moderate to severe left hydronephrosis.       Bladder:     A Fay catheter is identified within the bladder.  The bladder is not fully  decompressed.  Bladder wall appears thickened.  Echogenic debris is  identified within the bladder.     The prostate gland appears enlarged measuring 6.2 x 5.2 x 4.4 cm.        Impression  1.  Moderate to severe bilateral hydronephrosis.  When compared to prior CT  scan the degree of hydronephrosis is less extensive.     2.  Bilateral renal cortical thinning.  Bilateral renal cysts.     3.  A Fay catheter identified within the bladder.  Bladder is not fully  decompressed.  Layering echogenic debris identified within the bladder lumen. Bladder wall appears thickened.     4.  Prostate is enlarged. Assessment  1-Stage III MORENA in the setting of Neurogenic Bladder  with chronic suprapubic catheter possibly exacerbated by the ACEI+HCTZ and possible UTI  UA SG 1.020 ketones tr, blood large, protein >300, Ni large, WBC packed, hector few, LE large, pH 6.0  Urology note reviewed  Cr 4.3-->3.1-->2.3-->1.9mg/dl  PLAN:  1. Continue IVF   2. Urology following  3. Follow labs  4. Hold ACEI    2-Non Anion Gap Met Acidosis in the setting of the Impaired tubule function due Obstruction  And the MORENA-Resolved  HCO3 28  PLAN:  1. Follow Labs    3-Hyperkalemia sec to ACEI + MORENA + Obstruction  K+ WNL  PLAN:  1. Follow K+  2. Hold ACEI    4- Sec HPTH of Renal Origin with hyperphosphatemia in the setting of the MORENA  PTH 47 and Vit D 56  PO4 WNL, Ca++ low  PLAN:  1. Follow Ca++ and PO4    5-HTN with CKD I-IV  BP at goal <130/80  PLAN:  1. 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  Follow H/H    Thank you  for allowing us to participate in care of Bricarmel Seals ERIKA Mata MD  10:17 AM  6/20/2022

## 2022-06-21 LAB
ALBUMIN SERPL-MCNC: 2.8 G/DL (ref 3.5–5.2)
ALP BLD-CCNC: 58 U/L (ref 40–129)
ALT SERPL-CCNC: 6 U/L (ref 0–40)
ANION GAP SERPL CALCULATED.3IONS-SCNC: 8 MMOL/L (ref 7–16)
AST SERPL-CCNC: 11 U/L (ref 0–39)
BILIRUB SERPL-MCNC: 0.3 MG/DL (ref 0–1.2)
BUN BLDV-MCNC: 35 MG/DL (ref 6–23)
CALCIUM SERPL-MCNC: 8.3 MG/DL (ref 8.6–10.2)
CHLORIDE BLD-SCNC: 106 MMOL/L (ref 98–107)
CO2: 28 MMOL/L (ref 22–29)
CREAT SERPL-MCNC: 1.8 MG/DL (ref 0.7–1.2)
GFR AFRICAN AMERICAN: 43
GFR NON-AFRICAN AMERICAN: 36 ML/MIN/1.73
GLUCOSE BLD-MCNC: 105 MG/DL (ref 74–99)
HCT VFR BLD CALC: 28.8 % (ref 37–54)
HEMOGLOBIN: 9.2 G/DL (ref 12.5–16.5)
MAGNESIUM: 1.6 MG/DL (ref 1.6–2.6)
MCH RBC QN AUTO: 31.5 PG (ref 26–35)
MCHC RBC AUTO-ENTMCNC: 31.9 % (ref 32–34.5)
MCV RBC AUTO: 98.6 FL (ref 80–99.9)
PDW BLD-RTO: 13.8 FL (ref 11.5–15)
PHOSPHORUS: 3.1 MG/DL (ref 2.5–4.5)
PLATELET # BLD: 318 E9/L (ref 130–450)
PMV BLD AUTO: 9.3 FL (ref 7–12)
POTASSIUM SERPL-SCNC: 4.2 MMOL/L (ref 3.5–5)
RBC # BLD: 2.92 E12/L (ref 3.8–5.8)
SODIUM BLD-SCNC: 142 MMOL/L (ref 132–146)
TOTAL PROTEIN: 5.5 G/DL (ref 6.4–8.3)
WBC # BLD: 8.7 E9/L (ref 4.5–11.5)

## 2022-06-21 PROCEDURE — 36415 COLL VENOUS BLD VENIPUNCTURE: CPT

## 2022-06-21 PROCEDURE — 97530 THERAPEUTIC ACTIVITIES: CPT

## 2022-06-21 PROCEDURE — 80053 COMPREHEN METABOLIC PANEL: CPT

## 2022-06-21 PROCEDURE — 2580000003 HC RX 258: Performed by: CLINICAL NURSE SPECIALIST

## 2022-06-21 PROCEDURE — APPSS45 APP SPLIT SHARED TIME 31-45 MINUTES: Performed by: PHYSICIAN ASSISTANT

## 2022-06-21 PROCEDURE — 85027 COMPLETE CBC AUTOMATED: CPT

## 2022-06-21 PROCEDURE — 6360000002 HC RX W HCPCS: Performed by: INTERNAL MEDICINE

## 2022-06-21 PROCEDURE — 84100 ASSAY OF PHOSPHORUS: CPT

## 2022-06-21 PROCEDURE — 99232 SBSQ HOSP IP/OBS MODERATE 35: CPT | Performed by: INTERNAL MEDICINE

## 2022-06-21 PROCEDURE — 97116 GAIT TRAINING THERAPY: CPT

## 2022-06-21 PROCEDURE — 6370000000 HC RX 637 (ALT 250 FOR IP): Performed by: INTERNAL MEDICINE

## 2022-06-21 PROCEDURE — 6360000002 HC RX W HCPCS: Performed by: CLINICAL NURSE SPECIALIST

## 2022-06-21 PROCEDURE — 6370000000 HC RX 637 (ALT 250 FOR IP): Performed by: NURSE PRACTITIONER

## 2022-06-21 PROCEDURE — 2580000003 HC RX 258: Performed by: INTERNAL MEDICINE

## 2022-06-21 PROCEDURE — 6370000000 HC RX 637 (ALT 250 FOR IP): Performed by: PHYSICIAN ASSISTANT

## 2022-06-21 PROCEDURE — 1200000000 HC SEMI PRIVATE

## 2022-06-21 PROCEDURE — 83735 ASSAY OF MAGNESIUM: CPT

## 2022-06-21 RX ADMIN — HEPARIN SODIUM 5000 UNITS: 5000 INJECTION INTRAVENOUS; SUBCUTANEOUS at 05:06

## 2022-06-21 RX ADMIN — PANTOPRAZOLE SODIUM 40 MG: 40 TABLET, DELAYED RELEASE ORAL at 05:06

## 2022-06-21 RX ADMIN — AMLODIPINE BESYLATE 2.5 MG: 10 TABLET ORAL at 09:17

## 2022-06-21 RX ADMIN — ASPIRIN 81 MG: 81 TABLET, COATED ORAL at 09:15

## 2022-06-21 RX ADMIN — OXYCODONE HYDROCHLORIDE AND ACETAMINOPHEN 250 MG: 500 TABLET ORAL at 09:15

## 2022-06-21 RX ADMIN — POLYETHYLENE GLYCOL 3350 17 G: 17 POWDER, FOR SOLUTION ORAL at 09:19

## 2022-06-21 RX ADMIN — TIMOLOL MALEATE 1 DROP: 5 SOLUTION OPHTHALMIC at 09:21

## 2022-06-21 RX ADMIN — METOPROLOL SUCCINATE 25 MG: 25 TABLET, EXTENDED RELEASE ORAL at 09:15

## 2022-06-21 RX ADMIN — Medication 400 MG: at 09:15

## 2022-06-21 RX ADMIN — HEPARIN SODIUM 5000 UNITS: 5000 INJECTION INTRAVENOUS; SUBCUTANEOUS at 14:39

## 2022-06-21 RX ADMIN — Medication 2000 UNITS: at 09:15

## 2022-06-21 RX ADMIN — HEPARIN SODIUM 5000 UNITS: 5000 INJECTION INTRAVENOUS; SUBCUTANEOUS at 21:12

## 2022-06-21 RX ADMIN — CYANOCOBALAMIN TAB 1000 MCG 1000 MCG: 1000 TAB at 09:15

## 2022-06-21 RX ADMIN — Medication 10 ML: at 21:14

## 2022-06-21 RX ADMIN — FINASTERIDE 5 MG: 5 TABLET, FILM COATED ORAL at 09:15

## 2022-06-21 RX ADMIN — ATORVASTATIN CALCIUM 20 MG: 20 TABLET, FILM COATED ORAL at 21:13

## 2022-06-21 RX ADMIN — CEFEPIME HYDROCHLORIDE 1000 MG: 1 INJECTION, POWDER, FOR SOLUTION INTRAMUSCULAR; INTRAVENOUS at 00:47

## 2022-06-21 RX ADMIN — TIMOLOL MALEATE 1 DROP: 5 SOLUTION OPHTHALMIC at 21:13

## 2022-06-21 ASSESSMENT — PAIN SCALES - GENERAL: PAINLEVEL_OUTOF10: 0

## 2022-06-21 NOTE — PROGRESS NOTES
Physical Therapy Treatment Note/Plan of Care    Room #:  5892/6139-68  Patient Name: Prasanna Montana  YOB: 1932  MRN: [id number]    Date of Service: 6/21/2022     Tentative placement recommendation: Home Health Physical Therapy   Equipment recommendation: Patient has needed equipment       Evaluating Physical Therapist: River Casillas, PT #[id number]      Specific Provider Orders/Date/Referring Provider :  06/17/22 1045   PT eval and treat Start: 06/17/22 1045, End: 06/17/22 1045, ONE TIME, Standing Count: 1 Occurrences, R    Nile Al, APRN - CNP      Admitting Diagnosis:   Dehydration [E86.0]  Hyperkalemia [E87.5]  Acute cystitis without hematuria [C51.05]  Complicated UTI (urinary tract infection) [N39.0]  Acute renal failure, unspecified acute renal failure type ([address] [N17.9]     suprapubic catheter producing little to no urine which is cloudy in nature    Surgery: none  Visit Diagnoses       Codes    Acute cystitis without hematuria     N30.00    Hyperkalemia     E87.5    Dehydration     E86.0          Patient Active Problem List   Diagnosis    Pure hypercholesterolemia    Essential hypertension    Primary osteoarthritis involving multiple joints    Iron deficiency anemia due to chronic blood loss    Stage 4 chronic kidney disease ([address]    Hypokalemia    Enlarged prostate with urinary retention    BPH with urinary obstruction    Hypomagnesemia    Chronic primary angle-closure glaucoma, indeterminate stage    Chronic renal disease, stage III ([address] [[id number]]    Urinary tract infection associated with catheterization of urinary tract ([address]    Hydronephrosis due to obstruction of bladder    Stage 3 acute kidney injury ([address]    Complicated UTI (urinary tract infection)    Acute renal failure ([address]        ASSESSMENT of Current Deficits  Patient with slow jadon and cues for pacing and safety.   Patient requires skilled physical therapy to address concerns listed above to increase safety and independence at discharge. PHYSICAL THERAPY  PLAN OF CARE       Physical therapy plan of care is established based on physician order,  patient diagnosis and clinical assessment    Current Treatment Recommendations:    -Bed Mobility: Lower extremity exercises   -Sitting Balance: Incorporate reaching activities to activate trunk muscles   -Standing Balance: Perform strengthening exercises in standing to promote motor control with or without upper extremity support   -Transfers: Provide instruction on proper hand and foot position for adequate transfer of weight onto lower extremities and use of gait device if needed and Cues for hand placement, technique and safety. Provide stabilization to prevent fall   -Gait: Gait training, Standing activities to improve: base of support, weight shift, weight bearing  and Pregait training to emphasize: Device control, Upright and Safety   -Endurance: Utilize Supervised activities to increase level of endurance to allow for safe functional mobility including transfers and gait   -Stairs: Stair training with instruction on proper technique and hand placement on rail    PT long term treatment goals are located in below grid    Patient and or family understand(s) diagnosis, prognosis, and plan of care. Frequency of treatments: Patient will be seen  daily.          Prior Level of Function: Patient ambulated with cane    Rehab Potential: good    for baseline    Past medical history:   Past Medical History:   Diagnosis Date    Enlarged prostate     Hyperlipidemia     Hypertension     Lumbosacral strain 7/8/2013    Pacemaker      Past Surgical History:   Procedure Laterality Date    A-V CARDIAC PACEMAKER INSERTION  5/30/07    COLONOSCOPY  8/3/11    CYSTOSCOPY N/A 11/2/2021    CYSTOSCOPY RETROGRADE PYELOGRAM, TRANSURETHRAL RESECTION OF THE PROSTATE WITH SUPRAPUBIC CATHETER PLACEMENT performed by Mare Harvey MD at [facility] [address]  2002    cataracts evelyn  PACEMAKER PLACEMENT      2017 battery change    TONSILLECTOMY      child       SUBJECTIVE:    Precautions: Up with assistance, falls and hard of hearing ,  Suprapubic catheter, hearing aides  Social history: Patient lives with spouse in a two story home resides first  with 3 steps  to enter with Rail  Tub shower grab bars    Equipment owned: Carmelo brooke, Christensen Harjeet, Bedside commode and Tub transfer bench,       [address] [facility]   How much difficulty turning over in bed?: None  How much difficulty sitting down on / standing up from a chair with arms?: A Little  How much difficulty moving from lying on back to sitting on side of bed?: None  How much help from another person moving to and from a bed to a chair?: None  How much help from another person needed to walk in hospital room?: A Little  How much help from another person for climbing 3-5 steps with a railing?: A Little  AM-PAC Inpatient Mobility Raw Score : 21  AM-PAC Inpatient T-Scale Score : 50.25  Mobility Inpatient CMS 0-100% Score: 28.97  Mobility Inpatient CMS G-Code Modifier : [address] cleared patient for PT treatment. . Patient seated in bedside chair with family present. OBJECTIVE;   Initial Evaluation  Date: 6/18/2022 Treatment Date:  6/21/2022       Short Term/ Long Term   Goals   Was pt agreeable to Eval/treatment? Yes yes To be met in 3 days   Pain level   4/10  \"down below\" catheter site 0/10    Bed Mobility    Rolling: Supervision     Supine to sit: Supervision     Sit to supine: Not assessed     Scooting: Supervision    Rolling: Not assessed patient in chair      Rolling: Independent    Supine to sit:  Independent    Sit to supine: Independent    Scooting: Independent     Transfers Sit to stand: Minimal assist of 1 Cues for hand placement and safety   Sit to stand: Supervision  Cues for hand placement and safety       Sit to stand: Modified Independent     Ambulation    40 feet using  wheeled walker with Supervision    cues for upright posture, safety and pacing 3x150 feet using  wheeled walker with Supervision    cues for safety and pacing    100 feet using  wheeled walker or cane with Modified Independent    Stair negotiation: ascended and descended   Not assessed     3 steps 1 rail with supervision    ROM Within functional limits    Increase range of motion 10% of affected joints    Strength BUE:  refer to OT eval  RLE:  4-/5  LLE:  4-/5  Increase strength in affected mm groups by 1/3 grade   Balance Sitting EOB:  good -  Dynamic Standing:  fair wheeled walker  Sitting EOB: good   Dynamic Standing: good wheeled walker   Sitting EOB:  good    Dynamic Standing: good cane versus wheeled walker      Patient is Alert & Oriented x person, place, time and situation and follows directions    Sensation:  Patient  denies numbness/tingling   Edema:  no   Endurance: fair  +    Vitals: room air   Blood Pressure at rest  Blood Pressure during session    Heart Rate at rest  Heart Rate during session     SPO2 at rest %  SPO2 during session  %     Patient education  Patient educated on role of Physical Therapy, risks of immobility, safety and plan of care,  importance of mobility while in hospital  and safety      Patient response to education:   Pt verbalized understanding Pt demonstrated skill Pt requires further education in this area   Yes Partial Yes      Treatment:  Patient practiced and was instructed/facilitated in the following treatment: Patient stood to wheeled walker to amb in hallway and back to room to chair. Therapeutic Exercises:  not performed  x reps    At end of session, patient in chair with spouse present call light and phone within reach,  all lines and tubes intact, nursing notified. Patient would benefit from continued skilled Physical Therapy to improve functional independence and quality of life.          Patient's/ family goals   home    Time in  80  Time out  316    Total Treatment Time  11 minutes    CPT codes:  Gait Training (01630) 11 minutes 1 unit(s)   Vera Storm, [facility]  #[id number]

## 2022-06-21 NOTE — CARE COORDINATION
Plan remains home at discharge with Resumption of [address] Family will transport home. Patient uses a cane at baseline, has been up therapy and is walking 100ft plus with a wheeled walker. Per patient, he has a walker at home he can use, no need to order one. ID following and states possible PO Augmentin on DC.

## 2022-06-21 NOTE — PROGRESS NOTES
Nephrology Progress  Note  Patient's Name: Martín De La Rosa  10:19 AM  6/21/2022    Nephrologist: Taj Ny    Reason for Consult: MORENA  Requesting Physician:  Shea Mitchell MD    History of Present Ilness from the 6/17/22 note:    Martín De La Rosa is a 80 y.o. male with prior history Jan 2022  MORENA on CKD Stage 3A  Chronic kidney disease likely due to longstanding history of hypertension as well as BPH/ Neurogenic Bladder  with a Baseline serum creatinine range 1.5 ->1.8mg/dl over the past 3 years. Admitted in jan 2022 with a  serum creatinine of 4.0 w/ BUN 98  In the setting of  ACE inhibitor and diuretic. Patient   Follows with urology for history of suprapubic catheter he was seen May 25 2022 by Urology and the plan has he was voiding with a low PVR to keep SP tube clamped and then to have removal. He presented to the ED 6/16/22 for SP tube producing little or no urine and urine  thick with a milky consistency as per the ED notes. He notes for the last several days he had no energy and was lying down more frequently for longer periods.  He also noted abd pain shiloh over th bladder    6/21/22:Pt awake alert appropriate up in chair, no CP or SOB, appetite good, states he feels better      Past Medical History:   Diagnosis Date    Enlarged prostate     Hyperlipidemia     Hypertension     Lumbosacral strain 7/8/2013    Pacemaker        Past Surgical History:   Procedure Laterality Date    A-V CARDIAC PACEMAKER INSERTION  5/30/07    COLONOSCOPY  8/3/11    CYSTOSCOPY N/A 11/2/2021    CYSTOSCOPY RETROGRADE PYELOGRAM, TRANSURETHRAL RESECTION OF THE PROSTATE WITH SUPRAPUBIC CATHETER PLACEMENT performed by Raul Reza MD at [place] [address] SURGERY  2002    cataracts evelyn    PACEMAKER PLACEMENT      2017 battery change    TONSILLECTOMY      child       Family History   Problem Relation Age of Onset    High Blood Pressure Mother     Cancer Mother     Diabetes Mother     Heart Disease Father     High Blood Pressure Father         reports that he quit smoking about 21 years ago. He has a 40.00 pack-year smoking history. He has never used smokeless tobacco. He reports that he does not drink alcohol and does not use drugs. Allergies:  Patient has no known allergies. Current Medications:    lactated ringers infusion, Continuous  cefepime (MAXIPIME) 1000 mg IVPB extended (mini-bag), Q24H  ascorbic acid (VITAMIN C) tablet 250 mg, Daily  aspirin EC tablet 81 mg, Daily  pantoprazole (PROTONIX) tablet 40 mg, Daily  timolol (TIMOPTIC) 0.5 % ophthalmic solution 1 drop, BID  sodium chloride flush 0.9 % injection 5-40 mL, 2 times per day  sodium chloride flush 0.9 % injection 5-40 mL, PRN  0.9 % sodium chloride infusion, PRN  acetaminophen (TYLENOL) tablet 650 mg, Q6H PRN   Or  acetaminophen (TYLENOL) suppository 650 mg, Q6H PRN  ondansetron (ZOFRAN-ODT) disintegrating tablet 4 mg, Q8H PRN   Or  ondansetron (ZOFRAN) injection 4 mg, Q6H PRN  polyethylene glycol (GLYCOLAX) packet 17 g, Daily  heparin (porcine) injection 5,000 Units, 3 times per day  Vitamin D (CHOLECALCIFEROL) tablet 2,000 Units, Daily  amLODIPine (NORVASC) tablet 2.5 mg, Daily  finasteride (PROSCAR) tablet 5 mg, Daily  metoprolol succinate (TOPROL XL) extended release tablet 25 mg, Daily  atorvastatin (LIPITOR) tablet 20 mg, Daily  vitamin B-12 (CYANOCOBALAMIN) tablet 1,000 mcg, Daily        Review of Systems:   Pertinent items are noted in HPI.     Physical exam:   Constitutional:  Elderly frail mail  Vitals:   VITALS:  BP (!) 114/58   Pulse 60   Temp 98.6 °F (37 °C) (Oral)   Resp 18   Ht 5' 9\" (1.753 m)   Wt 140 lb (63.5 kg)   SpO2 98%   BMI 20.67 kg/m²   24HR INTAKE/OUTPUT:      Intake/Output Summary (Last 24 hours) at 6/21/2022 1019  Last data filed at 6/21/2022 0955  Gross per 24 hour   Intake 990 ml   Output 4000 ml   Net -3010 ml     URINARY CATHETER OUTPUT (Fay):  Suprapubic Catheter-Output (mL): 3000 mL  DRAIN/TUBE OUTPUT:     VENT SETTINGS: Additional Respiratory Assessments  Heart Rate: 60  Resp: 18  SpO2: 98 %    Skin: no rash, turgor wnl  Heent:  eomi, mmm  Neck: no bruits or jvd noted  Cardiovascular: Irreg without S3 or a rub  Respiratory: CTA B without w/r/r  Abdomen:  +bs, soft, suprapubic catheter in place, tender suprapubic on palpation  Ext: (-) ,bilat lower extremity edema  Psychiatric: mood and affect appropriate  Musculoskeletal:  Rom, muscular strength intact    Data:   Labs:  CBC:   Lab Results   Component Value Date    WBC 8.7 06/21/2022    RBC 2.92 06/21/2022    HGB 9.2 06/21/2022    HCT 28.8 06/21/2022    MCV 98.6 06/21/2022    MCH 31.5 06/21/2022    MCHC 31.9 06/21/2022    RDW 13.8 06/21/2022     06/21/2022    MPV 9.3 06/21/2022     CBC with Differential:    Lab Results   Component Value Date    WBC 8.7 06/21/2022    RBC 2.92 06/21/2022    HGB 9.2 06/21/2022    HCT 28.8 06/21/2022     06/21/2022    MCV 98.6 06/21/2022    MCH 31.5 06/21/2022    MCHC 31.9 06/21/2022    RDW 13.8 06/21/2022    SEGSPCT 62 09/12/2013    LYMPHOPCT 25.1 06/16/2022    MONOPCT 11.7 06/16/2022    BASOPCT 0.5 06/16/2022    MONOSABS 1.02 06/16/2022    LYMPHSABS 2.19 06/16/2022    EOSABS 0.02 06/16/2022    BASOSABS 0.04 06/16/2022     Hemoglobin/Hematocrit:    Lab Results   Component Value Date    HGB 9.2 06/21/2022    HCT 28.8 06/21/2022     CMP:    Lab Results   Component Value Date     06/21/2022    K 4.2 06/21/2022    K 5.0 06/17/2022     06/21/2022    CO2 28 06/21/2022    BUN 35 06/21/2022    CREATININE 1.8 06/21/2022    GFRAA 43 06/21/2022    LABGLOM 36 06/21/2022    GLUCOSE 105 06/21/2022    GLUCOSE 106 01/09/2012    PROT 5.5 06/21/2022    LABALBU 2.8 06/21/2022    LABALBU 4.4 01/09/2012    CALCIUM 8.3 06/21/2022    BILITOT 0.3 06/21/2022    ALKPHOS 58 06/21/2022    AST 11 06/21/2022    ALT 6 06/21/2022     BMP:    Lab Results   Component Value Date     06/21/2022    K 4.2 06/21/2022    K 5.0 06/17/2022     06/21/2022    CO2 28 06/21/2022    BUN 35 06/21/2022    LABALBU 2.8 06/21/2022    LABALBU 4.4 01/09/2012    CREATININE 1.8 06/21/2022    CALCIUM 8.3 06/21/2022    GFRAA 43 06/21/2022    LABGLOM 36 06/21/2022    GLUCOSE 105 06/21/2022    GLUCOSE 106 01/09/2012     BUN/Creatinine:    Lab Results   Component Value Date    BUN 35 06/21/2022    CREATININE 1.8 06/21/2022     Hepatic Function Panel:    Lab Results   Component Value Date    ALKPHOS 58 06/21/2022    ALT 6 06/21/2022    AST 11 06/21/2022    PROT 5.5 06/21/2022    BILITOT 0.3 06/21/2022    BILIDIR 0.2 08/09/2021    IBILI 0.3 08/09/2021    LABALBU 2.8 06/21/2022    LABALBU 4.4 01/09/2012     Albumin:    Lab Results   Component Value Date    LABALBU 2.8 06/21/2022    LABALBU 4.4 01/09/2012     Calcium:    Lab Results   Component Value Date    CALCIUM 8.3 06/21/2022     Ionized Calcium:  No results found for: IONCA  Magnesium:    Lab Results   Component Value Date    MG 1.6 06/21/2022     Phosphorus:    Lab Results   Component Value Date    PHOS 3.1 06/21/2022     LDH:  No results found for: LDH  Uric Acid:    Lab Results   Component Value Date    LABURIC 8.8 03/07/2022     PT/INR:    Lab Results   Component Value Date    PROTIME 11.4 07/06/2017    INR 1.0 07/06/2017     PTT:  No results found for: APTT, PTT[APTT}  Troponin:    Lab Results   Component Value Date    TROPONINI 0.02 02/09/2021     U/A:    Lab Results   Component Value Date    NITRITE Neg 03/22/2022    COLORU Yellow 06/16/2022    PROTEINU >=300 06/16/2022    PHUR 6.0 06/16/2022    WBCUA PACKED 06/16/2022    WBCUA NONE 01/09/2012    RBCUA 2-5 06/16/2022    RBCUA NONE 01/17/2014    YEAST Present 03/07/2022    BACTERIA FEW 06/16/2022    CLARITYU TURBID 06/16/2022    SPECGRAV 1.020 06/16/2022    LEUKOCYTESUR LARGE 06/16/2022    UROBILINOGEN 0.2 06/16/2022    BILIRUBINUR Negative 06/16/2022    BILIRUBINUR Neg 03/22/2022    BILIRUBINUR NEGATIVE 01/09/2012    BLOODU LARGE 06/16/2022    GLUCOSEU Negative 06/16/2022    GLUCOSEU NEGATIVE 01/09/2012    AMORPHOUS FEW 01/17/2014     ABG:  No results found for: PH, PCO2, PO2, HCO3, BE, THGB, TCO2, O2SAT  HgBA1c:    Lab Results   Component Value Date    LABA1C 6.0 08/23/2021     Microalbumen/Creatinine ratio:  No components found for: RUCREAT  FLP:    Lab Results   Component Value Date    TRIG 101 03/07/2022    HDL 43 03/07/2022    LDLCALC 70 03/07/2022    LABVLDL 20 03/07/2022     TSH:    Lab Results   Component Value Date    TSH 1.070 11/11/2015     VITAMIN B12: No components found for: B12  FOLATE:    Lab Results   Component Value Date    FOLATE >20.0 06/18/2022     Iron Saturation:  No components found for: PERCENTFE  FERRITIN:    Lab Results   Component Value Date    FERRITIN 332 06/18/2022     AMYLASE:  No results found for: AMYLASE  LIPASE:    Lab Results   Component Value Date    LIPASE 27 08/09/2021     Fibrinogen Level:  No components found for: FIB  24 Hour Urine for Protein:  No components found for: RAWUPRO, UHRS3, QWNY57NV, UTV3  24 Hour Urine for Creatinine Clearance:  No components found for: CREAT4, UHRS10, UTV10  PSA:   Lab Results   Component Value Date    PSA 1.35 06/17/2022        Imaging:  EXAMINATION:  RETROPERITONEAL ULTRASOUND OF THE KIDNEYS AND URINARY BLADDER     6/16/2022     COMPARISON:  CT abdomen and pelvis from August 9, 2021     HISTORY:  ORDERING SYSTEM PROVIDED HISTORY: ARF  TECHNOLOGIST PROVIDED HISTORY:     Reason for exam:->ARF  What reading provider will be dictating this exam?->CRC     FINDINGS:     Kidneys:     The right kidney measures 11.5 cm in length and the left kidney measures 11.2  cm in length.     Right kidney: The corticomedullary differentiation and cortical thickness is  decreased.  Anechoic thin walled nonvascular right mid and upper pole renal  cysts measuring 25 mm and 12 mm.  There is moderate to severe right  hydronephrosis.     Left kidney: The corticomedullary differentiation and cortical thickness is  decreased. us to participate in care of Ke Bobby MD  10:19 AM  6/21/2022

## 2022-06-21 NOTE — PROGRESS NOTES
OT BEDSIDE TREATMENT NOTE      Date:2022  Patient Name: Tammie Rodriguez  MRN: [id number]  : 1932  Room: [address] [place] [address]       Evaluating OT: Lowanda Bright OTR/L; [id number]      Referring Provider and Specific Provider Orders/Date:      22   OT eval and treat  Start:  22 1045,   End:  22 104,   ONE TIME,   Standing Count:  1 Occurrences,   R         Ariana Thrasher, APRN - CNP      Placement Recommendation: HHOT        Diagnosis:   1. Acute renal failure, unspecified acute renal failure type ([facility] [address]    2. Acute cystitis without hematuria    3. Hyperkalemia    4. Dehydration         Surgery: none        Pertinent Medical History:       Past Medical History        Past Medical History:   Diagnosis Date    Enlarged prostate      Hyperlipidemia      Hypertension      Lumbosacral strain 2013    Pacemaker              Past Surgical History         Past Surgical History:   Procedure Laterality Date    A-V CARDIAC PACEMAKER INSERTION   07    COLONOSCOPY   8/3/11    CYSTOSCOPY N/A 2021     CYSTOSCOPY RETROGRADE PYELOGRAM, TRANSURETHRAL RESECTION OF THE PROSTATE WITH SUPRAPUBIC CATHETER PLACEMENT performed by Duke Moreau MD at [address]        cataracts evelyn    PACEMAKER PLACEMENT         2017 battery change    TONSILLECTOMY         child          Precautions:  Fall Risk, suprapubic catheter, [place]/hearing aides      Assessment of current deficits:     [x]? Functional mobility            [x]?ADLs           [x]? Strength                   []?Cognition    [x]? Functional transfers          [x]? IADLs         []? Safety Awareness   [x]? Endurance    []? Fine Coordination              [x]? Balance      []? Vision/perception    []? Sensation      []? Gross Motor Coordination  []? ROM           []?  Delirium                   []? Motor Control      OT PLAN OF CARE   OT POC based on physician orders, patient diagnosis and results of clinical Little  How much help for taking care of personal grooming?: A Little  How much help for eating meals?: None  AM-PAC Inpatient Daily Activity Raw Score: 17  AM-PAC Inpatient ADL T-Scale Score : 37.26  ADL Inpatient CMS 0-100% Score: 50.11  ADL Inpatient CMS G-Code Modifier : CK                Functional Assessment:     Initial Eval Status  Date: 6/17/22 Treatment Status  Date: 6/21/22 STGs = LTGs  Time frame: 10-14 days   Feeding Independent   N/T  Independent    Grooming Supervision  N/T Independent    UB Dressing Minimal Assist to tie gown Min A to adjust back of gown Independent    LB Dressing Moderate Assist for lower body clothing management before and after toileting. Minimal assist to don slippers while seated at EOB. N/T - pt states he donned pants and socks without assist Independent    Bathing Moderate Assist N/T  Modified F[place]ardo    Toileting Supervision for hygiene after using commode for BM. Toilet wipes provided. N/T - pt has suprapubic catheter Independent    Bed Mobility  Supine to sit: Supervision   Sit to supine: N/T as pt was up in chair  N/T  Supine to sit: Independent   Sit to supine: Independent    Functional Transfers Minimal Assist from EOB to wheeled walker. Minimal assist for transfer to and from low commode with minimal verbal cues to use grab bar for safe commode transfer. Supervision for transfer from standing with wheeled walker to bedside chair. Transfer training with verbal cues for hand placement throughout session to improve safety. Supervision for sit to stand transfers to/from chair; use of FWW; cuing for hand placement Independent    Functional Mobility Minimal assist with wheeled walker to improve balance to and from bathroom and up to bedside chair, verbal cues for walker sequence and safety.   Min A with FWW for large household distances in room  and hallway; no LOB noted  Modified Fajardo    Balance Sitting:     Static: good     Dynamic: fair   Standing: fair  with wheeled walker Sitting:     Static: good     Dynamic: fair   Standing: fair  with wheeled walker      Activity Tolerance Fair  fair  good    Visual/  Perceptual Glasses: yes                        Hand Dominance: left      Hearing: WFL   Sensation:  No c/o numbness or tingling  Tone: WFL   Edema: no     Comments: Patient cleared by nursing staff. Upon arrival pt seated in bedside chair. Wife and daughter present. Daughter stepped OOR during session. Pt agreeable to OT tx session. Pt educated with regards to  transfer training, functional mobility, safety awareness,  ECT's. At end of session pt seated in bedside chair  with all lines and tubes intact, call light within reach. Overall, pt demonstrated fair/fair minus independence and safety during completion of ADL/functional transfers/mobility tasks. Pt would benefit from continued skilled OT to increase safety and independence with completion of ADL/IADL tasks for functional independence and quality of life. Pt required cues and education as noted above for safe facilitation and completion of tasks. Therapist provided skilled monitoring of patient's response during treatment session. Prior to and at the end of session, environmental modifications completed for patients safety and efficiency of treatment session. Overall, patient demonstrates minimal difficulties with completion of BADLs and IADLs. Factors contributing to these difficulties include decreased endurance, and generalized weakness. As noted above, patient likely to benefit from further OT intervention to increase independence, safety, and overall quality of life. Treatment:     ? Functional transfers: Facilitated transfers to/from bedside chair with cues for body alignment, safety and hand placement. ? Postural Balance: Sitting/standing balance retraining to improve righting reactions with postural changes during ADLs.     · Pt has made fair progress towards set goals   · OT 1-3x/week for 5-7 days during hospitalization       Treatment Time also includes thorough review of current medical information, gathering information on past medical history/social history and prior level of function, informal observation of tasks, assessment of data and education on plan of care and goals.     Treatment Time In:  2:06 PM     Treatment Time Out: 2:24 PM            Treatment Charges: Mins Units   ADL/Home Mgt     06139     Thera Activities     67085 18 1   Ther Ex                 23105     Manual Therapy    97123     Neuro Re-ed         56089     Orthotic manage/training                               97145     Non Billable Time     Total Timed Treatment 18 9981 JARED Us/MAI #[id number]

## 2022-06-21 NOTE — PROGRESS NOTES
[address] [place], [place] [address]ist   Progress Note    Admitting Date and Time: 6/16/2022  6:25 PM  Admit Dx: Dehydration [E86.0]  Hyperkalemia [E87.5]  Acute cystitis without hematuria [O39.38]  Complicated UTI (urinary tract infection) [N39.0]  Acute renal failure, unspecified acute renal failure type ([address] [N17.9]    Subjective:  Patient reports feeling better today. He continues to have some discomfort at the suprapubic cath site however when they irrigated today there was no pain. Appetite is improved and he is walking to the bathroom using his alone walker without issue. Urine culture #1 is growing Mixed al: #2 sensitivity and ID are pending    ROS: denies fever, chills, cp, sob, n/v, HA unless stated above.      cefepime (MAXIPIME) 1000 mg IVPB extended (mini-bag)  1,000 mg IntraVENous Q24H    ascorbic acid  250 mg Oral Daily    aspirin  81 mg Oral Daily    pantoprazole  40 mg Oral Daily    timolol  1 drop Both Eyes BID    sodium chloride flush  5-40 mL IntraVENous 2 times per day    polyethylene glycol  17 g Oral Daily    heparin (porcine)  5,000 Units SubCUTAneous 3 times per day    Vitamin D  2,000 Units Oral Daily    amLODIPine  2.5 mg Oral Daily    finasteride  5 mg Oral Daily    metoprolol succinate  25 mg Oral Daily    atorvastatin  20 mg Oral Daily    vitamin B-12  1,000 mcg Oral Daily     sodium chloride flush, 5-40 mL, PRN  sodium chloride, , PRN  acetaminophen, 650 mg, Q6H PRN   Or  acetaminophen, 650 mg, Q6H PRN  ondansetron, 4 mg, Q8H PRN   Or  ondansetron, 4 mg, Q6H PRN    Objective:    BP (!) 114/58   Pulse 60   Temp 98.6 °F (37 °C) (Oral)   Resp 18   Ht 5' 9\" (1.753 m)   Wt 140 lb (63.5 kg)   SpO2 98%   BMI 20.67 kg/m²   General Appearance: alert and oriented to person, place and time and in no acute distress  Skin: warm and dry, scabbed area to nose  Head: normocephalic and atraumatic  Eyes: pupils equal, round, and reactive to light, conjunctivae normal  Neck: neck supple and non tender without mass   Pulmonary/Chest: clear to auscultation bilaterally- no wheezes, rales or rhonchi, no respiratory distress  Cardiovascular: irregularly irregular, murmur  Abdomen: soft, tender, distended, normal bowel sounds, suprapubic catheter with cloudy urine  Extremities: no cyanosis, no clubbing and no edema  Neurologic: no cranial nerve deficit and speech normal  : SPT in place    Recent Labs     06/19/22  0626 06/20/22  0742 06/21/22  0646    141 142   K 4.0 4.0 4.2    104 106   CO2 28 28 28   BUN 56* 44* 35*   CREATININE 2.3* 1.9* 1.8*   GLUCOSE 96 100* 105*   CALCIUM 8.9 8.3* 8.3*       Recent Labs     06/19/22  0626 06/20/22  0742 06/21/22  0646   ALKPHOS 63 60 58   PROT 6.3* 5.8* 5.5*   LABALBU 3.2* 3.0* 2.8*   BILITOT 0.3 0.3 0.3   AST 7 8 11   ALT <5 5 6       Recent Labs     06/19/22 0626 06/20/22  0742 06/21/22  0646   WBC 7.8 8.9 8.7   RBC 3.41* 3.01* 2.92*   HGB 10.6* 9.4* 9.2*   HCT 32.4* 29.8* 28.8*   MCV 95.0 99.0 98.6   MCH 31.1 31.2 31.5   MCHC 32.7 31.5* 31.9*   RDW 14.2 14.2 13.8    347 318   MPV 9.0 9.3 9.3     Radiology:   US RETROPERITONEAL COMPLETE   Final Result   1. Moderate to severe bilateral hydronephrosis. When compared to prior CT   scan the degree of hydronephrosis is less extensive. 2.  Bilateral renal cortical thinning. Bilateral renal cysts. 3.  A Fay catheter identified within the bladder. Bladder is not fully   decompressed. Layering echogenic debris identified within the bladder lumen. Bladder wall appears thickened. 4.  Prostate is enlarged. Assessment:  Principal Problem:    Complicated UTI (urinary tract infection)  Active Problems:    Urinary tract infection associated with catheterization of urinary tract (HCC)    Hydronephrosis due to obstruction of bladder    Stage 3 acute kidney injury ([facility] [address]  Resolved Problems:    * No resolved hospital problems. *      Plan:  1.    Complicated UTI:  ID following.    -Urine culture #1 is growing Mixed al: Mixed gram negative rods and Nonhemolytic Strep species.  -Continue Cefepime per ID awaiting urine culture #2  -Per urology; he should be discharged with drainage bag and the SPT should be left to gravity. He will no longer clamp the SPT at home. .     2. Pyocystis from indwelling suprapubic catheter:    -Urology following.    -Per urology; he should be discharged with drainage bag and the SPT should be left to gravity. He will no longer clamp the SPT at home.  -Plan is for non surgical management with bladder drainage, antibiotics and hydration. 3.   MORENA on chronic kidney disease:    -Nephrology following. Creatinine was 5.5 on admission and is 1.9 today.  -Continue IV fluids. 4.   Multifactorial hyperkalemia resolved:    -Potassium was 5.5 yesterday. ACEi is on hold. Potassium is 4.0 today. 5.   Metabolic acidosis Improved:    -Bicarb was 14 on admission and is 28 today. 6.   Hypertension:    -Continue Norvasc, metoprolol, lisinopril on hold secondary to MORENA    7. Anemia:    -Hgb is 9.4 today. Likely secondary to chronic kidney disease with MORENA. 8.   BPH:    -Continue Proscar. 9.   GERD:   -Continue Protonix    CODE STATUS: Full code  DVT prophylaxis: Subcu heparin  Disposition: Following ID's transition to oral antibiotics, will be discharged home to follow-up with the with urology for SPT    NOTE: This report was transcribed using voice recognition software. Every effort was made to ensure accuracy; however, inadvertent computerized transcription errors may be present.      Electronically signed by Laura Garcia PA-C on 6/21/2022 at 5:14 PM

## 2022-06-21 NOTE — PROGRESS NOTES
[facility]     [address] [place] [address]  [place], [place]  Phone [phone]   [phone]      Admit Date: 2022  6:25 PM  Pt Name: Wilfred Rajan  MRN: [id number]  : 1932  Reason for Consult:    Chief Complaint   Patient presents with    Dysuria     stated that he is producing little to no urine     Requesting Physician:  Yovanny Rosa MD  PCP: David Matt MD  History Obtained From:  patient, chart   ID consulted for Dehydration [E86.0]  Hyperkalemia [E87.5]  Acute cystitis without hematuria [K06.50]  Complicated UTI (urinary tract infection) [N39.0]  Acute renal failure, unspecified acute renal failure type ([address] [N17.9]  on hospital day 5   Face to face encounter   CHIEF COMPLAINT       Chief Complaint   Patient presents with    Dysuria     stated that he is producing little to no urine     HISTORYOF PRESENT ILLNESS   Wilfred Rajan is a 80 y.o. male who presents with   has a past medical history of Enlarged prostate, Hyperlipidemia, Hypertension, Lumbosacral strain, and Pacemaker. Dysuria       Patient lives with wife- he reports he was not feeling well since about Monday. He has a suprabic cath and he also reports he voids. His urine has been cloudy with sediments. He denies any fevers at home. He reports fatigue, poor appetite. And overall not feeling well. He is known to Urology. He had TURP and suprapubic placement in 2021. He is chronic kidney disease. He denies any recent antibiotics  Cultures are pending   Labs reviewed   He denies any nausea, vomiting, fevers, no diarrhea, does have constipation. Previous cultures reviewed     ID has been asked to evaluate and manage atbs.    DOS  22  In chair ha no c/o SPT has cloudy sediments  2022 Assisted in transferring pt from bed to chair to eat breakd=fast has no c/o Fay yellow   2022 Pt in chair wife present spt with cloudy urine  No f/c/n/v/d/  6/18/2022Pt in chair has no c/o guillory still cloudy no issues with atbx   REVIEW OF SYSTEMS     CONSTITUTIONAL:   + fatigue   ALLERGIES:    No urticaria, hay fever,    EYES:     No blurry vision, loss of vision, eye pain  ENT:      No hearing loss, sore throat  CARDIOVASCULAR:   No chest pain or palpitations, + HTN, + hyperlipidemia , + pacer   RESPIRATORY:   No cough, sob  ENDOCRINE:    No increase thirst, urination   HEME-LYMPH:   No easy bruising or bleeding  GI:     No nausea, vomiting or diarrhea  :     No urinary complaints  NEURO:    No seizures, stroke, HA  MUSCULOSKELETAL:  No muscle aches or pain, no joint pain  SKIN:     No rash or itch  PSYCH:    No depression or anxiety       CURRENT MEDICATIONS     Current Facility-Administered Medications:     lactated ringers infusion, , IntraVENous, Continuous, Suzanne Valiente MD, Last Rate: 75 mL/hr at 06/20/22 2239, New Bag at 06/20/22 2239    cefepime (MAXIPIME) 1000 mg IVPB extended (mini-bag), 1,000 mg, IntraVENous, Q24H, Lorrane Rochdale, APRN - CNS, Stopped at 06/21/22 0504    ascorbic acid (VITAMIN C) tablet 250 mg, 250 mg, Oral, Daily, Marko Bishop MD, 250 mg at 06/21/22 0915    aspirin EC tablet 81 mg, 81 mg, Oral, Daily, Marko Bishop MD, 81 mg at 06/21/22 0915    pantoprazole (PROTONIX) tablet 40 mg, 40 mg, Oral, Daily, Marko Bishop MD, 40 mg at 06/21/22 0506    timolol (TIMOPTIC) 0.5 % ophthalmic solution 1 drop, 1 drop, Both Eyes, BID, Marko Bishop MD, 1 drop at 06/20/22 2033    sodium chloride flush 0.9 % injection 5-40 mL, 5-40 mL, IntraVENous, 2 times per day, Marko Bishop MD, 10 mL at 06/20/22 2038    sodium chloride flush 0.9 % injection 5-40 mL, 5-40 mL, IntraVENous, PRN, Marko Bishop MD    0.9 % sodium chloride infusion, , IntraVENous, PRN, Marko Bishop MD    acetaminophen (TYLENOL) tablet 650 mg, 650 mg, Oral, Q6H PRN **OR** acetaminophen (TYLENOL) suppository 650 mg, 650 mg, Rectal, Q6H PRN, Marko Said, MD    ondansetron (ZOFRAN-ODT) disintegrating tablet 4 mg, 4 mg, Oral, Q8H PRN **OR** ondansetron (ZOFRAN) injection 4 mg, 4 mg, IntraVENous, Q6H PRN, Devonte Rosenberg MD    polyethylene glycol Charlie Case) packet 17 g, 17 g, Oral, Daily, Devonte Rosenberg MD, 17 g at 06/19/22 0847    heparin (porcine) injection 5,000 Units, 5,000 Units, SubCUTAneous, 3 times per day, Devonte Rosenberg MD, 5,000 Units at 06/21/22 0506    Vitamin D (CHOLECALCIFEROL) tablet 2,000 Units, 2,000 Units, Oral, Daily, Caralee Cabool, APRN - CNP, 2,000 Units at 06/21/22 0915    amLODIPine (NORVASC) tablet 2.5 mg, 2.5 mg, Oral, Daily, Caralee Cabool, APRN - CNP, 2.5 mg at 06/20/22 5425    finasteride (PROSCAR) tablet 5 mg, 5 mg, Oral, Daily, Caralee Cabool, APRN - CNP, 5 mg at 06/21/22 0915    metoprolol succinate (TOPROL XL) extended release tablet 25 mg, 25 mg, Oral, Daily, Caralee Cabool, APRN - CNP, 25 mg at 06/21/22 0915    atorvastatin (LIPITOR) tablet 20 mg, 20 mg, Oral, Daily, Caralee Cabool, APRN - CNP, 20 mg at 06/20/22 2033    vitamin B-12 (CYANOCOBALAMIN) tablet 1,000 mcg, 1,000 mcg, Oral, Daily, Caralee Cabool, APRN - CNP, 1,000 mcg at 06/21/22 0915  ALLERGIES     Patient has no known allergies.   Immunization History   Administered Date(s) Administered    COVID-19, Moderna, Booster, PF, 50mcg/0.25ml 12/06/2021    COVID-19, Moderna, Primary or Immunocompromised, PF, 100mcg/0.5mL 01/20/2021, 02/19/2021, 03/01/2021    DTaP 09/18/2003    Influenza 10/28/2013    Influenza Virus Vaccine 09/24/2012, 09/23/2014    Influenza, High Dose (Fluzone 65 yrs and older) 11/21/2016, 10/25/2017, 10/09/2018    Influenza, High-dose, Eugena Gavin, 65 yrs +, IM (Fluzone) 10/26/2020    Influenza, Quadv, adjuvanted, 65 yrs +, IM, PF (Fluad) 10/12/2021    Influenza, Triv, inactivated, subunit, adjuvanted, IM (Fluad 65 yrs and older) 11/13/2019    Pneumococcal Conjugate 13-valent ([id number]) 02/09/2016, 10/01/2020    Pneumococcal Polysaccharide ([id number]) 07/21/2011      Internal Administration   First Dose COVID-19, Moderna, Primary or Immunocompromised, PF, 100mcg/0.5mL  01/20/2021   Second Dose COVID-19, Moderna, Primary or Immunocompromised, PF, 100mcg/0.5mL   02/19/2021       Last COVID Lab SARS-CoV-2, PCR (no units)   Date Value   08/22/2021 Not Detected     SARS-CoV-2, NAAT (no units)   Date Value   08/22/2021 Not Detected     SARS-COV-2, POC (no units)   Date Value   02/05/2021 Detected (A)        PAST MEDICAL HISTORY     Past Medical History:   Diagnosis Date    Enlarged prostate     Hyperlipidemia     Hypertension     Lumbosacral strain 7/8/2013    Pacemaker      SURGICAL HISTORY       Past Surgical History:   Procedure Laterality Date    A-V CARDIAC PACEMAKER INSERTION  5/30/07    COLONOSCOPY  8/3/11    CYSTOSCOPY N/A 11/2/2021    CYSTOSCOPY RETROGRADE PYELOGRAM, TRANSURETHRAL RESECTION OF THE PROSTATE WITH SUPRAPUBIC CATHETER PLACEMENT performed by Sima Ibarra MD at [place] [address] SURGERY  2002    cataracts evelyn    PACEMAKER PLACEMENT      2017 battery change    TONSILLECTOMY      child     FAMILY HISTORY       Family History   Problem Relation Age of Onset    High Blood Pressure Mother     Cancer Mother     Diabetes Mother     Heart Disease Father     High Blood Pressure Father      ·      PHYSICAL EXAM        Vitals:    06/20/22 0745 06/20/22 1921 06/21/22 0505 06/21/22 0745   BP: (!) 102/55 135/64  (!) 114/58   Pulse: 59 63  60   Resp: 17 18  18   Temp: 98.2 °F (36.8 °C) 97.8 °F (36.6 °C)  98.6 °F (37 °C)   TempSrc: Oral Oral  Oral   SpO2: 97% 98%  98%   Weight:   140 lb (63.5 kg)    Height:         Physical Exam   Constitutional/General:    In chair    Head: NC/AT  Eyes: PERRL, EOMI   Pulmonary: Lungs clear to auscultation bilaterally. On RA   Cardiovascular:  Regular rate and rhythm  Abdomen: Soft, + BS. No distension. Nontender.     Extremities    Warm and well perfused  Skin: Warm and dry    Neurologic:    No focal deficits  Psych: Normal Affect  Suprapubic cath  . cloudy  urine with sediments. PIV     DIAGNOSTIC RESULTS   RADIOLOGY:   US RETROPERITONEAL COMPLETE    Result Date: 6/16/2022  EXAMINATION: RETROPERITONEAL ULTRASOUND OF THE KIDNEYS AND URINARY BLADDER 6/16/2022 COMPARISON: CT abdomen and pelvis from August 9, 2021 HISTORY: ORDERING SYSTEM PROVIDED HISTORY: ARF TECHNOLOGIST PROVIDED HISTORY: Reason for exam:->ARF What reading provider will be dictating this exam?->CRC FINDINGS: Kidneys: The right kidney measures 11.5 cm in length and the left kidney measures 11.2 cm in length. Right kidney: The corticomedullary differentiation and cortical thickness is decreased. Anechoic thin walled nonvascular right mid and upper pole renal cysts measuring 25 mm and 12 mm. There is moderate to severe right hydronephrosis. Left kidney: The corticomedullary differentiation and cortical thickness is decreased. Anechoic thin walled nonvascular left mid to lower pole 27 mm cyst.  No concerning renal mass or intrarenal calcification. There is moderate to severe left hydronephrosis. Bladder: A Fay catheter is identified within the bladder. The bladder is not fully decompressed. Bladder wall appears thickened. Echogenic debris is identified within the bladder. The prostate gland appears enlarged measuring 6.2 x 5.2 x 4.4 cm. 1.  Moderate to severe bilateral hydronephrosis. When compared to prior CT scan the degree of hydronephrosis is less extensive. 2.  Bilateral renal cortical thinning. Bilateral renal cysts. 3.  A Fay catheter identified within the bladder. Bladder is not fully decompressed. Layering echogenic debris identified within the bladder lumen. Bladder wall appears thickened. 4.  Prostate is enlarged.      LABS  Recent Labs     06/19/22  0626 06/20/22  0742 06/21/22  0646   WBC 7.8 8.9 8.7   HGB 10.6* 9.4* 9.2*   HCT 32.4* 29.8* 28.8*   MCV 95.0 99.0 98.6    347 318     Recent Labs 06/19/22  4497 06/19/22  0626 06/20/22  0742 06/20/22  0742 06/21/22  0646     --  141  --  142   K 4.0   < > 4.0   < > 4.2      < > 104   < > 106   CO2 28   < > 28   < > 28   BUN 56*  --  44*  --  35*   CREATININE 2.3*  --  1.9*  --  1.8*   GFRAA 32  --  40  --  43   LABGLOM 27  --  33  --  36   GLUCOSE 96  --  100*  --  105*   PROT 6.3*  --  5.8*  --  5.5*   LABALBU 3.2*  --  3.0*  --  2.8*   CALCIUM 8.9  --  8.3*  --  8.3*   BILITOT 0.3  --  0.3  --  0.3   ALKPHOS 63  --  60  --  58   AST 7  --  8  --  11   ALT <5  --  5  --  6    < > = values in this interval not displayed.      Lab Results   Component Value Date    ADVIRPCR Not Detected 08/22/2021    [facility] Not Detected 08/22/2021    BPPTXPPCR Not Detected 08/22/2021    CHLPNEPCR Not Detected 08/22/2021    [id number] Not Detected 08/22/2021    FMMCLP0SYV Not Detected 08/22/2021    TIHHW65LPH Not Detected 08/22/2021    WOPOD22ZUX Not Detected 08/22/2021    COVID19 Not Detected 08/22/2021    COVID19 Not Detected 08/22/2021    [facility] [place] Not Detected 08/22/2021    RHENTPCR Not Detected 08/22/2021    FLUBPCR Not Detected 08/22/2021    [facility] Not Detected 08/22/2021    CVTCGNH0LDC Not Detected 08/22/2021    ATWFZPD4KMF Not Detected 08/22/2021    VGFUEEH6GGX Not Detected 08/22/2021    IHOINWR0JUP Not Detected 08/22/2021    RSVPCR Not Detected 08/22/2021     Lab Results   Component Value Date    COVID19 Not Detected 08/22/2021    COVID19 Not Detected 08/22/2021     COVID-19/RICK-COV2 LABS  Recent Labs     06/19/22  0626 06/20/22  0742 06/21/22  0646   AST 7 8 11   ALT <5 5 6     Lab Results   Component Value Date    CHOL 133 03/07/2022    TRIG 101 03/07/2022    HDL 43 03/07/2022    LDLCALC 70 03/07/2022    LABVLDL 20 03/07/2022     MICROBIOLOGY:  Cultures :     Lab Results   Component Value Date    BC 24 Hours no growth 06/17/2022    BC 5 Days no growth 08/22/2021    ORG Pseudomonas aeruginosa 08/23/2021     Lab Results   Component Value Date BLOODCULT2 24 Hours no growth 06/17/2022    BLOODCULT2 5 Days no growth 08/22/2021    ORG Pseudomonas aeruginosa 08/23/2021     Urine Culture, Routine   Date Value Ref Range Status   06/16/2022   Final    10 to 100,000 CFU/mL  Mixed al isolated. Further workup and sensitivity testing  is not routinely indicated and will not be performed. Mixed al isolated includes:  Mixed gram negative rods  Nonhemolytic Strep species     01/05/2022   Final    10 to 100,000 CFU/mL  Mixed al isolated. Further workup and sensitivity testing  is not routinely indicated and will not be performed. Mixed al isolated includes:  Mixed gram negative rods  Oxidase positive gram negative rods  Nonhemolytic Strep species     08/23/2021 >100,000 CFU/ml  Vabomere=36    Final       FINAL IMPRESSION    Patient is a 80 y.o. male who presented with   Chief Complaint   Patient presents with    Dysuria     stated that he is producing little to no urine    and admitted for Dehydration [E86.0]  Hyperkalemia [E87.5]  Acute cystitis without hematuria [D58.01]  Complicated UTI (urinary tract infection) [N39.0]  Acute renal failure, unspecified acute renal failure type ([facility] [address] [N17.9]  UTI mixed gpo/gnr blood cx ngtd   Moderate to severe bilateral hydronephrosis  History of COVID  chemo better     Plan:   · Cont Cefepime 1 gram IV daily  Poss d/c with po Augmentin awaiting urine cx   · Urine cultures mixed will repeat pending   · Labs reviewed    · Urology and nephrology consulted   · Previous cultures reviewed          Imaging and labs were reviewed. Martín De La Rosa was informed of their diagnosis, indications, risks and benefits of treatment. Martín De La Rosa had the opportunity to ask questions. All questions were answered. Thank you for involving me in the care of Martín De La Rosa. Please do not hesitate to call for any questions or concerns.     Electronically signed by Judson Aldridge MD on 6/21/2022 at 9:17 AM

## 2022-06-22 VITALS
HEART RATE: 62 BPM | DIASTOLIC BLOOD PRESSURE: 59 MMHG | BODY MASS INDEX: 21.62 KG/M2 | RESPIRATION RATE: 16 BRPM | OXYGEN SATURATION: 98 % | WEIGHT: 146 LBS | HEIGHT: 69 IN | SYSTOLIC BLOOD PRESSURE: 119 MMHG | TEMPERATURE: 99.5 F

## 2022-06-22 LAB
ALBUMIN SERPL-MCNC: 3.1 G/DL (ref 3.5–5.2)
ALP BLD-CCNC: 56 U/L (ref 40–129)
ALT SERPL-CCNC: 8 U/L (ref 0–40)
ANION GAP SERPL CALCULATED.3IONS-SCNC: 7 MMOL/L (ref 7–16)
AST SERPL-CCNC: 12 U/L (ref 0–39)
BILIRUB SERPL-MCNC: 0.2 MG/DL (ref 0–1.2)
BLOOD CULTURE, ROUTINE: NORMAL
BUN BLDV-MCNC: 32 MG/DL (ref 6–23)
CALCIUM SERPL-MCNC: 8.6 MG/DL (ref 8.6–10.2)
CHLORIDE BLD-SCNC: 104 MMOL/L (ref 98–107)
CO2: 30 MMOL/L (ref 22–29)
CREAT SERPL-MCNC: 1.8 MG/DL (ref 0.7–1.2)
CULTURE, BLOOD 2: NORMAL
GFR AFRICAN AMERICAN: 43
GFR NON-AFRICAN AMERICAN: 36 ML/MIN/1.73
GLUCOSE BLD-MCNC: 98 MG/DL (ref 74–99)
HCT VFR BLD CALC: 30 % (ref 37–54)
HEMOGLOBIN: 9.3 G/DL (ref 12.5–16.5)
MAGNESIUM: 1.5 MG/DL (ref 1.6–2.6)
MCH RBC QN AUTO: 31.2 PG (ref 26–35)
MCHC RBC AUTO-ENTMCNC: 31 % (ref 32–34.5)
MCV RBC AUTO: 100.7 FL (ref 80–99.9)
ORGANISM: ABNORMAL
PDW BLD-RTO: 14 FL (ref 11.5–15)
PHOSPHORUS: 3.2 MG/DL (ref 2.5–4.5)
PLATELET # BLD: 307 E9/L (ref 130–450)
PMV BLD AUTO: 9.4 FL (ref 7–12)
POTASSIUM SERPL-SCNC: 4.1 MMOL/L (ref 3.5–5)
RBC # BLD: 2.98 E12/L (ref 3.8–5.8)
SODIUM BLD-SCNC: 141 MMOL/L (ref 132–146)
TOTAL PROTEIN: 5.7 G/DL (ref 6.4–8.3)
URINE CULTURE, ROUTINE: ABNORMAL
WBC # BLD: 8.7 E9/L (ref 4.5–11.5)

## 2022-06-22 PROCEDURE — 2580000003 HC RX 258: Performed by: CLINICAL NURSE SPECIALIST

## 2022-06-22 PROCEDURE — 36415 COLL VENOUS BLD VENIPUNCTURE: CPT

## 2022-06-22 PROCEDURE — 99239 HOSP IP/OBS DSCHRG MGMT >30: CPT | Performed by: INTERNAL MEDICINE

## 2022-06-22 PROCEDURE — 6370000000 HC RX 637 (ALT 250 FOR IP): Performed by: INTERNAL MEDICINE

## 2022-06-22 PROCEDURE — 6370000000 HC RX 637 (ALT 250 FOR IP): Performed by: CLINICAL NURSE SPECIALIST

## 2022-06-22 PROCEDURE — 97530 THERAPEUTIC ACTIVITIES: CPT

## 2022-06-22 PROCEDURE — 6360000002 HC RX W HCPCS: Performed by: CLINICAL NURSE SPECIALIST

## 2022-06-22 PROCEDURE — 85027 COMPLETE CBC AUTOMATED: CPT

## 2022-06-22 PROCEDURE — 6360000002 HC RX W HCPCS: Performed by: INTERNAL MEDICINE

## 2022-06-22 PROCEDURE — 80053 COMPREHEN METABOLIC PANEL: CPT

## 2022-06-22 PROCEDURE — 84100 ASSAY OF PHOSPHORUS: CPT

## 2022-06-22 PROCEDURE — 6370000000 HC RX 637 (ALT 250 FOR IP): Performed by: NURSE PRACTITIONER

## 2022-06-22 PROCEDURE — APPSS60 APP SPLIT SHARED TIME 46-60 MINUTES: Performed by: PHYSICIAN ASSISTANT

## 2022-06-22 PROCEDURE — 97110 THERAPEUTIC EXERCISES: CPT

## 2022-06-22 PROCEDURE — 2580000003 HC RX 258: Performed by: INTERNAL MEDICINE

## 2022-06-22 PROCEDURE — 83735 ASSAY OF MAGNESIUM: CPT

## 2022-06-22 RX ORDER — MAGNESIUM SULFATE IN WATER 40 MG/ML
2000 INJECTION, SOLUTION INTRAVENOUS ONCE
Status: COMPLETED | OUTPATIENT
Start: 2022-06-22 | End: 2022-06-22

## 2022-06-22 RX ORDER — AMOXICILLIN AND CLAVULANATE POTASSIUM 500; 125 MG/1; MG/1
1 TABLET, FILM COATED ORAL EVERY 12 HOURS SCHEDULED
Status: DISCONTINUED | OUTPATIENT
Start: 2022-06-22 | End: 2022-06-22 | Stop reason: HOSPADM

## 2022-06-22 RX ORDER — AMOXICILLIN AND CLAVULANATE POTASSIUM 500; 125 MG/1; MG/1
1 TABLET, FILM COATED ORAL EVERY 12 HOURS SCHEDULED
Qty: 28 TABLET | Refills: 0 | Status: SHIPPED | OUTPATIENT
Start: 2022-06-22 | End: 2022-07-06

## 2022-06-22 RX ORDER — LACTOBACILLUS RHAMNOSUS GG 10B CELL
1 CAPSULE ORAL EVERY 12 HOURS
Qty: 60 CAPSULE | Refills: 0 | Status: SHIPPED | OUTPATIENT
Start: 2022-06-22 | End: 2022-07-22

## 2022-06-22 RX ADMIN — OXYCODONE HYDROCHLORIDE AND ACETAMINOPHEN 250 MG: 500 TABLET ORAL at 08:39

## 2022-06-22 RX ADMIN — TIMOLOL MALEATE 1 DROP: 5 SOLUTION OPHTHALMIC at 08:40

## 2022-06-22 RX ADMIN — FINASTERIDE 5 MG: 5 TABLET, FILM COATED ORAL at 08:39

## 2022-06-22 RX ADMIN — Medication 10 ML: at 08:48

## 2022-06-22 RX ADMIN — ASPIRIN 81 MG: 81 TABLET, COATED ORAL at 08:39

## 2022-06-22 RX ADMIN — METOPROLOL SUCCINATE 25 MG: 25 TABLET, EXTENDED RELEASE ORAL at 08:39

## 2022-06-22 RX ADMIN — CEFEPIME HYDROCHLORIDE 1000 MG: 1 INJECTION, POWDER, FOR SOLUTION INTRAMUSCULAR; INTRAVENOUS at 01:43

## 2022-06-22 RX ADMIN — PANTOPRAZOLE SODIUM 40 MG: 40 TABLET, DELAYED RELEASE ORAL at 05:35

## 2022-06-22 RX ADMIN — Medication 2000 UNITS: at 08:39

## 2022-06-22 RX ADMIN — HEPARIN SODIUM 5000 UNITS: 5000 INJECTION INTRAVENOUS; SUBCUTANEOUS at 14:54

## 2022-06-22 RX ADMIN — AMOXICILLIN AND CLAVULANATE POTASSIUM 1 TABLET: 500; 125 TABLET, FILM COATED ORAL at 10:17

## 2022-06-22 RX ADMIN — MAGNESIUM SULFATE HEPTAHYDRATE 2000 MG: 2 INJECTION, SOLUTION INTRAVENOUS at 13:14

## 2022-06-22 RX ADMIN — CYANOCOBALAMIN TAB 1000 MCG 1000 MCG: 1000 TAB at 08:39

## 2022-06-22 RX ADMIN — HEPARIN SODIUM 5000 UNITS: 5000 INJECTION INTRAVENOUS; SUBCUTANEOUS at 04:58

## 2022-06-22 RX ADMIN — AMLODIPINE BESYLATE 2.5 MG: 10 TABLET ORAL at 08:39

## 2022-06-22 RX ADMIN — POLYETHYLENE GLYCOL 3350 17 G: 17 POWDER, FOR SOLUTION ORAL at 08:39

## 2022-06-22 ASSESSMENT — PAIN SCALES - GENERAL
PAINLEVEL_OUTOF10: 0

## 2022-06-22 NOTE — PROGRESS NOTES
Nephrology Progress  Note  Patient's Name: Tayo Hicks  10:59 AM  6/22/2022    Nephrologist: Efrain Miller    Reason for Consult: MORENA  Requesting Physician:  Wade Xiong MD    History of Present Ilness from the 6/17/22 note:    Tayo Hicks is a 80 y.o. male with prior history Jan 2022  MORENA on CKD Stage 3A  Chronic kidney disease likely due to longstanding history of hypertension as well as BPH/ Neurogenic Bladder  with a Baseline serum creatinine range 1.5 ->1.8mg/dl over the past 3 years. Admitted in jan 2022 with a  serum creatinine of 4.0 w/ BUN 98  In the setting of  ACE inhibitor and diuretic. Patient   Follows with urology for history of suprapubic catheter he was seen May 25 2022 by Urology and the plan has he was voiding with a low PVR to keep SP tube clamped and then to have removal. He presented to the ED 6/16/22 for SP tube producing little or no urine and urine  thick with a milky consistency as per the ED notes. He notes for the last several days he had no energy and was lying down more frequently for longer periods.  He also noted abd pain shiloh over th bladder    6/22/22:Pt awake alert appropriate up in chair, no CP or SOB, appetite good, states he feels better and is eager for discharge, family at bedside and updated to renal status      Past Medical History:   Diagnosis Date    Enlarged prostate     Hyperlipidemia     Hypertension     Lumbosacral strain 7/8/2013    Pacemaker        Past Surgical History:   Procedure Laterality Date    A-V CARDIAC PACEMAKER INSERTION  5/30/07    COLONOSCOPY  8/3/11    CYSTOSCOPY N/A 11/2/2021    CYSTOSCOPY RETROGRADE PYELOGRAM, TRANSURETHRAL RESECTION OF THE PROSTATE WITH SUPRAPUBIC CATHETER PLACEMENT performed by Natty Escobar MD at [place] [address] SURGERY  2002    cataracts evelyn    PACEMAKER PLACEMENT      2017 battery change    TONSILLECTOMY      child       Family History   Problem Relation Age of Onset    High Blood Pressure Mother    Bk Cancer Mother     Diabetes Mother     Heart Disease Father     High Blood Pressure Father         reports that he quit smoking about 21 years ago. He has a 40.00 pack-year smoking history. He has never used smokeless tobacco. He reports that he does not drink alcohol and does not use drugs. Allergies:  Patient has no known allergies. Current Medications:    amoxicillin-clavulanate (AUGMENTIN) 500-125 MG per tablet 1 tablet, 2 times per day  ascorbic acid (VITAMIN C) tablet 250 mg, Daily  aspirin EC tablet 81 mg, Daily  pantoprazole (PROTONIX) tablet 40 mg, Daily  timolol (TIMOPTIC) 0.5 % ophthalmic solution 1 drop, BID  sodium chloride flush 0.9 % injection 5-40 mL, 2 times per day  sodium chloride flush 0.9 % injection 5-40 mL, PRN  0.9 % sodium chloride infusion, PRN  acetaminophen (TYLENOL) tablet 650 mg, Q6H PRN   Or  acetaminophen (TYLENOL) suppository 650 mg, Q6H PRN  ondansetron (ZOFRAN-ODT) disintegrating tablet 4 mg, Q8H PRN   Or  ondansetron (ZOFRAN) injection 4 mg, Q6H PRN  polyethylene glycol (GLYCOLAX) packet 17 g, Daily  heparin (porcine) injection 5,000 Units, 3 times per day  Vitamin D (CHOLECALCIFEROL) tablet 2,000 Units, Daily  amLODIPine (NORVASC) tablet 2.5 mg, Daily  finasteride (PROSCAR) tablet 5 mg, Daily  metoprolol succinate (TOPROL XL) extended release tablet 25 mg, Daily  atorvastatin (LIPITOR) tablet 20 mg, Daily  vitamin B-12 (CYANOCOBALAMIN) tablet 1,000 mcg, Daily        Review of Systems:   Pertinent items are noted in HPI.     Physical exam:   Constitutional:  Elderly frail mail  Vitals:   VITALS:  BP (!) 119/59   Pulse 62   Temp 99.5 °F (37.5 °C) (Oral)   Resp 16   Ht 5' 9\" (1.753 m)   Wt 146 lb (66.2 kg)   SpO2 98%   BMI 21.56 kg/m²   24HR INTAKE/OUTPUT:      Intake/Output Summary (Last 24 hours) at 6/22/2022 1059  Last data filed at 6/22/2022 8584  Gross per 24 hour   Intake 250 ml   Output 2350 ml   Net -2100 ml     URINARY CATHETER OUTPUT (Fay):  Suprapubic 06/22/2022    K 4.1 06/22/2022    K 5.0 06/17/2022     06/22/2022    CO2 30 06/22/2022    BUN 32 06/22/2022    LABALBU 3.1 06/22/2022    LABALBU 4.4 01/09/2012    CREATININE 1.8 06/22/2022    CALCIUM 8.6 06/22/2022    GFRAA 43 06/22/2022    LABGLOM 36 06/22/2022    GLUCOSE 98 06/22/2022    GLUCOSE 106 01/09/2012     BUN/Creatinine:    Lab Results   Component Value Date    BUN 32 06/22/2022    CREATININE 1.8 06/22/2022     Hepatic Function Panel:    Lab Results   Component Value Date    ALKPHOS 56 06/22/2022    ALT 8 06/22/2022    AST 12 06/22/2022    PROT 5.7 06/22/2022    BILITOT 0.2 06/22/2022    BILIDIR 0.2 08/09/2021    IBILI 0.3 08/09/2021    LABALBU 3.1 06/22/2022    LABALBU 4.4 01/09/2012     Albumin:    Lab Results   Component Value Date    LABALBU 3.1 06/22/2022    LABALBU 4.4 01/09/2012     Calcium:    Lab Results   Component Value Date    CALCIUM 8.6 06/22/2022     Ionized Calcium:  No results found for: IONCA  Magnesium:    Lab Results   Component Value Date    MG 1.5 06/22/2022     Phosphorus:    Lab Results   Component Value Date    PHOS 3.2 06/22/2022     LDH:  No results found for: LDH  Uric Acid:    Lab Results   Component Value Date    LABURIC 8.8 03/07/2022     PT/INR:    Lab Results   Component Value Date    PROTIME 11.4 07/06/2017    INR 1.0 07/06/2017     PTT:  No results found for: APTT, PTT[APTT}  Troponin:    Lab Results   Component Value Date    TROPONINI 0.02 02/09/2021     U/A:    Lab Results   Component Value Date    NITRITE Neg 03/22/2022    COLORU Yellow 06/16/2022    PROTEINU >=300 06/16/2022    PHUR 6.0 06/16/2022    WBCUA PACKED 06/16/2022    WBCUA NONE 01/09/2012    RBCUA 2-5 06/16/2022    RBCUA NONE 01/17/2014    YEAST Present 03/07/2022    BACTERIA FEW 06/16/2022    CLARITYU TURBID 06/16/2022    SPECGRAV 1.020 06/16/2022    LEUKOCYTESUR LARGE 06/16/2022    UROBILINOGEN 0.2 06/16/2022    BILIRUBINUR Negative 06/16/2022    BILIRUBINUR Neg 03/22/2022    BILIRUBINUR NEGATIVE 01/09/2012    BLOODU LARGE 06/16/2022    GLUCOSEU Negative 06/16/2022    GLUCOSEU NEGATIVE 01/09/2012    AMORPHOUS FEW 01/17/2014     ABG:  No results found for: PH, PCO2, PO2, HCO3, BE, THGB, TCO2, O2SAT  HgBA1c:    Lab Results   Component Value Date    LABA1C 6.0 08/23/2021     Microalbumen/Creatinine ratio:  No components found for: RUCREAT  FLP:    Lab Results   Component Value Date    TRIG 101 03/07/2022    HDL 43 03/07/2022    LDLCALC 70 03/07/2022    LABVLDL 20 03/07/2022     TSH:    Lab Results   Component Value Date    TSH 1.070 11/11/2015     VITAMIN B12: No components found for: B12  FOLATE:    Lab Results   Component Value Date    FOLATE >20.0 06/18/2022     Iron Saturation:  No components found for: PERCENTFE  FERRITIN:    Lab Results   Component Value Date    FERRITIN 332 06/18/2022     AMYLASE:  No results found for: AMYLASE  LIPASE:    Lab Results   Component Value Date    LIPASE 27 08/09/2021     Fibrinogen Level:  No components found for: FIB  24 Hour Urine for Protein:  No components found for: RAWUPRO, UHRS3, NZBK63MR, UTV3  24 Hour Urine for Creatinine Clearance:  No components found for: CREAT4, UHRS10, UTV10  PSA:   Lab Results   Component Value Date    PSA 1.35 06/17/2022        Imaging:  EXAMINATION:  RETROPERITONEAL ULTRASOUND OF THE KIDNEYS AND URINARY BLADDER     6/16/2022     COMPARISON:  CT abdomen and pelvis from August 9, 2021     HISTORY:  ORDERING SYSTEM PROVIDED HISTORY: ARF  TECHNOLOGIST PROVIDED HISTORY:     Reason for exam:->ARF  What reading provider will be dictating this exam?->CRC     FINDINGS:     Kidneys:     The right kidney measures 11.5 cm in length and the left kidney measures 11.2  cm in length.     Right kidney: The corticomedullary differentiation and cortical thickness is  decreased.  Anechoic thin walled nonvascular right mid and upper pole renal  cysts measuring 25 mm and 12 mm.  There is moderate to severe right  hydronephrosis.     Left kidney:  The corticomedullary differentiation and cortical thickness is  decreased.  Anechoic thin walled nonvascular left mid to lower pole 27 mm  cyst.  No concerning renal mass or intrarenal calcification.  There is  moderate to severe left hydronephrosis.       Bladder:     A Fay catheter is identified within the bladder.  The bladder is not fully  decompressed.  Bladder wall appears thickened.  Echogenic debris is  identified within the bladder.     The prostate gland appears enlarged measuring 6.2 x 5.2 x 4.4 cm.        Impression  1.  Moderate to severe bilateral hydronephrosis.  When compared to prior CT  scan the degree of hydronephrosis is less extensive.     2.  Bilateral renal cortical thinning.  Bilateral renal cysts.     3.  A Fay catheter identified within the bladder.  Bladder is not fully  decompressed.  Layering echogenic debris identified within the bladder lumen. Bladder wall appears thickened.     4.  Prostate is enlarged. Assessment  1-Stage III MORENA in the setting of Neurogenic Bladder  with chronic suprapubic catheter possibly exacerbated by the ACEI+HCTZ and possible UTI  UA SG 1.020 ketones tr, blood large, protein >300, Ni large, WBC packed, hector few, LE large, pH 6.0  Urology note reviewed  Cr 4.3-->3.1-->2.3-->1.9-->1.8mg/dl  PLAN:  1. Urology following  2. Follow labs as an outpt  4. Holding ACEI at D/C    2-Non Anion Gap Met Acidosis in the setting of the Impaired tubule function due Obstruction  And the MORENA-Resolved  HCO3 30  PLAN:  1. Follow Labs    3-Hyperkalemia sec to ACEI + MORENA + Obstruction  K+ WNL  PLAN:  1. Follow K+  2. 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 Follow Ca++ and PO4    5-HTN with CKD I-IV  BP at goal <130/80  PLAN:  1.  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 Follow H/H    7.  Hypomagnesemia  PLAN:  1. IV supplement    Thank you  for allowing us to participate in care of Jacinto Rooney MD  10:59 AM  6/22/2022

## 2022-06-22 NOTE — DISCHARGE SUMMARY
[facility] Physician Discharge Summary       [address] [place] [address] [facility]  [phone]            Activity level: As tolerated    Diet: ADULT DIET; Regular; Low Potassium (Less than 3000 mg/day); Low Phosphorus (Less than 1000 mg)   Consider taking a probiotic or cultured yogurt during the time that you are on the antibiotic orally    Labs: Per your primary care follow-up    Condition at discharge: Improved    Dispo: Home    Patient ID:  Apollo Acuna  [id number]  42 y.o.  6/2/1932    Admit date: 6/16/2022    Discharge date and time:  6/22/2022  4:24 PM    Admission Diagnoses: Principal Problem:    Complicated UTI (urinary tract infection)  Active Problems:    Urinary tract infection associated with catheterization of urinary tract ([address]    Hydronephrosis due to obstruction of bladder    Stage 3 acute kidney injury ([address]  Resolved Problems:    * No resolved hospital problems. *      Discharge Diagnoses: Principal Problem:    Complicated UTI (urinary tract infection)  Active Problems:    Urinary tract infection associated with catheterization of urinary tract (HCC)    Hydronephrosis due to obstruction of bladder    Stage 3 acute kidney injury ([address]  Resolved Problems:    * No resolved hospital problems. *      Consults:  IP CONSULT TO HOSPITALIST  IP CONSULT TO NEPHROLOGY  IP CONSULT TO INFECTIOUS DISEASES  IP CONSULT TO UROLOGY  IP CONSULT TO SOCIAL WORK    Procedures: None    Hospital Course:  1.    Complicated UTI:  ID following.    -Urine culture #1 is growing Mixed al: Mixed gram negative rods and Nonhemolytic Strep species.  -Continue Cefepime per ID awaiting urine culture #2  -Urine culture #2 growing Enterococcus faecalis, will discontinue the IV cefepime and begin oral 500 mg p.o. twice daily of Augmentin and patient is cleared by ID for discharge  -Per urology; he should be discharged with drainage bag and the SPT should be left to gravity. He will no longer clamp the SPT at home. .     2. 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 Burton Phillips as outpatient   -Per urology; he should be discharged with drainage bag and the SPT should be left to gravity. He will no longer clamp the SPT at home.  -Plan is for non surgical management with bladder drainage, antibiotics and hydration.         3. MORENA on chronic kidney disease:    -Nephrology following. Creatinine was 5.5 on admission and is 1.9 today.  -Continue IV fluids.      4.   Multifactorial hyperkalemia resolved:    -Potassium was 5.5 yesterday. ACEi is on hold. Potassium is 4.0 today.       5.   Metabolic acidosis Improved:    -Bicarb was 14 on admission and is 28 today.      6. Hypertension:    -Continue Norvasc, metoprolol, lisinopril on hold secondary to MORENA     7. Anemia:    -Hgb is 9.4 today. Likely secondary to chronic kidney disease with MORENA.      8. BPH:    -Continue Proscar.      9. GERD:   -Continue Protonix  -Per urology; he should be discharged with drainage bag and the SPT should be left to gravity. He will no longer clamp the SPT at home. [facility]        Discharge Exam:  General Appearance: alert and oriented to person, place and time and in no acute distress  Skin: warm and dry, scabbed area to nose  Head: normocephalic and atraumatic  Eyes: pupils equal, round, and reactive to light, conjunctivae normal  Neck: neck supple and non tender without mass   Pulmonary/Chest: clear to auscultation bilaterally- no wheezes, rales or rhonchi, no respiratory distress  Cardiovascular: irregularly irregular, murmur  Abdomen: soft, tender, distended, normal bowel sounds, suprapubic catheter with cloudy urine  Extremities: no cyanosis, no clubbing and no edema  Neurologic: no cranial nerve deficit and speech normal  : SPT in place       Vitals:    06/21/22 1955 06/22/22 0025 06/22/22 0457 06/22/22 0845   BP: 128/61   (!) 119/59   Pulse: 60   62   Resp: 16   16   Temp: 98.7 °F (37.1 °C)   99.5 °F (37.5 °C)   TempSrc: Oral   Oral   SpO2: 97%   98%   Weight:  146 lb 8 oz (66.5 kg) 146 lb (66.2 kg)    Height:         I/O last 3 completed shifts: In: 18 [P.O.:240; I.V.:750]  Out: 5350 [WNWHO:7130]  I/O this shift:  In: 250 [P.O.:240; I.V.:10]  Out: -       LABS:  Recent Labs     06/20/22  0742 06/21/22  0646 06/22/22  0704    142 141   K 4.0 4.2 4.1    106 104   CO2 28 28 30*   BUN 44* 35* 32*   CREATININE 1.9* 1.8* 1.8*   GLUCOSE 100* 105* 98   CALCIUM 8.3* 8.3* 8.6       Recent Labs     06/20/22  0742 06/21/22  0646 06/22/22  0704   WBC 8.9 8.7 8.7   RBC 3.01* 2.92* 2.98*   HGB 9.4* 9.2* 9.3*   HCT 29.8* 28.8* 30.0*   MCV 99.0 98.6 100.7*   MCH 31.2 31.5 31.2   MCHC 31.5* 31.9* 31.0*   RDW 14.2 13.8 14.0    318 307   MPV 9.3 9.3 9.4       No results for input(s): POCGLU in the last 72 hours. Imaging:  US RETROPERITONEAL COMPLETE    Result Date: 6/16/2022  EXAMINATION: RETROPERITONEAL ULTRASOUND OF THE KIDNEYS AND URINARY BLADDER 6/16/2022 COMPARISON: CT abdomen and pelvis from August 9, 2021 HISTORY: ORDERING SYSTEM PROVIDED HISTORY: ARF TECHNOLOGIST PROVIDED HISTORY: Reason for exam:->ARF What reading provider will be dictating this exam?->CRC FINDINGS: Kidneys: The right kidney measures 11.5 cm in length and the left kidney measures 11.2 cm in length. Right kidney: The corticomedullary differentiation and cortical thickness is decreased. Anechoic thin walled nonvascular right mid and upper pole renal cysts measuring 25 mm and 12 mm. There is moderate to severe right hydronephrosis. Left kidney: The corticomedullary differentiation and cortical thickness is decreased. Anechoic thin walled nonvascular left mid to lower pole 27 mm cyst.  No concerning renal mass or intrarenal calcification.   There is moderate to (PROSCAR) 5 MG tablet Take 1 tablet by mouth once daily  Qty: 90 tablet, Refills: 0      simvastatin (ZOCOR) 40 MG tablet Take 1 tablet by mouth nightly  Qty: 90 tablet, Refills: 1      Magnesium Oxide 200 MG TABS Take 200 mg by mouth daily  Qty: 30 tablet, Refills: 3      ferrous sulfate (IRON 325) 325 (65 Fe) MG tablet Take 1 tablet by mouth 2 times daily  Qty: 100 tablet, Refills: 3      Multiple Vitamin (ONE-A-DAY ESSENTIAL) TABS Take 1 tablet by mouth daily      Cholecalciferol (VITAMIN D3) 50 MCG (2000 UT) CAPS Take 2,000 Units by mouth daily       Ascorbic Acid (C-250) 250 MG CHEW Take 250 mg by mouth daily       vitamin B-12 (CYANOCOBALAMIN) 1000 MCG tablet Take 1,000 mcg by mouth daily      Misc Natural Products (OSTEO BI-FLEX JOINT SHIELD PO) Take 2 tablets by mouth daily. aspirin 81 MG EC tablet Take 81 mg by mouth daily Last dose 10/28       ! ! - Potential duplicate medications found. Please discuss with provider. Note that greater than 30 minutes was spent in preparing discharge papers, discussing discharge with patient, medication review, etc.    NOTE: This report was transcribed using voice recognition software. Every effort was made to ensure accuracy; however, inadvertent computerized transcription errors may be present.      Signed:  Electronically signed by Chante Marley, Ph.D. on 6/22/2022 at @NOW

## 2022-06-22 NOTE — PLAN OF CARE
Problem: Pain  Goal: Verbalizes/displays adequate comfort level or baseline comfort level  6/22/2022 0504 by Seb Garcia RN  Outcome: Progressing  6/21/2022 1913 by Jalen Rodríguez RN  Outcome: Progressing     Problem: Safety - Adult  Goal: Free from fall injury  6/22/2022 0504 by Seb Garcia RN  Outcome: Progressing  6/21/2022 1913 by Jalen Rodríguez RN  Outcome: Progressing     Problem: ABCDS Injury Assessment  Goal: Absence of physical injury  6/22/2022 0504 by Seb Garcia RN  Outcome: Progressing  6/21/2022 1913 by aJlen Rodríguez RN  Outcome: Progressing     Problem: Skin/Tissue Integrity  Goal: Absence of new skin breakdown  Description: 1. Monitor for areas of redness and/or skin breakdown  2. Assess vascular access sites hourly  3. Every 4-6 hours minimum:  Change oxygen saturation probe site  4. Every 4-6 hours:  If on nasal continuous positive airway pressure, respiratory therapy assess nares and determine need for appliance change or resting period. 6/21/2022 1913 by Jalen Rodríguez RN  Outcome: Progressing     Problem: Pain  Goal: Verbalizes/displays adequate comfort level or baseline comfort level  6/22/2022 0504 by Seb Garcia RN  Outcome: Progressing  6/21/2022 1913 by Jalen Rodríguez RN  Outcome: Progressing     Problem: Safety - Adult  Goal: Free from fall injury  6/22/2022 0504 by Seb Garcia RN  Outcome: Progressing  6/21/2022 1913 by Jalen Rodríguez RN  Outcome: Progressing     Problem: ABCDS Injury Assessment  Goal: Absence of physical injury  6/22/2022 0504 by Seb Garcia RN  Outcome: Progressing  6/21/2022 1913 by Jalen Rodríguez RN  Outcome: Progressing     Problem: Skin/Tissue Integrity  Goal: Absence of new skin breakdown  Description: 1. Monitor for areas of redness and/or skin breakdown  2. Assess vascular access sites hourly  3. Every 4-6 hours minimum:  Change oxygen saturation probe site  4.   Every 4-6 hours:  If on nasal continuous positive airway pressure, respiratory therapy assess nares and determine need for appliance change or resting period.   6/21/2022 1913 by Tyrone Mcgarry RN  Outcome: Progressing

## 2022-06-22 NOTE — PROGRESS NOTES
CLINICAL PHARMACY NOTE: MEDS TO BEDS    Total # of Prescriptions Filled: 1   The following medications were delivered to the patient:  · AUGMENTIN 500/125    Additional Documentation:  Kelli Cai- OLIVERC NOT COVERED BY INSURANCE

## 2022-06-22 NOTE — PROGRESS NOTES
[facility]     [address] [place] [address]  [place], [address]  Phone [phone]   [phone]      Admit Date: 2022  6:25 PM  Pt Name: Martín De La Rosa  MRN: [id number]  : 1932  Reason for Consult:    Chief Complaint   Patient presents with    Dysuria     stated that he is producing little to no urine     Requesting Physician:  Tammy Persaud MD  PCP: Shea Mitchell MD  History Obtained From:  patient, chart   ID consulted for Dehydration [E86.0]  Hyperkalemia [E87.5]  Acute cystitis without hematuria [M65.70]  Complicated UTI (urinary tract infection) [N39.0]  Acute renal failure, unspecified acute renal failure type ([address] [N17.9]  on hospital day 6   Face to face encounter   CHIEF COMPLAINT       Chief Complaint   Patient presents with    Dysuria     stated that he is producing little to no urine     HISTORYOF PRESENT ILLNESS   Martín De La Rosa is a 80 y.o. male who presents with   has a past medical history of Enlarged prostate, Hyperlipidemia, Hypertension, Lumbosacral strain, and Pacemaker. Dysuria       Patient lives with wife- he reports he was not feeling well since about Monday. He has a suprabic cath and he also reports he voids. His urine has been cloudy with sediments. He denies any fevers at home. He reports fatigue, poor appetite. And overall not feeling well. He is known to Urology. He had TURP and suprapubic placement in 2021. He is chronic kidney disease. He denies any recent antibiotics  Cultures are pending   Labs reviewed   He denies any nausea, vomiting, fevers, no diarrhea, does have constipation. Previous cultures reviewed     ID has been asked to evaluate and manage atbs. DOS  22    Patient is sitting up in chair. Feeling well- no fevers.    Family in room- updated    2022  In chair ha no c/o SPT has cloudy sediments  2022 Assisted in transferring pt from bed to chair to eat breakd=fast has no c/o Guillory yellow   6/19/2022 Pt in chair wife present spt with cloudy urine  No f/c/n/v/d/  6/18/2022Pt in chair has no c/o guillory still cloudy no issues with atbx   REVIEW OF SYSTEMS     CONSTITUTIONAL:   + fatigue   ALLERGIES:    No urticaria, hay fever,    EYES:     No blurry vision, loss of vision, eye pain  ENT:      No hearing loss, sore throat  CARDIOVASCULAR:   No chest pain or palpitations, + HTN, + hyperlipidemia , + pacer   RESPIRATORY:   No cough, sob  ENDOCRINE:    No increase thirst, urination   HEME-LYMPH:   No easy bruising or bleeding  GI:     No nausea, vomiting or diarrhea  :     No urinary complaints  NEURO:    No seizures, stroke, HA  MUSCULOSKELETAL:  No muscle aches or pain, no joint pain  SKIN:     No rash or itch  PSYCH:    No depression or anxiety       CURRENT MEDICATIONS     Current Facility-Administered Medications:     amoxicillin-clavulanate (AUGMENTIN) 500-125 MG per tablet 1 tablet, 1 tablet, Oral, 2 times per day, Bridgett Peguero, APRN - CNS    ascorbic acid (VITAMIN C) tablet 250 mg, 250 mg, Oral, Daily, Uli Buenrostro MD, 250 mg at 06/22/22 9246    aspirin EC tablet 81 mg, 81 mg, Oral, Daily, Uli Buenrostro MD, 81 mg at 06/22/22 0839    pantoprazole (PROTONIX) tablet 40 mg, 40 mg, Oral, Daily, Uli Buenrostro MD, 40 mg at 06/22/22 0535    timolol (TIMOPTIC) 0.5 % ophthalmic solution 1 drop, 1 drop, Both Eyes, BID, Uli Buenrostro MD, 1 drop at 06/22/22 0840    sodium chloride flush 0.9 % injection 5-40 mL, 5-40 mL, IntraVENous, 2 times per day, Uli Buenrostro MD, 10 mL at 06/22/22 0848    sodium chloride flush 0.9 % injection 5-40 mL, 5-40 mL, IntraVENous, PRN, Uli Buenrostro MD    0.9 % sodium chloride infusion, , IntraVENous, PRN, Uli Buenrostro MD    acetaminophen (TYLENOL) tablet 650 mg, 650 mg, Oral, Q6H PRN **OR** acetaminophen (TYLENOL) suppository 650 mg, 650 mg, Rectal, Q6H PRN, Uli Buenrostro MD    ondansetron (ZOFRAN-ODT) disintegrating tablet 4 mg, 4 mg, Oral, Q8H PRN **OR** ondansetron (ZOFRAN) injection 4 mg, 4 mg, IntraVENous, Q6H PRN, Magdalena Nielsen MD    polyethylene glycol [facility]) packet 17 g, 17 g, Oral, Daily, Magdalena Nielsen MD, 17 g at 06/22/22 9437    heparin (porcine) injection 5,000 Units, 5,000 Units, SubCUTAneous, 3 times per day, Magdalena Nielsen MD, 5,000 Units at 06/22/22 0458    Vitamin D (CHOLECALCIFEROL) tablet 2,000 Units, 2,000 Units, Oral, Daily, Aris Feathers, APRN - CNP, 2,000 Units at 06/22/22 0839    amLODIPine (NORVASC) tablet 2.5 mg, 2.5 mg, Oral, Daily, [place] Feathers, APRN - CNP, 2.5 mg at 06/22/22 0411    finasteride (PROSCAR) tablet 5 mg, 5 mg, Oral, Daily, [place] Feathers, APRN - CNP, 5 mg at 06/22/22 6291    metoprolol succinate (TOPROL XL) extended release tablet 25 mg, 25 mg, Oral, Daily, Aris Feathers, APRN - CNP, 25 mg at 06/22/22 0839    atorvastatin (LIPITOR) tablet 20 mg, 20 mg, Oral, Daily, [place] Feathers, APRN - CNP, 20 mg at 06/21/22 2113    vitamin B-12 (CYANOCOBALAMIN) tablet 1,000 mcg, 1,000 mcg, Oral, Daily, [place] Feathers, APRN - CNP, 1,000 mcg at 06/22/22 1760  ALLERGIES     Patient has no known allergies.   Immunization History   Administered Date(s) Administered    COVID-19, Moderna, Booster, PF, 50mcg/0.25ml 12/06/2021    COVID-19, Moderna, Primary or Immunocompromised, PF, 100mcg/0.5mL 01/20/2021, 02/19/2021, 03/01/2021    DTaP 09/18/2003    Influenza 10/28/2013    Influenza Virus Vaccine 09/24/2012, 09/23/2014    Influenza, High Dose (Fluzone 65 yrs and older) 11/21/2016, 10/25/2017, 10/09/2018    Influenza, High-dose, [place], 65 yrs +, IM (Fluzone) 10/26/2020    Influenza, Quadv, adjuvanted, 65 yrs +, IM, PF (Fluad) 10/12/2021    Influenza, Triv, inactivated, subunit, adjuvanted, IM (Fluad 65 yrs and older) 11/13/2019    Pneumococcal Conjugate 13-valent (Rogena Peabody) 02/09/2016, 10/01/2020    Pneumococcal Polysaccharide ([id number]) 07/21/2011      Internal Administration   First Dose COVID-19, Eleazar [place], Primary or Immunocompromised, PF, 100mcg/0.5mL  01/20/2021   Second Dose COVID-19, Moderna, Primary or Immunocompromised, PF, 100mcg/0.5mL   02/19/2021       Last COVID Lab SARS-CoV-2, PCR (no units)   Date Value   08/22/2021 Not Detected     SARS-CoV-2, NAAT (no units)   Date Value   08/22/2021 Not Detected     SARS-COV-2, POC (no units)   Date Value   02/05/2021 Detected (A)        PAST MEDICAL HISTORY     Past Medical History:   Diagnosis Date    Enlarged prostate     Hyperlipidemia     Hypertension     Lumbosacral strain 7/8/2013    Pacemaker      SURGICAL HISTORY       Past Surgical History:   Procedure Laterality Date    A-V CARDIAC PACEMAKER INSERTION  5/30/07    COLONOSCOPY  8/3/11    CYSTOSCOPY N/A 11/2/2021    CYSTOSCOPY RETROGRADE PYELOGRAM, TRANSURETHRAL RESECTION OF THE PROSTATE WITH SUPRAPUBIC CATHETER PLACEMENT performed by Peter Ch MD at [facility] [address] SURGERY  2002    cataracts evelyn    PACEMAKER PLACEMENT      2017 battery change    TONSILLECTOMY      child     FAMILY HISTORY       Family History   Problem Relation Age of Onset    High Blood Pressure Mother     Cancer Mother     Diabetes Mother     Heart Disease Father     High Blood Pressure Father      PHYSICAL EXAM        Vitals:    06/21/22 1955 06/22/22 0025 06/22/22 0457 06/22/22 0845   BP: 128/61   (!) 119/59   Pulse: 60   62   Resp: 16   16   Temp: 98.7 °F (37.1 °C)   99.5 °F (37.5 °C)   TempSrc: Oral   Oral   SpO2: 97%   98%   Weight:  146 lb 8 oz (66.5 kg) 146 lb (66.2 kg)    Height:         Physical Exam   Constitutional/General:    In chair    Head: NC/AT  Eyes: PERRL, EOMI   Pulmonary: Lungs clear to auscultation bilaterally. On RA   Cardiovascular:  Regular rate and rhythm  Abdomen: Soft, + BS. No distension. Nontender. Extremities    Warm and well perfused  Skin: Warm and dry    Neurologic:    No focal deficits  Psych: Normal Affect  Suprapubic cath - sediments noted.    PIV     DIAGNOSTIC RESULTS   RADIOLOGY:   US RETROPERITONEAL COMPLETE    Result Date: 6/16/2022  EXAMINATION: RETROPERITONEAL ULTRASOUND OF THE KIDNEYS AND URINARY BLADDER 6/16/2022 COMPARISON: CT abdomen and pelvis from August 9, 2021 HISTORY: ORDERING SYSTEM PROVIDED HISTORY: ARF TECHNOLOGIST PROVIDED HISTORY: Reason for exam:->ARF What reading provider will be dictating this exam?->CRC FINDINGS: Kidneys: The right kidney measures 11.5 cm in length and the left kidney measures 11.2 cm in length. Right kidney: The corticomedullary differentiation and cortical thickness is decreased. Anechoic thin walled nonvascular right mid and upper pole renal cysts measuring 25 mm and 12 mm. There is moderate to severe right hydronephrosis. Left kidney: The corticomedullary differentiation and cortical thickness is decreased. Anechoic thin walled nonvascular left mid to lower pole 27 mm cyst.  No concerning renal mass or intrarenal calcification. There is moderate to severe left hydronephrosis. Bladder: A Fay catheter is identified within the bladder. The bladder is not fully decompressed. Bladder wall appears thickened. Echogenic debris is identified within the bladder. The prostate gland appears enlarged measuring 6.2 x 5.2 x 4.4 cm. 1.  Moderate to severe bilateral hydronephrosis. When compared to prior CT scan the degree of hydronephrosis is less extensive. 2.  Bilateral renal cortical thinning. Bilateral renal cysts. 3.  A Fay catheter identified within the bladder. Bladder is not fully decompressed. Layering echogenic debris identified within the bladder lumen. Bladder wall appears thickened. 4.  Prostate is enlarged.      LABS  Recent Labs     06/20/22  0742 06/21/22  0646 06/22/22  0704   WBC 8.9 8.7 8.7   HGB 9.4* 9.2* 9.3*   HCT 29.8* 28.8* 30.0*   MCV 99.0 98.6 100.7*    318 307     Recent Labs     06/20/22  0742 06/20/22  0742 06/21/22  0646 06/21/22  0646 06/22/22  0704     --  142  --  141   K 4.0   < > 4.2   < > 4.1      < > 106   < > 104   CO2 28   < > 28   < > 30*   BUN 44*  --  35*  --  32*   CREATININE 1.9*  --  1.8*  --  1.8*   GFRAA 40  --  43  --  43   LABGLOM 33  --  36  --  36   GLUCOSE 100*  --  105*  --  98   PROT 5.8*  --  5.5*  --  5.7*   LABALBU 3.0*  --  2.8*  --  3.1*   CALCIUM 8.3*  --  8.3*  --  8.6   BILITOT 0.3  --  0.3  --  0.2   ALKPHOS 60  --  58  --  56   AST 8  --  11  --  12   ALT 5  --  6  --  8    < > = values in this interval not displayed.      Lab Results   Component Value Date    ADVIRPCR Not Detected 08/22/2021    [facility] Not Detected 08/22/2021    BPPTXPPCR Not Detected 08/22/2021    CHLPNEPCR Not Detected 08/22/2021    [id number] Not Detected 08/22/2021    FRAQNH4VLV Not Detected 08/22/2021    CNYLR22JVB Not Detected 08/22/2021    KWDMZ64UBM Not Detected 08/22/2021    COVID19 Not Detected 08/22/2021    COVID19 Not Detected 08/22/2021    [facility] Not Detected 08/22/2021    RHENTPCR Not Detected 08/22/2021    FLUBPCR Not Detected 08/22/2021    [facility] Not Detected 08/22/2021    ZEAEEVF2ODE Not Detected 08/22/2021    BSSWFFA0OPJ Not Detected 08/22/2021    EOGNTVK4SSW Not Detected 08/22/2021    OWIPVWJ0PEF Not Detected 08/22/2021    RSVPCR Not Detected 08/22/2021     Lab Results   Component Value Date    COVID19 Not Detected 08/22/2021    COVID19 Not Detected 08/22/2021     COVID-19/RICK-COV2 LABS  Recent Labs     06/20/22  0742 06/21/22  0646 06/22/22  0704   AST 8 11 12   ALT 5 6 8     Lab Results   Component Value Date    CHOL 133 03/07/2022    TRIG 101 03/07/2022    HDL 43 03/07/2022    LDLCALC 70 03/07/2022    LABVLDL 20 03/07/2022     MICROBIOLOGY:  Cultures :     Lab Results   Component Value Date    BC 24 Hours no growth 06/17/2022    BC 5 Days no growth 08/22/2021    ORG Enterococcus faecalis 06/19/2022    ORG Pseudomonas aeruginosa 08/23/2021     Lab Results   Component Value Date    BLOODCULT2 24 Hours no growth 06/17/2022    BLOODCULT2 5 Days no growth 08/22/2021    ORG Enterococcus faecalis 06/19/2022    ORG Pseudomonas aeruginosa 08/23/2021     Urine Culture, Routine   Date Value Ref Range Status   06/19/2022 25,000 CFU/ml  Final   06/16/2022   Final    10 to 100,000 CFU/mL  Mixed al isolated. Further workup and sensitivity testing  is not routinely indicated and will not be performed. Mixed al isolated includes:  Mixed gram negative rods  Nonhemolytic Strep species     01/05/2022   Final    10 to 100,000 CFU/mL  Mixed al isolated. Further workup and sensitivity testing  is not routinely indicated and will not be performed. Mixed al isolated includes:  Mixed gram negative rods  Oxidase positive gram negative rods  Nonhemolytic Strep species         FINAL IMPRESSION    Patient is a 80 y.o. male who presented with   Chief Complaint   Patient presents with    Dysuria     stated that he is producing little to no urine    and admitted for Dehydration [E86.0]  Hyperkalemia [E87.5]  Acute cystitis without hematuria [Q05.61]  Complicated UTI (urinary tract infection) [N39.0]  Acute renal failure, unspecified acute renal failure type ([address] [N17.9]  · UTI mixed gpo/gnr blood cx ngtd   · Moderate to severe bilateral hydronephrosis  · History of COVID  · chemo better     Plan:   · Stop cefepime  · Start Augmentin 500 mg PO BID  · Urine cultures- E. Faecalis   · Labs reviewed    · Urology and nephrology following    · Previous cultures reviewed   · Can d/c from ID POV  · Family at bedside and updated     Imaging and labs were reviewed. Stephany Thomas was informed of their diagnosis, indications, risks and benefits of treatment. Stephany Thomas had the opportunity to ask questions. All questions were answered. Thank you for involving me in the care of Stephany Thomas. Please do not hesitate to call for any questions or concerns.     Electronically signed by RAJAN Joseph on 6/22/2022 at 9:44 AM  As above         This is a face to face

## 2022-06-22 NOTE — PROGRESS NOTES
OT BEDSIDE TREATMENT NOTE      Date:2022  Patient Name: Bella Louis  MRN: [id number]  : 1932  Room: [address] [place] [address]       Evaluating OT: Nelia Hoffmann OTR/L; [id number]      Referring Provider and Specific Provider Orders/Date:      22   OT eval and treat  Start:  22,   End:  22,   ONE TIME,   Standing Count:  1 Occurrences,   R         Vandana Spear, APRN - CNP      Placement Recommendation: HHOT        Diagnosis:   1. Acute renal failure, unspecified acute renal failure type ([address]    2. Acute cystitis without hematuria    3. Hyperkalemia    4. Dehydration         Surgery: none        Pertinent Medical History:       Past Medical History        Past Medical History:   Diagnosis Date    Enlarged prostate      Hyperlipidemia      Hypertension      Lumbosacral strain 2013    Pacemaker              Past Surgical History         Past Surgical History:   Procedure Laterality Date    A-V CARDIAC PACEMAKER INSERTION   07    COLONOSCOPY   8/3/11    CYSTOSCOPY N/A 2021     CYSTOSCOPY RETROGRADE PYELOGRAM, TRANSURETHRAL RESECTION OF THE PROSTATE WITH SUPRAPUBIC CATHETER PLACEMENT performed by Sunshine Vega MD at [place] [address]        cataracts evelyn    PACEMAKER PLACEMENT         2017 battery change    TONSILLECTOMY         child          Precautions:  Fall Risk, suprapubic catheter, Quechan/hearing aides      Assessment of current deficits:     [x]? Functional mobility            [x]?ADLs           [x]? Strength                   []?Cognition    [x]? Functional transfers          [x]? IADLs         [x]? Safety Awareness   [x]? Endurance    []? Fine Coordination              [x]? Balance      []? Vision/perception    []? Sensation      []? Gross Motor Coordination  []? ROM           []?  Delirium                   []? Motor Control      OT PLAN OF CARE   OT POC based on physician orders, patient diagnosis and results of clinical assessment     Frequency/Duration 1-3 days/wk for 2 weeks PRN      Specific OT Treatment Interventions to include:   * Instruction/training on adapted ADL techniques and AE recommendations to increase functional independence within precautions       * Training on energy conservation strategies, correct breathing pattern and techniques to improve independence/tolerance for self-care routine  * Functional transfer/mobility training/DME recommendations for increased independence, safety, and fall prevention  * Patient/Family education to increase follow through with safety techniques and functional independence  * Recommendation of environmental modifications for increased safety with functional transfers/mobility and ADLs  * Therapeutic exercise to improve motor endurance, ROM, and functional strength for ADLs/functional transfers  * Therapeutic activities to facilitate/challenge dynamic balance, stand tolerance for increased safety and independence with ADLs  * Positioning to improve skin integrity, interaction with environment and functional independence     Recommended Adaptive Equipment: none       Home Living: with spouse; single family home, 2 story, resides on 1st floor, 3 steps to enter with rail, tub shower.        Equipment owned: wheeled walker, straight cane, bedside commode, tub transfer bench, 3 grab bars     Prior Level of Function: Independent with ADLs , Independent with IADLs; ambulated with straight cane      Pain Level: no c/o pain at this time     Cognition: A&O: 4/4; Follows 2 step directions              Memory: good               Sequencing: good               Problem solving: good               Judgement/safety: good      [facility]   AM-PAC Daily Activity Inpatient   How much help for putting on and taking off regular lower body clothing?: A Lot  How much help for Bathing?: A Lot  How much help for Toileting?: A Little  How much help for putting on and taking off regular upper body clothing?: A Little  How much help for taking care of personal grooming?: A Little  How much help for eating meals?: None  AM-PAC Inpatient Daily Activity Raw Score: 17  AM-PAC Inpatient ADL T-Scale Score : 37.26  ADL Inpatient CMS 0-100% Score: 50.11  ADL Inpatient CMS G-Code Modifier : CK                Functional Assessment:     Initial Eval Status  Date: 6/17/22 Treatment Status  Date: 6/22/22 STGs = LTGs  Time frame: 10-14 days   Feeding Independent  Pt able to manage packages to eat suzan crackers when seated EOB  Independent    Grooming Supervision  N/T Independent    UB Dressing Minimal Assist to tie gown Min A to adjust back of gown Independent    LB Dressing Moderate Assist for lower body clothing management before and after toileting. Minimal assist to don slippers while seated at EOB. N/T - pt states he donned pants and socks without assist and declined changing sweat pants or socks at this time Independent    Bathing Moderate Assist N/T  Modified Hart    Toileting Supervision for hygiene after using commode for BM. Toilet wipes provided. N/T - pt has suprapubic catheter Independent    Bed Mobility  Supine to sit: Supervision   Sit to supine: N/T as pt was up in chair  N/T; pt seated EOB at end of session; nsg aware Supine to sit: Independent   Sit to supine: Independent    Functional Transfers Minimal Assist from EOB to wheeled walker. Minimal assist for transfer to and from low commode with minimal verbal cues to use grab bar for safe commode transfer. Supervision for transfer from standing with wheeled walker to bedside chair. Transfer training with verbal cues for hand placement throughout session to improve safety.   Supervision for sit to stand transfers from bedside chair, to/from chair in hallway and to EOB; use of FWW; cuing for hand placement Independent    Functional Mobility Minimal assist with wheeled walker to improve balance to and from bathroom and up to bedside chair, verbal cues for walker sequence and safety. Min A with FWW for large household distances in room  and hallway; no LOB noted  Modified [place]    Balance Sitting:     Static: good     Dynamic: fair   Standing: fair  with wheeled walker Sitting:     Static: good     Dynamic: fair   Standing: fair  with wheeled walker      Activity Tolerance Fair  fair  good    Visual/  Perceptual Glasses: yes                        Hand Dominance: left    - BUE AROM exercises: 15 reps in all planes of movement to increase ROM required for functional transfers/ADL participation. Exercises completed in shoulder and elbow flexion/extension, internal/external rotation, abduction/adduction, supination/pronation, digit and wrist flexion/extension, abduction/adduction and digit opposition. B UE ROM appears to be [facility]. Min rest breaks provided 2* to decreased endurance/tolerance. Ex's completed when pt seated in chair in hallway. Hearing: WFL   Sensation:  No c/o numbness or tingling  Tone: WFL   Edema: none noted    Comments: Patient cleared by nursing staff. Upon arrival pt seated in bedside chair. Wife and son present. Pt agreeable to OT tx session. Pt educated with regards to  transfer training, functional mobility, safety awareness,  Static sitting balance, standing balance, B UE ROM ex's, ECT's. At end of session pt seated EOB  with call light within reach and catheter at EOB. Overall, pt demonstrated fair/fair minus independence and safety during completion of ADL/functional transfers/mobility tasks. Pt would benefit from continued skilled OT to increase safety and independence with completion of ADL/IADL tasks for functional independence and quality of life. Pt required cues and education as noted above for safe facilitation and completion of tasks. Therapist provided skilled monitoring of patient's response during treatment session.  Prior to and at the end of session, environmental modifications completed for patients safety and efficiency of treatment session. Overall, patient demonstrates minimal difficulties with completion of BADLs and IADLs. Factors contributing to these difficulties include decreased endurance, and generalized weakness. As noted above, patient likely to benefit from further OT intervention to increase independence, safety, and overall quality of life. Treatment:     ? Functional transfers: Facilitated transfers to/from bedside chair with cues for body alignment, safety and hand placement. ? Postural Balance: Sitting/standing balance retraining to improve righting reactions with postural changes during ADLs. ? Therapeutic Ex's: To increase B UE strength/ROM and endurance required for functional transfers and ADL participation. · Pt has made fair progress towards set goals   · OT 1-3x/week for 5-7 days during hospitalization     Treatment Time also includes thorough review of current medical information, gathering information on past medical history/social history and prior level of function, informal observation of tasks, assessment of data and education on plan of care and goals.     Treatment Time In:  3:05 PM     Treatment Time Out: 3:28 PM            Treatment Charges: Mins Units   ADL/Home Mgt     [id number]     Thera Activities     97725 19 1   Ther Ex                 56437 10 1   Manual Therapy    63716     Neuro Re-ed         98552     Orthotic manage/training                               07644     Non Billable Time     Total Timed Treatment 23 591 [facility], [place]/L #[id number]

## 2022-06-22 NOTE — PLAN OF CARE
Problem: Pain  Goal: Verbalizes/displays adequate comfort level or baseline comfort level  6/22/2022 1219 by Charity August RN  Outcome: Progressing     Problem: Safety - Adult  Goal: Free from fall injury  6/22/2022 1219 by Charity August RN  Outcome: Progressing     Problem: ABCDS Injury Assessment  Goal: Absence of physical injury  6/22/2022 1219 by Charity August RN  Outcome: Progressing

## 2022-06-23 ENCOUNTER — CARE COORDINATION (OUTPATIENT)
Dept: CASE MANAGEMENT | Age: 87
End: 2022-06-23

## 2022-06-23 ENCOUNTER — TELEPHONE (OUTPATIENT)
Dept: FAMILY MEDICINE CLINIC | Age: 87
End: 2022-06-23

## 2022-06-23 DIAGNOSIS — N39.0 COMPLICATED UTI (URINARY TRACT INFECTION): Primary | ICD-10-CM

## 2022-06-23 PROCEDURE — 1111F DSCHRG MED/CURRENT MED MERGE: CPT | Performed by: FAMILY MEDICINE

## 2022-06-23 NOTE — CARE COORDINATION
Samy 45 Transitions Initial Follow Up Call    Call within 2 business days of discharge: Yes    Patient: Monika Chiu Patient : 1932   MRN: [id number]  Reason for Admission: Complicated UTI  Discharge Date: 22 RARS: Readmission Risk Score: 21.2 ( )      Last Discharge  [place] [address]       Complaint Diagnosis Description Type Department Provider    22 Dysuria Acute renal failure, unspecified acute renal failure type ([facility] [address] [facility] . .. [facility]LE Dejah Balderrama MD; Walter Rogel. .. 22 uti  [facility]         Transitions of Care Initial Call    Was this an external facility discharge? No Discharge Facility: [address] [place] [address]    Challenges to be reviewed by the provider   Additional needs identified to be addressed with provider: No  none             Method of communication with provider : none    Advance Care Planning:   Does patient have an Advance Directive: reviewed and current. Care Transition Nurse contacted the family by telephone to perform post hospital discharge assessment. Verified name and  with family as identifiers. Provided introduction to self, and explanation of the CTN role. CTN reviewed discharge instructions, medical action plan and red flags with family who verbalized understanding. Family given an opportunity to ask questions and does not have any further questions or concerns at this time. Were discharge instructions available to patient? Yes. Reviewed appropriate site of care based on symptoms and resources available to patient including: PCP  Specialist  Urgent care clinics  [facility]  When to call 911  Condition related references. The family agrees to contact the PCP office for questions related to their healthcare. Medication reconciliation was performed with family, who verbalizes understanding of administration of home medications.  Advised obtaining a 90-day supply of all daily and as-needed medications. Was patient discharged with a pulse oximeter? no    CTN provided contact information. Plan for follow-up call in 5-7 days based on severity of symptoms and risk factors. Plan for next call: follow up appointment-Did patient get scheduled for f/u sppts with PCP nd urology? Non-face-to-face services provided:  Scheduled appointment with PCP-CTN confirmed with patient wife [facility]) that her DTR will call Dr. Jose Maria Stout (PCP) office today to schedule HFU appt. CTN will route to [facility] advising of the need to schedule HFU appt  Scheduled appointment with Specialist-CTN confirmed with [facility] DTR will call today to schedule HFu appt with Dr. Connee Mortimer (urology)  Obtained and reviewed discharge summary and/or continuity of care documents  Communication with home health agencies or other community services the patient is currently using-CTN spoke with [place] today at [facility] confimring discharge and JES[facility]S. [place] states patient will be seen soon but unsure if today or tomorrow. Education of patient/family/caregiver/guardian to support self-management-Discussed s/s of UTI/infection and knowing when to seek medical attention  Assessment and support for treatment adherence and medication management-Advised of importance for patient to take Augmentin as directed until completely finished.      Care Transitions 24 Hour Call    Schedule Follow Up Appointment with PCP: Jessika Mullen you have a copy of your discharge instructions?: Yes  Do you have all of your prescriptions and are they filled?: Yes  Have you been contacted by a [facility] Pharmacist?: No  Have you scheduled your follow up appointment?: No  Post Acute Services: Home Health (Comment: [address];  [facility] 1/11/22)  Patient DME: Yancy Ladd, Shower chair  Patient Home Equipment: Other (Comment: kahlil)  Do you have support at home?: Partner/Spouse/SO  Do you feel like you have everything you need to keep you well at home?: Yes  Are you an active caregiver in your home?: No  Care Transitions Interventions       Spoke with patient wife Anuj tSeele) on communication release form regarding initial TCM/hospital discharge follow up. Confirmed patient was admitted to [address] [place] [address] for complicated UTI as patienthas history of chronic suprapubic catheter d/t BPH and urine culture tested positive for enterococcus faecalis. Анна Sheldon reports patient doing better since discharge an offers no complaints at this time. Анна Sheldon states patient slept good last night and is getting stronger. States patient able to ambulate independently but has cane if needed. States patient was able to shower independently last night but wife stands by for any needed assistance. Denies patient having any shortness of breath, chest pain, chest discomfort, abdominal/pubic/kidney pain, nausea, vomiting, diarrhea, chills or fever. States sediment from urine is clearing and admits patient having 1,100 cc output this morning from catheter. CTN discussed s/s of UTI/infection and knowing when to seek medical attention. Provided a complete review of home meds with Анна Sheldon who confirms patient obtained Augmentin/Lactobacillus ordered on discharge. Advised of importance for patient to take Augmentin as directed until completely finished. 1111F code entered. CTN spoke with [place] at [facility] confirming [facility] but unsure if patient will be seen today or tomorrow. Анна Sheldon reports DTR will call today to schedule f/u appts with PCP and urology. Denies any needs or concerns at this time. CTN will continue to follow for Care Transition.      Follow Up  Future Appointments   Date Time Provider Department Center   7/26/2022  9:15 AM Radha Shaw MD [facility]   1/19/2023  9:30 AM Radha Shaw MD [facility] RAJAN Lugo

## 2022-06-23 NOTE — TELEPHONE ENCOUNTER
----- Message from RAJAN Hernandes CNP sent at 6/22/2022  9:22 PM EDT -----  Regarding: hospital follow up next week    ----- Message -----  From: Dante Escamilla MD  Sent: 6/22/2022   7:41 PM EDT  To:  Onur Chung MD

## 2022-06-30 ENCOUNTER — CARE COORDINATION (OUTPATIENT)
Dept: CASE MANAGEMENT | Age: 87
End: 2022-06-30

## 2022-06-30 NOTE — CARE COORDINATION
Samy 45 Transitions Follow Up Call    2022    Patient: Will Cintron  Patient : 1932   MRN: [id number]  Reason for Admission: Complicated UTI  Discharge Date: 22 RARS: Readmission Risk Score: 21.2 ( )    Attempted to contact patient/wife today 22 for TCM/hospital discharge follow up sub call. Left message on home/mobile number requesting a return call back to CTN and provided contact information. Noted patient is scheduled for HFU appt tomorrow 22 at 9:15 am with Dr. Griselda Karvonen (PCP). CTN will continue to follow for Care Transition.       Bertina Aschoff, APRN

## 2022-07-01 ENCOUNTER — OFFICE VISIT (OUTPATIENT)
Dept: FAMILY MEDICINE CLINIC | Age: 87
End: 2022-07-01
Payer: MEDICARE

## 2022-07-01 VITALS
WEIGHT: 144.9 LBS | HEIGHT: 69 IN | HEART RATE: 59 BPM | DIASTOLIC BLOOD PRESSURE: 42 MMHG | RESPIRATION RATE: 16 BRPM | TEMPERATURE: 97.6 F | BODY MASS INDEX: 21.46 KG/M2 | SYSTOLIC BLOOD PRESSURE: 110 MMHG | OXYGEN SATURATION: 98 %

## 2022-07-01 DIAGNOSIS — N39.0 COMPLICATED UTI (URINARY TRACT INFECTION): ICD-10-CM

## 2022-07-01 DIAGNOSIS — N39.0 URINARY TRACT INFECTION ASSOCIATED WITH CYSTOSTOMY CATHETER, SUBSEQUENT ENCOUNTER: ICD-10-CM

## 2022-07-01 DIAGNOSIS — Z09 HOSPITAL DISCHARGE FOLLOW-UP: Primary | ICD-10-CM

## 2022-07-01 DIAGNOSIS — T83.510D URINARY TRACT INFECTION ASSOCIATED WITH CYSTOSTOMY CATHETER, SUBSEQUENT ENCOUNTER: ICD-10-CM

## 2022-07-01 PROCEDURE — 1111F DSCHRG MED/CURRENT MED MERGE: CPT | Performed by: FAMILY MEDICINE

## 2022-07-01 PROCEDURE — 99495 TRANSJ CARE MGMT MOD F2F 14D: CPT | Performed by: FAMILY MEDICINE

## 2022-07-01 ASSESSMENT — ENCOUNTER SYMPTOMS
NAUSEA: 0
COUGH: 0
WHEEZING: 0
SORE THROAT: 0
BLOOD IN STOOL: 0
VOMITING: 0
ABDOMINAL PAIN: 0
DIARRHEA: 0
CONSTIPATION: 0
SHORTNESS OF BREATH: 0
BACK PAIN: 0

## 2022-07-01 NOTE — Clinical Note
Saw Mr. [facility] AT [place] 7/1/22 for hospital discharge follow up for UTI. Family inquired about getting additional labs ahead of 7/26/22 Medicare AWV. Please advise patient and family.

## 2022-07-01 NOTE — PROGRESS NOTES
Post-Discharge Transitional Care Follow Up      Ana Paula Paulino   YOB: 1932    Date of Office Visit:  7/1/2022  Date of Hospital Admission: 6/16/22  Date of Hospital Discharge: 6/22/22  Readmission Risk Score (high >=14%. Medium >=10%):Readmission Risk Score: 21.2 ( )      Care management risk score Rising risk (score 2-5) and Complex Care (Scores >=6): 10     Non face to face  following discharge, date last encounter closed (first attempt may have been earlier): 6/23/2022  9:49 AM     Call initiated 2 business days of discharge: Yes       Subjective:   DISCHARGE SUMMARY/TRANSITIONAL CARE COORDINATION TELEPHONE CALL:    Patient ID:  Ana Paula Paulino  [id number]  80 y.o.  6/2/1932     Admit date: 6/16/2022     Discharge date and time:  6/22/2022  4:24 PM     Admission Diagnoses: Principal Problem:    Complicated UTI (urinary tract infection)  Active Problems:    Urinary tract infection associated with catheterization of urinary tract ([address]    Hydronephrosis due to obstruction of bladder    Stage 3 acute kidney injury ([address]  Resolved Problems:    * No resolved hospital problems. *        Discharge Diagnoses: Principal Problem:    Complicated UTI (urinary tract infection)  Active Problems:    Urinary tract infection associated with catheterization of urinary tract (HCC)    Hydronephrosis due to obstruction of bladder    Stage 3 acute kidney injury ([address]  Resolved Problems:    * No resolved hospital problems. *        Consults:  IP CONSULT TO HOSPITALIST  IP CONSULT TO NEPHROLOGY  IP CONSULT TO INFECTIOUS DISEASES  IP CONSULT TO UROLOGY  IP CONSULT TO SOCIAL WORK     Procedures: None     Hospital Course:   1.   Complicated UTI:  ID following.    -Urine culture #1 is growing Mixed al: Mixed gram negative rods and Nonhemolytic Strep species.  -Continue Cefepime per ID awaiting urine culture #2  -Urine culture #2 growing Enterococcus faecalis, will discontinue the IV cefepime and begin oral 500 mg p.o. twice daily of Augmentin and patient is cleared by ID for discharge  -Per urology; he should be discharged with drainage bag and the SPT should be left to gravity. He will no longer clamp the SPT at home. .     2.   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 Fareed Hernandez as outpatient   -Per urology; he should be discharged with drainage bag and the SPT should be left to gravity. He will no longer clamp the SPT at home.  -Plan is for non surgical management with bladder drainage, antibiotics and hydration.         3.   MORENA on chronic kidney disease:    -Nephrology following.  Creatinine was 5.5 on admission and is 1.9 today.  -Continue IV fluids.      4.   Multifactorial hyperkalemia resolved:    -Potassium was 5.5 yesterday. Elois Flick is on hold.  Potassium is 4.0 today.       5.   Metabolic acidosis Improved:    -Bicarb was 14 on admission and is 28 today.      6.   Hypertension:    -Continue Norvasc, metoprolol, lisinopril on hold secondary to MORENA     7.   Anemia:    -Hgb is 9.4 today.  Likely secondary to chronic kidney disease with MORENA.      8.   BPH:    -Continue Proscar.      9.   GERD:   -Continue Protonix  -Per urology; he should be discharged with drainage bag and the SPT should be left to gravity. He will no longer clamp the SPT at home.      Patient Instructions:      Current Discharge Medication List        START taking these medications    Details  amoxicillin-clavulanate (AUGMENTIN) 500-125 MG per tablet Take 1 tablet by mouth every 12 hours for 14 days  Qty: 28 tablet, Refills: 0     lactobacillus (CULTURELLE) capsule Take 1 capsule by mouth every 12 hours  Qty: 60 capsule, Refills: 0           CONTINUE these medications which have NOT CHANGED    Details  !! finasteride (PROSCAR) 5 MG tablet Take 1 tablet by mouth daily  Qty: 90 tablet, Refills: 1     !! lisinopril-hydroCHLOROthiazide (PRINZIDE;ZESTORETIC) 10-12.5 MG per tablet Take 1 tablet by mouth daily  Qty: 90 tablet, Refills: 0     amLODIPine (NORVASC) 2.5 MG tablet Take 1 tablet by mouth daily  Qty: 90 tablet, Refills: 1     metoprolol succinate (TOPROL XL) 25 MG extended release tablet Take 1 tablet by mouth daily  Qty: 90 tablet, Refills: 0     pantoprazole (PROTONIX) 40 MG tablet Take 1 tablet by mouth daily  Qty: 30 tablet, Refills: 2     timolol (TIMOPTIC) 0.5 % ophthalmic solution Place 1 drop into both eyes 2 times daily  Qty: 5 mL, Refills: 0     !! lisinopril-hydroCHLOROthiazide (PRINZIDE;ZESTORETIC) 10-12.5 MG per tablet Take 1 tablet by mouth once daily  Qty: 90 tablet, Refills: 0     !! finasteride (PROSCAR) 5 MG tablet Take 1 tablet by mouth once daily  Qty: 90 tablet, Refills: 0     simvastatin (ZOCOR) 40 MG tablet Take 1 tablet by mouth nightly  Qty: 90 tablet, Refills: 1     Magnesium Oxide 200 MG TABS Take 200 mg by mouth daily  Qty: 30 tablet, Refills: 3     ferrous sulfate (IRON 325) 325 (65 Fe) MG tablet Take 1 tablet by mouth 2 times daily  Qty: 100 tablet, Refills: 3     Multiple Vitamin (ONE-A-DAY ESSENTIAL) TABS Take 1 tablet by mouth daily     Cholecalciferol (VITAMIN D3) 50 MCG (2000 UT) CAPS Take 2,000 Units by mouth daily      Ascorbic Acid (C-250) 250 MG CHEW Take 250 mg by mouth daily      vitamin B-12 (CYANOCOBALAMIN) 1000 MCG tablet Take 1,000 mcg by mouth daily     Misc Natural Products (OSTEO BI-FLEX JOINT SHIELD PO) Take 2 tablets by mouth daily.       aspirin 81 MG EC tablet Take 81 mg by mouth daily Last dose 10/28       TRANSITIONAL CARE COORDINATION CALL 22:    [facility] Transitions Initial Follow Up Call     Call within 2 business days of discharge:  Yes     Patient: Thony Delay  Patient : 1932   MRN: [id number]         Reason for Admission: Complicated UTI  Discharge Date: 22       RARS: Readmission Risk Score: 21.2 ( )                 Last Discharge [facility]       Complaint Diagnosis Description Type Department Provider    22 Dysuria Acute renal failure, unspecified acute renal failure type ([facility] . .. ED to Hosp-Admission (Discharged) (ADMITTED) CAROL 4 TELE Ortiz Rivera MD; Juan Jose Maddox. ..    22 uti   UC (LWBS BEFORE) CAROL Corolla          Transitions of Care Initial Call     Was this an external facility discharge? No Discharge Facility: [address] [place] [address]         Challenges to be reviewed by the provider   Additional needs identified to be addressed with provider: No  none                Method of communication with provider : none      Care Transition Nurse contacted the family by telephone to perform post hospital discharge assessment. Verified name and  with family as identifiers. Provided introduction to self, and explanation of the CTN role.      CTN reviewed discharge instructions, medical action plan and red flags with family who verbalized understanding. Family given an opportunity to ask questions and does not have any further questions or concerns at this time. Were discharge instructions available to patient? Yes.     Medication reconciliation was performed with family, who verbalizes understanding of administration of home medications. Advised obtaining a 90-day supply of all daily and as-needed medications.      CTN provided contact information. Plan for follow-up call in 5-7 days based on severity of symptoms and risk factors. Plan for next call: follow up appointment-Did patient get scheduled for f/u sppts with PCP nd urology? Non-face-to-face services provided:  Scheduled appointment with PCP-CTN confirmed with patient wife [facility]) that her DTR will call Dr. Robyn Marroquin (PCP) office today to schedule HFU appt.  CTN will route to [facility] advising of the need to schedule HFU appt  Scheduled appointment with Specialist-CTN confirmed with Jake Salinas DTR will call today to schedule HFu appt with Dr. Leslie St (urology)  Obtained and reviewed discharge summary and/or continuity of care documents  Communication with home health agencies or other community services the patient is currently using-CTN spoke with Mark Anthony Salgado today at [facility] confimring discharge and [facility]. Mark Anthony Salgado states patient will be seen soon but unsure if today or tomorrow. Education of patient/family/caregiver/guardian to support self-management-Discussed s/s of UTI/infection and knowing when to seek medical attention  Assessment and support for treatment adherence and medication management-Advised of importance for patient to take Augmentin as directed until completely finished.      Care Transitions 24 Hour Call   Schedule Follow Up Appointment with PCP: Leigha Escobar you have a copy of your discharge instructions?: Yes  Do you have all of your prescriptions and are they filled?: Yes  Have you been contacted by a Rayna Bazan Pharmacist?: No  Have you scheduled your follow up appointment?: No  Post Acute Services: [address] [place] [address] [facility] [phone] [facility] [address];  [facility] 1/11/22)  Patient DME: Terri baron, [employer], Shower chair  Patient Home Equipment: Other (Comment: kahlil)  Do you have support at home?: Partner/Spouse/SO  Do you feel like you have everything you need to keep you well at home?: Yes  Are you an active caregiver in your home?: No  Care Transitions Interventions        Spoke with patient wife Carlos Ram) on communication release form regarding initial TCM/hospital discharge follow up. Confirmed patient was admitted to [address] [place] [address] for complicated UTI as patienthas history of chronic suprapubic catheter d/t BPH and urine culture tested positive for enterococcus faecalis.      Evelyn reports patient doing better since discharge an offers no complaints at this time. Michelle Degree states patient slept good last night and is getting stronger. States patient able to ambulate independently but has cane if needed.  States patient was able to shower independently last night but wife stands by for any needed assistance.      Denies patient having any shortness of breath, chest pain, chest discomfort, abdominal/pubic/kidney pain, nausea, vomiting, diarrhea, chills or fever. States sediment from urine is clearing and admits patient having 1,100 cc output this morning from catheter. CTN discussed s/s of UTI/infection and knowing when to seek medical attention.      Provided a complete review of home meds with Nathalia Sebastian who confirms patient obtained Augmentin/Lactobacillus ordered on discharge. Advised of importance for patient to take Augmentin as directed until completely finished. 1111F code entered.      CTN spoke with Critical access hospital at [facility] confirming [facility] but unsure if patient will be seen today or tomorrow. Nathalia Sebastian reports DTR will call today to schedule f/u appts with PCP and urology.      Denies any needs or concerns at this time. CTN will continue to follow for Care Transition      Inpatient course: Discharge summary reviewed- see chart. Interval history/Current status:   Nearly back to baseline health and well being. Completing BID Augmentin. Back to his baseline of eating/drinking/sleeping/activity.     Patient Active Problem List   Diagnosis    Pure hypercholesterolemia    Essential hypertension    Primary osteoarthritis involving multiple joints    Iron deficiency anemia due to chronic blood loss    Stage 4 chronic kidney disease (HCC)    Hypokalemia    Enlarged prostate with urinary retention    BPH with urinary obstruction    Hypomagnesemia    Chronic primary angle-closure glaucoma, indeterminate stage    Chronic renal disease, stage III ([address] [892626]    Urinary tract infection associated with catheterization of urinary tract ([address]    Hydronephrosis due to obstruction of bladder    Stage 3 acute kidney injury ([address]    Complicated UTI (urinary tract infection)    Acute renal failure ([address]       Medications listed as ordered at the time of discharge from hospital     Medication List          Accurate as of July 1, 2022 11:48 AM. If you have any questions, ask your nurse or doctor.             CONTINUE taking these medications    amLODIPine 2.5 MG tablet  Commonly known as: NORVASC  Take 1 tablet by mouth daily     amoxicillin-clavulanate 500-125 MG per tablet  Commonly known as: AUGMENTIN  Take 1 tablet by mouth every 12 hours for 14 days     aspirin 81 MG EC tablet     C-250 250 MG Chew  Generic drug: Ascorbic Acid     ferrous sulfate 325 (65 Fe) MG tablet  Commonly known as: IRON 325     finasteride 5 MG tablet  Commonly known as: PROSCAR  Take 1 tablet by mouth daily     lactobacillus capsule  Take 1 capsule by mouth every 12 hours     lisinopril-hydroCHLOROthiazide 10-12.5 MG per tablet  Commonly known as: PRINZIDE;ZESTORETIC  Take 1 tablet by mouth daily     Magnesium Oxide 200 MG Tabs  Take 200 mg by mouth daily     metoprolol succinate 25 MG extended release tablet  Commonly known as: TOPROL XL  Take 1 tablet by mouth daily     One-A-Day Essential Tabs     OSTEO BI-FLEX JOINT SHIELD PO     pantoprazole 40 MG tablet  Commonly known as: PROTONIX  Take 1 tablet by mouth daily     simvastatin 40 MG tablet  Commonly known as: ZOCOR  Take 1 tablet by mouth nightly     timolol 0.5 % ophthalmic solution  Commonly known as: TIMOPTIC  Place 1 drop into both eyes 2 times daily     vitamin B-12 1000 MCG tablet  Commonly known as: CYANOCOBALAMIN     Vitamin D3 50 MCG (2000 UT) Caps             Medications marked \"taking\" at this time  Outpatient Medications Marked as Taking for the 7/1/22 encounter (Office Visit) with Lo Panchal MD   Medication Sig Dispense Refill    amoxicillin-clavulanate (AUGMENTIN) 500-125 MG per tablet Take 1 tablet by mouth every 12 hours for 14 days 28 tablet 0    lactobacillus (CULTURELLE) capsule Take 1 capsule by mouth every 12 hours 60 capsule 0    finasteride (PROSCAR) 5 MG tablet Take 1 tablet by mouth daily 90 tablet 1    lisinopril-hydroCHLOROthiazide (PRINZIDE;ZESTORETIC) 10-12.5 MG per tablet Take 1 tablet by mouth daily 90 tablet 0    amLODIPine (NORVASC) 2.5 MG tablet Take 1 tablet by mouth daily 90 tablet 1    metoprolol succinate (TOPROL XL) 25 MG extended release tablet Take 1 tablet by mouth daily 90 tablet 0    pantoprazole (PROTONIX) 40 MG tablet Take 1 tablet by mouth daily 30 tablet 2    timolol (TIMOPTIC) 0.5 % ophthalmic solution Place 1 drop into both eyes 2 times daily 5 mL 0    simvastatin (ZOCOR) 40 MG tablet Take 1 tablet by mouth nightly 90 tablet 1    Magnesium Oxide 200 MG TABS Take 200 mg by mouth daily 30 tablet 3    ferrous sulfate (IRON 325) 325 (65 Fe) MG tablet Take 1 tablet by mouth 2 times daily 100 tablet 3    Multiple Vitamin (ONE-A-DAY ESSENTIAL) TABS Take 1 tablet by mouth daily      Cholecalciferol (VITAMIN D3) 50 MCG (2000 UT) CAPS Take 2,000 Units by mouth daily       Ascorbic Acid (C-250) 250 MG CHEW Take 250 mg by mouth daily       vitamin B-12 (CYANOCOBALAMIN) 1000 MCG tablet Take 1,000 mcg by mouth daily      Misc Natural Products (OSTEO BI-FLEX JOINT SHIELD PO) Take 2 tablets by mouth daily.  aspirin 81 MG EC tablet Take 81 mg by mouth daily Last dose 10/28          Medications patient taking as of now reconciled against medications ordered at time of hospital discharge: Yes    Review of Systems   HENT: Negative for congestion, ear pain and sore throat. Respiratory: Negative for cough, shortness of breath and wheezing. Cardiovascular: Negative for chest pain, palpitations and leg swelling. Gastrointestinal: Negative for abdominal pain, blood in stool, constipation, diarrhea, nausea and vomiting. Genitourinary: Negative for dysuria, frequency, hematuria and urgency. Indwelling suprapubic catheter--longstanding   Musculoskeletal: Negative for back pain, myalgias and neck pain. Skin: Negative for rash. Neurological: Negative for dizziness, weakness and headaches. Psychiatric/Behavioral: The patient is not nervous/anxious.         Objective:    BP (!) 110/42   Pulse 59 Temp 97.6 °F (36.4 °C) (Temporal)   Resp 16   Ht 5' 9\" (1.753 m)   Wt 144 lb 14.4 oz (65.7 kg)   SpO2 98%   BMI 21.40 kg/m²      Physical Exam  Constitutional:       General: He is not in acute distress. Appearance: He is well-developed. He is not diaphoretic. HENT:      Head: Normocephalic and atraumatic. Right Ear: External ear normal.      Left Ear: External ear normal.      Mouth/Throat:      Pharynx: No oropharyngeal exudate. Eyes:      General: No scleral icterus. Right eye: No discharge. Conjunctiva/sclera: Conjunctivae normal.      Pupils: Pupils are equal, round, and reactive to light. Neck:      Thyroid: No thyromegaly. Cardiovascular:      Rate and Rhythm: Normal rate and regular rhythm. Heart sounds: Normal heart sounds. No murmur heard. Pulmonary:      Effort: Pulmonary effort is normal. No respiratory distress. Breath sounds: No stridor. No wheezing or rales. Chest:      Chest wall: No tenderness. Abdominal:      General: Bowel sounds are normal. There is no distension. Palpations: Abdomen is soft. There is no mass. Tenderness: There is no abdominal tenderness. There is no guarding. Musculoskeletal:         General: No tenderness. Normal range of motion. Cervical back: Normal range of motion and neck supple. Lymphadenopathy:      Cervical: No cervical adenopathy. Skin:     General: Skin is warm and dry. Coloration: Skin is not pale. Findings: No erythema or rash. Neurological:      Mental Status: He is alert and oriented to person, place, and time. Psychiatric:         Behavior: Behavior normal.         Thought Content:  Thought content normal.              Assessment / Plan:      Julia Alarcon was seen today for follow-up from hospital.    Diagnoses and all orders for this visit:    Hospital discharge follow-up: doing well at home  -     [place] DISCHARGE MEDS RECONCILED W/ CURRENT OUTPATIENT MED LIST    Urinary tract infection associated with cystostomy catheter, subsequent encounter: Resolving/Resolved  -     [place] DISCHARGE MEDS RECONCILED W/ CURRENT OUTPATIENT MED LIST    Complicated UTI (urinary tract infection): Resolving/Resolved  -     [place] DISCHARGE MEDS RECONCILED W/ CURRENT OUTPATIENT MED LIST         Follow Up:  Return for Keep scheduled appointment(s). An electronic signature was used to authenticate this note.   --Darris Severs, MD

## 2022-07-07 ENCOUNTER — CARE COORDINATION (OUTPATIENT)
Dept: CASE MANAGEMENT | Age: 87
End: 2022-07-07

## 2022-07-07 NOTE — CARE COORDINATION
[facility] Transitions Follow Up Call    2022    Patient: Nick Leyva  Patient : 1932   MRN: [id number]  Reason for Admission: Complicated UTI  Discharge Date: 22 RARS: Readmission Risk Score: 21.2 ( )    Care Transitions Follow Up Call    Needs to be reviewed by the provider   Additional needs identified to be addressed with provider: No  none             Method of communication with provider : none      Care Transition Nurse contacted the family by telephone to follow up after admission on 22. Verified name and  with family as identifiers. Addressed changes since last contact: none  Discussed follow-up appointments. If no appointment was previously scheduled, appointment scheduling offered: Yes. Is follow up appointment scheduled within 7 days of discharge? Yes. Advance Care Planning:   Does patient have an Advance Directive: reviewed and current. CTN reviewed discharge instructions, medical action plan and red flags with family and discussed any barriers to care and/or understanding of plan of care after discharge. Discussed appropriate site of care based on symptoms and resources available to patient including: PCP  Specialist  Urgent care clinics  Critical access hospital  When to call 911  Condition related references. The family agrees to contact the PCP office for questions related to their healthcare. Patients top risk factors for readmission: functional physical ability  medical condition-CKD  polypharmacy    Non-[facility] follow up appointment(s): CTN confirmed with patient wife that patient completed HFu appt with Dr. Yong Ortiz  (nephrology) on 22. CTN provided contact information for future needs. Plan for follow-up call in 5-7 days based on severity of symptoms and risk factors.     Care Transitions Subsequent and Final Call    Subsequent and Final Calls  Do you have any ongoing symptoms?: No  Have your medications changed?: No  Do you have any questions related to your

## 2022-07-14 ENCOUNTER — CARE COORDINATION (OUTPATIENT)
Dept: CASE MANAGEMENT | Age: 87
End: 2022-07-14

## 2022-07-14 NOTE — CARE COORDINATION
[facility] Transitions Follow Up Call    2022    Patient: Franco Kemp  Patient : 1932   MRN: [id number]  Reason for Admission: Complicated UTI  Discharge Date: 22 RARS: Readmission Risk Score: 21.2 ( )    Care Transitions Follow Up Call    Needs to be reviewed by the provider   Additional needs identified to be addressed with provider: No  none             Method of communication with provider : none      Care Transition Nurse contacted the family by telephone to follow up after admission on 22. Verified name and  with family as identifiers. Addressed changes since last contact: none  Discussed follow-up appointments. If no appointment was previously scheduled, appointment scheduling offered: Yes. Is follow up appointment scheduled within 7 days of discharge? Yes. Advance Care Planning:   Does patient have an Advance Directive: reviewed and current. CTN reviewed discharge instructions, medical action plan and red flags with family and discussed any barriers to care and/or understanding of plan of care after discharge. Discussed appropriate site of care based on symptoms and resources available to patient including: PCP  Specialist  Urgent care clinics  Home health  When to call 911. The family agrees to contact the PCP office for questions related to their healthcare. Patients top risk factors for readmission: medical condition-CKD  polypharmacy    CTN provided contact information for future needs. Plan for follow-up call in 5-7 days based on severity of symptoms and risk factors.     Care Transitions Subsequent and Final Call    Subsequent and Final Calls  Do you have any ongoing symptoms?: No  Have your medications changed?: No  Do you have any questions related to your medications?: No  Do you currently have any active services?: Yes  Are you currently active with any services?: Home Health  Do you have any needs or concerns that I can assist you with?: No  Identified Barriers: None  Care Transitions Interventions  No Identified Needs  Other Interventions:         Spoke with patient wife Allison Taylor) on communication release regarding TCM/hospital discharge follow up sub call for UTI. Chriss Mason reports patient continues doing well and offers no complaints. Denies any shortness of breath, chest pain, abdominal pain, flank/kidney pain, nausea, vomiting, chills or fever. Evelyn crump nurse from [facility] had visit yesterday and changed suprapubic catheter. States urine is clear/yellow coming from catheter and getting good output. States patient continues to get stronger and goes for his walks except when it humid outside. Denies any needs or concerns at this time. CTN will continue to follow for Care Transition.      Follow Up  Future Appointments   Date Time Provider Department Center   7/26/2022  9:15 AM Brandt Freedman MD [facility]   1/19/2023  9:30 AM Brandt Freedman MD [facility] RAJAN Arias

## 2022-07-21 ENCOUNTER — CARE COORDINATION (OUTPATIENT)
Dept: CASE MANAGEMENT | Age: 87
End: 2022-07-21

## 2022-07-21 NOTE — CARE COORDINATION
[facility] Transitions Follow Up Call    2022    Patient: Cristobal Hampton  Patient : 1932   MRN: [id number]  Reason for Admission: Complicated UTI  Discharge Date: 22 RARS: Readmission Risk Score: 21.2 ( )    Care Transitions Follow Up Call    Needs to be reviewed by the provider   Additional needs identified to be addressed with provider: No  none             Method of communication with provider : none      Care Transition Nurse contacted the family by telephone to follow up after admission on 22. Verified name and  with family as identifiers. Addressed changes since last contact: none  Discussed follow-up appointments. If no appointment was previously scheduled, appointment scheduling offered: Yes. Is follow up appointment scheduled within 7 days of discharge? Yes. Advance Care Planning:   Does patient have an Advance Directive: reviewed and current. CTN reviewed discharge instructions, medical action plan and red flags with family and discussed any barriers to care and/or understanding of plan of care after discharge. Discussed appropriate site of care based on symptoms and resources available to patient including: PCP  Specialist  Urgent care clinics  Home health  When to call 911. The family agrees to contact the PCP office for questions related to their healthcare. Patients top risk factors for readmission: medical condition-CKD  polypharmacy    CTN provided contact information for future needs. No further follow-up call indicated based on severity of symptoms and risk factors.     Care Transitions Subsequent and Final Call    Subsequent and Final Calls  Do you have any ongoing symptoms?: No  Have your medications changed?: No  Do you have any questions related to your medications?: No  Do you currently have any active services?: Yes  Are you currently active with any services?: Home Health  Do you have any needs or concerns that I can assist you with?: No  Identified Barriers: None  Care Transitions Interventions  No Identified Needs  Other Interventions:           Spoke with patient wife Selam Foss) today 7/21/22 for final TCM/hospital discharge follow up. Linda Chacon reports patient continues doing well and offers no complaints. Denies patient having any shortness of breath, chest pain, chest discomfort, abdominal pain, flank pain, nausea, vomiting, chills or fever. [facility] nurse changed suprapubic catheter last week. States urine is lear yellow and getting good urine output. [facility] nurse is coming on a monthly basis. States patient continues getting stronger and ambulating more independently. Denies any needs or further issues. Evelyn in agreement to final call. CTN signing off for Care Transition.      Follow Up  Future Appointments   Date Time Provider Department Center   7/26/2022  9:15 AM [facility]   1/19/2023  9:30 AM Mina Israel MD [facility] RAJAN Shannon

## 2022-07-26 ENCOUNTER — OFFICE VISIT (OUTPATIENT)
Dept: FAMILY MEDICINE CLINIC | Age: 87
End: 2022-07-26
Payer: MEDICARE

## 2022-07-26 VITALS
WEIGHT: 145 LBS | HEART RATE: 80 BPM | TEMPERATURE: 97 F | HEIGHT: 69 IN | OXYGEN SATURATION: 99 % | SYSTOLIC BLOOD PRESSURE: 128 MMHG | DIASTOLIC BLOOD PRESSURE: 70 MMHG | BODY MASS INDEX: 21.48 KG/M2

## 2022-07-26 DIAGNOSIS — N40.1 ENLARGED PROSTATE WITH URINARY RETENTION: ICD-10-CM

## 2022-07-26 DIAGNOSIS — Z00.00 MEDICARE ANNUAL WELLNESS VISIT, SUBSEQUENT: Primary | ICD-10-CM

## 2022-07-26 DIAGNOSIS — D50.0 IRON DEFICIENCY ANEMIA DUE TO CHRONIC BLOOD LOSS: ICD-10-CM

## 2022-07-26 DIAGNOSIS — I10 ESSENTIAL HYPERTENSION: ICD-10-CM

## 2022-07-26 DIAGNOSIS — R33.8 ENLARGED PROSTATE WITH URINARY RETENTION: ICD-10-CM

## 2022-07-26 DIAGNOSIS — N18.4 STAGE 4 CHRONIC KIDNEY DISEASE (HCC): ICD-10-CM

## 2022-07-26 DIAGNOSIS — E78.00 PURE HYPERCHOLESTEROLEMIA: ICD-10-CM

## 2022-07-26 DIAGNOSIS — I50.22 CHRONIC SYSTOLIC (CONGESTIVE) HEART FAILURE (HCC): ICD-10-CM

## 2022-07-26 PROCEDURE — 1123F ACP DISCUSS/DSCN MKR DOCD: CPT | Performed by: FAMILY MEDICINE

## 2022-07-26 PROCEDURE — G0439 PPPS, SUBSEQ VISIT: HCPCS | Performed by: FAMILY MEDICINE

## 2022-07-26 RX ORDER — METOPROLOL SUCCINATE 25 MG/1
25 TABLET, EXTENDED RELEASE ORAL DAILY
Qty: 90 TABLET | Refills: 0 | Status: SHIPPED | OUTPATIENT
Start: 2022-07-26

## 2022-07-26 RX ORDER — AMLODIPINE BESYLATE 2.5 MG/1
2.5 TABLET ORAL DAILY
Qty: 90 TABLET | Refills: 1 | Status: SHIPPED | OUTPATIENT
Start: 2022-07-26

## 2022-07-26 RX ORDER — FINASTERIDE 5 MG/1
5 TABLET, FILM COATED ORAL DAILY
Qty: 90 TABLET | Refills: 1 | Status: SHIPPED | OUTPATIENT
Start: 2022-07-26

## 2022-07-26 RX ORDER — LISINOPRIL AND HYDROCHLOROTHIAZIDE 12.5; 1 MG/1; MG/1
1 TABLET ORAL DAILY
Qty: 90 TABLET | Refills: 0 | Status: SHIPPED | OUTPATIENT
Start: 2022-07-26

## 2022-07-26 RX ORDER — SIMVASTATIN 40 MG
40 TABLET ORAL NIGHTLY
Qty: 90 TABLET | Refills: 1 | Status: SHIPPED | OUTPATIENT
Start: 2022-07-26

## 2022-07-26 RX ORDER — PANTOPRAZOLE SODIUM 40 MG/1
40 TABLET, DELAYED RELEASE ORAL DAILY
Qty: 30 TABLET | Refills: 2 | Status: SHIPPED | OUTPATIENT
Start: 2022-07-26

## 2022-07-26 ASSESSMENT — LIFESTYLE VARIABLES: HOW OFTEN DO YOU HAVE A DRINK CONTAINING ALCOHOL: NEVER

## 2022-07-26 ASSESSMENT — PATIENT HEALTH QUESTIONNAIRE - PHQ9
SUM OF ALL RESPONSES TO PHQ QUESTIONS 1-9: 0
1. LITTLE INTEREST OR PLEASURE IN DOING THINGS: 0
SUM OF ALL RESPONSES TO PHQ9 QUESTIONS 1 & 2: 0
SUM OF ALL RESPONSES TO PHQ QUESTIONS 1-9: 0
2. FEELING DOWN, DEPRESSED OR HOPELESS: 0

## 2022-07-26 NOTE — PROGRESS NOTES
Medicare Annual Wellness Visit    Patricia Lebron is here for Medicare AWV    Assessment & Plan   Medicare annual wellness visit, subsequent  Stage 4 chronic kidney disease ([address]  Chronic systolic (congestive) heart failure  Enlarged prostate with urinary retention  Essential hypertension  Iron deficiency anemia due to chronic blood loss  Pure hypercholesterolemia    Recommendations for Preventive Services Due: see orders and patient instructions/AVS.  Recommended screening schedule for the next 5-10 years is provided to the patient in written form: see Patient Instructions/AVS.     Return in 3 months (on 10/26/2022) for FOLLOW UP VISIT AND Medicare Annual Wellness Visit in 1 year. Subjective   The following acute and/or chronic problems were also addressed today:  AS LISTED ABOVE     Patient's complete Health Risk Assessment and screening values have been reviewed and are found in Flowsheets. The following problems were reviewed today and where indicated follow up appointments were made and/or referrals ordered.     Positive Risk Factor Screenings with Interventions:             General Health and ACP:  General  In general, how would you say your health is?: Very Good  In the past 7 days, have you experienced any of the following: New or Increased Pain, New or Increased Fatigue, Loneliness, Social Isolation, Stress or Anger?: No  Do you get the social and emotional support that you need?: Yes  Do you have a Living Will?: (!) No    Advance Directives       Power of  Living Will ACP-Advance Directive ACP-Power of     Not on File Not on File Not on File Not on File        General Health Risk Interventions:  No Living Will: Advance Care Planning addressed with patient today    Health Habits/Nutrition:  Physical Activity: Insufficiently Active    Days of Exercise per Week: 2 days    Minutes of Exercise per Session: 10 min     Have you lost any weight without trying in the past 3 months?: No  Body mass index: 21.41  Have you seen the dentist within the past year?: (!) No  Health Habits/Nutrition Interventions:  Dental exam overdue:  patient encouraged to make appointment with his/her dentist    Hearing/Vision:  Do you or your family notice any trouble with your hearing that hasn't been managed with hearing aids?: No  Do you have difficulty driving, watching TV, or doing any of your daily activities because of your eyesight?: No  Have you had an eye exam within the past year?: (!) No  No results found. Hearing/Vision Interventions:  Vision concerns:  patient encouraged to make appointment with his/her eye specialist     ADLs:  In the past 7 days, did you need help from others to perform any of the following everyday activities: Eating, dressing, grooming, bathing, toileting, or walking/balance?: No  In the past 7 days, did you need help from others to take care of any of the following: Laundry, housekeeping, banking/finances, shopping, telephone use, food preparation, transportation, or taking medications?: (!) Yes  Select all that apply: [employer], Laundry, Housekeeping  ADL Interventions:  Patient declines any further evaluation/treatment for this issue  [facility]. Objective   Vitals:    07/26/22 0906   BP: 128/70   Pulse: 80   Temp: 97 °F (36.1 °C)   SpO2: 99%   Weight: 145 lb (65.8 kg)   Height: 5' 9\" (1.753 m)      Body mass index is 21.41 kg/m².       General Appearance: alert and oriented to person, place and time, well developed and well- nourished, in no acute distress  Skin: warm and dry, no rash or erythema  Head: normocephalic and atraumatic  Eyes: pupils equal, round, and reactive to light, extraocular eye movements intact, conjunctivae normal  ENT: tympanic membrane, external ear and ear canal normal bilaterally, nose without deformity, nasal mucosa and turbinates normal without polyps  Neck: supple and non-tender without mass, no thyromegaly or thyroid nodules, no cervical lymphadenopathy  Pulmonary/Chest: clear to auscultation bilaterally- no wheezes, rales or rhonchi, normal air movement, no respiratory distress  Cardiovascular: normal rate, regular rhythm, normal S1 and S2, no murmurs, rubs, clicks, or gallops, distal pulses intact, no carotid bruits  Abdomen: soft, non-tender, non-distended, normal bowel sounds, no masses or organomegaly  Extremities: no cyanosis, clubbing or edema  Musculoskeletal: normal range of motion, no joint swelling, deformity or tenderness  Neurologic: reflexes normal and symmetric, no cranial nerve deficit, gait, coordination and speech normal       No Known Allergies  Prior to Visit Medications    Medication Sig Taking? Authorizing Provider   timolol (TIMOPTIC) 0.5 % ophthalmic solution Place 1 drop into both eyes 2 times daily Yes Maximiliano Acosta MD   Magnesium Oxide 200 MG TABS Take 200 mg by mouth daily Yes Maximiliano Acosta MD   ferrous sulfate (IRON 325) 325 (65 Fe) MG tablet Take 1 tablet by mouth 2 times daily Yes Maximiliano Acosta MD   Multiple Vitamin (ONE-A-DAY ESSENTIAL) TABS Take 1 tablet by mouth daily Yes Historical Provider, MD   Cholecalciferol (VITAMIN D3) 50 MCG (2000 UT) CAPS Take 2,000 Units by mouth daily  Yes Historical Provider, MD   Ascorbic Acid 250 MG CHEW Take 250 mg by mouth daily  Yes Historical Provider, MD   vitamin B-12 (CYANOCOBALAMIN) 1000 MCG tablet Take 1,000 mcg by mouth daily Yes Historical Provider, MD   [facility] (OSTEO BI-FLEX JOINT SHIELD PO) Take 2 tablets by mouth daily. Yes Historical Provider, MD   aspirin 81 MG EC tablet Take 81 mg by mouth daily Last dose 10/28 Yes Historical Provider, MD   amLODIPine (NORVASC) 2.5 MG tablet Take 1 tablet by mouth in the morning. Yes Maximiliano Acosta MD   finasteride (PROSCAR) 5 MG tablet Take 1 tablet by mouth in the morning.  Yes Maximiliano Acosta MD   lisinopril-hydroCHLOROthiazide (PRINZIDE;ZESTORETIC) 10-12.5 MG per tablet Take 1 tablet by mouth

## 2022-07-26 NOTE — PATIENT INSTRUCTIONS
LOW SALT FOR BLOOD PRESSURE CONTROL. LOW FAT DIET FOR CHOLESTEROL CONTROL. DRINK ENOUGH FLUIDS FOR BETTER KIDNEY FUNCTION. TAKE  AMLODIPINE 2.5 MG. DAILY, PRINIVIL/HCT 10/12.5  1 TAB. DAILY,TOPROL XL 25 MG. 1 TAB. DAILY FOR BLOOD PRESSURE CONTROL. TAKE  ZOCOR 40 MG. 1 TAB. DAILY. FOR CHOLESTEROL CONTROL. Mikel Ahumada TAKE  FEOSOL 1 TAB. 2 TIMES A DAY AND MULTIVITAMIN 1 TAB. DAILY TO IMPROVE BLOOD COUNT. TAKE  PROSCAR 5 MG. DAILY FOR PROSTATE PROBLEM. REGULAR WALKING ADVISED. CONTINUE FOLLOW UP WITH DR. Jabier Ramirez FOR PROSTATE PROBLEM. NEXT APPOINTMENT IN 3 MONTHS. Personalized Preventive Plan for Marie Cross - 7/26/2022  Medicare offers a range of preventive health benefits. Some of the tests and screenings are paid in full while other may be subject to a deductible, co-insurance, and/or copay. Some of these benefits include a comprehensive review of your medical history including lifestyle, illnesses that may run in your family, and various assessments and screenings as appropriate. After reviewing your medical record and screening and assessments performed today your provider may have ordered immunizations, labs, imaging, and/or referrals for you. A list of these orders (if applicable) as well as your Preventive Care list are included within your After Visit Summary for your review. Other Preventive Recommendations:    A preventive eye exam performed by an eye specialist is recommended every 1-2 years to screen for glaucoma; cataracts, macular degeneration, and other eye disorders. A preventive dental visit is recommended every 6 months. Try to get at least 150 minutes of exercise per week or 10,000 steps per day on a pedometer . Order or download the FREE \"Exercise & Physical Activity: Your Everyday Guide\" from [employer]. Call 4-815.314.7265 or search [employer] online. You need 9231-3662 mg of calcium and 7589-8346 IU of vitamin D per day.  It is possible to meet your calcium requirement with diet alone, but a vitamin D supplement is usually necessary to meet this goal.  When exposed to the sun, use a sunscreen that protects against both UVA and UVB radiation with an SPF of 30 or greater. Reapply every 2 to 3 hours or after sweating, drying off with a towel, or swimming. Always wear a seat belt when traveling in a car. Always wear a helmet when riding a bicycle or motorcycle.

## 2022-09-25 ENCOUNTER — HOSPITAL ENCOUNTER (EMERGENCY)
Age: 87
Discharge: HOME | End: 2022-09-25
Payer: MEDICARE

## 2022-09-25 VITALS
RESPIRATION RATE: 20 BRPM | TEMPERATURE: 98.4 F | WEIGHT: 141 LBS | SYSTOLIC BLOOD PRESSURE: 152 MMHG | OXYGEN SATURATION: 97 % | HEIGHT: 69 IN | HEART RATE: 60 BPM | DIASTOLIC BLOOD PRESSURE: 69 MMHG | BODY MASS INDEX: 20.88 KG/M2

## 2022-09-25 DIAGNOSIS — J01.90 ACUTE NON-RECURRENT SINUSITIS, UNSPECIFIED LOCATION: Primary | ICD-10-CM

## 2022-09-25 PROCEDURE — 99211 OFF/OP EST MAY X REQ PHY/QHP: CPT

## 2022-09-25 RX ORDER — PENICILLIN V POTASSIUM 500 MG/1
500 TABLET ORAL 4 TIMES DAILY
Qty: 40 TABLET | Refills: 0 | Status: SHIPPED | OUTPATIENT
Start: 2022-09-25 | End: 2022-10-05

## 2022-09-25 RX ORDER — LIDOCAINE HYDROCHLORIDE 20 MG/ML
5 SOLUTION OROPHARYNGEAL
Qty: 100 ML | Refills: 0 | Status: SHIPPED | OUTPATIENT
Start: 2022-09-25

## 2022-09-25 ASSESSMENT — PAIN - FUNCTIONAL ASSESSMENT: PAIN_FUNCTIONAL_ASSESSMENT: NONE - DENIES PAIN

## 2022-09-25 NOTE — ED PROVIDER NOTES
Department of Emergency Medicine   ED  Provider Note  Admit Date/RoomTime: 9/25/2022  3:06 PM  ED Room: 02/02            Chief Complaint:  Pharyngitis (Started  Friday with sore throat)      History of Present Illness:  Source of history provided by:  patient. History/Exam Limitations: none. Franco Kemp is a 80 y.o. old male presenting to the emergency department for sore throat pain, which occured 2 day(s) prior to arrival.  Since onset the symptoms have been persistent. Denies fever, chills, chest pain, shortness of breath, difficulty swallowing, difficulty breathing, neck pain, neck stiffness, sick contacts, or rash. Exposed To: Streptococcus: no.                              Infectious Mononucleosis:  no.        Symptoms:  Pain:  Yes. Muffled Voice:  No.                            Hoarse:  No.                            Difficulty with Secretions:  No.       Associated Signs & Symptoms:  sinus drainage . Review of Systems:      Pertinent positives and negatives are stated within HPI, all other systems reviewed and are negative. Past Medical History:  has a past medical history of Enlarged prostate, Hyperlipidemia, Hypertension, Lumbosacral strain, and Pacemaker. Past Surgical History:  has a past surgical history that includes A-V cardiac pacemaker insertion (5/30/07); eye surgery (2002); Tonsillectomy; Colonoscopy (8/3/11); pacemaker placement; and Cystoscopy (N/A, 11/2/2021). Social History:  reports that he quit smoking about 22 years ago. He has a 40.00 pack-year smoking history. He has never used smokeless tobacco. He reports that he does not drink alcohol and does not use drugs. Family History: family history includes Cancer in his mother; Diabetes in his mother; Heart Disease in his father; High Blood Pressure in his father and mother. Allergies: Patient has no known allergies. Physical Exam:  Vital signs reviewed.   Constitutional: Alert, development consistent with age. Well appearing and non toxic and not distressed. Ears:  TMs without perforation, injection, or bulging. External canals clear without exudate. Mouth/Throat: Airway Patent. Floor of mouth soft. mild erythema, mucous membranes moist, and post nasal drip. Handling secretions, no stridor, no evidence of airway compromise, no trismus. No visible abscess or PTA. Neck:  Supple, full ROM, no asymmetry, no meningeal signs. No stridor. There is no  anterior cervical and posterior cervical node tenderness. Lungs:  Clear to auscultation and breath sounds equal.    CV: Regular rate and rhythm, normal heart sounds, without pathological murmurs, ectopy, gallops, or rubs. Skin:  Warm and dry, No rashes, no erythema present. Lymphatics: No lymphangitis. There is no  anterior cervical and posterior cervical node swelling and tenderness. Neurological:  GCS 15, Oriented. Motor functions intact.    -------------------Nursing Notes / Prior Records & Vitals Reviewed Section----------------------   (The nursing notes within the ED encounter, home medications, current encounter or past encounter records and vital signs as below have been reviewed)   BP (!) 152/69   Pulse 60   Temp 98.4 °F (36.9 °C) (Infrared)   Resp 20   Ht 5' 9\" (1.753 m)   Wt 141 lb (64 kg)   SpO2 97%   BMI 20.82 kg/m²   Oxygen Saturation Interpretation: Normal.  -------------------------------------------Test Results Section---------------------------------------------  (All laboratory and radiology results have been personally reviewed by myself)  Laboratory:  No results found for this visit on 09/25/22. Radiology: All Radiology results interpreted by Radiologist unless otherwise noted.   No orders to display     -----------------------------ED Course / Medical Decision Making Section--------------------------  ED Course Medications:  Medications - No data to display    Medical Decision Making: Pharyngitis, rapid strep not tested as there are no exudates or exposure. No evidence of PTA, RP abscess, epiglottitis, ludwigs angina, or other life threatening or deep space infection or airway compromise. I do believe patient's symptomatology is likely secondary to sinus drainage. At this time we will plan on outpatient management as well as antibiotics. Advised follow-up very closely with PCP for recheck return emergency department any new or worsening symptoms. Patient voiced understanding and is agreeable to the above treatment plan. Counseling: The emergency provider has spoken with the patient and discussed todays results, in addition to providing specific details for the plan of care and counseling regarding the diagnosis and prognosis. Questions are answered at this time and they are agreeable with the plan.  ------------------------------------Impression & Disposition Section------------------------------------  Impression(s):  1. Acute non-recurrent sinusitis, unspecified location        Disposition:  Disposition: Discharge to home  Patient condition is stable    New Prescriptions    LIDOCAINE VISCOUS HCL (XYLOCAINE) 2 % SOLN SOLUTION    Take 5 mLs by mouth every 3 hours as needed for Irritation    PENICILLIN V POTASSIUM (VEETID) 500 MG TABLET    Take 1 tablet by mouth 4 times daily for 10 days     * NOTE: This report was transcribed using voice recognition software. Every effort was made to ensure accuracy; however, inadvertent computerized transcription errors may be present.             Savanah [place]  09/25/22 1635

## 2022-11-08 ENCOUNTER — OFFICE VISIT (OUTPATIENT)
Dept: FAMILY MEDICINE CLINIC | Age: 87
End: 2022-11-08
Payer: MEDICARE

## 2022-11-08 VITALS
OXYGEN SATURATION: 98 % | WEIGHT: 149 LBS | TEMPERATURE: 97 F | HEART RATE: 82 BPM | SYSTOLIC BLOOD PRESSURE: 120 MMHG | BODY MASS INDEX: 22 KG/M2 | DIASTOLIC BLOOD PRESSURE: 70 MMHG

## 2022-11-08 DIAGNOSIS — I10 ESSENTIAL HYPERTENSION: ICD-10-CM

## 2022-11-08 DIAGNOSIS — N18.32 STAGE 3B CHRONIC KIDNEY DISEASE (HCC): ICD-10-CM

## 2022-11-08 DIAGNOSIS — I50.22 CHRONIC SYSTOLIC (CONGESTIVE) HEART FAILURE (HCC): ICD-10-CM

## 2022-11-08 DIAGNOSIS — D50.0 IRON DEFICIENCY ANEMIA DUE TO CHRONIC BLOOD LOSS: ICD-10-CM

## 2022-11-08 DIAGNOSIS — Z23 FLU VACCINE NEED: ICD-10-CM

## 2022-11-08 DIAGNOSIS — E78.00 PURE HYPERCHOLESTEROLEMIA: Primary | ICD-10-CM

## 2022-11-08 PROCEDURE — G8484 FLU IMMUNIZE NO ADMIN: HCPCS | Performed by: FAMILY MEDICINE

## 2022-11-08 PROCEDURE — G8420 CALC BMI NORM PARAMETERS: HCPCS | Performed by: FAMILY MEDICINE

## 2022-11-08 PROCEDURE — 1036F TOBACCO NON-USER: CPT | Performed by: FAMILY MEDICINE

## 2022-11-08 PROCEDURE — 99214 OFFICE O/P EST MOD 30 MIN: CPT | Performed by: FAMILY MEDICINE

## 2022-11-08 PROCEDURE — 1123F ACP DISCUSS/DSCN MKR DOCD: CPT | Performed by: FAMILY MEDICINE

## 2022-11-08 PROCEDURE — G8427 DOCREV CUR MEDS BY ELIG CLIN: HCPCS | Performed by: FAMILY MEDICINE

## 2022-11-08 PROCEDURE — 90694 VACC AIIV4 NO PRSRV 0.5ML IM: CPT | Performed by: FAMILY MEDICINE

## 2022-11-08 PROCEDURE — G0008 ADMIN INFLUENZA VIRUS VAC: HCPCS | Performed by: FAMILY MEDICINE

## 2022-11-08 RX ORDER — AMLODIPINE BESYLATE 2.5 MG/1
2.5 TABLET ORAL DAILY
Qty: 90 TABLET | Refills: 1 | Status: SHIPPED | OUTPATIENT
Start: 2022-11-08

## 2022-11-08 RX ORDER — SIMVASTATIN 40 MG
40 TABLET ORAL NIGHTLY
Qty: 90 TABLET | Refills: 1 | Status: SHIPPED | OUTPATIENT
Start: 2022-11-08

## 2022-11-08 RX ORDER — PANTOPRAZOLE SODIUM 40 MG/1
40 TABLET, DELAYED RELEASE ORAL DAILY
Qty: 90 TABLET | Refills: 1 | Status: SHIPPED | OUTPATIENT
Start: 2022-11-08

## 2022-11-08 RX ORDER — ADHESIVE TAPE 3"X 2.3 YD
200 TAPE, NON-MEDICATED TOPICAL DAILY
Qty: 30 TABLET | Refills: 3 | Status: SHIPPED | OUTPATIENT
Start: 2022-11-08

## 2022-11-08 RX ORDER — LISINOPRIL AND HYDROCHLOROTHIAZIDE 12.5; 1 MG/1; MG/1
1 TABLET ORAL DAILY
Qty: 90 TABLET | Refills: 1 | Status: SHIPPED | OUTPATIENT
Start: 2022-11-08

## 2022-11-08 RX ORDER — METOPROLOL SUCCINATE 25 MG/1
25 TABLET, EXTENDED RELEASE ORAL DAILY
Qty: 90 TABLET | Refills: 0 | Status: SHIPPED | OUTPATIENT
Start: 2022-11-08

## 2022-11-08 RX ORDER — FINASTERIDE 5 MG/1
5 TABLET, FILM COATED ORAL DAILY
Qty: 90 TABLET | Refills: 1 | Status: SHIPPED | OUTPATIENT
Start: 2022-11-08

## 2022-11-08 NOTE — PATIENT INSTRUCTIONS
LOW SALT FOR BLOOD PRESSURE CONTROL. LOW FAT DIET FOR CHOLESTEROL CONTROL. DRINK ENOUGH FLUIDS FOR BETTER KIDNEY FUNCTION. TAKE  AMLODIPINE 2.5 MG. DAILY, PRINIVIL/HCT 10/12.5  1 TAB. DAILY,TOPROL XL 25 MG. 1 TAB. DAILY FOR BLOOD PRESSURE CONTROL. TAKE  ZOCOR 40 MG. 1 TAB. DAILY. FOR CHOLESTEROL CONTROL. Doug Self TAKE  FEOSOL 1 TAB. 2 TIMES A DAY AND MULTIVITAMIN 1 TAB. DAILY TO IMPROVE BLOOD COUNT. TAKE  PROSCAR 5 MG. DAILY FOR PROSTATE PROBLEM. REGULAR WALKING ADVISED. CONTINUE FOLLOW UP WITH DR. AGUILLON FOR KIDNEY PROBLEM. CONTINUE FOLLOW UP WITH DR. Kit Ballard FOR PROSTATE PROBLEM. FASTING FOR LAB WORK PRIOR TO NEXT VISIT. INJECTION FLU VACCINE GIVEN TODAY. CALL FOR  ANY ADVERSE REACTION. NEXT APPOINTMENT IN 3 MONTHS.

## 2022-11-08 NOTE — PROGRESS NOTES
OFFICE PROGRESS NOTE      SUBJECTIVE:        Patient ID:   Bryan Hunter is a 80 y.o. male who presents for   Chief Complaint   Patient presents with    Hypertension    Cholesterol Problem    Flu Vaccine           HPI:     RECHECK BP, CHOLESTEROL AND ANEMIA. STILL HAS [employer] CATHETER. SEEING DR. Lazarus Gleason FOR PROSTATE PROBLEM AND DR. AGUILLON FOR KIDNEY PROBLEM. MEDICATION REFILL. FEELS GOOD. WATCHING DIET GOOD. WALKING SOME IN HOUSE FOR EXERCISE. TAKING MEDICATIONS REGULARLY. Prior to Admission medications    Medication Sig Start Date End Date Taking?  Authorizing Provider   amLODIPine (NORVASC) 2.5 MG tablet Take 1 tablet by mouth daily 11/8/22  Yes Azra Minor MD   finasteride (PROSCAR) 5 MG tablet Take 1 tablet by mouth daily 11/8/22  Yes Azra Minor MD   lisinopril-hydroCHLOROthiazide (PRINZIDE;ZESTORETIC) 10-12.5 MG per tablet Take 1 tablet by mouth daily 11/8/22  Yes Azar Minor MD   Magnesium Oxide 200 MG TABS Take 200 mg by mouth daily 11/8/22  Yes Azra Minor MD   metoprolol succinate (TOPROL XL) 25 MG extended release tablet Take 1 tablet by mouth daily 11/8/22  Yes Azra Minor MD   pantoprazole (PROTONIX) 40 MG tablet Take 1 tablet by mouth daily 11/8/22  Yes Azra Minor MD   simvastatin (ZOCOR) 40 MG tablet Take 1 tablet by mouth nightly 11/8/22  Yes Azra Minor MD   lidocaine viscous hcl (XYLOCAINE) 2 % SOLN solution Take 5 mLs by mouth every 3 hours as needed for Irritation 9/25/22  Yes COLTEN Morales   timolol (TIMOPTIC) 0.5 % ophthalmic solution Place 1 drop into both eyes 2 times daily 5/2/22  Yes Azra Minor MD   ferrous sulfate (IRON 325) 325 (65 Fe) MG tablet Take 1 tablet by mouth 2 times daily 9/10/21  Yes Azra Minor MD   Multiple Vitamin (ONE-A-DAY ESSENTIAL) TABS Take 1 tablet by mouth daily   Yes Historical Provider, MD   Cholecalciferol (VITAMIN D3) 50 MCG ( UT) CAPS Take 2,000 Units by mouth daily    Yes Historical Provider, MD   Ascorbic Acid 250 MG CHEW Take 250 mg by mouth daily    Yes Historical Provider, MD   vitamin B-12 (CYANOCOBALAMIN) 1000 MCG tablet Take 1,000 mcg by mouth daily   Yes Historical Provider, MD   [facility] (OSTEO BI-FLEX JOINT SHIELD PO) Take 2 tablets by mouth daily. Yes Historical Provider, MD   aspirin 81 MG EC tablet Take 81 mg by mouth daily Last dose 10/28   Yes Historical Provider, MD     Social History     Socioeconomic History    Marital status:    Tobacco Use    Smoking status: Former     Packs/day: 1.00     Years: 40.00     Pack years: 40.00     Types: Cigarettes     Quit date: 2000     Years since quittin.1    Smokeless tobacco: Never   Vaping Use    Vaping Use: Never used   Substance and Sexual Activity    Alcohol use: No    Drug use: No     Social Determinants of Health     Financial Resource Strain: Low Risk     Difficulty of Paying Living Expenses: Not hard at all   Food Insecurity: No Food Insecurity    Worried About Running Out of Food in the Last Year: Never true    Ran Out of Food in the Last Year: Never true   Transportation Needs: No Transportation Needs    Lack of Transportation (Medical): No    Lack of Transportation (Non-Medical):  No   Physical Activity: Insufficiently Active    Days of Exercise per Week: 2 days    Minutes of Exercise per Session: 10 min       I have reviewed Kyle's allergies, medications, problem list, medical, social and family history and have updated as needed in the electronic medical record  Review Of Systems:    Skin: no abnormal pigmentation, rash, scaling, itching, masses, hair or nail changes  Eyes: no blurring, diplopia, or eye pain  Ears/Nose/Throat: no hearing loss, tinnitus, vertigo, nosebleed, nasal congestion, rhinorrhea, sore throat  Respiratory: no cough, pleuritic chest pain, dyspnea, or wheezing  Cardiovascular: no chest pain, angina, dyspnea on exertion, orthopnea, PND, palpitations, or claudication  Gastrointestinal: no nausea, vomiting, heartburn, diarrhea, constipation, bloating,  abdominal pain, or blood per rectum. Appetite is good  Genitourinary: no urinary urgency, frequency, dysuria, nocturia, hesitancy, or incontinence  Musculoskeletal: joint pains off and on. Morning stiffness. Ambulating well  Neurologic: no paralysis, paresis, paresthesia, seizures, tremors, or headaches  Hematologic/Lymphatic/Immunologic: no anemia, abnormal bleeding/bruising, fever, chills, night sweats, swollen glands, or unexplained weight loss  Endocrine: no heat or cold intolerance and no polyphagia, polydipsia, or polyuria        OBJECTIVE:     VS:  Wt Readings from Last 3 Encounters:   11/08/22 149 lb (67.6 kg)   09/25/22 141 lb (64 kg)   07/26/22 145 lb (65.8 kg)     Temp Readings from Last 3 Encounters:   11/08/22 97 °F (36.1 °C)   09/25/22 98.4 °F (36.9 °C) (Infrared)   07/26/22 97 °F (36.1 °C)     BP Readings from Last 3 Encounters:   11/08/22 120/70   09/25/22 (!) 152/69   07/26/22 128/70        General appearance: Alert, Awake, Oriented times 3, no distress  Skin: Warm and dry  Head: Normocephalic. No masses, lesions or tenderness noted  Eyes: Conjunctivae appear normal. PERLE  Ears: External ears normal  Nose/Sinuses: Nares normal. Septum midline. Mucosa normal. No drainage  Oropharynx: Oropharynx clear with no exudate seen  Neck: Neck supple. No jugular venous distension, lymphadenopathy or thyromegaly Trachea midline  Chest:  Normal. Movements are Normal and Equal.  Lungs: Lungs clear to auscultation bilaterally. No ronchi, crackles or wheezes  Heart: S1 S2  Regular rate and rhythm. No rub, murmur or gallop  Abdomen: Abdomen soft, non-tender. BS normal. No masses, organomegaly. Back: Grossly Normal and Equal. DTR are Normal. SLR is Normal.  Extremities: Arthritic changes are noted.  Movements are limited. Pedal pulses are normal.  Musculoskeletal: Muscular strength appears intact. No joint effusion, tenderness, swelling or warmth  Neuro:  No focal motor or sensory deficits        ASSESSMENT     Patient Active Problem List    Diagnosis Date Noted    Hypokalemia 08/09/2021    Iron deficiency anemia due to chronic blood loss 07/26/2018    Stage 4 chronic kidney disease ([facility] [address] 07/26/2018    Primary osteoarthritis involving multiple joints 09/24/2012    Pure hypercholesterolemia     Essential hypertension     Chronic systolic (congestive) heart failure 07/26/2022    Urinary tract infection associated with catheterization of urinary tract ([facility] [address] 06/16/2022    Hydronephrosis due to obstruction of bladder 19/40/3797    Complicated UTI (urinary tract infection) 06/16/2022    Acute renal failure (HCC)     Stage 3b chronic kidney disease ([facility] 04/28/2022    Hypomagnesemia 02/22/2022    Chronic primary angle-closure glaucoma, indeterminate stage 02/22/2022    BPH with urinary obstruction 11/02/2021    Enlarged prostate with urinary retention 09/10/2021        Diagnosis:     ICD-10-CM    1. Pure hypercholesterolemia  E78.00 Comprehensive Metabolic Panel     Lipid Panel    CONTROLLED      2. Essential hypertension  I10     CONTROLLED      3. Chronic systolic (congestive) heart failure  I50.22     STABLE      4. Stage 3b chronic kidney disease (HCC)  N18.32       5. Iron deficiency anemia due to chronic blood loss  D50.0 CBC with Auto Differential    STABLLE      6. Flu vaccine need  Z23 Influenza, FLUAD, (age 72 y+), IM, Preservative Free, 0.5 mL          PLAN:           Patient Instructions   LOW SALT FOR BLOOD PRESSURE CONTROL. LOW FAT DIET FOR CHOLESTEROL CONTROL. DRINK ENOUGH FLUIDS FOR BETTER KIDNEY FUNCTION. TAKE  AMLODIPINE 2.5 MG. DAILY, PRINIVIL/HCT 10/12.5  1 TAB. DAILY,TOPROL XL 25 MG. 1 TAB. DAILY FOR BLOOD PRESSURE CONTROL. TAKE  ZOCOR 40 MG. 1 TAB. DAILY. FOR CHOLESTEROL CONTROL. Leandro Patella TAKE  FEOSOL 1 TAB.  2 TIMES A DAY AND MULTIVITAMIN 1 TAB. DAILY TO IMPROVE BLOOD COUNT. TAKE  PROSCAR 5 MG. DAILY FOR PROSTATE PROBLEM. REGULAR WALKING ADVISED. CONTINUE FOLLOW UP WITH DR. AGUILLON FOR KIDNEY PROBLEM. CONTINUE FOLLOW UP WITH DR. Mir Diaz FOR PROSTATE PROBLEM. FASTING FOR LAB WORK PRIOR TO NEXT VISIT. INJECTION FLU VACCINE GIVEN TODAY. CALL FOR  ANY ADVERSE REACTION. NEXT APPOINTMENT IN 3 MONTHS. Return in about 3 months (around 2/8/2023) for FOLLOW UP VISIT. I have reviewed my findings and recommendations with Jose Anaya.     Electronically signed by Sydney Delgado MD on 11/8/22 at 10:21 AM EST

## 2023-02-17 DIAGNOSIS — D50.0 IRON DEFICIENCY ANEMIA DUE TO CHRONIC BLOOD LOSS: ICD-10-CM

## 2023-02-17 DIAGNOSIS — E78.00 PURE HYPERCHOLESTEROLEMIA: ICD-10-CM

## 2023-02-17 LAB
ALBUMIN SERPL-MCNC: 3.9 G/DL (ref 3.5–5.2)
ALP BLD-CCNC: 82 U/L (ref 40–129)
ALT SERPL-CCNC: 6 U/L (ref 0–40)
ANION GAP SERPL CALCULATED.3IONS-SCNC: 17 MMOL/L (ref 7–16)
AST SERPL-CCNC: 11 U/L (ref 0–39)
BASOPHILS ABSOLUTE: 0.09 E9/L (ref 0–0.2)
BASOPHILS RELATIVE PERCENT: 1.1 % (ref 0–2)
BILIRUB SERPL-MCNC: 0.4 MG/DL (ref 0–1.2)
BUN BLDV-MCNC: 51 MG/DL (ref 6–23)
CALCIUM SERPL-MCNC: 9.3 MG/DL (ref 8.6–10.2)
CHLORIDE BLD-SCNC: 102 MMOL/L (ref 98–107)
CHOLESTEROL, TOTAL: 140 MG/DL (ref 0–199)
CO2: 21 MMOL/L (ref 22–29)
CREAT SERPL-MCNC: 2.5 MG/DL (ref 0.7–1.2)
EOSINOPHILS ABSOLUTE: 0.12 E9/L (ref 0.05–0.5)
EOSINOPHILS RELATIVE PERCENT: 1.4 % (ref 0–6)
GFR SERPL CREATININE-BSD FRML MDRD: 24 ML/MIN/1.73
GLUCOSE BLD-MCNC: 94 MG/DL (ref 74–99)
HCT VFR BLD CALC: 36.7 % (ref 37–54)
HDLC SERPL-MCNC: 46 MG/DL
HEMOGLOBIN: 12.1 G/DL (ref 12.5–16.5)
IMMATURE GRANULOCYTES #: 0.03 E9/L
IMMATURE GRANULOCYTES %: 0.4 % (ref 0–5)
LDL CHOLESTEROL CALCULATED: 74 MG/DL (ref 0–99)
LYMPHOCYTES ABSOLUTE: 1.99 E9/L (ref 1.5–4)
LYMPHOCYTES RELATIVE PERCENT: 23.8 % (ref 20–42)
MCH RBC QN AUTO: 30.8 PG (ref 26–35)
MCHC RBC AUTO-ENTMCNC: 33 % (ref 32–34.5)
MCV RBC AUTO: 93.4 FL (ref 80–99.9)
MONOCYTES ABSOLUTE: 0.58 E9/L (ref 0.1–0.95)
MONOCYTES RELATIVE PERCENT: 6.9 % (ref 2–12)
NEUTROPHILS ABSOLUTE: 5.56 E9/L (ref 1.8–7.3)
NEUTROPHILS RELATIVE PERCENT: 66.4 % (ref 43–80)
PDW BLD-RTO: 12.5 FL (ref 11.5–15)
PLATELET # BLD: 271 E9/L (ref 130–450)
PMV BLD AUTO: 10.3 FL (ref 7–12)
POTASSIUM SERPL-SCNC: 4.5 MMOL/L (ref 3.5–5)
RBC # BLD: 3.93 E12/L (ref 3.8–5.8)
SODIUM BLD-SCNC: 140 MMOL/L (ref 132–146)
TOTAL PROTEIN: 7.3 G/DL (ref 6.4–8.3)
TRIGL SERPL-MCNC: 101 MG/DL (ref 0–149)
VLDLC SERPL CALC-MCNC: 20 MG/DL
WBC # BLD: 8.4 E9/L (ref 4.5–11.5)

## 2023-02-23 ENCOUNTER — OFFICE VISIT (OUTPATIENT)
Dept: FAMILY MEDICINE CLINIC | Age: 88
End: 2023-02-23

## 2023-02-23 VITALS
DIASTOLIC BLOOD PRESSURE: 66 MMHG | BODY MASS INDEX: 22.59 KG/M2 | HEART RATE: 63 BPM | WEIGHT: 153 LBS | SYSTOLIC BLOOD PRESSURE: 110 MMHG | OXYGEN SATURATION: 99 %

## 2023-02-23 DIAGNOSIS — D50.0 IRON DEFICIENCY ANEMIA DUE TO CHRONIC BLOOD LOSS: ICD-10-CM

## 2023-02-23 DIAGNOSIS — E78.00 PURE HYPERCHOLESTEROLEMIA: Primary | ICD-10-CM

## 2023-02-23 DIAGNOSIS — I50.22 CHRONIC SYSTOLIC (CONGESTIVE) HEART FAILURE (HCC): ICD-10-CM

## 2023-02-23 DIAGNOSIS — I10 ESSENTIAL HYPERTENSION: ICD-10-CM

## 2023-02-23 DIAGNOSIS — N18.4 STAGE 4 CHRONIC KIDNEY DISEASE (HCC): ICD-10-CM

## 2023-02-23 RX ORDER — FINASTERIDE 5 MG/1
5 TABLET, FILM COATED ORAL DAILY
Qty: 90 TABLET | Refills: 1 | Status: SHIPPED | OUTPATIENT
Start: 2023-02-23

## 2023-02-23 RX ORDER — METOPROLOL SUCCINATE 25 MG/1
25 TABLET, EXTENDED RELEASE ORAL DAILY
Qty: 90 TABLET | Refills: 0 | Status: SHIPPED | OUTPATIENT
Start: 2023-02-23

## 2023-02-23 RX ORDER — SIMVASTATIN 40 MG
40 TABLET ORAL NIGHTLY
Qty: 90 TABLET | Refills: 1 | Status: SHIPPED | OUTPATIENT
Start: 2023-02-23

## 2023-02-23 RX ORDER — PANTOPRAZOLE SODIUM 40 MG/1
40 TABLET, DELAYED RELEASE ORAL DAILY
Qty: 90 TABLET | Refills: 1 | Status: SHIPPED | OUTPATIENT
Start: 2023-02-23

## 2023-02-23 RX ORDER — AMLODIPINE BESYLATE 2.5 MG/1
2.5 TABLET ORAL DAILY
Qty: 90 TABLET | Refills: 1 | Status: SHIPPED | OUTPATIENT
Start: 2023-02-23

## 2023-02-23 RX ORDER — ADHESIVE TAPE 3"X 2.3 YD
200 TAPE, NON-MEDICATED TOPICAL DAILY
Qty: 30 TABLET | Refills: 3 | Status: SHIPPED | OUTPATIENT
Start: 2023-02-23

## 2023-02-23 RX ORDER — LISINOPRIL AND HYDROCHLOROTHIAZIDE 12.5; 1 MG/1; MG/1
1 TABLET ORAL DAILY
Qty: 90 TABLET | Refills: 1 | Status: SHIPPED | OUTPATIENT
Start: 2023-02-23

## 2023-02-23 SDOH — ECONOMIC STABILITY: INCOME INSECURITY: HOW HARD IS IT FOR YOU TO PAY FOR THE VERY BASICS LIKE FOOD, HOUSING, MEDICAL CARE, AND HEATING?: NOT HARD AT ALL

## 2023-02-23 SDOH — ECONOMIC STABILITY: FOOD INSECURITY: WITHIN THE PAST 12 MONTHS, YOU WORRIED THAT YOUR FOOD WOULD RUN OUT BEFORE YOU GOT MONEY TO BUY MORE.: NEVER TRUE

## 2023-02-23 SDOH — ECONOMIC STABILITY: FOOD INSECURITY: WITHIN THE PAST 12 MONTHS, THE FOOD YOU BOUGHT JUST DIDN'T LAST AND YOU DIDN'T HAVE MONEY TO GET MORE.: NEVER TRUE

## 2023-02-23 ASSESSMENT — PATIENT HEALTH QUESTIONNAIRE - PHQ9
SUM OF ALL RESPONSES TO PHQ QUESTIONS 1-9: 0
SUM OF ALL RESPONSES TO PHQ9 QUESTIONS 1 & 2: 0
1. LITTLE INTEREST OR PLEASURE IN DOING THINGS: 0
2. FEELING DOWN, DEPRESSED OR HOPELESS: 0
SUM OF ALL RESPONSES TO PHQ QUESTIONS 1-9: 0

## 2023-02-23 NOTE — PATIENT INSTRUCTIONS
LOW SALT FOR BLOOD PRESSURE CONTROL. LOW FAT DIET FOR CHOLESTEROL CONTROL. DRINK ENOUGH FLUIDS FOR BETTER KIDNEY FUNCTION. TAKE  AMLODIPINE 2.5 MG. DAILY, PRINIVIL/HCT 10/12.5  1 TAB. DAILY,TOPROL XL 25 MG. 1 TAB. DAILY FOR BLOOD PRESSURE CONTROL. TAKE  ZOCOR 40 MG. 1 TAB. DAILY. FOR CHOLESTEROL CONTROL. Lavlilia Candle TAKE  FEOSOL 1 TAB. 2 TIMES A DAY AND MULTIVITAMIN 1 TAB. DAILY TO IMPROVE BLOOD COUNT. TAKE  PROSCAR 5 MG. DAILY FOR PROSTATE PROBLEM. REGULAR WALKING ADVISED. CONTINUE FOLLOW UP WITH DR. AGUILLON FOR KIDNEY PROBLEM. CONTINUE FOLLOW UP WITH DR. Wily Ibarra FOR PROSTATE PROBLEM. FASTING FOR LAB WORK PRIOR TO NEXT VISIT. NEXT APPOINTMENT IN 3 MONTHS.

## 2023-02-23 NOTE — PROGRESS NOTES
OFFICE PROGRESS NOTE      SUBJECTIVE:        Patient ID:   Hair Ren is a 80 y.o. male who presents for   Chief Complaint   Patient presents with    Discuss Labs    Hypertension           HPI:     RECHECK BP, CHOLESTEROL AND ANEMIA. MEDICATION REFILL. FEELS GOOD. WATCHING DIET GOOD. WALKING SOME IN HOUSE FOR EXERCISE. TAKING MEDICATIONS REGULARLY. Prior to Admission medications    Medication Sig Start Date End Date Taking?  Authorizing Provider   amLODIPine (NORVASC) 2.5 MG tablet Take 1 tablet by mouth daily 2/23/23  Yes Miri Yang MD   finasteride (PROSCAR) 5 MG tablet Take 1 tablet by mouth daily 2/23/23  Yes Miri Yang MD   lisinopril-hydroCHLOROthiazide (PRINZIDE;ZESTORETIC) 10-12.5 MG per tablet Take 1 tablet by mouth daily 2/23/23  Yes Miri Yang MD   Magnesium Oxide 200 MG TABS Take 200 mg by mouth daily 2/23/23  Yes Miri Yang MD   metoprolol succinate (TOPROL XL) 25 MG extended release tablet Take 1 tablet by mouth daily 2/23/23  Yes Miri Yang MD   pantoprazole (PROTONIX) 40 MG tablet Take 1 tablet by mouth daily 2/23/23  Yes Miri Yang MD   simvastatin (ZOCOR) 40 MG tablet Take 1 tablet by mouth nightly 2/23/23  Yes Miri Yang MD   lidocaine viscous hcl (XYLOCAINE) 2 % SOLN solution Take 5 mLs by mouth every 3 hours as needed for Irritation 9/25/22  Yes COLTEN Piña   timolol (TIMOPTIC) 0.5 % ophthalmic solution Place 1 drop into both eyes 2 times daily 5/2/22  Yes Miri Yang MD   ferrous sulfate (IRON 325) 325 (65 Fe) MG tablet Take 1 tablet by mouth 2 times daily 9/10/21  Yes Miri Yang MD   Multiple Vitamin (ONE-A-DAY ESSENTIAL) TABS Take 1 tablet by mouth daily   Yes Historical Provider, MD   Cholecalciferol (VITAMIN D3) 50 MCG (2000 UT) CAPS Take 2,000 Units by mouth daily    Yes Historical Provider, MD   Ascorbic Acid 250 MG CHEW Take 250 mg by mouth daily    Yes Historical Provider, MD   vitamin B-12 (CYANOCOBALAMIN) 1000 MCG tablet Take 1,000 mcg by mouth daily   Yes Historical Provider, MD   [facility] Natural Products (OSTEO BI-FLEX JOINT SHIELD PO) Take 2 tablets by mouth daily. Yes Historical Provider, MD   aspirin 81 MG EC tablet Take 81 mg by mouth daily Last dose 10/28   Yes Historical Provider, MD     Social History     Socioeconomic History    Marital status:      Spouse name: None    Number of children: None    Years of education: None    Highest education level: None   Tobacco Use    Smoking status: Former     Packs/day: 1.00     Years: 40.00     Pack years: 40.00     Types: Cigarettes     Quit date: 2000     Years since quittin.4    Smokeless tobacco: Never   Vaping Use    Vaping Use: Never used   Substance and Sexual Activity    Alcohol use: No    Drug use: No     Social Determinants of Health     Financial Resource Strain: Low Risk     Difficulty of Paying Living Expenses: Not hard at all   Food Insecurity: No Food Insecurity    Worried About Running Out of Food in the Last Year: Never true    Ran Out of Food in the Last Year: Never true   Transportation Needs: Unknown    Lack of Transportation (Non-Medical):  No   Physical Activity: Insufficiently Active    Days of Exercise per Week: 2 days    Minutes of Exercise per Session: 10 min   Housing Stability: Unknown    Unstable Housing in the Last Year: No       I have reviewed Kyle's allergies, medications, problem list, medical, social and family history and have updated as needed in the electronic medical record  Review Of Systems:    Skin: no abnormal pigmentation, rash, scaling, itching, masses, hair or nail changes  Eyes: no blurring, diplopia, or eye pain  Ears/Nose/Throat: no hearing loss, tinnitus, vertigo, nosebleed, nasal congestion, rhinorrhea, sore throat  Respiratory: no cough, pleuritic chest pain, dyspnea, or wheezing  Cardiovascular: no chest pain, angina, dyspnea on exertion, orthopnea, PND, palpitations, or claudication  Gastrointestinal: no nausea, vomiting, heartburn, diarrhea, constipation, bloating,  abdominal pain, or blood per rectum. Appetite is good  Genitourinary: no urinary urgency, frequency, dysuria, nocturia, hesitancy, or incontinence  Musculoskeletal: joint pains off and on. Morning stiffness. Ambulating well  Neurologic: no paralysis, paresis, paresthesia, seizures, tremors, or headaches  Hematologic/Lymphatic/Immunologic: no anemia, abnormal bleeding/bruising, fever, chills, night sweats, swollen glands, or unexplained weight loss  Endocrine: no heat or cold intolerance and no polyphagia, polydipsia, or polyuria        OBJECTIVE:     VS:  Wt Readings from Last 3 Encounters:   02/23/23 153 lb (69.4 kg)   11/08/22 149 lb (67.6 kg)   09/25/22 141 lb (64 kg)     Temp Readings from Last 3 Encounters:   11/08/22 97 °F (36.1 °C)   09/25/22 98.4 °F (36.9 °C) (Infrared)   07/26/22 97 °F (36.1 °C)     BP Readings from Last 3 Encounters:   02/23/23 110/66   11/08/22 120/70   09/25/22 (!) 152/69        General appearance: Alert, Awake, Oriented times 3, no distress  Skin: Warm and dry  Head: Normocephalic. No masses, lesions or tenderness noted  Eyes: Conjunctivae appear normal. PERLE  Ears: External ears normal  Nose/Sinuses: Nares normal. Septum midline. Mucosa normal. No drainage  Oropharynx: Oropharynx clear with no exudate seen  Neck: Neck supple. No jugular venous distension, lymphadenopathy or thyromegaly Trachea midline  Chest:  Normal. Movements are Normal and Equal.  Lungs: Lungs clear to auscultation bilaterally. No ronchi, crackles or wheezes  Heart: S1 S2  Regular rate and rhythm. No rub, murmur or gallop  Abdomen: Abdomen soft, non-tender. BS normal. No masses, organomegaly. Back: Grossly Normal and Equal. DTR are Normal. SLR is Normal.  Extremities: Arthritic changes are noted. Movements are limited. Pedal pulses are normal.  Musculoskeletal: Muscular strength appears intact.  No joint effusion, tenderness, swelling or warmth  Neuro:  No focal motor or sensory deficits        ASSESSMENT     Patient Active Problem List    Diagnosis Date Noted    Hypokalemia 08/09/2021    Iron deficiency anemia due to chronic blood loss 07/26/2018    Stage 4 chronic kidney disease ([facility] [address] 07/26/2018    Primary osteoarthritis involving multiple joints 09/24/2012    Pure hypercholesterolemia     Essential hypertension     Chronic systolic (congestive) heart failure 07/26/2022    Urinary tract infection associated with catheterization of urinary tract ([facility] [address] 06/16/2022    Hydronephrosis due to obstruction of bladder 96/20/9471    Complicated UTI (urinary tract infection) 06/16/2022    Acute renal failure (HCC)     Stage 3b chronic kidney disease ([facility] [address] 04/28/2022    Hypomagnesemia 02/22/2022    Chronic primary angle-closure glaucoma, indeterminate stage 02/22/2022    BPH with urinary obstruction 11/02/2021    Enlarged prostate with urinary retention 09/10/2021        Diagnosis:     ICD-10-CM    1. Pure hypercholesterolemia  E78.00 Comprehensive Metabolic Panel     Lipid Panel    CONTROLLED      2. Chronic systolic (congestive) heart failure (HCC)  I50.22     STABLE      3. Stage 4 chronic kidney disease (HCC)  N18.4 CBC with Auto Differential    STABLE      4. Essential hypertension  I10     CONTROLLED      5. Iron deficiency anemia due to chronic blood loss  D50.0 CBC with Auto Differential    IMPROVING          PLAN:           Patient Instructions   LOW SALT FOR BLOOD PRESSURE CONTROL. LOW FAT DIET FOR CHOLESTEROL CONTROL. DRINK ENOUGH FLUIDS FOR BETTER KIDNEY FUNCTION. TAKE  AMLODIPINE 2.5 MG. DAILY, PRINIVIL/HCT 10/12.5  1 TAB. DAILY,TOPROL XL 25 MG. 1 TAB. DAILY FOR BLOOD PRESSURE CONTROL. TAKE  ZOCOR 40 MG. 1 TAB. DAILY. FOR CHOLESTEROL CONTROL. Emperatriz Chang TAKE  FEOSOL 1 TAB. 2 TIMES A DAY AND MULTIVITAMIN 1 TAB. DAILY TO IMPROVE BLOOD COUNT. TAKE  PROSCAR 5 MG. DAILY FOR PROSTATE PROBLEM. REGULAR WALKING ADVISED.   CONTINUE FOLLOW UP WITH DR. AGUILLON FOR KIDNEY PROBLEM. CONTINUE FOLLOW UP WITH DR. Shantel Painting FOR PROSTATE PROBLEM. FASTING FOR LAB WORK PRIOR TO NEXT VISIT. NEXT APPOINTMENT IN 3 MONTHS. Return in about 3 months (around 5/23/2023) for FOLLOW UP VISIT. I have reviewed my findings and recommendations with Shay Villareal.     Electronically signed by Wilfredo Alford MD on 2/23/23 at 2:56 PM EST

## 2023-05-03 ENCOUNTER — OFFICE VISIT (OUTPATIENT)
Dept: FAMILY MEDICINE CLINIC | Age: 88
End: 2023-05-03

## 2023-05-03 VITALS
HEART RATE: 59 BPM | DIASTOLIC BLOOD PRESSURE: 68 MMHG | BODY MASS INDEX: 22.45 KG/M2 | SYSTOLIC BLOOD PRESSURE: 112 MMHG | OXYGEN SATURATION: 97 % | WEIGHT: 152 LBS | TEMPERATURE: 98.2 F

## 2023-05-03 DIAGNOSIS — I10 ESSENTIAL HYPERTENSION: ICD-10-CM

## 2023-05-03 DIAGNOSIS — E78.00 PURE HYPERCHOLESTEROLEMIA: ICD-10-CM

## 2023-05-03 DIAGNOSIS — I50.22 CHRONIC SYSTOLIC (CONGESTIVE) HEART FAILURE (HCC): ICD-10-CM

## 2023-05-03 DIAGNOSIS — J06.9 VIRAL URI: Primary | ICD-10-CM

## 2023-05-03 NOTE — PROGRESS NOTES
OFFICE PROGRESS NOTE      SUBJECTIVE:        Patient ID:   Hanny Duarte is a 80 y.o. male who presents for   Chief Complaint   Patient presents with    Sinus Problem    Pharyngitis           HPI:     Upper Respiratory Infection: Patient complains of symptoms of a URI. Symptoms include congestion and sore throat. Onset of symptoms was 2 days ago, unchanged since that time. He also c/o no  fever for the past 2 days . He is drinking plenty of fluids. Evaluation to date: none. Treatment to date: none. WATCHING DIET GOOD. NO  EXERCISE. TAKING MEDICATIONS REGULARLY. Prior to Admission medications    Medication Sig Start Date End Date Taking?  Authorizing Provider   amLODIPine (NORVASC) 2.5 MG tablet Take 1 tablet by mouth daily 2/23/23   Christal Gupta MD   finasteride (PROSCAR) 5 MG tablet Take 1 tablet by mouth daily 2/23/23   Christal Gupta, MD   lisinopril-hydroCHLOROthiazide (PRINZIDE;ZESTORETIC) 10-12.5 MG per tablet Take 1 tablet by mouth daily 2/23/23   Christal Gupta MD   Magnesium Oxide 200 MG TABS Take 200 mg by mouth daily 2/23/23   Christal Gupta MD   metoprolol succinate (TOPROL XL) 25 MG extended release tablet Take 1 tablet by mouth daily 2/23/23   Christal Gupta MD   pantoprazole (PROTONIX) 40 MG tablet Take 1 tablet by mouth daily 2/23/23   Christal Gupta MD   simvastatin (ZOCOR) 40 MG tablet Take 1 tablet by mouth nightly 2/23/23   Christal Gupta MD   lidocaine viscous hcl (XYLOCAINE) 2 % SOLN solution Take 5 mLs by mouth every 3 hours as needed for Irritation 9/25/22   COLTEN Bryant   timolol (TIMOPTIC) 0.5 % ophthalmic solution Place 1 drop into both eyes 2 times daily 5/2/22   Freddy Hayes MD   ferrous sulfate (IRON 325) 325 (65 Fe) MG tablet Take 1 tablet by mouth 2 times daily 9/10/21   Christal Gupta MD   Multiple Vitamin (ONE-A-DAY ESSENTIAL) TABS Take 1 tablet by mouth daily    Historical Provider, MD   Cholecalciferol (VITAMIN D3) 50 MCG

## 2023-05-03 NOTE — PATIENT INSTRUCTIONS
REST  FORCE FLUID ORALLY. TYLENOL AS NEEDED. TAKE CLARITIN 10 MG. DAILY AS NEEDED FOR SINUS CONGESTION. SUCK  CEPACOL LOZENGES AS NEEDED FOR SORE THROAT RELIEF. LOW SALT FOR BLOOD PRESSURE CONTROL. LOW FAT DIET FOR CHOLESTEROL CONTROL. DRINK ENOUGH FLUIDS FOR BETTER KIDNEY FUNCTION. TAKE  AMLODIPINE 2.5 MG. DAILY, PRINIVIL/HCT 10/12.5  1 TAB. DAILY,TOPROL XL 25 MG. 1 TAB. DAILY FOR BLOOD PRESSURE CONTROL. TAKE  ZOCOR 40 MG. 1 TAB. DAILY. FOR CHOLESTEROL CONTROL. Kerrie Donate TAKE  FEOSOL 1 TAB. 2 TIMES A DAY AND MULTIVITAMIN 1 TAB. DAILY TO IMPROVE BLOOD COUNT. TAKE  PROSCAR 5 MG. DAILY FOR PROSTATE PROBLEM. REGULAR WALKING ADVISED. CONTINUE FOLLOW UP WITH DR. AGUILLON FOR KIDNEY PROBLEM. CONTINUE FOLLOW UP WITH DR. Dann Lopez FOR PROSTATE PROBLEM. FASTING FOR LAB WORK PRIOR TO NEXT VISIT. KEEP NEXT APPOINTMENT IN 3 WEEKS.

## 2023-05-19 DIAGNOSIS — E78.00 PURE HYPERCHOLESTEROLEMIA: ICD-10-CM

## 2023-05-19 DIAGNOSIS — D50.0 IRON DEFICIENCY ANEMIA DUE TO CHRONIC BLOOD LOSS: ICD-10-CM

## 2023-05-19 DIAGNOSIS — N18.4 STAGE 4 CHRONIC KIDNEY DISEASE (HCC): ICD-10-CM

## 2023-05-19 LAB
ALBUMIN SERPL-MCNC: 3.8 G/DL (ref 3.5–5.2)
ALP SERPL-CCNC: 77 U/L (ref 40–129)
ALT SERPL-CCNC: 10 U/L (ref 0–40)
ANION GAP SERPL CALCULATED.3IONS-SCNC: 15 MMOL/L (ref 7–16)
AST SERPL-CCNC: 10 U/L (ref 0–39)
BASOPHILS # BLD: 0.09 E9/L (ref 0–0.2)
BASOPHILS NFR BLD: 1.1 % (ref 0–2)
BILIRUB SERPL-MCNC: 0.3 MG/DL (ref 0–1.2)
BUN SERPL-MCNC: 47 MG/DL (ref 6–23)
CALCIUM SERPL-MCNC: 8.8 MG/DL (ref 8.6–10.2)
CHLORIDE SERPL-SCNC: 109 MMOL/L (ref 98–107)
CHOLESTEROL, TOTAL: 114 MG/DL (ref 0–199)
CO2 SERPL-SCNC: 19 MMOL/L (ref 22–29)
CREAT SERPL-MCNC: 2.5 MG/DL (ref 0.7–1.2)
EOSINOPHIL # BLD: 0.26 E9/L (ref 0.05–0.5)
EOSINOPHIL NFR BLD: 3.1 % (ref 0–6)
ERYTHROCYTE [DISTWIDTH] IN BLOOD BY AUTOMATED COUNT: 12.9 FL (ref 11.5–15)
GLUCOSE SERPL-MCNC: 107 MG/DL (ref 74–99)
HCT VFR BLD AUTO: 34.1 % (ref 37–54)
HDLC SERPL-MCNC: 38 MG/DL
HGB BLD-MCNC: 10.7 G/DL (ref 12.5–16.5)
IMM GRANULOCYTES # BLD: 0.02 E9/L
IMM GRANULOCYTES NFR BLD: 0.2 % (ref 0–5)
LDLC SERPL CALC-MCNC: 56 MG/DL (ref 0–99)
LYMPHOCYTES # BLD: 2.12 E9/L (ref 1.5–4)
LYMPHOCYTES NFR BLD: 25.3 % (ref 20–42)
MCH RBC QN AUTO: 31.8 PG (ref 26–35)
MCHC RBC AUTO-ENTMCNC: 31.4 % (ref 32–34.5)
MCV RBC AUTO: 101.2 FL (ref 80–99.9)
MONOCYTES # BLD: 0.67 E9/L (ref 0.1–0.95)
MONOCYTES NFR BLD: 8 % (ref 2–12)
NEUTROPHILS # BLD: 5.23 E9/L (ref 1.8–7.3)
NEUTS SEG NFR BLD: 62.3 % (ref 43–80)
PLATELET # BLD AUTO: 286 E9/L (ref 130–450)
PMV BLD AUTO: 10.2 FL (ref 7–12)
POTASSIUM SERPL-SCNC: 4.4 MMOL/L (ref 3.5–5)
PROT SERPL-MCNC: 6.6 G/DL (ref 6.4–8.3)
RBC # BLD AUTO: 3.37 E12/L (ref 3.8–5.8)
SODIUM SERPL-SCNC: 143 MMOL/L (ref 132–146)
TRIGL SERPL-MCNC: 100 MG/DL (ref 0–149)
VLDLC SERPL CALC-MCNC: 20 MG/DL
WBC # BLD: 8.4 E9/L (ref 4.5–11.5)

## 2023-05-25 ENCOUNTER — OFFICE VISIT (OUTPATIENT)
Dept: FAMILY MEDICINE CLINIC | Age: 88
End: 2023-05-25

## 2023-05-25 VITALS
OXYGEN SATURATION: 98 % | SYSTOLIC BLOOD PRESSURE: 122 MMHG | BODY MASS INDEX: 22.74 KG/M2 | WEIGHT: 154 LBS | HEART RATE: 52 BPM | DIASTOLIC BLOOD PRESSURE: 62 MMHG

## 2023-05-25 DIAGNOSIS — N40.1 ENLARGED PROSTATE WITH URINARY RETENTION: ICD-10-CM

## 2023-05-25 DIAGNOSIS — N18.4 STAGE 4 CHRONIC KIDNEY DISEASE (HCC): ICD-10-CM

## 2023-05-25 DIAGNOSIS — D50.0 IRON DEFICIENCY ANEMIA DUE TO CHRONIC BLOOD LOSS: ICD-10-CM

## 2023-05-25 DIAGNOSIS — R33.8 ENLARGED PROSTATE WITH URINARY RETENTION: ICD-10-CM

## 2023-05-25 DIAGNOSIS — I50.22 CHRONIC SYSTOLIC (CONGESTIVE) HEART FAILURE (HCC): ICD-10-CM

## 2023-05-25 DIAGNOSIS — I10 ESSENTIAL HYPERTENSION: ICD-10-CM

## 2023-05-25 DIAGNOSIS — E78.00 PURE HYPERCHOLESTEROLEMIA: Primary | ICD-10-CM

## 2023-05-25 PROBLEM — N39.0 URINARY TRACT INFECTION ASSOCIATED WITH CATHETERIZATION OF URINARY TRACT (HCC): Status: RESOLVED | Noted: 2022-06-16 | Resolved: 2023-05-25

## 2023-05-25 PROBLEM — N13.30 HYDRONEPHROSIS DUE TO OBSTRUCTION OF BLADDER: Status: RESOLVED | Noted: 2022-06-16 | Resolved: 2023-05-25

## 2023-05-25 PROBLEM — E83.42 HYPOMAGNESEMIA: Status: RESOLVED | Noted: 2022-02-22 | Resolved: 2023-05-25

## 2023-05-25 PROBLEM — E87.6 HYPOKALEMIA: Status: RESOLVED | Noted: 2021-08-09 | Resolved: 2023-05-25

## 2023-05-25 PROBLEM — N32.0 HYDRONEPHROSIS DUE TO OBSTRUCTION OF BLADDER: Status: RESOLVED | Noted: 2022-06-16 | Resolved: 2023-05-25

## 2023-05-25 PROBLEM — T83.511A URINARY TRACT INFECTION ASSOCIATED WITH CATHETERIZATION OF URINARY TRACT (HCC): Status: RESOLVED | Noted: 2022-06-16 | Resolved: 2023-05-25

## 2023-05-25 RX ORDER — LISINOPRIL AND HYDROCHLOROTHIAZIDE 12.5; 1 MG/1; MG/1
1 TABLET ORAL DAILY
Qty: 90 TABLET | Refills: 1 | Status: SHIPPED | OUTPATIENT
Start: 2023-05-25

## 2023-05-25 RX ORDER — FINASTERIDE 5 MG/1
5 TABLET, FILM COATED ORAL DAILY
Qty: 90 TABLET | Refills: 1 | Status: SHIPPED | OUTPATIENT
Start: 2023-05-25

## 2023-05-25 RX ORDER — PANTOPRAZOLE SODIUM 40 MG/1
40 TABLET, DELAYED RELEASE ORAL DAILY
Qty: 90 TABLET | Refills: 1 | Status: SHIPPED | OUTPATIENT
Start: 2023-05-25

## 2023-05-25 RX ORDER — AMLODIPINE BESYLATE 2.5 MG/1
2.5 TABLET ORAL DAILY
Qty: 90 TABLET | Refills: 1 | Status: SHIPPED | OUTPATIENT
Start: 2023-05-25

## 2023-05-25 RX ORDER — SIMVASTATIN 40 MG
40 TABLET ORAL NIGHTLY
Qty: 90 TABLET | Refills: 1 | Status: SHIPPED | OUTPATIENT
Start: 2023-05-25

## 2023-05-25 RX ORDER — METOPROLOL SUCCINATE 25 MG/1
25 TABLET, EXTENDED RELEASE ORAL DAILY
Qty: 90 TABLET | Refills: 0 | Status: SHIPPED | OUTPATIENT
Start: 2023-05-25

## 2023-05-25 RX ORDER — ADHESIVE TAPE 3"X 2.3 YD
200 TAPE, NON-MEDICATED TOPICAL DAILY
Qty: 30 TABLET | Refills: 3 | Status: SHIPPED | OUTPATIENT
Start: 2023-05-25

## 2023-05-25 NOTE — PATIENT INSTRUCTIONS
LOW SALT FOR BLOOD PRESSURE CONTROL. LOW FAT DIET FOR CHOLESTEROL CONTROL. DRINK ENOUGH FLUIDS FOR BETTER KIDNEY FUNCTION. TAKE  AMLODIPINE 2.5 MG. DAILY, PRINIVIL/HCT 10/12.5  1 TAB. DAILY,TOPROL XL 25 MG. 1 TAB. DAILY FOR BLOOD PRESSURE CONTROL. TAKE  ZOCOR 40 MG. 1 TAB. DAILY. FOR CHOLESTEROL CONTROL. Violet Rogers TAKE  FEOSOL 1 TAB. 2 TIMES A DAY AND MULTIVITAMIN 1 TAB. DAILY TO IMPROVE BLOOD COUNT. TAKE  PROSCAR 5 MG. DAILY FOR PROSTATE PROBLEM. REGULAR WALKING ADVISED. CONTINUE FOLLOW UP WITH DR. AGUILLON FOR KIDNEY PROBLEM. CONTINUE FOLLOW UP WITH DR. Ysabel Coyne FOR PROSTATE PROBLEM. KEEP NEXT APPOINTMENT IN 3 MONTHS.

## 2023-05-25 NOTE — PROGRESS NOTES
OFFICE PROGRESS NOTE      SUBJECTIVE:        Patient ID:   Tremaine Cloud is a 80 y.o. male who presents for   Chief Complaint   Patient presents with    Hypertension    Congestive Heart Failure           HPI:     RECHECK BP, CHOLESTEROL AND HEART PROBLEM. STILL HAS BLADDER CATHETER AND LEAKING SOME AT TIMES. SEEING DR. Scarlet Dial NEXT MONTH. MEDICATION REFILL. FEELS GOOD. WATCHING DIET GOOD. WALKING FOR EXERCISE. TAKING MEDICATIONS REGULARLY. TAKING IRON TABLETS AND MULTIVITAMIN REGULARLY. Prior to Admission medications    Medication Sig Start Date End Date Taking?  Authorizing Provider   simvastatin (ZOCOR) 40 MG tablet Take 1 tablet by mouth nightly 5/25/23  Yes Simran Rivera MD   pantoprazole (PROTONIX) 40 MG tablet Take 1 tablet by mouth daily 5/25/23  Yes Simran Rivera MD   metoprolol succinate (TOPROL XL) 25 MG extended release tablet Take 1 tablet by mouth daily 5/25/23  Yes Simran Rivera MD   Magnesium Oxide 200 MG TABS Take 200 mg by mouth daily 5/25/23  Yes Simran Rivera MD   lisinopril-hydroCHLOROthiazide (PRINZIDE;ZESTORETIC) 10-12.5 MG per tablet Take 1 tablet by mouth daily 5/25/23  Yes Simran Rivera MD   finasteride (PROSCAR) 5 MG tablet Take 1 tablet by mouth daily 5/25/23  Yes Simran Rivera MD   amLODIPine (NORVASC) 2.5 MG tablet Take 1 tablet by mouth daily 5/25/23  Yes Simran Rivera MD   lidocaine viscous hcl (XYLOCAINE) 2 % SOLN solution Take 5 mLs by mouth every 3 hours as needed for Irritation 9/25/22  Yes COLTEN Conde   timolol (TIMOPTIC) 0.5 % ophthalmic solution Place 1 drop into both eyes 2 times daily 5/2/22  Yes Simran Rivera MD   ferrous sulfate (IRON 325) 325 (65 Fe) MG tablet Take 1 tablet by mouth 2 times daily 9/10/21  Yes Simran Rivera MD   Multiple Vitamin (ONE-A-DAY ESSENTIAL) TABS Take 1 tablet by mouth daily   Yes Historical Provider, MD   Cholecalciferol (VITAMIN D3) 50 MCG (2000 UT) CAPS Take 1 capsule by mouth daily

## 2023-06-10 ENCOUNTER — HOSPITAL ENCOUNTER (EMERGENCY)
Age: 88
Discharge: HOME | End: 2023-06-10
Payer: MEDICARE

## 2023-06-10 VITALS
BODY MASS INDEX: 22.51 KG/M2 | HEART RATE: 61 BPM | HEIGHT: 69 IN | WEIGHT: 152 LBS | OXYGEN SATURATION: 100 % | RESPIRATION RATE: 18 BRPM | TEMPERATURE: 98.7 F | DIASTOLIC BLOOD PRESSURE: 61 MMHG | SYSTOLIC BLOOD PRESSURE: 131 MMHG

## 2023-06-10 DIAGNOSIS — N30.01 ACUTE CYSTITIS WITH HEMATURIA: Primary | ICD-10-CM

## 2023-06-10 LAB
BACTERIA URNS QL MICRO: ABNORMAL /HPF
BILIRUB UR QL STRIP: NEGATIVE
CLARITY UR: ABNORMAL
COLOR UR: ABNORMAL
GLUCOSE UR STRIP-MCNC: NEGATIVE MG/DL
HGB UR QL STRIP: ABNORMAL
KETONES UR STRIP-MCNC: NEGATIVE MG/DL
LEUKOCYTE ESTERASE UR QL STRIP: ABNORMAL
NITRITE UR QL STRIP: POSITIVE
PH UR STRIP: 5.5 [PH] (ref 5–9)
PROT UR STRIP-MCNC: >=300 MG/DL
RBC #/AREA URNS HPF: ABNORMAL /HPF (ref 0–2)
SP GR UR STRIP: 1.02 (ref 1–1.03)
UROBILINOGEN UR STRIP-ACNC: 0.2 E.U./DL
WBC #/AREA URNS HPF: ABNORMAL /HPF (ref 0–5)

## 2023-06-10 PROCEDURE — 81001 URINALYSIS AUTO W/SCOPE: CPT

## 2023-06-10 PROCEDURE — 99211 OFF/OP EST MAY X REQ PHY/QHP: CPT

## 2023-06-10 PROCEDURE — 87088 URINE BACTERIA CULTURE: CPT

## 2023-06-10 RX ORDER — AMOXICILLIN AND CLAVULANATE POTASSIUM 500; 125 MG/1; MG/1
1 TABLET, FILM COATED ORAL 2 TIMES DAILY
Qty: 20 TABLET | Refills: 0 | Status: SHIPPED | OUTPATIENT
Start: 2023-06-10 | End: 2023-06-20

## 2023-06-10 ASSESSMENT — PAIN - FUNCTIONAL ASSESSMENT: PAIN_FUNCTIONAL_ASSESSMENT: 0-10

## 2023-06-10 ASSESSMENT — PAIN SCALES - GENERAL: PAINLEVEL_OUTOF10: 0

## 2023-06-10 NOTE — ED PROVIDER NOTES
note:    No orders to display     No results found. No results found.     -------------------------------------------------PROCEDURES--------------------------------------------  Unless otherwise noted below, none      Procedures      ---EMERGENCY DEPARTMENT COURSE and DIFFERENTIAL DIAGNOSIS/MDM---  (CC/HPI Summary, DDx, ED Course, and Reassessment:) (Disposition Considerations (include 1 Tests not done, Admit vs D/C, Shared Decision Making, Pt Expectation of Test or Tx., Consults, Social Determinants, Chronic Conditiions, Records reviewed)    MDM  Number of Diagnoses or Management Options  Acute cystitis with hematuria  Diagnosis management comments: Patient in no acute distress. Urinalysis was reviewed. Check urine culture. Patient does have history of renal insufficiency placed on antibiotic renal dose. He had been on in the past for a urinary infection after return from the hospital.  I will place him on that. 500 mg twice a day for 10 days. I recommend close follow-up with his doctors. If worse go to the hospital.         DISCHARGE MEDICATIONS:  New Prescriptions    AMOXICILLIN-CLAVULANATE (AUGMENTIN) 500-125 MG PER TABLET    Take 1 tablet by mouth in the morning and at bedtime for 10 days       DISCONTINUED MEDICATIONS:  Discontinued Medications    No medications on file       PATIENT REFERRED TO:  PANFILO Spencer. [address] [place] [phone]    Schedule an appointment as soon as possible for a visit       Gabriela George MD  [facility]  [phone]            --------------------------------- ADDITIONAL PROVIDER NOTES ---------------------------------  I have spoken with the patient and discussed todays results, in addition to providing specific details for the plan of care and counseling regarding the diagnosis and prognosis. Their questions are answered at this time and they are agreeable with the plan.    This patient is stable

## 2023-06-11 LAB — BACTERIA UR CULT: NORMAL

## 2023-08-31 ENCOUNTER — OFFICE VISIT (OUTPATIENT)
Dept: FAMILY MEDICINE CLINIC | Age: 88
End: 2023-08-31
Payer: MEDICARE

## 2023-08-31 VITALS
BODY MASS INDEX: 23.18 KG/M2 | DIASTOLIC BLOOD PRESSURE: 70 MMHG | WEIGHT: 157 LBS | HEART RATE: 60 BPM | SYSTOLIC BLOOD PRESSURE: 120 MMHG | OXYGEN SATURATION: 99 %

## 2023-08-31 DIAGNOSIS — E78.00 PURE HYPERCHOLESTEROLEMIA: Primary | ICD-10-CM

## 2023-08-31 DIAGNOSIS — N18.4 STAGE 4 CHRONIC KIDNEY DISEASE (HCC): ICD-10-CM

## 2023-08-31 DIAGNOSIS — I50.22 CHRONIC SYSTOLIC (CONGESTIVE) HEART FAILURE (HCC): ICD-10-CM

## 2023-08-31 DIAGNOSIS — I10 ESSENTIAL HYPERTENSION: ICD-10-CM

## 2023-08-31 DIAGNOSIS — D50.0 IRON DEFICIENCY ANEMIA DUE TO CHRONIC BLOOD LOSS: ICD-10-CM

## 2023-08-31 PROCEDURE — 1123F ACP DISCUSS/DSCN MKR DOCD: CPT | Performed by: FAMILY MEDICINE

## 2023-08-31 PROCEDURE — 1036F TOBACCO NON-USER: CPT | Performed by: FAMILY MEDICINE

## 2023-08-31 PROCEDURE — G8427 DOCREV CUR MEDS BY ELIG CLIN: HCPCS | Performed by: FAMILY MEDICINE

## 2023-08-31 PROCEDURE — G8420 CALC BMI NORM PARAMETERS: HCPCS | Performed by: FAMILY MEDICINE

## 2023-08-31 PROCEDURE — 99214 OFFICE O/P EST MOD 30 MIN: CPT | Performed by: FAMILY MEDICINE

## 2023-08-31 RX ORDER — LISINOPRIL AND HYDROCHLOROTHIAZIDE 12.5; 1 MG/1; MG/1
1 TABLET ORAL DAILY
Qty: 90 TABLET | Refills: 1 | Status: SHIPPED | OUTPATIENT
Start: 2023-08-31

## 2023-08-31 RX ORDER — SIMVASTATIN 40 MG
40 TABLET ORAL NIGHTLY
Qty: 90 TABLET | Refills: 1 | Status: SHIPPED | OUTPATIENT
Start: 2023-08-31

## 2023-08-31 RX ORDER — METOPROLOL SUCCINATE 25 MG/1
25 TABLET, EXTENDED RELEASE ORAL DAILY
Qty: 90 TABLET | Refills: 0 | Status: SHIPPED | OUTPATIENT
Start: 2023-08-31

## 2023-08-31 RX ORDER — AMLODIPINE BESYLATE 2.5 MG/1
2.5 TABLET ORAL DAILY
Qty: 90 TABLET | Refills: 1 | Status: SHIPPED | OUTPATIENT
Start: 2023-08-31

## 2023-08-31 RX ORDER — ADHESIVE TAPE 3"X 2.3 YD
200 TAPE, NON-MEDICATED TOPICAL DAILY
Qty: 30 TABLET | Refills: 3 | Status: SHIPPED | OUTPATIENT
Start: 2023-08-31

## 2023-08-31 RX ORDER — PANTOPRAZOLE SODIUM 40 MG/1
40 TABLET, DELAYED RELEASE ORAL DAILY
Qty: 90 TABLET | Refills: 1 | Status: SHIPPED | OUTPATIENT
Start: 2023-08-31

## 2023-08-31 RX ORDER — FINASTERIDE 5 MG/1
5 TABLET, FILM COATED ORAL DAILY
Qty: 90 TABLET | Refills: 1 | Status: SHIPPED | OUTPATIENT
Start: 2023-08-31

## 2023-08-31 NOTE — PATIENT INSTRUCTIONS
LOW SALT FOR BLOOD PRESSURE CONTROL. LOW FAT DIET FOR CHOLESTEROL CONTROL. DRINK ENOUGH FLUIDS FOR BETTER KIDNEY FUNCTION. TAKE  AMLODIPINE 2.5 MG. DAILY, PRINIVIL/HCT 10/12.5  1 TAB. DAILY,TOPROL XL 25 MG. 1 TAB. DAILY FOR BLOOD PRESSURE CONTROL. TAKE  ZOCOR 40 MG. 1 TAB. DAILY. FOR CHOLESTEROL CONTROL. Calvillo Corrente TAKE  FEOSOL 1 TAB. 2 TIMES A DAY AND MULTIVITAMIN 1 TAB. DAILY TO IMPROVE BLOOD COUNT. TAKE  PROSCAR 5 MG. DAILY FOR PROSTATE PROBLEM. REGULAR WALKING ADVISED. CONTINUE FOLLOW UP WITH DR. AGUILLON FOR KIDNEY PROBLEM. CONTINUE FOLLOW UP WITH DR. Tamy Mccloud FOR PROSTATE PROBLEM. NEXT APPOINTMENT IN 3 MONTHS FOR ANNUAL PHYSICAL EXAMINATION.

## 2023-08-31 NOTE — PROGRESS NOTES
OFFICE PROGRESS NOTE      SUBJECTIVE:        Patient ID:   Jeison Joseph is a 80 y.o. male who presents for   Chief Complaint   Patient presents with    Hip Pain    Ankle Pain           HPI:     RECHECK CAD, BP, CHOLESTEROL, ANEMIA  AND DIABETES. SEEING DR. Kelsea Curry. SEEING DR. AGUILLON FOR LOW KIDNEY FUNCTION. MEDICATION REFILL. FEELS GOOD. WATCHING DIET GOOD. WALKING SOME IN HOUSE FOR EXERCISE. WILL TRY LITTLE MORE FOR JOINT PAIN RELIEF. WILL TAKE TYLENOL AS NEEDED. TAKING MEDICATIONS REGULARLY. Prior to Admission medications    Medication Sig Start Date End Date Taking?  Authorizing Provider   amLODIPine (NORVASC) 2.5 MG tablet Take 1 tablet by mouth daily 8/31/23  Yes Lynda Davila MD   finasteride (PROSCAR) 5 MG tablet Take 1 tablet by mouth daily 8/31/23  Yes Lynda Davila MD   lisinopril-hydroCHLOROthiazide (PRINZIDE;ZESTORETIC) 10-12.5 MG per tablet Take 1 tablet by mouth daily 8/31/23  Yes Lynda Davila MD   metoprolol succinate (TOPROL XL) 25 MG extended release tablet Take 1 tablet by mouth daily 8/31/23  Yes Lynda Davila MD   pantoprazole (PROTONIX) 40 MG tablet Take 1 tablet by mouth daily 8/31/23  Yes Lynda Davila MD   simvastatin (ZOCOR) 40 MG tablet Take 1 tablet by mouth nightly 8/31/23  Yes Lynda Davila MD   Magnesium Oxide -Mg Supplement 200 MG TABS Take 200 mg by mouth daily 8/31/23  Yes Lynda Davila MD   lidocaine viscous hcl (XYLOCAINE) 2 % SOLN solution Take 5 mLs by mouth every 3 hours as needed for Irritation 9/25/22  Yes COLTEN Sanabria   timolol (TIMOPTIC) 0.5 % ophthalmic solution Place 1 drop into both eyes 2 times daily 5/2/22  Yes Lynda Davila MD   ferrous sulfate (IRON 325) 325 (65 Fe) MG tablet Take 1 tablet by mouth 2 times daily 9/10/21  Yes Lynda Davila MD   Multiple Vitamin (ONE-A-DAY ESSENTIAL) TABS Take 1 tablet by mouth daily   Yes Historical Provider, MD   Cholecalciferol (VITAMIN D3) 50 MCG

## 2023-09-07 DIAGNOSIS — I10 ESSENTIAL HYPERTENSION: ICD-10-CM

## 2023-09-07 DIAGNOSIS — D50.0 IRON DEFICIENCY ANEMIA DUE TO CHRONIC BLOOD LOSS: ICD-10-CM

## 2023-09-07 DIAGNOSIS — E78.00 PURE HYPERCHOLESTEROLEMIA: ICD-10-CM

## 2023-09-07 LAB
ABSOLUTE IMMATURE GRANULOCYTE: <0.03 K/UL (ref 0–0.58)
ALBUMIN SERPL-MCNC: 4.4 G/DL (ref 3.5–5.2)
ALP BLD-CCNC: 81 U/L (ref 40–129)
ALT SERPL-CCNC: 9 U/L (ref 0–40)
ANION GAP SERPL CALCULATED.3IONS-SCNC: 14 MMOL/L (ref 7–16)
AST SERPL-CCNC: 11 U/L (ref 0–39)
BASOPHILS ABSOLUTE: 0.09 K/UL (ref 0–0.2)
BASOPHILS RELATIVE PERCENT: 1 % (ref 0–2)
BILIRUB SERPL-MCNC: 0.4 MG/DL (ref 0–1.2)
BUN BLDV-MCNC: 55 MG/DL (ref 6–23)
CALCIUM SERPL-MCNC: 9.2 MG/DL (ref 8.6–10.2)
CHLORIDE BLD-SCNC: 104 MMOL/L (ref 98–107)
CHOLESTEROL: 123 MG/DL
CO2: 20 MMOL/L (ref 22–29)
CREAT SERPL-MCNC: 2.4 MG/DL (ref 0.7–1.2)
EOSINOPHILS ABSOLUTE: 0.1 K/UL (ref 0.05–0.5)
EOSINOPHILS RELATIVE PERCENT: 1 % (ref 0–6)
GFR SERPL CREATININE-BSD FRML MDRD: 25 ML/MIN/1.73M2
GLUCOSE BLD-MCNC: 99 MG/DL (ref 74–99)
HCT VFR BLD CALC: 35.6 % (ref 37–54)
HDLC SERPL-MCNC: 39 MG/DL
HEMOGLOBIN: 11.5 G/DL (ref 12.5–16.5)
IMMATURE GRANULOCYTES: 0 % (ref 0–5)
LDL CHOLESTEROL: 63 MG/DL
LYMPHOCYTES ABSOLUTE: 2.41 K/UL (ref 1.5–4)
LYMPHOCYTES RELATIVE PERCENT: 30 % (ref 20–42)
MCH RBC QN AUTO: 31.6 PG (ref 26–35)
MCHC RBC AUTO-ENTMCNC: 32.3 G/DL (ref 32–34.5)
MCV RBC AUTO: 97.8 FL (ref 80–99.9)
MONOCYTES ABSOLUTE: 0.55 K/UL (ref 0.1–0.95)
MONOCYTES RELATIVE PERCENT: 7 % (ref 2–12)
NEUTROPHILS ABSOLUTE: 4.84 K/UL (ref 1.8–7.3)
NEUTROPHILS RELATIVE PERCENT: 61 % (ref 43–80)
PDW BLD-RTO: 12.2 % (ref 11.5–15)
PLATELET # BLD: 279 K/UL (ref 130–450)
PMV BLD AUTO: 10.1 FL (ref 7–12)
POTASSIUM SERPL-SCNC: 4.4 MMOL/L (ref 3.5–5)
RBC # BLD: 3.64 M/UL (ref 3.8–5.8)
SODIUM BLD-SCNC: 138 MMOL/L (ref 132–146)
TOTAL PROTEIN: 7.4 G/DL (ref 6.4–8.3)
TRIGL SERPL-MCNC: 107 MG/DL
VLDLC SERPL CALC-MCNC: 21 MG/DL
WBC # BLD: 8 K/UL (ref 4.5–11.5)

## 2023-09-12 RX ORDER — TIMOLOL MALEATE 5 MG/ML
1 SOLUTION/ DROPS OPHTHALMIC 2 TIMES DAILY
Qty: 5 ML | Refills: 0 | Status: SHIPPED | OUTPATIENT
Start: 2023-09-12

## 2023-09-28 ENCOUNTER — APPOINTMENT (OUTPATIENT)
Dept: CT IMAGING | Age: 88
DRG: 699 | End: 2023-09-28
Payer: MEDICARE

## 2023-09-28 ENCOUNTER — HOSPITAL ENCOUNTER (INPATIENT)
Age: 88
LOS: 3 days | Discharge: HOME HEALTH | DRG: 699 | End: 2023-10-01
Attending: STUDENT IN AN ORGANIZED HEALTH CARE EDUCATION/TRAINING PROGRAM | Admitting: INTERNAL MEDICINE
Payer: MEDICARE

## 2023-09-28 DIAGNOSIS — N18.9 ACUTE KIDNEY INJURY SUPERIMPOSED ON CKD (HCC): Primary | ICD-10-CM

## 2023-09-28 DIAGNOSIS — N39.0 URINARY TRACT INFECTION ASSOCIATED WITH CYSTOSTOMY CATHETER, INITIAL ENCOUNTER (HCC): ICD-10-CM

## 2023-09-28 DIAGNOSIS — T83.510A URINARY TRACT INFECTION ASSOCIATED WITH CYSTOSTOMY CATHETER, INITIAL ENCOUNTER (HCC): ICD-10-CM

## 2023-09-28 DIAGNOSIS — R53.83 FATIGUE, UNSPECIFIED TYPE: ICD-10-CM

## 2023-09-28 DIAGNOSIS — N17.9 ACUTE KIDNEY INJURY SUPERIMPOSED ON CKD (HCC): Primary | ICD-10-CM

## 2023-09-28 LAB
ALBUMIN SERPL-MCNC: 4.3 G/DL (ref 3.5–5.2)
ALP SERPL-CCNC: 80 U/L (ref 40–129)
ALT SERPL-CCNC: 8 U/L (ref 0–40)
ANION GAP SERPL CALCULATED.3IONS-SCNC: 14 MMOL/L (ref 7–16)
AST SERPL-CCNC: 8 U/L (ref 0–39)
BACTERIA URNS QL MICRO: ABNORMAL
BASOPHILS # BLD: 0.07 K/UL (ref 0–0.2)
BASOPHILS NFR BLD: 1 % (ref 0–2)
BILIRUB SERPL-MCNC: 0.4 MG/DL (ref 0–1.2)
BILIRUB UR QL STRIP: NEGATIVE
BUN SERPL-MCNC: 68 MG/DL (ref 6–23)
CALCIUM SERPL-MCNC: 9.3 MG/DL (ref 8.6–10.2)
CHLORIDE SERPL-SCNC: 105 MMOL/L (ref 98–107)
CLARITY UR: ABNORMAL
CO2 SERPL-SCNC: 19 MMOL/L (ref 22–29)
COLOR UR: YELLOW
CREAT SERPL-MCNC: 3 MG/DL (ref 0.7–1.2)
EOSINOPHIL # BLD: 0.04 K/UL (ref 0.05–0.5)
EOSINOPHILS RELATIVE PERCENT: 1 % (ref 0–6)
ERYTHROCYTE [DISTWIDTH] IN BLOOD BY AUTOMATED COUNT: 12.5 % (ref 11.5–15)
GFR SERPL CREATININE-BSD FRML MDRD: 19 ML/MIN/1.73M2
GLUCOSE SERPL-MCNC: 163 MG/DL (ref 74–99)
GLUCOSE UR STRIP-MCNC: NEGATIVE MG/DL
HCT VFR BLD AUTO: 34.2 % (ref 37–54)
HGB BLD-MCNC: 11.4 G/DL (ref 12.5–16.5)
HGB UR QL STRIP.AUTO: ABNORMAL
IMM GRANULOCYTES # BLD AUTO: <0.03 K/UL (ref 0–0.58)
IMM GRANULOCYTES NFR BLD: 0 % (ref 0–5)
KETONES UR STRIP-MCNC: NEGATIVE MG/DL
LACTATE BLDV-SCNC: 1.2 MMOL/L (ref 0.5–2.2)
LEUKOCYTE ESTERASE UR QL STRIP: ABNORMAL
LIPASE SERPL-CCNC: 47 U/L (ref 13–60)
LYMPHOCYTES NFR BLD: 2.19 K/UL (ref 1.5–4)
LYMPHOCYTES RELATIVE PERCENT: 25 % (ref 20–42)
MCH RBC QN AUTO: 31.7 PG (ref 26–35)
MCHC RBC AUTO-ENTMCNC: 33.3 G/DL (ref 32–34.5)
MCV RBC AUTO: 95 FL (ref 80–99.9)
MONOCYTES NFR BLD: 0.81 K/UL (ref 0.1–0.95)
MONOCYTES NFR BLD: 9 % (ref 2–12)
NEUTROPHILS NFR BLD: 64 % (ref 43–80)
NEUTS SEG NFR BLD: 5.65 K/UL (ref 1.8–7.3)
NITRITE UR QL STRIP: NEGATIVE
PH UR STRIP: 6 [PH] (ref 5–9)
PLATELET # BLD AUTO: 295 K/UL (ref 130–450)
PMV BLD AUTO: 9.4 FL (ref 7–12)
POTASSIUM SERPL-SCNC: 5 MMOL/L (ref 3.5–5)
PROT SERPL-MCNC: 7.8 G/DL (ref 6.4–8.3)
PROT UR STRIP-MCNC: 100 MG/DL
RBC # BLD AUTO: 3.6 M/UL (ref 3.8–5.8)
RBC #/AREA URNS HPF: ABNORMAL /HPF
SODIUM SERPL-SCNC: 138 MMOL/L (ref 132–146)
SP GR UR STRIP: 1.01 (ref 1–1.03)
UROBILINOGEN UR STRIP-ACNC: 0.2 EU/DL (ref 0–1)
WBC #/AREA URNS HPF: ABNORMAL /HPF
WBC OTHER # BLD: 8.8 K/UL (ref 4.5–11.5)

## 2023-09-28 PROCEDURE — 99223 1ST HOSP IP/OBS HIGH 75: CPT | Performed by: INTERNAL MEDICINE

## 2023-09-28 PROCEDURE — 87077 CULTURE AEROBIC IDENTIFY: CPT

## 2023-09-28 PROCEDURE — 6360000002 HC RX W HCPCS: Performed by: STUDENT IN AN ORGANIZED HEALTH CARE EDUCATION/TRAINING PROGRAM

## 2023-09-28 PROCEDURE — 2580000003 HC RX 258: Performed by: STUDENT IN AN ORGANIZED HEALTH CARE EDUCATION/TRAINING PROGRAM

## 2023-09-28 PROCEDURE — 96365 THER/PROPH/DIAG IV INF INIT: CPT

## 2023-09-28 PROCEDURE — 2580000003 HC RX 258: Performed by: NURSE PRACTITIONER

## 2023-09-28 PROCEDURE — 96361 HYDRATE IV INFUSION ADD-ON: CPT

## 2023-09-28 PROCEDURE — 81001 URINALYSIS AUTO W/SCOPE: CPT

## 2023-09-28 PROCEDURE — APPSS45 APP SPLIT SHARED TIME 31-45 MINUTES: Performed by: NURSE PRACTITIONER

## 2023-09-28 PROCEDURE — 2060000000 HC ICU INTERMEDIATE R&B

## 2023-09-28 PROCEDURE — 74176 CT ABD & PELVIS W/O CONTRAST: CPT

## 2023-09-28 PROCEDURE — 99285 EMERGENCY DEPT VISIT HI MDM: CPT

## 2023-09-28 PROCEDURE — 80053 COMPREHEN METABOLIC PANEL: CPT

## 2023-09-28 PROCEDURE — 6360000002 HC RX W HCPCS: Performed by: NURSE PRACTITIONER

## 2023-09-28 PROCEDURE — 85025 COMPLETE CBC W/AUTO DIFF WBC: CPT

## 2023-09-28 PROCEDURE — 6370000000 HC RX 637 (ALT 250 FOR IP): Performed by: NURSE PRACTITIONER

## 2023-09-28 PROCEDURE — 87086 URINE CULTURE/COLONY COUNT: CPT

## 2023-09-28 PROCEDURE — 83605 ASSAY OF LACTIC ACID: CPT

## 2023-09-28 PROCEDURE — 83690 ASSAY OF LIPASE: CPT

## 2023-09-28 RX ORDER — METOPROLOL SUCCINATE 25 MG/1
25 TABLET, EXTENDED RELEASE ORAL DAILY
Status: DISCONTINUED | OUTPATIENT
Start: 2023-09-28 | End: 2023-10-01 | Stop reason: HOSPADM

## 2023-09-28 RX ORDER — 0.9 % SODIUM CHLORIDE 0.9 %
1000 INTRAVENOUS SOLUTION INTRAVENOUS ONCE
Status: COMPLETED | OUTPATIENT
Start: 2023-09-28 | End: 2023-09-28

## 2023-09-28 RX ORDER — MAGNESIUM OXIDE 400 MG/1
200 TABLET ORAL DAILY
Status: DISCONTINUED | OUTPATIENT
Start: 2023-09-28 | End: 2023-10-01 | Stop reason: HOSPADM

## 2023-09-28 RX ORDER — LANOLIN ALCOHOL/MO/W.PET/CERES
1000 CREAM (GRAM) TOPICAL DAILY
Status: DISCONTINUED | OUTPATIENT
Start: 2023-09-29 | End: 2023-10-01 | Stop reason: HOSPADM

## 2023-09-28 RX ORDER — SODIUM CHLORIDE 9 MG/ML
INJECTION, SOLUTION INTRAVENOUS PRN
Status: DISCONTINUED | OUTPATIENT
Start: 2023-09-28 | End: 2023-10-01 | Stop reason: HOSPADM

## 2023-09-28 RX ORDER — ONDANSETRON 2 MG/ML
4 INJECTION INTRAMUSCULAR; INTRAVENOUS EVERY 6 HOURS PRN
Status: DISCONTINUED | OUTPATIENT
Start: 2023-09-28 | End: 2023-10-01 | Stop reason: HOSPADM

## 2023-09-28 RX ORDER — ACETAMINOPHEN 325 MG/1
650 TABLET ORAL EVERY 6 HOURS PRN
Status: DISCONTINUED | OUTPATIENT
Start: 2023-09-28 | End: 2023-10-01 | Stop reason: HOSPADM

## 2023-09-28 RX ORDER — SODIUM CHLORIDE 9 MG/ML
INJECTION, SOLUTION INTRAVENOUS CONTINUOUS
Status: DISCONTINUED | OUTPATIENT
Start: 2023-09-28 | End: 2023-10-01

## 2023-09-28 RX ORDER — ASPIRIN 81 MG/1
81 TABLET ORAL DAILY
Status: DISCONTINUED | OUTPATIENT
Start: 2023-09-28 | End: 2023-10-01 | Stop reason: HOSPADM

## 2023-09-28 RX ORDER — AMLODIPINE BESYLATE 2.5 MG/1
2.5 TABLET ORAL DAILY
Status: DISCONTINUED | OUTPATIENT
Start: 2023-09-28 | End: 2023-10-01 | Stop reason: HOSPADM

## 2023-09-28 RX ORDER — ONDANSETRON 4 MG/1
4 TABLET, ORALLY DISINTEGRATING ORAL EVERY 8 HOURS PRN
Status: DISCONTINUED | OUTPATIENT
Start: 2023-09-28 | End: 2023-10-01 | Stop reason: HOSPADM

## 2023-09-28 RX ORDER — ASCORBIC ACID 500 MG
250 TABLET ORAL DAILY
Status: DISCONTINUED | OUTPATIENT
Start: 2023-09-28 | End: 2023-10-01 | Stop reason: HOSPADM

## 2023-09-28 RX ORDER — PANTOPRAZOLE SODIUM 40 MG/1
40 TABLET, DELAYED RELEASE ORAL DAILY
Status: DISCONTINUED | OUTPATIENT
Start: 2023-09-28 | End: 2023-10-01 | Stop reason: HOSPADM

## 2023-09-28 RX ORDER — SODIUM CHLORIDE 0.9 % (FLUSH) 0.9 %
5-40 SYRINGE (ML) INJECTION PRN
Status: DISCONTINUED | OUTPATIENT
Start: 2023-09-28 | End: 2023-10-01 | Stop reason: HOSPADM

## 2023-09-28 RX ORDER — ACETAMINOPHEN 650 MG/1
650 SUPPOSITORY RECTAL EVERY 6 HOURS PRN
Status: DISCONTINUED | OUTPATIENT
Start: 2023-09-28 | End: 2023-10-01 | Stop reason: HOSPADM

## 2023-09-28 RX ORDER — ATORVASTATIN CALCIUM 20 MG/1
20 TABLET, FILM COATED ORAL DAILY
Status: DISCONTINUED | OUTPATIENT
Start: 2023-09-28 | End: 2023-10-01 | Stop reason: HOSPADM

## 2023-09-28 RX ORDER — VITAMIN B COMPLEX
2000 TABLET ORAL DAILY
Status: DISCONTINUED | OUTPATIENT
Start: 2023-09-28 | End: 2023-10-01 | Stop reason: HOSPADM

## 2023-09-28 RX ORDER — FINASTERIDE 5 MG/1
5 TABLET, FILM COATED ORAL DAILY
Status: DISCONTINUED | OUTPATIENT
Start: 2023-09-28 | End: 2023-09-30

## 2023-09-28 RX ORDER — SODIUM CHLORIDE 0.9 % (FLUSH) 0.9 %
5-40 SYRINGE (ML) INJECTION EVERY 12 HOURS SCHEDULED
Status: DISCONTINUED | OUTPATIENT
Start: 2023-09-28 | End: 2023-10-01 | Stop reason: HOSPADM

## 2023-09-28 RX ORDER — TIMOLOL MALEATE 5 MG/ML
1 SOLUTION/ DROPS OPHTHALMIC 2 TIMES DAILY
Status: DISCONTINUED | OUTPATIENT
Start: 2023-09-28 | End: 2023-10-01 | Stop reason: HOSPADM

## 2023-09-28 RX ORDER — POLYETHYLENE GLYCOL 3350 17 G/17G
17 POWDER, FOR SOLUTION ORAL DAILY PRN
Status: DISCONTINUED | OUTPATIENT
Start: 2023-09-28 | End: 2023-10-01 | Stop reason: HOSPADM

## 2023-09-28 RX ORDER — HEPARIN SODIUM 5000 [USP'U]/ML
5000 INJECTION, SOLUTION INTRAVENOUS; SUBCUTANEOUS EVERY 8 HOURS SCHEDULED
Status: DISCONTINUED | OUTPATIENT
Start: 2023-09-28 | End: 2023-10-01 | Stop reason: HOSPADM

## 2023-09-28 RX ADMIN — FINASTERIDE 5 MG: 5 TABLET, FILM COATED ORAL at 21:42

## 2023-09-28 RX ADMIN — AMLODIPINE BESYLATE 2.5 MG: 2.5 TABLET ORAL at 21:32

## 2023-09-28 RX ADMIN — OXYCODONE HYDROCHLORIDE AND ACETAMINOPHEN 250 MG: 500 TABLET ORAL at 21:31

## 2023-09-28 RX ADMIN — ASPIRIN 81 MG: 81 TABLET, COATED ORAL at 21:31

## 2023-09-28 RX ADMIN — HEPARIN SODIUM 5000 UNITS: 5000 INJECTION INTRAVENOUS; SUBCUTANEOUS at 21:30

## 2023-09-28 RX ADMIN — Medication 10 ML: at 21:00

## 2023-09-28 RX ADMIN — ATORVASTATIN CALCIUM 20 MG: 20 TABLET, FILM COATED ORAL at 21:31

## 2023-09-28 RX ADMIN — PANTOPRAZOLE SODIUM 40 MG: 40 TABLET, DELAYED RELEASE ORAL at 21:30

## 2023-09-28 RX ADMIN — CEFEPIME 1000 MG: 1 INJECTION, POWDER, FOR SOLUTION INTRAMUSCULAR; INTRAVENOUS at 14:17

## 2023-09-28 RX ADMIN — Medication 2000 UNITS: at 21:30

## 2023-09-28 RX ADMIN — TIMOLOL MALEATE 1 DROP: 5 SOLUTION OPHTHALMIC at 21:00

## 2023-09-28 RX ADMIN — MAGNESIUM OXIDE 200 MG: 400 TABLET ORAL at 21:30

## 2023-09-28 RX ADMIN — METOPROLOL SUCCINATE 25 MG: 25 TABLET, EXTENDED RELEASE ORAL at 21:32

## 2023-09-28 RX ADMIN — SODIUM CHLORIDE: 9 INJECTION, SOLUTION INTRAVENOUS at 21:43

## 2023-09-28 RX ADMIN — SODIUM CHLORIDE 1000 ML: 9 INJECTION, SOLUTION INTRAVENOUS at 12:57

## 2023-09-28 NOTE — ED PROVIDER NOTES
Notes PCP visit from 8/31/23    CC/HPI Summary, DDx, ED Course, and Reassessment: CBC is ordered to evaluate for any signs of infection or inflammation by obtaining a WBC count, or any signs of acute anemia by interpreting hemoglobin. CMP was ordered to evaluate for any electrolyte imbalances, kidney function, hepatic injury or any elevations in anion gap. Urinalysis ordered to evaluate for a UTI and/or hematuria. Lipase level was ordered to evaluate for possible elevations which suggest pancreatic etiology of symptoms. Lactic acid level obtained to evaluate for signs of organ hypoperfusion or ischemia. A CT abdomen without IV contrast was ordered to evaluate for, but without limitation to, ureterolithiasis, nephrolithiasis, constipation, hollow organ perforation, small bowel obstruction, bowel ischemia, pneumoperitoneum, diverticulitis, cholecystitis, appendicitis, perforation. CBC revealed mild anemia with a hemoglobin 11.4. CMP revealed MORENA with creatinine 3.0 from his baseline around 2.4. Lipase lactic acid were normal.  Urinalysis revealed large leukocytes with  white blood cells and was sent for culture. Patient will be given a dose of cefepime in the ED. CT abdomen pelvis revealed heterogeneity and enlargement of the prostate with findings to suggest cystitis as well as distention of the renal collecting systems bilaterally with prominence of the urethral lining to suggest a pyelitis/pyelonephritis. Patient will be admitted to the ED due to his urinary tract infection and MORENA by Dr. Chaparrita Ching at this time. Disposition Considerations (Tests not ordered but considered, Shared Decision Making, Pt Expectation of Test or Tx.):     FINAL IMPRESSION      1. Acute kidney injury superimposed on CKD ([address])    2. Urinary tract infection associated with cystostomy catheter, initial encounter ([address])    3.  Fatigue, unspecified type          DISPOSITION/PLAN     DISPOSITION Admitted 09/28/2023 06:26:20

## 2023-09-28 NOTE — H&P
[address] [facility]   History and Physical    CHIEF COMPLAINT:  Fatigue, abd pain    History of Present Illness:  Nav Almendarez is a 80 y.o. male with a history of HLD, HTN, CKD stage IV, HFrEF, SHOBHA, and BPH with suprapubic catheter who presents with Urinary Catheter (Urinary guillory catheter leaking) and Fatigue (Over the past 4 days, loss of appetite)  Pt reports feeling tired and weak for the last 4-5 days and has tenderness at suprapubic site with drainage. States catheter is changed every few months, most recently ~3 weeks by home nurse. Reports no appetite with reduced po intake for the last couple days. Vital signs stable in ER. Serum Cr 3.0, was 2.4 on 9/7. UA with bacteria, WBC & RBC. CTAP notes abnormal appearance of bladder with wall thickening & air and distention in renal collecting systems bilaterally with prominence of urothelial lining. While in ER, pt received cefepime and 1L IVF bolus.      WORK UP SINCE ARRIVAL:  Results for orders placed or performed during the hospital encounter of 09/28/23   CBC with Auto Differential   Result Value Ref Range    WBC 8.8 4.5 - 11.5 k/uL    RBC 3.60 (L) 3.80 - 5.80 m/uL    Hemoglobin 11.4 (L) 12.5 - 16.5 g/dL    Hematocrit 34.2 (L) 37.0 - 54.0 %    MCV 95.0 80.0 - 99.9 fL    MCH 31.7 26.0 - 35.0 pg    MCHC 33.3 32.0 - 34.5 g/dL    RDW 12.5 11.5 - 15.0 %    Platelets 008 948 - 209 k/uL    MPV 9.4 7.0 - 12.0 fL    Neutrophils % 64 43.0 - 80.0 %    Lymphocytes % 25 20.0 - 42.0 %    Monocytes % 9 2.0 - 12.0 %    Eosinophils % 1 0 - 6 %    Basophils % 1 0.0 - 2.0 %    Immature Granulocytes 0 0.0 - 5.0 %    Neutrophils Absolute 5.65 1.80 - 7.30 k/uL    Lymphocytes Absolute 2.19 1.50 - 4.00 k/uL    Monocytes Absolute 0.81 0.10 - 0.95 k/uL    Eosinophils Absolute 0.04 (L) 0.05 - 0.50 k/uL    Basophils Absolute 0.07 0.00 - 0.20 k/uL    Absolute Immature Granulocyte <0.03 0.00 - 0.58 k/uL   CMP   Result Value Ref Range    Sodium 138 132 - 146

## 2023-09-29 LAB
ANION GAP SERPL CALCULATED.3IONS-SCNC: 12 MMOL/L (ref 7–16)
BASOPHILS # BLD: 0.07 K/UL (ref 0–0.2)
BASOPHILS NFR BLD: 1 % (ref 0–2)
BUN SERPL-MCNC: 51 MG/DL (ref 6–23)
CALCIUM SERPL-MCNC: 8.8 MG/DL (ref 8.6–10.2)
CHLORIDE SERPL-SCNC: 111 MMOL/L (ref 98–107)
CO2 SERPL-SCNC: 19 MMOL/L (ref 22–29)
CREAT SERPL-MCNC: 2.2 MG/DL (ref 0.7–1.2)
EOSINOPHIL # BLD: 0.15 K/UL (ref 0.05–0.5)
EOSINOPHILS RELATIVE PERCENT: 2 % (ref 0–6)
ERYTHROCYTE [DISTWIDTH] IN BLOOD BY AUTOMATED COUNT: 12.6 % (ref 11.5–15)
GFR SERPL CREATININE-BSD FRML MDRD: 27 ML/MIN/1.73M2
GLUCOSE SERPL-MCNC: 108 MG/DL (ref 74–99)
HCT VFR BLD AUTO: 32.2 % (ref 37–54)
HGB BLD-MCNC: 10.6 G/DL (ref 12.5–16.5)
IMM GRANULOCYTES # BLD AUTO: 0.03 K/UL (ref 0–0.58)
IMM GRANULOCYTES NFR BLD: 0 % (ref 0–5)
LYMPHOCYTES NFR BLD: 1.95 K/UL (ref 1.5–4)
LYMPHOCYTES RELATIVE PERCENT: 24 % (ref 20–42)
MCH RBC QN AUTO: 31.6 PG (ref 26–35)
MCHC RBC AUTO-ENTMCNC: 32.9 G/DL (ref 32–34.5)
MCV RBC AUTO: 96.1 FL (ref 80–99.9)
MONOCYTES NFR BLD: 0.77 K/UL (ref 0.1–0.95)
MONOCYTES NFR BLD: 10 % (ref 2–12)
NEUTROPHILS NFR BLD: 63 % (ref 43–80)
NEUTS SEG NFR BLD: 5.05 K/UL (ref 1.8–7.3)
PLATELET # BLD AUTO: 286 K/UL (ref 130–450)
PMV BLD AUTO: 9.6 FL (ref 7–12)
POTASSIUM SERPL-SCNC: 4.6 MMOL/L (ref 3.5–5)
RBC # BLD AUTO: 3.35 M/UL (ref 3.8–5.8)
SODIUM SERPL-SCNC: 142 MMOL/L (ref 132–146)
WBC OTHER # BLD: 8 K/UL (ref 4.5–11.5)

## 2023-09-29 PROCEDURE — 97165 OT EVAL LOW COMPLEX 30 MIN: CPT

## 2023-09-29 PROCEDURE — 2580000003 HC RX 258: Performed by: NURSE PRACTITIONER

## 2023-09-29 PROCEDURE — 97535 SELF CARE MNGMENT TRAINING: CPT

## 2023-09-29 PROCEDURE — 6360000002 HC RX W HCPCS: Performed by: NURSE PRACTITIONER

## 2023-09-29 PROCEDURE — 2060000000 HC ICU INTERMEDIATE R&B

## 2023-09-29 PROCEDURE — 0T2BX0Z CHANGE DRAINAGE DEVICE IN BLADDER, EXTERNAL APPROACH: ICD-10-PCS | Performed by: UROLOGY

## 2023-09-29 PROCEDURE — 6370000000 HC RX 637 (ALT 250 FOR IP): Performed by: NURSE PRACTITIONER

## 2023-09-29 PROCEDURE — APPSS30 APP SPLIT SHARED TIME 16-30 MINUTES: Performed by: NURSE PRACTITIONER

## 2023-09-29 PROCEDURE — 97161 PT EVAL LOW COMPLEX 20 MIN: CPT | Performed by: PHYSICAL THERAPIST

## 2023-09-29 PROCEDURE — 36415 COLL VENOUS BLD VENIPUNCTURE: CPT

## 2023-09-29 PROCEDURE — 99232 SBSQ HOSP IP/OBS MODERATE 35: CPT | Performed by: INTERNAL MEDICINE

## 2023-09-29 PROCEDURE — 6370000000 HC RX 637 (ALT 250 FOR IP): Performed by: INTERNAL MEDICINE

## 2023-09-29 PROCEDURE — 97110 THERAPEUTIC EXERCISES: CPT | Performed by: PHYSICAL THERAPIST

## 2023-09-29 PROCEDURE — 85025 COMPLETE CBC W/AUTO DIFF WBC: CPT

## 2023-09-29 PROCEDURE — 97530 THERAPEUTIC ACTIVITIES: CPT | Performed by: PHYSICAL THERAPIST

## 2023-09-29 PROCEDURE — 80048 BASIC METABOLIC PNL TOTAL CA: CPT

## 2023-09-29 RX ORDER — SODIUM BICARBONATE 650 MG/1
650 TABLET ORAL 2 TIMES DAILY
Status: DISCONTINUED | OUTPATIENT
Start: 2023-09-29 | End: 2023-10-01 | Stop reason: HOSPADM

## 2023-09-29 RX ADMIN — HEPARIN SODIUM 5000 UNITS: 5000 INJECTION INTRAVENOUS; SUBCUTANEOUS at 12:06

## 2023-09-29 RX ADMIN — ASPIRIN 81 MG: 81 TABLET, COATED ORAL at 12:07

## 2023-09-29 RX ADMIN — TIMOLOL MALEATE 1 DROP: 5 SOLUTION OPHTHALMIC at 20:10

## 2023-09-29 RX ADMIN — SODIUM CHLORIDE: 9 INJECTION, SOLUTION INTRAVENOUS at 14:41

## 2023-09-29 RX ADMIN — CEFEPIME 1000 MG: 1 INJECTION, POWDER, FOR SOLUTION INTRAMUSCULAR; INTRAVENOUS at 14:40

## 2023-09-29 RX ADMIN — MAGNESIUM OXIDE 200 MG: 400 TABLET ORAL at 12:07

## 2023-09-29 RX ADMIN — FINASTERIDE 5 MG: 5 TABLET, FILM COATED ORAL at 12:07

## 2023-09-29 RX ADMIN — HEPARIN SODIUM 5000 UNITS: 5000 INJECTION INTRAVENOUS; SUBCUTANEOUS at 06:25

## 2023-09-29 RX ADMIN — HEPARIN SODIUM 5000 UNITS: 5000 INJECTION INTRAVENOUS; SUBCUTANEOUS at 23:35

## 2023-09-29 RX ADMIN — TIMOLOL MALEATE 1 DROP: 5 SOLUTION OPHTHALMIC at 12:06

## 2023-09-29 RX ADMIN — PANTOPRAZOLE SODIUM 40 MG: 40 TABLET, DELAYED RELEASE ORAL at 12:19

## 2023-09-29 RX ADMIN — CEFEPIME 1000 MG: 1 INJECTION, POWDER, FOR SOLUTION INTRAMUSCULAR; INTRAVENOUS at 03:00

## 2023-09-29 RX ADMIN — SODIUM BICARBONATE 650 MG: 650 TABLET ORAL at 20:09

## 2023-09-29 RX ADMIN — CYANOCOBALAMIN TAB 1000 MCG 1000 MCG: 1000 TAB at 12:07

## 2023-09-29 RX ADMIN — Medication 2000 UNITS: at 12:07

## 2023-09-29 RX ADMIN — OXYCODONE HYDROCHLORIDE AND ACETAMINOPHEN 250 MG: 500 TABLET ORAL at 12:07

## 2023-09-29 RX ADMIN — Medication 10 ML: at 20:09

## 2023-09-29 RX ADMIN — ATORVASTATIN CALCIUM 20 MG: 20 TABLET, FILM COATED ORAL at 12:07

## 2023-09-29 ASSESSMENT — PAIN SCALES - GENERAL
PAINLEVEL_OUTOF10: 0

## 2023-09-29 NOTE — CARE COORDINATION
Case Management Assessment  Initial Evaluation    Date/Time of Evaluation: 9/29/2023 2:53 PM  Assessment Completed by: MINERVA Barreto    If patient is discharged prior to next notation, then this note serves as note for discharge by case management. Patient Name: Molly Dance                   YOB: 1932  Diagnosis: Complicated UTI (urinary tract infection) [N39.0]  Acute kidney injury superimposed on CKD ([address]) [N17.9, N18.9]  Fatigue, unspecified type [R53.83]  Urinary tract infection associated with cystostomy catheter, initial encounter ([address]) [T83.510A, N39.0]                   Date / Time: 9/28/2023 12:33 PM    Patient Admission Status: Inpatient   Readmission Risk (Low < 19, Mod (19-27), High > 27): Readmission Risk Score: 14.3    Current PCP: Noel Walls MD  PCP verified by CM? Yes    Chart Reviewed: Yes      History Provided by: Patient  Patient Orientation: Alert and Oriented    Patient Cognition: Alert    Hospitalization in the last 30 days (Readmission):  No    If yes, Readmission Assessment in CM Navigator will be completed.     Advance Directives:      Code Status: DNR-CCA   Patient's Primary Decision Maker is: Legal Next of Kin    Primary Decision Maker: RuddyEvelyn - Spouse - [phone]    Secondary Decision Maker: Iqra Louis - Child - [phone]    Supplemental (Other) Decision Maker: Marog Humberto - Child - [phone]    Discharge Planning:    Patient lives with:   Type of Home:    Primary Care Giver: Self  Patient Support Systems include: Spouse/Significant Other, Children   Current Financial resources:    Current community resources:    Current services prior to admission:              Current DME:              Type of Home Care services:       ADLS  Prior functional level: Assistance with the following:, Other (see comment) (transportation)  Current functional level: Assistance with the following:, Other (see comment) (transportation)    PT AM-PAC: 17 /24  OT AM-PAC:

## 2023-09-29 NOTE — ACP (ADVANCE CARE PLANNING)
Advance Care Planning   Healthcare Decision Maker:    Primary Decision Maker: Evelyn Fox - Spouse - [phone]    Secondary Decision Maker: Mary Kate Rosalesl - [phone]    Supplemental (Other) Decision Maker: Lexus Hernandez - Child - [phone]    Click here to complete Healthcare Decision Makers including selection of the Healthcare Decision Maker Relationship (ie \"Primary\"). Today we documented Decision Maker(s) consistent with Legal Next of Kin hierarchy.

## 2023-09-30 LAB
25(OH)D3 SERPL-MCNC: 52.5 NG/ML (ref 30–100)
ANION GAP SERPL CALCULATED.3IONS-SCNC: 12 MMOL/L (ref 7–16)
BASOPHILS # BLD: 0.08 K/UL (ref 0–0.2)
BASOPHILS NFR BLD: 1 % (ref 0–2)
BUN SERPL-MCNC: 49 MG/DL (ref 6–23)
CALCIUM SERPL-MCNC: 8.9 MG/DL (ref 8.6–10.2)
CHLORIDE SERPL-SCNC: 113 MMOL/L (ref 98–107)
CO2 SERPL-SCNC: 19 MMOL/L (ref 22–29)
CREAT SERPL-MCNC: 2 MG/DL (ref 0.7–1.2)
EOSINOPHIL # BLD: 0.21 K/UL (ref 0.05–0.5)
EOSINOPHILS RELATIVE PERCENT: 3 % (ref 0–6)
ERYTHROCYTE [DISTWIDTH] IN BLOOD BY AUTOMATED COUNT: 12.7 % (ref 11.5–15)
GFR SERPL CREATININE-BSD FRML MDRD: 31 ML/MIN/1.73M2
GLUCOSE SERPL-MCNC: 104 MG/DL (ref 74–99)
HCT VFR BLD AUTO: 31.6 % (ref 37–54)
HGB BLD-MCNC: 10 G/DL (ref 12.5–16.5)
IMM GRANULOCYTES # BLD AUTO: <0.03 K/UL (ref 0–0.58)
IMM GRANULOCYTES NFR BLD: 0 % (ref 0–5)
LYMPHOCYTES NFR BLD: 2.1 K/UL (ref 1.5–4)
LYMPHOCYTES RELATIVE PERCENT: 27 % (ref 20–42)
MAGNESIUM SERPL-MCNC: 2.1 MG/DL (ref 1.6–2.6)
MCH RBC QN AUTO: 30.8 PG (ref 26–35)
MCHC RBC AUTO-ENTMCNC: 31.6 G/DL (ref 32–34.5)
MCV RBC AUTO: 97.2 FL (ref 80–99.9)
MONOCYTES NFR BLD: 0.76 K/UL (ref 0.1–0.95)
MONOCYTES NFR BLD: 10 % (ref 2–12)
NEUTROPHILS NFR BLD: 60 % (ref 43–80)
NEUTS SEG NFR BLD: 4.75 K/UL (ref 1.8–7.3)
PHOSPHATE SERPL-MCNC: 3 MG/DL (ref 2.5–4.5)
PLATELET # BLD AUTO: 256 K/UL (ref 130–450)
PMV BLD AUTO: 9.5 FL (ref 7–12)
POTASSIUM SERPL-SCNC: 4.5 MMOL/L (ref 3.5–5)
PTH-INTACT SERPL-MCNC: 36.6 PG/ML (ref 15–65)
RBC # BLD AUTO: 3.25 M/UL (ref 3.8–5.8)
SODIUM SERPL-SCNC: 144 MMOL/L (ref 132–146)
WBC OTHER # BLD: 7.9 K/UL (ref 4.5–11.5)

## 2023-09-30 PROCEDURE — 99232 SBSQ HOSP IP/OBS MODERATE 35: CPT | Performed by: INTERNAL MEDICINE

## 2023-09-30 PROCEDURE — 83735 ASSAY OF MAGNESIUM: CPT

## 2023-09-30 PROCEDURE — 6370000000 HC RX 637 (ALT 250 FOR IP): Performed by: INTERNAL MEDICINE

## 2023-09-30 PROCEDURE — 6370000000 HC RX 637 (ALT 250 FOR IP): Performed by: NURSE PRACTITIONER

## 2023-09-30 PROCEDURE — 2060000000 HC ICU INTERMEDIATE R&B

## 2023-09-30 PROCEDURE — 85025 COMPLETE CBC W/AUTO DIFF WBC: CPT

## 2023-09-30 PROCEDURE — 6360000002 HC RX W HCPCS: Performed by: NURSE PRACTITIONER

## 2023-09-30 PROCEDURE — 36415 COLL VENOUS BLD VENIPUNCTURE: CPT

## 2023-09-30 PROCEDURE — 2580000003 HC RX 258: Performed by: NURSE PRACTITIONER

## 2023-09-30 PROCEDURE — 80048 BASIC METABOLIC PNL TOTAL CA: CPT

## 2023-09-30 PROCEDURE — 83970 ASSAY OF PARATHORMONE: CPT

## 2023-09-30 PROCEDURE — 82306 VITAMIN D 25 HYDROXY: CPT

## 2023-09-30 PROCEDURE — APPSS30 APP SPLIT SHARED TIME 16-30 MINUTES: Performed by: NURSE PRACTITIONER

## 2023-09-30 PROCEDURE — 6370000000 HC RX 637 (ALT 250 FOR IP): Performed by: UROLOGY

## 2023-09-30 PROCEDURE — 84100 ASSAY OF PHOSPHORUS: CPT

## 2023-09-30 RX ORDER — OXYBUTYNIN CHLORIDE 5 MG/1
5 TABLET ORAL 3 TIMES DAILY
Status: DISCONTINUED | OUTPATIENT
Start: 2023-09-30 | End: 2023-10-01 | Stop reason: HOSPADM

## 2023-09-30 RX ORDER — POLYETHYLENE GLYCOL 3350 17 G/17G
17 POWDER, FOR SOLUTION ORAL DAILY
Status: DISCONTINUED | OUTPATIENT
Start: 2023-09-30 | End: 2023-10-01 | Stop reason: HOSPADM

## 2023-09-30 RX ORDER — LANOLIN ALCOHOL/MO/W.PET/CERES
3 CREAM (GRAM) TOPICAL NIGHTLY PRN
Status: DISCONTINUED | OUTPATIENT
Start: 2023-09-30 | End: 2023-10-01 | Stop reason: HOSPADM

## 2023-09-30 RX ADMIN — Medication 10 ML: at 07:53

## 2023-09-30 RX ADMIN — SODIUM BICARBONATE 650 MG: 650 TABLET ORAL at 07:52

## 2023-09-30 RX ADMIN — FINASTERIDE 5 MG: 5 TABLET, FILM COATED ORAL at 07:52

## 2023-09-30 RX ADMIN — ASPIRIN 81 MG: 81 TABLET, COATED ORAL at 07:52

## 2023-09-30 RX ADMIN — HEPARIN SODIUM 5000 UNITS: 5000 INJECTION INTRAVENOUS; SUBCUTANEOUS at 05:43

## 2023-09-30 RX ADMIN — TIMOLOL MALEATE 1 DROP: 5 SOLUTION OPHTHALMIC at 20:33

## 2023-09-30 RX ADMIN — OXYCODONE HYDROCHLORIDE AND ACETAMINOPHEN 250 MG: 500 TABLET ORAL at 07:52

## 2023-09-30 RX ADMIN — TIMOLOL MALEATE 1 DROP: 5 SOLUTION OPHTHALMIC at 07:53

## 2023-09-30 RX ADMIN — PANTOPRAZOLE SODIUM 40 MG: 40 TABLET, DELAYED RELEASE ORAL at 07:52

## 2023-09-30 RX ADMIN — SODIUM BICARBONATE 650 MG: 650 TABLET ORAL at 20:33

## 2023-09-30 RX ADMIN — HEPARIN SODIUM 5000 UNITS: 5000 INJECTION INTRAVENOUS; SUBCUTANEOUS at 13:20

## 2023-09-30 RX ADMIN — AMLODIPINE BESYLATE 2.5 MG: 2.5 TABLET ORAL at 07:53

## 2023-09-30 RX ADMIN — CEFEPIME 1000 MG: 1 INJECTION, POWDER, FOR SOLUTION INTRAMUSCULAR; INTRAVENOUS at 02:39

## 2023-09-30 RX ADMIN — HEPARIN SODIUM 5000 UNITS: 5000 INJECTION INTRAVENOUS; SUBCUTANEOUS at 22:05

## 2023-09-30 RX ADMIN — ATORVASTATIN CALCIUM 20 MG: 20 TABLET, FILM COATED ORAL at 07:52

## 2023-09-30 RX ADMIN — SODIUM CHLORIDE: 9 INJECTION, SOLUTION INTRAVENOUS at 06:09

## 2023-09-30 RX ADMIN — OXYBUTYNIN CHLORIDE 5 MG: 5 TABLET ORAL at 15:37

## 2023-09-30 RX ADMIN — Medication 2000 UNITS: at 07:52

## 2023-09-30 RX ADMIN — MAGNESIUM OXIDE 200 MG: 400 TABLET ORAL at 07:51

## 2023-09-30 RX ADMIN — POLYETHYLENE GLYCOL 3350 17 G: 17 POWDER, FOR SOLUTION ORAL at 15:37

## 2023-09-30 RX ADMIN — CEFEPIME 1000 MG: 1 INJECTION, POWDER, FOR SOLUTION INTRAMUSCULAR; INTRAVENOUS at 13:21

## 2023-09-30 RX ADMIN — SODIUM CHLORIDE: 9 INJECTION, SOLUTION INTRAVENOUS at 22:03

## 2023-09-30 RX ADMIN — OXYBUTYNIN CHLORIDE 5 MG: 5 TABLET ORAL at 20:33

## 2023-09-30 RX ADMIN — CYANOCOBALAMIN TAB 1000 MCG 1000 MCG: 1000 TAB at 07:52

## 2023-09-30 ASSESSMENT — PAIN SCALES - GENERAL
PAINLEVEL_OUTOF10: 0
PAINLEVEL_OUTOF10: 0

## 2023-09-30 NOTE — PLAN OF CARE
Problem: Discharge Planning  Goal: Discharge to home or other facility with appropriate resources  Outcome: Progressing     Problem: Safety - Adult  Goal: Free from fall injury  Outcome: Progressing     Problem: Chronic Conditions and Co-morbidities  Goal: Patient's chronic conditions and co-morbidity symptoms are monitored and maintained or improved  Outcome: Progressing     Problem: Pain  Goal: Verbalizes/displays adequate comfort level or baseline comfort level  Outcome: Progressing     Problem: ABCDS Injury Assessment  Goal: Absence of physical injury  Outcome: Progressing

## 2023-09-30 NOTE — PLAN OF CARE
Problem: Discharge Planning  Goal: Discharge to home or other facility with appropriate resources  9/30/2023 0328 by Anastasia Deleon RN  Outcome: Progressing     Problem: Safety - Adult  Goal: Free from fall injury  9/30/2023 0328 by Anastasia Deleon RN  Outcome: Progressing     Problem: Safety - Adult  Goal: Free from fall injury  9/30/2023 0057 by Anastasia Deleon RN  Outcome: Progressing     Problem: Chronic Conditions and Co-morbidities  Goal: Patient's chronic conditions and co-morbidity symptoms are monitored and maintained or improved  9/30/2023 0328 by Anastasia Deleon RN  Outcome: Progressing     Problem: Chronic Conditions and Co-morbidities  Goal: Patient's chronic conditions and co-morbidity symptoms are monitored and maintained or improved  9/30/2023 0057 by Anastasia Deleon RN  Outcome: Progressing     Problem: Pain  Goal: Verbalizes/displays adequate comfort level or baseline comfort level  9/30/2023 0328 by Kyle Armendariz RN  Outcome: Progressing     Problem: Pain  Goal: Verbalizes/displays adequate comfort level or baseline comfort level  9/30/2023 0057 by Anastasia Deleon RN  Outcome: Progressing     Problem: ABCDS Injury Assessment  Goal: Absence of physical injury  9/30/2023 0328 by Anastasia Deleon RN  Outcome: Progressing     Problem: ABCDS Injury Assessment  Goal: Absence of physical injury  9/30/2023 0057 by Anastasia Deleon RN  Outcome: Progressing

## 2023-10-01 VITALS
TEMPERATURE: 98.4 F | DIASTOLIC BLOOD PRESSURE: 63 MMHG | OXYGEN SATURATION: 96 % | RESPIRATION RATE: 18 BRPM | BODY MASS INDEX: 22.96 KG/M2 | SYSTOLIC BLOOD PRESSURE: 143 MMHG | HEIGHT: 69 IN | HEART RATE: 61 BPM | WEIGHT: 155 LBS

## 2023-10-01 LAB
ANION GAP SERPL CALCULATED.3IONS-SCNC: 9 MMOL/L (ref 7–16)
BASOPHILS # BLD: 0.06 K/UL (ref 0–0.2)
BASOPHILS NFR BLD: 1 % (ref 0–2)
BUN SERPL-MCNC: 45 MG/DL (ref 6–23)
CALCIUM SERPL-MCNC: 8.6 MG/DL (ref 8.6–10.2)
CHLORIDE SERPL-SCNC: 113 MMOL/L (ref 98–107)
CO2 SERPL-SCNC: 21 MMOL/L (ref 22–29)
CREAT SERPL-MCNC: 1.7 MG/DL (ref 0.7–1.2)
EOSINOPHIL # BLD: 0.25 K/UL (ref 0.05–0.5)
EOSINOPHILS RELATIVE PERCENT: 3 % (ref 0–6)
ERYTHROCYTE [DISTWIDTH] IN BLOOD BY AUTOMATED COUNT: 12.8 % (ref 11.5–15)
GFR SERPL CREATININE-BSD FRML MDRD: 37 ML/MIN/1.73M2
GLUCOSE SERPL-MCNC: 104 MG/DL (ref 74–99)
HCT VFR BLD AUTO: 29.5 % (ref 37–54)
HGB BLD-MCNC: 9.4 G/DL (ref 12.5–16.5)
IMM GRANULOCYTES # BLD AUTO: <0.03 K/UL (ref 0–0.58)
IMM GRANULOCYTES NFR BLD: 0 % (ref 0–5)
LYMPHOCYTES NFR BLD: 2.31 K/UL (ref 1.5–4)
LYMPHOCYTES RELATIVE PERCENT: 28 % (ref 20–42)
MAGNESIUM SERPL-MCNC: 1.8 MG/DL (ref 1.6–2.6)
MCH RBC QN AUTO: 31.2 PG (ref 26–35)
MCHC RBC AUTO-ENTMCNC: 31.9 G/DL (ref 32–34.5)
MCV RBC AUTO: 98 FL (ref 80–99.9)
MICROORGANISM SPEC CULT: ABNORMAL
MONOCYTES NFR BLD: 0.76 K/UL (ref 0.1–0.95)
MONOCYTES NFR BLD: 9 % (ref 2–12)
NEUTROPHILS NFR BLD: 59 % (ref 43–80)
NEUTS SEG NFR BLD: 4.91 K/UL (ref 1.8–7.3)
PHOSPHATE SERPL-MCNC: 2.9 MG/DL (ref 2.5–4.5)
PLATELET # BLD AUTO: 240 K/UL (ref 130–450)
PMV BLD AUTO: 9.7 FL (ref 7–12)
POTASSIUM SERPL-SCNC: 4.5 MMOL/L (ref 3.5–5)
RBC # BLD AUTO: 3.01 M/UL (ref 3.8–5.8)
SODIUM SERPL-SCNC: 143 MMOL/L (ref 132–146)
SPECIMEN DESCRIPTION: ABNORMAL
WBC OTHER # BLD: 8.3 K/UL (ref 4.5–11.5)

## 2023-10-01 PROCEDURE — 85025 COMPLETE CBC W/AUTO DIFF WBC: CPT

## 2023-10-01 PROCEDURE — APPSS45 APP SPLIT SHARED TIME 31-45 MINUTES: Performed by: NURSE PRACTITIONER

## 2023-10-01 PROCEDURE — 80048 BASIC METABOLIC PNL TOTAL CA: CPT

## 2023-10-01 PROCEDURE — 6370000000 HC RX 637 (ALT 250 FOR IP): Performed by: INTERNAL MEDICINE

## 2023-10-01 PROCEDURE — 2580000003 HC RX 258: Performed by: NURSE PRACTITIONER

## 2023-10-01 PROCEDURE — 6360000002 HC RX W HCPCS: Performed by: NURSE PRACTITIONER

## 2023-10-01 PROCEDURE — 83735 ASSAY OF MAGNESIUM: CPT

## 2023-10-01 PROCEDURE — 84100 ASSAY OF PHOSPHORUS: CPT

## 2023-10-01 PROCEDURE — 99239 HOSP IP/OBS DSCHRG MGMT >30: CPT | Performed by: INTERNAL MEDICINE

## 2023-10-01 PROCEDURE — 36415 COLL VENOUS BLD VENIPUNCTURE: CPT

## 2023-10-01 PROCEDURE — 6370000000 HC RX 637 (ALT 250 FOR IP): Performed by: NURSE PRACTITIONER

## 2023-10-01 PROCEDURE — 6370000000 HC RX 637 (ALT 250 FOR IP): Performed by: UROLOGY

## 2023-10-01 RX ORDER — OXYBUTYNIN CHLORIDE 5 MG/1
5 TABLET ORAL 3 TIMES DAILY
Qty: 90 TABLET | Refills: 0 | Status: SHIPPED | OUTPATIENT
Start: 2023-10-01

## 2023-10-01 RX ORDER — CIPROFLOXACIN 500 MG/1
500 TABLET, FILM COATED ORAL DAILY
Qty: 10 TABLET | Refills: 0 | Status: SHIPPED | OUTPATIENT
Start: 2023-10-01 | End: 2023-10-11

## 2023-10-01 RX ORDER — SODIUM BICARBONATE 650 MG/1
650 TABLET ORAL 2 TIMES DAILY
Qty: 60 TABLET | Refills: 0 | Status: SHIPPED | OUTPATIENT
Start: 2023-10-01

## 2023-10-01 RX ADMIN — TIMOLOL MALEATE 1 DROP: 5 SOLUTION OPHTHALMIC at 08:30

## 2023-10-01 RX ADMIN — ASPIRIN 81 MG: 81 TABLET, COATED ORAL at 08:30

## 2023-10-01 RX ADMIN — CEFEPIME 1000 MG: 1 INJECTION, POWDER, FOR SOLUTION INTRAMUSCULAR; INTRAVENOUS at 02:35

## 2023-10-01 RX ADMIN — Medication 2000 UNITS: at 08:30

## 2023-10-01 RX ADMIN — OXYBUTYNIN CHLORIDE 5 MG: 5 TABLET ORAL at 08:30

## 2023-10-01 RX ADMIN — OXYCODONE HYDROCHLORIDE AND ACETAMINOPHEN 250 MG: 500 TABLET ORAL at 08:30

## 2023-10-01 RX ADMIN — MAGNESIUM OXIDE 200 MG: 400 TABLET ORAL at 08:30

## 2023-10-01 RX ADMIN — AMLODIPINE BESYLATE 2.5 MG: 2.5 TABLET ORAL at 08:30

## 2023-10-01 RX ADMIN — CYANOCOBALAMIN TAB 1000 MCG 1000 MCG: 1000 TAB at 08:30

## 2023-10-01 RX ADMIN — EPOETIN ALFA-EPBX 3000 UNITS: 3000 INJECTION, SOLUTION INTRAVENOUS; SUBCUTANEOUS at 11:52

## 2023-10-01 RX ADMIN — ATORVASTATIN CALCIUM 20 MG: 20 TABLET, FILM COATED ORAL at 08:30

## 2023-10-01 RX ADMIN — Medication 10 ML: at 08:40

## 2023-10-01 RX ADMIN — METOPROLOL SUCCINATE 25 MG: 25 TABLET, EXTENDED RELEASE ORAL at 08:30

## 2023-10-01 RX ADMIN — HEPARIN SODIUM 5000 UNITS: 5000 INJECTION INTRAVENOUS; SUBCUTANEOUS at 05:31

## 2023-10-01 RX ADMIN — SODIUM BICARBONATE 650 MG: 650 TABLET ORAL at 08:40

## 2023-10-01 RX ADMIN — PANTOPRAZOLE SODIUM 40 MG: 40 TABLET, DELAYED RELEASE ORAL at 08:30

## 2023-10-01 RX ADMIN — Medication 3 MG: at 00:55

## 2023-10-01 ASSESSMENT — PAIN SCALES - GENERAL: PAINLEVEL_OUTOF10: 0

## 2023-10-01 NOTE — DISCHARGE SUMMARY
[facility]   Discharge Summary      [address], [place]  [facility] [phone]    Call in 1 day(s)  [address] [facility], [address] [facility] [phone]    Call in 1 day(s)  Follow-up    Luis Gonzalez MD  [address] [place] [address] [phone]    Call in 1 day(s)  Follow-up    Luis Gonzalez [address] [place] [address]  [phone]            Activity level: As tolerated    Diet: ADULT DIET; Regular  ADULT ORAL NUTRITION SUPPLEMENT; Breakfast, Lunch, Dinner; Standard High Calorie/High Protein Oral Supplement    Labs:Per PCP    Condition at discharge: Stable    Dispo: Discharge home  with resumption of [address] [place] [address] [place]      Patient ID:  Jd Martinez  [id number]  34 y.o.  6/2/1932      Discharge date and time:  10/1/2023  11:00 AM    Admission Diagnoses: Principal Problem:    Complicated UTI (urinary tract infection)  Active Problems:    Acute kidney injury superimposed on CKD ([address])  Resolved Problems:    * No resolved hospital problems. *      Discharge Diagnoses: Principal Problem:    Complicated UTI (urinary tract infection)  Active Problems:    Acute kidney injury superimposed on CKD ([address])  Resolved Problems:    * No resolved hospital problems. *      Past Medical History:  has a past medical history of CKD (chronic kidney disease) stage 4, GFR 15-29 ml/min ([facility]), Enlarged prostate, HFrEF (heart failure with reduced ejection fraction) ([address]), Hyperlipidemia, Hypertension, SHOBHA (iron deficiency anemia), Lumbosacral strain, and Pacemaker. .       Consults:  IP CONSULT TO NEPHROLOGY  IP CONSULT TO UROLOGY    Procedures: Suprapubic catheter exchange    Hospital Course: Jd Martinez is a 80 y.o. male with a PMH of HLD, HTN, CKD stage IV, HFrEF, SHOBHA, neurogenic bladder and BPH with suprapubic catheter admitted on 9/28/2023 for acute cystitis, MORENA on CKD, and tenderness & drainage at suprapubic

## 2023-10-01 NOTE — PROGRESS NOTES
[facility]  [address] [place]. [place]        Date:2023                                                  Patient Name: Monica Adorno    MRN: [id number]    : 1932    Room: [address] [place] [address]      Evaluating OT: Dariusz Jeronimo OTR/L; #[id number]     Referring Provider and Specific Provider Orders/Date:      23  OT eval and treat  Start:  23,   End:  23,   ONE TIME,   Standing Count:  1 Occurrences,   Anjel Evans, APRN - NP      Placement Recommendation: Home no Occupational Therapy       Diagnosis:   1. Acute kidney injury superimposed on CKD ([address])    2. Urinary tract infection associated with cystostomy catheter, initial encounter ([address])    3.  Fatigue, unspecified type         Surgery: None      Pertinent Medical History:       Past Medical History:   Diagnosis Date    CKD (chronic kidney disease) stage 4, GFR 15-29 ml/min ([facility])     Enlarged prostate     HFrEF (heart failure with reduced ejection fraction) ([facility])     Hyperlipidemia     Hypertension     SHOBHA (iron deficiency anemia)     Lumbosacral strain 2013    Pacemaker          Past Surgical History:   Procedure Laterality Date    A-V CARDIAC PACEMAKER INSERTION  07    COLONOSCOPY  8/3/11    CYSTOSCOPY N/A 2021    CYSTOSCOPY RETROGRADE PYELOGRAM, TRANSURETHRAL RESECTION OF THE PROSTATE WITH SUPRAPUBIC CATHETER PLACEMENT performed by Barbara Villa MD at [address] [place] [address]      cataracts evelyn    PACEMAKER PLACEMENT      2017 battery change    TONSILLECTOMY      child        Precautions:  Fall Risk, Up with Assistance      Assessment of current deficits:     [x] Functional mobility  [x]ADLs  [x] Strength               []Cognition    [x] Functional transfers   [x] IADLs         [] Safety Awareness   [x]Endurance    [] Fine Coordination              [x] Balance      [] Vision/perception   []Sensation
Physical Therapy Initial Evaluation/Plan of Care    Room #:  5038/6372-14  Patient Name: Ray Odell  YOB: 1932  MRN: [id number]    Date of Service: 9/29/2023     Tentative placement recommendation: Home with [facility] if needed  Equipment recommendation: Patient has needed equipment       Evaluating Physical Therapist: Elsa [place]ford [place] #[id number]      Specific Provider Orders/Date/Referring Provider :  09/28/23 1845    PT evaluation and treat  Start:  09/28/23 1845,   End:  09/28/23 1845,   ONE TIME,   Standing Count:  1 Occurrences,   R         Chu Heart, APRN - NP     Admitting Diagnosis:   Complicated UTI (urinary tract infection) [N39.0]  Acute kidney injury superimposed on CKD ([address]) [N17.9, N18.9]  Fatigue, unspecified type [R53.83]  Urinary tract infection associated with cystostomy catheter, initial encounter ([address]) [T83.510A, N39.0]     fatigue-past 3 to 4 days he has been increasingly fatigued and felt rundown and has tenderness at suprapubic site with drainage.   Surgery: none  Visit Diagnoses         Codes    Acute kidney injury superimposed on CKD ([address])    -  Primary N17.9, N18.9    Urinary tract infection associated with cystostomy catheter, initial encounter ([address])     T83.510A, N39.0    Fatigue, unspecified type     R53.83            Patient Active Problem List   Diagnosis    Pure hypercholesterolemia    Essential hypertension    Primary osteoarthritis involving multiple joints    Iron deficiency anemia due to chronic blood loss    Stage 4 chronic kidney disease (HCC)    Enlarged prostate with urinary retention    BPH with urinary obstruction    Chronic primary angle-closure glaucoma, indeterminate stage    Complicated UTI (urinary tract infection)    Chronic systolic (congestive) heart failure        ASSESSMENT of Current Deficits Patient exhibits decreased strength, balance, and endurance impairing functional mobility, transfers, gait , gait distance, and
Physician Progress Note      PATIENT:               Max Stahl  CSN #:                  [id number]  :                       1932  ADMIT DATE:       2023 12:33 PM  [address] DATE:  Mable Welshckney  PROVIDER #:        Chastity Stewart MD          QUERY TEXT:    Dear ,    Pt admitted with UTI. Pt noted to have Neurogenic bladder with chronic SPT. If possible, please document in the progress notes and discharge summary if   you are evaluating and/or treating any of the following: The medical record reflects the following:  Risk Factors: HTN, HLD, CKD 4, Chronic CHF, BPH with suprapubic catheter  Clinical Indicators: per ED note: \" Urinalysis revealed large leukocytes with    white blood cells. .. Urinary tract infection associated with cystostomy   catheter, initial encounter\"  Treatment: IMCU monitoring, Urology consult, Ua with cx pending, IVF's, IV   Cefepime    Thank You,  Gibran Garcia RN, BSN, CDS  Clinical Documentation Improvement Specialist  Options provided:  -- UTI due to suprapubic catheter  -- UTI not due to suprapubic catheter  -- Other - I will add my own diagnosis  -- Disagree - Not applicable / Not valid  -- Disagree - Clinically unable to determine / Unknown  -- Refer to Clinical Documentation Reviewer    PROVIDER RESPONSE TEXT:    UTI is due to suprapubic catheter. Query created by: Gibran Garcia on 2023 11:42 AM      Electronically signed by:   Chastity Stewart MD 2023 11:53 AM
BP. May need to add prn antihypertensives     Iron deficiency anemia  -Hgb currently at baseline  -hold home iron supplementation during infection        Code Status: DNR-CCA  DVT prophylaxis: Heparin injections     Disposition: From home. Currently anticipate 2-3 days inpatient for work-up and treatment. 30 minutes or more spent reviewing patient chart, assessing patient, discussing plan of care with patient and family, discussing plan of care with collaborating physician, and documentation. NOTE: This report was transcribed using voice recognition software. Every effort was made to ensure accuracy; however, inadvertent computerized transcription errors may be present.   Electronically signed by RAJAN Barajas NP on 9/29/2023 at 11:11 AM
Component Value Date/Time    HGB 10.0 09/30/2023 04:59 AM    HCT 31.6 09/30/2023 04:59 AM       Lab Results   Component Value Date    PSA 1.35 06/17/2022    PSA 2.56 11/11/2015       Review Of Systems:  Constitutional: Tired  Eyes: negative  Ears, nose, mouth, throat, and face: negative  Respiratory: negative  Cardiovascular: Hypertension  Gastrointestinal: negative  Genitourinary: Neurogenic bladder  Musculoskeletal: Lumbosacral strain  Neurological: negative  Behavioral/Psych: negative  Endocrine: negative    Physical Exam:  Skin is dry, and without rashes  Respirations are non-labored, intact  Abdomen is soft, non-tender, non-distended. Active bowel sounds are present. No rebound or guarding  Alert and oriented x 3. No focal motor/sensory deficits  #30 French suprapubic tube catheter is draining clear, yellow urine    Assessment and Plan:  BPH (S/PTURP with SPT in November 2021)/Neurogenic bladder/Chronic bilateral hydronephrosis/UTI/CKD   -Stop finasteride  -I will try him on oxybutynin for his bladder spasms. If this does not lessen the incontinence around the suprapubic tube, we discussed intravesical Botox as an option  -His suprapubic tube was changed yesterday. This will continue to be changed every 3 weeks and as needed with [place] home health care using a #30 French catheter  -CT scan on 9/28/2023 showing enlargement of the prostate with bladder wall thickening and cystitis with chronic distention of the renal collecting system bilaterally without evidence of stone disease  -Serum creatinine is stable at 2.0. Continue to monitor. He is being followed by Dr. Genesis Andersen from nephrology  -Cultures are pending.   Continue antibiotics  -We will follow him during his hospital stay    Sangeeta Robert MD  9/30/2023  2:21 PM
urinary obstruction  -started on bicarb supplementation per neph     Weakness in the setting of poor nutritional intake  -PT/OT following. AM-PAC score 17  -nutritional supplementation  -up with assist     HFrEF, not currently in exacerbation follows with Dr. Rhoda Pro  -monitor volume status closely with IVF  -pt appears euvolemic at this time  -continue home metoprolol     HLD  HTN  -hold home lisinopril-HCTZ as above  -BP has been soft/normotensive without lisinopril-HCTZ  -continue home metoprolol, amlodipine with hold parameters  -continue home statin  -monitor BP     Iron deficiency anemia  -Hgb currently at baseline of ~9-11  -hold home iron supplementation during infection  -per nephrology, will give JAG if needed        Code Status: DNR-CCA  DVT prophylaxis: Heparin injections     Disposition: From home. Currently anticipate 1-2 more days inpatient treatment and monitoring. If pt's medical conditions continue to improve, can likely be discharged within the next couple of days. 30 minutes or more spent reviewing patient chart, assessing patient, discussing plan of care with patient and family, discussing plan of care with collaborating physician, and documentation. NOTE: This report was transcribed using voice recognition software. Every effort was made to ensure accuracy; however, inadvertent computerized transcription errors may be present.   Electronically signed by RAJAN Hinson NP on 9/30/2023 at 10:41 AM
slightly diminished in bases  Gastrointestinal: soft, nontender, nondistended, no hepatosplenomegaly, suprapubic catheter in place  Ext: No edema  Neuro: Awake alert and oriented  Skin: dry, no rash       Data:    Recent Labs     09/28/23  1249 09/29/23  0632 09/30/23  0459   WBC 8.8 8.0 7.9   HGB 11.4* 10.6* 10.0*   HCT 34.2* 32.2* 31.6*   MCV 95.0 96.1 97.2    286 256       Recent Labs     09/28/23  1249 09/29/23  0632 09/30/23  0459    142 144   K 5.0 4.6 4.5    111* 113*   CO2 19* 19* 19*   CREATININE 3.0* 2.2* 2.0*   BUN 68* 51* 49*   LABGLOM 19* 27* 31*   GLUCOSE 163* 108* 104*   CALCIUM 9.3 8.8 8.9   PHOS  --   --  3.0   MG  --   --  2.1       Vit D, 25-Hydroxy   Date Value Ref Range Status   12/02/2022 92 30 - 100 ng/mL Final     Comment:     <20 ng/mL. ........... Brandie Silver Deficient  20-30 ng/mL. ......... Brandie Silver Insufficient   ng/mL. ........ Brandie Silver Sufficient  >100 ng/mL. .......... Brandie Silver Toxic         PTH   Date Value Ref Range Status   12/02/2022 53 15 - 65 pg/mL Final       Recent Labs     09/28/23  1249   ALT 8   AST 8   ALKPHOS 80   BILITOT 0.4       Recent Labs     09/28/23  1249   LABALBU 4.3       Ferritin   Date Value Ref Range Status   12/02/2022 135 ng/mL Final     Comment:     FERRITIN Reference Ranges:  Adult Males   20 - 60 years:    30 - 400 ng/mL  Adult females 16 - 61 years:    15 - 150 ng/mL  Adults greater than 60 years:   no established reference range  Pediatrics:                     no established reference range       Iron   Date Value Ref Range Status   12/02/2022 80 59 - 158 mcg/dL Final     TIBC   Date Value Ref Range Status   12/02/2022 295 250 - 450 mcg/dL Final       Vitamin B-12   Date Value Ref Range Status   06/18/2022 1251 (H) 211 - 946 pg/mL Final       Folate   Date Value Ref Range Status   06/18/2022 >20.0 4.8 - 24.2 ng/mL Final         Lab Results   Component Value Date/Time    COLORU Yellow 09/28/2023 12:49 PM    NITRU NEGATIVE 09/28/2023 12:49 PM    GLUCOSEU NEGATIVE
greater than 22 mmol/L goal  CO2 21 mmol/L 10/1/23  On sodium bicarbonate 650 mg twice daily started 9/29  Follow on oral HCO3    4. Complicated urinary tract infection-  UC >100k GNR-currently on cefepime  Urology is following  Prepubic catheter irrigated to maintain patency  Is again having leakage around his suprapubic catheter    5. Anemia of chronic kidney disease-  Hemoglobin goal greater than 10 g/dL  Hemoglobin this morning 9.4 g/dL  Given acute illness no indication for IV iron  Will give a dose of JAG today    6. Hypertension with chronic kidney disease stage I-IV-  BP Goal less than 130/80  Currently at/near goal    7. Secondary hyperparathyroidism of renal origin-  PTH- 36.6, vitamin D -52.5  Phosphorus 2.9 mg/dL        RAJAN Franks - CNP  Patient seen and examined. I had a face to face encounter with the patient. Agree with exam.    Agree with  formulation, assessment and plan as outlined above and directed by me. Addition and corrections were done as deemed appropriate. My exam and plan include:     Agree with the above. Continue current treatment as outlined above.  Will have [address] draw labs    CATHY Levin MD

## 2023-10-01 NOTE — CARE COORDINATION
10/1/2023 1320 CM note: Discharge order noted. Pt resides with spouse in 2 story home, first floor set up, [address]. He is active with [facility] for cath changes. JESÚS orders for [facility] obtained and CM left message for [employer]. Pt's family to transport home.  Ajit NEWBERRY

## 2023-10-02 ENCOUNTER — CLINICAL DOCUMENTATION ONLY (OUTPATIENT)
Facility: CLINIC | Age: 88
End: 2023-10-02

## 2023-10-10 ENCOUNTER — OFFICE VISIT (OUTPATIENT)
Dept: FAMILY MEDICINE CLINIC | Age: 88
End: 2023-10-10

## 2023-10-10 VITALS
SYSTOLIC BLOOD PRESSURE: 118 MMHG | HEART RATE: 67 BPM | OXYGEN SATURATION: 97 % | BODY MASS INDEX: 22.74 KG/M2 | WEIGHT: 154 LBS | DIASTOLIC BLOOD PRESSURE: 64 MMHG

## 2023-10-10 DIAGNOSIS — I50.22 CHRONIC SYSTOLIC (CONGESTIVE) HEART FAILURE (HCC): ICD-10-CM

## 2023-10-10 DIAGNOSIS — N39.0 ACUTE UTI (URINARY TRACT INFECTION): ICD-10-CM

## 2023-10-10 DIAGNOSIS — E78.00 PURE HYPERCHOLESTEROLEMIA: ICD-10-CM

## 2023-10-10 DIAGNOSIS — I10 ESSENTIAL HYPERTENSION: ICD-10-CM

## 2023-10-10 DIAGNOSIS — N18.9 ACUTE KIDNEY INJURY SUPERIMPOSED ON CHRONIC KIDNEY DISEASE (HCC): ICD-10-CM

## 2023-10-10 DIAGNOSIS — Z09 HOSPITAL DISCHARGE FOLLOW-UP: Primary | ICD-10-CM

## 2023-10-10 DIAGNOSIS — R33.8 ENLARGED PROSTATE WITH URINARY RETENTION: ICD-10-CM

## 2023-10-10 DIAGNOSIS — N17.9 ACUTE KIDNEY INJURY SUPERIMPOSED ON CHRONIC KIDNEY DISEASE (HCC): ICD-10-CM

## 2023-10-10 DIAGNOSIS — N40.1 ENLARGED PROSTATE WITH URINARY RETENTION: ICD-10-CM

## 2023-10-10 LAB
25(OH)D3 SERPL-MCNC: 52.3 NG/ML (ref 30–100)
ALBUMIN SERPL-MCNC: 4.2 G/DL (ref 3.5–5.2)
ALP SERPL-CCNC: 74 U/L (ref 40–129)
ALT SERPL-CCNC: 15 U/L (ref 0–40)
ANION GAP SERPL CALCULATED.3IONS-SCNC: 21 MMOL/L (ref 7–16)
AST SERPL-CCNC: 19 U/L (ref 0–39)
BACTERIA URNS QL MICRO: ABNORMAL
BASOPHILS # BLD: 0.09 K/UL (ref 0–0.2)
BASOPHILS NFR BLD: 1 % (ref 0–2)
BILIRUB SERPL-MCNC: 0.5 MG/DL (ref 0–1.2)
BILIRUB UR QL STRIP: NEGATIVE
BUN SERPL-MCNC: 31 MG/DL (ref 6–23)
CALCIUM SERPL-MCNC: 9.5 MG/DL (ref 8.6–10.2)
CHLORIDE SERPL-SCNC: 103 MMOL/L (ref 98–107)
CHOLEST SERPL-MCNC: 137 MG/DL
CLARITY UR: CLEAR
CO2 SERPL-SCNC: 22 MMOL/L (ref 22–29)
COLOR UR: YELLOW
CREAT SERPL-MCNC: 1.8 MG/DL (ref 0.7–1.2)
CREAT UR-MCNC: 75.4 MG/DL (ref 40–278)
EOSINOPHIL # BLD: 0.11 K/UL (ref 0.05–0.5)
EOSINOPHILS RELATIVE PERCENT: 1 % (ref 0–6)
ERYTHROCYTE [DISTWIDTH] IN BLOOD BY AUTOMATED COUNT: 13.1 % (ref 11.5–15)
GFR SERPL CREATININE-BSD FRML MDRD: 36 ML/MIN/1.73M2
GLUCOSE SERPL-MCNC: 116 MG/DL (ref 74–99)
GLUCOSE UR STRIP-MCNC: NEGATIVE MG/DL
HCT VFR BLD AUTO: 35.9 % (ref 37–54)
HDLC SERPL-MCNC: 41 MG/DL
HGB BLD-MCNC: 11.6 G/DL (ref 12.5–16.5)
HGB UR QL STRIP.AUTO: ABNORMAL
IMM GRANULOCYTES # BLD AUTO: <0.03 K/UL (ref 0–0.58)
IMM GRANULOCYTES NFR BLD: 0 % (ref 0–5)
KETONES UR STRIP-MCNC: NEGATIVE MG/DL
LDLC SERPL CALC-MCNC: 70 MG/DL
LEUKOCYTE ESTERASE UR QL STRIP: ABNORMAL
LYMPHOCYTES NFR BLD: 1.8 K/UL (ref 1.5–4)
LYMPHOCYTES RELATIVE PERCENT: 21 % (ref 20–42)
MAGNESIUM SERPL-MCNC: 2.2 MG/DL (ref 1.6–2.6)
MCH RBC QN AUTO: 32.1 PG (ref 26–35)
MCHC RBC AUTO-ENTMCNC: 32.3 G/DL (ref 32–34.5)
MCV RBC AUTO: 99.4 FL (ref 80–99.9)
MICROALBUMIN UR-MCNC: 259 MG/L (ref 0–19)
MICROALBUMIN/CREAT UR-RTO: 344 MCG/MG CREAT (ref 0–30)
MONOCYTES NFR BLD: 0.61 K/UL (ref 0.1–0.95)
MONOCYTES NFR BLD: 7 % (ref 2–12)
NEUTROPHILS NFR BLD: 70 % (ref 43–80)
NEUTS SEG NFR BLD: 6.13 K/UL (ref 1.8–7.3)
NITRITE UR QL STRIP: NEGATIVE
PH UR STRIP: 6 [PH] (ref 5–9)
PHOSPHATE SERPL-MCNC: 3.5 MG/DL (ref 2.5–4.5)
PLATELET # BLD AUTO: 271 K/UL (ref 130–450)
PMV BLD AUTO: 10.2 FL (ref 7–12)
POTASSIUM SERPL-SCNC: 4.2 MMOL/L (ref 3.5–5)
PROT SERPL-MCNC: 7.1 G/DL (ref 6.4–8.3)
PROT UR STRIP-MCNC: 30 MG/DL
PTH-INTACT SERPL-MCNC: 65.5 PG/ML (ref 15–65)
RBC # BLD AUTO: 3.61 M/UL (ref 3.8–5.8)
RBC #/AREA URNS HPF: ABNORMAL /HPF
SODIUM SERPL-SCNC: 146 MMOL/L (ref 132–146)
SP GR UR STRIP: 1.01 (ref 1–1.03)
TRIGL SERPL-MCNC: 130 MG/DL
URATE SERPL-MCNC: 8.5 MG/DL (ref 3.4–7)
UROBILINOGEN UR STRIP-ACNC: 0.2 EU/DL (ref 0–1)
VLDLC SERPL CALC-MCNC: 26 MG/DL
WBC #/AREA URNS HPF: ABNORMAL /HPF
WBC OTHER # BLD: 8.8 K/UL (ref 4.5–11.5)
YEAST URNS QL MICRO: PRESENT

## 2023-10-10 NOTE — PATIENT INSTRUCTIONS
LOW SALT FOR BLOOD PRESSURE CONTROL. LOW FAT DIET FOR CHOLESTEROL CONTROL. DRINK ENOUGH FLUIDS FOR BETTER KIDNEY FUNCTION. TAKE  AMLODIPINE 2.5 MG. DAILY, PRINIVIL/HCT 10/12.5  1 TAB. DAILY,TOPROL XL 25 MG. 1 TAB. DAILY FOR BLOOD PRESSURE CONTROL. TAKE  ZOCOR 40 MG. 1 TAB. DAILY. FOR CHOLESTEROL CONTROL. Gisselle Mcmillan TAKE  FEOSOL 1 TAB. 2 TIMES A DAY AND MULTIVITAMIN 1 TAB. DAILY TO IMPROVE BLOOD COUNT. TAKE  PROSCAR 5 MG. DAILY FOR PROSTATE PROBLEM. REGULAR WALKING ADVISED. CONTINUE FOLLOW UP WITH DR. AGUILLON FOR KIDNEY PROBLEM. CONTINUE FOLLOW UP WITH DR. Chantel Galeas FOR PROSTATE PROBLEM. NEXT APPOINTMENT IN 4 MONTHS FOR ANNUAL PHYSICAL EXAMINATION.

## 2023-10-10 NOTE — PROGRESS NOTES
tenderness, swelling or warmth  Neuro:  No focal motor or sensory deficits      An electronic signature was used to authenticate this note.   --Nida Nair MD

## 2023-10-12 NOTE — ED NOTES
Department of Emergency Medicine  FIRST PROVIDER TRIAGE NOTE             Independent MLP           6/16/22  5:35 PM EDT    Date of Encounter: 6/16/22   MRN: [id number]      HPI: Ailin Be is a 80 y.o. male who presents to the ED for suprapubic catheter producing little to no urine which is cloudy in nature. Family member concern for elevated BUN/creatinine    ROS: Negative for fever, flank pain, abdominal pain, nausea, vomiting, diarrhea. PE: Gen Appearance/Constitutional: alert  CV: regular rate  Pulm: CTA bilat  GI: soft and NT, bsx4q  Musculoskeletal: moves all extremities x 4    Vitals:    06/16/22 1734   BP: 124/63   Pulse:    Resp:    Temp:    SpO2:        Past medical history, surgical history, and medications reviewed. Initial Plan of Care: All treatment areas within department are currently occupied. Plan to order/initiate the following while awaiting opening in ED: labs. Initiate treatment/testing, proceed to treatment area when bed available for ED Attending/MLP to continue care.     Electronically signed by RAJAN Herrera CNP   DD: 6/16/22       RAJAN Herrera CNP  06/16/22 1738
(E4) spontaneous

## 2023-11-13 LAB
ANION GAP SERPL CALCULATED.3IONS-SCNC: 12 MMOL/L (ref 7–16)
BUN SERPL-MCNC: 30 MG/DL (ref 6–23)
CALCIUM SERPL-MCNC: 9.3 MG/DL (ref 8.6–10.2)
CHLORIDE SERPL-SCNC: 103 MMOL/L (ref 98–107)
CO2 SERPL-SCNC: 27 MMOL/L (ref 22–29)
CREAT SERPL-MCNC: 1.7 MG/DL (ref 0.7–1.2)
GFR SERPL CREATININE-BSD FRML MDRD: 37 ML/MIN/1.73M2
GLUCOSE SERPL-MCNC: 106 MG/DL (ref 74–99)
POTASSIUM SERPL-SCNC: 3.8 MMOL/L (ref 3.5–5)
SODIUM SERPL-SCNC: 142 MMOL/L (ref 132–146)

## 2023-12-07 ENCOUNTER — OFFICE VISIT (OUTPATIENT)
Dept: FAMILY MEDICINE CLINIC | Age: 88
End: 2023-12-07
Payer: MEDICARE

## 2023-12-07 VITALS
BODY MASS INDEX: 23.85 KG/M2 | HEIGHT: 69 IN | DIASTOLIC BLOOD PRESSURE: 74 MMHG | WEIGHT: 161 LBS | SYSTOLIC BLOOD PRESSURE: 122 MMHG | HEART RATE: 60 BPM | OXYGEN SATURATION: 98 %

## 2023-12-07 DIAGNOSIS — I50.22 CHRONIC SYSTOLIC (CONGESTIVE) HEART FAILURE (HCC): ICD-10-CM

## 2023-12-07 DIAGNOSIS — D50.0 IRON DEFICIENCY ANEMIA DUE TO CHRONIC BLOOD LOSS: ICD-10-CM

## 2023-12-07 DIAGNOSIS — E78.00 PURE HYPERCHOLESTEROLEMIA: ICD-10-CM

## 2023-12-07 DIAGNOSIS — N18.4 STAGE 4 CHRONIC KIDNEY DISEASE (HCC): ICD-10-CM

## 2023-12-07 DIAGNOSIS — Z00.00 MEDICARE ANNUAL WELLNESS VISIT, SUBSEQUENT: Primary | ICD-10-CM

## 2023-12-07 DIAGNOSIS — I10 ESSENTIAL HYPERTENSION: ICD-10-CM

## 2023-12-07 PROCEDURE — G0439 PPPS, SUBSEQ VISIT: HCPCS | Performed by: FAMILY MEDICINE

## 2023-12-07 PROCEDURE — 1123F ACP DISCUSS/DSCN MKR DOCD: CPT | Performed by: FAMILY MEDICINE

## 2023-12-07 PROCEDURE — G8484 FLU IMMUNIZE NO ADMIN: HCPCS | Performed by: FAMILY MEDICINE

## 2023-12-07 RX ORDER — AMLODIPINE BESYLATE 2.5 MG/1
2.5 TABLET ORAL DAILY
Qty: 90 TABLET | Refills: 1 | Status: SHIPPED | OUTPATIENT
Start: 2023-12-07

## 2023-12-07 RX ORDER — LISINOPRIL AND HYDROCHLOROTHIAZIDE 12.5; 1 MG/1; MG/1
1 TABLET ORAL DAILY
Qty: 90 TABLET | Refills: 1 | Status: SHIPPED | OUTPATIENT
Start: 2023-12-07

## 2023-12-07 ASSESSMENT — PATIENT HEALTH QUESTIONNAIRE - PHQ9
SUM OF ALL RESPONSES TO PHQ9 QUESTIONS 1 & 2: 0
SUM OF ALL RESPONSES TO PHQ QUESTIONS 1-9: 0
SUM OF ALL RESPONSES TO PHQ QUESTIONS 1-9: 0
1. LITTLE INTEREST OR PLEASURE IN DOING THINGS: 0
SUM OF ALL RESPONSES TO PHQ QUESTIONS 1-9: 0
2. FEELING DOWN, DEPRESSED OR HOPELESS: 0
SUM OF ALL RESPONSES TO PHQ QUESTIONS 1-9: 0

## 2023-12-07 ASSESSMENT — LIFESTYLE VARIABLES
HOW OFTEN DO YOU HAVE A DRINK CONTAINING ALCOHOL: NEVER
HOW MANY STANDARD DRINKS CONTAINING ALCOHOL DO YOU HAVE ON A TYPICAL DAY: PATIENT DOES NOT DRINK

## 2023-12-07 NOTE — PATIENT INSTRUCTIONS
150 minutes of exercise per week or 10,000 steps per day on a pedometer . Order or download the FREE \"Exercise & Physical Activity: Your Everyday Guide\" from [employer]. Call 2-628.364.3175 or search [employer] online. You need 0016-9058 mg of calcium and 9717-1108 IU of vitamin D per day. It is possible to meet your calcium requirement with diet alone, but a vitamin D supplement is usually necessary to meet this goal.  When exposed to the sun, use a sunscreen that protects against both UVA and UVB radiation with an SPF of 30 or greater. Reapply every 2 to 3 hours or after sweating, drying off with a towel, or swimming. Always wear a seat belt when traveling in a car. Always wear a helmet when riding a bicycle or motorcycle.

## 2023-12-08 RX ORDER — METOPROLOL SUCCINATE 25 MG/1
25 TABLET, EXTENDED RELEASE ORAL DAILY
Qty: 90 TABLET | Refills: 0 | Status: SHIPPED | OUTPATIENT
Start: 2023-12-08

## 2023-12-15 ENCOUNTER — HOSPITAL ENCOUNTER (EMERGENCY)
Age: 88
Discharge: HOME | End: 2023-12-16
Attending: EMERGENCY MEDICINE
Payer: MEDICARE

## 2023-12-15 VITALS
HEART RATE: 66 BPM | DIASTOLIC BLOOD PRESSURE: 78 MMHG | TEMPERATURE: 97.3 F | OXYGEN SATURATION: 99 % | WEIGHT: 158 LBS | SYSTOLIC BLOOD PRESSURE: 126 MMHG | RESPIRATION RATE: 18 BRPM | BODY MASS INDEX: 23.33 KG/M2

## 2023-12-15 DIAGNOSIS — D64.9 CHRONIC ANEMIA: ICD-10-CM

## 2023-12-15 DIAGNOSIS — R31.0 GROSS HEMATURIA: Primary | ICD-10-CM

## 2023-12-15 DIAGNOSIS — Z93.59 SUPRAPUBIC CATHETER (HCC): ICD-10-CM

## 2023-12-15 DIAGNOSIS — N18.9 CHRONIC KIDNEY DISEASE, UNSPECIFIED CKD STAGE: ICD-10-CM

## 2023-12-15 LAB
ANION GAP SERPL CALCULATED.3IONS-SCNC: 9 MMOL/L (ref 7–16)
BASOPHILS # BLD: 0.06 K/UL (ref 0–0.2)
BASOPHILS NFR BLD: 1 % (ref 0–2)
BUN SERPL-MCNC: 27 MG/DL (ref 6–23)
CALCIUM SERPL-MCNC: 9.3 MG/DL (ref 8.6–10.2)
CHLORIDE SERPL-SCNC: 102 MMOL/L (ref 98–107)
CO2 SERPL-SCNC: 27 MMOL/L (ref 22–29)
CREAT SERPL-MCNC: 1.8 MG/DL (ref 0.7–1.2)
EOSINOPHIL # BLD: 0.06 K/UL (ref 0.05–0.5)
EOSINOPHILS RELATIVE PERCENT: 1 % (ref 0–6)
ERYTHROCYTE [DISTWIDTH] IN BLOOD BY AUTOMATED COUNT: 11.9 % (ref 11.5–15)
GFR SERPL CREATININE-BSD FRML MDRD: 36 ML/MIN/1.73M2
GLUCOSE SERPL-MCNC: 127 MG/DL (ref 74–99)
HCT VFR BLD AUTO: 33.8 % (ref 37–54)
HCT VFR BLD AUTO: 34.9 % (ref 37–54)
HGB BLD-MCNC: 11.2 G/DL (ref 12.5–16.5)
HGB BLD-MCNC: 11.5 G/DL (ref 12.5–16.5)
IMM GRANULOCYTES # BLD AUTO: <0.03 K/UL (ref 0–0.58)
IMM GRANULOCYTES NFR BLD: 0 % (ref 0–5)
LYMPHOCYTES NFR BLD: 1.68 K/UL (ref 1.5–4)
LYMPHOCYTES RELATIVE PERCENT: 25 % (ref 20–42)
MCH RBC QN AUTO: 31.8 PG (ref 26–35)
MCHC RBC AUTO-ENTMCNC: 33.1 G/DL (ref 32–34.5)
MCV RBC AUTO: 96 FL (ref 80–99.9)
MONOCYTES NFR BLD: 0.36 K/UL (ref 0.1–0.95)
MONOCYTES NFR BLD: 5 % (ref 2–12)
NEUTROPHILS NFR BLD: 68 % (ref 43–80)
NEUTS SEG NFR BLD: 4.53 K/UL (ref 1.8–7.3)
PLATELET # BLD AUTO: 208 K/UL (ref 130–450)
PMV BLD AUTO: 10.4 FL (ref 7–12)
POTASSIUM SERPL-SCNC: 3.8 MMOL/L (ref 3.5–5)
RBC # BLD AUTO: 3.52 M/UL (ref 3.8–5.8)
SODIUM SERPL-SCNC: 138 MMOL/L (ref 132–146)
WBC OTHER # BLD: 6.7 K/UL (ref 4.5–11.5)

## 2023-12-15 PROCEDURE — 85018 HEMOGLOBIN: CPT

## 2023-12-15 PROCEDURE — 80048 BASIC METABOLIC PNL TOTAL CA: CPT

## 2023-12-15 PROCEDURE — 85025 COMPLETE CBC W/AUTO DIFF WBC: CPT

## 2023-12-15 PROCEDURE — 85014 HEMATOCRIT: CPT

## 2023-12-15 ASSESSMENT — PAIN SCALES - GENERAL: PAINLEVEL_OUTOF10: 3

## 2023-12-15 ASSESSMENT — PAIN - FUNCTIONAL ASSESSMENT: PAIN_FUNCTIONAL_ASSESSMENT: 0-10

## 2023-12-15 ASSESSMENT — PAIN DESCRIPTION - LOCATION: LOCATION: OTHER (COMMENT)

## 2023-12-15 NOTE — ED NOTES
Irrigated suprapubic catheter at this time. Multiple clots noted during irrigation. Unable to pull back fluid at this time. Dr. Susan Montana notified. Order for three way catheter placed.      Hakan Mojica RN  12/15/23 0555

## 2023-12-15 NOTE — ED PROVIDER NOTES
[address] ENCOUNTER        Pt Name: Anna Rios  MRN: [id number]  [address] [facility] [place] 6/2/1932  Date of evaluation: 12/15/2023  Provider: Xochitl Dejesus DO  PCP: Riki Frias MD  Note Started: 12:59 PM EST 12/15/23    CHIEF COMPLAINT       Chief Complaint   Patient presents with    Hematuria     Suprapubic catheter problem. Blood around site and blood in urine. HISTORY OF PRESENT ILLNESS: 1 or more Elements   History From: patient    Limitations to history : None    Anna Rios is a 80 y.o. male who presents to the emergency department for hematuria that started 4 hours ago. Patient has a chronic suprapubic catheter placed, was exchanged approximately 1 month ago. He reports they have not been irrigating the catheter. He reports approximate 4 hours ago he started noticing gross hematuria to the catheter bag. Patient denies fever, chills, headache, shortness of breath, chest pain, abdominal pain, nausea, vomiting, diarrhea, lightheadedness, hematochezia, and melena. Nursing Notes were all reviewed and agreed with or any disagreements were addressed in the HPI. REVIEW OF SYSTEMS :           Positives and Pertinent negatives as per HPI.      SURGICAL HISTORY     Past Surgical History:   Procedure Laterality Date    A-V CARDIAC PACEMAKER INSERTION  5/30/07    COLONOSCOPY  8/3/11    CYSTOSCOPY N/A 11/2/2021    CYSTOSCOPY RETROGRADE PYELOGRAM, TRANSURETHRAL RESECTION OF THE PROSTATE WITH SUPRAPUBIC CATHETER PLACEMENT performed by Devi Gale MD at [facility]  2002    cataracts evelyn    PACEMAKER PLACEMENT      2017 battery change    TONSILLECTOMY      child        [address]       Discharge Medication List as of 12/16/2023 12:02 AM        CONTINUE these medications which have NOT CHANGED    Details   metoprolol succinate (TOPROL XL) 25 MG extended release tablet Take 1 tablet by mouth once daily, Disp-90 was exchanged, three-way with bladder irrigation ordered. Hemoglobin 11.2, not on thinners. Plan to touch base with Dr. Deshaun Seaman, his urologist and likely admission for continued hematuria. [PW]   2107 Replace suprapubic catheter with 26 [place] three-way. [PW]   2127 Repeat H/H ordered. [PW]   2252 Hemoglobin and Hematocrit(!):    Hemoglobin Quant 11.5(!)   Hematocrit 34.9(!) [PW]   2252 Repeat Hgb 11.5 [PW]   2252 Continuous bladder irrigation currently running, urine is nearly completely clear. Patient resting comfortably. [PW]   4108 Urine remains completely clear after bladder irrigation. Patient feeling well and at baseline. Repeat hemoglobin 11.5, stable from previous. [PW]   Sat Dec 16, 2023   0001 Spoke with patient by results, recommended follow-up on Monday morning with his urologist.  Return precautions were discussed including any further hematuria. Him and family agreeable with plan. Vital stable. Patient denies any discomfort. Urine is clear. [PW]      ED Course User Index  [DT] Annice Gilford, DO  [PW] Pino Clayton DO        Patient presents emergency department for gross hematuria to suprapubic catheter bag. Will exchange the catheter then irrigate the bladder. Ordered a CBC and BMP to evaluate for possible MORENA on CKD and anemia. Labs show stable hemoglobin of 11.2, no MORENA per patient's baseline. Patient had the suprapubic catheter replaced, was irrigated and to stand with removal of large clots but was still gross hematuria. The suprapubic catheter was exchanged again for a three-way for continuous bladder irrigation. Patient was sent to Dr. Chandrakant Vigil pending laboratory work and if his urine clears up. Per reevaluations in the workup tab, urine cleared up and repeat H&H was stable.   Patient is resting comfortably and instructed him to contact his urologist to set up an appointment for outpatient follow-up, he is aware the signs and symptoms when to the emergency department for

## 2023-12-16 NOTE — ED NOTES
Supplies for suprapubic catheter exchange at the bedside. Dr. Becky Maurer aware.       Ligia Marcum RN  12/15/23 2037

## 2023-12-16 NOTE — DISCHARGE INSTRUCTIONS
Please follow-up with your urologist by calling their office Monday morning. Please return ED for any worsening symptoms. Return ED for any blood in the urine.

## 2023-12-19 ENCOUNTER — CLINICAL DOCUMENTATION ONLY (OUTPATIENT)
Facility: CLINIC | Age: 88
End: 2023-12-19

## 2024-01-29 LAB
BILIRUBIN, URINE: NORMAL
BLOOD, URINE: NORMAL
CLARITY: NORMAL
COLOR: NORMAL
GLUCOSE URINE: NORMAL
KETONES, URINE: NORMAL
LEUKOCYTE ESTERASE, URINE: NORMAL
NITRITE, URINE: NORMAL
PH UA: NORMAL
PROTEIN UA: NORMAL
SPECIFIC GRAVITY, URINE: NORMAL
UROBILINOGEN, URINE: NORMAL

## 2024-02-01 ENCOUNTER — HOSPITAL ENCOUNTER (EMERGENCY)
Age: 89
Discharge: HOME | End: 2024-02-01
Payer: MEDICARE

## 2024-02-01 VITALS
OXYGEN SATURATION: 100 % | RESPIRATION RATE: 18 BRPM | WEIGHT: 154 LBS | TEMPERATURE: 98.1 F | DIASTOLIC BLOOD PRESSURE: 57 MMHG | HEART RATE: 62 BPM | BODY MASS INDEX: 22.74 KG/M2 | SYSTOLIC BLOOD PRESSURE: 112 MMHG

## 2024-02-01 DIAGNOSIS — N30.01 ACUTE CYSTITIS WITH HEMATURIA: Primary | ICD-10-CM

## 2024-02-01 LAB
BACTERIA URNS QL MICRO: ABNORMAL
BILIRUB UR QL STRIP: NEGATIVE
CLARITY UR: ABNORMAL
COLOR UR: YELLOW
GLUCOSE UR STRIP-MCNC: NEGATIVE MG/DL
HGB UR QL STRIP.AUTO: ABNORMAL
KETONES UR STRIP-MCNC: NEGATIVE MG/DL
LEUKOCYTE ESTERASE UR QL STRIP: ABNORMAL
NITRITE UR QL STRIP: NEGATIVE
PH UR STRIP: 6 [PH] (ref 5–9)
PROT UR STRIP-MCNC: >=300 MG/DL
RBC #/AREA URNS HPF: ABNORMAL /HPF
SP GR UR STRIP: 1.02 (ref 1–1.03)
UROBILINOGEN UR STRIP-ACNC: 0.2 EU/DL (ref 0–1)
WBC #/AREA URNS HPF: ABNORMAL /HPF

## 2024-02-01 PROCEDURE — 81001 URINALYSIS AUTO W/SCOPE: CPT

## 2024-02-01 PROCEDURE — 87086 URINE CULTURE/COLONY COUNT: CPT

## 2024-02-01 PROCEDURE — 99211 OFF/OP EST MAY X REQ PHY/QHP: CPT

## 2024-02-01 PROCEDURE — 87077 CULTURE AEROBIC IDENTIFY: CPT

## 2024-02-01 RX ORDER — CIPROFLOXACIN 500 MG/1
500 TABLET, FILM COATED ORAL DAILY
Qty: 10 TABLET | Refills: 0 | Status: SHIPPED | OUTPATIENT
Start: 2024-02-01 | End: 2024-02-11

## 2024-02-01 NOTE — ED PROVIDER NOTES
rash.   Neurological:      Mental Status: He is alert and oriented to person, place, and time.      GCS: GCS eye subscore is 4. GCS verbal subscore is 5. GCS motor subscore is 6.         -------------------------------------------------- RESULTS -------------------------------------------------  Results for orders placed or performed during the hospital encounter of 02/01/24   Urinalysis   Result Value Ref Range    Color, UA Yellow Yellow    Turbidity UA Turbid (A) Clear    Glucose, Ur NEGATIVE NEGATIVE mg/dL    Bilirubin Urine NEGATIVE NEGATIVE    Ketones, Urine NEGATIVE NEGATIVE mg/dL    Specific Gravity, UA 1.020 1.005 - 1.030    Urine Hgb MODERATE (A) NEGATIVE    pH, UA 6.0 5.0 - 9.0    Protein, UA >=300 (A) NEGATIVE mg/dL    Urobilinogen, Urine 0.2 0.0 - 1.0 EU/dL    Nitrite, Urine NEGATIVE NEGATIVE    Leukocyte Esterase, Urine LARGE (A) NEGATIVE   Microscopic Urinalysis   Result Value Ref Range    WBC, UA 10 TO 20 (A) 0 TO 5 /HPF    RBC, UA 10 TO 20 (A) 0 TO 2 /HPF    Bacteria, UA 2+ (A) None     No orders to display       Labs Reviewed   URINALYSIS - Abnormal; Notable for the following components:       Result Value    Turbidity UA Turbid (*)     Urine Hgb MODERATE (*)     Protein, UA >=300 (*)     Leukocyte Esterase, Urine LARGE (*)     All other components within normal limits   MICROSCOPIC URINALYSIS - Abnormal; Notable for the following components:    WBC, UA 10 TO 20 (*)     RBC, UA 10 TO 20 (*)     Bacteria, UA 2+ (*)     All other components within normal limits   CULTURE, URINE       When ordered only abnormal lab results are displayed. All other labs were within normal range or not returned as of this dictation.    Interpretation per the Radiologist below, if available at the time of this note:    No orders to display     No results found.    No results found.     -------------------------------------------------PROCEDURES--------------------------------------------  Unless otherwise noted below,

## 2024-02-04 LAB
MICROORGANISM SPEC CULT: ABNORMAL
SPECIMEN DESCRIPTION: ABNORMAL

## 2024-02-07 LAB
25(OH)D3 SERPL-MCNC: 60.1 NG/ML (ref 30–100)
ALBUMIN SERPL-MCNC: 4 G/DL (ref 3.5–5.2)
ALP SERPL-CCNC: 69 U/L (ref 40–129)
ALT SERPL-CCNC: 7 U/L (ref 0–40)
ANION GAP SERPL CALCULATED.3IONS-SCNC: 14 MMOL/L (ref 7–16)
AST SERPL-CCNC: 11 U/L (ref 0–39)
BASOPHILS # BLD: 0.13 K/UL (ref 0–0.2)
BASOPHILS NFR BLD: 2 % (ref 0–2)
BILIRUB SERPL-MCNC: 0.2 MG/DL (ref 0–1.2)
BUN SERPL-MCNC: 57 MG/DL (ref 6–23)
CALCIUM SERPL-MCNC: 9.2 MG/DL (ref 8.6–10.2)
CHLORIDE SERPL-SCNC: 112 MMOL/L (ref 98–107)
CHOLEST SERPL-MCNC: 117 MG/DL
CO2 SERPL-SCNC: 19 MMOL/L (ref 22–29)
CREAT SERPL-MCNC: 2.3 MG/DL (ref 0.7–1.2)
EOSINOPHIL # BLD: 0.11 K/UL (ref 0.05–0.5)
EOSINOPHILS RELATIVE PERCENT: 2 % (ref 0–6)
ERYTHROCYTE [DISTWIDTH] IN BLOOD BY AUTOMATED COUNT: 13.4 % (ref 11.5–15)
GFR SERPL CREATININE-BSD FRML MDRD: 26 ML/MIN/1.73M2
GLUCOSE SERPL-MCNC: 109 MG/DL (ref 74–99)
HCT VFR BLD AUTO: 34.2 % (ref 37–54)
HDLC SERPL-MCNC: 38 MG/DL
HGB BLD-MCNC: 11 G/DL (ref 12.5–16.5)
IMM GRANULOCYTES # BLD AUTO: <0.03 K/UL (ref 0–0.58)
IMM GRANULOCYTES NFR BLD: 0 % (ref 0–5)
LDLC SERPL CALC-MCNC: 51 MG/DL
LYMPHOCYTES NFR BLD: 1.92 K/UL (ref 1.5–4)
LYMPHOCYTES RELATIVE PERCENT: 26 % (ref 20–42)
MAGNESIUM SERPL-MCNC: 2.4 MG/DL (ref 1.6–2.6)
MCH RBC QN AUTO: 31.3 PG (ref 26–35)
MCHC RBC AUTO-ENTMCNC: 32.2 G/DL (ref 32–34.5)
MCV RBC AUTO: 97.4 FL (ref 80–99.9)
MONOCYTES NFR BLD: 0.52 K/UL (ref 0.1–0.95)
MONOCYTES NFR BLD: 7 % (ref 2–12)
NEUTROPHILS NFR BLD: 63 % (ref 43–80)
NEUTS SEG NFR BLD: 4.58 K/UL (ref 1.8–7.3)
PHOSPHATE SERPL-MCNC: 3.5 MG/DL (ref 2.5–4.5)
PLATELET # BLD AUTO: 323 K/UL (ref 130–450)
PMV BLD AUTO: 10.1 FL (ref 7–12)
POTASSIUM SERPL-SCNC: 4.5 MMOL/L (ref 3.5–5)
PROT SERPL-MCNC: 7.3 G/DL (ref 6.4–8.3)
PTH-INTACT SERPL-MCNC: 57.1 PG/ML (ref 15–65)
RBC # BLD AUTO: 3.51 M/UL (ref 3.8–5.8)
SODIUM SERPL-SCNC: 145 MMOL/L (ref 132–146)
TRIGL SERPL-MCNC: 139 MG/DL
URATE SERPL-MCNC: 11.3 MG/DL (ref 3.4–7)
VLDLC SERPL CALC-MCNC: 28 MG/DL
WBC OTHER # BLD: 7.3 K/UL (ref 4.5–11.5)

## 2024-03-12 ENCOUNTER — OFFICE VISIT (OUTPATIENT)
Dept: FAMILY MEDICINE CLINIC | Age: 89
End: 2024-03-12

## 2024-03-12 VITALS
DIASTOLIC BLOOD PRESSURE: 70 MMHG | WEIGHT: 156 LBS | OXYGEN SATURATION: 96 % | SYSTOLIC BLOOD PRESSURE: 120 MMHG | HEART RATE: 57 BPM | BODY MASS INDEX: 23.04 KG/M2

## 2024-03-12 DIAGNOSIS — D50.0 IRON DEFICIENCY ANEMIA DUE TO CHRONIC BLOOD LOSS: ICD-10-CM

## 2024-03-12 DIAGNOSIS — I10 ESSENTIAL HYPERTENSION: ICD-10-CM

## 2024-03-12 DIAGNOSIS — E78.00 PURE HYPERCHOLESTEROLEMIA: Primary | ICD-10-CM

## 2024-03-12 DIAGNOSIS — Z93.59 SUPRAPUBIC CATHETER (HCC): ICD-10-CM

## 2024-03-12 DIAGNOSIS — I50.22 CHRONIC SYSTOLIC (CONGESTIVE) HEART FAILURE (HCC): ICD-10-CM

## 2024-03-12 DIAGNOSIS — N18.4 STAGE 4 CHRONIC KIDNEY DISEASE (HCC): ICD-10-CM

## 2024-03-12 RX ORDER — AMLODIPINE BESYLATE 2.5 MG/1
2.5 TABLET ORAL DAILY
Qty: 90 TABLET | Refills: 1 | Status: SHIPPED | OUTPATIENT
Start: 2024-03-12

## 2024-03-12 RX ORDER — FESOTERODINE FUMARATE 8 MG/1
1 TABLET, EXTENDED RELEASE ORAL NIGHTLY
COMMUNITY
Start: 2024-02-14

## 2024-03-12 RX ORDER — PANTOPRAZOLE SODIUM 40 MG/1
40 TABLET, DELAYED RELEASE ORAL DAILY
Qty: 90 TABLET | Refills: 1 | Status: SHIPPED | OUTPATIENT
Start: 2024-03-12

## 2024-03-12 RX ORDER — SIMVASTATIN 40 MG
40 TABLET ORAL NIGHTLY
Qty: 90 TABLET | Refills: 1 | Status: SHIPPED | OUTPATIENT
Start: 2024-03-12

## 2024-03-12 RX ORDER — METOPROLOL SUCCINATE 25 MG/1
25 TABLET, EXTENDED RELEASE ORAL DAILY
Qty: 90 TABLET | Refills: 0 | Status: SHIPPED | OUTPATIENT
Start: 2024-03-12

## 2024-03-12 SDOH — ECONOMIC STABILITY: FOOD INSECURITY: WITHIN THE PAST 12 MONTHS, THE FOOD YOU BOUGHT JUST DIDN'T LAST AND YOU DIDN'T HAVE MONEY TO GET MORE.: NEVER TRUE

## 2024-03-12 SDOH — ECONOMIC STABILITY: INCOME INSECURITY: HOW HARD IS IT FOR YOU TO PAY FOR THE VERY BASICS LIKE FOOD, HOUSING, MEDICAL CARE, AND HEATING?: NOT HARD AT ALL

## 2024-03-12 SDOH — ECONOMIC STABILITY: FOOD INSECURITY: WITHIN THE PAST 12 MONTHS, YOU WORRIED THAT YOUR FOOD WOULD RUN OUT BEFORE YOU GOT MONEY TO BUY MORE.: NEVER TRUE

## 2024-03-12 ASSESSMENT — PATIENT HEALTH QUESTIONNAIRE - PHQ9
SUM OF ALL RESPONSES TO PHQ9 QUESTIONS 1 & 2: 0
1. LITTLE INTEREST OR PLEASURE IN DOING THINGS: 0
2. FEELING DOWN, DEPRESSED OR HOPELESS: 0
SUM OF ALL RESPONSES TO PHQ QUESTIONS 1-9: 0

## 2024-03-12 NOTE — PATIENT INSTRUCTIONS
LOW SALT FOR BLOOD PRESSURE CONTROL.  LOW FAT DIET FOR CHOLESTEROL CONTROL.  DRINK ENOUGH FLUIDS FOR BETTER KIDNEY FUNCTION.  TAKE  AMLODIPINE 2.5 MG. DAILY, PRINIVIL/HCT 10/12.5  1 TAB. DAILY,TOPROL XL 25 MG. 1 TAB. DAILY FOR BLOOD PRESSURE CONTROL.  TAKE  ZOCOR 40 MG. 1 TAB. DAILY.  FOR CHOLESTEROL CONTROL..  TAKE  FEOSOL 1 TAB. 2 TIMES A DAY AND MULTIVITAMIN 1 TAB. DAILY TO IMPROVE BLOOD COUNT.  TAKE  PROSCAR 5 MG. DAILY FOR PROSTATE PROBLEM.  REGULAR WALKING ADVISED.  CONTINUE FOLLOW UP WITH DR. AGUILLON FOR KIDNEY PROBLEM.  CONTINUE FOLLOW UP WITH DR. PINTO FOR PROSTATE PROBLEM.  FASTING FOR LAB WORK PRIOR TO NEXT VISIT.  NEXT APPOINTMENT IN 3 MONTHS

## 2024-03-12 NOTE — PROGRESS NOTES
OFFICE PROGRESS NOTE      SUBJECTIVE:        Patient ID:   Kyle Fox is a 89 y.o. male who presents for   Chief Complaint   Patient presents with    Hypertension    Cholesterol Problem           HPI:     RECHECK BP, CHOLESTEROL AND CKD  SEEING UROLOGIST AND NEPHROLOGIST. STILL HAS INDWELLING CATHETER.  MEDICATION REFILL.  FEELS GOOD.   WATCHING DIET GOOD.  WALKING SOME IN HOUSE FOR EXERCISE.  TAKING MEDICATIONS REGULARLY.         Prior to Admission medications    Medication Sig Start Date End Date Taking? Authorizing Provider   Fesoterodine Fumarate ER 8 MG TB24 Take 1 tablet by mouth nightly at bedtime 2/14/24  Yes Emil Garcia MD   amLODIPine (NORVASC) 2.5 MG tablet Take 1 tablet by mouth daily 3/12/24  Yes Freddy Etienne MD   Magnesium Oxide -Mg Supplement 200 MG TABS Take 200 mg by mouth daily 3/12/24  Yes Freddy Etienne MD   metoprolol succinate (TOPROL XL) 25 MG extended release tablet Take 1 tablet by mouth daily 3/12/24  Yes Freddy Etienne MD   pantoprazole (PROTONIX) 40 MG tablet Take 1 tablet by mouth daily 3/12/24  Yes Freddy Etienne MD   simvastatin (ZOCOR) 40 MG tablet Take 1 tablet by mouth nightly 3/12/24  Yes Freddy Etienne MD   timolol (TIMOPTIC) 0.5 % ophthalmic solution Place 1 drop into both eyes 2 times daily 9/12/23  Yes Freddy Etienne MD   ferrous sulfate (IRON 325) 325 (65 Fe) MG tablet Take 1 tablet by mouth 2 times daily Indications: DO NOT resume this medication until cleared by PCP and/or nephrologist 9/10/21  Yes Freddy Etienne MD   Multiple Vitamin (ONE-A-DAY ESSENTIAL) TABS Take 1 tablet by mouth daily   Yes Emil Garcia MD   Cholecalciferol (VITAMIN D3) 50 MCG (2000 UT) CAPS Take 1 capsule by mouth daily   Yes Emil Garcia MD   Ascorbic Acid 250 MG CHEW Take 250 mg by mouth daily    Yes Emil Garcia MD   vitamin B-12 (CYANOCOBALAMIN) 1000 MCG tablet Take 1 tablet by mouth daily   Yes Jose

## 2024-05-09 LAB
CREAT UR-MCNC: 66.2 MG/DL (ref 40–278)
MICROALBUMIN UR-MCNC: 1379 MG/L (ref 0–19)
MICROALBUMIN/CREAT UR-RTO: 2083 MCG/MG CREAT (ref 0–30)

## 2024-05-28 RX ORDER — LISINOPRIL AND HYDROCHLOROTHIAZIDE 12.5; 1 MG/1; MG/1
1 TABLET ORAL DAILY
Qty: 90 TABLET | Refills: 0 | Status: SHIPPED | OUTPATIENT
Start: 2024-05-28

## 2024-05-28 NOTE — TELEPHONE ENCOUNTER
Pt Daughter Savi request a refill on Lisinopril 10-12.5 mg    Last seen 3/12/2024  Next appt 6/13/2024    [facility], [place]

## 2024-06-04 ENCOUNTER — TELEPHONE (OUTPATIENT)
Dept: FAMILY MEDICINE CLINIC | Age: 89
End: 2024-06-04

## 2024-06-04 NOTE — TELEPHONE ENCOUNTER
Camille  for Dr Armstrong office called.  She stated the health care nurse changed pt catheter on 5/14/24 and she noticed pt had a rash on his penis Pt stated he is using [employer] powder which is not really helping so Camille is asking if he should be using something different? Either OTC or by prescription? Can call the pt     Last seen 3/12/2024  Next appt 6/13/2024    Requested Prescriptions      No prescriptions requested or ordered in this encounter      Emerson Guaman

## 2024-06-07 RX ORDER — METOPROLOL SUCCINATE 25 MG/1
25 TABLET, EXTENDED RELEASE ORAL DAILY
Qty: 90 TABLET | Refills: 0 | Status: SHIPPED | OUTPATIENT
Start: 2024-06-07

## 2024-06-10 DIAGNOSIS — D50.0 IRON DEFICIENCY ANEMIA DUE TO CHRONIC BLOOD LOSS: ICD-10-CM

## 2024-06-10 DIAGNOSIS — E78.00 PURE HYPERCHOLESTEROLEMIA: ICD-10-CM

## 2024-06-10 LAB
ALBUMIN: 4.2 G/DL (ref 3.5–5.2)
ALP BLD-CCNC: 86 U/L (ref 40–129)
ALT SERPL-CCNC: <5 U/L (ref 0–40)
ANION GAP SERPL CALCULATED.3IONS-SCNC: 12 MMOL/L (ref 7–16)
AST SERPL-CCNC: 10 U/L (ref 0–39)
BASOPHILS ABSOLUTE: 0.09 K/UL (ref 0–0.2)
BASOPHILS RELATIVE PERCENT: 1 % (ref 0–2)
BILIRUB SERPL-MCNC: 0.2 MG/DL (ref 0–1.2)
BUN BLDV-MCNC: 34 MG/DL (ref 6–23)
CALCIUM SERPL-MCNC: 9.4 MG/DL (ref 8.6–10.2)
CHLORIDE BLD-SCNC: 104 MMOL/L (ref 98–107)
CHOLESTEROL, TOTAL: 124 MG/DL
CO2: 27 MMOL/L (ref 22–29)
CREAT SERPL-MCNC: 2 MG/DL (ref 0.7–1.2)
EOSINOPHILS ABSOLUTE: 0.17 K/UL (ref 0.05–0.5)
EOSINOPHILS RELATIVE PERCENT: 2 % (ref 0–6)
GFR, ESTIMATED: 31 ML/MIN/1.73M2
GLUCOSE BLD-MCNC: 102 MG/DL (ref 74–99)
HCT VFR BLD CALC: 38 % (ref 37–54)
HDLC SERPL-MCNC: 39 MG/DL
HEMOGLOBIN: 12.1 G/DL (ref 12.5–16.5)
IMMATURE GRANULOCYTES %: 0 % (ref 0–5)
IMMATURE GRANULOCYTES ABSOLUTE: <0.03 K/UL (ref 0–0.58)
LDL CHOLESTEROL: 64 MG/DL
LYMPHOCYTES ABSOLUTE: 1.91 K/UL (ref 1.5–4)
LYMPHOCYTES RELATIVE PERCENT: 27 % (ref 20–42)
MAGNESIUM: 2.1 MG/DL (ref 1.6–2.6)
MCH RBC QN AUTO: 32 PG (ref 26–35)
MCHC RBC AUTO-ENTMCNC: 31.8 G/DL (ref 32–34.5)
MCV RBC AUTO: 100.5 FL (ref 80–99.9)
MONOCYTES ABSOLUTE: 0.52 K/UL (ref 0.1–0.95)
MONOCYTES RELATIVE PERCENT: 7 % (ref 2–12)
NEUTROPHILS ABSOLUTE: 4.35 K/UL (ref 1.8–7.3)
NEUTROPHILS RELATIVE PERCENT: 62 % (ref 43–80)
PDW BLD-RTO: 12 % (ref 11.5–15)
PLATELET # BLD: 279 K/UL (ref 130–450)
PMV BLD AUTO: 10.5 FL (ref 7–12)
POTASSIUM SERPL-SCNC: 4.2 MMOL/L (ref 3.5–5)
RBC # BLD: 3.78 M/UL (ref 3.8–5.8)
SODIUM BLD-SCNC: 143 MMOL/L (ref 132–146)
TOTAL PROTEIN: 7.3 G/DL (ref 6.4–8.3)
TRIGL SERPL-MCNC: 106 MG/DL
VLDLC SERPL CALC-MCNC: 21 MG/DL
WBC # BLD: 7.1 K/UL (ref 4.5–11.5)

## 2024-06-12 NOTE — RESULT ENCOUNTER NOTE
KIDNEY FUNCTION  LOW. WILL MONITOR. FOLLOW UP WITH NEPHROLOGIST.   HGB LOW BUT IMPROVING. CONTINUE IRON TABLETS AND MULTIVITAMIN.

## 2024-06-13 ENCOUNTER — OFFICE VISIT (OUTPATIENT)
Dept: FAMILY MEDICINE CLINIC | Age: 89
End: 2024-06-13

## 2024-06-13 VITALS
DIASTOLIC BLOOD PRESSURE: 60 MMHG | SYSTOLIC BLOOD PRESSURE: 120 MMHG | OXYGEN SATURATION: 99 % | BODY MASS INDEX: 22.89 KG/M2 | WEIGHT: 155 LBS | HEART RATE: 60 BPM

## 2024-06-13 DIAGNOSIS — D50.0 IRON DEFICIENCY ANEMIA DUE TO CHRONIC BLOOD LOSS: ICD-10-CM

## 2024-06-13 DIAGNOSIS — N18.4 STAGE 4 CHRONIC KIDNEY DISEASE (HCC): ICD-10-CM

## 2024-06-13 DIAGNOSIS — E78.00 PURE HYPERCHOLESTEROLEMIA: Primary | ICD-10-CM

## 2024-06-13 DIAGNOSIS — I50.22 CHRONIC SYSTOLIC (CONGESTIVE) HEART FAILURE (HCC): ICD-10-CM

## 2024-06-13 DIAGNOSIS — I10 ESSENTIAL HYPERTENSION: ICD-10-CM

## 2024-06-13 NOTE — PATIENT INSTRUCTIONS
LOW SALT FOR BLOOD PRESSURE CONTROL.  LOW FAT DIET FOR CHOLESTEROL CONTROL.  DRINK ENOUGH FLUIDS FOR BETTER KIDNEY FUNCTION.  TAKE  AMLODIPINE 2.5 MG. DAILY, PRINIVIL/HCT 10/12.5  1 TAB. DAILY,TOPROL XL 25 MG. 1 TAB. DAILY FOR BLOOD PRESSURE CONTROL.  TAKE  ZOCOR 40 MG. 1 TAB. DAILY.  FOR CHOLESTEROL CONTROL..  TAKE  FEOSOL 1 TAB. 2 TIMES A DAY AND MULTIVITAMIN 1 TAB. DAILY TO IMPROVE BLOOD COUNT.  TAKE  PROSCAR 5 MG. DAILY FOR PROSTATE PROBLEM.  REGULAR WALKING ADVISED.  CONTINUE FOLLOW UP WITH DR. AGUILLON FOR KIDNEY PROBLEM.  CONTINUE FOLLOW UP WITH DR. PINTO FOR PROSTATE PROBLEM.  FASTING FOR LAB WORK PRIOR TO NEXT VISIT.  NEXT APPOINTMENT IN 2 MONTHS

## 2024-06-13 NOTE — PROGRESS NOTES
CONTROL.  TAKE  ZOCOR 40 MG. 1 TAB. DAILY.  FOR CHOLESTEROL CONTROL..  TAKE  FEOSOL 1 TAB. 2 TIMES A DAY AND MULTIVITAMIN 1 TAB. DAILY TO IMPROVE BLOOD COUNT.  TAKE  PROSCAR 5 MG. DAILY FOR PROSTATE PROBLEM.  REGULAR WALKING ADVISED.  CONTINUE FOLLOW UP WITH DR. AGUILLON FOR KIDNEY PROBLEM.  CONTINUE FOLLOW UP WITH DR. PINTO FOR PROSTATE PROBLEM.  FASTING FOR LAB WORK PRIOR TO NEXT VISIT.  NEXT APPOINTMENT IN 2 MONTHS    No follow-ups on file.         I have reviewed my findings and recommendations with Kyle Fox.    Electronically signed by Freddy Etienne MD on 6/13/24 at 11:06 AM EDT

## 2024-08-06 DIAGNOSIS — D50.0 IRON DEFICIENCY ANEMIA DUE TO CHRONIC BLOOD LOSS: ICD-10-CM

## 2024-08-06 DIAGNOSIS — E78.00 PURE HYPERCHOLESTEROLEMIA: ICD-10-CM

## 2024-08-06 DIAGNOSIS — N18.4 STAGE 4 CHRONIC KIDNEY DISEASE (HCC): ICD-10-CM

## 2024-08-06 LAB
ALBUMIN: 3.9 G/DL (ref 3.5–5.2)
ALBUMIN: 4 G/DL (ref 3.5–5.2)
ALP BLD-CCNC: 79 U/L (ref 40–129)
ALT SERPL-CCNC: <5 U/L (ref 0–40)
ANION GAP SERPL CALCULATED.3IONS-SCNC: 13 MMOL/L (ref 7–16)
ANION GAP SERPL CALCULATED.3IONS-SCNC: 14 MMOL/L (ref 7–16)
AST SERPL-CCNC: 8 U/L (ref 0–39)
BASOPHILS # BLD: 0.08 K/UL (ref 0–0.2)
BASOPHILS ABSOLUTE: 0.09 K/UL (ref 0–0.2)
BASOPHILS NFR BLD: 1 % (ref 0–2)
BASOPHILS RELATIVE PERCENT: 1 % (ref 0–2)
BILIRUB SERPL-MCNC: 0.5 MG/DL (ref 0–1.2)
BUN BLDV-MCNC: 43 MG/DL (ref 6–23)
BUN SERPL-MCNC: 44 MG/DL (ref 6–23)
CALCIUM SERPL-MCNC: 9.2 MG/DL (ref 8.6–10.2)
CALCIUM SERPL-MCNC: 9.2 MG/DL (ref 8.6–10.2)
CHLORIDE BLD-SCNC: 103 MMOL/L (ref 98–107)
CHLORIDE SERPL-SCNC: 102 MMOL/L (ref 98–107)
CO2 SERPL-SCNC: 25 MMOL/L (ref 22–29)
CO2: 25 MMOL/L (ref 22–29)
CREAT SERPL-MCNC: 2.3 MG/DL (ref 0.7–1.2)
CREAT SERPL-MCNC: 2.3 MG/DL (ref 0.7–1.2)
EOSINOPHIL # BLD: 0.15 K/UL (ref 0.05–0.5)
EOSINOPHILS ABSOLUTE: 0.13 K/UL (ref 0.05–0.5)
EOSINOPHILS RELATIVE PERCENT: 2 % (ref 0–6)
EOSINOPHILS RELATIVE PERCENT: 2 % (ref 0–6)
ERYTHROCYTE [DISTWIDTH] IN BLOOD BY AUTOMATED COUNT: 13 % (ref 11.5–15)
GFR, ESTIMATED: 27 ML/MIN/1.73M2
GFR, ESTIMATED: 27 ML/MIN/1.73M2
GLUCOSE BLD-MCNC: 100 MG/DL (ref 74–99)
GLUCOSE SERPL-MCNC: 99 MG/DL (ref 74–99)
HCT VFR BLD AUTO: 37.7 % (ref 37–54)
HCT VFR BLD CALC: 37.1 % (ref 37–54)
HEMOGLOBIN: 11.9 G/DL (ref 12.5–16.5)
HGB BLD-MCNC: 12 G/DL (ref 12.5–16.5)
IMM GRANULOCYTES # BLD AUTO: <0.03 K/UL (ref 0–0.58)
IMM GRANULOCYTES NFR BLD: 0 % (ref 0–5)
IMMATURE GRANULOCYTES %: 0 % (ref 0–5)
IMMATURE GRANULOCYTES ABSOLUTE: <0.03 K/UL (ref 0–0.58)
LYMPHOCYTES ABSOLUTE: 1.96 K/UL (ref 1.5–4)
LYMPHOCYTES NFR BLD: 1.98 K/UL (ref 1.5–4)
LYMPHOCYTES RELATIVE PERCENT: 25 % (ref 20–42)
LYMPHOCYTES RELATIVE PERCENT: 25 % (ref 20–42)
MAGNESIUM SERPL-MCNC: 1.9 MG/DL (ref 1.6–2.6)
MCH RBC QN AUTO: 31.7 PG (ref 26–35)
MCH RBC QN AUTO: 31.8 PG (ref 26–35)
MCHC RBC AUTO-ENTMCNC: 31.8 G/DL (ref 32–34.5)
MCHC RBC AUTO-ENTMCNC: 32.1 G/DL (ref 32–34.5)
MCV RBC AUTO: 99.2 FL (ref 80–99.9)
MCV RBC AUTO: 99.7 FL (ref 80–99.9)
MONOCYTES ABSOLUTE: 0.51 K/UL (ref 0.1–0.95)
MONOCYTES NFR BLD: 0.55 K/UL (ref 0.1–0.95)
MONOCYTES NFR BLD: 7 % (ref 2–12)
MONOCYTES RELATIVE PERCENT: 7 % (ref 2–12)
NEUTROPHILS ABSOLUTE: 5.08 K/UL (ref 1.8–7.3)
NEUTROPHILS NFR BLD: 65 % (ref 43–80)
NEUTROPHILS RELATIVE PERCENT: 65 % (ref 43–80)
NEUTS SEG NFR BLD: 5.17 K/UL (ref 1.8–7.3)
PDW BLD-RTO: 12.9 % (ref 11.5–15)
PHOSPHATE SERPL-MCNC: 3.2 MG/DL (ref 2.5–4.5)
PLATELET # BLD AUTO: 257 K/UL (ref 130–450)
PLATELET # BLD: 255 K/UL (ref 130–450)
PMV BLD AUTO: 10.6 FL (ref 7–12)
PMV BLD AUTO: 10.7 FL (ref 7–12)
POTASSIUM SERPL-SCNC: 4.3 MMOL/L (ref 3.5–5)
POTASSIUM SERPL-SCNC: 4.5 MMOL/L (ref 3.5–5)
RBC # BLD AUTO: 3.78 M/UL (ref 3.8–5.8)
RBC # BLD: 3.74 M/UL (ref 3.8–5.8)
SODIUM BLD-SCNC: 141 MMOL/L (ref 132–146)
SODIUM SERPL-SCNC: 141 MMOL/L (ref 132–146)
TOTAL PROTEIN: 7.3 G/DL (ref 6.4–8.3)
WBC # BLD: 7.8 K/UL (ref 4.5–11.5)
WBC OTHER # BLD: 8 K/UL (ref 4.5–11.5)

## 2024-08-08 ENCOUNTER — OFFICE VISIT (OUTPATIENT)
Dept: FAMILY MEDICINE CLINIC | Age: 89
End: 2024-08-08
Payer: MEDICARE

## 2024-08-08 VITALS
SYSTOLIC BLOOD PRESSURE: 120 MMHG | OXYGEN SATURATION: 98 % | HEART RATE: 61 BPM | WEIGHT: 152 LBS | DIASTOLIC BLOOD PRESSURE: 70 MMHG | BODY MASS INDEX: 22.45 KG/M2

## 2024-08-08 DIAGNOSIS — I10 ESSENTIAL HYPERTENSION: ICD-10-CM

## 2024-08-08 DIAGNOSIS — E78.00 PURE HYPERCHOLESTEROLEMIA: Primary | ICD-10-CM

## 2024-08-08 DIAGNOSIS — D50.0 IRON DEFICIENCY ANEMIA DUE TO CHRONIC BLOOD LOSS: ICD-10-CM

## 2024-08-08 DIAGNOSIS — I50.22 CHRONIC SYSTOLIC (CONGESTIVE) HEART FAILURE (HCC): ICD-10-CM

## 2024-08-08 DIAGNOSIS — N18.4 STAGE 4 CHRONIC KIDNEY DISEASE (HCC): ICD-10-CM

## 2024-08-08 DIAGNOSIS — M15.9 PRIMARY OSTEOARTHRITIS INVOLVING MULTIPLE JOINTS: ICD-10-CM

## 2024-08-08 PROCEDURE — 99214 OFFICE O/P EST MOD 30 MIN: CPT | Performed by: FAMILY MEDICINE

## 2024-08-08 PROCEDURE — 1036F TOBACCO NON-USER: CPT | Performed by: FAMILY MEDICINE

## 2024-08-08 PROCEDURE — G8420 CALC BMI NORM PARAMETERS: HCPCS | Performed by: FAMILY MEDICINE

## 2024-08-08 PROCEDURE — 1123F ACP DISCUSS/DSCN MKR DOCD: CPT | Performed by: FAMILY MEDICINE

## 2024-08-08 PROCEDURE — G8427 DOCREV CUR MEDS BY ELIG CLIN: HCPCS | Performed by: FAMILY MEDICINE

## 2024-08-08 RX ORDER — AMLODIPINE BESYLATE 2.5 MG/1
2.5 TABLET ORAL DAILY
Qty: 90 TABLET | Refills: 1 | Status: SHIPPED | OUTPATIENT
Start: 2024-08-08

## 2024-08-08 RX ORDER — SIMVASTATIN 40 MG
40 TABLET ORAL NIGHTLY
Qty: 90 TABLET | Refills: 1 | Status: SHIPPED | OUTPATIENT
Start: 2024-08-08

## 2024-08-08 RX ORDER — PANTOPRAZOLE SODIUM 40 MG/1
40 TABLET, DELAYED RELEASE ORAL DAILY
Qty: 90 TABLET | Refills: 1 | Status: SHIPPED | OUTPATIENT
Start: 2024-08-08

## 2024-08-08 RX ORDER — METOPROLOL SUCCINATE 25 MG/1
25 TABLET, EXTENDED RELEASE ORAL DAILY
Qty: 90 TABLET | Refills: 0 | Status: SHIPPED | OUTPATIENT
Start: 2024-08-08

## 2024-08-08 RX ORDER — LISINOPRIL AND HYDROCHLOROTHIAZIDE 12.5; 1 MG/1; MG/1
1 TABLET ORAL DAILY
Qty: 90 TABLET | Refills: 0 | Status: SHIPPED | OUTPATIENT
Start: 2024-08-08

## 2024-08-08 RX ORDER — TIMOLOL MALEATE 5 MG/ML
1 SOLUTION/ DROPS OPHTHALMIC 2 TIMES DAILY
Qty: 5 ML | Refills: 0 | Status: SHIPPED | OUTPATIENT
Start: 2024-08-08

## 2024-08-08 NOTE — PROGRESS NOTES
clear to auscultation bilaterally.  No ronchi, crackles or wheezes  Heart: S1 S2  Regular rate and rhythm. No rub, murmur or gallop  Abdomen: Abdomen soft, non-tender. BS normal. No masses, organomegaly.  Back: Grossly Normal and Equal. DTR are Normal. SLR is Normal.  Extremities: Arthritic changes are noted. Movements are limited. Pedal pulses are normal.  Musculoskeletal: Muscular strength appears intact. No joint effusion, tenderness, swelling or warmth  Neuro:  No focal motor or sensory deficits        ASSESSMENT     Patient Active Problem List    Diagnosis Date Noted    Iron deficiency anemia due to chronic blood loss 07/26/2018    Stage 4 chronic kidney disease (HCC) 07/26/2018    Primary osteoarthritis involving multiple joints 09/24/2012    Pure hypercholesterolemia     Essential hypertension     Chronic systolic (congestive) heart failure 07/26/2022    Complicated UTI (urinary tract infection) 06/16/2022    Acute kidney injury superimposed on CKD (HCC)     Suprapubic catheter ([facility]) 03/12/2024    Chronic primary angle-closure glaucoma, indeterminate stage 02/22/2022    BPH with urinary obstruction 11/02/2021    Enlarged prostate with urinary retention 09/10/2021        Diagnosis:     ICD-10-CM    1. Pure hypercholesterolemia  E78.00 Comprehensive Metabolic Panel     Lipid Panel    CONTROLLED      2. Stage 4 chronic kidney disease (HCC)  N18.4 CBC with Auto Differential    STABLE      3. Chronic systolic (congestive) heart failure (HCC)  I50.22     STABLE      4. Essential hypertension  I10 CBC with Auto Differential    CONTROLLED      5. Iron deficiency anemia due to chronic blood loss  D50.0 CBC with Auto Differential    STABLE      6. Primary osteoarthritis involving multiple joints  M15.9     STABLE          PLAN:           Patient Instructions   LOW SALT FOR BLOOD PRESSURE CONTROL.  LOW FAT DIET FOR CHOLESTEROL CONTROL.  DRINK ENOUGH FLUIDS FOR BETTER KIDNEY FUNCTION.  TAKE  AMLODIPINE 2.5 MG. DAILY,

## 2024-08-12 ENCOUNTER — TELEPHONE (OUTPATIENT)
Dept: FAMILY MEDICINE CLINIC | Age: 89
End: 2024-08-12

## 2024-08-12 NOTE — TELEPHONE ENCOUNTER
Esperanza from [facility] called.  She stated the pt was due for catheter change on 8.13.24. due to pt not having electricity and phone line she rescheduled it for 8.20.24. Ok'd  by Abbi MORGAN MA for Dr Etienne.  Last seen 8/8/2024  Next appt 10/8/2024    Requested Prescriptions      No prescriptions requested or ordered in this encounter

## 2024-08-29 LAB
BILIRUBIN, URINE: NORMAL
BLOOD, URINE: NORMAL
CLARITY, UA: NORMAL
COLOR, UA: NORMAL
GLUCOSE URINE: NORMAL
KETONES, URINE: NORMAL
LEUKOCYTE ESTERASE, URINE: NORMAL
NITRITE, URINE: NORMAL
PH UA: NORMAL
PROTEIN UA: NORMAL
SPECIFIC GRAVITY UA: NORMAL
UROBILINOGEN, URINE: NORMAL

## 2024-09-01 ENCOUNTER — HOSPITAL ENCOUNTER (EMERGENCY)
Age: 89
Discharge: HOME | End: 2024-09-01
Payer: MEDICARE

## 2024-09-01 VITALS
OXYGEN SATURATION: 99 % | WEIGHT: 148 LBS | RESPIRATION RATE: 16 BRPM | BODY MASS INDEX: 21.86 KG/M2 | HEART RATE: 60 BPM | DIASTOLIC BLOOD PRESSURE: 61 MMHG | SYSTOLIC BLOOD PRESSURE: 131 MMHG | TEMPERATURE: 99.3 F

## 2024-09-01 DIAGNOSIS — N30.01 ACUTE CYSTITIS WITH HEMATURIA: Primary | ICD-10-CM

## 2024-09-01 LAB
BACTERIA URNS QL MICRO: ABNORMAL
BILIRUB UR QL STRIP: NEGATIVE
CLARITY UR: ABNORMAL
COLOR UR: YELLOW
GLUCOSE UR STRIP-MCNC: NEGATIVE MG/DL
HGB UR QL STRIP.AUTO: ABNORMAL
KETONES UR STRIP-MCNC: NEGATIVE MG/DL
LEUKOCYTE ESTERASE UR QL STRIP: ABNORMAL
NITRITE UR QL STRIP: NEGATIVE
PH UR STRIP: 6.5 [PH] (ref 5–9)
PROT UR STRIP-MCNC: >=300 MG/DL
RBC #/AREA URNS HPF: ABNORMAL /HPF
SP GR UR STRIP: 1.02 (ref 1–1.03)
UROBILINOGEN UR STRIP-ACNC: 0.2 EU/DL (ref 0–1)
WBC #/AREA URNS HPF: ABNORMAL /HPF

## 2024-09-01 PROCEDURE — 87077 CULTURE AEROBIC IDENTIFY: CPT

## 2024-09-01 PROCEDURE — 99211 OFF/OP EST MAY X REQ PHY/QHP: CPT

## 2024-09-01 PROCEDURE — 81001 URINALYSIS AUTO W/SCOPE: CPT

## 2024-09-01 PROCEDURE — 87086 URINE CULTURE/COLONY COUNT: CPT

## 2024-09-01 RX ORDER — CIPROFLOXACIN 500 MG/1
500 TABLET, FILM COATED ORAL DAILY
Qty: 10 TABLET | Refills: 0 | Status: SHIPPED | OUTPATIENT
Start: 2024-09-01 | End: 2024-09-11

## 2024-09-01 ASSESSMENT — PAIN SCALES - GENERAL: PAINLEVEL_OUTOF10: 0

## 2024-09-01 ASSESSMENT — PAIN - FUNCTIONAL ASSESSMENT: PAIN_FUNCTIONAL_ASSESSMENT: 0-10

## 2024-09-01 NOTE — ED PROVIDER NOTES
[facility] URGENT CARE  EMERGENCY DEPARTMENT ENCOUNTER        NAME: Kyle oFx  :  1932  MRN:  [id number]  Date of evaluation: 2024  Provider: Champ Arias PA-C  PCP: Freddy Etienne MD  Note Started : 1:59 PM EDT 24    Chief Complaint: Urinary Tract Infection (Burning/pressure)      This is a 89-year-old male that presents to urgent care with family.  He does have a indwelling catheter.  He does have history of renal insufficiency.  He has been having some burning and pressure with urination lately.  He does have a history of UTI.  On first contact patient he appears to be in no acute distress.        Review of Systems  Pertinent positives and negatives are stated within HPI, all other systems reviewed and are negative.     Allergies: Pcn [penicillins]     --------------------------------------------- PAST HISTORY ---------------------------------------------  Past Medical History:  has a past medical history of CKD (chronic kidney disease) stage 4, GFR 15-29 ml/min ([facility]), Enlarged prostate, HFrEF (heart failure with reduced ejection fraction) ([facility]), Hyperlipidemia, Hypertension, SHOBHA (iron deficiency anemia), Lumbosacral strain, and Pacemaker.    Past Surgical History:  has a past surgical history that includes A-V cardiac pacemaker insertion (07); eye surgery (); Tonsillectomy; Colonoscopy (8/3/11); pacemaker placement; and Cystoscopy (N/A, 2021).    Social History:  reports that he quit smoking about 23 years ago. His smoking use included cigarettes. He started smoking about 63 years ago. He has a 40 pack-year smoking history. He has never used smokeless tobacco. He reports that he does not drink alcohol and does not use drugs.    Family History: family history includes Cancer in his mother; Diabetes in his mother; Heart Disease in his father; High Blood Pressure in his father and mother.     The patient’s home medications have been reviewed.    The nursing notes

## 2024-09-05 LAB
MICROORGANISM SPEC CULT: ABNORMAL
MICROORGANISM SPEC CULT: ABNORMAL
SPECIMEN DESCRIPTION: ABNORMAL

## 2024-09-06 NOTE — ED NOTES
Reviewed patients after hours culture results with Dr. Hall.  Urine culture growing KLEBSIELLA OXYTOCA and PSEUDOMONAS AERUGINOSA, patient discharged on ciprofloxacin.   Notified patient of results, symptoms improving, agrees to follow-up with urology for repeat cultures. I also spoke with patient's daughter and reviewed culture results, OK with following up with urology since patient is improving.     Claudia Saucedo, PharmD, BCPS 9/6/2024 5:19 PM   [phone]

## 2024-09-16 DIAGNOSIS — D50.0 IRON DEFICIENCY ANEMIA DUE TO CHRONIC BLOOD LOSS: ICD-10-CM

## 2024-09-16 DIAGNOSIS — E78.00 PURE HYPERCHOLESTEROLEMIA: ICD-10-CM

## 2024-09-16 DIAGNOSIS — N18.4 STAGE 4 CHRONIC KIDNEY DISEASE (HCC): ICD-10-CM

## 2024-09-16 DIAGNOSIS — I10 ESSENTIAL HYPERTENSION: ICD-10-CM

## 2024-09-16 LAB
25(OH)D3 SERPL-MCNC: 80.2 NG/ML (ref 30–100)
ALBUMIN SERPL-MCNC: 4.5 G/DL (ref 3.5–5.2)
ALBUMIN: 4.4 G/DL (ref 3.5–5.2)
ALP BLD-CCNC: 73 U/L (ref 40–129)
ALP SERPL-CCNC: 75 U/L (ref 40–129)
ALT SERPL-CCNC: 10 U/L (ref 0–40)
ALT SERPL-CCNC: 9 U/L (ref 0–40)
ANION GAP SERPL CALCULATED.3IONS-SCNC: 16 MMOL/L (ref 7–16)
ANION GAP SERPL CALCULATED.3IONS-SCNC: 16 MMOL/L (ref 7–16)
AST SERPL-CCNC: 13 U/L (ref 0–39)
AST SERPL-CCNC: 13 U/L (ref 0–39)
BASOPHILS # BLD: 0.1 K/UL (ref 0–0.2)
BASOPHILS ABSOLUTE: 0.1 K/UL (ref 0–0.2)
BASOPHILS NFR BLD: 1 % (ref 0–2)
BASOPHILS RELATIVE PERCENT: 1 % (ref 0–2)
BILIRUB SERPL-MCNC: 0.4 MG/DL (ref 0–1.2)
BILIRUB SERPL-MCNC: 0.4 MG/DL (ref 0–1.2)
BUN BLDV-MCNC: 29 MG/DL (ref 6–23)
BUN SERPL-MCNC: 29 MG/DL (ref 6–23)
CALCIUM SERPL-MCNC: 9.3 MG/DL (ref 8.6–10.2)
CALCIUM SERPL-MCNC: 9.4 MG/DL (ref 8.6–10.2)
CHLORIDE BLD-SCNC: 101 MMOL/L (ref 98–107)
CHLORIDE SERPL-SCNC: 100 MMOL/L (ref 98–107)
CHOLEST SERPL-MCNC: 162 MG/DL
CHOLESTEROL, TOTAL: 161 MG/DL
CO2 SERPL-SCNC: 27 MMOL/L (ref 22–29)
CO2: 27 MMOL/L (ref 22–29)
CREAT SERPL-MCNC: 1.7 MG/DL (ref 0.7–1.2)
CREAT SERPL-MCNC: 1.7 MG/DL (ref 0.7–1.2)
CREAT UR-MCNC: 73.2 MG/DL (ref 40–278)
EOSINOPHIL # BLD: 0.16 K/UL (ref 0.05–0.5)
EOSINOPHILS ABSOLUTE: 0.16 K/UL (ref 0.05–0.5)
EOSINOPHILS RELATIVE PERCENT: 2 % (ref 0–6)
EOSINOPHILS RELATIVE PERCENT: 2 % (ref 0–6)
ERYTHROCYTE [DISTWIDTH] IN BLOOD BY AUTOMATED COUNT: 13.8 % (ref 11.5–15)
GFR, ESTIMATED: 37 ML/MIN/1.73M2
GFR, ESTIMATED: 38 ML/MIN/1.73M2
GLUCOSE BLD-MCNC: 101 MG/DL (ref 74–99)
GLUCOSE SERPL-MCNC: 102 MG/DL (ref 74–99)
HCT VFR BLD AUTO: 37.6 % (ref 37–54)
HCT VFR BLD CALC: 37.6 % (ref 37–54)
HDLC SERPL-MCNC: 42 MG/DL
HDLC SERPL-MCNC: 43 MG/DL
HEMOGLOBIN: 11.9 G/DL (ref 12.5–16.5)
HGB BLD-MCNC: 11.7 G/DL (ref 12.5–16.5)
IMM GRANULOCYTES # BLD AUTO: <0.03 K/UL (ref 0–0.58)
IMM GRANULOCYTES NFR BLD: 0 % (ref 0–5)
IMMATURE GRANULOCYTES %: 0 % (ref 0–5)
IMMATURE GRANULOCYTES ABSOLUTE: <0.03 K/UL (ref 0–0.58)
LDL CHOLESTEROL: 80 MG/DL
LDLC SERPL CALC-MCNC: 83 MG/DL
LYMPHOCYTES ABSOLUTE: 2.09 K/UL (ref 1.5–4)
LYMPHOCYTES NFR BLD: 2.09 K/UL (ref 1.5–4)
LYMPHOCYTES RELATIVE PERCENT: 25 % (ref 20–42)
LYMPHOCYTES RELATIVE PERCENT: 25 % (ref 20–42)
MAGNESIUM SERPL-MCNC: 2.2 MG/DL (ref 1.6–2.6)
MCH RBC QN AUTO: 31.1 PG (ref 26–35)
MCH RBC QN AUTO: 31.6 PG (ref 26–35)
MCHC RBC AUTO-ENTMCNC: 31.1 G/DL (ref 32–34.5)
MCHC RBC AUTO-ENTMCNC: 31.6 G/DL (ref 32–34.5)
MCV RBC AUTO: 100 FL (ref 80–99.9)
MCV RBC AUTO: 100 FL (ref 80–99.9)
MICROALBUMIN UR-MCNC: 611 MG/L (ref 0–19)
MICROALBUMIN/CREAT UR-RTO: 835 MCG/MG CREAT (ref 0–30)
MONOCYTES ABSOLUTE: 0.56 K/UL (ref 0.1–0.95)
MONOCYTES NFR BLD: 0.62 K/UL (ref 0.1–0.95)
MONOCYTES NFR BLD: 8 % (ref 2–12)
MONOCYTES RELATIVE PERCENT: 7 % (ref 2–12)
NEUTROPHILS ABSOLUTE: 5.47 K/UL (ref 1.8–7.3)
NEUTROPHILS NFR BLD: 64 % (ref 43–80)
NEUTROPHILS RELATIVE PERCENT: 65 % (ref 43–80)
NEUTS SEG NFR BLD: 5.29 K/UL (ref 1.8–7.3)
PDW BLD-RTO: 13.9 % (ref 11.5–15)
PHOSPHATE SERPL-MCNC: 3 MG/DL (ref 2.5–4.5)
PLATELET # BLD AUTO: 261 K/UL (ref 130–450)
PLATELET # BLD: 253 K/UL (ref 130–450)
PMV BLD AUTO: 10.6 FL (ref 7–12)
PMV BLD AUTO: 10.6 FL (ref 7–12)
POTASSIUM SERPL-SCNC: 3.8 MMOL/L (ref 3.5–5)
POTASSIUM SERPL-SCNC: 3.8 MMOL/L (ref 3.5–5)
PROT SERPL-MCNC: 7.2 G/DL (ref 6.4–8.3)
PTH-INTACT SERPL-MCNC: 87.9 PG/ML (ref 15–65)
RBC # BLD AUTO: 3.76 M/UL (ref 3.8–5.8)
RBC # BLD: 3.76 M/UL (ref 3.8–5.8)
SODIUM BLD-SCNC: 144 MMOL/L (ref 132–146)
SODIUM SERPL-SCNC: 143 MMOL/L (ref 132–146)
TOTAL PROTEIN: 7.3 G/DL (ref 6.4–8.3)
TRIGL SERPL-MCNC: 186 MG/DL
TRIGL SERPL-MCNC: 188 MG/DL
URATE SERPL-MCNC: 8.8 MG/DL (ref 3.4–7)
VLDLC SERPL CALC-MCNC: 37 MG/DL
VLDLC SERPL CALC-MCNC: 38 MG/DL
WBC # BLD: 8.4 K/UL (ref 4.5–11.5)
WBC OTHER # BLD: 8.3 K/UL (ref 4.5–11.5)

## 2024-10-08 ENCOUNTER — OFFICE VISIT (OUTPATIENT)
Dept: FAMILY MEDICINE CLINIC | Age: 89
End: 2024-10-08

## 2024-10-08 VITALS
WEIGHT: 156 LBS | DIASTOLIC BLOOD PRESSURE: 70 MMHG | BODY MASS INDEX: 23.04 KG/M2 | SYSTOLIC BLOOD PRESSURE: 130 MMHG | OXYGEN SATURATION: 96 % | HEART RATE: 61 BPM

## 2024-10-08 DIAGNOSIS — N18.4 STAGE 4 CHRONIC KIDNEY DISEASE (HCC): ICD-10-CM

## 2024-10-08 DIAGNOSIS — Z23 FLU VACCINE NEED: Primary | ICD-10-CM

## 2024-10-08 DIAGNOSIS — I10 ESSENTIAL HYPERTENSION: ICD-10-CM

## 2024-10-08 DIAGNOSIS — D50.0 IRON DEFICIENCY ANEMIA DUE TO CHRONIC BLOOD LOSS: ICD-10-CM

## 2024-10-08 DIAGNOSIS — E78.00 PURE HYPERCHOLESTEROLEMIA: ICD-10-CM

## 2024-10-08 DIAGNOSIS — I50.22 CHRONIC SYSTOLIC (CONGESTIVE) HEART FAILURE (HCC): ICD-10-CM

## 2024-10-08 RX ORDER — AMLODIPINE BESYLATE 2.5 MG/1
2.5 TABLET ORAL DAILY
Qty: 90 TABLET | Refills: 1 | Status: SHIPPED | OUTPATIENT
Start: 2024-10-08

## 2024-10-08 RX ORDER — SIMVASTATIN 40 MG
40 TABLET ORAL NIGHTLY
Qty: 90 TABLET | Refills: 1 | Status: SHIPPED | OUTPATIENT
Start: 2024-10-08

## 2024-10-08 RX ORDER — PANTOPRAZOLE SODIUM 40 MG/1
40 TABLET, DELAYED RELEASE ORAL DAILY
Qty: 90 TABLET | Refills: 1 | Status: SHIPPED | OUTPATIENT
Start: 2024-10-08

## 2024-10-08 RX ORDER — LISINOPRIL/HYDROCHLOROTHIAZIDE 10-12.5 MG
1 TABLET ORAL DAILY
Qty: 90 TABLET | Refills: 0 | Status: SHIPPED | OUTPATIENT
Start: 2024-10-08

## 2024-10-08 RX ORDER — METOPROLOL SUCCINATE 25 MG/1
25 TABLET, EXTENDED RELEASE ORAL DAILY
Qty: 90 TABLET | Refills: 0 | Status: SHIPPED | OUTPATIENT
Start: 2024-10-08

## 2024-10-08 RX ORDER — TIMOLOL MALEATE 5 MG/ML
1 SOLUTION/ DROPS OPHTHALMIC 2 TIMES DAILY
Qty: 5 ML | Refills: 0 | Status: SHIPPED | OUTPATIENT
Start: 2024-10-08

## 2024-10-08 NOTE — PROGRESS NOTES
OFFICE PROGRESS NOTE      SUBJECTIVE:        Patient ID:   Kyle Fox is a 89 y.o. male who presents for   Chief Complaint   Patient presents with    Hypertension           HPI:     RECHECK BP, CHOLESTEROL AND CHRONIC CHF.  NO CHEST PAIN OR SHORTNESS OF BREATH.  MEDICATION REFILL.  FEELS GOOD.   WATCHING DIET GOOD.  WALKING SOME IN HOUSE FOR EXERCISE.  TAKING MEDICATIONS REGULARLY.         Prior to Admission medications    Medication Sig Start Date End Date Taking? Authorizing Provider   timolol (TIMOPTIC) 0.5 % ophthalmic solution Place 1 drop into both eyes 2 times daily 8/8/24   Freddy Etienne MD   lisinopril-hydroCHLOROthiazide (PRINZIDE;ZESTORETIC) 10-12.5 MG per tablet Take 1 tablet by mouth daily 8/8/24   Freddy Etienne MD   amLODIPine (NORVASC) 2.5 MG tablet Take 1 tablet by mouth daily 8/8/24   Freddy Etienne MD   Magnesium Oxide -Mg Supplement 200 MG TABS Take 200 mg by mouth daily 8/8/24   Freddy Etienne MD   metoprolol succinate (TOPROL XL) 25 MG extended release tablet Take 1 tablet by mouth daily 8/8/24   Freddy Etienne MD   pantoprazole (PROTONIX) 40 MG tablet Take 1 tablet by mouth daily 8/8/24   Freddy Etienne MD   simvastatin (ZOCOR) 40 MG tablet Take 1 tablet by mouth nightly 8/8/24   Freddy Etienne MD   Fesoterodine Fumarate ER 8 MG TB24 Take 1 tablet by mouth nightly at bedtime 2/14/24   Emil Garcia MD   ferrous sulfate (IRON 325) 325 (65 Fe) MG tablet Take 1 tablet by mouth 2 times daily Indications: DO NOT resume this medication until cleared by PCP and/or nephrologist 9/10/21   Freddy Etienne MD   Multiple Vitamin (ONE-A-DAY ESSENTIAL) TABS Take 1 tablet by mouth daily    Emil Garcia MD   Cholecalciferol (VITAMIN D3) 50 MCG (2000 UT) CAPS Take 1 capsule by mouth daily    Emil Garcia MD   Ascorbic Acid 250 MG CHEW Take 250 mg by mouth daily     Emil Garcia MD   vitamin B-12 (CYANOCOBALAMIN) 1000 MCG

## 2024-10-08 NOTE — PATIENT INSTRUCTIONS
LOW SALT FOR BLOOD PRESSURE CONTROL.  LOW FAT DIET FOR CHOLESTEROL CONTROL.  DRINK ENOUGH FLUIDS FOR BETTER KIDNEY FUNCTION.  TAKE  AMLODIPINE 2.5 MG. DAILY, PRINIVIL/HCT 10/12.5  1 TAB. DAILY,TOPROL XL 25 MG. 1 TAB. DAILY FOR BLOOD PRESSURE CONTROL.  TAKE  ZOCOR 40 MG. 1 TAB. DAILY.  FOR CHOLESTEROL CONTROL..  TAKE  FEOSOL 1 TAB. 2 TIMES A DAY AND MULTIVITAMIN 1 TAB. DAILY TO IMPROVE BLOOD COUNT.  TAKE  PROSCAR 5 MG. DAILY FOR PROSTATE PROBLEM.  REGULAR WALKING ADVISED.  CONTINUE FOLLOW UP WITH DR. AGUILLON FOR KIDNEY PROBLEM.  CONTINUE FOLLOW UP WITH DR. PINTO FOR PROSTATE PROBLEM.  INJECTION FLU VACCINE GIVEN TODAY. CALL FOR  ANY ADVERSE REACTION.   NEXT APPOINTMENT IN 2 MONTHS FOR ANNUAL PHYSICAL EXAMINATION.

## 2024-10-25 LAB
25(OH)D3 SERPL-MCNC: 79.6 NG/ML (ref 30–100)
ALBUMIN SERPL-MCNC: 4.2 G/DL (ref 3.5–5.2)
ALP SERPL-CCNC: 79 U/L (ref 40–129)
ALT SERPL-CCNC: <5 U/L (ref 0–40)
ANION GAP SERPL CALCULATED.3IONS-SCNC: 14 MMOL/L (ref 7–16)
AST SERPL-CCNC: 10 U/L (ref 0–39)
BASOPHILS # BLD: 0.1 K/UL (ref 0–0.2)
BASOPHILS NFR BLD: 1 % (ref 0–2)
BILIRUB SERPL-MCNC: 0.4 MG/DL (ref 0–1.2)
BUN SERPL-MCNC: 34 MG/DL (ref 6–23)
CALCIUM SERPL-MCNC: 9.8 MG/DL (ref 8.6–10.2)
CHLORIDE SERPL-SCNC: 103 MMOL/L (ref 98–107)
CHOLEST SERPL-MCNC: 139 MG/DL
CO2 SERPL-SCNC: 27 MMOL/L (ref 22–29)
CREAT SERPL-MCNC: 1.8 MG/DL (ref 0.7–1.2)
EOSINOPHIL # BLD: 0.12 K/UL (ref 0.05–0.5)
EOSINOPHILS RELATIVE PERCENT: 1 % (ref 0–6)
ERYTHROCYTE [DISTWIDTH] IN BLOOD BY AUTOMATED COUNT: 13 % (ref 11.5–15)
GFR, ESTIMATED: 35 ML/MIN/1.73M2
GLUCOSE SERPL-MCNC: 112 MG/DL (ref 74–99)
HCT VFR BLD AUTO: 38.2 % (ref 37–54)
HDLC SERPL-MCNC: 39 MG/DL
HGB BLD-MCNC: 12.1 G/DL (ref 12.5–16.5)
IMM GRANULOCYTES # BLD AUTO: 0.03 K/UL (ref 0–0.58)
IMM GRANULOCYTES NFR BLD: 0 % (ref 0–5)
LDLC SERPL CALC-MCNC: 78 MG/DL
LYMPHOCYTES NFR BLD: 1.87 K/UL (ref 1.5–4)
LYMPHOCYTES RELATIVE PERCENT: 18 % (ref 20–42)
MAGNESIUM SERPL-MCNC: 2 MG/DL (ref 1.6–2.6)
MCH RBC QN AUTO: 31.2 PG (ref 26–35)
MCHC RBC AUTO-ENTMCNC: 31.7 G/DL (ref 32–34.5)
MCV RBC AUTO: 98.5 FL (ref 80–99.9)
MONOCYTES NFR BLD: 0.56 K/UL (ref 0.1–0.95)
MONOCYTES NFR BLD: 5 % (ref 2–12)
NEUTROPHILS NFR BLD: 74 % (ref 43–80)
NEUTS SEG NFR BLD: 7.72 K/UL (ref 1.8–7.3)
PHOSPHATE SERPL-MCNC: 3.2 MG/DL (ref 2.5–4.5)
PLATELET # BLD AUTO: 264 K/UL (ref 130–450)
PMV BLD AUTO: 10.9 FL (ref 7–12)
POTASSIUM SERPL-SCNC: 3.8 MMOL/L (ref 3.5–5)
PROT SERPL-MCNC: 7.4 G/DL (ref 6.4–8.3)
PTH-INTACT SERPL-MCNC: 71.7 PG/ML (ref 15–65)
RBC # BLD AUTO: 3.88 M/UL (ref 3.8–5.8)
SODIUM SERPL-SCNC: 144 MMOL/L (ref 132–146)
TRIGL SERPL-MCNC: 112 MG/DL
URATE SERPL-MCNC: 9.2 MG/DL (ref 3.4–7)
VLDLC SERPL CALC-MCNC: 22 MG/DL
WBC OTHER # BLD: 10.4 K/UL (ref 4.5–11.5)

## 2024-10-31 ENCOUNTER — APPOINTMENT (OUTPATIENT)
Dept: GENERAL RADIOLOGY | Age: 89
End: 2024-10-31
Payer: MEDICARE

## 2024-10-31 ENCOUNTER — APPOINTMENT (OUTPATIENT)
Dept: CT IMAGING | Age: 89
End: 2024-10-31
Payer: MEDICARE

## 2024-10-31 ENCOUNTER — HOSPITAL ENCOUNTER (EMERGENCY)
Age: 89
Discharge: HOME | End: 2024-10-31
Attending: EMERGENCY MEDICINE
Payer: MEDICARE

## 2024-10-31 VITALS
SYSTOLIC BLOOD PRESSURE: 134 MMHG | TEMPERATURE: 98 F | WEIGHT: 151 LBS | RESPIRATION RATE: 20 BRPM | OXYGEN SATURATION: 98 % | HEART RATE: 70 BPM | BODY MASS INDEX: 22.36 KG/M2 | DIASTOLIC BLOOD PRESSURE: 78 MMHG | HEIGHT: 69 IN

## 2024-10-31 DIAGNOSIS — R53.83 OTHER FATIGUE: ICD-10-CM

## 2024-10-31 DIAGNOSIS — N39.0 URINARY TRACT INFECTION ASSOCIATED WITH INDWELLING URETHRAL CATHETER, INITIAL ENCOUNTER (HCC): Primary | ICD-10-CM

## 2024-10-31 DIAGNOSIS — T83.511A URINARY TRACT INFECTION ASSOCIATED WITH INDWELLING URETHRAL CATHETER, INITIAL ENCOUNTER (HCC): Primary | ICD-10-CM

## 2024-10-31 LAB
ALBUMIN SERPL-MCNC: 4.1 G/DL (ref 3.5–5.2)
ALP SERPL-CCNC: 73 U/L (ref 40–129)
ALT SERPL-CCNC: 7 U/L (ref 0–40)
ANION GAP SERPL CALCULATED.3IONS-SCNC: 11 MMOL/L (ref 7–16)
AST SERPL-CCNC: 10 U/L (ref 0–39)
BACTERIA URNS QL MICRO: ABNORMAL
BASOPHILS # BLD: 0.05 K/UL (ref 0–0.2)
BASOPHILS NFR BLD: 1 % (ref 0–2)
BILIRUB DIRECT SERPL-MCNC: <0.2 MG/DL (ref 0–0.3)
BILIRUB INDIRECT SERPL-MCNC: NORMAL MG/DL (ref 0–1)
BILIRUB SERPL-MCNC: 0.5 MG/DL (ref 0–1.2)
BILIRUB UR QL STRIP: NEGATIVE
BUN SERPL-MCNC: 30 MG/DL (ref 6–23)
CALCIUM SERPL-MCNC: 9.4 MG/DL (ref 8.6–10.2)
CHLORIDE SERPL-SCNC: 103 MMOL/L (ref 98–107)
CLARITY UR: CLEAR
CO2 SERPL-SCNC: 28 MMOL/L (ref 22–29)
COLOR UR: YELLOW
CREAT SERPL-MCNC: 1.6 MG/DL (ref 0.7–1.2)
EKG ATRIAL RATE: 62 BPM
EKG Q-T INTERVAL: 234 MS
EKG QRS DURATION: 126 MS
EKG QTC CALCULATION (BAZETT): 327 MS
EKG R AXIS: -52 DEGREES
EKG T AXIS: 147 DEGREES
EKG VENTRICULAR RATE: 118 BPM
EOSINOPHIL # BLD: 0.06 K/UL (ref 0.05–0.5)
EOSINOPHILS RELATIVE PERCENT: 1 % (ref 0–6)
ERYTHROCYTE [DISTWIDTH] IN BLOOD BY AUTOMATED COUNT: 12.3 % (ref 11.5–15)
FLUAV RNA RESP QL NAA+PROBE: NOT DETECTED
FLUBV RNA RESP QL NAA+PROBE: NOT DETECTED
GFR, ESTIMATED: 40 ML/MIN/1.73M2
GLUCOSE SERPL-MCNC: 106 MG/DL (ref 74–99)
GLUCOSE UR STRIP-MCNC: NEGATIVE MG/DL
HCT VFR BLD AUTO: 36.6 % (ref 37–54)
HGB BLD-MCNC: 12.2 G/DL (ref 12.5–16.5)
HGB UR QL STRIP.AUTO: ABNORMAL
IMM GRANULOCYTES # BLD AUTO: 0.03 K/UL (ref 0–0.58)
IMM GRANULOCYTES NFR BLD: 0 % (ref 0–5)
KETONES UR STRIP-MCNC: NEGATIVE MG/DL
LACTATE BLDV-SCNC: 1.1 MMOL/L (ref 0.5–2.2)
LEUKOCYTE ESTERASE UR QL STRIP: ABNORMAL
LIPASE SERPL-CCNC: 26 U/L (ref 13–60)
LYMPHOCYTES NFR BLD: 2.05 K/UL (ref 1.5–4)
LYMPHOCYTES RELATIVE PERCENT: 23 % (ref 20–42)
MAGNESIUM SERPL-MCNC: 1.8 MG/DL (ref 1.6–2.6)
MCH RBC QN AUTO: 32.1 PG (ref 26–35)
MCHC RBC AUTO-ENTMCNC: 33.3 G/DL (ref 32–34.5)
MCV RBC AUTO: 96.3 FL (ref 80–99.9)
MONOCYTES NFR BLD: 0.53 K/UL (ref 0.1–0.95)
MONOCYTES NFR BLD: 6 % (ref 2–12)
NEUTROPHILS NFR BLD: 69 % (ref 43–80)
NEUTS SEG NFR BLD: 6.17 K/UL (ref 1.8–7.3)
NITRITE UR QL STRIP: POSITIVE
PH UR STRIP: 7.5 [PH] (ref 5–9)
PLATELET # BLD AUTO: 225 K/UL (ref 130–450)
PMV BLD AUTO: 10.3 FL (ref 7–12)
POTASSIUM SERPL-SCNC: 3.8 MMOL/L (ref 3.5–5)
PROT SERPL-MCNC: 6.7 G/DL (ref 6.4–8.3)
PROT UR STRIP-MCNC: 100 MG/DL
RBC # BLD AUTO: 3.8 M/UL (ref 3.8–5.8)
RBC #/AREA URNS HPF: ABNORMAL /HPF
SARS-COV-2 RNA RESP QL NAA+PROBE: NOT DETECTED
SODIUM SERPL-SCNC: 142 MMOL/L (ref 132–146)
SOURCE: NORMAL
SP GR UR STRIP: 1.02 (ref 1–1.03)
SPECIMEN DESCRIPTION: NORMAL
TROPONIN I SERPL HS-MCNC: 26 NG/L (ref 0–11)
UROBILINOGEN UR STRIP-ACNC: 0.2 EU/DL (ref 0–1)
WBC #/AREA URNS HPF: ABNORMAL /HPF
WBC OTHER # BLD: 8.9 K/UL (ref 4.5–11.5)

## 2024-10-31 PROCEDURE — 96374 THER/PROPH/DIAG INJ IV PUSH: CPT

## 2024-10-31 PROCEDURE — 83605 ASSAY OF LACTIC ACID: CPT

## 2024-10-31 PROCEDURE — 80053 COMPREHEN METABOLIC PANEL: CPT

## 2024-10-31 PROCEDURE — 93010 ELECTROCARDIOGRAM REPORT: CPT | Performed by: INTERNAL MEDICINE

## 2024-10-31 PROCEDURE — 83690 ASSAY OF LIPASE: CPT

## 2024-10-31 PROCEDURE — 6360000002 HC RX W HCPCS: Performed by: EMERGENCY MEDICINE

## 2024-10-31 PROCEDURE — 85025 COMPLETE CBC W/AUTO DIFF WBC: CPT

## 2024-10-31 PROCEDURE — 99285 EMERGENCY DEPT VISIT HI MDM: CPT

## 2024-10-31 PROCEDURE — 84484 ASSAY OF TROPONIN QUANT: CPT

## 2024-10-31 PROCEDURE — 82248 BILIRUBIN DIRECT: CPT

## 2024-10-31 PROCEDURE — 83735 ASSAY OF MAGNESIUM: CPT

## 2024-10-31 PROCEDURE — 87636 SARSCOV2 & INF A&B AMP PRB: CPT

## 2024-10-31 PROCEDURE — 2580000003 HC RX 258: Performed by: EMERGENCY MEDICINE

## 2024-10-31 PROCEDURE — 81001 URINALYSIS AUTO W/SCOPE: CPT

## 2024-10-31 PROCEDURE — 70450 CT HEAD/BRAIN W/O DYE: CPT

## 2024-10-31 PROCEDURE — 93005 ELECTROCARDIOGRAM TRACING: CPT | Performed by: EMERGENCY MEDICINE

## 2024-10-31 PROCEDURE — 71046 X-RAY EXAM CHEST 2 VIEWS: CPT

## 2024-10-31 RX ORDER — 0.9 % SODIUM CHLORIDE 0.9 %
1000 INTRAVENOUS SOLUTION INTRAVENOUS ONCE
Status: COMPLETED | OUTPATIENT
Start: 2024-10-31 | End: 2024-10-31

## 2024-10-31 RX ORDER — CEFDINIR 300 MG/1
300 CAPSULE ORAL DAILY
Qty: 10 CAPSULE | Refills: 0 | Status: SHIPPED | OUTPATIENT
Start: 2024-10-31 | End: 2024-11-10

## 2024-10-31 RX ADMIN — SODIUM CHLORIDE 1000 ML: 9 INJECTION, SOLUTION INTRAVENOUS at 14:54

## 2024-10-31 RX ADMIN — WATER 1000 MG: 1 INJECTION INTRAMUSCULAR; INTRAVENOUS; SUBCUTANEOUS at 17:18

## 2024-10-31 ASSESSMENT — PAIN SCALES - GENERAL: PAINLEVEL_OUTOF10: 5

## 2024-10-31 ASSESSMENT — PAIN DESCRIPTION - LOCATION: LOCATION: HIP

## 2024-10-31 ASSESSMENT — PAIN DESCRIPTION - DESCRIPTORS: DESCRIPTORS: ACHING

## 2024-10-31 ASSESSMENT — PAIN DESCRIPTION - FREQUENCY: FREQUENCY: INTERMITTENT

## 2024-10-31 ASSESSMENT — PAIN DESCRIPTION - ORIENTATION: ORIENTATION: LEFT

## 2024-10-31 NOTE — ED NOTES
Patient was able to ambulate own the hallway and back with no problem. States he feels good except for a little bit of hip pain

## 2024-10-31 NOTE — ED PROVIDER NOTES
[facility] EMERGENCY DEPARTMENT  EMERGENCY DEPARTMENT ENCOUNTER        Pt Name: Kyle Fox  MRN: [id number]  Birthdate 6/2/1932  Date of evaluation: 10/31/2024  Provider: Ayush Perdomo DO  PCP: Freddy Etienne MD  Note Started: 2:19 PM EDT 10/31/24    CHIEF COMPLAINT       Chief Complaint   Patient presents with    Fatigue     Patient is from home and comes in with increased weakness since yesterday.        HISTORY OF PRESENT ILLNESS: 1 or more Elements   History From: patient    Limitations to history : None    Kyle Fox is a 89 y.o. male who presents to the ED for evaluation of fatigue.  Patient denies any associate chest pain, shortness breath, abdominal pain, nausea, vomiting, or diarrhea.  States he just feels really fatigued.  States he is able to ambulate without difficulty and has not had any falls.  No head injuries.  He does report head fullness.  Denies any cough or chest congestion.  Patient has a suprapubic catheter.  States that occasional leaks and has been doing that for years.  Has not noticed any change in the odor of the urine or blood in the urine.  No fever or chills.  No sick contacts.  Patient states he just feels off.    Nursing Notes were all reviewed and agreed with or any disagreements were addressed in the HPI.      REVIEW OF EXTERNAL NOTE :        REVIEW OF SYSTEMS :           Positives and Pertinent negatives as per HPI.     SURGICAL HISTORY     Past Surgical History:   Procedure Laterality Date    A-V CARDIAC PACEMAKER INSERTION  5/30/07    COLONOSCOPY  8/3/11    CYSTOSCOPY N/A 11/2/2021    CYSTOSCOPY RETROGRADE PYELOGRAM, TRANSURETHRAL RESECTION OF THE PROSTATE WITH SUPRAPUBIC CATHETER PLACEMENT performed by Ham Armstrong MD at [facility] OR    EYE SURGERY  2002    cataracts evelyn    PACEMAKER PLACEMENT      2017 battery change    TONSILLECTOMY      child       CURRENTMEDICATIONS       Previous Medications    AMLODIPINE (NORVASC) 2.5 MG TABLET  What was discussed)  None        I am the Primary Clinician of Record.    FINAL IMPRESSION      1. Urinary tract infection associated with indwelling urethral catheter, initial encounter ([facility])    2. Other fatigue          DISPOSITION/PLAN     DISPOSITION Decision To Discharge 10/31/2024 06:50:21 PM      PATIENT REFERRED TO:  Freddy Etienne MD  [address] [facility]  [place] [address]  [phone]    Call         DISCHARGE MEDICATIONS:  New Prescriptions    CEFDINIR (OMNICEF) 300 MG CAPSULE    Take 1 capsule by mouth daily for 10 days       DISCONTINUED MEDICATIONS:  Discontinued Medications    No medications on file              (Please note that portions of this note were completed with a voice recognition program.  Efforts were made to edit the dictations but occasionally words are mis-transcribed.)    Ayush Perdomo DO (electronically signed)           Ayush Perdomo DO  10/31/24 [id number]

## 2024-12-11 ENCOUNTER — OFFICE VISIT (OUTPATIENT)
Dept: FAMILY MEDICINE CLINIC | Age: 88
End: 2024-12-11

## 2024-12-11 VITALS
HEART RATE: 61 BPM | DIASTOLIC BLOOD PRESSURE: 64 MMHG | SYSTOLIC BLOOD PRESSURE: 122 MMHG | OXYGEN SATURATION: 98 % | BODY MASS INDEX: 22.45 KG/M2 | WEIGHT: 152 LBS

## 2024-12-11 DIAGNOSIS — I10 ESSENTIAL HYPERTENSION: ICD-10-CM

## 2024-12-11 DIAGNOSIS — D50.0 IRON DEFICIENCY ANEMIA DUE TO CHRONIC BLOOD LOSS: ICD-10-CM

## 2024-12-11 DIAGNOSIS — Z93.59 SUPRAPUBIC CATHETER (HCC): ICD-10-CM

## 2024-12-11 DIAGNOSIS — N18.4 STAGE 4 CHRONIC KIDNEY DISEASE (HCC): ICD-10-CM

## 2024-12-11 DIAGNOSIS — Z00.00 MEDICARE ANNUAL WELLNESS VISIT, SUBSEQUENT: Primary | ICD-10-CM

## 2024-12-11 DIAGNOSIS — E78.00 PURE HYPERCHOLESTEROLEMIA: ICD-10-CM

## 2024-12-11 DIAGNOSIS — I50.22 CHRONIC SYSTOLIC (CONGESTIVE) HEART FAILURE (HCC): ICD-10-CM

## 2024-12-11 RX ORDER — AMLODIPINE BESYLATE 2.5 MG/1
2.5 TABLET ORAL DAILY
Qty: 90 TABLET | Refills: 1 | Status: SHIPPED | OUTPATIENT
Start: 2024-12-11

## 2024-12-11 RX ORDER — LISINOPRIL AND HYDROCHLOROTHIAZIDE 10; 12.5 MG/1; MG/1
1 TABLET ORAL DAILY
Qty: 90 TABLET | Refills: 0 | Status: SHIPPED | OUTPATIENT
Start: 2024-12-11

## 2024-12-11 RX ORDER — METOPROLOL SUCCINATE 25 MG/1
25 TABLET, EXTENDED RELEASE ORAL DAILY
Qty: 90 TABLET | Refills: 0 | Status: SHIPPED | OUTPATIENT
Start: 2024-12-11

## 2024-12-11 RX ORDER — SIMVASTATIN 40 MG
40 TABLET ORAL NIGHTLY
Qty: 90 TABLET | Refills: 1 | Status: SHIPPED | OUTPATIENT
Start: 2024-12-11

## 2024-12-11 RX ORDER — PANTOPRAZOLE SODIUM 40 MG/1
40 TABLET, DELAYED RELEASE ORAL DAILY
Qty: 90 TABLET | Refills: 1 | Status: SHIPPED | OUTPATIENT
Start: 2024-12-11

## 2024-12-11 RX ORDER — TIMOLOL MALEATE 5 MG/ML
1 SOLUTION/ DROPS OPHTHALMIC 2 TIMES DAILY
Qty: 5 ML | Refills: 0 | Status: SHIPPED | OUTPATIENT
Start: 2024-12-11

## 2024-12-11 ASSESSMENT — PATIENT HEALTH QUESTIONNAIRE - PHQ9
SUM OF ALL RESPONSES TO PHQ QUESTIONS 1-9: 0
SUM OF ALL RESPONSES TO PHQ QUESTIONS 1-9: 0
2. FEELING DOWN, DEPRESSED OR HOPELESS: NOT AT ALL
SUM OF ALL RESPONSES TO PHQ9 QUESTIONS 1 & 2: 0
1. LITTLE INTEREST OR PLEASURE IN DOING THINGS: NOT AT ALL
SUM OF ALL RESPONSES TO PHQ QUESTIONS 1-9: 0
SUM OF ALL RESPONSES TO PHQ QUESTIONS 1-9: 0

## 2024-12-11 ASSESSMENT — LIFESTYLE VARIABLES
HOW MANY STANDARD DRINKS CONTAINING ALCOHOL DO YOU HAVE ON A TYPICAL DAY: PATIENT DOES NOT DRINK
HOW MANY STANDARD DRINKS CONTAINING ALCOHOL DO YOU HAVE ON A TYPICAL DAY: PATIENT DOES NOT DRINK
HOW OFTEN DO YOU HAVE A DRINK CONTAINING ALCOHOL: NEVER

## 2024-12-11 NOTE — PATIENT INSTRUCTIONS
swimming.  Always wear a seat belt when traveling in a car. Always wear a helmet when riding a bicycle or motorcycle.

## 2024-12-11 NOTE — PROGRESS NOTES
Medicare Annual Wellness Visit    Kyle Fox is here for Medicare AWV    Assessment & Plan   Medicare annual wellness visit, subsequent  Pure hypercholesterolemia  Essential hypertension  Chronic systolic (congestive) heart failure (HCC)  Iron deficiency anemia due to chronic blood loss  Stage 4 chronic kidney disease (HCC)  Suprapubic catheter (HCC)    Recommendations for Preventive Services Due: see orders and patient instructions/AVS.  Recommended screening schedule for the next 5-10 years is provided to the patient in written form: see Patient Instructions/AVS.     Return in 2 months (on 2025) for FOLLOW UP VISIT AND  Medicare Annual Wellness Visit in 1 year..     Subjective   The following acute and/or chronic problems were also addressed today:  AS LISTED ABOVE    Patient's complete Health Risk Assessment and screening values have been reviewed and are found in Flowsheets. The following problems were reviewed today and where indicated follow up appointments were made and/or referrals ordered.    Positive Risk Factor Screenings with Interventions:             General HRA Questions:  Select all that apply: (!) Loneliness  Interventions - Loneliness:  Patient comments: WIFE  RECENTLY. TAKEN CARE OF BY DAUGHTER BUT NOT WITH HER ALL THE TIME.  See above      Inactivity:  On average, how many days per week do you engage in moderate to strenuous exercise (like a brisk walk)?: 0 days (!) Abnormal  On average, how many minutes do you engage in exercise at this level?: 0 min  Interventions:  Patient comments: AMBULATING IN HOUSE AS TOLERATED.  Patient declined any further interventions or treatment      Dentist Screen:  Have you seen the dentist within the past year?: (!) No    Intervention:  Advised to schedule with their dentist    Hearing Screen:  Do you or your family notice any trouble with your hearing that hasn't been managed with hearing aids?: (!) Yes    Interventions:  Patient comments: DOES HAVE

## 2025-02-05 ENCOUNTER — OFFICE VISIT (OUTPATIENT)
Dept: FAMILY MEDICINE CLINIC | Age: 89
End: 2025-02-05
Payer: MEDICARE

## 2025-02-05 VITALS
HEART RATE: 58 BPM | DIASTOLIC BLOOD PRESSURE: 60 MMHG | SYSTOLIC BLOOD PRESSURE: 110 MMHG | OXYGEN SATURATION: 96 % | BODY MASS INDEX: 22.45 KG/M2 | WEIGHT: 152 LBS

## 2025-02-05 DIAGNOSIS — N30.00 ACUTE CYSTITIS WITHOUT HEMATURIA: ICD-10-CM

## 2025-02-05 DIAGNOSIS — I10 ESSENTIAL HYPERTENSION: ICD-10-CM

## 2025-02-05 DIAGNOSIS — E78.00 PURE HYPERCHOLESTEROLEMIA: Primary | ICD-10-CM

## 2025-02-05 DIAGNOSIS — D50.0 IRON DEFICIENCY ANEMIA DUE TO CHRONIC BLOOD LOSS: ICD-10-CM

## 2025-02-05 DIAGNOSIS — I50.22 CHRONIC SYSTOLIC (CONGESTIVE) HEART FAILURE (HCC): ICD-10-CM

## 2025-02-05 DIAGNOSIS — Z93.59 SUPRAPUBIC CATHETER (HCC): ICD-10-CM

## 2025-02-05 DIAGNOSIS — N18.4 STAGE 4 CHRONIC KIDNEY DISEASE (HCC): ICD-10-CM

## 2025-02-05 LAB
BASOPHILS ABSOLUTE: 0.09 K/UL (ref 0–0.2)
BASOPHILS RELATIVE PERCENT: 1 % (ref 0–2)
BILIRUBIN, POC: NORMAL
BLOOD URINE, POC: NORMAL
CLARITY, POC: NORMAL
COLOR, POC: NORMAL
EOSINOPHILS ABSOLUTE: 0.07 K/UL (ref 0.05–0.5)
EOSINOPHILS RELATIVE PERCENT: 1 % (ref 0–6)
GLUCOSE URINE, POC: NORMAL MG/DL
HCT VFR BLD CALC: 37.9 % (ref 37–54)
HEMOGLOBIN: 12.4 G/DL (ref 12.5–16.5)
IMMATURE GRANULOCYTES %: 0 % (ref 0–5)
IMMATURE GRANULOCYTES ABSOLUTE: 0.03 K/UL (ref 0–0.58)
KETONES, POC: NORMAL MG/DL
LEUKOCYTE EST, POC: NORMAL
LYMPHOCYTES ABSOLUTE: 2.73 K/UL (ref 1.5–4)
LYMPHOCYTES RELATIVE PERCENT: 25 % (ref 20–42)
MCH RBC QN AUTO: 31.4 PG (ref 26–35)
MCHC RBC AUTO-ENTMCNC: 32.7 G/DL (ref 32–34.5)
MCV RBC AUTO: 95.9 FL (ref 80–99.9)
MONOCYTES ABSOLUTE: 0.91 K/UL (ref 0.1–0.95)
MONOCYTES RELATIVE PERCENT: 8 % (ref 2–12)
NEUTROPHILS ABSOLUTE: 7.22 K/UL (ref 1.8–7.3)
NEUTROPHILS RELATIVE PERCENT: 65 % (ref 43–80)
NITRITE, POC: NORMAL
PDW BLD-RTO: 12.8 % (ref 11.5–15)
PH, POC: 6.5
PLATELET # BLD: 291 K/UL (ref 130–450)
PMV BLD AUTO: 10.8 FL (ref 7–12)
PROTEIN, POC: >300 MG/DL
RBC # BLD: 3.95 M/UL (ref 3.8–5.8)
SPECIFIC GRAVITY, POC: 1.02
UROBILINOGEN, POC: 0.2 MG/DL
WBC # BLD: 11.1 K/UL (ref 4.5–11.5)

## 2025-02-05 PROCEDURE — 1160F RVW MEDS BY RX/DR IN RCRD: CPT | Performed by: FAMILY MEDICINE

## 2025-02-05 PROCEDURE — 99214 OFFICE O/P EST MOD 30 MIN: CPT | Performed by: FAMILY MEDICINE

## 2025-02-05 PROCEDURE — 1123F ACP DISCUSS/DSCN MKR DOCD: CPT | Performed by: FAMILY MEDICINE

## 2025-02-05 PROCEDURE — 81002 URINALYSIS NONAUTO W/O SCOPE: CPT | Performed by: FAMILY MEDICINE

## 2025-02-05 PROCEDURE — 1159F MED LIST DOCD IN RCRD: CPT | Performed by: FAMILY MEDICINE

## 2025-02-05 RX ORDER — CIPROFLOXACIN 250 MG/1
250 TABLET, FILM COATED ORAL 2 TIMES DAILY
Qty: 20 TABLET | Refills: 0 | Status: SHIPPED | OUTPATIENT
Start: 2025-02-05 | End: 2025-02-15

## 2025-02-05 ASSESSMENT — PATIENT HEALTH QUESTIONNAIRE - PHQ9
SUM OF ALL RESPONSES TO PHQ QUESTIONS 1-9: 0
SUM OF ALL RESPONSES TO PHQ QUESTIONS 1-9: 0
SUM OF ALL RESPONSES TO PHQ9 QUESTIONS 1 & 2: 0
1. LITTLE INTEREST OR PLEASURE IN DOING THINGS: NOT AT ALL
SUM OF ALL RESPONSES TO PHQ QUESTIONS 1-9: 0
SUM OF ALL RESPONSES TO PHQ QUESTIONS 1-9: 0
2. FEELING DOWN, DEPRESSED OR HOPELESS: NOT AT ALL

## 2025-02-05 NOTE — PATIENT INSTRUCTIONS
LOW SALT FOR BLOOD PRESSURE CONTROL.  LOW FAT DIET FOR CHOLESTEROL CONTROL.  DRINK ENOUGH FLUIDS FOR BETTER KIDNEY FUNCTION.  TAKE  AMLODIPINE 2.5 MG. DAILY, PRINIVIL/HCT 10/12.5  1 TAB. DAILY,TOPROL XL 25 MG. 1 TAB. DAILY FOR BLOOD PRESSURE CONTROL.  TAKE  ZOCOR 40 MG. 1 TAB. DAILY.  FOR CHOLESTEROL CONTROL..  TAKE  FEOSOL 1 TAB. 2 TIMES A DAY AND MULTIVITAMIN 1 TAB. DAILY TO IMPROVE BLOOD COUNT.  TAKE CIPRO 250 MG. 2 TIMES A DAY FOR URINE INFECTION.  REGULAR WALKING ADVISED.  CONTINUE FOLLOW UP WITH DR. AGUILLON FOR KIDNEY PROBLEM.  CONTINUE FOLLOW UP WITH DR. PINTO FOR PROSTATE PROBLEM.  NEXT APPOINTMENT IN 1 MONTH.

## 2025-02-05 NOTE — PROGRESS NOTES
OFFICE PROGRESS NOTE      SUBJECTIVE:        Patient ID:   Kyle Fox is a 89 y.o. male who presents for   Chief Complaint   Patient presents with    Urinary Tract Infection           HPI:     RECHECK BP, CHOLESTEROL, CKD AND CHRONIC CHF.  SUPRAPUBIC CATHETER DRAINING  CLOUDY URINE FOR PAST 2 DAYS. ALSO HAD LOW GRADE FEVER FOR 1 DAY.   MEDICATION REFILL.  FEELS GOOD OTHERWISE.   WATCHING DIET GOOD.  NO  EXERCISE.  TAKING MEDICATIONS REGULARLY.         Prior to Admission medications    Medication Sig Start Date End Date Taking? Authorizing Provider   ciprofloxacin (CIPRO) 250 MG tablet Take 1 tablet by mouth 2 times daily for 10 days 2/5/25 2/15/25 Yes Freddy Etienne MD   lisinopril-hydroCHLOROthiazide (PRINZIDE;ZESTORETIC) 10-12.5 MG per tablet Take 1 tablet by mouth daily 12/11/24  Yes Freddy Etienne MD   amLODIPine (NORVASC) 2.5 MG tablet Take 1 tablet by mouth daily 12/11/24  Yes Freddy Etienne MD   Magnesium Oxide -Mg Supplement 200 MG TABS Take 200 mg by mouth daily 12/11/24  Yes Freddy Etienne MD   metoprolol succinate (TOPROL XL) 25 MG extended release tablet Take 1 tablet by mouth daily 12/11/24  Yes Freddy Etienne MD   pantoprazole (PROTONIX) 40 MG tablet Take 1 tablet by mouth daily 12/11/24  Yes Freddy Etienne MD   Fesoterodine Fumarate ER 8 MG TB24 Take 1 tablet by mouth nightly at bedtime 2/14/24  Yes Emil Garcia MD   ferrous sulfate (IRON 325) 325 (65 Fe) MG tablet Take 1 tablet by mouth 2 times daily Indications: DO NOT resume this medication until cleared by PCP and/or nephrologist 9/10/21  Yes Freddy Etienne MD   Multiple Vitamin (ONE-A-DAY ESSENTIAL) TABS Take 1 tablet by mouth daily   Yes Emil Garcia MD   Cholecalciferol (VITAMIN D3) 50 MCG (2000 UT) CAPS Take 1 capsule by mouth daily   Yes Emil Garcia MD   Ascorbic Acid 250 MG CHEW Take 250 mg by mouth daily    Yes Emil Garcia MD   Misc Natural Products

## 2025-02-09 LAB
CULTURE: ABNORMAL
CULTURE: ABNORMAL
SPECIMEN DESCRIPTION: ABNORMAL

## 2025-02-10 LAB
CULTURE: ABNORMAL
CULTURE: ABNORMAL
SPECIMEN DESCRIPTION: ABNORMAL

## 2025-02-19 ENCOUNTER — OFFICE VISIT (OUTPATIENT)
Dept: FAMILY MEDICINE CLINIC | Age: 89
End: 2025-02-19
Payer: MEDICARE

## 2025-02-19 VITALS
DIASTOLIC BLOOD PRESSURE: 60 MMHG | HEIGHT: 69 IN | HEART RATE: 62 BPM | SYSTOLIC BLOOD PRESSURE: 106 MMHG | RESPIRATION RATE: 16 BRPM | WEIGHT: 150 LBS | TEMPERATURE: 98.2 F | BODY MASS INDEX: 22.22 KG/M2

## 2025-02-19 DIAGNOSIS — N18.4 STAGE 4 CHRONIC KIDNEY DISEASE (HCC): ICD-10-CM

## 2025-02-19 DIAGNOSIS — Z93.59 SUPRAPUBIC CATHETER (HCC): ICD-10-CM

## 2025-02-19 DIAGNOSIS — I10 ESSENTIAL HYPERTENSION: ICD-10-CM

## 2025-02-19 DIAGNOSIS — D50.0 IRON DEFICIENCY ANEMIA DUE TO CHRONIC BLOOD LOSS: ICD-10-CM

## 2025-02-19 DIAGNOSIS — M25.552 PAIN OF BOTH HIP JOINTS: ICD-10-CM

## 2025-02-19 DIAGNOSIS — M25.551 PAIN OF BOTH HIP JOINTS: ICD-10-CM

## 2025-02-19 DIAGNOSIS — I50.22 CHRONIC SYSTOLIC (CONGESTIVE) HEART FAILURE (HCC): ICD-10-CM

## 2025-02-19 DIAGNOSIS — E78.00 PURE HYPERCHOLESTEROLEMIA: Primary | ICD-10-CM

## 2025-02-19 PROCEDURE — 99214 OFFICE O/P EST MOD 30 MIN: CPT | Performed by: FAMILY MEDICINE

## 2025-02-19 PROCEDURE — 1159F MED LIST DOCD IN RCRD: CPT | Performed by: FAMILY MEDICINE

## 2025-02-19 PROCEDURE — 1126F AMNT PAIN NOTED NONE PRSNT: CPT | Performed by: FAMILY MEDICINE

## 2025-02-19 PROCEDURE — 1160F RVW MEDS BY RX/DR IN RCRD: CPT | Performed by: FAMILY MEDICINE

## 2025-02-19 PROCEDURE — 1123F ACP DISCUSS/DSCN MKR DOCD: CPT | Performed by: FAMILY MEDICINE

## 2025-02-19 SDOH — ECONOMIC STABILITY: FOOD INSECURITY: WITHIN THE PAST 12 MONTHS, THE FOOD YOU BOUGHT JUST DIDN'T LAST AND YOU DIDN'T HAVE MONEY TO GET MORE.: NEVER TRUE

## 2025-02-19 SDOH — ECONOMIC STABILITY: FOOD INSECURITY: WITHIN THE PAST 12 MONTHS, YOU WORRIED THAT YOUR FOOD WOULD RUN OUT BEFORE YOU GOT MONEY TO BUY MORE.: NEVER TRUE

## 2025-02-19 NOTE — PROGRESS NOTES
OFFICE PROGRESS NOTE      SUBJECTIVE:        Patient ID:   Kyle Fox is a 89 y.o. male who presents for   Chief Complaint   Patient presents with    Follow-up    Hip Pain     Pt c/o back of left hip pain for about a week.     Discuss Labs           HPI:     RECHECK BP, CHOLESTEROL AND CHRONIC CHF.  HAVING PAIN IN BOTH HIPS WHEN SITTING DOWN FOR ABOUT 1 WEEK. NOT AMBULATING MUCH IN HOUSE. WILL TRY WALKING MORE AS RECOMMENDED.  SEEING DR. AGUILLON NEXT MONTH FOR KIDNEY FAILURE FOLLOW UP.  MEDICATION REFILL.  WATCHING DIET GOOD.  WALKING SOME IN HOUSE FOR EXERCISE.  TAKING MEDICATIONS REGULARLY.         Prior to Admission medications    Medication Sig Start Date End Date Taking? Authorizing Provider   timolol (TIMOPTIC) 0.5 % ophthalmic solution Place 1 drop into both eyes 2 times daily 12/11/24  Yes Freddy Etienne MD   lisinopril-hydroCHLOROthiazide (PRINZIDE;ZESTORETIC) 10-12.5 MG per tablet Take 1 tablet by mouth daily 12/11/24  Yes Freddy Etienne MD   amLODIPine (NORVASC) 2.5 MG tablet Take 1 tablet by mouth daily 12/11/24  Yes Freddy Etienne MD   Magnesium Oxide -Mg Supplement 200 MG TABS Take 200 mg by mouth daily 12/11/24  Yes Freddy Etienne MD   metoprolol succinate (TOPROL XL) 25 MG extended release tablet Take 1 tablet by mouth daily 12/11/24  Yes Freddy Etienne MD   pantoprazole (PROTONIX) 40 MG tablet Take 1 tablet by mouth daily 12/11/24  Yes Freddy Etienne MD   simvastatin (ZOCOR) 40 MG tablet Take 1 tablet by mouth nightly 12/11/24  Yes Freddy Etienne MD   Fesoterodine Fumarate ER 8 MG TB24 Take 1 tablet by mouth nightly at bedtime 2/14/24  Yes ProviderEmil MD   ferrous sulfate (IRON 325) 325 (65 Fe) MG tablet Take 1 tablet by mouth 2 times daily Indications: DO NOT resume this medication until cleared by PCP and/or nephrologist 9/10/21  Yes Freddy Etienne MD   Multiple Vitamin (ONE-A-DAY ESSENTIAL) TABS Take 1 tablet by mouth daily   Yes

## 2025-02-19 NOTE — PATIENT INSTRUCTIONS
LOW SALT FOR BLOOD PRESSURE CONTROL.  LOW FAT DIET FOR CHOLESTEROL CONTROL.  DRINK ENOUGH FLUIDS FOR BETTER KIDNEY FUNCTION.  TAKE  AMLODIPINE 2.5 MG. DAILY, PRINIVIL/HCT 10/12.5  1 TAB. DAILY,TOPROL XL 25 MG. 1 TAB. DAILY FOR BLOOD PRESSURE CONTROL.  TAKE  ZOCOR 40 MG. 1 TAB. DAILY.  FOR CHOLESTEROL CONTROL..  TAKE  FEOSOL 1 TAB. 2 TIMES A DAY AND MULTIVITAMIN 1 TAB. DAILY TO IMPROVE BLOOD COUNT.  TAKE CELEBREX 100 MG. DAILY AS NEEDED FOR HIP PAINS.  REGULAR WALKING ADVISED.  CONTINUE FOLLOW UP WITH DR. AGUILLON FOR KIDNEY PROBLEM.  CONTINUE FOLLOW UP WITH DR. PINTO FOR PROSTATE PROBLEM.  NEXT APPOINTMENT IN 2 MONTHS.

## 2025-02-20 ENCOUNTER — TELEPHONE (OUTPATIENT)
Dept: FAMILY MEDICINE CLINIC | Age: 89
End: 2025-02-20

## 2025-02-20 RX ORDER — CELECOXIB 100 MG/1
100 CAPSULE ORAL DAILY PRN
Qty: 30 CAPSULE | Refills: 1 | Status: SHIPPED | OUTPATIENT
Start: 2025-02-20

## 2025-02-20 NOTE — TELEPHONE ENCOUNTER
Pt daughter Savi called to advise pt was told to take Celebrex 100 mg but the prescription was not called to [facility]. They would like prescription sent to the pharmacy.    Last seen 2/19/2025  Next appt 4/22/2025

## 2025-03-13 ENCOUNTER — APPOINTMENT (OUTPATIENT)
Dept: CT IMAGING | Age: 89
End: 2025-03-13
Payer: MEDICARE

## 2025-03-13 ENCOUNTER — HOSPITAL ENCOUNTER (OUTPATIENT)
Age: 89
Setting detail: OBSERVATION
LOS: 1 days | Discharge: HOME HEALTH | End: 2025-03-14
Attending: STUDENT IN AN ORGANIZED HEALTH CARE EDUCATION/TRAINING PROGRAM | Admitting: FAMILY MEDICINE
Payer: MEDICARE

## 2025-03-13 ENCOUNTER — APPOINTMENT (OUTPATIENT)
Dept: GENERAL RADIOLOGY | Age: 89
End: 2025-03-13
Payer: MEDICARE

## 2025-03-13 DIAGNOSIS — R55 NEAR SYNCOPE: Primary | ICD-10-CM

## 2025-03-13 PROBLEM — E86.0 DEHYDRATION: Status: ACTIVE | Noted: 2025-03-13

## 2025-03-13 PROBLEM — E87.0 HYPERNATREMIA: Status: ACTIVE | Noted: 2025-03-13

## 2025-03-13 LAB
ALBUMIN SERPL-MCNC: 3.7 G/DL (ref 3.5–5.2)
ALP SERPL-CCNC: 75 U/L (ref 40–129)
ALT SERPL-CCNC: 11 U/L (ref 0–40)
ANION GAP SERPL CALCULATED.3IONS-SCNC: 13 MMOL/L (ref 7–16)
AST SERPL-CCNC: 11 U/L (ref 0–39)
BACTERIA URNS QL MICRO: ABNORMAL
BASOPHILS # BLD: 0.07 K/UL (ref 0–0.2)
BASOPHILS NFR BLD: 1 % (ref 0–2)
BILIRUB SERPL-MCNC: 0.3 MG/DL (ref 0–1.2)
BILIRUB UR QL STRIP: NEGATIVE
BUN SERPL-MCNC: 65 MG/DL (ref 6–23)
CALCIUM SERPL-MCNC: 9.1 MG/DL (ref 8.6–10.2)
CHLORIDE SERPL-SCNC: 114 MMOL/L (ref 98–107)
CLARITY UR: ABNORMAL
CO2 SERPL-SCNC: 21 MMOL/L (ref 22–29)
COLOR UR: YELLOW
CREAT SERPL-MCNC: 2.4 MG/DL (ref 0.7–1.2)
EOSINOPHIL # BLD: 0.23 K/UL (ref 0.05–0.5)
EOSINOPHILS RELATIVE PERCENT: 2 % (ref 0–6)
EPI CELLS #/AREA URNS HPF: ABNORMAL /HPF
ERYTHROCYTE [DISTWIDTH] IN BLOOD BY AUTOMATED COUNT: 13.5 % (ref 11.5–15)
GFR, ESTIMATED: 24 ML/MIN/1.73M2
GLUCOSE SERPL-MCNC: 127 MG/DL (ref 74–99)
GLUCOSE UR STRIP-MCNC: NEGATIVE MG/DL
HCT VFR BLD AUTO: 33.9 % (ref 37–54)
HGB BLD-MCNC: 11.1 G/DL (ref 12.5–16.5)
HGB UR QL STRIP.AUTO: ABNORMAL
IMM GRANULOCYTES # BLD AUTO: 0.03 K/UL (ref 0–0.58)
IMM GRANULOCYTES NFR BLD: 0 % (ref 0–5)
KETONES UR STRIP-MCNC: NEGATIVE MG/DL
LEUKOCYTE ESTERASE UR QL STRIP: ABNORMAL
LYMPHOCYTES NFR BLD: 2.02 K/UL (ref 1.5–4)
LYMPHOCYTES RELATIVE PERCENT: 21 % (ref 20–42)
MCH RBC QN AUTO: 31.7 PG (ref 26–35)
MCHC RBC AUTO-ENTMCNC: 32.7 G/DL (ref 32–34.5)
MCV RBC AUTO: 96.9 FL (ref 80–99.9)
MONOCYTES NFR BLD: 0.6 K/UL (ref 0.1–0.95)
MONOCYTES NFR BLD: 6 % (ref 2–12)
NEUTROPHILS NFR BLD: 69 % (ref 43–80)
NEUTS SEG NFR BLD: 6.54 K/UL (ref 1.8–7.3)
NITRITE UR QL STRIP: POSITIVE
PH UR STRIP: 6 [PH] (ref 5–8)
PLATELET # BLD AUTO: 246 K/UL (ref 130–450)
PMV BLD AUTO: 10.4 FL (ref 7–12)
POTASSIUM SERPL-SCNC: 4.5 MMOL/L (ref 3.5–5)
PROT SERPL-MCNC: 6.7 G/DL (ref 6.4–8.3)
PROT UR STRIP-MCNC: 100 MG/DL
RBC # BLD AUTO: 3.5 M/UL (ref 3.8–5.8)
RBC #/AREA URNS HPF: ABNORMAL /HPF
SODIUM SERPL-SCNC: 148 MMOL/L (ref 132–146)
SP GR UR STRIP: 1.02 (ref 1–1.03)
TROPONIN I SERPL HS-MCNC: 38 NG/L (ref 0–11)
TROPONIN I SERPL HS-MCNC: 41 NG/L (ref 0–11)
UROBILINOGEN UR STRIP-ACNC: 0.2 EU/DL (ref 0–1)
WBC #/AREA URNS HPF: ABNORMAL /HPF
WBC OTHER # BLD: 9.5 K/UL (ref 4.5–11.5)

## 2025-03-13 PROCEDURE — 87077 CULTURE AEROBIC IDENTIFY: CPT

## 2025-03-13 PROCEDURE — 96372 THER/PROPH/DIAG INJ SC/IM: CPT

## 2025-03-13 PROCEDURE — 6360000002 HC RX W HCPCS: Performed by: FAMILY MEDICINE

## 2025-03-13 PROCEDURE — 99285 EMERGENCY DEPT VISIT HI MDM: CPT

## 2025-03-13 PROCEDURE — 80053 COMPREHEN METABOLIC PANEL: CPT

## 2025-03-13 PROCEDURE — G0378 HOSPITAL OBSERVATION PER HR: HCPCS

## 2025-03-13 PROCEDURE — 87086 URINE CULTURE/COLONY COUNT: CPT

## 2025-03-13 PROCEDURE — 2500000003 HC RX 250 WO HCPCS: Performed by: FAMILY MEDICINE

## 2025-03-13 PROCEDURE — 84484 ASSAY OF TROPONIN QUANT: CPT

## 2025-03-13 PROCEDURE — 81001 URINALYSIS AUTO W/SCOPE: CPT

## 2025-03-13 PROCEDURE — 96361 HYDRATE IV INFUSION ADD-ON: CPT

## 2025-03-13 PROCEDURE — 6370000000 HC RX 637 (ALT 250 FOR IP): Performed by: FAMILY MEDICINE

## 2025-03-13 PROCEDURE — 70450 CT HEAD/BRAIN W/O DYE: CPT

## 2025-03-13 PROCEDURE — 93005 ELECTROCARDIOGRAM TRACING: CPT | Performed by: STUDENT IN AN ORGANIZED HEALTH CARE EDUCATION/TRAINING PROGRAM

## 2025-03-13 PROCEDURE — 2580000003 HC RX 258: Performed by: FAMILY MEDICINE

## 2025-03-13 PROCEDURE — 96360 HYDRATION IV INFUSION INIT: CPT

## 2025-03-13 PROCEDURE — 99222 1ST HOSP IP/OBS MODERATE 55: CPT | Performed by: FAMILY MEDICINE

## 2025-03-13 PROCEDURE — 85025 COMPLETE CBC W/AUTO DIFF WBC: CPT

## 2025-03-13 PROCEDURE — 71045 X-RAY EXAM CHEST 1 VIEW: CPT

## 2025-03-13 RX ORDER — PANTOPRAZOLE SODIUM 40 MG/1
40 TABLET, DELAYED RELEASE ORAL DAILY
Status: DISCONTINUED | OUTPATIENT
Start: 2025-03-14 | End: 2025-03-14 | Stop reason: HOSPADM

## 2025-03-13 RX ORDER — ASPIRIN 81 MG/1
81 TABLET ORAL DAILY
Status: DISCONTINUED | OUTPATIENT
Start: 2025-03-14 | End: 2025-03-14 | Stop reason: HOSPADM

## 2025-03-13 RX ORDER — DEXTROSE MONOHYDRATE AND SODIUM CHLORIDE 5; .45 G/100ML; G/100ML
INJECTION, SOLUTION INTRAVENOUS CONTINUOUS
Status: DISCONTINUED | OUTPATIENT
Start: 2025-03-13 | End: 2025-03-14

## 2025-03-13 RX ORDER — TIMOLOL MALEATE 5 MG/ML
1 SOLUTION/ DROPS OPHTHALMIC 2 TIMES DAILY
Status: DISCONTINUED | OUTPATIENT
Start: 2025-03-13 | End: 2025-03-14 | Stop reason: HOSPADM

## 2025-03-13 RX ORDER — ACETAMINOPHEN 325 MG/1
650 TABLET ORAL EVERY 6 HOURS PRN
COMMUNITY

## 2025-03-13 RX ORDER — ALLOPURINOL 100 MG/1
100 TABLET ORAL DAILY
Status: DISCONTINUED | OUTPATIENT
Start: 2025-03-14 | End: 2025-03-14 | Stop reason: HOSPADM

## 2025-03-13 RX ORDER — ONDANSETRON 2 MG/ML
4 INJECTION INTRAMUSCULAR; INTRAVENOUS EVERY 6 HOURS PRN
Status: DISCONTINUED | OUTPATIENT
Start: 2025-03-13 | End: 2025-03-14 | Stop reason: HOSPADM

## 2025-03-13 RX ORDER — ACETAMINOPHEN 325 MG/1
650 TABLET ORAL EVERY 6 HOURS PRN
Status: DISCONTINUED | OUTPATIENT
Start: 2025-03-13 | End: 2025-03-14 | Stop reason: HOSPADM

## 2025-03-13 RX ORDER — SODIUM BICARBONATE 650 MG/1
650 TABLET ORAL 3 TIMES DAILY
Status: DISCONTINUED | OUTPATIENT
Start: 2025-03-13 | End: 2025-03-14 | Stop reason: HOSPADM

## 2025-03-13 RX ORDER — LANOLIN ALCOHOL/MO/W.PET/CERES
1000 CREAM (GRAM) TOPICAL DAILY
Status: DISCONTINUED | OUTPATIENT
Start: 2025-03-14 | End: 2025-03-14 | Stop reason: HOSPADM

## 2025-03-13 RX ORDER — M-VIT,TX,IRON,MINS/CALC/FOLIC 27MG-0.4MG
1 TABLET ORAL DAILY
Status: DISCONTINUED | OUTPATIENT
Start: 2025-03-14 | End: 2025-03-14 | Stop reason: HOSPADM

## 2025-03-13 RX ORDER — ONDANSETRON 4 MG/1
4 TABLET, ORALLY DISINTEGRATING ORAL EVERY 8 HOURS PRN
Status: DISCONTINUED | OUTPATIENT
Start: 2025-03-13 | End: 2025-03-14 | Stop reason: HOSPADM

## 2025-03-13 RX ORDER — ATORVASTATIN CALCIUM 40 MG/1
40 TABLET, FILM COATED ORAL DAILY
Status: DISCONTINUED | OUTPATIENT
Start: 2025-03-13 | End: 2025-03-14 | Stop reason: HOSPADM

## 2025-03-13 RX ORDER — MAGNESIUM OXIDE 400 MG/1
200 TABLET ORAL DAILY
Status: DISCONTINUED | OUTPATIENT
Start: 2025-03-14 | End: 2025-03-14 | Stop reason: HOSPADM

## 2025-03-13 RX ORDER — METOPROLOL SUCCINATE 25 MG/1
25 TABLET, EXTENDED RELEASE ORAL DAILY
Status: DISCONTINUED | OUTPATIENT
Start: 2025-03-14 | End: 2025-03-14 | Stop reason: HOSPADM

## 2025-03-13 RX ORDER — SODIUM CHLORIDE 9 MG/ML
INJECTION, SOLUTION INTRAVENOUS PRN
Status: DISCONTINUED | OUTPATIENT
Start: 2025-03-13 | End: 2025-03-14 | Stop reason: HOSPADM

## 2025-03-13 RX ORDER — SODIUM CHLORIDE 0.9 % (FLUSH) 0.9 %
5-40 SYRINGE (ML) INJECTION PRN
Status: DISCONTINUED | OUTPATIENT
Start: 2025-03-13 | End: 2025-03-14 | Stop reason: HOSPADM

## 2025-03-13 RX ORDER — LISINOPRIL 10 MG/1
10 TABLET ORAL DAILY
COMMUNITY

## 2025-03-13 RX ORDER — SODIUM BICARBONATE 650 MG/1
650 TABLET ORAL 3 TIMES DAILY
COMMUNITY

## 2025-03-13 RX ORDER — SODIUM CHLORIDE 0.9 % (FLUSH) 0.9 %
5-40 SYRINGE (ML) INJECTION EVERY 12 HOURS SCHEDULED
Status: DISCONTINUED | OUTPATIENT
Start: 2025-03-13 | End: 2025-03-14 | Stop reason: HOSPADM

## 2025-03-13 RX ORDER — ACETAMINOPHEN 650 MG/1
650 SUPPOSITORY RECTAL EVERY 6 HOURS PRN
Status: DISCONTINUED | OUTPATIENT
Start: 2025-03-13 | End: 2025-03-14 | Stop reason: HOSPADM

## 2025-03-13 RX ORDER — ENOXAPARIN SODIUM 100 MG/ML
30 INJECTION SUBCUTANEOUS DAILY
Status: DISCONTINUED | OUTPATIENT
Start: 2025-03-13 | End: 2025-03-14 | Stop reason: HOSPADM

## 2025-03-13 RX ORDER — ALLOPURINOL 100 MG/1
100 TABLET ORAL DAILY
COMMUNITY

## 2025-03-13 RX ORDER — SENNOSIDES 8.8 MG/5ML
5 LIQUID ORAL 2 TIMES DAILY PRN
Status: DISCONTINUED | OUTPATIENT
Start: 2025-03-13 | End: 2025-03-14 | Stop reason: HOSPADM

## 2025-03-13 RX ORDER — VITAMIN B COMPLEX
2000 TABLET ORAL DAILY
Status: DISCONTINUED | OUTPATIENT
Start: 2025-03-14 | End: 2025-03-14 | Stop reason: HOSPADM

## 2025-03-13 RX ADMIN — ATORVASTATIN CALCIUM 40 MG: 40 TABLET, FILM COATED ORAL at 20:37

## 2025-03-13 RX ADMIN — SODIUM BICARBONATE 650 MG: 650 TABLET ORAL at 20:37

## 2025-03-13 RX ADMIN — DEXTROSE AND SODIUM CHLORIDE: 5; .45 INJECTION, SOLUTION INTRAVENOUS at 20:43

## 2025-03-13 RX ADMIN — Medication 3 MG: at 20:37

## 2025-03-13 RX ADMIN — SODIUM CHLORIDE, PRESERVATIVE FREE 10 ML: 5 INJECTION INTRAVENOUS at 20:37

## 2025-03-13 RX ADMIN — ENOXAPARIN SODIUM 30 MG: 100 INJECTION SUBCUTANEOUS at 20:37

## 2025-03-13 RX ADMIN — TIMOLOL MALEATE 1 DROP: 5 SOLUTION OPHTHALMIC at 20:37

## 2025-03-13 NOTE — ED PROVIDER NOTES
[facility] EMERGENCY DEPARTMENT  EMERGENCY DEPARTMENT ENCOUNTER        Pt Name: Kyle Fox  MRN: [id number]  Birthdate 6/2/1932  Date of evaluation: 3/13/2025  Provider: Marino Borja DO  PCP: Freddy Etienne MD  Note Started: 12:53 PM EDT 3/13/25    CHIEF COMPLAINT       Chief Complaint   Patient presents with    Dizziness     Sitting eating family felt flush. His vision went a little dim but he stated that he did not pass out. Per family he \"stopped breathing\" per dispatch.        HISTORY OF PRESENT ILLNESS: 1 or more Elements   History From: patient    Limitations to history : None    Kyle Fox is a 89 y.o. male who presents with dizziness.  Patient states he was sitting at breakfast table when he suddenly became lightheaded and felt flushed.  He said he began to get tunnel vision but never passed out.  His daughter believes that he stopped breathing but patient states that that did not happen.  He was subsequently brought to the emergency department thereafter.  Of note, he has a past history of hypertension, complicated UTI, suprapubic catheter, heart failure with previous history of pacemaker placement, and stage IV kidney failure.  According to Keenan, he had his pacemaker evaluated yesterday.    Patient denies fever, chills, headache, shortness of breath, chest pain, abdominal pain, nausea, vomiting, diarrhea, lightheadedness, dysuria, hematuria, hematochezia, and melena.    Nursing Notes were all reviewed and agreed with or any disagreements were addressed in the HPI.        REVIEW OF EXTERNAL NOTES :            REVIEW OF SYSTEMS :           Positives and Pertinent negatives as per HPI.     SURGICAL HISTORY     Past Surgical History:   Procedure Laterality Date    A-V CARDIAC PACEMAKER INSERTION  5/30/07    COLONOSCOPY  8/3/11    CYSTOSCOPY N/A 11/2/2021    CYSTOSCOPY RETROGRADE PYELOGRAM, TRANSURETHRAL RESECTION OF THE PROSTATE WITH SUPRAPUBIC CATHETER PLACEMENT performed by 
Peds called for repeat, scheduled C/S to delivery of a 39.0 wk M born via scheduled repeat C/S to a 24yo  w/ blood type O+ and PNL unremarkable neg/NR/imm, GBS + on 10/10. Prepregnancy history significant for a hernia repair and asthma, with her last exacerbation in . Uncomplicated pregnancy. No ROM. No labor. Clear fluids at delivery. Nuchal x1. Knot x2. At delivery, baby had good tone, was pink and had a good cry. W/D/S/S. APGARs 9/9. Baby admitted to the NBN. Mom wants to BF, Hep B and circ.    Physical Exam:    Gen: awake, alert, active  HEENT: anterior fontanel open soft and flat. no cleft lip/palate, ears normal set, no ear pits or tags, no lesions in mouth/throat,  red reflex positive bilaterally, nares clinically patent  Resp: good air entry and clear to auscultation bilaterally  Cardiac: Normal S1/S2, regular rate and rhythm, no murmurs, rubs or gallops, 2+ femoral pulses bilaterally  Abd: soft, non tender, non distended, normal bowel sounds, no organomegaly,  umbilicus clean/dry/intact  Neuro: +grasp/suck/slava, normal tone  Extremities: negative bartlow and ortolani, full range of motion x 4, no crepitus  Skin: no rash, pink  Genital Exam: testes descended bilaterally, normal male anatomy, paolo 1, anus patent

## 2025-03-13 NOTE — H&P
WO CONTRAST   Final Result   1. No acute intracranial abnormality.   2. Global cortical atrophy.   3. Small mucous retention cyst or nasal polyp in the posterior left ethmoid   sinuses.         XR CHEST 1 VIEW   Final Result   1. No acute cardiopulmonary process.   2. Dual-chamber cardiac pacemaker in position.             EKG:   A-V dual paced rhythm      ASSESSMENT:      Principal Problem:    Syncope and collapse  Active Problems:    Essential hypertension    Stage 4 chronic kidney disease (HCC)    Hypernatremia    Dehydration  Resolved Problems:    * No resolved hospital problems. *      PLAN:    1.  Syncope -- likely due to dehydration -- sodium is 148; BUN is 10 above his baseline  -1/2 NS with D5 at 100 cc/hr overnight  -BMP in AM  -orthostatics    2.  Hx of HFrEF/CHF, pacer -- stable  -saw Dr. Santizo in the office yesterday    3.  CKD stage 4 -- creatinine at baseline; BUN is up from his baseline of 54 at 65 today  -BMP in AM  -Avoid nephrotoxins    4.  HTN -- controlled  -Hold home anti-hypertensives due to the syncope for now    5.  Prostatic enlargement -- with suprapubic catheter -- no fevers, no sepsis, no s/s of infection    Code Status: DNR-CCA  DVT prophylaxis:   Early ambulation  Disp: to home without services likely tomorrow      NOTE: This report was transcribed using voice recognition software. Every effort was made to ensure accuracy; however, inadvertent computerized transcription errors may be present.  Electronically signed by Melanie Barrow DO on 3/13/2025 at 7:44 PM

## 2025-03-13 NOTE — PROGRESS NOTES
4 Eyes Skin Assessment     NAME:  Kyle Fox  YOB: 1932  MEDICAL RECORD NUMBER:  [id number]    The patient is being assessed for  Admission    I agree that at least one RN has performed a thorough Head to Toe Skin Assessment on the patient. ALL assessment sites listed below have been assessed.      Areas assessed by both nurses:    Head, Face, Ears, Shoulders, Back, Chest, Arms, Elbows, Hands, Sacrum. Buttock, Coccyx, Ischium, and Legs. Feet and Heels        Does the Patient have a Wound? Yes wound(s) were present on assessment. LDA wound assessment was Initiated and completed by RN    Scabbed area on Left buttock healing  1.5 x 2.0 area on Right buttock  Scabs on back of right and left ear  Redness on suprapubic cath  Heels boggy  Bed ordered foam dressing applied to buttocks           Charan Prevention initiated by RN: Yes  Wound Care Orders initiated by RN: No    Pressure Injury (Stage 3,4, Unstageable, DTI, NWPT, and Complex wounds) if present, place Wound referral order by RN under : No    New Ostomies, if present place, Ostomy referral order under : No     Nurse 1 eSignature: Electronically signed by Nneka Hairston RN on 3/13/25 at 7:02 PM EDT    **SHARE this note so that the co-signing nurse can place an eSignature**    Nurse 2 eSignature: Electronically signed by Beth Le RN on 3/13/25 at 7:26 PM EDT

## 2025-03-14 VITALS
HEART RATE: 60 BPM | BODY MASS INDEX: 21.62 KG/M2 | TEMPERATURE: 97.9 F | OXYGEN SATURATION: 100 % | DIASTOLIC BLOOD PRESSURE: 55 MMHG | WEIGHT: 151 LBS | SYSTOLIC BLOOD PRESSURE: 116 MMHG | HEIGHT: 70 IN | RESPIRATION RATE: 17 BRPM

## 2025-03-14 LAB
ALBUMIN SERPL-MCNC: 3.3 G/DL (ref 3.5–5.2)
ALP SERPL-CCNC: 67 U/L (ref 40–129)
ALT SERPL-CCNC: 9 U/L (ref 0–40)
ANION GAP SERPL CALCULATED.3IONS-SCNC: 11 MMOL/L (ref 7–16)
AST SERPL-CCNC: 10 U/L (ref 0–39)
BASOPHILS # BLD: 0.07 K/UL (ref 0–0.2)
BASOPHILS NFR BLD: 1 % (ref 0–2)
BILIRUB DIRECT SERPL-MCNC: <0.2 MG/DL (ref 0–0.3)
BILIRUB INDIRECT SERPL-MCNC: ABNORMAL MG/DL (ref 0–1)
BILIRUB SERPL-MCNC: 0.3 MG/DL (ref 0–1.2)
BUN SERPL-MCNC: 53 MG/DL (ref 6–23)
CALCIUM SERPL-MCNC: 8.8 MG/DL (ref 8.6–10.2)
CHLORIDE SERPL-SCNC: 112 MMOL/L (ref 98–107)
CO2 SERPL-SCNC: 21 MMOL/L (ref 22–29)
CREAT SERPL-MCNC: 2 MG/DL (ref 0.7–1.2)
EKG ATRIAL RATE: 89 BPM
EKG P AXIS: 84 DEGREES
EKG P-R INTERVAL: 184 MS
EKG Q-T INTERVAL: 486 MS
EKG QRS DURATION: 164 MS
EKG QTC CALCULATION (BAZETT): 486 MS
EKG R AXIS: -45 DEGREES
EKG T AXIS: 39 DEGREES
EKG VENTRICULAR RATE: 60 BPM
EOSINOPHIL # BLD: 0.19 K/UL (ref 0.05–0.5)
EOSINOPHILS RELATIVE PERCENT: 2 % (ref 0–6)
ERYTHROCYTE [DISTWIDTH] IN BLOOD BY AUTOMATED COUNT: 13.5 % (ref 11.5–15)
GFR, ESTIMATED: 31 ML/MIN/1.73M2
GLUCOSE SERPL-MCNC: 123 MG/DL (ref 74–99)
HCT VFR BLD AUTO: 30.3 % (ref 37–54)
HGB BLD-MCNC: 10 G/DL (ref 12.5–16.5)
IMM GRANULOCYTES # BLD AUTO: <0.03 K/UL (ref 0–0.58)
IMM GRANULOCYTES NFR BLD: 0 % (ref 0–5)
LACTATE BLDV-SCNC: 1.6 MMOL/L (ref 0.5–2.2)
LYMPHOCYTES NFR BLD: 2.17 K/UL (ref 1.5–4)
LYMPHOCYTES RELATIVE PERCENT: 25 % (ref 20–42)
MCH RBC QN AUTO: 31.7 PG (ref 26–35)
MCHC RBC AUTO-ENTMCNC: 33 G/DL (ref 32–34.5)
MCV RBC AUTO: 96.2 FL (ref 80–99.9)
MONOCYTES NFR BLD: 0.72 K/UL (ref 0.1–0.95)
MONOCYTES NFR BLD: 8 % (ref 2–12)
NEUTROPHILS NFR BLD: 63 % (ref 43–80)
NEUTS SEG NFR BLD: 5.41 K/UL (ref 1.8–7.3)
PHOSPHATE SERPL-MCNC: 3.7 MG/DL (ref 2.5–4.5)
PLATELET # BLD AUTO: 228 K/UL (ref 130–450)
PMV BLD AUTO: 10.1 FL (ref 7–12)
POTASSIUM SERPL-SCNC: 4.1 MMOL/L (ref 3.5–5)
PROT SERPL-MCNC: 6.2 G/DL (ref 6.4–8.3)
RBC # BLD AUTO: 3.15 M/UL (ref 3.8–5.8)
SODIUM SERPL-SCNC: 144 MMOL/L (ref 132–146)
WBC OTHER # BLD: 8.6 K/UL (ref 4.5–11.5)

## 2025-03-14 PROCEDURE — 82248 BILIRUBIN DIRECT: CPT

## 2025-03-14 PROCEDURE — 80053 COMPREHEN METABOLIC PANEL: CPT

## 2025-03-14 PROCEDURE — 36415 COLL VENOUS BLD VENIPUNCTURE: CPT

## 2025-03-14 PROCEDURE — G0378 HOSPITAL OBSERVATION PER HR: HCPCS

## 2025-03-14 PROCEDURE — 96361 HYDRATE IV INFUSION ADD-ON: CPT

## 2025-03-14 PROCEDURE — 6370000000 HC RX 637 (ALT 250 FOR IP): Performed by: FAMILY MEDICINE

## 2025-03-14 PROCEDURE — 85025 COMPLETE CBC W/AUTO DIFF WBC: CPT

## 2025-03-14 PROCEDURE — 96372 THER/PROPH/DIAG INJ SC/IM: CPT

## 2025-03-14 PROCEDURE — 83605 ASSAY OF LACTIC ACID: CPT

## 2025-03-14 PROCEDURE — 99238 HOSP IP/OBS DSCHRG MGMT 30/<: CPT | Performed by: FAMILY MEDICINE

## 2025-03-14 PROCEDURE — 84100 ASSAY OF PHOSPHORUS: CPT

## 2025-03-14 PROCEDURE — 6360000002 HC RX W HCPCS: Performed by: FAMILY MEDICINE

## 2025-03-14 PROCEDURE — 93010 ELECTROCARDIOGRAM REPORT: CPT | Performed by: INTERNAL MEDICINE

## 2025-03-14 PROCEDURE — 2580000003 HC RX 258: Performed by: FAMILY MEDICINE

## 2025-03-14 RX ADMIN — ATORVASTATIN CALCIUM 40 MG: 40 TABLET, FILM COATED ORAL at 09:07

## 2025-03-14 RX ADMIN — ENOXAPARIN SODIUM 30 MG: 100 INJECTION SUBCUTANEOUS at 09:06

## 2025-03-14 RX ADMIN — SODIUM BICARBONATE 650 MG: 650 TABLET ORAL at 09:07

## 2025-03-14 RX ADMIN — Medication 2000 UNITS: at 09:06

## 2025-03-14 RX ADMIN — MAGNESIUM OXIDE 200 MG: 400 TABLET ORAL at 09:06

## 2025-03-14 RX ADMIN — CYANOCOBALAMIN TAB 1000 MCG 1000 MCG: 1000 TAB at 09:07

## 2025-03-14 RX ADMIN — METOPROLOL SUCCINATE 25 MG: 25 TABLET, EXTENDED RELEASE ORAL at 09:08

## 2025-03-14 RX ADMIN — SODIUM BICARBONATE 650 MG: 650 TABLET ORAL at 13:57

## 2025-03-14 RX ADMIN — Medication 1 TABLET: at 09:06

## 2025-03-14 RX ADMIN — DEXTROSE AND SODIUM CHLORIDE: 5; .45 INJECTION, SOLUTION INTRAVENOUS at 08:09

## 2025-03-14 RX ADMIN — TIMOLOL MALEATE 1 DROP: 5 SOLUTION OPHTHALMIC at 09:09

## 2025-03-14 RX ADMIN — ASPIRIN 81 MG: 81 TABLET, COATED ORAL at 09:07

## 2025-03-14 RX ADMIN — ALLOPURINOL 100 MG: 100 TABLET ORAL at 09:08

## 2025-03-14 RX ADMIN — PANTOPRAZOLE SODIUM 40 MG: 40 TABLET, DELAYED RELEASE ORAL at 09:06

## 2025-03-14 NOTE — PROGRESS NOTES
Fay catheter documented on 03/13 in place of patient's actual chronic suprapubic catheter which patient had on admission. Correct documentation charted today with accurate outputs placed.

## 2025-03-14 NOTE — ACP (ADVANCE CARE PLANNING)
Advance Care Planning   Healthcare Decision Maker:    Primary Decision Maker: Savi Marquis - Child - [phone]    Secondary Decision Maker: Kyle Fox Jr. - Child - [phone]      Today we documented Decision Maker(s) consistent with Legal Next of Kin hierarchy.

## 2025-03-14 NOTE — DISCHARGE INSTR - COC
Continuity of Care Form    Patient Name: Kyle Fox   :  1932  MRN:  [id number]    Admit date:  3/13/2025  Discharge date:  ***    Code Status Order: DNR-CCA   Advance Directives:     Admitting Physician:  Melanie Barrow DO  PCP: Freddy Etienne MD    Discharging Nurse: ***  Discharging Hospital Unit/Room#: 0630/0630-01  Discharging Unit Phone Number: ***    Emergency Contact:   Extended Emergency Contact Information  Primary Emergency Contact: Savi Marquis  Home Phone: [phone]  Mobile Phone: [phone]  Relation: Child   needed? No  Secondary Emergency Contact: Kyle Fox Jr.  Home Phone: [phone]  Mobile Phone: [phone]  Relation: Child  Preferred language: English   needed? No    Past Surgical History:  Past Surgical History:   Procedure Laterality Date    A-V CARDIAC PACEMAKER INSERTION  07    COLONOSCOPY  8/3/11    CYSTOSCOPY N/A 2021    CYSTOSCOPY RETROGRADE PYELOGRAM, TRANSURETHRAL RESECTION OF THE PROSTATE WITH SUPRAPUBIC CATHETER PLACEMENT performed by Ham Armstrong MD at [facility] OR    EYE SURGERY  2002    cataracts evelyn    PACEMAKER PLACEMENT      2017 battery change    TONSILLECTOMY      child       Immunization History:   Immunization History   Administered Date(s) Administered    COVID-19, MODERNA BLUE border, Primary or Immunocompromised, (age 12y+), IM, 100 mcg/0.5mL 2021, 2021, 2021    COVID-19, MODERNA Booster BLUE border, (age 18y+), IM, 50mcg/0.25mL 2021    COVID-19, MODERNA, , (age 12y+), IM, 50mcg/0.5mL 2023    DTaP 2003    Influenza 10/28/2013    Influenza Virus Vaccine 2012, 2014    Influenza, FLUAD, (age 65 y+), IM, Quadv, 0.5mL 10/12/2021, 2022, 2023    Influenza, FLUAD, (age 65 y+), IM, Trivalent PF, 0.5mL 2019, 10/08/2024    Influenza, FLUZONE High Dose (age 65 y+), IM, Quadv, 0.7mL 10/26/2020    Influenza, FLUZONE High Dose, (age 65 y+), IM, Trivalent PF, 0.5mL  {CHP DME ADLs:033643556}  Toileting  {CHP DME ADLs:409778275}  Feeding  {CHP DME ADLs:553880346}  Med Admin  {P DME ADLs:997583024}  Med Delivery   { RACHEAL MED Delivery:636351185}    Wound Care Documentation and Therapy:  Wound 08/09/21 Buttocks Right;Inner 0.4x 0.5x 0.1 (Active)   Number of days: 1312       Wound 03/13/25 Buttocks Right (Active)   Wound Image   03/13/25 1930   Dressing Status New dressing applied 03/13/25 2000   Dressing/Treatment Foam 03/13/25 2000   Wound Length (cm) 1.2 cm 03/13/25 1930   Wound Width (cm) 2 cm 03/13/25 1930   Wound Depth (cm) 0.1 cm 03/13/25 1930   Wound Surface Area (cm^2) 2.4 cm^2 03/13/25 1930   Wound Volume (cm^3) 0.24 cm^3 03/13/25 1930   Quynh-wound Assessment Intact 03/13/25 2000   Number of days: 0       Wound 03/13/25 Arm Left (Active)   Number of days: 0        Elimination:  Continence:   Bowel: {YES / NO:19727}  Bladder: {YES / NO:19727}  Urinary Catheter: {Urinary Catheter:618952830}   Colostomy/Ileostomy/Ileal Conduit: {YES / NO:19727}       Date of Last BM: ***    Intake/Output Summary (Last 24 hours) at 3/14/2025 1428  Last data filed at 3/14/2025 1230  Gross per 24 hour   Intake 360 ml   Output 1500 ml   Net -1140 ml     I/O last 3 completed shifts:  In: -   Out: 1500 [Urine:1500]    Safety Concerns:     { RACHEAL Safety Concerns:563078311}    Impairments/Disabilities:      { RACHEAL Impairments/Disabilities:570420024}    Nutrition Therapy:  Current Nutrition Therapy:   { RACHEAL Diet List:463427453}    Routes of Feeding: {CHP DME Other Feedings:467755342}  Liquids: {Slp liquid thickness:58996}  Daily Fluid Restriction: {CHP DME Yes amt example:237305042}  Last Modified Barium Swallow with Video (Video Swallowing Test): {Done Not Done Date:304088012}    Treatments at the Time of Hospital Discharge:   Respiratory Treatments: ***  Oxygen Therapy:  {Therapy; copd oxygen:86741}  Ventilator:    { CC Vent List:563545099}    Rehab Therapies: {THERAPEUTIC

## 2025-03-14 NOTE — CARE COORDINATION
Case Management Assessment  Initial Evaluation    Date/Time of Evaluation: 3/14/2025 3:08 PM  Assessment Completed by: Aislinn Laurent RN    If patient is discharged prior to next notation, then this note serves as note for discharge by case management.    Patient Name: Kyle Fox                   YOB: 1932  Diagnosis: Syncope and collapse [R55]  Near syncope [R55]  Dehydration [E86.0]                   Date / Time: 3/13/2025 12:27 PM    Patient Admission Status: Observation   Readmission Risk (Low < 19, Mod (19-27), High > 27): Readmission Risk Score: 15.2    Current PCP: Freddy Etienne MD  PCP verified by CM? Yes    Chart Reviewed: Yes      History Provided by: Patient  Patient Orientation: Alert and Oriented    Patient Cognition: Alert    Hospitalization in the last 30 days (Readmission):  No    If yes, Readmission Assessment in CM Navigator will be completed.    Advance Directives:      Code Status: DNR-CCA   Patient's Primary Decision Maker is: Legal Next of Kin    Primary Decision Maker: Savi Marquis - Child - [phone]    Secondary Decision Maker: Kyle Fox Jr. - Child - [phone]    Discharge Planning:    Patient lives with: Children Type of Home: House  Primary Care Giver: Family (home care)  Patient Support Systems include: Children, Family Members, Home Care Staff, Friends/Neighbors     ADLS  Prior functional level: Assistance with the following:, Mobility, Shopping  Current functional level: Assistance with the following:, Mobility, Shopping      Family can provide assistance at DC: Yes  Would you like Case Management to discuss the discharge plan with any other family members/significant others, and if so, who? No  Plans to Return to Present Housing: Yes  Other Identified Issues/Barriers to RETURNING to current housing: none       Financial    Payor: [facility] MEDICARE / Plan: [facility] MEDICARE / Product Type: *No Product type* /         [facility] [address] [place] [address] ([place]),  [place] [address] - P [phone] - F [phone]  [address] [facility]  [place] ([place]) [place] [address]  Phone: [phone] Fax: [phone]    [facility] [address] [place] [address] - [address] - P [phone] - F [phone]  [address]  [facility] [address]  Phone: [phone] Fax: [phone]      Notes:    Factors facilitating achievement of predicted outcomes: Family support and Caregiver support    Barriers to discharge: none     Additional Case Management Notes: 3-14-Cm note: met with pt for transition of care, pt lives with his dtr Savi, he is active with [facility] for  for care of Suprapubic catheter, pt wishes to resume their services at dc, order obtained, cm called liaison to update on dc home today, they will follow up at his home. Pt's dtr will transport home. Electronically signed by Aislinn Laurent RN on 3/14/2025 at 3:13 PM      The Plan for Transition of Care is related to the following treatment goals of Syncope and collapse [R55]  Near syncope [R55]  Dehydration [E86.0]    IF APPLICABLE: The Patient and/or patient representative Kyle and his family were provided with a choice of provider and agrees with the discharge plan. Freedom of choice list with basic dialogue that supports the patient's individualized plan of care/goals and shares the quality data associated with the providers was provided to: Patient   Patient Representative Name:       The Patient and/or Patient Representative Agree with the Discharge Plan? Yes    Aislinn Laurent RN  Case Management Department  Ph: [phone] Fax: [phone]

## 2025-03-14 NOTE — DISCHARGE INSTRUCTIONS
Continue all follow up appointment as scheduled.       Your information:  Name: Kyle Fox  : 1932    Your instructions:    YOU ARE BEING DISCHARGED HOME. PLEASE MAKE AND KEEP YOUR FOLLOW UP APPOINTMENTS.    What to do after you leave the hospital:    Recommended diet: regular diet and low sodium    Recommended activity: activity as tolerated    IF YOU EXPERIENCE ANY OF THE FOLLOWING SYMPTOMS, CHEST PAIN, SHORTNESS OF BREATH, COUGHING UP BLOOD OR BLOODY SPUTUM, STOMACH PAIN OR CRAMPING, DARK, TARRY STOOLS, LOSS OF APPETITE, GENERAL NOT FEELING WELL, SIGNS AND SYMPTOMS OF INFECTION LIKE FEVER AND OR CHILLS, PLEASE CALL DR ARGUELLO OR RETURN TO THE EMERGENCY ROOM.        The following personal items were collected during your admission and were returned to you:    Belongings  Dental Appliances: None  Vision - Corrective Lenses: Eyeglasses  Hearing Aid: Bilateral hearing aids  Clothing: Pajamas, Pants, Shirt, Socks, Undergarments  Jewelry: None  Body Piercings Removed: No  Electronic Devices: None  Weapons (Notify Protective Services/Security): None  Other Valuables: At home  Home Medications: None  Valuables Given To: Patient  Provide Name(s) of Who Valuable(s) Were Given To: Kyle  Responsible person(s) in the waiting room: n/a  Patient approves for provider to speak to responsible person post operatively: Yes    Information obtained by:  By signing below, I understand that if any problems occur once I leave the hospital I am to contact Dr. Arguello.  I understand and acknowledge receipt of the instructions indicated above.

## 2025-03-14 NOTE — DISCHARGE SUMMARY
medications       lisinopril-hydroCHLOROthiazide (PRINZIDE;ZESTORETIC) 10-12.5 MG per tablet Comments:   Reason for Stopping:         amLODIPine (NORVASC) 2.5 MG tablet Comments:   Reason for Stopping:         Fesoterodine Fumarate ER 8 MG TB24 Comments:   Reason for Stopping:                 Note that less than 30 minutes was spent in preparing discharge papers, discussing discharge with patient, medication review, etc.    NOTE: This report was transcribed using voice recognition software. Every effort was made to ensure accuracy; however, inadvertent computerized transcription errors may be present.     Signed:  Electronically signed by Melanie Barrow DO on 3/14/2025 at 3:33 PM

## 2025-03-15 LAB
MICROORGANISM SPEC CULT: ABNORMAL
SERVICE CMNT-IMP: ABNORMAL
SPECIMEN DESCRIPTION: ABNORMAL

## 2025-04-12 PROBLEM — E86.0 DEHYDRATION: Status: RESOLVED | Noted: 2025-03-13 | Resolved: 2025-04-12

## 2025-04-22 ENCOUNTER — OFFICE VISIT (OUTPATIENT)
Dept: FAMILY MEDICINE CLINIC | Age: 89
End: 2025-04-22
Payer: MEDICARE

## 2025-04-22 VITALS
WEIGHT: 155 LBS | SYSTOLIC BLOOD PRESSURE: 112 MMHG | HEART RATE: 60 BPM | DIASTOLIC BLOOD PRESSURE: 70 MMHG | OXYGEN SATURATION: 98 % | BODY MASS INDEX: 22.24 KG/M2

## 2025-04-22 DIAGNOSIS — D50.0 IRON DEFICIENCY ANEMIA DUE TO CHRONIC BLOOD LOSS: ICD-10-CM

## 2025-04-22 DIAGNOSIS — M25.551 PAIN OF BOTH HIP JOINTS: ICD-10-CM

## 2025-04-22 DIAGNOSIS — N18.4 STAGE 4 CHRONIC KIDNEY DISEASE (HCC): ICD-10-CM

## 2025-04-22 DIAGNOSIS — I10 ESSENTIAL HYPERTENSION: ICD-10-CM

## 2025-04-22 DIAGNOSIS — M25.552 PAIN OF BOTH HIP JOINTS: ICD-10-CM

## 2025-04-22 DIAGNOSIS — I50.22 CHRONIC SYSTOLIC (CONGESTIVE) HEART FAILURE (HCC): ICD-10-CM

## 2025-04-22 DIAGNOSIS — E78.00 PURE HYPERCHOLESTEROLEMIA: Primary | ICD-10-CM

## 2025-04-22 PROCEDURE — 1159F MED LIST DOCD IN RCRD: CPT | Performed by: FAMILY MEDICINE

## 2025-04-22 PROCEDURE — 99214 OFFICE O/P EST MOD 30 MIN: CPT | Performed by: FAMILY MEDICINE

## 2025-04-22 PROCEDURE — 1160F RVW MEDS BY RX/DR IN RCRD: CPT | Performed by: FAMILY MEDICINE

## 2025-04-22 PROCEDURE — 1123F ACP DISCUSS/DSCN MKR DOCD: CPT | Performed by: FAMILY MEDICINE

## 2025-04-22 RX ORDER — CELECOXIB 100 MG/1
100 CAPSULE ORAL DAILY PRN
Qty: 30 CAPSULE | Refills: 1 | Status: CANCELLED | OUTPATIENT
Start: 2025-04-22

## 2025-04-22 RX ORDER — PANTOPRAZOLE SODIUM 40 MG/1
40 TABLET, DELAYED RELEASE ORAL DAILY
Qty: 90 TABLET | Refills: 1 | Status: SHIPPED | OUTPATIENT
Start: 2025-04-22

## 2025-04-22 RX ORDER — LISINOPRIL 10 MG/1
10 TABLET ORAL DAILY
COMMUNITY
Start: 2025-03-10 | End: 2025-04-22

## 2025-04-22 RX ORDER — SIMVASTATIN 40 MG
40 TABLET ORAL NIGHTLY
Qty: 90 TABLET | Refills: 1 | Status: SHIPPED | OUTPATIENT
Start: 2025-04-22

## 2025-04-22 RX ORDER — TIMOLOL MALEATE 5 MG/ML
1 SOLUTION/ DROPS OPHTHALMIC 2 TIMES DAILY
Qty: 5 ML | Refills: 0 | Status: SHIPPED | OUTPATIENT
Start: 2025-04-22

## 2025-04-22 RX ORDER — METOPROLOL SUCCINATE 25 MG/1
25 TABLET, EXTENDED RELEASE ORAL DAILY
Qty: 90 TABLET | Refills: 0 | Status: SHIPPED | OUTPATIENT
Start: 2025-04-22

## 2025-04-22 NOTE — PATIENT INSTRUCTIONS
LOW SALT FOR BLOOD PRESSURE CONTROL.  LOW FAT DIET FOR CHOLESTEROL CONTROL.  DRINK ENOUGH FLUIDS FOR BETTER KIDNEY FUNCTION.  TAKE  PRINIVIL 10 MG AND TOPROL XL 25 MG. 1 TAB. DAILY FOR BLOOD PRESSURE CONTROL.  TAKE  ZOCOR 40 MG. 1 TAB. DAILY.  FOR CHOLESTEROL CONTROL..  TAKE  FEOSOL 1 TAB. 2 TIMES A DAY AND MULTIVITAMIN 1 TAB. DAILY TO IMPROVE BLOOD COUNT.  TAKE CELEBREX 100 MG. DAILY AS NEEDED FOR HIP PAINS.  REGULAR WALKING ADVISED.  CONTINUE FOLLOW UP WITH DR. AGUILLON FOR KIDNEY PROBLEM.  CONTINUE FOLLOW UP WITH DR. PINTO FOR PROSTATE PROBLEM.  NEXT APPOINTMENT IN 2 MONTHS.

## 2025-04-22 NOTE — PROGRESS NOTES
OFFICE PROGRESS NOTE      SUBJECTIVE:        Patient ID:   Kyle Fox is a 89 y.o. male who presents for   Chief Complaint   Patient presents with    Follow-up     ER - passed out    Discuss Medications     Meds changed           HPI:     RECHECK BP, CHOLESTEROL, ANEMIA AND CKD.  WAS ADMITTED AT [facility] ON  3/13/2025 WITH H/O PASSING OUT. DISCHARGED WITH CHANGES IN MEDICATIONS. FEELS FINE NOW.  MEDICATION REFILL.   WATCHING DIET GOOD.  WALKING SOME IN HOUSE FOR EXERCISE.  TAKING MEDICATIONS REGULARLY.         Prior to Admission medications    Medication Sig Start Date End Date Taking? Authorizing Provider   timolol (TIMOPTIC) 0.5 % ophthalmic solution Place 1 drop into both eyes 2 times daily 4/22/25  Yes Freddy Etienne MD   Magnesium Oxide -Mg Supplement 200 MG TABS Take 200 mg by mouth daily 4/22/25  Yes Freddy Etienne MD   metoprolol succinate (TOPROL XL) 25 MG extended release tablet Take 1 tablet by mouth daily 4/22/25  Yes Freddy Etienne MD   pantoprazole (PROTONIX) 40 MG tablet Take 1 tablet by mouth daily 4/22/25  Yes Freddy Etienne MD   simvastatin (ZOCOR) 40 MG tablet Take 1 tablet by mouth nightly 4/22/25  Yes Freddy Etienne MD   lisinopril (PRINIVIL;ZESTRIL) 10 MG tablet Take 1 tablet by mouth daily   Yes Emil Garcia MD   sodium bicarbonate 650 MG tablet Take 1 tablet by mouth 3 times daily   Yes Emil Garcia MD   allopurinol (ZYLOPRIM) 100 MG tablet Take 1 tablet by mouth daily   Yes Emil Garcia MD   celecoxib (CELEBREX) 100 MG capsule Take 1 capsule by mouth daily as needed for Pain 2/20/25  Yes Freddy Etienne MD   ferrous sulfate (IRON 325) 325 (65 Fe) MG tablet Take 1 tablet by mouth 2 times daily Indications: DO NOT resume this medication until cleared by PCP and/or nephrologist 9/10/21  Yes Freddy Etienne MD   Cholecalciferol (VITAMIN D3) 50 MCG (2000 UT) CAPS Take 1 capsule by mouth daily   Yes Emil Garcia MD

## 2025-05-20 ENCOUNTER — HOSPITAL ENCOUNTER (EMERGENCY)
Age: 89
Discharge: HOME | End: 2025-05-20
Payer: MEDICARE

## 2025-05-20 VITALS
WEIGHT: 155 LBS | RESPIRATION RATE: 20 BRPM | DIASTOLIC BLOOD PRESSURE: 60 MMHG | BODY MASS INDEX: 22.24 KG/M2 | SYSTOLIC BLOOD PRESSURE: 135 MMHG | TEMPERATURE: 97.7 F | HEART RATE: 68 BPM

## 2025-05-20 DIAGNOSIS — N39.0 URINARY TRACT INFECTION ASSOCIATED WITH INDWELLING URETHRAL CATHETER, INITIAL ENCOUNTER: Primary | ICD-10-CM

## 2025-05-20 DIAGNOSIS — T83.511A URINARY TRACT INFECTION ASSOCIATED WITH INDWELLING URETHRAL CATHETER, INITIAL ENCOUNTER: Primary | ICD-10-CM

## 2025-05-20 LAB
BACTERIA URNS QL MICRO: ABNORMAL
BILIRUB UR QL STRIP: NEGATIVE
CLARITY UR: ABNORMAL
COLOR UR: YELLOW
GLUCOSE UR STRIP-MCNC: NEGATIVE MG/DL
HGB UR QL STRIP.AUTO: ABNORMAL
KETONES UR STRIP-MCNC: NEGATIVE MG/DL
LEUKOCYTE ESTERASE UR QL STRIP: ABNORMAL
NITRITE UR QL STRIP: NEGATIVE
PH UR STRIP: 6.5 [PH] (ref 5–8)
PROT UR STRIP-MCNC: >=300 MG/DL
RBC #/AREA URNS HPF: ABNORMAL /HPF
SP GR UR STRIP: 1.02 (ref 1–1.03)
UROBILINOGEN UR STRIP-ACNC: 0.2 EU/DL (ref 0–1)
WBC #/AREA URNS HPF: ABNORMAL /HPF

## 2025-05-20 PROCEDURE — 87086 URINE CULTURE/COLONY COUNT: CPT

## 2025-05-20 PROCEDURE — 99211 OFF/OP EST MAY X REQ PHY/QHP: CPT

## 2025-05-20 PROCEDURE — 87077 CULTURE AEROBIC IDENTIFY: CPT

## 2025-05-20 PROCEDURE — 81001 URINALYSIS AUTO W/SCOPE: CPT

## 2025-05-20 RX ORDER — CEFDINIR 300 MG/1
300 CAPSULE ORAL DAILY
Qty: 7 CAPSULE | Refills: 0 | Status: SHIPPED | OUTPATIENT
Start: 2025-05-20 | End: 2025-05-27

## 2025-05-20 ASSESSMENT — PAIN DESCRIPTION - LOCATION: LOCATION: BACK

## 2025-05-20 ASSESSMENT — PAIN - FUNCTIONAL ASSESSMENT: PAIN_FUNCTIONAL_ASSESSMENT: 0-10

## 2025-05-20 ASSESSMENT — PAIN SCALES - GENERAL: PAINLEVEL_OUTOF10: 2

## 2025-05-21 NOTE — DISCHARGE INSTRUCTIONS
Take all medication as ordered  Complete all antibiotics-even when feeling better  Increase fluids  Avoid alcohol  If symptoms worsen or change you will need further evaluation in the ED

## 2025-05-21 NOTE — ED PROVIDER NOTES
Independent CHETAN Visit.    [facility]  ED  Encounter Note  Admit Date/RoomTime: 2025  7:51 PM  ED Room:   NAME: Kyle Fox  : 1932  MRN: [id number]  PCP: Freddy Etienne MD    CHIEF COMPLAINT     Back Pain (Lower back pain and discolored urine. Pt does have guillory cath. )    HISTORY OF PRESENT ILLNESS        Kyle Fox is a 89 y.o. male who presents to the Urgent Care with daughter for evaluation of cloudy urine in cath bag. Daughter states symptoms started one day ago. Pt states he is feeling ok but thinks he may have a slight fever. Denies N/V/D and only discomfort is at insertion site of suprapubic cath.     REVIEW OF SYSTEMS     Pertinent positives and negatives are stated within HPI, all other systems reviewed and are negative.    Past Medical History:  has a past medical history of CKD (chronic kidney disease) stage 4, GFR 15-29 ml/min ([facility]), Enlarged prostate, HFrEF (heart failure with reduced ejection fraction) ([facility]), Hyperlipidemia, Hypertension, SHOBHA (iron deficiency anemia), Lumbosacral strain, and Pacemaker.  Surgical History:  has a past surgical history that includes A-V cardiac pacemaker insertion (07); eye surgery (); Tonsillectomy; Colonoscopy (8/3/11); pacemaker placement; and Cystoscopy (N/A, 2021).  Social History:  reports that he quit smoking about 24 years ago. His smoking use included cigarettes. He started smoking about 64 years ago. He has a 40 pack-year smoking history. He has never used smokeless tobacco. He reports that he does not drink alcohol and does not use drugs.  Family History: family history includes Cancer in his mother; Diabetes in his mother; Heart Disease in his father; High Blood Pressure in his father and mother.   Allergies: Patient has no known allergies.  CURRENT MEDICATIONS       Discharge Medication List as of 2025  8:22 PM        CONTINUE these medications which have NOT CHANGED    Details   timolol (TIMOPTIC) 0.5

## 2025-05-23 ENCOUNTER — RESULTS FOLLOW-UP (OUTPATIENT)
Dept: PHARMACY | Age: 89
End: 2025-05-23

## 2025-05-24 LAB
MICROORGANISM SPEC CULT: ABNORMAL
SERVICE CMNT-IMP: ABNORMAL
SPECIMEN DESCRIPTION: ABNORMAL

## 2025-06-05 PROCEDURE — 99285 EMERGENCY DEPT VISIT HI MDM: CPT

## 2025-06-06 ENCOUNTER — HOSPITAL ENCOUNTER (OUTPATIENT)
Age: 89
Setting detail: OBSERVATION
Discharge: HOME HEALTH | End: 2025-06-10
Attending: STUDENT IN AN ORGANIZED HEALTH CARE EDUCATION/TRAINING PROGRAM | Admitting: INTERNAL MEDICINE
Payer: MEDICARE

## 2025-06-06 DIAGNOSIS — R31.0 GROSS HEMATURIA: Primary | ICD-10-CM

## 2025-06-06 PROBLEM — D64.9 ANEMIA: Status: ACTIVE | Noted: 2025-06-06

## 2025-06-06 PROBLEM — E78.5 HYPERLIPIDEMIA: Status: ACTIVE | Noted: 2025-06-06

## 2025-06-06 PROBLEM — R31.9 HEMATURIA: Status: ACTIVE | Noted: 2025-06-06

## 2025-06-06 PROBLEM — I10 PRIMARY HYPERTENSION: Status: ACTIVE | Noted: 2025-06-06

## 2025-06-06 LAB
AMORPH SED URNS QL MICRO: PRESENT
ANION GAP SERPL CALCULATED.3IONS-SCNC: 13 MMOL/L (ref 7–16)
BASOPHILS # BLD: 0.05 K/UL (ref 0–0.2)
BASOPHILS # BLD: 0.07 K/UL (ref 0–0.2)
BASOPHILS NFR BLD: 1 % (ref 0–2)
BASOPHILS NFR BLD: 1 % (ref 0–2)
BILIRUB UR QL STRIP: ABNORMAL
BUN SERPL-MCNC: 31 MG/DL (ref 6–23)
CALCIUM SERPL-MCNC: 9.7 MG/DL (ref 8.6–10.2)
CHLORIDE SERPL-SCNC: 102 MMOL/L (ref 98–107)
CLARITY UR: ABNORMAL
CO2 SERPL-SCNC: 28 MMOL/L (ref 22–29)
COLOR UR: ABNORMAL
CREAT SERPL-MCNC: 1.7 MG/DL (ref 0.7–1.2)
EOSINOPHIL # BLD: 0.16 K/UL (ref 0.05–0.5)
EOSINOPHIL # BLD: 0.2 K/UL (ref 0.05–0.5)
EOSINOPHILS RELATIVE PERCENT: 2 % (ref 0–6)
EOSINOPHILS RELATIVE PERCENT: 2 % (ref 0–6)
ERYTHROCYTE [DISTWIDTH] IN BLOOD BY AUTOMATED COUNT: 12.8 % (ref 11.5–15)
ERYTHROCYTE [DISTWIDTH] IN BLOOD BY AUTOMATED COUNT: 12.8 % (ref 11.5–15)
GFR, ESTIMATED: 37 ML/MIN/1.73M2
GLUCOSE SERPL-MCNC: 119 MG/DL (ref 74–99)
GLUCOSE UR STRIP-MCNC: ABNORMAL MG/DL
HCT VFR BLD AUTO: 28.9 % (ref 37–54)
HCT VFR BLD AUTO: 31.5 % (ref 37–54)
HCT VFR BLD AUTO: 35.1 % (ref 37–54)
HGB BLD-MCNC: 10.2 G/DL (ref 12.5–16.5)
HGB BLD-MCNC: 11.2 G/DL (ref 12.5–16.5)
HGB BLD-MCNC: 9.3 G/DL (ref 12.5–16.5)
HGB UR QL STRIP.AUTO: ABNORMAL
IMM GRANULOCYTES # BLD AUTO: 0.03 K/UL (ref 0–0.58)
IMM GRANULOCYTES # BLD AUTO: 0.04 K/UL (ref 0–0.58)
IMM GRANULOCYTES NFR BLD: 0 % (ref 0–5)
IMM GRANULOCYTES NFR BLD: 0 % (ref 0–5)
KETONES UR STRIP-MCNC: ABNORMAL MG/DL
LEUKOCYTE ESTERASE UR QL STRIP: ABNORMAL
LYMPHOCYTES NFR BLD: 1.86 K/UL (ref 1.5–4)
LYMPHOCYTES NFR BLD: 3.66 K/UL (ref 1.5–4)
LYMPHOCYTES RELATIVE PERCENT: 19 % (ref 20–42)
LYMPHOCYTES RELATIVE PERCENT: 36 % (ref 20–42)
MCH RBC QN AUTO: 30.9 PG (ref 26–35)
MCH RBC QN AUTO: 31 PG (ref 26–35)
MCHC RBC AUTO-ENTMCNC: 31.9 G/DL (ref 32–34.5)
MCHC RBC AUTO-ENTMCNC: 32.4 G/DL (ref 32–34.5)
MCV RBC AUTO: 95.5 FL (ref 80–99.9)
MCV RBC AUTO: 97.2 FL (ref 80–99.9)
MONOCYTES NFR BLD: 0.69 K/UL (ref 0.1–0.95)
MONOCYTES NFR BLD: 0.7 K/UL (ref 0.1–0.95)
MONOCYTES NFR BLD: 7 % (ref 2–12)
MONOCYTES NFR BLD: 7 % (ref 2–12)
NEUTROPHILS NFR BLD: 54 % (ref 43–80)
NEUTROPHILS NFR BLD: 71 % (ref 43–80)
NEUTS SEG NFR BLD: 5.48 K/UL (ref 1.8–7.3)
NEUTS SEG NFR BLD: 6.87 K/UL (ref 1.8–7.3)
NITRITE UR QL STRIP: ABNORMAL
PH UR STRIP: 6.5 [PH] (ref 5–8)
PLATELET # BLD AUTO: 304 K/UL (ref 130–450)
PLATELET # BLD AUTO: 315 K/UL (ref 130–450)
PMV BLD AUTO: 10.1 FL (ref 7–12)
PMV BLD AUTO: 10.1 FL (ref 7–12)
POTASSIUM SERPL-SCNC: 4.6 MMOL/L (ref 3.5–5)
PROT UR STRIP-MCNC: >=300 MG/DL
RBC # BLD AUTO: 3.3 M/UL (ref 3.8–5.8)
RBC # BLD AUTO: 3.61 M/UL (ref 3.8–5.8)
RBC #/AREA URNS HPF: ABNORMAL /HPF
SODIUM SERPL-SCNC: 143 MMOL/L (ref 132–146)
SP GR UR STRIP: 1.02 (ref 1–1.03)
UROBILINOGEN UR STRIP-ACNC: ABNORMAL EU/DL (ref 0–1)
WBC #/AREA URNS HPF: ABNORMAL /HPF
WBC OTHER # BLD: 10.1 K/UL (ref 4.5–11.5)
WBC OTHER # BLD: 9.7 K/UL (ref 4.5–11.5)

## 2025-06-06 PROCEDURE — G0378 HOSPITAL OBSERVATION PER HR: HCPCS

## 2025-06-06 PROCEDURE — 2500000003 HC RX 250 WO HCPCS: Performed by: NURSE PRACTITIONER

## 2025-06-06 PROCEDURE — 6370000000 HC RX 637 (ALT 250 FOR IP): Performed by: INTERNAL MEDICINE

## 2025-06-06 PROCEDURE — 6370000000 HC RX 637 (ALT 250 FOR IP): Performed by: NURSE PRACTITIONER

## 2025-06-06 PROCEDURE — 85014 HEMATOCRIT: CPT

## 2025-06-06 PROCEDURE — 81001 URINALYSIS AUTO W/SCOPE: CPT

## 2025-06-06 PROCEDURE — 99223 1ST HOSP IP/OBS HIGH 75: CPT | Performed by: INTERNAL MEDICINE

## 2025-06-06 PROCEDURE — 85018 HEMOGLOBIN: CPT

## 2025-06-06 PROCEDURE — 87086 URINE CULTURE/COLONY COUNT: CPT

## 2025-06-06 PROCEDURE — APPSS45 APP SPLIT SHARED TIME 31-45 MINUTES: Performed by: NURSE PRACTITIONER

## 2025-06-06 PROCEDURE — 80048 BASIC METABOLIC PNL TOTAL CA: CPT

## 2025-06-06 PROCEDURE — 85025 COMPLETE CBC W/AUTO DIFF WBC: CPT

## 2025-06-06 RX ORDER — LANOLIN ALCOHOL/MO/W.PET/CERES
200 CREAM (GRAM) TOPICAL DAILY
Status: DISCONTINUED | OUTPATIENT
Start: 2025-06-07 | End: 2025-06-10 | Stop reason: HOSPADM

## 2025-06-06 RX ORDER — POLYETHYLENE GLYCOL 3350 17 G/17G
17 POWDER, FOR SOLUTION ORAL DAILY PRN
Status: DISCONTINUED | OUTPATIENT
Start: 2025-06-06 | End: 2025-06-10 | Stop reason: HOSPADM

## 2025-06-06 RX ORDER — METOPROLOL SUCCINATE 25 MG/1
25 TABLET, EXTENDED RELEASE ORAL DAILY
Status: DISCONTINUED | OUTPATIENT
Start: 2025-06-06 | End: 2025-06-10 | Stop reason: HOSPADM

## 2025-06-06 RX ORDER — ACETAMINOPHEN 325 MG/1
650 TABLET ORAL EVERY 6 HOURS PRN
Status: DISCONTINUED | OUTPATIENT
Start: 2025-06-06 | End: 2025-06-10 | Stop reason: HOSPADM

## 2025-06-06 RX ORDER — LISINOPRIL 10 MG/1
10 TABLET ORAL DAILY
Status: DISCONTINUED | OUTPATIENT
Start: 2025-06-06 | End: 2025-06-10 | Stop reason: HOSPADM

## 2025-06-06 RX ORDER — TIMOLOL MALEATE 5 MG/ML
1 SOLUTION/ DROPS OPHTHALMIC 2 TIMES DAILY
Status: DISCONTINUED | OUTPATIENT
Start: 2025-06-06 | End: 2025-06-10 | Stop reason: HOSPADM

## 2025-06-06 RX ORDER — METOPROLOL SUCCINATE 25 MG/1
25 TABLET, EXTENDED RELEASE ORAL DAILY
Status: DISCONTINUED | OUTPATIENT
Start: 2025-06-06 | End: 2025-06-06

## 2025-06-06 RX ORDER — SODIUM CHLORIDE 0.9 % (FLUSH) 0.9 %
5-40 SYRINGE (ML) INJECTION EVERY 12 HOURS SCHEDULED
Status: DISCONTINUED | OUTPATIENT
Start: 2025-06-06 | End: 2025-06-10 | Stop reason: HOSPADM

## 2025-06-06 RX ORDER — PANTOPRAZOLE SODIUM 40 MG/1
40 TABLET, DELAYED RELEASE ORAL
Status: DISCONTINUED | OUTPATIENT
Start: 2025-06-07 | End: 2025-06-10 | Stop reason: HOSPADM

## 2025-06-06 RX ORDER — FERROUS SULFATE 325(65) MG
325 TABLET ORAL 2 TIMES DAILY
Status: DISCONTINUED | OUTPATIENT
Start: 2025-06-06 | End: 2025-06-10 | Stop reason: HOSPADM

## 2025-06-06 RX ORDER — SODIUM CHLORIDE 9 MG/ML
INJECTION, SOLUTION INTRAVENOUS PRN
Status: DISCONTINUED | OUTPATIENT
Start: 2025-06-06 | End: 2025-06-10 | Stop reason: HOSPADM

## 2025-06-06 RX ORDER — ATORVASTATIN CALCIUM 20 MG/1
20 TABLET, FILM COATED ORAL NIGHTLY
Status: DISCONTINUED | OUTPATIENT
Start: 2025-06-06 | End: 2025-06-10 | Stop reason: HOSPADM

## 2025-06-06 RX ORDER — ONDANSETRON 4 MG/1
4 TABLET, ORALLY DISINTEGRATING ORAL EVERY 8 HOURS PRN
Status: DISCONTINUED | OUTPATIENT
Start: 2025-06-06 | End: 2025-06-10 | Stop reason: HOSPADM

## 2025-06-06 RX ORDER — TIMOLOL MALEATE 5 MG/ML
1 SOLUTION/ DROPS OPHTHALMIC 2 TIMES DAILY
Status: DISCONTINUED | OUTPATIENT
Start: 2025-06-06 | End: 2025-06-06

## 2025-06-06 RX ORDER — LISINOPRIL 10 MG/1
10 TABLET ORAL DAILY
Status: DISCONTINUED | OUTPATIENT
Start: 2025-06-06 | End: 2025-06-06

## 2025-06-06 RX ORDER — ACETAMINOPHEN 650 MG/1
650 SUPPOSITORY RECTAL EVERY 6 HOURS PRN
Status: DISCONTINUED | OUTPATIENT
Start: 2025-06-06 | End: 2025-06-10 | Stop reason: HOSPADM

## 2025-06-06 RX ORDER — SODIUM CHLORIDE 0.9 % (FLUSH) 0.9 %
5-40 SYRINGE (ML) INJECTION PRN
Status: DISCONTINUED | OUTPATIENT
Start: 2025-06-06 | End: 2025-06-10 | Stop reason: HOSPADM

## 2025-06-06 RX ORDER — ONDANSETRON 2 MG/ML
4 INJECTION INTRAMUSCULAR; INTRAVENOUS EVERY 6 HOURS PRN
Status: DISCONTINUED | OUTPATIENT
Start: 2025-06-06 | End: 2025-06-10 | Stop reason: HOSPADM

## 2025-06-06 RX ADMIN — ATORVASTATIN CALCIUM 20 MG: 20 TABLET, FILM COATED ORAL at 23:52

## 2025-06-06 RX ADMIN — METOPROLOL SUCCINATE 25 MG: 25 TABLET, EXTENDED RELEASE ORAL at 23:52

## 2025-06-06 RX ADMIN — SODIUM CHLORIDE, PRESERVATIVE FREE 10 ML: 5 INJECTION INTRAVENOUS at 23:52

## 2025-06-06 RX ADMIN — Medication 325 MG: at 23:52

## 2025-06-06 RX ADMIN — LISINOPRIL 10 MG: 10 TABLET ORAL at 23:52

## 2025-06-06 ASSESSMENT — PAIN SCALES - GENERAL: PAINLEVEL_OUTOF10: 2

## 2025-06-06 NOTE — ED PROVIDER NOTES
Monocytes Absolute 0.70 0.10 - 0.95 k/uL    Eosinophils Absolute 0.20 0.05 - 0.50 k/uL    Basophils Absolute 0.05 0.00 - 0.20 k/uL    Immature Granulocytes Absolute 0.03 0.00 - 0.58 k/uL       Radiology:  No orders to display       ------------------------- NURSING NOTES AND VITALS REVIEWED ---------------------------  Date / Time Roomed:  6/6/2025 12:29 AM  ED Bed Assignment:  13/13    The nursing notes within the ED encounter and vital signs as below have been reviewed.   /66   Pulse 60   Temp 97.8 °F (36.6 °C) (Oral)   Resp 16   Ht 1.778 m (5' 10\")   Wt 68.5 kg (151 lb)   SpO2 96%   BMI 21.67 kg/m²   Oxygen Saturation Interpretation: Normal      Diagnosis:  1. Gross hematuria        Disposition:  Patient's disposition: Admit  Patient's condition is stable.            Brian Barcenas DO  06/06/25 2049

## 2025-06-06 NOTE — CONSULTS
follow along.    Electronically signed by Manny Vega MD on 6/6/2025 at 12:56 PM  [facility]

## 2025-06-06 NOTE — ED NOTES
Suprapubic catheter irrigated with a piston syringe. 500 mL sterile water instilled with 500 mL output. Dr. Barcenas notified that urine is still bright red.

## 2025-06-06 NOTE — ED NOTES
PrecautionsPrecautions: Fall riskNegative Pressure Room: NoPositive Pressure Room: No  Safe EnvironmentArm Bands On: Fall; IDPatient has limb restriction?: NoSafety Measures: Standard Safety Measures; Bed/Chair alarm on; Bed/Chair-Wheels locked; Bed in low position; Call light within reach; Overbed table w/in reach; Gripper socks; Side rails X 2; Caregiver at bedside  TelemetryCardiac/Telemetry Monitor On: Bedside monitor in useAlarm Audible: YesAlarms Set: Yes  Family/Significant Other CommunicationFamily/Significant Other Update: Updated; Visiting  Fall Risk InterventionsNursing Judgement-Fall Risk High(Add Comments): YesToilet Every 2 Hours-In Advance of Need: YesHourly Visual Checks: In bed; AwakeFall Visual Posted: Armband; Fall sign posted; SocksRoom Door Open: YesAlarm On: BedPatient Moved Closer to Nursing Station: No  MobilityActivity: In bedTurned/Repositioned: Turns selfHead of Bed Elevated : Self regulated  NutritionFeeding: Able to feed self - IndependentNPO: No  HygieneHygiene: Quynh careProduct Used : Soap and water  Comfort and Environment InterventionsComfort: Lights dim  Safety CompanionSitter Type: FamilyIndications for Sitter: Patient safetyAlternatives to Sitter: Increased Frequency of Nursing Rounds; Decrease Stimulation; Comfort Measures; Eliminate Invasive Treatment

## 2025-06-06 NOTE — ED NOTES
Patient placed on continuous bladder irrigation. Clots noted, manual irrigation to break clots. 500 mL instilled and 500 mL output.

## 2025-06-06 NOTE — ED NOTES
Spoke with dr cobb who states that he spoke with the hospitalist and they accepted him because his hgb level dropped. Family updated.

## 2025-06-07 PROBLEM — N17.9 ACUTE KIDNEY INJURY SUPERIMPOSED ON STAGE 4 CHRONIC KIDNEY DISEASE (HCC): Status: ACTIVE | Noted: 2025-06-07

## 2025-06-07 PROBLEM — N18.4 ACUTE KIDNEY INJURY SUPERIMPOSED ON STAGE 4 CHRONIC KIDNEY DISEASE (HCC): Status: ACTIVE | Noted: 2025-06-07

## 2025-06-07 PROBLEM — I95.2 HYPOTENSION DUE TO DRUGS: Status: ACTIVE | Noted: 2025-06-07

## 2025-06-07 PROBLEM — R55 NEAR SYNCOPE: Status: ACTIVE | Noted: 2025-06-07

## 2025-06-07 LAB
ANION GAP SERPL CALCULATED.3IONS-SCNC: 16 MMOL/L (ref 7–16)
BASOPHILS # BLD: 0.06 K/UL (ref 0–0.2)
BASOPHILS NFR BLD: 1 % (ref 0–2)
BUN SERPL-MCNC: 36 MG/DL (ref 6–23)
CALCIUM SERPL-MCNC: 8.9 MG/DL (ref 8.6–10.2)
CHLORIDE SERPL-SCNC: 102 MMOL/L (ref 98–107)
CO2 SERPL-SCNC: 22 MMOL/L (ref 22–29)
CREAT SERPL-MCNC: 2.4 MG/DL (ref 0.7–1.2)
EOSINOPHIL # BLD: 0.03 K/UL (ref 0.05–0.5)
EOSINOPHILS RELATIVE PERCENT: 0 % (ref 0–6)
ERYTHROCYTE [DISTWIDTH] IN BLOOD BY AUTOMATED COUNT: 13 % (ref 11.5–15)
GFR, ESTIMATED: 25 ML/MIN/1.73M2
GLUCOSE SERPL-MCNC: 136 MG/DL (ref 74–99)
HCT VFR BLD AUTO: 27.7 % (ref 37–54)
HGB BLD-MCNC: 9.1 G/DL (ref 12.5–16.5)
IMM GRANULOCYTES # BLD AUTO: 0.04 K/UL (ref 0–0.58)
IMM GRANULOCYTES NFR BLD: 0 % (ref 0–5)
LYMPHOCYTES NFR BLD: 2.41 K/UL (ref 1.5–4)
LYMPHOCYTES RELATIVE PERCENT: 19 % (ref 20–42)
MCH RBC QN AUTO: 31.9 PG (ref 26–35)
MCHC RBC AUTO-ENTMCNC: 32.9 G/DL (ref 32–34.5)
MCV RBC AUTO: 97.2 FL (ref 80–99.9)
MICROORGANISM SPEC CULT: ABNORMAL
MONOCYTES NFR BLD: 0.92 K/UL (ref 0.1–0.95)
MONOCYTES NFR BLD: 7 % (ref 2–12)
NEUTROPHILS NFR BLD: 73 % (ref 43–80)
NEUTS SEG NFR BLD: 9.19 K/UL (ref 1.8–7.3)
PLATELET # BLD AUTO: 324 K/UL (ref 130–450)
PMV BLD AUTO: 11.2 FL (ref 7–12)
POTASSIUM SERPL-SCNC: 4.5 MMOL/L (ref 3.5–5)
RBC # BLD AUTO: 2.85 M/UL (ref 3.8–5.8)
SODIUM SERPL-SCNC: 140 MMOL/L (ref 132–146)
SPECIMEN DESCRIPTION: ABNORMAL
WBC OTHER # BLD: 12.7 K/UL (ref 4.5–11.5)

## 2025-06-07 PROCEDURE — 99232 SBSQ HOSP IP/OBS MODERATE 35: CPT | Performed by: INTERNAL MEDICINE

## 2025-06-07 PROCEDURE — 6360000002 HC RX W HCPCS: Performed by: FAMILY MEDICINE

## 2025-06-07 PROCEDURE — 6370000000 HC RX 637 (ALT 250 FOR IP): Performed by: INTERNAL MEDICINE

## 2025-06-07 PROCEDURE — 6370000000 HC RX 637 (ALT 250 FOR IP): Performed by: NURSE PRACTITIONER

## 2025-06-07 PROCEDURE — P9047 ALBUMIN (HUMAN), 25%, 50ML: HCPCS | Performed by: FAMILY MEDICINE

## 2025-06-07 PROCEDURE — 2580000003 HC RX 258: Performed by: NURSE PRACTITIONER

## 2025-06-07 PROCEDURE — 85025 COMPLETE CBC W/AUTO DIFF WBC: CPT

## 2025-06-07 PROCEDURE — APPSS30 APP SPLIT SHARED TIME 16-30 MINUTES: Performed by: NURSE PRACTITIONER

## 2025-06-07 PROCEDURE — 2500000003 HC RX 250 WO HCPCS: Performed by: NURSE PRACTITIONER

## 2025-06-07 PROCEDURE — G0378 HOSPITAL OBSERVATION PER HR: HCPCS

## 2025-06-07 PROCEDURE — 80048 BASIC METABOLIC PNL TOTAL CA: CPT

## 2025-06-07 PROCEDURE — 96365 THER/PROPH/DIAG IV INF INIT: CPT

## 2025-06-07 PROCEDURE — 96361 HYDRATE IV INFUSION ADD-ON: CPT

## 2025-06-07 PROCEDURE — 36415 COLL VENOUS BLD VENIPUNCTURE: CPT

## 2025-06-07 RX ORDER — ALLOPURINOL 100 MG/1
100 TABLET ORAL DAILY
Status: DISCONTINUED | OUTPATIENT
Start: 2025-06-07 | End: 2025-06-10 | Stop reason: HOSPADM

## 2025-06-07 RX ORDER — SODIUM CHLORIDE, SODIUM LACTATE, POTASSIUM CHLORIDE, CALCIUM CHLORIDE 600; 310; 30; 20 MG/100ML; MG/100ML; MG/100ML; MG/100ML
INJECTION, SOLUTION INTRAVENOUS CONTINUOUS
Status: DISCONTINUED | OUTPATIENT
Start: 2025-06-07 | End: 2025-06-10 | Stop reason: HOSPADM

## 2025-06-07 RX ORDER — ALBUMIN (HUMAN) 12.5 G/50ML
50 SOLUTION INTRAVENOUS ONCE
Status: COMPLETED | OUTPATIENT
Start: 2025-06-07 | End: 2025-06-07

## 2025-06-07 RX ADMIN — ATORVASTATIN CALCIUM 20 MG: 20 TABLET, FILM COATED ORAL at 22:49

## 2025-06-07 RX ADMIN — PANTOPRAZOLE SODIUM 40 MG: 40 TABLET, DELAYED RELEASE ORAL at 07:00

## 2025-06-07 RX ADMIN — SODIUM CHLORIDE, SODIUM LACTATE, POTASSIUM CHLORIDE, AND CALCIUM CHLORIDE: .6; .31; .03; .02 INJECTION, SOLUTION INTRAVENOUS at 10:12

## 2025-06-07 RX ADMIN — SODIUM CHLORIDE, PRESERVATIVE FREE 10 ML: 5 INJECTION INTRAVENOUS at 10:17

## 2025-06-07 RX ADMIN — TIMOLOL MALEATE 1 DROP: 5 SOLUTION OPHTHALMIC at 10:19

## 2025-06-07 RX ADMIN — ALLOPURINOL 100 MG: 100 TABLET ORAL at 10:59

## 2025-06-07 RX ADMIN — MAGNESIUM OXIDE 400 MG (241.3 MG MAGNESIUM) TABLET 200 MG: TABLET at 10:08

## 2025-06-07 RX ADMIN — ALBUMIN (HUMAN) 50 G: 0.25 INJECTION, SOLUTION INTRAVENOUS at 06:40

## 2025-06-07 RX ADMIN — ACETAMINOPHEN 650 MG: 325 TABLET ORAL at 02:31

## 2025-06-07 RX ADMIN — TIMOLOL MALEATE 1 DROP: 5 SOLUTION OPHTHALMIC at 22:49

## 2025-06-07 RX ADMIN — Medication 325 MG: at 22:49

## 2025-06-07 RX ADMIN — Medication 325 MG: at 10:08

## 2025-06-07 ASSESSMENT — PAIN SCALES - GENERAL
PAINLEVEL_OUTOF10: 6
PAINLEVEL_OUTOF10: 3

## 2025-06-07 ASSESSMENT — PAIN DESCRIPTION - DESCRIPTORS: DESCRIPTORS: CRAMPING

## 2025-06-07 ASSESSMENT — PAIN DESCRIPTION - LOCATION: LOCATION: ABDOMEN

## 2025-06-07 NOTE — ED NOTES
ED to Inpatient Handoff Report    Notified Crow that electronic handoff available and patient ready for transport to room 332-01.    Safety Risks: Home safety issues and Risk of falls    Patient in Restraints: no    Constant Observer or Patient : no - family has been at bedside throughout his stay    Telemetry Monitoring Ordered :Yes               Deterioration Index Score:   Predictive Model Details          24 (Normal)  Factor Value    Calculated 6/6/2025 20:53 61% Age 89 years old    Deterioration Index Model 14% Potassium 4.6 mmol/L     10% Sodium 143 mmol/L     5% WBC count 10.1 k/uL     4% BUN abnormal (31 mg/dL)     3% Hematocrit abnormal (31.5 %)     2% Pulse 60     2% Systolic 117     0% Temperature 97.8 °F (36.6 °C)     0% Pulse oximetry 96 %     0% Respiratory rate 16        Vitals:    06/06/25 1830 06/06/25 1845 06/06/25 1900 06/06/25 1915   BP:   117/66    Pulse: 60 60 60 60   Resp:   16    Temp:       TempSrc:       SpO2: 95% 96% 97% 96%   Weight:       Height:             Opportunity for questions and clarification was provided during telephone report.

## 2025-06-08 LAB
ANION GAP SERPL CALCULATED.3IONS-SCNC: 14 MMOL/L (ref 7–16)
ANION GAP SERPL CALCULATED.3IONS-SCNC: 14 MMOL/L (ref 7–16)
BASOPHILS # BLD: 0.05 K/UL (ref 0–0.2)
BASOPHILS # BLD: 0.06 K/UL (ref 0–0.2)
BASOPHILS NFR BLD: 1 % (ref 0–2)
BASOPHILS NFR BLD: 1 % (ref 0–2)
BUN SERPL-MCNC: 31 MG/DL (ref 6–23)
BUN SERPL-MCNC: 32 MG/DL (ref 6–23)
CALCIUM SERPL-MCNC: 8.5 MG/DL (ref 8.6–10.2)
CALCIUM SERPL-MCNC: 8.6 MG/DL (ref 8.6–10.2)
CHLORIDE SERPL-SCNC: 105 MMOL/L (ref 98–107)
CHLORIDE SERPL-SCNC: 105 MMOL/L (ref 98–107)
CO2 SERPL-SCNC: 22 MMOL/L (ref 22–29)
CO2 SERPL-SCNC: 23 MMOL/L (ref 22–29)
CREAT SERPL-MCNC: 1.9 MG/DL (ref 0.7–1.2)
CREAT SERPL-MCNC: 1.9 MG/DL (ref 0.7–1.2)
EOSINOPHIL # BLD: 0.17 K/UL (ref 0.05–0.5)
EOSINOPHIL # BLD: 0.18 K/UL (ref 0.05–0.5)
EOSINOPHILS RELATIVE PERCENT: 2 % (ref 0–6)
EOSINOPHILS RELATIVE PERCENT: 2 % (ref 0–6)
ERYTHROCYTE [DISTWIDTH] IN BLOOD BY AUTOMATED COUNT: 13.2 % (ref 11.5–15)
ERYTHROCYTE [DISTWIDTH] IN BLOOD BY AUTOMATED COUNT: 13.3 % (ref 11.5–15)
GFR, ESTIMATED: 32 ML/MIN/1.73M2
GFR, ESTIMATED: 33 ML/MIN/1.73M2
GLUCOSE SERPL-MCNC: 119 MG/DL (ref 74–99)
GLUCOSE SERPL-MCNC: 153 MG/DL (ref 74–99)
HCT VFR BLD AUTO: 20.8 % (ref 37–54)
HCT VFR BLD AUTO: 22.1 % (ref 37–54)
HCT VFR BLD AUTO: 24.3 % (ref 37–54)
HGB BLD-MCNC: 6.6 G/DL (ref 12.5–16.5)
HGB BLD-MCNC: 7 G/DL (ref 12.5–16.5)
HGB BLD-MCNC: 7.7 G/DL (ref 12.5–16.5)
IMM GRANULOCYTES # BLD AUTO: 0.03 K/UL (ref 0–0.58)
IMM GRANULOCYTES # BLD AUTO: <0.03 K/UL (ref 0–0.58)
IMM GRANULOCYTES NFR BLD: 0 % (ref 0–5)
IMM GRANULOCYTES NFR BLD: 0 % (ref 0–5)
LYMPHOCYTES NFR BLD: 1.87 K/UL (ref 1.5–4)
LYMPHOCYTES NFR BLD: 1.94 K/UL (ref 1.5–4)
LYMPHOCYTES RELATIVE PERCENT: 22 % (ref 20–42)
LYMPHOCYTES RELATIVE PERCENT: 23 % (ref 20–42)
MCH RBC QN AUTO: 31.3 PG (ref 26–35)
MCH RBC QN AUTO: 31.3 PG (ref 26–35)
MCHC RBC AUTO-ENTMCNC: 31.7 G/DL (ref 32–34.5)
MCHC RBC AUTO-ENTMCNC: 31.7 G/DL (ref 32–34.5)
MCV RBC AUTO: 98.6 FL (ref 80–99.9)
MCV RBC AUTO: 98.7 FL (ref 80–99.9)
MONOCYTES NFR BLD: 0.55 K/UL (ref 0.1–0.95)
MONOCYTES NFR BLD: 0.67 K/UL (ref 0.1–0.95)
MONOCYTES NFR BLD: 7 % (ref 2–12)
MONOCYTES NFR BLD: 8 % (ref 2–12)
NEUTROPHILS NFR BLD: 67 % (ref 43–80)
NEUTROPHILS NFR BLD: 67 % (ref 43–80)
NEUTS SEG NFR BLD: 5.61 K/UL (ref 1.8–7.3)
NEUTS SEG NFR BLD: 5.62 K/UL (ref 1.8–7.3)
PLATELET # BLD AUTO: 219 K/UL (ref 130–450)
PLATELET # BLD AUTO: 226 K/UL (ref 130–450)
PMV BLD AUTO: 10.8 FL (ref 7–12)
PMV BLD AUTO: 11 FL (ref 7–12)
POTASSIUM SERPL-SCNC: 3.9 MMOL/L (ref 3.5–5)
POTASSIUM SERPL-SCNC: 4 MMOL/L (ref 3.5–5)
RBC # BLD AUTO: 2.11 M/UL (ref 3.8–5.8)
RBC # BLD AUTO: 2.24 M/UL (ref 3.8–5.8)
SODIUM SERPL-SCNC: 141 MMOL/L (ref 132–146)
SODIUM SERPL-SCNC: 142 MMOL/L (ref 132–146)
WBC OTHER # BLD: 8.4 K/UL (ref 4.5–11.5)
WBC OTHER # BLD: 8.4 K/UL (ref 4.5–11.5)

## 2025-06-08 PROCEDURE — 99232 SBSQ HOSP IP/OBS MODERATE 35: CPT | Performed by: INTERNAL MEDICINE

## 2025-06-08 PROCEDURE — 85018 HEMOGLOBIN: CPT

## 2025-06-08 PROCEDURE — 86900 BLOOD TYPING SEROLOGIC ABO: CPT

## 2025-06-08 PROCEDURE — 96361 HYDRATE IV INFUSION ADD-ON: CPT

## 2025-06-08 PROCEDURE — 86850 RBC ANTIBODY SCREEN: CPT

## 2025-06-08 PROCEDURE — APPSS30 APP SPLIT SHARED TIME 16-30 MINUTES: Performed by: NURSE PRACTITIONER

## 2025-06-08 PROCEDURE — 36415 COLL VENOUS BLD VENIPUNCTURE: CPT

## 2025-06-08 PROCEDURE — G0378 HOSPITAL OBSERVATION PER HR: HCPCS

## 2025-06-08 PROCEDURE — 85014 HEMATOCRIT: CPT

## 2025-06-08 PROCEDURE — 36430 TRANSFUSION BLD/BLD COMPNT: CPT

## 2025-06-08 PROCEDURE — 86901 BLOOD TYPING SEROLOGIC RH(D): CPT

## 2025-06-08 PROCEDURE — 6370000000 HC RX 637 (ALT 250 FOR IP): Performed by: NURSE PRACTITIONER

## 2025-06-08 PROCEDURE — 86923 COMPATIBILITY TEST ELECTRIC: CPT

## 2025-06-08 PROCEDURE — 85025 COMPLETE CBC W/AUTO DIFF WBC: CPT

## 2025-06-08 PROCEDURE — P9016 RBC LEUKOCYTES REDUCED: HCPCS

## 2025-06-08 PROCEDURE — 80048 BASIC METABOLIC PNL TOTAL CA: CPT

## 2025-06-08 RX ORDER — SODIUM CHLORIDE 9 MG/ML
INJECTION, SOLUTION INTRAVENOUS PRN
Status: DISCONTINUED | OUTPATIENT
Start: 2025-06-08 | End: 2025-06-10 | Stop reason: HOSPADM

## 2025-06-08 RX ADMIN — TIMOLOL MALEATE 1 DROP: 5 SOLUTION OPHTHALMIC at 20:25

## 2025-06-08 RX ADMIN — ALLOPURINOL 100 MG: 100 TABLET ORAL at 10:14

## 2025-06-08 RX ADMIN — PANTOPRAZOLE SODIUM 40 MG: 40 TABLET, DELAYED RELEASE ORAL at 05:57

## 2025-06-08 RX ADMIN — ACETAMINOPHEN 650 MG: 325 TABLET ORAL at 10:14

## 2025-06-08 RX ADMIN — TIMOLOL MALEATE 1 DROP: 5 SOLUTION OPHTHALMIC at 10:21

## 2025-06-08 RX ADMIN — Medication 325 MG: at 20:23

## 2025-06-08 RX ADMIN — MAGNESIUM OXIDE 400 MG (241.3 MG MAGNESIUM) TABLET 200 MG: TABLET at 10:14

## 2025-06-08 RX ADMIN — Medication 325 MG: at 10:14

## 2025-06-08 RX ADMIN — ATORVASTATIN CALCIUM 20 MG: 20 TABLET, FILM COATED ORAL at 20:24

## 2025-06-08 ASSESSMENT — PAIN DESCRIPTION - ORIENTATION: ORIENTATION: LEFT

## 2025-06-08 ASSESSMENT — PAIN DESCRIPTION - LOCATION: LOCATION: SHOULDER

## 2025-06-08 ASSESSMENT — PAIN DESCRIPTION - DESCRIPTORS: DESCRIPTORS: DISCOMFORT;ACHING

## 2025-06-08 NOTE — PLAN OF CARE
Problem: Safety - Adult  Goal: Free from fall injury  6/8/2025 0412 by Anne-Marie Nielsen RN  Outcome: Progressing  6/7/2025 1754 by Brie Olmstead RN  Outcome: Progressing     Problem: Chronic Conditions and Co-morbidities  Goal: Patient's chronic conditions and co-morbidity symptoms are monitored and maintained or improved  6/8/2025 0412 by Anne-Marie Nielsen RN  Outcome: Progressing  6/7/2025 1754 by Brie Olmstead RN  Outcome: Progressing     Problem: Discharge Planning  Goal: Discharge to home or other facility with appropriate resources  6/8/2025 0412 by Anne-Marie Nielsen RN  Outcome: Progressing  6/7/2025 1754 by Brie Olmstead RN  Outcome: Progressing     Problem: ABCDS Injury Assessment  Goal: Absence of physical injury  6/8/2025 0412 by Anne-Marie Nielsen RN  Outcome: Progressing  6/7/2025 1754 by Brie Olmstead RN  Outcome: Progressing

## 2025-06-09 LAB
ABO/RH: NORMAL
ANION GAP SERPL CALCULATED.3IONS-SCNC: 12 MMOL/L (ref 7–16)
ANTIBODY SCREEN: NEGATIVE
ARM BAND NUMBER: NORMAL
BASOPHILS # BLD: 0.07 K/UL (ref 0–0.2)
BASOPHILS NFR BLD: 1 % (ref 0–2)
BLOOD BANK BLOOD PRODUCT EXPIRATION DATE: NORMAL
BLOOD BANK DISPENSE STATUS: NORMAL
BLOOD BANK ISBT PRODUCT BLOOD TYPE: 5100
BLOOD BANK PRODUCT CODE: NORMAL
BLOOD BANK SAMPLE EXPIRATION: NORMAL
BLOOD BANK UNIT TYPE AND RH: NORMAL
BPU ID: NORMAL
BUN SERPL-MCNC: 29 MG/DL (ref 6–23)
CALCIUM SERPL-MCNC: 8.6 MG/DL (ref 8.6–10.2)
CHLORIDE SERPL-SCNC: 106 MMOL/L (ref 98–107)
CO2 SERPL-SCNC: 23 MMOL/L (ref 22–29)
COMPONENT: NORMAL
CREAT SERPL-MCNC: 1.8 MG/DL (ref 0.7–1.2)
CROSSMATCH RESULT: NORMAL
EOSINOPHIL # BLD: 0.26 K/UL (ref 0.05–0.5)
EOSINOPHILS RELATIVE PERCENT: 3 % (ref 0–6)
ERYTHROCYTE [DISTWIDTH] IN BLOOD BY AUTOMATED COUNT: 13.5 % (ref 11.5–15)
GFR, ESTIMATED: 35 ML/MIN/1.73M2
GLUCOSE SERPL-MCNC: 94 MG/DL (ref 74–99)
HCT VFR BLD AUTO: 24.4 % (ref 37–54)
HGB BLD-MCNC: 7.8 G/DL (ref 12.5–16.5)
IMM GRANULOCYTES # BLD AUTO: <0.03 K/UL (ref 0–0.58)
IMM GRANULOCYTES NFR BLD: 0 % (ref 0–5)
LYMPHOCYTES NFR BLD: 1.98 K/UL (ref 1.5–4)
LYMPHOCYTES RELATIVE PERCENT: 24 % (ref 20–42)
MCH RBC QN AUTO: 31.2 PG (ref 26–35)
MCHC RBC AUTO-ENTMCNC: 32 G/DL (ref 32–34.5)
MCV RBC AUTO: 97.6 FL (ref 80–99.9)
MONOCYTES NFR BLD: 0.8 K/UL (ref 0.1–0.95)
MONOCYTES NFR BLD: 10 % (ref 2–12)
NEUTROPHILS NFR BLD: 63 % (ref 43–80)
NEUTS SEG NFR BLD: 5.3 K/UL (ref 1.8–7.3)
PLATELET # BLD AUTO: 208 K/UL (ref 130–450)
PMV BLD AUTO: 11.3 FL (ref 7–12)
POTASSIUM SERPL-SCNC: 3.8 MMOL/L (ref 3.5–5)
RBC # BLD AUTO: 2.5 M/UL (ref 3.8–5.8)
SODIUM SERPL-SCNC: 141 MMOL/L (ref 132–146)
TRANSFUSION STATUS: NORMAL
UNIT DIVISION: 0
UNIT ISSUE DATE/TIME: NORMAL
WBC OTHER # BLD: 8.4 K/UL (ref 4.5–11.5)

## 2025-06-09 PROCEDURE — 96361 HYDRATE IV INFUSION ADD-ON: CPT

## 2025-06-09 PROCEDURE — 85025 COMPLETE CBC W/AUTO DIFF WBC: CPT

## 2025-06-09 PROCEDURE — 2580000003 HC RX 258: Performed by: NURSE PRACTITIONER

## 2025-06-09 PROCEDURE — 99232 SBSQ HOSP IP/OBS MODERATE 35: CPT | Performed by: INTERNAL MEDICINE

## 2025-06-09 PROCEDURE — 6370000000 HC RX 637 (ALT 250 FOR IP): Performed by: NURSE PRACTITIONER

## 2025-06-09 PROCEDURE — 36415 COLL VENOUS BLD VENIPUNCTURE: CPT

## 2025-06-09 PROCEDURE — G0378 HOSPITAL OBSERVATION PER HR: HCPCS

## 2025-06-09 PROCEDURE — 2500000003 HC RX 250 WO HCPCS: Performed by: NURSE PRACTITIONER

## 2025-06-09 PROCEDURE — APPSS30 APP SPLIT SHARED TIME 16-30 MINUTES: Performed by: NURSE PRACTITIONER

## 2025-06-09 PROCEDURE — 80048 BASIC METABOLIC PNL TOTAL CA: CPT

## 2025-06-09 RX ADMIN — SODIUM CHLORIDE, PRESERVATIVE FREE 10 ML: 5 INJECTION INTRAVENOUS at 10:13

## 2025-06-09 RX ADMIN — Medication 325 MG: at 21:11

## 2025-06-09 RX ADMIN — PANTOPRAZOLE SODIUM 40 MG: 40 TABLET, DELAYED RELEASE ORAL at 06:29

## 2025-06-09 RX ADMIN — ATORVASTATIN CALCIUM 20 MG: 20 TABLET, FILM COATED ORAL at 21:11

## 2025-06-09 RX ADMIN — ALLOPURINOL 100 MG: 100 TABLET ORAL at 10:13

## 2025-06-09 RX ADMIN — SODIUM CHLORIDE, SODIUM LACTATE, POTASSIUM CHLORIDE, AND CALCIUM CHLORIDE: .6; .31; .03; .02 INJECTION, SOLUTION INTRAVENOUS at 21:09

## 2025-06-09 RX ADMIN — MAGNESIUM OXIDE 400 MG (241.3 MG MAGNESIUM) TABLET 200 MG: TABLET at 10:13

## 2025-06-09 RX ADMIN — METOPROLOL SUCCINATE 25 MG: 25 TABLET, EXTENDED RELEASE ORAL at 10:13

## 2025-06-09 RX ADMIN — Medication 325 MG: at 10:11

## 2025-06-09 RX ADMIN — TIMOLOL MALEATE 1 DROP: 5 SOLUTION OPHTHALMIC at 10:12

## 2025-06-09 RX ADMIN — TIMOLOL MALEATE 1 DROP: 5 SOLUTION OPHTHALMIC at 22:15

## 2025-06-09 NOTE — ACP (ADVANCE CARE PLANNING)
Advance Care Planning   Healthcare Decision Maker:    Primary Decision Maker: Savi Marquis - Child - [phone]    Secondary Decision Maker: Kyle Fox Jr. - Child - [phone]

## 2025-06-10 ENCOUNTER — TELEPHONE (OUTPATIENT)
Dept: FAMILY MEDICINE CLINIC | Age: 89
End: 2025-06-10

## 2025-06-10 VITALS
DIASTOLIC BLOOD PRESSURE: 62 MMHG | WEIGHT: 151 LBS | OXYGEN SATURATION: 93 % | BODY MASS INDEX: 21.62 KG/M2 | TEMPERATURE: 97.9 F | RESPIRATION RATE: 16 BRPM | HEIGHT: 70 IN | SYSTOLIC BLOOD PRESSURE: 138 MMHG | HEART RATE: 60 BPM

## 2025-06-10 LAB
ANION GAP SERPL CALCULATED.3IONS-SCNC: 14 MMOL/L (ref 7–16)
BASOPHILS # BLD: 0.06 K/UL (ref 0–0.2)
BASOPHILS NFR BLD: 1 % (ref 0–2)
BUN SERPL-MCNC: 26 MG/DL (ref 6–23)
CALCIUM SERPL-MCNC: 8.8 MG/DL (ref 8.6–10.2)
CHLORIDE SERPL-SCNC: 106 MMOL/L (ref 98–107)
CO2 SERPL-SCNC: 22 MMOL/L (ref 22–29)
CREAT SERPL-MCNC: 1.7 MG/DL (ref 0.7–1.2)
EOSINOPHIL # BLD: 0.27 K/UL (ref 0.05–0.5)
EOSINOPHILS RELATIVE PERCENT: 4 % (ref 0–6)
ERYTHROCYTE [DISTWIDTH] IN BLOOD BY AUTOMATED COUNT: 13.3 % (ref 11.5–15)
GFR, ESTIMATED: 38 ML/MIN/1.73M2
GLUCOSE SERPL-MCNC: 95 MG/DL (ref 74–99)
HCT VFR BLD AUTO: 25.7 % (ref 37–54)
HGB BLD-MCNC: 8.2 G/DL (ref 12.5–16.5)
IMM GRANULOCYTES # BLD AUTO: <0.03 K/UL (ref 0–0.58)
IMM GRANULOCYTES NFR BLD: 0 % (ref 0–5)
LYMPHOCYTES NFR BLD: 1.95 K/UL (ref 1.5–4)
LYMPHOCYTES RELATIVE PERCENT: 28 % (ref 20–42)
MCH RBC QN AUTO: 31.3 PG (ref 26–35)
MCHC RBC AUTO-ENTMCNC: 31.9 G/DL (ref 32–34.5)
MCV RBC AUTO: 98.1 FL (ref 80–99.9)
MONOCYTES NFR BLD: 0.59 K/UL (ref 0.1–0.95)
MONOCYTES NFR BLD: 9 % (ref 2–12)
NEUTROPHILS NFR BLD: 58 % (ref 43–80)
NEUTS SEG NFR BLD: 3.98 K/UL (ref 1.8–7.3)
PLATELET # BLD AUTO: 211 K/UL (ref 130–450)
PMV BLD AUTO: 10.6 FL (ref 7–12)
POTASSIUM SERPL-SCNC: 3.6 MMOL/L (ref 3.5–5)
RBC # BLD AUTO: 2.62 M/UL (ref 3.8–5.8)
SODIUM SERPL-SCNC: 142 MMOL/L (ref 132–146)
WBC OTHER # BLD: 6.9 K/UL (ref 4.5–11.5)

## 2025-06-10 PROCEDURE — 85025 COMPLETE CBC W/AUTO DIFF WBC: CPT

## 2025-06-10 PROCEDURE — 97161 PT EVAL LOW COMPLEX 20 MIN: CPT | Performed by: PHYSICAL THERAPIST

## 2025-06-10 PROCEDURE — 6370000000 HC RX 637 (ALT 250 FOR IP): Performed by: NURSE PRACTITIONER

## 2025-06-10 PROCEDURE — 97165 OT EVAL LOW COMPLEX 30 MIN: CPT

## 2025-06-10 PROCEDURE — 96361 HYDRATE IV INFUSION ADD-ON: CPT

## 2025-06-10 PROCEDURE — 80048 BASIC METABOLIC PNL TOTAL CA: CPT

## 2025-06-10 PROCEDURE — 36415 COLL VENOUS BLD VENIPUNCTURE: CPT

## 2025-06-10 PROCEDURE — 2500000003 HC RX 250 WO HCPCS: Performed by: NURSE PRACTITIONER

## 2025-06-10 PROCEDURE — 99239 HOSP IP/OBS DSCHRG MGMT >30: CPT | Performed by: INTERNAL MEDICINE

## 2025-06-10 RX ADMIN — TIMOLOL MALEATE 1 DROP: 5 SOLUTION OPHTHALMIC at 08:44

## 2025-06-10 RX ADMIN — ALLOPURINOL 100 MG: 100 TABLET ORAL at 08:39

## 2025-06-10 RX ADMIN — PANTOPRAZOLE SODIUM 40 MG: 40 TABLET, DELAYED RELEASE ORAL at 08:39

## 2025-06-10 RX ADMIN — SODIUM CHLORIDE, PRESERVATIVE FREE 10 ML: 5 INJECTION INTRAVENOUS at 08:38

## 2025-06-10 RX ADMIN — MAGNESIUM OXIDE 400 MG (241.3 MG MAGNESIUM) TABLET 200 MG: TABLET at 08:39

## 2025-06-10 RX ADMIN — METOPROLOL SUCCINATE 25 MG: 25 TABLET, EXTENDED RELEASE ORAL at 08:39

## 2025-06-10 RX ADMIN — Medication 325 MG: at 08:39

## 2025-06-10 RX ADMIN — ACETAMINOPHEN 650 MG: 325 TABLET ORAL at 00:46

## 2025-06-10 ASSESSMENT — PAIN DESCRIPTION - LOCATION
LOCATION: GENERALIZED

## 2025-06-10 ASSESSMENT — PAIN SCALES - GENERAL
PAINLEVEL_OUTOF10: 2
PAINLEVEL_OUTOF10: 4

## 2025-06-10 NOTE — DISCHARGE INSTRUCTIONS
Follow up with [facility] (Dr. Maddox/Patti/Manjinder/Silvia) in 2- 4 [phone].      Please continue to hold Lisinopril 10mg Daily at DC. Will require follow up with PCP prior to restarting.

## 2025-06-10 NOTE — CARE COORDINATION
6/10/25 1010 CM note: no covid testing, urine cx (+) 6/6/25. Hx chronic suprapubic catheter. Room air. Discharge order noted after urology assessment. NO new medications noted at this time. Discharge plan is home with resumption of [facility]. HHC order noted. Notified [facility] via careport of pts dc today. Pt resides with his dtr in a ranch home, [place]ste. He ambulates with  a walker outside of the home. Pt's dtr assists with cooking, cleaning and shopping. Pts dtr will transport home. Electronically signed by Claudia Yates RN on 6/10/2025 at 10:22 AM   
Potential DME:    Patient expects to discharge to: House  Plan for transportation at discharge:      Financial    Payor: [place] MEDICARE / Plan: [facility] MEDICARE / Product Type: *No Product type* /       Potential assistance Purchasing Medications:    Meds-to-Beds request:        [facility] [address] [facility] ([place]), [place] - [address] - P [phone] - F [phone]  [address] [facility] ([place]) [place] [address]  Phone: [phone] Fax: [phone]    [facility] [address] [place], [place] - [address] - P [phone] - F [phone]  [address]  [facility] [address]  Phone: [phone] Fax: [phone]    [id number] [place], [place] - [address] - P [phone] - F [phone]  [address]  [facility] [address]  Phone: [phone] Fax: [phone]      Notes:    Factors facilitating achievement of predicted outcomes: Family support, Cooperative, and Pleasant      Additional Case Management Notes: 6/9/2025 1441 CM note: Met with patient for care coordination plan. Pt is pleasant and [facility] He ambulates with  a walker outside of the home. Pt's dtr assists with cooking, cleaning and shopping. PCP is Dr ANTOLIN Etienne. Pt has a s/p catheter and is active with [facility]-referral placed in [facility]. Pt plans to return home at d/c, dtr will transport. Await therapy juarez Fong RN    The Plan for Transition of Care is related to the following treatment goals of Gross hematuria [R31.0]  Hematuria [R31.9]      The Patient and/or Patient Representative Agree with the Discharge Plan?  yes    Rubi Rayo RN  Case Management Department

## 2025-06-10 NOTE — TELEPHONE ENCOUNTER
Patient's daughter, Savi, called to schedule HFU appt.     Last seen 4/22/2025  Next appt 7/1/2025

## 2025-06-10 NOTE — DISCHARGE SUMMARY
[facility] Hospitalist       Hospitalist Physician Discharge Summary       Freddy Etienne MD  [address] [address]  [phone]    Schedule an appointment as soon as possible for a visit in 1 week(s)  Please call for follow up post hospital stay appt. Lisinopril 10mg Daily has been held throughout hospital stay. Will continue to hold at [place]. Address at follow up appointment please    Gianni Dunbar MD  [address] [facility] [address] [phone]    Schedule an appointment as soon as possible for a visit  Please call for follow up post hospital stay appt.      Future Appointments   Date Time Provider Department Center   7/1/2025 10:00 AM Freddy Etienne MD [facility] ECC DEP       Activity level: As Tolerated    Diet: ADULT DIET; Regular; Low Sodium (2 gm)        Condition at discharge: Stable    Dispo:Home        Patient ID:  Kyle Fox  [id number]  89 y.o.  6/2/1932    Admit date: 6/6/2025    Discharge date and time:  6/10/2025  10:01 AM    Admission Diagnoses: Principal Problem:    Hematuria  Active Problems:    Anemia    Hyperlipidemia    Primary hypertension    Hypotension due to drugs    Acute kidney injury superimposed on stage 4 chronic kidney disease (HCC)    Near syncope  Resolved Problems:    * No resolved hospital problems. *      Discharge Diagnoses: Principal Problem:    Hematuria  Active Problems:    Anemia    Hyperlipidemia    Primary hypertension    Hypotension due to drugs    Acute kidney injury superimposed on stage 4 chronic kidney disease (HCC)    Near syncope  Resolved Problems:    * No resolved hospital problems. *      Consults:  IP CONSULT TO UROLOGY  IP CONSULT TO UROLOGY    Procedures:   6/6 SP Cath exchange in ED    Hospital Course:   6/6 pt presented to ED with complaints of hematuria. Recent Cath exchange at home with home care. Developed hematuria and presented to ED. 3 way cath was placed in ED course. Urology was consulted. Pt

## 2025-06-10 NOTE — PLAN OF CARE
Problem: Safety - Adult  Goal: Free from fall injury  Outcome: Progressing     Problem: Chronic Conditions and Co-morbidities  Goal: Patient's chronic conditions and co-morbidity symptoms are monitored and maintained or improved  Outcome: Progressing     Problem: Discharge Planning  Goal: Discharge to home or other facility with appropriate resources  Outcome: Progressing     Problem: ABCDS Injury Assessment  Goal: Absence of physical injury  Outcome: Progressing     Problem: Pain  Goal: Verbalizes/displays adequate comfort level or baseline comfort level  Outcome: Progressing     Problem: Skin/Tissue Integrity  Goal: Skin integrity remains intact  Description: 1.  Monitor for areas of redness and/or skin breakdown2.  Assess vascular access sites hourly3.  Every 4-6 hours minimum:  Change oxygen saturation probe site4.  Every 4-6 hours:  If on nasal continuous positive airway pressure, respiratory therapy assess nares and determine need for appliance change or resting period  Outcome: Progressing

## 2025-06-10 NOTE — PLAN OF CARE
Problem: Safety - Adult  Goal: Free from fall injury  6/10/2025 1208 by Sailaja Laughlin RN  Outcome: Progressing  6/9/2025 2228 by Amy Gregory RN  Outcome: Progressing     Problem: Chronic Conditions and Co-morbidities  Goal: Patient's chronic conditions and co-morbidity symptoms are monitored and maintained or improved  6/10/2025 1208 by Sailaja Laughlin RN  Outcome: Progressing  6/9/2025 2228 by Amy Gregory RN  Outcome: Progressing     Problem: Discharge Planning  Goal: Discharge to home or other facility with appropriate resources  6/10/2025 1208 by Sailaja Laughlin RN  Outcome: Progressing  6/9/2025 2228 by Amy Gregory RN  Outcome: Progressing     Problem: ABCDS Injury Assessment  Goal: Absence of physical injury  6/10/2025 1208 by Sailaja Laughlin RN  Outcome: Progressing  6/9/2025 2228 by Amy Gregory RN  Outcome: Progressing     Problem: Pain  Goal: Verbalizes/displays adequate comfort level or baseline comfort level  6/10/2025 1208 by Sailaja Laughlin RN  Outcome: Progressing  6/9/2025 2228 by Amy Gregory RN  Outcome: Progressing     Problem: Skin/Tissue Integrity  Goal: Skin integrity remains intact  Description: 1.  Monitor for areas of redness and/or skin breakdown2.  Assess vascular access sites hourly3.  Every 4-6 hours minimum:  Change oxygen saturation probe site4.  Every 4-6 hours:  If on nasal continuous positive airway pressure, respiratory therapy assess nares and determine need for appliance change or resting period  6/10/2025 1208 by Sailaja Laughlin RN  Outcome: Progressing  6/9/2025 2228 by Amy Gregory RN  Outcome: Progressing

## 2025-06-10 NOTE — PROGRESS NOTES
[facility] Hospitalist   Progress Note    Admitting Date and Time: 6/6/2025 12:29 AM  Admit Dx: Gross hematuria [R31.0]  Hematuria [R31.9]    Subjective:    Pt feels much better this morning. Daughter at bedside states on Friday home nurse was in to see pat. States he had SP cath changed at that time. He went about his day without any issues. Later that night noted Hematuria. SP cath CBI stopped yesterday. Pink tinged Urine in tubing. Lives with Daughter in Ranch home. Uses cane intermittently.     ROS: denies fever, chills, cp, sob, n/v, HA unless stated above.     allopurinol  100 mg Oral Daily    ferrous sulfate  325 mg Oral BID    magnesium oxide  200 mg Oral Daily    pantoprazole  40 mg Oral QAM AC    atorvastatin  20 mg Oral Nightly    sodium chloride flush  5-40 mL IntraVENous 2 times per day    [Held by provider] lisinopril  10 mg Oral Daily    metoprolol succinate  25 mg Oral Daily    timolol  1 drop Both Eyes BID     sodium chloride, , PRN  sodium chloride flush, 5-40 mL, PRN  sodium chloride, , PRN  ondansetron, 4 mg, Q8H PRN   Or  ondansetron, 4 mg, Q6H PRN  polyethylene glycol, 17 g, Daily PRN  acetaminophen, 650 mg, Q6H PRN   Or  acetaminophen, 650 mg, Q6H PRN         Objective:    BP (!) 105/53   Pulse 59   Temp 97.9 °F (36.6 °C) (Oral)   Resp 17   Ht 1.778 m (5' 10\")   Wt 68.5 kg (151 lb)   SpO2 97%   BMI 21.67 kg/m²   General Appearance: alert and oriented to person, place and time and in no acute distress  Skin: warm and dry  Head: normocephalic and atraumatic  Eyes: pupils equal, round, and reactive to light, extraocular eye movements intact, conjunctivae normal  Neck: neck supple and non tender without mass   Pulmonary/Chest: clear to auscultation bilaterally- no wheezes, rales or rhonchi, normal air movement, no respiratory distress  Cardiovascular: normal rate, normal S1 and S2 and no RGM  Abdomen: soft, non-tender, non-distended, normal bowel sounds. 
4 Eyes Skin Assessment     NAME:  Kyle Fox  YOB: 1932  MEDICAL RECORD NUMBER:  [id number]    The patient is being assessed for  Admission    I agree that at least one RN has performed a thorough Head to Toe Skin Assessment on the patient. ALL assessment sites listed below have been assessed.      Areas assessed by both nurses:    Head, Face, Ears, Shoulders, Back, Chest, Arms, Elbows, Hands, Sacrum. Buttock, Coccyx, Ischium, and Legs. Feet and Heels        Does the Patient have a Wound? Yes wound(s) were present on assessment. LDA wound assessment was Initiated and completed by RN 2 red open areas on upper inner buttocks. Skin red and excoriated around S/P catheter site       Charan Prevention initiated by RN: No  Wound Care Orders initiated by RN: No    Pressure Injury (Stage 3,4, Unstageable, DTI, NWPT, and Complex wounds) if present, place Wound referral order by RN under : No    New Ostomies, if present place, Ostomy referral order under : No     Nurse 1 eSignature: Electronically signed by Crow Rios RN on 6/7/25 at 2:40 AM EDT    **SHARE this note so that the co-signing nurse can place an eSignature**    Nurse 2 eSignature: Electronically signed by Ollie Nicholson RN on 6/7/25 at 2:41 AM EDT   
[facility]  (Tan/ Patti/Manjinder)      PROGRESS NOTE         PATIENT NAME: Kyle Fox   YOB: 1932  ADMISSION DATE: 6/6/2025 12:29 AM   TODAY'S DATE: 6/7/2025        Subjective       Urine is very light pink with CBI at a light rate.  Patient is comfortable at this time        Objective     VS:   BP (!) 119/51   Pulse 60   Temp 98.2 °F (36.8 °C) (Oral)   Resp 18   Ht 1.778 m (5' 10\")   Wt 68.5 kg (151 lb)   SpO2 100%   BMI 21.67 kg/m²     I & O - 24hr:    Intake/Output Summary (Last 24 hours) at 6/7/2025 0857  Last data filed at 6/7/2025 0125  Gross per 24 hour   Intake 22884 ml   Output 92492 ml   Net -2050 ml         Physical Exam:  General:  Neck:  Resp:  Abdomen:   No acute distress  Supple  Normal effort  Soft, non-tender, nondistended   :  Suprapubic catheter very light pink on very slow drip   Skin: Skin color, texture, turgor normal, no rashes or lesions       Labs and Imaging Studies   Labs:    CBC:   Recent Labs     06/06/25  0109 06/06/25  0703 06/06/25  2045 06/07/25  0510   WBC 9.7 10.1  --  12.7*   HGB 11.2* 10.2* 9.3* 9.1*   HCT 35.1* 31.5* 28.9* 27.7*   MCV 97.2 95.5  --  97.2    304  --  324     BMP:  Recent Labs     06/06/25  0109 06/07/25  0510    140   K 4.6 4.5    102   CO2 28 22   BUN 31* 36*   CREATININE 1.7* 2.4*       Magnesium:   Lab Results   Component Value Date/Time    MG 1.9 05/09/2025 11:10 AM      Phosphate:   Lab Results   Component Value Date/Time    PHOS 2.8 05/09/2025 11:10 AM     PT/INR: No results for input(s): \"PROTIME\", \"INR\" in the last 72 hours.    U/A:   Lab Results   Component Value Date/Time    NITRITE NEG 02/05/2025 03:26 PM    LEUKOCYTESUR  * Unable to report due to color interference. 06/06/2025 01:09 AM    PHUR 6.5 06/06/2025 01:09 AM    PHUR 6.5 02/05/2025 03:26 PM    PHUR 6.0 02/01/2024 11:18 AM    WBCUA 21 TO 50 06/06/2025 01:09 AM    WBCUA NONE 01/09/2012 09:35 AM    RBCUA TOO NUMEROUS TO COUNT 06/06/2025 01:09 AM    
[facility]  (Tan/ Patti/Manjinder)      PROGRESS NOTE         PATIENT NAME: Kyle Fox   YOB: 1932  ADMISSION DATE: 6/6/2025 12:29 AM   TODAY'S DATE: 6/8/2025        Subjective       Almost clear urine CBI halted      Objective     VS:   BP (!) 117/50   Pulse 60   Temp 98.6 °F (37 °C) (Oral)   Resp 16   Ht 1.778 m (5' 10\")   Wt 68.5 kg (151 lb)   SpO2 97%   BMI 21.67 kg/m²     I & O - 24hr:    Intake/Output Summary (Last 24 hours) at 6/8/2025 0803  Last data filed at 6/8/2025 0704  Gross per 24 hour   Intake 3950 ml   Output 34780 ml   Net -8325 ml         Physical Exam:  General:  Neck:  Resp:  Abdomen:   No acute distress  Supple  Normal effort  Soft, non-tender, nondistended   :  Very light pink see-through urine CBI halted   Skin: Skin color, texture, turgor normal, no rashes or lesions       Labs and Imaging Studies   Labs:    CBC:   Recent Labs     06/06/25  0109 06/06/25  0703 06/06/25  2045 06/07/25  0510   WBC 9.7 10.1  --  12.7*   HGB 11.2* 10.2* 9.3* 9.1*   HCT 35.1* 31.5* 28.9* 27.7*   MCV 97.2 95.5  --  97.2    304  --  324     BMP:  Recent Labs     06/06/25  0109 06/07/25  0510    140   K 4.6 4.5    102   CO2 28 22   BUN 31* 36*   CREATININE 1.7* 2.4*       Magnesium:   Lab Results   Component Value Date/Time    MG 1.9 05/09/2025 11:10 AM      Phosphate:   Lab Results   Component Value Date/Time    PHOS 2.8 05/09/2025 11:10 AM     PT/INR: No results for input(s): \"PROTIME\", \"INR\" in the last 72 hours.    U/A:   Lab Results   Component Value Date/Time    NITRITE NEG 02/05/2025 03:26 PM    LEUKOCYTESUR  * Unable to report due to color interference. 06/06/2025 01:09 AM    PHUR 6.5 06/06/2025 01:09 AM    PHUR 6.5 02/05/2025 03:26 PM    PHUR 6.0 02/01/2024 11:18 AM    WBCUA 21 TO 50 06/06/2025 01:09 AM    WBCUA NONE 01/09/2012 09:35 AM    RBCUA TOO NUMEROUS TO COUNT 06/06/2025 01:09 AM    RBCUA NONE 01/17/2014 10:30 AM    BACTERIA 3+ 05/20/2025 07:25 PM    
Blood consent obtained and placed in soft chart  
CBI intake- 9000ml    CBI output- 8200ml    Leaking around suprapubic site, dressing change and linen changed twice in the past 12 hours.  
Continuous bladder irrigation intake 9000cc, CBI 3 way output 5150cc. Suprapubic site leaking and linens changed 3 times due to leaking. External catheter applied around insertion site with 400cc output.   
Dr. Elizondo notified of Hgb 6.6. New orders noted.  
Pt up to bsc to have a BM and stated he felt faint. Bp 76/312 nurses assisted him back to bed and placed in reverse trendelenburg position. He was unresponsive for seconds than was alert and talking. Bp 96/48, 144/54 thereafter. Pt alert and oriented x 4. Notified Dr. Hale. New orders received.   
Spiritual Health History and Assessment/Progress Note  [facility]    (P) Spiritual/Emotional Needs,  ,  ,      Name: Kyle Fox MRN: [id number]    Age: 89 y.o.     Sex: male   Language: English   Judaism: Presbyterian   Hematuria     Date: 6/9/2025                           Spiritual Assessment began in [facility] MED SURG TELE        Referral/Consult From: (P) Rounding   Encounter Overview/Reason: (P) Spiritual/Emotional Needs  Service Provided For: (P) Patient and family together    Davida, Belief, Meaning:   Patient identifies as spiritual  Family/Friends identify as spiritual      Importance and Influence:  Patient has spiritual/personal beliefs that influence decisions regarding their health  Family/Friends have spiritual/personal beliefs that influence decisions regarding the patient's health    Community:  Patient feels well-supported. Support system includes: Children  Family/Friends feel well-supported. Support system includes: Extended family    Assessment and Plan of Care:     Patient Interventions include: Facilitated expression of thoughts and feelings, Explored spiritual coping/struggle/distress, and Affirmed coping skills/support systems  Family/Friends Interventions include: Facilitated expression of thoughts and feelings, Explored spiritual coping/struggle/distress, and Affirmed coping skills/support systems    Patient Plan of Care: Spiritual Care available upon further referral  Family/Friends Plan of Care: No family/friends present    Electronically signed by CLEMENT Chang on 6/9/2025 at 11:26 AM   
clots  -urology following, ordered irrigation and noted that leakage around catheter site to be managed by nursing with absorbent pads prn  -Hgb down 1pt since arrival, but currently stable >9  -hold home asa for now    Presyncope  -presyncopal overnight while getting to [facility], BP 70's/30's. Received albumin x1 and BP has improved, though remains soft  -hold parameters placed on antihypertensives  -check ortho BP  -up with assist  -IVF as below     Acute on chronic iron deficiency anemia  -continue home iron supplementation    CKD stage IV  -follows with Dr. Saleh  -though baseline sCr ~2.5, his initial sCr was 1.7 yesterday and bumped up to 2.4 this am.   -in light of orthostatis and change in sCr, will start gentle IV hydration with LR@50ml/hr  -monitor volume status  -continue home allopurinol     HTN  -continue home metoprolol & lisinopril with hold parameters  -monitor BP     HLD  -continue home statin        Code Status: DNR-CCA  DVT prophylaxis: SCD's    Disposition: From home. Currently anticipate additional 1-2 days for work-up, treatment & monitoring to ensure safe discharge.    Medications:  REVIEWED DAILY  Radiology: REVIEWED DAILY    Infusion Medications    sodium chloride       Scheduled Medications    ferrous sulfate  325 mg Oral BID    magnesium oxide  200 mg Oral Daily    pantoprazole  40 mg Oral QAM AC    atorvastatin  20 mg Oral Nightly    sodium chloride flush  5-40 mL IntraVENous 2 times per day    lisinopril  10 mg Oral Daily    metoprolol succinate  25 mg Oral Daily    timolol  1 drop Both Eyes BID     PRN Meds: sodium chloride flush, sodium chloride, ondansetron **OR** ondansetron, polyethylene glycol, acetaminophen **OR** acetaminophen    Recent Labs     06/06/25  0109 06/07/25  0510    140   K 4.6 4.5    102   CO2 28 22   BUN 31* 36*   CREATININE 1.7* 2.4*   GLUCOSE 119* 136*   CALCIUM 9.7 8.9     No results for input(s): \"MG\" in the last 72 hours.  No results for input(s): \"PHOS\" 
of 64, lungs cta, abd pos bs soft nt, ext neg for le edema    I agree with the assessment and plan of RAJAN Rolon - NP    35 min spent reviewing records, interviewing/examining pt, analyzing data, discussing with nursing, formulating treatment plan as noted in NP's note.     Hematuria  Anemia with acute blood loss component  Htn  hyperlipidemia    Transfusion discussed with pt. Time given for questions and all questions answered.       Electronically signed by Terry Elizondo D.O.  Hospitalist  [facility]    
observation of tasks, assessment of data and development of plan of care and goals.        Lyn Haider, OTR/L #[id number]   
while in hospital , ankle pumps, quad set and glut set for edema control, blood clot prevention, importance and purpose of adaptive device and adjusted to proper height for the patient., safety , stair training , and positioning for skin integrity and comfort     Patient response to education:   Pt verbalized understanding Pt demonstrated skill Pt requires further education in this area   Yes Partial Yes      Treatment:  Patient practiced and was instructed/facilitated in the following treatment: Patient Sat edge of bed 5 minutes with Supervision  to increase dynamic sitting balance and activity tolerance. Pt performed bed mobility, transfers, ambulation in room.      Therapeutic Exercises:  not performed      At end of session, patient in chair with alarm call light and phone within reach,  all lines and tubes intact, nursing notified.      Patient would benefit from continued skilled Physical Therapy to improve functional independence and quality of life.         Patient's/ family goals   get stronger    Time in    1020  Time out  1040    Total Treatment Time  0  minutes    Evaluation time includes thorough review of current medical information, gathering information on past medical history/social history and prior level of function, completion of standardized testing/informal observation of tasks, assessment of data, and development of Plan of care and goals.     CPT codes:  Low Complexity PT evaluation (11393)  No treatment billed    Fabiano Pugh, PT

## 2025-06-11 ENCOUNTER — CARE COORDINATION (OUTPATIENT)
Dept: CARE COORDINATION | Age: 89
End: 2025-06-11

## 2025-06-11 NOTE — CARE COORDINATION
Care Transitions Note    Initial Call - Call within 2 business days of discharge: Yes    Attempted to reach patient for transitions of care follow up. Unable to reach patient.    Outreach Attempts:   HIPAA compliant voicemail left for patient.  First attempt    Patient: Kyle Fox    Patient : 1932   MRN: [id number]    Reason for Admission: Gross hematuria  Discharge Date: 6/10/25  RURS: Readmission Risk Score: 15.2    Last Discharge Facility       Date Complaint Diagnosis Description Type Department Provider    25 Hematuria Gross hematuria ED to Hosp-Admission (Discharged) (ADMITTED) Pedro Cordero MD; Brian Barcenas...            Was this an external facility discharge? No    Follow Up Appointment:   Patient has hospital follow up appointment scheduled within 14 days of discharge.    Future Appointments         Provider Specialty Dept Phone    2025 11:00 AM Freddy Etienne MD Family Medicine [phone]    2025 10:00 AM Freddy Etienne MD Family Medicine [phone]            Plan for follow-up on next business day.      Estefani Delgado RN

## 2025-06-11 NOTE — CARE COORDINATION
-CTN phoned [facility] and spoke to [place], next visit will be tomorrow (6/12/25) or Friday (6/13/25.)

## 2025-06-12 ENCOUNTER — CARE COORDINATION (OUTPATIENT)
Dept: CARE COORDINATION | Age: 89
End: 2025-06-12

## 2025-06-12 NOTE — CARE COORDINATION
Care Transitions Note    Initial Call - Call within 2 business days of discharge: Yes    Attempted to reach patient for transitions of care follow up. Unable to reach patient.    Outreach Attempts:   HIPAA compliant voicemail left for patient.  Second attempt.  Unable to reach letter mailed to address on file.     Patient: Kyle Fox    Patient : 1932   MRN: [id number]    Reason for Admission: Gross hematuria  Discharge Date: 6/10/25  RURS: Readmission Risk Score: 15.2    Last Discharge Facility       Date Complaint Diagnosis Description Type Department Provider    25 Hematuria Gross hematuria ED to Hosp-Admission (Discharged) (ADMITTED) Pedro Cordero MD; Brian Barcenas...            Was this an external facility discharge? No    Follow Up Appointment:   Patient has hospital follow up appointment scheduled within 14 days of discharge.    Future Appointments         Provider Specialty Dept Phone    2025 11:00 AM Freddy Etienne MD Family Medicine [phone]    2025 10:00 AM Freddy Etienne MD Family Medicine [phone]            No further follow-up call indicated     Estefani Delgado RN

## 2025-06-17 ENCOUNTER — OFFICE VISIT (OUTPATIENT)
Dept: FAMILY MEDICINE CLINIC | Age: 89
End: 2025-06-17

## 2025-06-17 VITALS
WEIGHT: 151 LBS | DIASTOLIC BLOOD PRESSURE: 64 MMHG | OXYGEN SATURATION: 100 % | SYSTOLIC BLOOD PRESSURE: 122 MMHG | HEART RATE: 61 BPM | BODY MASS INDEX: 21.67 KG/M2

## 2025-06-17 DIAGNOSIS — N18.4 STAGE 4 CHRONIC KIDNEY DISEASE (HCC): ICD-10-CM

## 2025-06-17 DIAGNOSIS — E78.00 PURE HYPERCHOLESTEROLEMIA: ICD-10-CM

## 2025-06-17 DIAGNOSIS — I10 ESSENTIAL HYPERTENSION: ICD-10-CM

## 2025-06-17 DIAGNOSIS — Z09 HOSPITAL DISCHARGE FOLLOW-UP: Primary | ICD-10-CM

## 2025-06-17 DIAGNOSIS — Z93.59 SUPRAPUBIC CATHETER (HCC): ICD-10-CM

## 2025-06-17 DIAGNOSIS — D50.0 IRON DEFICIENCY ANEMIA DUE TO CHRONIC BLOOD LOSS: ICD-10-CM

## 2025-06-17 DIAGNOSIS — R31.0 GROSS HEMATURIA: ICD-10-CM

## 2025-06-17 DIAGNOSIS — I50.22 CHRONIC SYSTOLIC (CONGESTIVE) HEART FAILURE (HCC): ICD-10-CM

## 2025-06-17 NOTE — PATIENT INSTRUCTIONS
LOW SALT FOR BLOOD PRESSURE CONTROL.  LOW FAT DIET FOR CHOLESTEROL CONTROL.  DRINK ENOUGH FLUIDS FOR BETTER KIDNEY FUNCTION.  TAKE  PRINIVIL 10 MG AND TOPROL XL 25 MG. 1 TAB. DAILY FOR BLOOD PRESSURE CONTROL.  TAKE  ZOCOR 40 MG. 1 TAB. DAILY.  FOR CHOLESTEROL CONTROL..  TAKE  FEOSOL 1 TAB. 2 TIMES A DAY AND MULTIVITAMIN 1 TAB. DAILY TO IMPROVE BLOOD COUNT.  TAKE CELEBREX 100 MG. DAILY AS NEEDED FOR HIP PAINS.  REGULAR WALKING ADVISED.  CONTINUE FOLLOW UP WITH DR. AGUILLON FOR KIDNEY PROBLEM.  CONTINUE FOLLOW UP WITH DR. PINTO FOR PROSTATE PROBLEM.  KEEP NEXT APPOINTMENT IN 2 WEEKS FOR ANNUAL PHYSICAL EXAMINATION.

## 2025-06-17 NOTE — PROGRESS NOTES
Objective:    /64   Pulse 61   Wt 68.5 kg (151 lb)   SpO2 100%   BMI 21.67 kg/m²   Physical Exam  General appearance: Alert, Awake, Oriented times 3, no distress  Skin: Warm and dry  Head: Normocephalic. No masses, lesions or tenderness noted  Eyes: Conjunctivae appear normal. PERLE  Ears: External ears normal  Nose/Sinuses: Nares normal. Septum midline. Mucosa normal. No drainage  Oropharynx: Oropharynx clear with no exudate seen  Neck: Neck supple. No jugular venous distension, lymphadenopathy or thyromegaly Trachea midline  Chest:  Normal. Movements are Normal and Equal.  Lungs: Lungs clear to auscultation bilaterally.  No ronchi, crackles or wheezes  Heart: S1 S2  Regular rate and rhythm. No rub, murmur or gallop  Abdomen: Abdomen soft, non-tender. BS normal. No masses, organomegaly.  Back: Grossly Normal and Equal. DTR are Normal. SLR is Normal.  Extremities: Arthritic changes are noted. Movements are limited. Pedal pulses are normal.  Musculoskeletal: Muscular strength appears intact. No joint effusion, tenderness, swelling or warmth  Neuro:  No focal motor or sensory deficits  An electronic signature was used to authenticate this note.  --Freddy Etienne MD

## 2025-07-01 ENCOUNTER — OFFICE VISIT (OUTPATIENT)
Dept: FAMILY MEDICINE CLINIC | Age: 89
End: 2025-07-01
Payer: MEDICARE

## 2025-07-01 VITALS
OXYGEN SATURATION: 98 % | DIASTOLIC BLOOD PRESSURE: 70 MMHG | HEART RATE: 58 BPM | WEIGHT: 149 LBS | SYSTOLIC BLOOD PRESSURE: 126 MMHG | BODY MASS INDEX: 21.33 KG/M2 | HEIGHT: 70 IN

## 2025-07-01 DIAGNOSIS — I50.22 CHRONIC SYSTOLIC (CONGESTIVE) HEART FAILURE (HCC): ICD-10-CM

## 2025-07-01 DIAGNOSIS — M25.552 PAIN OF BOTH HIP JOINTS: ICD-10-CM

## 2025-07-01 DIAGNOSIS — N18.4 STAGE 4 CHRONIC KIDNEY DISEASE (HCC): ICD-10-CM

## 2025-07-01 DIAGNOSIS — D50.0 IRON DEFICIENCY ANEMIA DUE TO CHRONIC BLOOD LOSS: ICD-10-CM

## 2025-07-01 DIAGNOSIS — M25.551 PAIN OF BOTH HIP JOINTS: ICD-10-CM

## 2025-07-01 DIAGNOSIS — I10 ESSENTIAL HYPERTENSION: ICD-10-CM

## 2025-07-01 DIAGNOSIS — E78.00 PURE HYPERCHOLESTEROLEMIA: ICD-10-CM

## 2025-07-01 DIAGNOSIS — Z00.00 MEDICARE ANNUAL WELLNESS VISIT, SUBSEQUENT: Primary | ICD-10-CM

## 2025-07-01 PROCEDURE — G0439 PPPS, SUBSEQ VISIT: HCPCS | Performed by: FAMILY MEDICINE

## 2025-07-01 PROCEDURE — 1123F ACP DISCUSS/DSCN MKR DOCD: CPT | Performed by: FAMILY MEDICINE

## 2025-07-01 PROCEDURE — 1159F MED LIST DOCD IN RCRD: CPT | Performed by: FAMILY MEDICINE

## 2025-07-01 PROCEDURE — 1160F RVW MEDS BY RX/DR IN RCRD: CPT | Performed by: FAMILY MEDICINE

## 2025-07-01 RX ORDER — CELECOXIB 100 MG/1
100 CAPSULE ORAL DAILY PRN
Qty: 30 CAPSULE | Refills: 1 | Status: SHIPPED | OUTPATIENT
Start: 2025-07-01

## 2025-07-01 RX ORDER — FERROUS SULFATE 325(65) MG
325 TABLET ORAL 2 TIMES DAILY
Qty: 100 TABLET | Refills: 3 | Status: SHIPPED | OUTPATIENT
Start: 2025-07-01

## 2025-07-01 RX ORDER — PANTOPRAZOLE SODIUM 40 MG/1
40 TABLET, DELAYED RELEASE ORAL DAILY
Qty: 90 TABLET | Refills: 1 | Status: SHIPPED | OUTPATIENT
Start: 2025-07-01

## 2025-07-01 RX ORDER — TIMOLOL MALEATE 5 MG/ML
1 SOLUTION/ DROPS OPHTHALMIC 2 TIMES DAILY
Qty: 5 ML | Refills: 0 | Status: SHIPPED | OUTPATIENT
Start: 2025-07-01

## 2025-07-01 RX ORDER — METOPROLOL SUCCINATE 25 MG/1
25 TABLET, EXTENDED RELEASE ORAL DAILY
Qty: 90 TABLET | Refills: 0 | Status: SHIPPED | OUTPATIENT
Start: 2025-07-01

## 2025-07-01 RX ORDER — SIMVASTATIN 40 MG
40 TABLET ORAL NIGHTLY
Qty: 90 TABLET | Refills: 1 | Status: SHIPPED | OUTPATIENT
Start: 2025-07-01

## 2025-07-01 ASSESSMENT — PATIENT HEALTH QUESTIONNAIRE - PHQ9
1. LITTLE INTEREST OR PLEASURE IN DOING THINGS: NOT AT ALL
SUM OF ALL RESPONSES TO PHQ QUESTIONS 1-9: 0
SUM OF ALL RESPONSES TO PHQ QUESTIONS 1-9: 0
2. FEELING DOWN, DEPRESSED OR HOPELESS: NOT AT ALL
SUM OF ALL RESPONSES TO PHQ QUESTIONS 1-9: 0
SUM OF ALL RESPONSES TO PHQ QUESTIONS 1-9: 0

## 2025-07-01 ASSESSMENT — LIFESTYLE VARIABLES
HOW OFTEN DO YOU HAVE A DRINK CONTAINING ALCOHOL: NEVER
HOW MANY STANDARD DRINKS CONTAINING ALCOHOL DO YOU HAVE ON A TYPICAL DAY: PATIENT DOES NOT DRINK
HOW MANY STANDARD DRINKS CONTAINING ALCOHOL DO YOU HAVE ON A TYPICAL DAY: PATIENT DOES NOT DRINK

## 2025-07-01 NOTE — PATIENT INSTRUCTIONS
LOW SALT FOR BLOOD PRESSURE CONTROL.  LOW FAT DIET FOR CHOLESTEROL CONTROL.  DRINK ENOUGH FLUIDS FOR BETTER KIDNEY FUNCTION.  TAKE  PRINIVIL 10 MG AND TOPROL XL 25 MG. 1 TAB. DAILY FOR BLOOD PRESSURE CONTROL.  TAKE  ZOCOR 40 MG. 1 TAB. DAILY.  FOR CHOLESTEROL CONTROL..  TAKE  FEOSOL 1 TAB. 2 TIMES A DAY AND MULTIVITAMIN 1 TAB. DAILY TO IMPROVE BLOOD COUNT.  TAKE CELEBREX 100 MG. DAILY AS NEEDED FOR HIP PAINS.  REGULAR WALKING ADVISED.  CONTINUE FOLLOW UP WITH DR. AGUILLON FOR KIDNEY PROBLEM.  CONTINUE FOLLOW UP WITH DR. PINTO FOR PROSTATE PROBLEM.  NEXT APPOINTMENT IN 2 MONTHS.       Learning About Emotional Support  When do you need emotional support?     You might find getting support from others helpful when you have a long-term health problem. Often people feel alone, confused, or scared when coping with an illness. But you aren't alone. Other people are going through the same thing you are and know how you feel.  Talking with others about your feelings can help you feel better.  Your family and friends can give you support. So can your doctor, a support group, or a [employer]. If you have a support network, you will not feel as alone. You will learn new ways to deal with your situation, and you may try harder to overcome it.  Where you can get support  Family and friends: They can help you cope by giving you comfort and encouragement.  Counseling: Professional counseling can help you cope with situations that interfere with your life and cause stress. Counseling can help you understand and deal with your illness.  Your doctor: Find a doctor you trust and feel comfortable with. Be open and honest about your fears and concerns. Your doctor can help you get the right medical treatments, including counseling.  Spiritual or Congregational groups: They can provide comfort and may be able to help you find counseling or other social support services.  Social groups: They can help you meet new people and get involved in activities

## 2025-07-01 NOTE — PROGRESS NOTES
Medicare Annual Wellness Visit    Kyle Fox is here for Medicare AWV    Assessment & Plan   Medicare annual wellness visit, subsequent  Pure hypercholesterolemia  Essential hypertension  Chronic systolic (congestive) heart failure (HCC)  Stage 4 chronic kidney disease (HCC)  Iron deficiency anemia due to chronic blood loss  Pain of both hip joints       Return in 2 months (on 9/1/2025) for FOLLOW UP VISIT AND  Medicare Annual Wellness Visit in 1 year.     Subjective   The following acute and/or chronic problems were also addressed today:  AS LISTED ABOVE    Patient's complete Health Risk Assessment and screening values have been reviewed and are found in Flowsheets. The following problems were reviewed today and where indicated follow up appointments were made and/or referrals ordered.    Positive Risk Factor Screenings with Interventions:                 Dentist Screen:  Have you seen the dentist within the past year?: (!) No    Intervention:  Not applicable - dentures     Vision Screen:  Do you have difficulty driving, watching TV, or doing any of your daily activities because of your eyesight?: No  Have you had an eye exam within the past year?: (!) No  Interventions:   Patient comments: LAST SEEN EYE DOCTOR 2 YEARS AGO.  Patient encouraged to make appointment with their eye specialist     ADL's:   Patient reports needing help with:  Select all that apply: (!) Laundry, Housekeeping, Banking/Finances, Shopping, Food Preparation, Transportation  Interventions:  Patient comments: DAUGHTER HELPS WITH ALL HIS ADL.  See above                  Objective   Vitals:    07/01/25 1001   BP: 126/70   Pulse: 58   SpO2: 98%   Weight: 67.6 kg (149 lb)   Height: 1.778 m (5' 10\")      Body mass index is 21.38 kg/m².      General Appearance: alert and oriented to person, place and time, well developed and well- nourished, in no acute distress  Skin: warm and dry, no rash or erythema  Head: normocephalic and atraumatic  Eyes:

## 2025-07-16 ENCOUNTER — CARE COORDINATION (OUTPATIENT)
Dept: CARE COORDINATION | Age: 89
End: 2025-07-16

## 2025-07-16 NOTE — CARE COORDINATION
Ambulatory Care Coordination Note     7/16/2025 4:20 PM     Patient outreach attempt by this ACM today to offer care management services. ACM was unable to reach the patient by telephone today;   left voice message requesting a return phone call to this ACM.  letter mailed requesting patient  to contact this ACM.      ACM: Shelli Porras RN     Care Summary Note:       PCP/Specialist follow up:   Future Appointments         Provider Specialty Dept Phone    9/2/2025 10:30 AM Freddy Etienne MD Family Medicine [phone]    7/7/2026 10:30 AM Freddy Etienne MD Family Medicine [phone]            Follow Up:   Plan for next ACM outreach in approximately 2 weeks to complete:  - outreach attempt to offer care management services.

## 2025-08-05 RX ORDER — TIMOLOL MALEATE 5 MG/ML
SOLUTION/ DROPS OPHTHALMIC
Qty: 5 ML | Refills: 0 | Status: SHIPPED | OUTPATIENT
Start: 2025-08-05

## 2025-08-12 ENCOUNTER — CARE COORDINATION (OUTPATIENT)
Dept: CARE COORDINATION | Age: 89
End: 2025-08-12

## 2025-08-26 ENCOUNTER — CARE COORDINATION (OUTPATIENT)
Dept: CARE COORDINATION | Age: 89
End: 2025-08-26

## 2025-09-02 ENCOUNTER — OFFICE VISIT (OUTPATIENT)
Dept: FAMILY MEDICINE CLINIC | Age: 89
End: 2025-09-02

## 2025-09-02 VITALS
BODY MASS INDEX: 22.42 KG/M2 | HEART RATE: 64 BPM | TEMPERATURE: 98.1 F | HEIGHT: 70 IN | DIASTOLIC BLOOD PRESSURE: 68 MMHG | WEIGHT: 156.6 LBS | SYSTOLIC BLOOD PRESSURE: 134 MMHG | OXYGEN SATURATION: 95 %

## 2025-09-02 DIAGNOSIS — E78.00 PURE HYPERCHOLESTEROLEMIA: Primary | ICD-10-CM

## 2025-09-02 DIAGNOSIS — I50.22 CHRONIC SYSTOLIC (CONGESTIVE) HEART FAILURE (HCC): ICD-10-CM

## 2025-09-02 DIAGNOSIS — I10 ESSENTIAL HYPERTENSION: ICD-10-CM

## 2025-09-02 DIAGNOSIS — Z93.59 SUPRAPUBIC CATHETER (HCC): ICD-10-CM

## 2025-09-02 DIAGNOSIS — N18.4 STAGE 4 CHRONIC KIDNEY DISEASE (HCC): ICD-10-CM

## 2025-09-02 DIAGNOSIS — D50.0 IRON DEFICIENCY ANEMIA DUE TO CHRONIC BLOOD LOSS: ICD-10-CM

## (undated) DEVICE — TOWEL,OR,DSP,ST,BLUE,STD,6/PK,12PK/CS: Brand: MEDLINE

## (undated) DEVICE — SOLUTION IRRIGATION STRL H2O 3000 ML 4/CA

## (undated) DEVICE — CATHETER URET 5FR L70CM TIP 8FR OPN END CONE TIP INJ HUB

## (undated) DEVICE — ELECTRODE ES 28FR WIRE 0.012IN BLU R ANG CUT LOOP STBL FOR

## (undated) DEVICE — GARMENT,MEDLINE,DVT,INT,CALF,MED, GEN2: Brand: MEDLINE

## (undated) DEVICE — CAMERA STRYKER 1488 HD GEN

## (undated) DEVICE — CYSTO PACK: Brand: MEDLINE INDUSTRIES, INC.

## (undated) DEVICE — BAG DRAINAGE CONTAINER 15 LT FLUID COLLCTN

## (undated) DEVICE — SOLUTION IRRIGATION GLYCINE 1.5% 3000 ML USP 4/CA

## (undated) DEVICE — ELECTRODE ES VPR FOR USA ELITE SYS

## (undated) DEVICE — CIRCON 30/70 URO LENS

## (undated) DEVICE — CATHETER URETH 24FR L16IN BLLN 30CC SIL COUVELAIRE TIP 3 W

## (undated) DEVICE — BASIC SINGLE BASIN 1-LF: Brand: MEDLINE INDUSTRIES, INC.

## (undated) DEVICE — SOLUTION IV IRRIG WATER 1000ML POUR BRL 2F7114

## (undated) DEVICE — SYRINGE CATH TIP 50ML

## (undated) DEVICE — CATHETER,FOLEY,3WAY,SILI-ELAST,24FR,30ML: Brand: MEDLINE

## (undated) DEVICE — COVER,LIGHT HANDLE,FLX,1/PK: Brand: MEDLINE INDUSTRIES, INC.

## (undated) DEVICE — TUBING, SUCTION, 1/4" X 10', STRAIGHT: Brand: MEDLINE

## (undated) DEVICE — GAUZE,SPONGE,4"X4",16PLY,XRAY,STRL,LF: Brand: MEDLINE

## (undated) DEVICE — BAG DRNGE COMB PK

## (undated) DEVICE — GOWN,SIRUS,NONRNF,SETINSLV,XL,20/CS: Brand: MEDLINE

## (undated) DEVICE — CIRCON 21F CYSTO TRAY

## (undated) DEVICE — CATHETER ETER URETH 30FR BLLN 5CC LTX 2 W F BARDX LUB

## (undated) DEVICE — NDL CNTR 40CT FM MAG: Brand: MEDLINE INDUSTRIES, INC.

## (undated) DEVICE — Z INACTIVE USE 2635503 SOLUTION IRRIG 3000ML ST H2O USP UROMATIC PLAS CONT

## (undated) DEVICE — GLOVE ORANGE PI 7 1/2   MSG9075